# Patient Record
Sex: FEMALE | Race: BLACK OR AFRICAN AMERICAN | Employment: UNEMPLOYED | URBAN - METROPOLITAN AREA
[De-identification: names, ages, dates, MRNs, and addresses within clinical notes are randomized per-mention and may not be internally consistent; named-entity substitution may affect disease eponyms.]

---

## 2017-01-27 ENCOUNTER — HOSPITAL ENCOUNTER (EMERGENCY)
Age: 47
Discharge: HOME | End: 2017-01-28
Attending: EMERGENCY MEDICINE
Payer: MEDICAID

## 2017-01-27 DIAGNOSIS — K50.111 CROHN'S COLITIS, WITH RECTAL BLEEDING (HCC): Primary | ICD-10-CM

## 2017-01-27 DIAGNOSIS — S40.021A CONTUSION OF RIGHT UPPER ARM, INITIAL ENCOUNTER: ICD-10-CM

## 2017-01-27 LAB
ALBUMIN SERPL BCP-MCNC: 3.9 G/DL (ref 3.4–5)
ALBUMIN/GLOB SERPL: 1 {RATIO} (ref 0.8–1.7)
ALP SERPL-CCNC: 52 U/L (ref 45–117)
ALT SERPL-CCNC: 21 U/L (ref 13–56)
ANION GAP BLD CALC-SCNC: 9 MMOL/L (ref 3–18)
AST SERPL W P-5'-P-CCNC: 25 U/L (ref 15–37)
BASOPHILS # BLD AUTO: 0 K/UL (ref 0–0.06)
BASOPHILS # BLD: 0 % (ref 0–2)
BILIRUB SERPL-MCNC: 0.4 MG/DL (ref 0.2–1)
BUN SERPL-MCNC: 15 MG/DL (ref 7–18)
BUN/CREAT SERPL: 20 (ref 12–20)
CALCIUM SERPL-MCNC: 8.7 MG/DL (ref 8.5–10.1)
CHLORIDE SERPL-SCNC: 103 MMOL/L (ref 100–108)
CO2 SERPL-SCNC: 26 MMOL/L (ref 21–32)
CREAT SERPL-MCNC: 0.75 MG/DL (ref 0.6–1.3)
DIFFERENTIAL METHOD BLD: ABNORMAL
EOSINOPHIL # BLD: 0.3 K/UL (ref 0–0.4)
EOSINOPHIL NFR BLD: 3 % (ref 0–5)
ERYTHROCYTE [DISTWIDTH] IN BLOOD BY AUTOMATED COUNT: 15.8 % (ref 11.6–14.5)
GLOBULIN SER CALC-MCNC: 4 G/DL (ref 2–4)
GLUCOSE SERPL-MCNC: 90 MG/DL (ref 74–99)
HCT VFR BLD AUTO: 34.6 % (ref 35–45)
HGB BLD-MCNC: 10.8 G/DL (ref 12–16)
LIPASE SERPL-CCNC: 136 U/L (ref 73–393)
LYMPHOCYTES # BLD AUTO: 19 % (ref 21–52)
LYMPHOCYTES # BLD: 1.7 K/UL (ref 0.9–3.6)
MAGNESIUM SERPL-MCNC: 1.5 MG/DL (ref 1.8–2.4)
MCH RBC QN AUTO: 23.5 PG (ref 24–34)
MCHC RBC AUTO-ENTMCNC: 31.2 G/DL (ref 31–37)
MCV RBC AUTO: 75.4 FL (ref 74–97)
MONOCYTES # BLD: 0.7 K/UL (ref 0.05–1.2)
MONOCYTES NFR BLD AUTO: 8 % (ref 3–10)
NEUTS SEG # BLD: 6.3 K/UL (ref 1.8–8)
NEUTS SEG NFR BLD AUTO: 70 % (ref 40–73)
PLATELET # BLD AUTO: 312 K/UL (ref 135–420)
PMV BLD AUTO: 9.7 FL (ref 9.2–11.8)
POTASSIUM SERPL-SCNC: 4.8 MMOL/L (ref 3.5–5.5)
PROT SERPL-MCNC: 7.9 G/DL (ref 6.4–8.2)
RBC # BLD AUTO: 4.59 M/UL (ref 4.2–5.3)
SODIUM SERPL-SCNC: 138 MMOL/L (ref 136–145)
WBC # BLD AUTO: 9 K/UL (ref 4.6–13.2)

## 2017-01-27 PROCEDURE — 85652 RBC SED RATE AUTOMATED: CPT | Performed by: EMERGENCY MEDICINE

## 2017-01-27 PROCEDURE — 96361 HYDRATE IV INFUSION ADD-ON: CPT

## 2017-01-27 PROCEDURE — 80053 COMPREHEN METABOLIC PANEL: CPT | Performed by: EMERGENCY MEDICINE

## 2017-01-27 PROCEDURE — 85025 COMPLETE CBC W/AUTO DIFF WBC: CPT | Performed by: EMERGENCY MEDICINE

## 2017-01-27 PROCEDURE — 96374 THER/PROPH/DIAG INJ IV PUSH: CPT

## 2017-01-27 PROCEDURE — 83690 ASSAY OF LIPASE: CPT | Performed by: EMERGENCY MEDICINE

## 2017-01-27 PROCEDURE — 81003 URINALYSIS AUTO W/O SCOPE: CPT | Performed by: EMERGENCY MEDICINE

## 2017-01-27 PROCEDURE — 99284 EMERGENCY DEPT VISIT MOD MDM: CPT

## 2017-01-27 PROCEDURE — 83735 ASSAY OF MAGNESIUM: CPT | Performed by: EMERGENCY MEDICINE

## 2017-01-27 PROCEDURE — 74011250636 HC RX REV CODE- 250/636: Performed by: EMERGENCY MEDICINE

## 2017-01-27 PROCEDURE — 96375 TX/PRO/DX INJ NEW DRUG ADDON: CPT

## 2017-01-27 RX ORDER — MORPHINE SULFATE 2 MG/ML
4 INJECTION, SOLUTION INTRAMUSCULAR; INTRAVENOUS
Status: COMPLETED | OUTPATIENT
Start: 2017-01-27 | End: 2017-01-28

## 2017-01-27 RX ORDER — SODIUM CHLORIDE 0.9 % (FLUSH) 0.9 %
5-10 SYRINGE (ML) INJECTION EVERY 8 HOURS
Status: DISCONTINUED | OUTPATIENT
Start: 2017-01-27 | End: 2017-01-28 | Stop reason: HOSPADM

## 2017-01-27 RX ORDER — ONDANSETRON 2 MG/ML
4 INJECTION INTRAMUSCULAR; INTRAVENOUS
Status: COMPLETED | OUTPATIENT
Start: 2017-01-27 | End: 2017-01-27

## 2017-01-27 RX ORDER — SODIUM CHLORIDE 0.9 % (FLUSH) 0.9 %
5-10 SYRINGE (ML) INJECTION AS NEEDED
Status: DISCONTINUED | OUTPATIENT
Start: 2017-01-27 | End: 2017-01-28 | Stop reason: HOSPADM

## 2017-01-27 RX ADMIN — Medication 10 ML: at 23:37

## 2017-01-27 RX ADMIN — ONDANSETRON 4 MG: 2 INJECTION INTRAMUSCULAR; INTRAVENOUS at 23:37

## 2017-01-27 RX ADMIN — SODIUM CHLORIDE 1000 ML: 900 INJECTION, SOLUTION INTRAVENOUS at 23:37

## 2017-01-27 RX ADMIN — METHYLPREDNISOLONE SODIUM SUCCINATE 62.5 MG: 125 INJECTION, POWDER, FOR SOLUTION INTRAMUSCULAR; INTRAVENOUS at 23:46

## 2017-01-27 NOTE — Clinical Note
SPECIAL DISCHARGE INSTRUCTIONS AND COMMENTS: 
 
General comments: Thank you for allowing us to provide you with excellent care today. We hope we addressed all of your concerns and needs. We strive to provide excellent quality care in the Emergency Depart ment. If you feel that you have not received excellent quality care or timely care, please ask to speak to the nurse manager. Please choose us in the future for your continued health care needs. Follow-up comments: The exam and treatment you rece ived in the Emergency Department were for an urgent problem that may be new for you and / or one which may be related to a worsening of a chronic or ongoing medical problem that you already had prior to this visit. In any case, today's treatment is not i ntended to be considered as complete care in all cases and thus, it is important that you follow-up with a doctor, nurse practitioner, or physicians assistant to: (1) confirm your diagnosis, (2) re-evaluation of changes in your illness and treatment, an d (3) for ongoing care. In some cases we may have contacted your doctor or a specific specialist who may be involved in your future evaluation and care but in any case, take this sheet with you when you go to your follow-up visit or refer to the infor racheal on these sheets when you are calling to arrange an appointment - it may prove helpful in making the appointment. Prescribed Medications: Unless you have been directed by the provider to change your current medicines, you should continue to noble e them as before. If you have been prescribed medicines, please take them as directed. In some cases, these new medicines are intended to replace a medicine that you are currently taking and if so, this will be noted below.  
 
 Our expectation is that y our condition will improve by following the doctor's recommendations, however, if in the event your condition worsens or does not improve as expected, please follow-up with your PCP or if unable to reach your usual health care provider, you should return  to the Emergency Department. We are available 24 hours a day.   
 
SPECIFIC INSTRUCTIONS PROVIDED BY YOUR DOCTOR, Taylor Monteiro MD:

## 2017-01-28 ENCOUNTER — APPOINTMENT (OUTPATIENT)
Dept: CT IMAGING | Age: 47
End: 2017-01-28
Attending: EMERGENCY MEDICINE
Payer: MEDICAID

## 2017-01-28 VITALS
OXYGEN SATURATION: 99 % | TEMPERATURE: 98 F | HEIGHT: 66 IN | HEART RATE: 89 BPM | RESPIRATION RATE: 16 BRPM | BODY MASS INDEX: 28.12 KG/M2 | SYSTOLIC BLOOD PRESSURE: 157 MMHG | WEIGHT: 175 LBS | DIASTOLIC BLOOD PRESSURE: 119 MMHG

## 2017-01-28 LAB
APPEARANCE UR: CLEAR
BILIRUB UR QL: NEGATIVE
COLOR UR: YELLOW
ERYTHROCYTE [SEDIMENTATION RATE] IN BLOOD: 34 MM/HR (ref 0–20)
GLUCOSE UR STRIP.AUTO-MCNC: NEGATIVE MG/DL
HGB UR QL STRIP: NEGATIVE
KETONES UR QL STRIP.AUTO: 40 MG/DL
LEUKOCYTE ESTERASE UR QL STRIP.AUTO: NEGATIVE
NITRITE UR QL STRIP.AUTO: NEGATIVE
PH UR STRIP: 5.5 [PH] (ref 5–8)
PROT UR STRIP-MCNC: NEGATIVE MG/DL
SP GR UR REFRACTOMETRY: >1.03 (ref 1–1.03)
UROBILINOGEN UR QL STRIP.AUTO: 0.2 EU/DL (ref 0.2–1)

## 2017-01-28 PROCEDURE — 74011250636 HC RX REV CODE- 250/636: Performed by: EMERGENCY MEDICINE

## 2017-01-28 PROCEDURE — 72125 CT NECK SPINE W/O DYE: CPT

## 2017-01-28 PROCEDURE — 74176 CT ABD & PELVIS W/O CONTRAST: CPT

## 2017-01-28 RX ORDER — HYDROCODONE BITARTRATE AND ACETAMINOPHEN 5; 325 MG/1; MG/1
1 TABLET ORAL
Qty: 10 TAB | Refills: 0 | Status: SHIPPED | OUTPATIENT
Start: 2017-01-28 | End: 2017-02-09

## 2017-01-28 RX ORDER — PROMETHAZINE HYDROCHLORIDE 25 MG/1
25 TABLET ORAL
Qty: 12 TAB | Refills: 0 | Status: SHIPPED | OUTPATIENT
Start: 2017-01-28 | End: 2017-01-31

## 2017-01-28 RX ORDER — PREDNISONE 10 MG/1
TABLET ORAL
Qty: 43 TAB | Refills: 0 | Status: SHIPPED | OUTPATIENT
Start: 2017-01-28 | End: 2017-03-23

## 2017-01-28 RX ORDER — HYDROCHLOROTHIAZIDE 12.5 MG/1
12.5 TABLET ORAL DAILY
Qty: 30 TAB | Refills: 3 | Status: SHIPPED | OUTPATIENT
Start: 2017-01-28 | End: 2017-03-23

## 2017-01-28 RX ADMIN — Medication 4 MG: at 00:11
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
## 2017-01-28 NOTE — ED NOTES
Pt was discharged in good and improved condition. The patient's diagnosis, condition and treatment were explained to patient and aftercare instructions were given. The patient verbalized good understanding. Patient armband removed and both labels and armband were placed in shred bin. Patient left ER ambulatory with steady gait in no acute distress. 4 prescriptions provided. Patient reports pain is currently 3 on numeric scale. Patient provided education about pain medication including dosage and side effects. Patient verbalized understanding. [employer] [place] at bedside to provide transportation home. MD aware of patients blood pressure, patient has a hx. Of hypertension and has not taken her medication for tonight, MD okay with continuing discharge of patient to go home and take her BP medication.

## 2017-01-28 NOTE — ED NOTES
Pt educated on the effects of narcotic pain medication and the inability to drive after receiving narcotic pain medication in the ER. Pt verbalized understanding and states that her daughter will come pick her up. This RN spoke with patients daughter Joanie Silva on the phone, who verified that she will be here to pick patient up once she is discharged home.

## 2017-01-28 NOTE — DISCHARGE INSTRUCTIONS
Crohn's Disease: Care Instructions  Your Care Instructions    Crohn's disease is a lifelong inflammatory bowel disease (IBD). Parts of the digestive tract get swollen and irritated and may develop deep sores called ulcers. Crohn's disease usually occurs in the last part of the small intestine and the first part of the large intestine. But it can develop anywhere from the mouth to the anus. The main symptoms of Crohn's disease are belly pain, diarrhea, fever, and weight loss. Some people may have constipation. Crohn's disease also sometimes causes problems with the joints, eyes, or skin. Your symptoms may be mild at some times and severe at others. The disease can also go into remission, which means that it is not active and you have no symptoms. Bad attacks of Crohn's disease often have to be treated in the hospital so that you can get medicines and liquids through a tube in your vein, called an IV. This gives your digestive system time to rest and recover. Talk with your doctor about the best treatments for you. You may need medicines that help prevent or treat flare-ups of the disease. You may need surgery to remove part of your bowel if you have an abnormal opening in the bowel (fistula), an abscess, or a bowel obstruction. In some cases, surgery is needed if medicines do not work. But symptoms often return to other areas of the intestines after surgery. Learning good self-care can help you reduce your symptoms and manage Crohn's disease. Follow-up care is a key part of your treatment and safety. Be sure to make and go to all appointments, and call your doctor if you are having problems. Its also a good idea to know your test results and keep a list of the medicines you take. How can you care for yourself at home? · Take your medicines exactly as prescribed. Call your doctor if you think you are having a problem with your medicine.  You will get more details on the specific medicines your doctor prescribes. · Do not take anti-inflammatory medicines, such as aspirin, ibuprofen (Advil, Motrin), or naproxen (Aleve). They may make your symptoms worse. Do not take any other medicines or herbal products without talking to your doctor first.  · Avoid foods that make your symptoms worse. These might include milk, alcohol, high-fiber foods, or spicy foods. · Eat a healthy diet. Make sure to get enough iron. Rectal bleeding may make you lose iron. Good sources of iron include beef, lentils, spinach, raisins, and iron-enriched breads and cereals. · Drink liquid meal replacements if your doctor recommends them. These are high in calories and contain vitamins and minerals. Severe symptoms may make it hard for your body to absorb vitamins and minerals. · Do not smoke. Smoking makes Crohn's disease worse. If you need help quitting, talk to your doctor about stop-smoking programs and medicines. These can increase your chances of quitting for good. · Seek support from friends and family to help cope with Crohn's disease. The illness can affect all parts of your life. Get counseling if you need it. When should you call for help? Call 911 anytime you think you may need emergency care. For example, call if:  · You have severe belly pain. · You passed out (lost consciousness). Call your doctor now or seek immediate medical care if:  · You have signs of needing more fluids. You have sunken eyes and a dry mouth, and you pass only a little dark urine. · You have pain and swelling in the anal area, or you have pus draining from the anal area. · You have a fever or shaking chills. · Your belly is bloated. Watch closely for changes in your health, and be sure to contact your doctor if:  · Your symptoms get worse. · You have diarrhea for more than 2 weeks. · Your pain is not steadily getting better. · You have unexplained weight loss. Where can you learn more?   Go to http://pam.info/. Enter 21  in the search box to learn more about \"Crohn's Disease: Care Instructions. \"  Current as of: August 9, 2016  Content Version: 11.1  © 4344-4405 [employer]. Care instructions adapted under license by [employer] (which disclaims liability or warranty for this information). If you have questions about a medical condition or this instruction, always ask your healthcare professional. [place] [address] any warranty or liability for your use of this information.
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
## 2017-01-28 NOTE — ED NOTES
Pt is resting in a position of comfort on stretcher watching Tv. Patient states her pain is slightly better at a 6 on a 0-10 scale. Pt updated on plan of care waiting on test results. Pt verbalized understanding.
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
## 2017-01-31 ENCOUNTER — HOSPITAL ENCOUNTER (EMERGENCY)
Age: 47
Discharge: HOME | End: 2017-01-31
Attending: INTERNAL MEDICINE
Payer: MEDICAID

## 2017-01-31 VITALS
RESPIRATION RATE: 20 BRPM | HEART RATE: 80 BPM | BODY MASS INDEX: 28.12 KG/M2 | TEMPERATURE: 98.6 F | WEIGHT: 175 LBS | HEIGHT: 66 IN | DIASTOLIC BLOOD PRESSURE: 86 MMHG | OXYGEN SATURATION: 99 % | SYSTOLIC BLOOD PRESSURE: 155 MMHG

## 2017-01-31 DIAGNOSIS — G89.4 CHRONIC PAIN SYNDROME: Primary | ICD-10-CM

## 2017-01-31 DIAGNOSIS — K50.111 CROHN'S COLITIS, WITH RECTAL BLEEDING (HCC): ICD-10-CM

## 2017-01-31 LAB
ALBUMIN SERPL BCP-MCNC: 3.6 G/DL (ref 3.4–5)
ALBUMIN/GLOB SERPL: 1 {RATIO} (ref 0.8–1.7)
ALP SERPL-CCNC: 51 U/L (ref 45–117)
ALT SERPL-CCNC: 17 U/L (ref 13–56)
AMPHET UR QL SCN: NEGATIVE
ANION GAP BLD CALC-SCNC: 10 MMOL/L (ref 3–18)
APPEARANCE UR: CLEAR
AST SERPL W P-5'-P-CCNC: 15 U/L (ref 15–37)
BARBITURATES UR QL SCN: NEGATIVE
BASOPHILS # BLD AUTO: 0 K/UL (ref 0–0.06)
BASOPHILS # BLD: 0 % (ref 0–2)
BENZODIAZ UR QL: NEGATIVE
BILIRUB SERPL-MCNC: 0.4 MG/DL (ref 0.2–1)
BILIRUB UR QL: NEGATIVE
BUN SERPL-MCNC: 13 MG/DL (ref 7–18)
BUN/CREAT SERPL: 20 (ref 12–20)
CALCIUM SERPL-MCNC: 8.8 MG/DL (ref 8.5–10.1)
CANNABINOIDS UR QL SCN: NEGATIVE
CHLORIDE SERPL-SCNC: 101 MMOL/L (ref 100–108)
CO2 SERPL-SCNC: 26 MMOL/L (ref 21–32)
COCAINE UR QL SCN: NEGATIVE
COLOR UR: YELLOW
CREAT SERPL-MCNC: 0.66 MG/DL (ref 0.6–1.3)
DIFFERENTIAL METHOD BLD: ABNORMAL
EOSINOPHIL # BLD: 0 K/UL (ref 0–0.4)
EOSINOPHIL NFR BLD: 0 % (ref 0–5)
ERYTHROCYTE [DISTWIDTH] IN BLOOD BY AUTOMATED COUNT: 15.7 % (ref 11.6–14.5)
GLOBULIN SER CALC-MCNC: 3.6 G/DL (ref 2–4)
GLUCOSE SERPL-MCNC: 97 MG/DL (ref 74–99)
GLUCOSE UR STRIP.AUTO-MCNC: NEGATIVE MG/DL
HCT VFR BLD AUTO: 31.4 % (ref 35–45)
HDSCOM,HDSCOM: ABNORMAL
HGB BLD-MCNC: 10 G/DL (ref 12–16)
HGB UR QL STRIP: NEGATIVE
KETONES UR QL STRIP.AUTO: 40 MG/DL
LEUKOCYTE ESTERASE UR QL STRIP.AUTO: NEGATIVE
LYMPHOCYTES # BLD AUTO: 12 % (ref 21–52)
LYMPHOCYTES # BLD: 0.9 K/UL (ref 0.9–3.6)
MCH RBC QN AUTO: 23.5 PG (ref 24–34)
MCHC RBC AUTO-ENTMCNC: 31.8 G/DL (ref 31–37)
MCV RBC AUTO: 73.9 FL (ref 74–97)
METHADONE UR QL: NEGATIVE
MONOCYTES # BLD: 0.7 K/UL (ref 0.05–1.2)
MONOCYTES NFR BLD AUTO: 9 % (ref 3–10)
NEUTS SEG # BLD: 6.2 K/UL (ref 1.8–8)
NEUTS SEG NFR BLD AUTO: 79 % (ref 40–73)
NITRITE UR QL STRIP.AUTO: NEGATIVE
OPIATES UR QL: POSITIVE
PCP UR QL: NEGATIVE
PH UR STRIP: 6 [PH] (ref 5–8)
PLATELET # BLD AUTO: 323 K/UL (ref 135–420)
PMV BLD AUTO: 9.5 FL (ref 9.2–11.8)
POTASSIUM SERPL-SCNC: 3.9 MMOL/L (ref 3.5–5.5)
PROT SERPL-MCNC: 7.2 G/DL (ref 6.4–8.2)
PROT UR STRIP-MCNC: NEGATIVE MG/DL
RBC # BLD AUTO: 4.25 M/UL (ref 4.2–5.3)
SODIUM SERPL-SCNC: 137 MMOL/L (ref 136–145)
SP GR UR REFRACTOMETRY: 1.03 (ref 1–1.03)
UROBILINOGEN UR QL STRIP.AUTO: 1 EU/DL (ref 0.2–1)
WBC # BLD AUTO: 7.9 K/UL (ref 4.6–13.2)

## 2017-01-31 PROCEDURE — 80053 COMPREHEN METABOLIC PANEL: CPT

## 2017-01-31 PROCEDURE — 96374 THER/PROPH/DIAG INJ IV PUSH: CPT

## 2017-01-31 PROCEDURE — 85025 COMPLETE CBC W/AUTO DIFF WBC: CPT | Performed by: INTERNAL MEDICINE

## 2017-01-31 PROCEDURE — 74011250636 HC RX REV CODE- 250/636: Performed by: PHYSICIAN ASSISTANT

## 2017-01-31 PROCEDURE — 81003 URINALYSIS AUTO W/O SCOPE: CPT

## 2017-01-31 PROCEDURE — 96375 TX/PRO/DX INJ NEW DRUG ADDON: CPT

## 2017-01-31 PROCEDURE — 99283 EMERGENCY DEPT VISIT LOW MDM: CPT

## 2017-01-31 PROCEDURE — 36415 COLL VENOUS BLD VENIPUNCTURE: CPT | Performed by: INTERNAL MEDICINE

## 2017-01-31 PROCEDURE — 80307 DRUG TEST PRSMV CHEM ANLYZR: CPT

## 2017-01-31 RX ORDER — ONDANSETRON 2 MG/ML
8 INJECTION INTRAMUSCULAR; INTRAVENOUS ONCE
Status: COMPLETED | OUTPATIENT
Start: 2017-01-31 | End: 2017-01-31

## 2017-01-31 RX ORDER — PROMETHAZINE HYDROCHLORIDE 25 MG/1
25 TABLET ORAL
Qty: 30 TAB | Refills: 0 | Status: SHIPPED | OUTPATIENT
Start: 2017-01-31 | End: 2017-03-23

## 2017-01-31 RX ORDER — BUTORPHANOL TARTRATE 2 MG/ML
1 INJECTION INTRAMUSCULAR; INTRAVENOUS
Status: COMPLETED | OUTPATIENT
Start: 2017-01-31 | End: 2017-01-31

## 2017-01-31 RX ORDER — PREDNISONE 10 MG/1
TABLET ORAL
Qty: 43 TAB | Refills: 0 | Status: SHIPPED | OUTPATIENT
Start: 2017-01-31 | End: 2017-03-23

## 2017-01-31 RX ORDER — TRAMADOL HYDROCHLORIDE 50 MG/1
50 TABLET ORAL
Qty: 12 TAB | Refills: 0 | Status: SHIPPED | OUTPATIENT
Start: 2017-01-31 | End: 2017-03-23

## 2017-01-31 RX ADMIN — BUTORPHANOL TARTRATE 1 MG: 2 INJECTION, SOLUTION INTRAMUSCULAR; INTRAVENOUS at 20:28

## 2017-01-31 RX ADMIN — ONDANSETRON 8 MG: 2 INJECTION INTRAMUSCULAR; INTRAVENOUS at 20:28

## 2017-01-31 RX ADMIN — METHYLPREDNISOLONE SODIUM SUCCINATE 125 MG: 125 INJECTION, POWDER, FOR SOLUTION INTRAMUSCULAR; INTRAVENOUS at 20:28

## 2017-01-31 NOTE — ED PROVIDER NOTES
[address]  EMERGENCY DEPARTMENT HISTORY AND PHYSICAL EXAM       Date: 1/31/2017   Patient Name: Praveen Reddy   YOB: 1970  Medical Record Number: [id number]    Chief Complaint   Patient presents with    Abdominal Pain        History Provided By:  patient    Additional History: 40 y.o. female to ED w/ hx chronic pain, cocaine use (resulting in d/c from pain mgmt) and crohn's colitis to ED c/o continued generalized abdominal pain and bloody stool. Rain out of norco rx'd 4 days ago. Saw Dr. Michelle Rueda previously, no consistent fu \"my Crohn's flare up doesn't match his schedule. \" Had fever (101F) PTA that resolved w/ bath. Has been taking lt over oral prednisone x few days. Takes Asacol. Denies possibility of pregnancy (PSHx tubal ligation). Requesting admission for pain control. No vomiting, tolerating PO. No prior abdominal surgeries. Past Medical History:   Past Medical History   Diagnosis Date    Abdominal pain     Anemia NEC      sickle cell trait    C. difficile colitis     Crohn's disease (HCC)     Gastrointestinal disorder      Crohns    Herpes zoster     Hx of cocaine abuse     Hyperkalemia     Hypertension     Iron deficiency anemia     MRSA (methicillin resistant Staphylococcus aureus)     Obesity     Pain management     Sickle cell trait (HCC)         Past Surgical History:   Past Surgical History   Procedure Laterality Date    Hx gyn       tubal ligation    Hx tubal ligation          Social History:   Social History   Substance Use Topics    Smoking status: Former Smoker    Smokeless tobacco: None    Alcohol use No        Allergies:   No Known Allergies     Review of Systems   Review of Systems   Constitutional: Negative for activity change, chills, diaphoresis and fatigue. Gastrointestinal: Positive for abdominal pain and blood in stool. Negative for abdominal distention, diarrhea, nausea, rectal pain and vomiting.    Musculoskeletal: Negative. Skin: Negative. Neurological: Negative. Hematological: Negative. Psychiatric/Behavioral: Negative. Physical Exam  Vitals:    01/31/17 1827 01/31/17 2000 01/31/17 2100 01/31/17 2130   BP: (!) 170/114 (!) 155/100 (!) 154/98 155/86   Pulse: 80      Resp: 20      Temp: 98.6 °F (37 °C)      SpO2: 99% 100% 97% 99%   Weight: 79.4 kg (175 lb)      Height: 5' 6\" (1.676 m)          CONSTITUTIONAL: Well developed/nourished. Awake, alert. Non-toxic appearance. Not diaphoretic. Afebrile. Overweight well appearing female resting comfortably on stretcher. HEAD: NC/AT. EYES: PERRLA. EOMI. Sclera anicteric. Mucosa pink     ENT: Ears normal to external inspection. Mucous membranes moist. Nares patent. No trismus. No stridor. Secretions controlled. NECK: No adenopathy. Neck supple without meningismus. CARDIOVASCULAR: Regular rate and rhythm. No MRG. PULMONARY/CHEST: No acute respiratory distress. Moving air well. CTA-B. No wheeze/rale/rhonchi. Talking in full sentences w/o accessory muscle use. ABDOMINAL: Soft. Non-distended. Active BS all quadrants. No tenderness. No rebound, guarding, or rigidity. MUSCULOSKELETAL: FROM x 4. No peripheral edema. SKIN:  Skin warm and dry. No diaphoresis, rashes or lesions visible. Skin intact. No erythema, warmth, streaking, fluctuance or induration. NEUROLOGICAL: Alert, awake. Age appropriate behavior. Appropriately oriented. Normal speech.     DIAGNOSTICS    Medications   butorphanol (STADOL) injection 1 mg (1 mg IntraVENous Given 1/31/17 2028)   ondansetron (ZOFRAN) injection 8 mg (8 mg IntraVENous Given 1/31/17 2028)   methylPREDNISolone (PF) (SOLU-MEDROL) injection 125 mg (125 mg IntraVENous Given 1/31/17 2028)       Recent Results (from the past 12 hour(s))   DRUG SCREEN, URINE    Collection Time: 01/31/17  7:45 PM   Result Value Ref Range    BENZODIAZEPINE NEGATIVE  NEG      BARBITURATES NEGATIVE  NEG      THC (TH-CANNABINOL) NEGATIVE NEG      OPIATES POSITIVE (A) NEG      PCP(PHENCYCLIDINE) NEGATIVE  NEG      COCAINE NEGATIVE  NEG      AMPHETAMINE NEGATIVE  NEG      METHADONE NEGATIVE  NEG      HDSCOM (NOTE)    URINALYSIS W/ RFLX MICROSCOPIC    Collection Time: 01/31/17  7:45 PM   Result Value Ref Range    Color YELLOW      Appearance CLEAR      Specific gravity 1.030 1.005 - 1.030      pH (UA) 6.0 5.0 - 8.0      Protein NEGATIVE  NEG mg/dL    Glucose NEGATIVE  NEG mg/dL    Ketone 40 (A) NEG mg/dL    Bilirubin NEGATIVE  NEG      Blood NEGATIVE  NEG      Urobilinogen 1.0 0.2 - 1.0 EU/dL    Nitrites NEGATIVE  NEG      Leukocyte Esterase NEGATIVE  NEG     METABOLIC PANEL, COMPREHENSIVE    Collection Time: 01/31/17  8:15 PM   Result Value Ref Range    Sodium 137 136 - 145 mmol/L    Potassium 3.9 3.5 - 5.5 mmol/L    Chloride 101 100 - 108 mmol/L    CO2 26 21 - 32 mmol/L    Anion gap 10 3.0 - 18 mmol/L    Glucose 97 74 - 99 mg/dL    BUN 13 7.0 - 18 MG/DL    Creatinine 0.66 0.6 - 1.3 MG/DL    BUN/Creatinine ratio 20 12 - 20      GFR est AA >60 >60 ml/min/1.73m2    GFR est non-AA >60 >60 ml/min/1.73m2    Calcium 8.8 8.5 - 10.1 MG/DL    Bilirubin, total 0.4 0.2 - 1.0 MG/DL    ALT 17 13 - 56 U/L    AST 15 15 - 37 U/L    Alk. phosphatase 51 45 - 117 U/L    Protein, total 7.2 6.4 - 8.2 g/dL    Albumin 3.6 3.4 - 5.0 g/dL    Globulin 3.6 2.0 - 4.0 g/dL    A-G Ratio 1.0 0.8 - 1.7     CBC WITH AUTOMATED DIFF    Collection Time: 01/31/17  9:00 PM   Result Value Ref Range    WBC 7.9 4.6 - 13.2 K/uL    RBC 4.25 4.20 - 5.30 M/uL    HGB 10.0 (L) 12.0 - 16.0 g/dL    HCT 31.4 (L) 35.0 - 45.0 %    MCV 73.9 (L) 74.0 - 97.0 FL    MCH 23.5 (L) 24.0 - 34.0 PG    MCHC 31.8 31.0 - 37.0 g/dL    RDW 15.7 (H) 11.6 - 14.5 %    PLATELET 575 952 - 903 K/uL    MPV 9.5 9.2 - 11.8 FL    NEUTROPHILS 79 (H) 40 - 73 %    LYMPHOCYTES 12 (L) 21 - 52 %    MONOCYTES 9 3 - 10 %    EOSINOPHILS 0 0 - 5 %    BASOPHILS 0 0 - 2 %    ABS. NEUTROPHILS 6.2 1.8 - 8.0 K/UL    ABS.  LYMPHOCYTES 0.9 0.9 - 3.6 K/UL    ABS. MONOCYTES 0.7 0.05 - 1.2 K/UL    ABS. EOSINOPHILS 0.0 0.0 - 0.4 K/UL    ABS. BASOPHILS 0.0 0.0 - 0.06 K/UL    DF AUTOMATED       MEDICAL DECISION MAKING  I am the first provider for this patient. I reviewed the vital signs, available nursing notes, past medical history, past surgical history, family history and social history. Patient Vitals for the past 12 hrs:   Temp Pulse Resp BP SpO2   01/31/17 2130 - - - 155/86 99 %   01/31/17 2100 - - - (!) 154/98 97 %   01/31/17 2000 - - - (!) 155/100 100 %   01/31/17 1827 98.6 °F (37 °C) 80 20 (!) 170/114 99 %       Medical Records Reviewed: Old medical records. Previous radiology studies. Provider Notes:      -chronic pain hx/dx w/ frequent narcotics,  reviewed- extensive and frequent fills  -comprehensive w/u 4 days ago including CT which showed only mild colitis  -labs unremarkable today, no sig changes today, doubt need for repeat imaging  -today not clinically ill, septic or toxic appearing  -abdomen soft and not acute  -suspect primary reason for return visit is for more narcotics- first thing she requested during exam, stadol didn't help and wants refill  -dc from pain mgmt for cocaine abuse  - will not be refilling norco  -refuses cipro/flagyl rx, says she has at home  -stable for dc home w/ fu as already scheduled    Discharge: Pt has been reexamined. Patient has no new complaints, changes, or physical findings. Care plan outlined and precautions discussed. Results were reviewed with the patient. All medications were reviewed with the patient; will d/c home. All of pt's questions and concerns were addressed. Patient was instructed and agrees to follow up, as well as to return to the ED upon further deterioration. Patient is ready to go home. 1. Chronic pain syndrome    2.  Crohn's colitis, with rectal bleeding ([facility] [address]         DISCHARGE    Follow-up Information     Follow up With Details Comments [address], MD Schedule an appointment as soon as possible for a visit in 1 day Follow up with Dr. Braydon Chaney. [address] [place] [address] [address] [facility] EMERGENCY DEPT  As needed, If symptoms worsen [address] [address]  [phone]          Discharge Medication List as of 1/31/2017  9:57 PM      START taking these medications    Details   ! ! predniSONE (DELTASONE) 10 mg tablet 6 pills x 2 days  5 pills x 2 days  4 pills x 2 days  3 pills x 2 days  2 pills x 2 days  1 pill x 2 days  1/2 pill x 2 days, Print, Disp-43 Tab, R-0      !! promethazine (PHENERGAN) 25 mg tablet Take 1 Tab by mouth every six (6) hours as needed for Nausea., Normal, Disp-30 Tab, R-0      traMADol (ULTRAM) 50 mg tablet Take 1 Tab by mouth every six (6) hours as needed for Pain. Max Daily Amount: 200 mg., Print, Disp-12 Tab, R-0       !! - Potential duplicate medications found. Please discuss with provider. CONTINUE these medications which have NOT CHANGED    Details   ! ! predniSONE (DELTASONE) 10 mg tablet Take 6 pills for 2 days, 5 pills for 2 days, 4 pills for 2 days, 3 pills for 2 days, 2 pills for 2 days, 1 pill for 2 days, 1/2 pill for 2 days. Take with food. Indications: CROHN'S DISEASE, Print, Disp-43 Tab, R-0      HYDROcodone-acetaminophen (NORCO) 5-325 mg per tablet Take 1 Tab by mouth every four (4) hours as needed for Pain. Max Daily Amount: 6 Tabs., Print, Disp-10 Tab, R-0      hydroCHLOROthiazide (HYDRODIURIL) 12.5 mg tablet Take 1 Tab by mouth daily. , Normal, Disp-30 Tab, R-3      !! promethazine (PHENERGAN) 25 mg tablet Take 1 Tab by mouth every six (6) hours as needed. For nausea or vomiting, Print, Disp-30 Tab, R-0      mesalamine EC (ASACOL) 400 mg EC tablet Take  by mouth three (3) times daily. , Historical Med       !! - Potential duplicate medications found. Please discuss with provider. Attestations:    This note is prepared by Gabriele Lee, acting as a Scribe for [email or web address] on 7:01 PM on 1/31/2017 . R&LLONI: The scribe's documentation has been prepared under my direction and personally reviewed by me in its entirety.

## 2017-01-31 NOTE — ED TRIAGE NOTES
Pt states was seen x3 days ago for crohns flare up; bloody stools and abdominal pain. Given Norco, ran out-pain is worse, unable to see GI until next month. Sepsis Screening completed    (  )Patient meets SIRS criteria. (  x)Patient does not meet SIRS criteria.       SIRS Criteria is achieved when two or more of the following are present   Temperature < 96.8°F (36°C) or > 100.9°F (38.3°C)   Heart Rate > 90 beats per minute   Respiratory Rate > 20 beats per minute   WBC count > 12,000 or <4,000 or > 10% bands

## 2017-02-01 NOTE — DISCHARGE INSTRUCTIONS
Crohn's Disease: Care Instructions  Your Care Instructions    Crohn's disease is a lifelong inflammatory bowel disease (IBD). Parts of the digestive tract get swollen and irritated and may develop deep sores called ulcers. Crohn's disease usually occurs in the last part of the small intestine and the first part of the large intestine. But it can develop anywhere from the mouth to the anus. The main symptoms of Crohn's disease are belly pain, diarrhea, fever, and weight loss. Some people may have constipation. Crohn's disease also sometimes causes problems with the joints, eyes, or skin. Your symptoms may be mild at some times and severe at others. The disease can also go into remission, which means that it is not active and you have no symptoms. Bad attacks of Crohn's disease often have to be treated in the hospital so that you can get medicines and liquids through a tube in your vein, called an IV. This gives your digestive system time to rest and recover. Talk with your doctor about the best treatments for you. You may need medicines that help prevent or treat flare-ups of the disease. You may need surgery to remove part of your bowel if you have an abnormal opening in the bowel (fistula), an abscess, or a bowel obstruction. In some cases, surgery is needed if medicines do not work. But symptoms often return to other areas of the intestines after surgery. Learning good self-care can help you reduce your symptoms and manage Crohn's disease. Follow-up care is a key part of your treatment and safety. Be sure to make and go to all appointments, and call your doctor if you are having problems. Its also a good idea to know your test results and keep a list of the medicines you take. How can you care for yourself at home? · Take your medicines exactly as prescribed. Call your doctor if you think you are having a problem with your medicine.  You will get more details on the specific medicines your doctor prescribes. · Do not take anti-inflammatory medicines, such as aspirin, ibuprofen (Advil, Motrin), or naproxen (Aleve). They may make your symptoms worse. Do not take any other medicines or herbal products without talking to your doctor first.  · Avoid foods that make your symptoms worse. These might include milk, alcohol, high-fiber foods, or spicy foods. · Eat a healthy diet. Make sure to get enough iron. Rectal bleeding may make you lose iron. Good sources of iron include beef, lentils, spinach, raisins, and iron-enriched breads and cereals. · Drink liquid meal replacements if your doctor recommends them. These are high in calories and contain vitamins and minerals. Severe symptoms may make it hard for your body to absorb vitamins and minerals. · Do not smoke. Smoking makes Crohn's disease worse. If you need help quitting, talk to your doctor about stop-smoking programs and medicines. These can increase your chances of quitting for good. · Seek support from friends and family to help cope with Crohn's disease. The illness can affect all parts of your life. Get counseling if you need it. When should you call for help? Call 911 anytime you think you may need emergency care. For example, call if:  · You have severe belly pain. · You passed out (lost consciousness). Call your doctor now or seek immediate medical care if:  · You have signs of needing more fluids. You have sunken eyes and a dry mouth, and you pass only a little dark urine. · You have pain and swelling in the anal area, or you have pus draining from the anal area. · You have a fever or shaking chills. · Your belly is bloated. Watch closely for changes in your health, and be sure to contact your doctor if:  · Your symptoms get worse. · You have diarrhea for more than 2 weeks. · Your pain is not steadily getting better. · You have unexplained weight loss. Where can you learn more?   Go to http://pam.info/. Enter 21  in the search box to learn more about \"Crohn's Disease: Care Instructions. \"  Current as of: August 9, 2016  Content Version: 11.1  © 1646-2755 [employer]. Care instructions adapted under license by [employer] (which disclaims liability or warranty for this information). If you have questions about a medical condition or this instruction, always ask your healthcare professional. [place] [address] any warranty or liability for your use of this information.

## 2017-02-01 NOTE — ED NOTES
Upon entry for discharge patient daughter not present. Patient reports will daughter for ride home. Patient informed that IV will be removed and discharge paperwork will be reviewed when daughter returns.  Patient verbalized understanding

## 2017-02-01 NOTE — ED NOTES
I have reviewed discharge instructions with the patient. The patient verbalized understanding.   Patient armband removed and shredded      Daughter at bedside upon discharge

## 2017-02-09 ENCOUNTER — HOSPITAL ENCOUNTER (EMERGENCY)
Age: 47
Discharge: HOME | End: 2017-02-10
Attending: EMERGENCY MEDICINE | Admitting: EMERGENCY MEDICINE
Payer: MEDICAID

## 2017-02-09 ENCOUNTER — APPOINTMENT (OUTPATIENT)
Dept: CT IMAGING | Age: 47
End: 2017-02-09
Attending: EMERGENCY MEDICINE
Payer: MEDICAID

## 2017-02-09 DIAGNOSIS — R10.84 CHRONIC GENERALIZED ABDOMINAL PAIN: ICD-10-CM

## 2017-02-09 DIAGNOSIS — G89.29 CHRONIC GENERALIZED ABDOMINAL PAIN: ICD-10-CM

## 2017-02-09 DIAGNOSIS — K50.90 CROHN'S DISEASE WITHOUT COMPLICATION, UNSPECIFIED GASTROINTESTINAL TRACT LOCATION (HCC): Primary | ICD-10-CM

## 2017-02-09 LAB
ALBUMIN SERPL BCP-MCNC: 3.7 G/DL (ref 3.4–5)
ALBUMIN/GLOB SERPL: 0.9 {RATIO} (ref 0.8–1.7)
ALP SERPL-CCNC: 47 U/L (ref 45–117)
ALT SERPL-CCNC: 17 U/L (ref 13–56)
ANION GAP BLD CALC-SCNC: 11 MMOL/L (ref 3–18)
APPEARANCE UR: CLEAR
AST SERPL W P-5'-P-CCNC: 7 U/L (ref 15–37)
BASOPHILS # BLD AUTO: 0 K/UL (ref 0–0.06)
BASOPHILS # BLD: 0 % (ref 0–2)
BILIRUB SERPL-MCNC: 0.3 MG/DL (ref 0.2–1)
BILIRUB UR QL: NEGATIVE
BUN SERPL-MCNC: 13 MG/DL (ref 7–18)
BUN/CREAT SERPL: 19 (ref 12–20)
CALCIUM SERPL-MCNC: 9 MG/DL (ref 8.5–10.1)
CHLORIDE SERPL-SCNC: 98 MMOL/L (ref 100–108)
CO2 SERPL-SCNC: 29 MMOL/L (ref 21–32)
COLOR UR: YELLOW
CREAT SERPL-MCNC: 0.69 MG/DL (ref 0.6–1.3)
DIFFERENTIAL METHOD BLD: ABNORMAL
EOSINOPHIL # BLD: 0 K/UL (ref 0–0.4)
EOSINOPHIL NFR BLD: 0 % (ref 0–5)
ERYTHROCYTE [DISTWIDTH] IN BLOOD BY AUTOMATED COUNT: 16.1 % (ref 11.6–14.5)
ERYTHROCYTE [SEDIMENTATION RATE] IN BLOOD: 48 MM/HR (ref 0–20)
GLOBULIN SER CALC-MCNC: 4 G/DL (ref 2–4)
GLUCOSE SERPL-MCNC: 116 MG/DL (ref 74–99)
GLUCOSE UR STRIP.AUTO-MCNC: NEGATIVE MG/DL
HCT VFR BLD AUTO: 34.9 % (ref 35–45)
HGB BLD-MCNC: 11.1 G/DL (ref 12–16)
HGB UR QL STRIP: NEGATIVE
KETONES UR QL STRIP.AUTO: NEGATIVE MG/DL
LEUKOCYTE ESTERASE UR QL STRIP.AUTO: NEGATIVE
LIPASE SERPL-CCNC: 139 U/L (ref 73–393)
LYMPHOCYTES # BLD AUTO: 6 % (ref 21–52)
LYMPHOCYTES # BLD: 0.9 K/UL (ref 0.9–3.6)
MCH RBC QN AUTO: 23.7 PG (ref 24–34)
MCHC RBC AUTO-ENTMCNC: 31.8 G/DL (ref 31–37)
MCV RBC AUTO: 74.6 FL (ref 74–97)
MONOCYTES # BLD: 0.5 K/UL (ref 0.05–1.2)
MONOCYTES NFR BLD AUTO: 3 % (ref 3–10)
NEUTS SEG # BLD: 14.3 K/UL (ref 1.8–8)
NEUTS SEG NFR BLD AUTO: 91 % (ref 40–73)
NITRITE UR QL STRIP.AUTO: NEGATIVE
PH UR STRIP: 8 [PH] (ref 5–8)
PLATELET # BLD AUTO: 431 K/UL (ref 135–420)
PMV BLD AUTO: 9.5 FL (ref 9.2–11.8)
POTASSIUM SERPL-SCNC: 3.6 MMOL/L (ref 3.5–5.5)
PROT SERPL-MCNC: 7.7 G/DL (ref 6.4–8.2)
PROT UR STRIP-MCNC: NEGATIVE MG/DL
RBC # BLD AUTO: 4.68 M/UL (ref 4.2–5.3)
SODIUM SERPL-SCNC: 138 MMOL/L (ref 136–145)
SP GR UR REFRACTOMETRY: 1.02 (ref 1–1.03)
UROBILINOGEN UR QL STRIP.AUTO: 1 EU/DL (ref 0.2–1)
WBC # BLD AUTO: 15.6 K/UL (ref 4.6–13.2)

## 2017-02-09 PROCEDURE — 81003 URINALYSIS AUTO W/O SCOPE: CPT | Performed by: EMERGENCY MEDICINE

## 2017-02-09 PROCEDURE — 96376 TX/PRO/DX INJ SAME DRUG ADON: CPT

## 2017-02-09 PROCEDURE — 74011000258 HC RX REV CODE- 258: Performed by: EMERGENCY MEDICINE

## 2017-02-09 PROCEDURE — 96375 TX/PRO/DX INJ NEW DRUG ADDON: CPT

## 2017-02-09 PROCEDURE — 83690 ASSAY OF LIPASE: CPT | Performed by: EMERGENCY MEDICINE

## 2017-02-09 PROCEDURE — 74011250636 HC RX REV CODE- 250/636: Performed by: EMERGENCY MEDICINE

## 2017-02-09 PROCEDURE — 85025 COMPLETE CBC W/AUTO DIFF WBC: CPT | Performed by: EMERGENCY MEDICINE

## 2017-02-09 PROCEDURE — 74176 CT ABD & PELVIS W/O CONTRAST: CPT

## 2017-02-09 PROCEDURE — 99285 EMERGENCY DEPT VISIT HI MDM: CPT

## 2017-02-09 PROCEDURE — 80307 DRUG TEST PRSMV CHEM ANLYZR: CPT | Performed by: EMERGENCY MEDICINE

## 2017-02-09 PROCEDURE — 85652 RBC SED RATE AUTOMATED: CPT | Performed by: EMERGENCY MEDICINE

## 2017-02-09 PROCEDURE — 96374 THER/PROPH/DIAG INJ IV PUSH: CPT

## 2017-02-09 PROCEDURE — 80053 COMPREHEN METABOLIC PANEL: CPT | Performed by: EMERGENCY MEDICINE

## 2017-02-09 PROCEDURE — 96361 HYDRATE IV INFUSION ADD-ON: CPT

## 2017-02-09 RX ORDER — PREDNISONE 10 MG/1
TABLET ORAL
Qty: 40 TAB | Refills: 0 | Status: SHIPPED | OUTPATIENT
Start: 2017-02-09 | End: 2017-03-23

## 2017-02-09 RX ORDER — BUTORPHANOL TARTRATE 2 MG/ML
2 INJECTION INTRAMUSCULAR; INTRAVENOUS
Status: COMPLETED | OUTPATIENT
Start: 2017-02-09 | End: 2017-02-10

## 2017-02-09 RX ORDER — SODIUM CHLORIDE 0.9 % (FLUSH) 0.9 %
5-10 SYRINGE (ML) INJECTION EVERY 8 HOURS
Status: DISCONTINUED | OUTPATIENT
Start: 2017-02-09 | End: 2017-02-10 | Stop reason: HOSPADM

## 2017-02-09 RX ORDER — BUTORPHANOL TARTRATE 2 MG/ML
2 INJECTION INTRAMUSCULAR; INTRAVENOUS
Status: COMPLETED | OUTPATIENT
Start: 2017-02-09 | End: 2017-02-09

## 2017-02-09 RX ORDER — PROMETHAZINE HYDROCHLORIDE 25 MG/1
25 TABLET ORAL
Qty: 12 TAB | Refills: 0 | Status: SHIPPED | OUTPATIENT
Start: 2017-02-09 | End: 2017-03-23

## 2017-02-09 RX ORDER — SODIUM CHLORIDE 0.9 % (FLUSH) 0.9 %
5-10 SYRINGE (ML) INJECTION AS NEEDED
Status: DISCONTINUED | OUTPATIENT
Start: 2017-02-09 | End: 2017-02-10 | Stop reason: HOSPADM

## 2017-02-09 RX ORDER — TRAMADOL HYDROCHLORIDE 50 MG/1
50 TABLET ORAL
Qty: 30 TAB | Refills: 0 | Status: SHIPPED | OUTPATIENT
Start: 2017-02-09 | End: 2017-02-19

## 2017-02-09 RX ADMIN — SODIUM CHLORIDE 1000 ML: 900 INJECTION, SOLUTION INTRAVENOUS at 22:00

## 2017-02-09 RX ADMIN — PROMETHAZINE HYDROCHLORIDE 25 MG: 25 INJECTION, SOLUTION INTRAMUSCULAR; INTRAVENOUS at 22:29

## 2017-02-09 RX ADMIN — METHYLPREDNISOLONE SODIUM SUCCINATE 62.5 MG: 125 INJECTION, POWDER, FOR SOLUTION INTRAMUSCULAR; INTRAVENOUS at 23:47

## 2017-02-09 RX ADMIN — BUTORPHANOL TARTRATE 2 MG: 2 INJECTION, SOLUTION INTRAMUSCULAR; INTRAVENOUS at 22:31

## 2017-02-09 NOTE — Clinical Note
SPECIAL DISCHARGE INSTRUCTIONS AND COMMENTS: 
 
General comments: Thank you for allowing us to provide you with excellent care today. We hope we addressed all of your concerns and needs. We strive to provide excellent quality care in the Emergency Depart ment. If you feel that you have not received excellent quality care or timely care, please ask to speak to the nurse manager. Please choose us in the future for your continued health care needs. Follow-up comments: The exam and treatment you rece ived in the Emergency Department were for an urgent problem that may be new for you and / or one which may be related to a worsening of a chronic or ongoing medical problem that you already had prior to this visit. In any case, today's treatment is not i ntended to be considered as complete care in all cases and thus, it is important that you follow-up with a doctor, nurse practitioner, or physicians assistant to: (1) confirm your diagnosis, (2) re-evaluation of changes in your illness and treatment, an d (3) for ongoing care. In some cases we may have contacted your doctor or a specific specialist who may be involved in your future evaluation and care but in any case, take this sheet with you when you go to your follow-up visit or refer to the infor racheal on these sheets when you are calling to arrange an appointment - it may prove helpful in making the appointment. Prescribed Medications: Unless you have been directed by the provider to change your current medicines, you should continue to noble e them as before. If you have been prescribed medicines, please take them as directed. In some cases, these new medicines are intended to replace a medicine that you are currently taking and if so, this will be noted below.  
 
 Our expectation is that y our condition will improve by following the doctor's recommendations, however, if in the event your condition worsens or does not improve as expected, please follow-up with your PCP or if unable to reach your usual health care provider, you should return  to the Emergency Department. We are available 24 hours a day.   
 
SPECIFIC INSTRUCTIONS PROVIDED BY YOUR DOCTOR, Spencer Vela MD:

## 2017-02-10 VITALS
WEIGHT: 175 LBS | DIASTOLIC BLOOD PRESSURE: 99 MMHG | TEMPERATURE: 99 F | OXYGEN SATURATION: 99 % | RESPIRATION RATE: 16 BRPM | HEART RATE: 76 BPM | HEIGHT: 66 IN | SYSTOLIC BLOOD PRESSURE: 141 MMHG | BODY MASS INDEX: 28.12 KG/M2

## 2017-02-10 LAB
AMPHET UR QL SCN: NEGATIVE
BARBITURATES UR QL SCN: NEGATIVE
BENZODIAZ UR QL: NEGATIVE
CANNABINOIDS UR QL SCN: NEGATIVE
COCAINE UR QL SCN: NEGATIVE
HDSCOM,HDSCOM: ABNORMAL
METHADONE UR QL: NEGATIVE
OPIATES UR QL: POSITIVE
PCP UR QL: NEGATIVE

## 2017-02-10 PROCEDURE — 74011250636 HC RX REV CODE- 250/636: Performed by: EMERGENCY MEDICINE

## 2017-02-10 RX ADMIN — BUTORPHANOL TARTRATE 2 MG: 2 INJECTION, SOLUTION INTRAMUSCULAR; INTRAVENOUS at 00:03

## 2017-02-10 NOTE — DISCHARGE INSTRUCTIONS
Chronic Pain: Care Instructions  Your Care Instructions  Chronic pain is pain that lasts a long time (months or even years) and may or may not have a clear cause. It is different from acute pain, which usually does have a clear cause--like an injury or illness--and gets better over time. Chronic pain:  · Lasts over time but may vary from day to day. · Does not go away despite efforts to end it. · May disrupt your sleep and lead to fatigue. · May cause depression or anxiety. · May make your muscles tense, causing more pain. · Can disrupt your work, hobbies, home life, and relationships with friends and family. Chronic pain is a very real condition. It is not just in your head. Treatment can help and usually includes several methods used together, such as medicines, physical therapy, exercise, and other treatments. Learning how to relax and changing negative thought patterns can also help you cope. Chronic pain is complex. Taking an active role in your treatment will help you better manage your pain. Tell your doctor if you have trouble dealing with your pain. You may have to try several things before you find what works best for you. Follow-up care is a key part of your treatment and safety. Be sure to make and go to all appointments, and call your doctor if you are having problems. Its also a good idea to know your test results and keep a list of the medicines you take. How can you care for yourself at home? · Pace yourself. Break up large jobs into smaller tasks. Save harder tasks for days when you have less pain, or go back and forth between hard tasks and easier ones. Take rest breaks. · Relax, and reduce stress. Relaxation techniques such as deep breathing or meditation can help. · Keep moving. Gentle, daily exercise can help reduce pain over the long run. Try low- or no-impact exercises such as walking, swimming, and stationary biking. Do stretches to stay flexible.   · Try heat, cold packs, and massage. · Get enough sleep. Chronic pain can make you tired and drain your energy. Talk with your doctor if you have trouble sleeping because of pain. · Think positive. Your thoughts can affect your pain level. Do things that you enjoy to distract yourself when you have pain instead of focusing on the pain. See a movie, read a book, listen to music, or spend time with a friend. · If you think you are depressed, talk to your doctor about treatment. · Keep a daily pain diary. Record how your moods, thoughts, sleep patterns, activities, and medicine affect your pain. You may find that your pain is worse during or after certain activities or when you are feeling a certain emotion. Having a record of your pain can help you and your doctor find the best ways to treat your pain. · Take pain medicines exactly as directed. ¨ If the doctor gave you a prescription medicine for pain, take it as prescribed. ¨ If you are not taking a prescription pain medicine, ask your doctor if you can take an over-the-counter medicine. Reducing constipation caused by pain medicine  · Include fruits, vegetables, beans, and whole grains in your diet each day. These foods are high in fiber. · Drink plenty of fluids, enough so that your urine is light yellow or clear like water. If you have kidney, heart, or liver disease and have to limit fluids, talk with your doctor before you increase the amount of fluids you drink. · If your doctor recommends it, get more exercise. Walking is a good choice. Bit by bit, increase the amount you walk every day. Try for at least 30 minutes on most days of the week. · Schedule time each day for a bowel movement. A daily routine may help. Take your time and do not strain when having a bowel movement. When should you call for help? Call your doctor now or seek immediate medical care if:  · Your pain gets worse or is out of control.   · You feel down or blue, or you do not enjoy things like you once did. You may be depressed, which is common in people with chronic pain. Depression can be treated. · You have vomiting or cramps for more than 2 hours. Watch closely for changes in your health, and be sure to contact your doctor if:  · You cannot sleep because of pain. · You are very worried or anxious about your pain. · You have trouble taking your pain medicine. · You have any concerns about your pain medicine. · You have trouble with bowel movements, such as:  ¨ No bowel movement in 3 days. ¨ Blood in the anal area, in your stool, or on the toilet paper. ¨ Diarrhea for more than 24 hours. Where can you learn more? Go to http://johan-maria del carmen.info/. Enter N004 in the search box to learn more about \"Chronic Pain: Care Instructions. \"  Current as of: February 19, 2016  Content Version: 11.1  © 2064-4132 [employer]. Care instructions adapted under license by [employer] (which disclaims liability or warranty for this information). If you have questions about a medical condition or this instruction, always ask your healthcare professional. [place] [address] any warranty or liability for your use of this information. Diet for Inflammatory Bowel Disease: Care Instructions  Your Care Instructions    Crohns disease and ulcerative colitis are types of inflammatory bowel disease. What you eat doesn't increase the inflammation that causes your disease. But some types of foods, such as high-fiber fruits and vegetables, may make your symptoms worse. No one diet is right for everyone with an inflammatory bowel disease. Foods that bother one person may not bother another. Your diet has to be tailored for you. Follow-up care is a key part of your treatment and safety. Be sure to make and go to all appointments, and call your doctor if you are having problems.  It's also a good idea to know your test results and keep a list of the medicines you take.  How can you care for yourself at home? · Keep a food diary. As soon as you know what foods make your symptoms worse, your doctor or dietitian can help you plan the right diet for you. · During a flare-up, avoid or reduce foods that make symptoms worse. ¨ Choose dairy products that are low in lactose, such as yogurt, lactose-reduced milk, and hard cheeses like cheddar. ¨ If you have fat in your stools, choose low-fat foods instead of high-fat ones. For instance, some cuts of red meat have a lot of fat. A low-fat choice would be lean beef (such as sirloin, top and bottom round, tri, or diet lean hamburger), poultry, or fish, such as cod. ¨ Instead of frying foods, try baking or broiling them. ¨ Cook fruits and vegetables without hulls, skins, or seeds. ¨ Try different ways of preparing fruits and vegetables, such as steaming, stewing, or baking. ¨ Peel and seed fresh fruits and vegetables if these bother you, or choose canned varieties. · Get the calories and nutrients you need. ¨ Eat a varied, nutritious diet that is high in calories and protein. ¨ Try eating 3 meals plus 2 or 3 snacks a day. It may be easier to get more calories if you spread your food intake throughout the day. ¨ Take vitamin and mineral supplements if your doctor recommends them. ¨ Try adding high-calorie liquid supplements, such as Ensure Plus or Boost Plus, if you have trouble keeping your weight up. ¨ See your doctor or dietitian if your diet feels too limited or you are losing weight. · Make sure to get enough iron. Rectal bleeding may make you lose iron. Good sources of iron include:  ¨ Beef  ¨ Lentils. ¨ Spinach. ¨ Raisins. ¨ Iron-enriched breads and cereals. When should you call for help? Call 911 anytime you think you may need emergency care. For example, call if:  · You have severe belly pain. · You passed out (lost consciousness).   Call your doctor now or seek immediate medical care if:  · You have signs of needing more fluids. You have sunken eyes and a dry mouth, and you pass only a little dark urine. · You have pain and swelling in the anal area, or you have pus draining from the anal area. · You have a fever or shaking chills. · Your belly is bloated. Watch closely for changes in your health, and be sure to contact your doctor if:  · Your symptoms get worse. · You have diarrhea for more than 2 weeks. · You have unexplained weight loss. Where can you learn more? Go to http://johan-maria del carmen.info/. Enter I921 in the search box to learn more about \"Diet for Inflammatory Bowel Disease: Care Instructions. \"  Current as of: July 26, 2016  Content Version: 11.1  © 7698-5234 [employer]. Care instructions adapted under license by [employer] (which disclaims liability or warranty for this information). If you have questions about a medical condition or this instruction, always ask your healthcare professional. [place] [address] any warranty or liability for your use of this information. Abdominal Pain: Care Instructions  Your Care Instructions    Abdominal pain has many possible causes. Some aren't serious and get better on their own in a few days. Others need more testing and treatment. If your pain continues or gets worse, you need to be rechecked and may need more tests to find out what is wrong. You may need surgery to correct the problem. Don't ignore new symptoms, such as fever, nausea and vomiting, urination problems, pain that gets worse, and dizziness. These may be signs of a more serious problem. Your doctor may have recommended a follow-up visit in the next 8 to 12 hours. If you are not getting better, you may need more tests or treatment. The doctor has checked you carefully, but problems can develop later. If you notice any problems or new symptoms, get medical treatment right away.   Follow-up care is a key part of your treatment and safety. Be sure to make and go to all appointments, and call your doctor if you are having problems. It's also a good idea to know your test results and keep a list of the medicines you take. How can you care for yourself at home? · Rest until you feel better. · To prevent dehydration, drink plenty of fluids, enough so that your urine is light yellow or clear like water. Choose water and other caffeine-free clear liquids until you feel better. If you have kidney, heart, or liver disease and have to limit fluids, talk with your doctor before you increase the amount of fluids you drink. · If your stomach is upset, eat mild foods, such as rice, dry toast or crackers, bananas, and applesauce. Try eating several small meals instead of two or three large ones. · Wait until 48 hours after all symptoms have gone away before you have spicy foods, alcohol, and drinks that contain caffeine. · Do not eat foods that are high in fat. · Avoid anti-inflammatory medicines such as aspirin, ibuprofen (Advil, Motrin), and naproxen (Aleve). These can cause stomach upset. Talk to your doctor if you take daily aspirin for another health problem. When should you call for help? Call 911 anytime you think you may need emergency care. For example, call if:  · You passed out (lost consciousness). · You pass maroon or very bloody stools. · You vomit blood or what looks like coffee grounds. · You have new, severe belly pain. Call your doctor now or seek immediate medical care if:  · Your pain gets worse, especially if it becomes focused in one area of your belly. · You have a new or higher fever. · Your stools are black and look like tar, or they have streaks of blood. · You have unexpected vaginal bleeding. · You have symptoms of a urinary tract infection. These may include:  ¨ Pain when you urinate. ¨ Urinating more often than usual.  ¨ Blood in your urine.   · You are dizzy or lightheaded, or you feel like you may faint. Watch closely for changes in your health, and be sure to contact your doctor if:  · You are not getting better after 1 day (24 hours). Where can you learn more? Go to http://johan-maria del carmen.info/. Enter A378 in the search box to learn more about \"Abdominal Pain: Care Instructions. \"  Current as of: May 27, 2016  Content Version: 11.1  © 2006-2016 [employer]. Care instructions adapted under license by [employer] (which disclaims liability or warranty for this information). If you have questions about a medical condition or this instruction, always ask your healthcare professional. [place] [address] any warranty or liability for your use of this information. Crohn's Disease: Care Instructions  Your Care Instructions    Crohn's disease is a lifelong inflammatory bowel disease (IBD). Parts of the digestive tract get swollen and irritated and may develop deep sores called ulcers. Crohn's disease usually occurs in the last part of the small intestine and the first part of the large intestine. But it can develop anywhere from the mouth to the anus. The main symptoms of Crohn's disease are belly pain, diarrhea, fever, and weight loss. Some people may have constipation. Crohn's disease also sometimes causes problems with the joints, eyes, or skin. Your symptoms may be mild at some times and severe at others. The disease can also go into remission, which means that it is not active and you have no symptoms. Bad attacks of Crohn's disease often have to be treated in the hospital so that you can get medicines and liquids through a tube in your vein, called an IV. This gives your digestive system time to rest and recover. Talk with your doctor about the best treatments for you. You may need medicines that help prevent or treat flare-ups of the disease.  You may need surgery to remove part of your bowel if you have an abnormal opening in the bowel (fistula), an abscess, or a bowel obstruction. In some cases, surgery is needed if medicines do not work. But symptoms often return to other areas of the intestines after surgery. Learning good self-care can help you reduce your symptoms and manage Crohn's disease. Follow-up care is a key part of your treatment and safety. Be sure to make and go to all appointments, and call your doctor if you are having problems. Its also a good idea to know your test results and keep a list of the medicines you take. How can you care for yourself at home? · Take your medicines exactly as prescribed. Call your doctor if you think you are having a problem with your medicine. You will get more details on the specific medicines your doctor prescribes. · Do not take anti-inflammatory medicines, such as aspirin, ibuprofen (Advil, Motrin), or naproxen (Aleve). They may make your symptoms worse. Do not take any other medicines or herbal products without talking to your doctor first.  · Avoid foods that make your symptoms worse. These might include milk, alcohol, high-fiber foods, or spicy foods. · Eat a healthy diet. Make sure to get enough iron. Rectal bleeding may make you lose iron. Good sources of iron include beef, lentils, spinach, raisins, and iron-enriched breads and cereals. · Drink liquid meal replacements if your doctor recommends them. These are high in calories and contain vitamins and minerals. Severe symptoms may make it hard for your body to absorb vitamins and minerals. · Do not smoke. Smoking makes Crohn's disease worse. If you need help quitting, talk to your doctor about stop-smoking programs and medicines. These can increase your chances of quitting for good. · Seek support from friends and family to help cope with Crohn's disease. The illness can affect all parts of your life. Get counseling if you need it. When should you call for help? Call 911 anytime you think you may need emergency care.  For example, call if:  · You have severe belly pain. · You passed out (lost consciousness). Call your doctor now or seek immediate medical care if:  · You have signs of needing more fluids. You have sunken eyes and a dry mouth, and you pass only a little dark urine. · You have pain and swelling in the anal area, or you have pus draining from the anal area. · You have a fever or shaking chills. · Your belly is bloated. Watch closely for changes in your health, and be sure to contact your doctor if:  · Your symptoms get worse. · You have diarrhea for more than 2 weeks. · Your pain is not steadily getting better. · You have unexplained weight loss. Where can you learn more? Go to http://johan-maria del carmen.info/. Enter [id number] [id number] in the search box to learn more about \"Crohn's Disease: Care Instructions. \"  Current as of: August 9, 2016  Content Version: 11.1  © 6551-4900 [employer]. Care instructions adapted under license by [employer] (which disclaims liability or warranty for this information). If you have questions about a medical condition or this instruction, always ask your healthcare professional. [address] any warranty or liability for your use of this information.

## 2017-02-10 NOTE — ED NOTES
Report received, care of pt assumed at this time. Resting in position of comfort. IVF infusing at this time with no s/sx of inflammation or infiltration. Call bell in reach.

## 2017-02-10 NOTE — ED NOTES
Bedside and Verbal shift change report given to Howard Villarreal. RN (oncoming nurse) by Dianne Bacon (offgoing nurse). Report included the following information SBAR, ED Summary, ED Summary, Recent Results. Pt resting comfortably in stretcher. Updated pt on plan of care awaiting test results. Pt verbalized understanding, warm blankets provided to pt for comfort, bed in lowest lock position, side rails up x2, call bell in reach of pt and pt instructed to use call bell for assistance.

## 2017-02-10 NOTE — ED NOTES
Pt comes in tonight with complaints of crohn's flare up. Pt states  \"abd pain, nausea, vomiting and bloody stools have been on and off for a couple of weeks. \" Pt states she was recently seen in the ER for the same symptoms and has not followed up with her PCP since she was last seen.

## 2017-02-10 NOTE — ED NOTES
Pt is resting in a position of comfort on stretcher watching TV. Pt updated on plan of care waiting on test results. Pt verbalized understanding.

## 2017-02-10 NOTE — ED NOTES
Pt was educated on receiving narcotic pain medication and the inability to drive afterwards, patient verbalized understanding and states her son will be picking her up. Spoke with patients son Nicholas Manzanares on the phone who states that he drove the patient to the ER and will be here when she is discharged from the ER to drive her home.

## 2017-02-10 NOTE — ED NOTES
Pt discharged home stable and ambulatory. Pain level at discharge 7. Pt discharged with son. Reviewed discharged instructions with patient who verbalized understanding.   Patient armband removed and shredded

## 2017-02-10 NOTE — ED NOTES
Dr Rafi Munoz made aware of patient being a hard stick for IV's and blood, ultrasound machine brought to bedside per MD request for IV access.

## 2017-02-10 NOTE — ED PROVIDER NOTES
HPI Comments:   8:44 PM  40 y.o. female presents to ED c/o chronic worsening 8/10 abdominal pain. Associated symptoms include blood in stool, fever, chills, nausea, and decreased appetite. Reports she was seen for same sxs 2 weeks ago, the sxs eased after tx in ED, then began to flare up again today. Pt has an appointment with Yaya Baxter MD at the end of the month. PMHx of Crohn's disease, anemia, HTN, and obesity. Pt denies vomiting, cough, and any other Sx or complaints. Patient is a 40 y.o. female presenting with abdominal pain. The history is provided by the patient. No  was used. Abdominal Pain    This is a chronic problem. The problem has been gradually worsening. The pain is located in the generalized abdominal region. The pain is at a severity of 8/10. Associated symptoms include a fever and vomiting. Pertinent negatives include no diarrhea, no nausea, no dysuria, no frequency, no hematuria, no headaches, no arthralgias, no myalgias, no chest pain and no back pain. Her past medical history is significant for Crohn's disease. Past Medical History:   Diagnosis Date    Abdominal pain     Anemia NEC      sickle cell trait    C. difficile colitis     Crohn's disease (HCC)     Gastrointestinal disorder      Crohns    Herpes zoster     Hx of cocaine abuse     Hyperkalemia     Hypertension     Iron deficiency anemia     MRSA (methicillin resistant Staphylococcus aureus)     Obesity     Pain management     Sickle cell trait (HCC)        Past Surgical History:   Procedure Laterality Date    Hx gyn       tubal ligation    Hx tubal ligation           History reviewed. No pertinent family history. Social History     Social History    Marital status:      Spouse name: N/A    Number of children: N/A    Years of education: N/A     Occupational History    Not on file.      Social History Main Topics    Smoking status: Former Smoker    Smokeless tobacco: Not on file    Alcohol use No    Drug use: No    Sexual activity: Yes     Partners: Male     Birth control/ protection: Surgical     Other Topics Concern    Not on file     Social History Narrative         ALLERGIES: Review of patient's allergies indicates no known allergies. Review of Systems   Constitutional: Positive for appetite change (decreased), chills and fever. HENT: Negative for congestion, rhinorrhea and sore throat. Eyes: Negative for pain, discharge and visual disturbance. Respiratory: Negative for cough, chest tightness, shortness of breath and wheezing. Cardiovascular: Negative for chest pain and leg swelling. Gastrointestinal: Positive for abdominal pain, blood in stool and vomiting. Negative for diarrhea and nausea. Endocrine: Negative for polydipsia and polyuria. Genitourinary: Negative for difficulty urinating, dysuria, frequency, hematuria, menstrual problem, pelvic pain, urgency, vaginal bleeding and vaginal discharge. Musculoskeletal: Negative for arthralgias, back pain and myalgias. Skin: Negative for color change. Neurological: Negative for weakness, numbness and headaches. Hematological: Does not bruise/bleed easily. Psychiatric/Behavioral: Negative for decreased concentration. All other systems reviewed and are negative. Vitals:    02/09/17 1905 02/09/17 2107 02/09/17 2204 02/09/17 2217   BP: 127/87      Pulse: (!) 108  95 89   Resp: 20  19 14   Temp: 99 °F (37.2 °C)      SpO2: 100% 100% 100% 100%   Weight: 79.4 kg (175 lb)      Height: 5' 6\" (1.676 m)               Physical Exam   Nursing note and vitals reviewed. -------------------------PHYSICAL EXAM-------------------------    Vital signs and nursing notes reviewed  Nursing note and VS were reviewed      CONSTITUTIONAL: NAD. Well developed, well nourished and appears adequately hydrated. Awake and alert. Non-toxic in appearance, not diaphoretic. Afebrile.      HEAD: Normocephalic, Atraumatic. EYES: Pupils are equal, round and reactive. Extra-ocular movements intact. Sclera anicteric. Conjunctiva not injected. ENT: Mucous membranes are moist. There is no erythema or swelling of the mucosal tissues or enlargement of the tonsillar tissue or the presence of exudates. No oral lesions or thrush. The left TM is unremarkable. Both EAC's are without swelling or erythema. Both TM's unremarkable. Nasal mucosa pink with no discharge and the turbinates are of normal size. NECK: Normal ROM. Neck is supple. No posterior cervical paraspinal or midline tenderness. No obvious enlargement of the thyroid. No significant anterior cervical adenopathy. Trachea is midline. CARDIOVASCULAR:  regular rhythm. No murmurs, rubs or gallops. Distal pulses are 2+ and equal.    PULMONARY: Respiratory effort is normal and without the use of accessory muscles. Patient is speaking in full sentences. Clear to auscultation bilaterally. No wheezing, rales or rhonchi. CHEST WALL: Normal shape;  non tender to palpation; no crepitus    ABDOMINAL: Soft and non-distended. Diffuse mild tenderness. NO peritoneal signs - No rebound, guarding or rigidity. Active bowel sounds present. BACK: No thoracolumbar midline or paraspinal tenderness. Full range of motion. No CVA tenderness. MUSCULOSKELETAL: No obvious soft tissue tenderness or sites of bony tenderness or deformities; Full range of motion in all extremities. No obvious muscle tenderness. No joint inflammation. No LE edema. SKIN: Skin is warm and dry. Good skin turgor. No suspiscious rashes. No diaphoresis, lesions, or petechiae. Cap Refill is Normal.    NEUROLOGICAL: Alert, awake and appropriately oriented. Normal speech. CN's are normal;  Motor - no focal weakness; no obvious sensory loss; Cerebellar function- intact; DTR's - 2+ equal    PSYCH: Appropriate affect; normal thought content; no expressed suicidal ideation.       MDM  Number of Diagnoses or Management Options  Diagnosis management comments: INITIAL CLINICAL IMPRESSION and PLANS:  The patient presents with the primary complaint(s) of: abdominal pain. The presentation, to include historical aspects and clinical findings appear to be consistent with the DX of flare of Crohn's dz. However, other possible DX's to consider as primary, associated with, or exacerbated by include:    1. Chronic abdominal pain  2. Drug seeking behavior    Considering the above, my initial management plan to evaluate and therapeutic interventions include: Obtain Lab Studies,, Obtain Radiologic studies, and Initiate Medications and or IV Fluids,  As well as those noted in the orders:    Amelia Quach MD        Amount and/or Complexity of Data Reviewed  Clinical lab tests: ordered and reviewed  Tests in the radiology section of CPT®: ordered and reviewed (CT A/P)      ED Course       Procedures      PROGRESS NOTE:  11:55 PM  I have evaluated the patient. The patient notes feeling better. Clinically:  The patient appears to have demonstrated some overall improvement in response to intervention(s). Ancillary test results: \"were reviewed and and discussed with the patient. Impression:  Flare of Crohns disease with a CT that is not much different than prior. No evidence of complication. Action:   \"patient can be readied for discharge     Bree Delgado MD      ED CLINICAL SUMMARY - DISCHARGE     8:07 PM  CLINICAL COURSE while in the ED:      Intervention)s) while in ED:  ACTIONS / APPROACH: Based on the presenting CHRONIC history of abdominal pain. My initial focus was to Determine the cause and extent of the problem and Initiate Treatment as Appropriate . Details of actions taken are noted below. SPECIFICS REGARDING APPROACH:     1.  DIAGNOSTIC RESULTS:     PULSE OXIMETRY NOTE:  19:05   Pulse-ox is 100% on room air  Interpretation: normal  Intervention: none   Written by RUTH Farmer, as dictated by Emery Bates MD     CT ABD PELV WO CONT    (Results Pending)            Labs Reviewed   CBC WITH AUTOMATED DIFF - Abnormal; Notable for the following:        Result Value    WBC 15.6 (*)     HGB 11.1 (*)     HCT 34.9 (*)     MCH 23.7 (*)     RDW 16.1 (*)     PLATELET 105 (*)     NEUTROPHILS 91 (*)     LYMPHOCYTES 6 (*)     ABS. NEUTROPHILS 14.3 (*)     All other components within normal limits   METABOLIC PANEL, COMPREHENSIVE - Abnormal; Notable for the following:     Chloride 98 (*)     Glucose 116 (*)     AST (SGOT) 7 (*)     All other components within normal limits   SED RATE (ESR) - Abnormal; Notable for the following:     Sed rate, automated 48 (*)     All other components within normal limits   URINALYSIS W/ RFLX MICROSCOPIC   LIPASE   DRUG SCREEN, URINE           Recent Results (from the past 12 hour(s))   URINALYSIS W/ RFLX MICROSCOPIC    Collection Time: 02/09/17  8:45 PM   Result Value Ref Range    Color YELLOW      Appearance CLEAR      Specific gravity 1.019 1.005 - 1.030      pH (UA) 8.0 5.0 - 8.0      Protein NEGATIVE  NEG mg/dL    Glucose NEGATIVE  NEG mg/dL    Ketone NEGATIVE  NEG mg/dL    Bilirubin NEGATIVE  NEG      Blood NEGATIVE  NEG      Urobilinogen 1.0 0.2 - 1.0 EU/dL    Nitrites NEGATIVE  NEG      Leukocyte Esterase NEGATIVE  NEG     CBC WITH AUTOMATED DIFF    Collection Time: 02/09/17  9:40 PM   Result Value Ref Range    WBC 15.6 (H) 4.6 - 13.2 K/uL    RBC 4.68 4.20 - 5.30 M/uL    HGB 11.1 (L) 12.0 - 16.0 g/dL    HCT 34.9 (L) 35.0 - 45.0 %    MCV 74.6 74.0 - 97.0 FL    MCH 23.7 (L) 24.0 - 34.0 PG    MCHC 31.8 31.0 - 37.0 g/dL    RDW 16.1 (H) 11.6 - 14.5 %    PLATELET 646 (H) 469 - 420 K/uL    MPV 9.5 9.2 - 11.8 FL    NEUTROPHILS 91 (H) 40 - 73 %    LYMPHOCYTES 6 (L) 21 - 52 %    MONOCYTES 3 3 - 10 %    EOSINOPHILS 0 0 - 5 %    BASOPHILS 0 0 - 2 %    ABS. NEUTROPHILS 14.3 (H) 1.8 - 8.0 K/UL    ABS. LYMPHOCYTES 0.9 0.9 - 3.6 K/UL    ABS.  MONOCYTES 0.5 0.05 - 1.2 K/UL ABS. EOSINOPHILS 0.0 0.0 - 0.4 K/UL    ABS. BASOPHILS 0.0 0.0 - 0.06 K/UL    DF AUTOMATED     METABOLIC PANEL, COMPREHENSIVE    Collection Time: 02/09/17  9:40 PM   Result Value Ref Range    Sodium 138 136 - 145 mmol/L    Potassium 3.6 3.5 - 5.5 mmol/L    Chloride 98 (L) 100 - 108 mmol/L    CO2 29 21 - 32 mmol/L    Anion gap 11 3.0 - 18 mmol/L    Glucose 116 (H) 74 - 99 mg/dL    BUN 13 7.0 - 18 MG/DL    Creatinine 0.69 0.6 - 1.3 MG/DL    BUN/Creatinine ratio 19 12 - 20      GFR est AA >60 >60 ml/min/1.73m2    GFR est non-AA >60 >60 ml/min/1.73m2    Calcium 9.0 8.5 - 10.1 MG/DL    Bilirubin, total 0.3 0.2 - 1.0 MG/DL    ALT (SGPT) 17 13 - 56 U/L    AST (SGOT) 7 (L) 15 - 37 U/L    Alk. phosphatase 47 45 - 117 U/L    Protein, total 7.7 6.4 - 8.2 g/dL    Albumin 3.7 3.4 - 5.0 g/dL    Globulin 4.0 2.0 - 4.0 g/dL    A-G Ratio 0.9 0.8 - 1.7     SED RATE (ESR)    Collection Time: 02/09/17  9:40 PM   Result Value Ref Range    Sed rate, automated 48 (H) 0 - 20 mm/hr   LIPASE    Collection Time: 02/09/17  9:40 PM   Result Value Ref Range    Lipase 139 73 - 393 U/L       2. MEDICATIONS GIVEN:   Medications   sodium chloride (NS) flush 5-10 mL (not administered)   sodium chloride (NS) flush 5-10 mL (not administered)   sodium chloride 0.9 % bolus infusion 1,000 mL (1,000 mL IntraVENous New Bag 2/9/17 2200)   promethazine (PHENERGAN) 25 mg in 0.9% sodium chloride 50 mL IVPB (25 mg IntraVENous New Bag 2/9/17 2229)   butorphanol (STADOL) injection 2 mg (2 mg IntraVENous Given 2/9/17 2231)        Response to Intervention(s):   IMPROVED       Unanticipated Developments: NONE     ED COURSE - General Comment:  During the ED course I had re-evaluated the patient, answered their and /or their family's questions regarding my clinical impression, the patient's condition and plans for therapeutic interventions. The patient's ED course was uneventful and remained stable throughout.     CLINICAL IMPRESSION AND DISCUSSION:   I reviewed our electronic medical record system for any past medical records that were available that may contribute to the patients current condition, the nursing notes and vital signs from today's visit. Based on the clinical presentation, findings and results of diagnostic studies, as well as developments while in the ED,  I suspect the following: For the presentation noted above    The most likely cause or diagnosis is: flare of Crohn's disease        DISCUSSION REGARDING DIAGNOSIS: The specifics regarding my clinical impression / diagnosis are as follows: At this time there is no clinical evidence to support other pertinent diagnostic considerations such as:   N/A    Finally, other diagnostic considerations during this visit are noted below in Clinical Impression. Specific Conversations: With pt - need for close f/u with her GI specialist.      DISPOSITION DECISION:     DISCHARGE: I feel that we have optimized outpatient assessment and management such that Jolly Chiang is stable to be discharged and to continue with her care or complete any additional evaluation as appropriate at home or as an outpatient. Preparations will be made to discharge the patient. Present condition at the time of disposition: STABLE    DISCUSSION REGARDING THE DISPOSITION:         DISCHARGE NOTE:    Margaritoelvia Curry's  results have been reviewed with her. She has been counseled regarding her diagnosis, treatment, and plan. She verbally conveys understanding and agreement of the signs, symptoms, diagnosis, treatment and prognosis and additionally agrees to follow up as discussed. She also agrees with the care-plan and conveys that all of her questions have been answered.   I have also provided discharge instructions for her that include: educational information regarding their diagnosis and treatment, and list of reasons why they would want to return to the ED prior to their follow-up appointment, should her condition change. The patient and/or family has been provided with education for proper Emergency Department utilization. CLINICAL IMPRESSION  1. Crohn's disease without complication, unspecified gastrointestinal tract location ([address]    2. Chronic generalized abdominal pain        PLAN:  1. D/C home  2. Patient's Medications   Start Taking    No medications on file   Continue Taking    HYDROCHLOROTHIAZIDE (HYDRODIURIL) 12.5 MG TABLET    Take 1 Tab by mouth daily. MESALAMINE EC (ASACOL) 400 MG EC TABLET    Take  by mouth three (3) times daily. PREDNISONE (DELTASONE) 10 MG TABLET    Take 6 pills for 2 days, 5 pills for 2 days, 4 pills for 2 days, 3 pills for 2 days, 2 pills for 2 days, 1 pill for 2 days, 1/2 pill for 2 days. Take with food. Indications: CROHN'S DISEASE    PREDNISONE (DELTASONE) 10 MG TABLET    6 pills x 2 days  5 pills x 2 days  4 pills x 2 days  3 pills x 2 days  2 pills x 2 days  1 pill x 2 days  1/2 pill x 2 days    PROMETHAZINE (PHENERGAN) 25 MG TABLET    Take 1 Tab by mouth every six (6) hours as needed. For nausea or vomiting    PROMETHAZINE (PHENERGAN) 25 MG TABLET    Take 1 Tab by mouth every six (6) hours as needed for Nausea. TRAMADOL (ULTRAM) 50 MG TABLET    Take 1 Tab by mouth every six (6) hours as needed for Pain. Max Daily Amount: 200 mg. These Medications have changed    No medications on file   Stop Taking    HYDROCODONE-ACETAMINOPHEN (NORCO) 5-325 MG PER TABLET    Take 1 Tab by mouth every four (4) hours as needed for Pain. Max Daily Amount: 6 Tabs. 3.   Follow-up Information     Follow up With Details Comments Mac Rojas MD Go to For follow up with your appointment as scheduled. [address] [facility] [address] [facility] EMERGENCY DEPT Go to As needed, If symptoms worsen.  [address] [place] [id number] [phone]        Return if sxs worsen    ATTESTATIONS:  This note is prepared by Jorge Luevano Franco, acting as Scribe for Chelsea Marte MD.    Chelsea Marte MD: The scribe's documentation has been prepared under my direction and personally reviewed by me in its entirety. I confirm that the note above accurately reflects all work, treatment, procedures, and medical decision making performed by me.

## 2017-02-10 NOTE — ED TRIAGE NOTES
C/o chronic abd pain, blood in stool. States having a crohns flare up, seen for same x2 last week. Appt with Dr. Haydee Morel at end of the month. Sepsis Screening completed    (  )Patient meets SIRS criteria. ( x )Patient does not meet SIRS criteria.       SIRS Criteria is achieved when two or more of the following are present   Temperature < 96.8°F (36°C) or > 100.9°F (38.3°C)   Heart Rate > 90 beats per minute   Respiratory Rate > 20 beats per minute   WBC count > 12,000 or <4,000 or > 10% bands

## 2017-03-10 ENCOUNTER — APPOINTMENT (OUTPATIENT)
Dept: MRI IMAGING | Age: 47
DRG: 045 | End: 2017-03-10
Attending: EMERGENCY MEDICINE
Payer: MEDICAID

## 2017-03-10 ENCOUNTER — APPOINTMENT (OUTPATIENT)
Dept: CT IMAGING | Age: 47
DRG: 045 | End: 2017-03-10
Attending: PHYSICIAN ASSISTANT
Payer: MEDICAID

## 2017-03-10 ENCOUNTER — HOSPITAL ENCOUNTER (INPATIENT)
Age: 47
LOS: 13 days | Discharge: HOME HEALTH | DRG: 045 | End: 2017-03-23
Attending: EMERGENCY MEDICINE | Admitting: FAMILY MEDICINE
Payer: MEDICAID

## 2017-03-10 DIAGNOSIS — E87.6 HYPOKALEMIA: ICD-10-CM

## 2017-03-10 DIAGNOSIS — I63.9 CEREBROVASCULAR ACCIDENT (CVA), UNSPECIFIED MECHANISM (HCC): Primary | ICD-10-CM

## 2017-03-10 LAB
ALBUMIN SERPL BCP-MCNC: 2.9 G/DL (ref 3.4–5)
ALBUMIN/GLOB SERPL: 0.7 {RATIO} (ref 0.8–1.7)
ALP SERPL-CCNC: 40 U/L (ref 45–117)
ALT SERPL-CCNC: 15 U/L (ref 13–56)
AMPHET UR QL SCN: NEGATIVE
ANION GAP BLD CALC-SCNC: 8 MMOL/L (ref 3–18)
APPEARANCE UR: CLEAR
APTT PPP: 24.7 SEC (ref 23–36.4)
AST SERPL W P-5'-P-CCNC: 10 U/L (ref 15–37)
BARBITURATES UR QL SCN: NEGATIVE
BASOPHILS # BLD AUTO: 0 K/UL (ref 0–0.1)
BASOPHILS # BLD: 0 % (ref 0–3)
BENZODIAZ UR QL: NEGATIVE
BILIRUB SERPL-MCNC: 0.2 MG/DL (ref 0.2–1)
BILIRUB UR QL: NEGATIVE
BUN SERPL-MCNC: 10 MG/DL (ref 7–18)
BUN/CREAT SERPL: 14 (ref 12–20)
CALCIUM SERPL-MCNC: 8.6 MG/DL (ref 8.5–10.1)
CANNABINOIDS UR QL SCN: NEGATIVE
CHLORIDE SERPL-SCNC: 98 MMOL/L (ref 100–108)
CK MB CFR SERPL CALC: ABNORMAL % (ref 0–4)
CK MB SERPL-MCNC: <1 NG/ML (ref 5–25)
CK SERPL-CCNC: 15 U/L (ref 26–192)
CO2 SERPL-SCNC: 34 MMOL/L (ref 21–32)
COCAINE UR QL SCN: NEGATIVE
COLOR UR: YELLOW
CREAT SERPL-MCNC: 0.73 MG/DL (ref 0.6–1.3)
DIFFERENTIAL METHOD BLD: ABNORMAL
EOSINOPHIL # BLD: 0 K/UL (ref 0–0.4)
EOSINOPHIL NFR BLD: 0 % (ref 0–5)
ERYTHROCYTE [DISTWIDTH] IN BLOOD BY AUTOMATED COUNT: 17 % (ref 11.6–14.5)
GLOBULIN SER CALC-MCNC: 3.9 G/DL (ref 2–4)
GLUCOSE SERPL-MCNC: 115 MG/DL (ref 74–99)
GLUCOSE UR STRIP.AUTO-MCNC: NEGATIVE MG/DL
HCG UR QL: NEGATIVE
HCG UR QL: NEGATIVE
HCT VFR BLD AUTO: 27.2 % (ref 35–45)
HDSCOM,HDSCOM: ABNORMAL
HGB BLD-MCNC: 8.4 G/DL (ref 12–16)
HGB UR QL STRIP: NEGATIVE
INR PPP: 0.9 (ref 0.8–1.2)
KETONES UR QL STRIP.AUTO: NEGATIVE MG/DL
LEUKOCYTE ESTERASE UR QL STRIP.AUTO: NEGATIVE
LYMPHOCYTES # BLD AUTO: 2 % (ref 20–51)
LYMPHOCYTES # BLD: 0.3 K/UL (ref 0.8–3.5)
MAGNESIUM SERPL-MCNC: 1.9 MG/DL (ref 1.8–2.4)
MCH RBC QN AUTO: 24.3 PG (ref 24–34)
MCHC RBC AUTO-ENTMCNC: 30.9 G/DL (ref 31–37)
MCV RBC AUTO: 78.6 FL (ref 74–97)
METHADONE UR QL: NEGATIVE
MONOCYTES # BLD: 1 K/UL (ref 0–1)
MONOCYTES NFR BLD AUTO: 7 % (ref 2–9)
NEUTS BAND NFR BLD MANUAL: 4 % (ref 0–5)
NEUTS SEG # BLD: 12.1 K/UL (ref 1.8–8)
NEUTS SEG NFR BLD AUTO: 87 % (ref 42–75)
NITRITE UR QL STRIP.AUTO: NEGATIVE
NRBC BLD-RTO: 2 PER 100 WBC
OPIATES UR QL: POSITIVE
PCP UR QL: NEGATIVE
PH UR STRIP: 6.5 [PH] (ref 5–8)
PLATELET # BLD AUTO: 546 K/UL (ref 135–420)
PLATELET COMMENTS,PCOM: ABNORMAL
PMV BLD AUTO: 8.6 FL (ref 9.2–11.8)
POTASSIUM SERPL-SCNC: 2.9 MMOL/L (ref 3.5–5.5)
PROT SERPL-MCNC: 6.8 G/DL (ref 6.4–8.2)
PROT UR STRIP-MCNC: NEGATIVE MG/DL
PROTHROMBIN TIME: 12 SEC (ref 11.5–15.2)
RBC # BLD AUTO: 3.46 M/UL (ref 4.2–5.3)
RBC MORPH BLD: ABNORMAL
SODIUM SERPL-SCNC: 140 MMOL/L (ref 136–145)
SP GR UR REFRACTOMETRY: 1.02 (ref 1–1.03)
TROPONIN I SERPL-MCNC: <0.02 NG/ML (ref 0–0.06)
UROBILINOGEN UR QL STRIP.AUTO: 1 EU/DL (ref 0.2–1)
WBC # BLD AUTO: 13.9 K/UL (ref 4.6–13.2)
WBC MORPH BLD: ABNORMAL

## 2017-03-10 PROCEDURE — 74011250636 HC RX REV CODE- 250/636: Performed by: EMERGENCY MEDICINE

## 2017-03-10 PROCEDURE — 82550 ASSAY OF CK (CPK): CPT | Performed by: EMERGENCY MEDICINE

## 2017-03-10 PROCEDURE — 70450 CT HEAD/BRAIN W/O DYE: CPT

## 2017-03-10 PROCEDURE — 85025 COMPLETE CBC W/AUTO DIFF WBC: CPT | Performed by: EMERGENCY MEDICINE

## 2017-03-10 PROCEDURE — 85730 THROMBOPLASTIN TIME PARTIAL: CPT | Performed by: EMERGENCY MEDICINE

## 2017-03-10 PROCEDURE — 99285 EMERGENCY DEPT VISIT HI MDM: CPT

## 2017-03-10 PROCEDURE — 74011250637 HC RX REV CODE- 250/637: Performed by: EMERGENCY MEDICINE

## 2017-03-10 PROCEDURE — 74011250637 HC RX REV CODE- 250/637: Performed by: INTERNAL MEDICINE

## 2017-03-10 PROCEDURE — 80307 DRUG TEST PRSMV CHEM ANLYZR: CPT | Performed by: EMERGENCY MEDICINE

## 2017-03-10 PROCEDURE — 74011250637 HC RX REV CODE- 250/637: Performed by: FAMILY MEDICINE

## 2017-03-10 PROCEDURE — 70551 MRI BRAIN STEM W/O DYE: CPT

## 2017-03-10 PROCEDURE — 65610000006 HC RM INTENSIVE CARE

## 2017-03-10 PROCEDURE — 83735 ASSAY OF MAGNESIUM: CPT | Performed by: FAMILY MEDICINE

## 2017-03-10 PROCEDURE — 81025 URINE PREGNANCY TEST: CPT | Performed by: EMERGENCY MEDICINE

## 2017-03-10 PROCEDURE — 85610 PROTHROMBIN TIME: CPT | Performed by: EMERGENCY MEDICINE

## 2017-03-10 PROCEDURE — 74011250636 HC RX REV CODE- 250/636: Performed by: INTERNAL MEDICINE

## 2017-03-10 PROCEDURE — 80053 COMPREHEN METABOLIC PANEL: CPT | Performed by: EMERGENCY MEDICINE

## 2017-03-10 PROCEDURE — B246ZZ4 ULTRASONOGRAPHY OF RIGHT AND LEFT HEART, TRANSESOPHAGEAL: ICD-10-PCS | Performed by: INTERNAL MEDICINE

## 2017-03-10 PROCEDURE — 81003 URINALYSIS AUTO W/O SCOPE: CPT | Performed by: EMERGENCY MEDICINE

## 2017-03-10 PROCEDURE — 93005 ELECTROCARDIOGRAM TRACING: CPT

## 2017-03-10 PROCEDURE — 74011250636 HC RX REV CODE- 250/636: Performed by: FAMILY MEDICINE

## 2017-03-10 PROCEDURE — 70547 MR ANGIOGRAPHY NECK W/O DYE: CPT

## 2017-03-10 PROCEDURE — 70544 MR ANGIOGRAPHY HEAD W/O DYE: CPT

## 2017-03-10 RX ORDER — ASPIRIN 325 MG
325 TABLET ORAL
Status: COMPLETED | OUTPATIENT
Start: 2017-03-10 | End: 2017-03-10

## 2017-03-10 RX ORDER — SODIUM CHLORIDE 9 MG/ML
50 INJECTION, SOLUTION INTRAVENOUS CONTINUOUS
Status: DISCONTINUED | OUTPATIENT
Start: 2017-03-10 | End: 2017-03-14

## 2017-03-10 RX ORDER — GUAIFENESIN 100 MG/5ML
162 LIQUID (ML) ORAL DAILY
Status: DISCONTINUED | OUTPATIENT
Start: 2017-03-11 | End: 2017-03-11

## 2017-03-10 RX ORDER — SODIUM CHLORIDE 0.9 % (FLUSH) 0.9 %
5-10 SYRINGE (ML) INJECTION EVERY 8 HOURS
Status: DISCONTINUED | OUTPATIENT
Start: 2017-03-10 | End: 2017-03-10

## 2017-03-10 RX ORDER — SODIUM CHLORIDE 0.9 % (FLUSH) 0.9 %
5-10 SYRINGE (ML) INJECTION AS NEEDED
Status: DISCONTINUED | OUTPATIENT
Start: 2017-03-10 | End: 2017-03-11 | Stop reason: SDUPTHER

## 2017-03-10 RX ORDER — ATORVASTATIN CALCIUM 20 MG/1
80 TABLET, FILM COATED ORAL
Status: DISCONTINUED | OUTPATIENT
Start: 2017-03-10 | End: 2017-03-23 | Stop reason: HOSPADM

## 2017-03-10 RX ORDER — SODIUM CHLORIDE 9 MG/ML
125 INJECTION, SOLUTION INTRAVENOUS CONTINUOUS
Status: DISCONTINUED | OUTPATIENT
Start: 2017-03-10 | End: 2017-03-10

## 2017-03-10 RX ORDER — POTASSIUM CHLORIDE 7.45 MG/ML
10 INJECTION INTRAVENOUS
Status: COMPLETED | OUTPATIENT
Start: 2017-03-10 | End: 2017-03-10

## 2017-03-10 RX ORDER — MORPHINE SULFATE 2 MG/ML
2 INJECTION, SOLUTION INTRAMUSCULAR; INTRAVENOUS
Status: DISCONTINUED | OUTPATIENT
Start: 2017-03-10 | End: 2017-03-18

## 2017-03-10 RX ORDER — SODIUM CHLORIDE 0.9 % (FLUSH) 0.9 %
5-10 SYRINGE (ML) INJECTION EVERY 8 HOURS
Status: DISCONTINUED | OUTPATIENT
Start: 2017-03-10 | End: 2017-03-11 | Stop reason: SDUPTHER

## 2017-03-10 RX ORDER — ACETAMINOPHEN 325 MG/1
650 TABLET ORAL
Status: DISCONTINUED | OUTPATIENT
Start: 2017-03-10 | End: 2017-03-11 | Stop reason: SDUPTHER

## 2017-03-10 RX ADMIN — POTASSIUM CHLORIDE 10 MEQ: 10 INJECTION, SOLUTION INTRAVENOUS at 20:18

## 2017-03-10 RX ADMIN — ATORVASTATIN CALCIUM 80 MG: 20 TABLET, FILM COATED ORAL at 21:20

## 2017-03-10 RX ADMIN — POTASSIUM CHLORIDE 10 MEQ: 10 INJECTION, SOLUTION INTRAVENOUS at 22:28

## 2017-03-10 RX ADMIN — Medication 2 MG: at 22:50

## 2017-03-10 RX ADMIN — POTASSIUM CHLORIDE 10 MEQ: 10 INJECTION, SOLUTION INTRAVENOUS at 01:00

## 2017-03-10 RX ADMIN — SODIUM CHLORIDE 125 ML/HR: 900 INJECTION, SOLUTION INTRAVENOUS at 19:40

## 2017-03-10 RX ADMIN — ASPIRIN 325 MG ORAL TABLET 325 MG: 325 PILL ORAL at 18:39

## 2017-03-10 RX ADMIN — ACETAMINOPHEN 650 MG: 325 TABLET, FILM COATED ORAL at 21:21

## 2017-03-10 RX ADMIN — Medication 10 ML: at 20:17

## 2017-03-10 RX ADMIN — POTASSIUM CHLORIDE 10 MEQ: 10 INJECTION, SOLUTION INTRAVENOUS at 21:24

## 2017-03-10 NOTE — ED PROVIDER NOTES
HPI Comments:   3:50 PM   Deysi Ontiveros is a 40 y.o. Female with a hx of iron deficient anemia and HTN presenting via EMS to the ED C/O left sided weakness and left facial droop, onset 10 hours ago. EMS reports on their exam, FSBS was 146 mg/dL and pt had left sided weakness to her UE with no  and positive drift. Pt was seen at [facility] yesterday for acute abdominal pain and was discharged home. Pt apparently put on a Fentanyl 50 mcg patch about 13 hours ago and still had it on upon EMS arrival this afternoon. No known injury/trauma/fall. No other sxs or complaints. Patient is a 40 y.o. female presenting with facial droop. The history is provided by the EMS personnel and the patient. No  was used. Facial Droop   This is a new problem. The current episode started 6 to 12 hours ago. The problem has not changed since onset. Past Medical History:   Diagnosis Date    Abdominal pain     Anemia NEC     sickle cell trait    C. difficile colitis     Crohn's disease (HCC)     Gastrointestinal disorder     Crohns    Herpes zoster     Hx of cocaine abuse     Hyperkalemia     Hypertension     Iron deficiency anemia     MRSA (methicillin resistant Staphylococcus aureus)     Obesity     Pain management     Sickle cell trait (HCC)        Past Surgical History:   Procedure Laterality Date    HX GYN      tubal ligation    HX TUBAL LIGATION           History reviewed. No pertinent family history. Social History     Social History    Marital status:      Spouse name: N/A    Number of children: N/A    Years of education: N/A     Occupational History    Not on file. Social History Main Topics    Smoking status: Former Smoker    Smokeless tobacco: Not on file    Alcohol use No    Drug use:  Yes    Sexual activity: Yes     Partners: Male     Birth control/ protection: Surgical     Other Topics Concern    Not on file     Social History Narrative         ALLERGIES: Review of patient's allergies indicates no known allergies. Review of Systems   Neurological: Positive for facial asymmetry (left facial droop) and weakness (left sided). All other systems reviewed and are negative. Vitals:    03/10/17 1645 03/10/17 1647 03/10/17 1700 03/10/17 1715   BP: 123/86  104/72 (!) 140/92   Pulse: (!) 104  (!) 105 (!) 101   Resp: 21  16 11   Temp:       SpO2: 98% 98%     Weight:       Height:                Physical Exam   Constitutional: She appears well-developed and well-nourished. No distress. NAD   HENT:   Head: Normocephalic and atraumatic. Eyes: Pupils are equal, round, and reactive to light. Neck: Neck supple. Cardiovascular: Normal rate, regular rhythm, S1 normal, S2 normal and normal heart sounds. Pulmonary/Chest: Breath sounds normal. No respiratory distress. She has no wheezes. She has no rales. She exhibits no tenderness. Lungs clear. Abdominal: Soft. She exhibits no distension and no mass. There is no tenderness. There is no guarding. Musculoskeletal: Normal range of motion. She exhibits no edema or tenderness. Neurological: She is alert. There is left facial droop. Speech is slightly slurred. There is left hemiparesis. Skin: No rash noted. Psychiatric: She has a normal mood and affect. Her behavior is normal. Thought content normal.   Nursing note and vitals reviewed. RESULTS:    EKG interpretation: (Preliminary)  5:23 PM   NSR, rate 99 bpm. Normal EKG. EKG read by Nicky Saleem MD    CARDIAC MONITOR NOTE:  4:18 PM    Cardiac Rhythm: Sinus tachycardia  Rate: 119 bpm  Intervention: None     CT HEAD WO CONT   Final Result:  1. Focally abnormal gray-white matter differentiation with associated edema  involving the right parietal and temporal lobes, spanning the sylvian fissure in  keeping with developing infarct.  Punctate area of increased density within the  sylvian M2 segment is nonspecific, but may pertain to a small in situ thrombus. No evidence of acute intra-axial or extra-axial fluid collection.     CODE S findings discussed with  in the ED at the time of the scan. As read by the radiologist.            Labs Reviewed   CARDIAC PANEL,(CK, CKMB & TROPONIN) - Abnormal; Notable for the following:        Result Value    CK 15 (*)     All other components within normal limits   CBC WITH AUTOMATED DIFF - Abnormal; Notable for the following:     WBC 13.9 (*)     RBC 3.46 (*)     HGB 8.4 (*)     HCT 27.2 (*)     MCHC 30.9 (*)     RDW 17.0 (*)     PLATELET 429 (*)     MPV 8.6 (*)     NEUTROPHILS 87 (*)     LYMPHOCYTES 2 (*)     NRBC 2.0 (*)     ABS. NEUTROPHILS 12.1 (*)     ABS. LYMPHOCYTES 0.3 (*)     All other components within normal limits   METABOLIC PANEL, COMPREHENSIVE - Abnormal; Notable for the following:     Potassium 2.9 (*)     Chloride 98 (*)     CO2 34 (*)     Glucose 115 (*)     AST (SGOT) 10 (*)     Alk.  phosphatase 40 (*)     Albumin 2.9 (*)     A-G Ratio 0.7 (*)     All other components within normal limits   DRUG SCREEN, URINE - Abnormal; Notable for the following:     OPIATES POSITIVE (*)     All other components within normal limits   URINALYSIS W/ RFLX MICROSCOPIC   HCG URINE, QL   PROTHROMBIN TIME + INR   PTT   HCG URINE, QL. - POC       Recent Results (from the past 12 hour(s))   CARDIAC PANEL,(CK, CKMB & TROPONIN)    Collection Time: 03/10/17  4:30 PM   Result Value Ref Range    CK 15 (L) 26 - 192 U/L    CK - MB <1.0 <3.6 ng/ml    CK-MB Index Cannot be calulated 0.0 - 4.0 %    Troponin-I, Qt. <0.02 0.00 - 0.06 NG/ML   CBC WITH AUTOMATED DIFF    Collection Time: 03/10/17  4:30 PM   Result Value Ref Range    WBC 13.9 (H) 4.6 - 13.2 K/uL    RBC 3.46 (L) 4.20 - 5.30 M/uL    HGB 8.4 (L) 12.0 - 16.0 g/dL    HCT 27.2 (L) 35.0 - 45.0 %    MCV 78.6 74.0 - 97.0 FL    MCH 24.3 24.0 - 34.0 PG    MCHC 30.9 (L) 31.0 - 37.0 g/dL    RDW 17.0 (H) 11.6 - 14.5 %    PLATELET 805 (H) 103 - 420 K/uL    MPV 8.6 (L) 9.2 - 11.8 FL    NEUTROPHILS 87 (H) 42 - 75 %    BAND NEUTROPHILS 4 0 - 5 %    LYMPHOCYTES 2 (L) 20 - 51 %    MONOCYTES 7 2 - 9 %    EOSINOPHILS 0 0 - 5 %    BASOPHILS 0 0 - 3 %    NRBC 2.0 (H) 0  WBC    ABS. NEUTROPHILS 12.1 (H) 1.8 - 8.0 K/UL    ABS. LYMPHOCYTES 0.3 (L) 0.8 - 3.5 K/UL    ABS. MONOCYTES 1.0 0 - 1.0 K/UL    ABS. EOSINOPHILS 0.0 0.0 - 0.4 K/UL    ABS. BASOPHILS 0.0 0.0 - 0.1 K/UL    PLATELET COMMENTS INCREASED PLATELETS      RBC COMMENTS ANISOCYTOSIS  1+        RBC COMMENTS SPHEROCYTES  1+        RBC COMMENTS POLYCHROMASIA  1+        RBC COMMENTS HYPOCHROMIA  1+        RBC COMMENTS OVALOCYTES  1+        RBC COMMENTS POIKILOCYTOSIS  1+        RBC COMMENTS TEARDROP CELLS  1+        RBC COMMENTS TARGET CELLS  FEW  MICROCYTOSIS  1+        WBC COMMENTS TOXIC GRANULATION      DF MANUAL     METABOLIC PANEL, COMPREHENSIVE    Collection Time: 03/10/17  4:30 PM   Result Value Ref Range    Sodium 140 136 - 145 mmol/L    Potassium 2.9 (LL) 3.5 - 5.5 mmol/L    Chloride 98 (L) 100 - 108 mmol/L    CO2 34 (H) 21 - 32 mmol/L    Anion gap 8 3.0 - 18 mmol/L    Glucose 115 (H) 74 - 99 mg/dL    BUN 10 7.0 - 18 MG/DL    Creatinine 0.73 0.6 - 1.3 MG/DL    BUN/Creatinine ratio 14 12 - 20      GFR est AA >60 >60 ml/min/1.73m2    GFR est non-AA >60 >60 ml/min/1.73m2    Calcium 8.6 8.5 - 10.1 MG/DL    Bilirubin, total 0.2 0.2 - 1.0 MG/DL    ALT (SGPT) 15 13 - 56 U/L    AST (SGOT) 10 (L) 15 - 37 U/L    Alk.  phosphatase 40 (L) 45 - 117 U/L    Protein, total 6.8 6.4 - 8.2 g/dL    Albumin 2.9 (L) 3.4 - 5.0 g/dL    Globulin 3.9 2.0 - 4.0 g/dL    A-G Ratio 0.7 (L) 0.8 - 1.7     URINALYSIS W/ RFLX MICROSCOPIC    Collection Time: 03/10/17  4:30 PM   Result Value Ref Range    Color YELLOW      Appearance CLEAR      Specific gravity 1.021 1.005 - 1.030      pH (UA) 6.5 5.0 - 8.0      Protein NEGATIVE  NEG mg/dL    Glucose NEGATIVE  NEG mg/dL    Ketone NEGATIVE  NEG mg/dL    Bilirubin NEGATIVE  NEG      Blood NEGATIVE  NEG Urobilinogen 1.0 0.2 - 1.0 EU/dL    Nitrites NEGATIVE  NEG      Leukocyte Esterase NEGATIVE  NEG     DRUG SCREEN, URINE    Collection Time: 03/10/17  4:30 PM   Result Value Ref Range    BENZODIAZEPINE NEGATIVE  NEG      BARBITURATES NEGATIVE  NEG      THC (TH-CANNABINOL) NEGATIVE  NEG      OPIATES POSITIVE (A) NEG      PCP(PHENCYCLIDINE) NEGATIVE  NEG      COCAINE NEGATIVE  NEG      AMPHETAMINE NEGATIVE  NEG      METHADONE NEGATIVE  NEG      HDSCOM (NOTE)    HCG URINE, QL    Collection Time: 03/10/17  4:30 PM   Result Value Ref Range    HCG urine, Ql. NEGATIVE  NEG     PROTHROMBIN TIME + INR    Collection Time: 03/10/17  5:00 PM   Result Value Ref Range    Prothrombin time 12.0 11.5 - 15.2 sec    INR 0.9 0.8 - 1.2     PTT    Collection Time: 03/10/17  5:00 PM   Result Value Ref Range    aPTT 24.7 23.0 - 36.4 SEC   EKG, 12 LEAD, INITIAL    Collection Time: 03/10/17  5:23 PM   Result Value Ref Range    Ventricular Rate 99 BPM    Atrial Rate 99 BPM    P-R Interval 144 ms    QRS Duration 86 ms    Q-T Interval 368 ms    QTC Calculation (Bezet) 472 ms    Calculated P Axis 30 degrees    Calculated R Axis 27 degrees    Calculated T Axis 44 degrees    Diagnosis       Normal sinus rhythm  Normal ECG  When compared with ECG of 26-FEB-2016 06:43,  Criteria for Anteroseptal infarct are no longer present  Nonspecific T wave abnormality no longer evident in Anterior leads     HCG URINE, QL. - POC    Collection Time: 03/10/17  5:35 PM   Result Value Ref Range    Pregnancy test,urine (POC) NEGATIVE  NEG          MDM  Number of Diagnoses or Management Options  Cerebrovascular accident (CVA), unspecified mechanism ([facility] [address]): Hypokalemia:   Diagnosis management comments: INITIAL CLINICAL IMPRESSION and PLANS:  The patient presents with the primary complaint(s) of: left sided weakness and left facial droop.  The presentation, to include historical aspects and clinical findings are consistent with the DX of stroke, which was confirmed via immediate CT. Considering the above, my initial management plan to evaluate and therapeutic interventions include the following and as noted in the orders:    1. Labs: UDS, UA, CMP, CBC, Cardiac Panel, HCG Urine Ql, PTT, Prothrombin time + INR  2. Imaging: CT head, MRI brain, MRA brain, MRA neck       Amount and/or Complexity of Data Reviewed  Clinical lab tests: ordered and reviewed  Tests in the radiology section of CPT®: ordered and reviewed (CT head, MRI brain, MRA brain, MRA neck)  Tests in the medicine section of CPT®: ordered and reviewed (EKG)  Discuss the patient with other providers: yes (Dr. Candida Watkins (Tele Neurology), Yoel Darby MD (Neurology), Paddy Menon DO (Internal Medicine))  Independent visualization of images, tracings, or specimens: yes (EKG)    Critical Care  Total time providing critical care: 30-74 minutes (45)    ED Course     MEDICATIONS GIVEN:  Medications   sodium chloride (NS) flush 5-10 mL (not administered)   sodium chloride (NS) flush 5-10 mL (not administered)   aspirin (ASPIRIN) tablet 325 mg (not administered)        Procedures    PROGRESS NOTE:   3:39 PM  Code S called. PROGRESS NOTE:   3:50 PM  Initial assessment performed. CONSULT NOTE:   4:00 PM  Terese Kennedy MD spoke with Dr. Candida Watkins  Specialty: Tele Neurology  Discussed pt's hx, disposition, and available diagnostic and imaging results over the telephone. Reviewed care plans. He recommends admission for stroke and obtaining MRI brain, MRA brain, and MRA neck with contrast.   Written by Melania Hong ED Scribariane, as dictated by Terese Kennedy MD.    CONSULT NOTE:   4:40 PM  Terese Kennedy MD spoke with Yoel Darby MD    Specialty: Neurology  Discussed pt's hx, disposition, and available diagnostic and imaging results over the telephone. Reviewed care plans. Consulting physician agrees with plans as outlined. He recommends admission to the ICU and states he will consult the pt. Written by RUTH Merrill Che, as dictated by Sam Ruiz MD.    CONSULT NOTE:   4:55 PM  Sam Ruiz MD spoke with Shawanda Vasquez DO    Specialty: Internal Medicine   Discussed pt's hx, disposition, and available diagnostic and imaging results over the telephone. Reviewed care plans. Consulting physician agrees with plans as outlined. He agrees to admit the pt to the ICU. Written by RUTH Merrill Che, as dictated by Sam Ruiz MD.     ADMISSION NOTE:  4:55 PM  Patient is being admitted to the hospital by Shawanda Vasquez DO. The results of their tests and reasons for their admission have been discussed with them and/or available family. They convey agreement and understanding for the need to be admitted and for their admission diagnosis. Written by RUTH Merrill Che, as dictated by Sam Ruiz MD.    CONDITIONS ON ADMISSION:  Thrombosis is not present at the time of admission. Urinary Tract Infection is not present at the time of admission. MRSA is not present at the time of admission. Wound infection is not present at the time of admission. Pressure Ulcer is not present at the time of admission. Procedure Note- Peripheral IV Access  5:35 PM  Performed by: MD Sam Cam MD gained IV access using 20 gauge needle because the patient had no vascular access. After cleaning the site with alcohol prep, the right EJ vein was localized without ultrasound guidance in an anterior approach. Line confirmation was obtained by direct visualization and good blood return. No anaesthetic was used. The line was successfully flushed with normal saline and was secured with transparent tape. The procedure took 1-15 minutes, and pt tolerated well. Written by RUTH Merrill Che, as dictated by Sam Ruiz MD.        CLINICAL IMPRESSION    1. Cerebrovascular accident (CVA), unspecified mechanism ([address]    2. Hypokalemia         Attestations: This note is prepared by John Kapadia, acting as Scribe for Guille Prabhakar MD.    Guille Prabhakar MD: The scribe's documentation has been prepared under my direction and personally reviewed by me in its entirety. I confirm that the note above accurately reflects all work, treatment, procedures, and medical decision making performed by me.     5:17 PM  I have spent 45 minutes of critical care time involved in lab review, consultations with specialist, family decision-making, and documentation. During this entire length of time I was immediately available to the patient. Critical Care: The reason for providing this level of medical care for this critically ill patient was due a critical illness that impaired one or more vital organ systems such that there was a high probability of imminent or life threatening deterioration in the patients condition. This care involved high complexity decision making to assess, manipulate, and support vital system functions, to treat this degreee vital organ system failure and to prevent further life threatening deterioration of the patients condition.

## 2017-03-10 NOTE — ED TRIAGE NOTES
Patient arrived via ems. Patient was called a code S from the field by RONNY Tristan. patient states her last normal was before 6am. Patient was seen at [facility] yesterday for acute abdominal pain was discharged home. Patients daughter called 911 because of illness she was not acting right. Found patient in her bed patient had left sided facial droop and left arm numbness and right leg tingling. Patient has fentanyl patch she applied at 0300 am and started having facial droop at 6am per patient. States she fell in bathroom earlier today due to her weakness. Sepsis Screening completed    (  )Patient meets SIRS criteria. ( x )Patient does not meet SIRS criteria.       SIRS Criteria is achieved when two or more of the following are present   Temperature < 96.8°F (36°C) or > 100.9°F (38.3°C)   Heart Rate > 90 beats per minute   Respiratory Rate > 20 breaths per minute   WBC count > 12,000 or <4,000 or > 10% bands

## 2017-03-10 NOTE — IP AVS SNAPSHOT
Summary of Care Report The Summary of Care report has been created to help improve care coordination. Users with access to [employer] or [address] (Web-based application) may access additional patient information including the Discharge Summary. If you are not currently a [address] user and need more information, please call the number listed below in the [place] [address] section and ask to be connected with Medical Records. Facility Information Name Address Phone [address] [address] [phone] Patient Information Patient Name Sex  Baltazar Gallardo ([id number]) Female 1970 Discharge Information Admitting Provider Service Area Unit Kingsley De Luna, DO / [address] [facility]/Med / [phone] Discharge Provider Discharge Date/Time Discharge Disposition Destination [facility] [place], [place] / [phone] 3/23/2017 (Pending) [facility] (none) Patient Language Language ENGLISH [13] Hospital Problems as of 3/23/2017  Reviewed: 3/15/2017 12:04 PM by Alok Rey MD  
  
  
  
 Class Noted - Resolved Last Modified POA Active Problems Chronic pain syndrome  2012 - Present 3/15/2017 by Laverne Hagan MD Yes Entered by Diana Zavaleta MD  
  Crohn disease [facility])  2012 - Present 3/10/2017 by [address], DO Yes Entered by Diana Zavaleta MD  
  Sickle cell trait [facility])  Unknown - Present 3/10/2017 by [address], DO Yes Entered by [address], DO Hypertension  Unknown - Present 3/10/2017 by [address], DO Yes Entered by [address], DO Hx of cocaine abuse  Unknown - Present 3/10/2017 by [address], DO Yes Entered by [address], DO Embolic stroke ([facility] [address]  777 - Present 3/15/2017 by Laverne Hagan MD Yes Entered by Mohsen Garcia DO Neuropathic pain  3/15/2017 - Present 3/15/2017 by Kristal Salguero MD Yes Entered by Kristal Salguero MD  
  DVT (deep venous thrombosis) ([facility] [address]  3/16/2017 - Present 3/16/2017 by Kristal Salguero MD Unknown Entered by Kristal Salguero MD  
  
Non-Hospital Problems as of 3/23/2017  Reviewed: 3/15/2017 12:04 PM by Jeneal Krabbe, MD  
 None You are allergic to the following No active allergies Current Discharge Medication List  
  
START taking these medications Dose & Instructions Dispensing Information Comments  
 atorvastatin 80 mg tablet Commonly known as:  LIPITOR Dose:  80 mg Take 1 Tab by mouth nightly. Quantity:  30 Tab Refills:  2  
   
 enoxaparin 80 mg/0.8 mL injection Commonly known as:  LOVENOX Dose:  80 mg  
80 mg by SubCUTAneous route every twelve (12) hours. Quantity:  10 Syringe Refills:  0  
   
 gabapentin 400 mg capsule Commonly known as:  NEURONTIN Dose:  400 mg Take 1 Cap by mouth three (3) times daily. Quantity:  90 Cap Refills:  2  
   
 mesalamine 500 mg CR capsule Commonly known as:  PENTASA Replaces:  mesalamine  mg EC tablet Dose:  1000 mg Take 2 Caps by mouth four (4) times daily. Indications: CROHN'S DISEASE Quantity:  120 Cap Refills:  0  
   
 oxyCODONE-acetaminophen 5-325 mg per tablet Commonly known as:  PERCOCET Dose:  1 Tab Take 1 Tab by mouth every four (4) hours as needed for Pain. Max Daily Amount: 6 Tabs. Quantity:  20 Tab Refills:  0 CONTINUE these medications which have CHANGED Dose & Instructions Dispensing Information Comments  
 predniSONE 20 mg tablet Commonly known as:  Jonetta Cass What changed:   
- medication strength 
- how much to take 
- how to take this - when to take this 
- additional instructions - Another medication with the same name was removed. Continue taking this medication, and follow the directions you see here. Dose:  40 mg Take 2 Tabs by mouth daily (with breakfast). Indications: CROHN'S DISEASE Quantity:  20 Tab Refills:  0 STOP taking these medications Comments  
 hydroCHLOROthiazide 12.5 mg tablet Commonly known as:  HYDRODIURIL  
   
   
 mesalamine  mg EC tablet Commonly known as:  ASACOL Replaced by:  mesalamine 500 mg CR capsule  
   
   
 promethazine 25 mg tablet Commonly known as:  PHENERGAN  
   
   
 traMADol 50 mg tablet Commonly known as:  ULTRAM  
   
   
  
  
  
Current Immunizations Name Date Pneumococcal Vaccine (Unspecified Type) 1/1/2010 Surgery Information ID Date/Time Status Primary Surgeon All Procedures Location [id number] 3/17/2017 [address] [place] [address] MD Milad COLONOSCOPY; BIOPSY; THE [facility] ENDOSCOPY    
 COLONOSCOPY; BIOPSY;:  COLONOSCOPY Follow-up Information Follow up With Details Comments Contact Info [facility]   [phone] [facility] OP INFUSION [facility] On 3/24/2017 Follow-up appointment @ 1:00 p.m.  (Located on the Third Floor at [address] [facility] 
[phone] Sandor Romberg, MD On 3/27/2017 Follow-up appointment @ 10:20 a.m. Please arrive by 10:00 a.m. to complete paperwork. [id number] [address], [address] Discharge Instructions DISCHARGE SUMMARY from Nurse The following personal items are in your possession at time of discharge: 
 
Dental Appliances: None Visual Aid: None Home Medications: None Jewelry: None Clothing: None Other Valuables: Cell Phone, [address] PATIENT INSTRUCTIONS: 
 
 
F-face looks uneven A-arms unable to move or move unevenly S-speech slurred or non-existent T-time-call 911 as soon as signs and symptoms begin-DO NOT go  
 Back to bed or wait to see if you get better-TIME IS BRAIN. Warning Signs of HEART ATTACK Call 911 if you have these symptoms: 
? Chest discomfort. Most heart attacks involve discomfort in the center of the chest that lasts more than a few minutes, or that goes away and comes back. It can feel like uncomfortable pressure, squeezing, fullness, or pain. ? Discomfort in other areas of the upper body. Symptoms can include pain or discomfort in one or both arms, the back, neck, jaw, or stomach. ? Shortness of breath with or without chest discomfort. ? Other signs may include breaking out in a cold sweat, nausea, or lightheadedness. Don't wait more than five minutes to call [address]! Fast action can save your life. Calling 911 is almost always the fastest way to get lifesaving treatment. Emergency Medical Services staff can begin treatment when they arrive  up to an hour sooner than if someone gets to the hospital by car. The discharge information has been reviewed with the {PATIENT PARENT GUARDIAN:42630}. The {PATIENT PARENT GUARDIAN:92055} verbalized understanding. Discharge medications reviewed with the {Dishcarge meds reviewed SNUC:99613} and appropriate educational materials and side effects teaching were provided. Chart Review Routing History Recipient Method Report Sent By [facility] Rosa Ponce MD  
Fax: [phone] Phone: [phone] Fax Provider Comm Report Dea Maddox [[id number]] 2/28/2012  4:27 PM 02/27/2012 Jody Ponce MD  
Fax: [phone] Phone: [phone] Fax Provider Comm Report Dea Maddox [[id number]] 7/11/2012 10:09 AM 06/04/2012 Provider Comm Report Dea Maddox [[id number]] 7/11/2012 10:09 AM 04/09/2012 Provider Comm Report Dea Maddox [[id number]] 7/11/2012 10:09 AM 02/27/2012 Arun Hubbard MD  
Phone: [phone]  In [employer] Routed Ryan Asif MD [[id number]] 8/8/2015 6:51 PM 08/08/2015 Tarun Dave MD  
Phone: [phone] In Basket IP Auto Routed Trans Vilma Avilez MD [[id number]] 8/9/2015  8:21 PM 08/09/2015 Cricket Candelaria MD  
Phone: [phone] In Basket IP Auto Routed Trans Vilma Avilez MD [[id number]] 8/9/2015  8:21 PM 08/09/2015 Eulalia Leal DO Fax: [phone] Phone: [phone] Fax IP Auto Routed Jessee Alvarado MD [id number] 3/17/2017  3:15 PM 03/17/2017

## 2017-03-10 NOTE — ED NOTES
Neurologist Dr. Elan De La Vega on [facility] assessed patient informed her she has had a stroke. Patient has a fentanyl patch 50mcg to left arm.  Removed per Dr. Naomi Altman and waisted per protocol per witness of Bairon Bernal RN

## 2017-03-10 NOTE — ED NOTES
Was reported to this RN due to IV access unable to do mri and mri of brain with contrast. Dr. Victorina Aburto was aware and said this was all right per Dr. Victorina Aburto but the MRI and MRA  Of brain without contrast was completed.

## 2017-03-10 NOTE — IP AVS SNAPSHOT
Sonam Jane 
 
 
 [address] [facility] [address] 
[phone] Patient: Troy Box MRN: [id number] NVY:5/86/6123 You are allergic to the following No active allergies Recent Documentation Height Weight Breastfeeding? BMI OB Status Smoking Status 1.676 m 85.1 kg No 30.28 kg/m2 Having regular periods Former Smoker Emergency Contacts Name Discharge Info Relation Home Work Mobile Sherry Green DISCHARGE CAREGIVER [3] Parent [1] [phone] About your hospitalization You were admitted on:  March 10, 2017 You last received care in the:  [address] [place] [address] You were discharged on:  March 23, 2017 Unit phone number:  [phone] Why you were hospitalized Your primary diagnosis was:  Not on File Your diagnoses also included:  Crohn Disease (Hcc), Obesity, Hx Of Cocaine Abuse, Hypertension, Sickle Cell Trait (Hcc), Hypokalemia, Embolic Stroke (Hcc), Acute Blood Loss Anemia, Chronic Pain Syndrome, Neuropathic Pain, Dvt (Deep Venous Thrombosis) (Hcc) Providers Seen During Your Hospitalizations Provider Role Specialty Primary office phone Gabriel Johnson MD Attending Provider Family Practice [phone] Mary Dickson DO Attending Provider Family Practice [phone] Your Primary Care Physician (PCP) Primary Care Physician Office Phone Office Fax NONE ** None ** ** None ** Follow-up Information Follow up With Details Comments Contact Info [id number]   [phone] [facility] OP INFUSION THE [facility] On 3/24/2017 Follow-up appointment @ 1:00 p.m.  (Located on the Third Floor at [address] [facility] 
[phone] Rylie Pope MD On 3/27/2017 Follow-up appointment @ 10:20 a.m. Please arrive by 10:00 a.m. to complete paperwork. [id number] So[address] Evita Ramirez, Volodymyr [address] [place] [address] 
 [phone] Your Appointments Friday March 24, 2017  1:00 PM EDT INFUSION 90 with THE [facility] INFUSION NURSE 3  
[facility] OP INFUSION THE [facility] ([address] THE [facility]) [address] [address]  
[phone] Friday April 07, 2017  1:00 PM EDT INFUSION 60 with THE [facility] INFUSION NURSE 3  
[facility] OP INFUSION THE [facility] ([address] [facility]) [address] [address]  
[phone] Friday May 05, 2017  1:00 PM EDT INFUSION 60 with THE [facility] INFUSION NURSE 3  
[facility] OP INFUSION THE [facility] ([address] [facility]) [address] [address]  
[phone] Current Discharge Medication List  
  
START taking these medications Dose & Instructions Dispensing Information Comments Morning Noon Evening Bedtime  
 atorvastatin 80 mg tablet Commonly known as:  LIPITOR Your last dose was: Your next dose is:    
   
   
 Dose:  80 mg Take 1 Tab by mouth nightly. Quantity:  30 Tab Refills:  2  
     
   
   
   
  
 enoxaparin 80 mg/0.8 mL injection Commonly known as:  LOVENOX Your last dose was: Your next dose is:    
   
   
 Dose:  80 mg  
80 mg by SubCUTAneous route every twelve (12) hours. Quantity:  10 Syringe Refills:  0  
     
   
   
   
  
 gabapentin 400 mg capsule Commonly known as:  NEURONTIN Your last dose was: Your next dose is:    
   
   
 Dose:  400 mg Take 1 Cap by mouth three (3) times daily. Quantity:  90 Cap Refills:  2  
     
   
   
   
  
 mesalamine 500 mg CR capsule Commonly known as:  PENTASA Replaces:  mesalamine  mg EC tablet Your last dose was: Your next dose is:    
   
   
 Dose:  1000 mg Take 2 Caps by mouth four (4) times daily. Indications: CROHN'S DISEASE Quantity:  120 Cap Refills:  0  
     
   
   
   
  
 oxyCODONE-acetaminophen 5-325 mg per tablet Commonly known as:  PERCOCET Your last dose was: Your next dose is:    
   
   
 Dose:  1 Tab Take 1 Tab by mouth every four (4) hours as needed for Pain. Max Daily Amount: 6 Tabs. Quantity:  20 Tab Refills:  0 CONTINUE these medications which have CHANGED Dose & Instructions Dispensing Information Comments Morning Noon Evening Bedtime  
 predniSONE 20 mg tablet Commonly known as:  Willie Plasencia What changed:   
- medication strength 
- how much to take 
- how to take this - when to take this 
- additional instructions - Another medication with the same name was removed. Continue taking this medication, and follow the directions you see here. Your last dose was: Your next dose is:    
   
   
 Dose:  40 mg Take 2 Tabs by mouth daily (with breakfast). Indications: CROHN'S DISEASE Quantity:  20 Tab Refills:  0 STOP taking these medications   
 hydroCHLOROthiazide 12.5 mg tablet Commonly known as:  HYDRODIURIL  
   
  
 mesalamine  mg EC tablet Commonly known as:  ASACOL Replaced by:  mesalamine 500 mg CR capsule  
   
  
 promethazine 25 mg tablet Commonly known as:  PHENERGAN  
   
  
 traMADol 50 mg tablet Commonly known as:  ULTRAM  
   
  
  
  
Where to Get Your Medications Information on where to get these meds will be given to you by the nurse or doctor. ! Ask your nurse or doctor about these medications  
  atorvastatin 80 mg tablet  
 enoxaparin 80 mg/0.8 mL injection  
 gabapentin 400 mg capsule  
 mesalamine 500 mg CR capsule  
 oxyCODONE-acetaminophen 5-325 mg per tablet  
 predniSONE 20 mg tablet Discharge Instructions DISCHARGE SUMMARY from Nurse The following personal items are in your possession at time of discharge: 
 
Dental Appliances: None Visual Aid: None Home Medications: None Jewelry: None Clothing: None Other Valuables: Cell Phone, Luis Armando Terry PATIENT INSTRUCTIONS: 
 
 
F-face looks uneven A-arms unable to move or move unevenly S-speech slurred or non-existent T-time-call 911 as soon as signs and symptoms begin-DO NOT go Back to bed or wait to see if you get better-TIME IS BRAIN. Warning Signs of HEART ATTACK Call 911 if you have these symptoms: 
? Chest discomfort. Most heart attacks involve discomfort in the center of the chest that lasts more than a few minutes, or that goes away and comes back. It can feel like uncomfortable pressure, squeezing, fullness, or pain. ? Discomfort in other areas of the upper body. Symptoms can include pain or discomfort in one or both arms, the back, neck, jaw, or stomach. ? Shortness of breath with or without chest discomfort. ? Other signs may include breaking out in a cold sweat, nausea, or lightheadedness. Don't wait more than five minutes to call [address]! Fast action can save your life. Calling 911 is almost always the fastest way to get lifesaving treatment. Emergency Medical Services staff can begin treatment when they arrive  up to an hour sooner than if someone gets to the hospital by car. The discharge information has been reviewed with the {PATIENT PARENT GUARDIAN:72562}. The {PATIENT PARENT GUARDIAN:82185} verbalized understanding. Discharge medications reviewed with the {Dishcarge meds reviewed EUXJ:92462} and appropriate educational materials and side effects teaching were provided. Discharge Instructions Attachments/References ANTIPLATELETS AFTER ISCHEMIC STROKE: GENERAL INFO (ENGLISH) STROKE: SECONDARY PREVENTION: GENERAL INFO (ENGLISH) DVT (DEEP VEIN THROMBOSIS): GENERAL INFO (ENGLISH) COMPRESSION DEVICE: SEQUENTIAL OR INTERMITTENT PNEUMATIC: GENERAL INFO (ENGLISH) ENOXAPARIN (LOVENOX) (ENGLISH) WARFARIN SAFETY TIPS (ENGLISH) Discharge Orders None [employer] Announcement We are excited to announce that we are making your provider's discharge notes available to you in [employer]. You will see these notes when they are completed and signed by the physician that discharged you from your recent hospital stay. If you have any questions or concerns about any information you see in [employer], please call the Health Information Department where you were seen or reach out to your Primary Care Provider for more information about your plan of care. Introducing [facility]! Ted Goneaston introduces [employer] patient portal. Now you can access parts of your medical record, email your doctor's office, and request medication refills online. 1. In your internet browser, go to [email or web address]. [email or web address] 2. Click on the First Time User? Click Here link in the Sign In box. You will see the New Member Sign Up page. 3. Enter your [employer] Access Code exactly as it appears below. You will not need to use this code after youve completed the sign-up process. If you do not sign up before the expiration date, you must request a new code. · [employer] Access Code: [email or web address] Expires: 4/28/2017 12:08 AM 
 
4. Enter the last four digits of your Social Security Number (xxxx) and Date of Birth (mm/dd/yyyy) as indicated and click Submit. You will be taken to the next sign-up page. 5. Create a [employer] ID. This will be your [employer] login ID and cannot be changed, so think of one that is secure and easy to remember. 6. Create a [employer] password. You can change your password at any time. 7. Enter your Password Reset Question and Answer. This can be used at a later time if you forget your password. 8. Enter your e-mail address. You will receive e-mail notification when new information is available in [address] 9. Click Sign Up. You can now view and download portions of your medical record. 10. Click the Download Summary menu link to download a portable copy of your medical information. If you have questions, please visit the Frequently Asked Questions section of the [employer] website. Remember, [employer] is NOT to be used for urgent needs. For medical emergencies, dial 911. Now available from your iPhone and Android! General Information Please provide this summary of care documentation to your next provider. Patient Signature:  ____________________________________________________________ Date:  ____________________________________________________________  
  
Samia Sleight Provider Signature:  ____________________________________________________________ Date:  ____________________________________________________________ More Information Learning About Antiplatelet Medicines After a Stroke Introduction If you have had a stroke, you may have concerns about having another one. You want to do all you can do to avoid this. If your stroke was caused by a blood clot, one of the best things you can do is to take antiplatelet medicines. They can help prevent another stroke. In most cases, you don't take them if you had a stroke caused by a leak in an artery. These medicines are often called blood thinners. But they don't thin your blood. They work to keep platelets from sticking together and forming blood clots. (A platelet is a type of blood cell.) Blood clots can cause a stroke if they block a blood vessel in the brain. So by preventing blood clots, you are helping to prevent a stroke. Examples · Aspirin (Enmanuel, Bufferin, Ecotrin) · Aspirin with dipyridamole (Aggrenox) · Clopidogrel (Plavix) Possible side effects These medicines make your blood take longer than normal to clot. This can cause bleeding, and you may bruise easily. In rare cases, they can cause you to bleed inside your body without an injury.  If you have an injury, you might have bleeding that is hard to control. These medicines may have other side effects. Depending on which one you take, you may: 
· Have diarrhea. · Feel sick to your stomach. · Have a headache. · Have some mild belly pain. You may have other side effects or reactions not listed here. Check the information that comes with your medicine. What to know about taking this medicine · Be sure you get instructions about how to take your medicine safely. Blood thinners can cause serious bleeding problems. · Be safe with medicines. Take your medicines exactly as prescribed. Call your doctor if you think you are having a problem with your medicine. · Check with your doctor or pharmacist before you use any other medicines, including over-the-counter medicines. Make sure your doctor knows all of the medicines, vitamins, herbal products, and supplements you take. Taking some medicines together can cause problems. Where can you learn more? Go to http://johan-maria del carmen.info/. Enter X658 in the search box to learn more about \"Learning About Antiplatelet Medicines After a Stroke. \" Current as of: January 27, 2016 Content Version: 11.1 © 3664-0943 [employer]. Care instructions adapted under license by [employer] (which disclaims liability or warranty for this information). If you have questions about a medical condition or this instruction, always ask your healthcare professional. [place] [address] any warranty or liability for your use of this information. Learning About How to Prevent Another Stroke What can you do to prevent another stroke? After a stroke, people feel lots of different emotions. Some people are worried that they could have another stroke. Or they may feel overwhelmed by how much there is to learn and do. Some people feel sad or depressed.  
No matter what emotions you are feeling, you can give yourself some control and peace of mind by making a plan to lower your risk of having another stroke. Take your medicines You'll need to take medicines to help prevent another stroke. Be sure to take your medicines exactly as prescribed. And don't stop taking them unless your doctor tells you to. If you stop taking your medicines, you can increase your risk of having another stroke. Some of the medicines your doctor may prescribe include: · Aspirin or some other blood thinner to prevent blood clots. · Statins to lower cholesterol. · Blood pressure medicines to lower blood pressure. Manage other health problems You can help lower your chance of having another stroke by managing certain other health problems. Problems that increase your risk of having another stroke include: · High blood pressure. · High cholesterol. · Atrial fibrillation. · Diabetes. If you have any of these health problems, you can manage them with healthy lifestyle changes along with medicine. Adopt a healthy lifestyle · Do not smoke or allow others to smoke around you. If you need help quitting, talk to your doctor about stop-smoking programs and medicines. These can increase your chances of quitting for good. Smoking makes a stroke more likely. · Limit alcohol to 2 drinks a day for men and 1 drink a day for women. · Lose weight if you need to. Controlling your weight will help you keep your heart and body healthy. · Be active. Ask your doctor what type and level of activity is safe for you. · Eat heart-healthy foods, like fruits, vegetables, and high-fiber foods. It's also important to: · Get a flu shot every year. · Ask for help if you think you are depressed. Do stroke rehab Taking part in a stroke rehabilitation (rehab) program will help you to regain skills you lost or make the most of your abilities after a stroke. It also helps you take steps to prevent another stroke. Your rehab team will give you education and support to help you build new, healthy habits. You'll learn how to manage any other health problems that you might have. Linn Stone also learn how to exercise safely, eat a healthy diet, and quit smoking if you smoke. Linn Stone work with your team to decide what lifestyle choices are best for you. If your doctor hasn't already suggested it, ask him or her if stroke rehab is right for you. Know stroke symptoms Make sure you know the symptoms of stroke. FAST is a simple way to remember. Recognizing these symptoms helps you know when to call for medical help. FAST stands for: 
· Face drooping. · Arm weakness. · Speech difficulty. · Time to call 911. Follow-up care is a key part of your treatment and safety. Be sure to make and go to all appointments, and call your doctor if you are having problems. It's also a good idea to know your test results and keep a list of the medicines you take. Where can you learn more? Go to http://johan-maria del carmen.info/. Enter J421 in the search box to learn more about \"Learning About How to Prevent Another Stroke. \" Current as of: July 27, 2016 Content Version: 11.1 © 9092-7896 [employer]. Care instructions adapted under license by [employer] (which disclaims liability or warranty for this information). If you have questions about a medical condition or this instruction, always ask your healthcare professional. [place] [address] any warranty or liability for your use of this information. Learning About Deep Vein Thrombosis What is deep vein thrombosis? A deep vein thrombosis (DVT) is a blood clot in certain veins of the legs, pelvis, or arms. The clot is usually in the legs. DVT may damage the vein and cause the area to ache, swell, and change color. DVT also can lead to sores.  
DVT in these veins needs to be treated because the clots can get bigger, break loose, and travel through the bloodstream to the lungs. A blood clot in a lung can cause death. Blood clots can form in the veins when you are not active for a long period of time. For example, they can form if you need to stay in bed because of a health problem or must sit for a long time on an airplane or in a car. Surgery or an injury can damage your blood vessels and cause a clot to form. Cancer also can cause DVT. And some people have blood that clots too easily, which is a problem that may run in families. A risk factor is something that makes you more likely to develop a disease. Here are some major risk factors for DVT: 
· You have surgery. · You have to stay in bed for more than 3 days (such as in the hospital). · Your blood is likely to clot because of an injury, cancer, or inherited condition. Here are some minor risk factors for DVT: 
· You take birth control hormones. · You are pregnant. · You are in a car or airplane for a long trip. What are the symptoms? Symptoms of DVT may include: · Swelling in the affected area. · Redness and warmth in the affected area. · Pain or tenderness. You may have pain only when you touch the affected area or when you stand or walk. If your doctor thinks you may have DVT, you will probably have an ultrasound test. You may have other tests as well. How can you prevent DVT? · Exercise your lower leg muscles to help blood flow in your legs. Point your toes up toward your head so the calves of your legs are stretched, then relax and repeat. This is a good exercise to do when you are sitting for long periods of time. · Get out of bed as soon as you can after an illness or surgery. If you need to stay in bed, do the leg exercise noted above every hour when you are awake. · Use special stockings called compression stockings. These stockings are tight at the feet with a gradually looser fit on the leg.  Many doctors recommend that you wear compression stockings during a journey longer than 8 hours. · Take breaks when you are on long trips. Stop the car and walk around. On long airplane flights, walk up and down the aisle hourly, flex and point your feet every 20 minutes while sitting, and drink plenty of water. · Take blood-thinning medicines after some types of surgery if your doctor recommends it. Blood thinners also may be used if you are likely to develop clots. How is DVT treated? Treatment for DVT usually involves taking blood thinners. These medicines are given through a vein (intravenously, or IV) or as a pill. Talk with your doctor about which medicine is right for you. Your doctor also may suggest that you prop up or elevate your leg when possible, take walks, and wear compression stockings. These measures may help reduce the pain and swelling that can happen with DVT. Follow-up care is a key part of your treatment and safety. Be sure to make and go to all appointments, and call your doctor if you are having problems. It's also a good idea to know your test results and keep a list of the medicines you take. Where can you learn more? Go to http://johan-maria del carmen.info/. Enter O541 in the search box to learn more about \"Learning About Deep Vein Thrombosis. \" Current as of: June 4, 2016 Content Version: 11.1 © 8855-3851 [employer]wise[employer] Inc[employer]. Care instructions adapted under license by [employer] (which disclaims liability or warranty for this information). If you have questions about a medical condition or this instruction, always ask your healthcare professional. [place] [address] any warranty or liability for your use of this information. Learning About Compression Devices to Prevent Blood Clots What is a compression device?  
A compression device has sleeves that fit around your legs, with tubes that connect the sleeves to a pump. The device is sometimes called an intermittent or sequential compression device. The pump inflates different sections of the sleeves, squeezing from bottom to top in a massaging motion to help move the blood in your legs. The compression and massaging help prevent blood clots. Clots can form in the veins when you are not active for a long period of time. This can happen if you need to stay in bed after surgery or if you have a health condition or illness that makes your blood more likely to clot. It's important to prevent blood clots because they can be painful and dangerous. A deep vein thrombosis (DVT) is a blood clot in certain veins of the legs, pelvis, or arms. The clot is most often in the legs. The clots can get bigger, break loose, and travel through the bloodstream to the lungs. A blood clot in a lung can cause death. A compression device keeps the blood moving in your legs until you are able to move around on your own. What can you expect? Your nurse will put the sleeves on your legs, check the fit, and turn on the device. The sleeves will squeeze and release your legs. The pressure will make the sleeves feel snug when they inflate, but it should not feel painful. Sometimes a sleeve is used on one leg instead of both legs. A few times a day, your nurse will check to make sure that the sleeves fit properly. The nurse will also check your skin for irritation or any other problems. What else should you know about this device? It's important to wear the sleeves as much as you can, even while you sleep. It may feel a bit uncomfortable at times. But the device helps protect you from life-threatening blood clots. If you feel pain, your skin gets irritated, or you feel too hot, tell a member of your care team. If you need to use the bathroom or bathe, ask your nurse to take off the sleeves so you can get up.  Don't turn off the device or try to take off the sleeves by yourself. Keep the sleeves on when you're in bed. And keep using them until your doctor says you don't need them anymore. Where can you learn more? Go to http://johan-maria del carmen.info/. Enter P991 in the search box to learn more about \"Learning About Compression Devices to Prevent Blood Clots. \" Current as of: July 29, 2016 Content Version: 11.1 © 4769-2333 [employer]. Care instructions adapted under license by [employer] (which disclaims liability or warranty for this information). If you have questions about a medical condition or this instruction, always ask your healthcare professional. [address] any warranty or liability for your use of this information. Enoxaparin (Lovenox): Care Instructions Your Care Instructions Enoxaparin (Lovenox) is an anticoagulant medicine. It is one of a class of anticoagulants called low molecular weight heparin. Many people call these medicines blood thinners. They don't actually thin the blood, but they increase the time it takes a blood clot to form. This reduces the chance of a blood clot in the leg veins (deep vein thrombosis) or in the lungs (pulmonary embolism). Enoxaparin is a shot (injection). You or someone caring for you will inject it once or twice a day. Most people need shots for 5 to 10 days, but in some cases it can be longer. Your doctor will tell you how long you need to have the shots. Enoxaparin is used to: · Treat deep vein thrombosis (DVT), which is a blood clot in the legs, pelvis, or arms. · Reduce the chance of getting blood clots after certain surgeries. For example, you may take enoxaparin after knee or hip replacement surgery. · Reduce the chance of getting blood clots in people who are likely to get them and who are not active for a long period of time.  For example, you may need enoxaparin if you need to stay in bed for a long time because of a health problem. · Reduce the chance of blood clots when another blood thinner is stopped for a short time. For example, if you take warfarin and need surgery, your doctor may ask you to stop taking warfarin for a short time before the surgery. You will take enoxaparin to help prevent blood clots before the surgery. After the surgery, your doctor will tell you when it is safe to start taking warfarin again. This is called bridge therapy. Follow-up care is a key part of your treatment and safety. Be sure to make and go to all appointments, and call your doctor if you are having problems. It's also a good idea to know your test results and keep a list of the medicines you take. How can you care for yourself at home? How to inject enoxaparin You will get a prescription for prefilled syringes. Inject the medicine at the same time every day unless your doctor gives you other instructions. 1. Wash and dry your hands. 2. Sit or lie in a position that lets you see your belly. 3. Clean the injection site with an alcohol pad or swab, and let it dry. Choose a site on the right or left side of your belly, at least 2 inches from your belly button. Change the site each time you inject the medicine. 4. Remove the needle cap by pulling it straight off. Don't twist it. 5. Hold the syringe like a pencil in one hand. With the other hand, pinch an area of the injection site skin. You should have a \"fold\" in the skin. 6. Insert the entire needle straight down into the fold of skin. Don't insert the needle at an angle. 7. Press the plunger with your thumb until the syringe is empty. 8. Pull the needle straight out and let go of the skin. 9. Point the needle away from you and press down on the plunger. The needle will be covered. Take precautions · Don't rub the injection site. This could cause bruising. · Don't push air bubbles out of the syringe unless your doctor tells you to. Each syringe comes with air bubbles. · Don't stop taking enoxaparin without talking to your doctor. · If you are taking a blood thinner, be sure you get instructions about how to take your medicine safely. Blood thinners can cause serious bleeding problems. · Talk to your doctor before you take any prescription medicines, over-the-counter medicines, antibiotics, vitamins, or herbal products. · Don't take the following medicines unless your doctor says it's okay: ¨ Aspirin, products like aspirin (salicylates), or products that contain aspirin ¨ Nonsteroidal anti-inflammatory drugs (NSAIDs), such as ibuprofen (Advil, Motrin) and naproxen (Aleve) · Store enoxaparin at room temperature. Don't put it in the refrigerator or freezer. When should you call for help? Call 911 anytime you think you may need emergency care. For example, call if: 
· You cough up blood. · You vomit blood or what looks like coffee grounds. · You pass maroon or very bloody stools. Call your doctor now or seek immediate medical care if: 
· You have new bruises that are away from the injection site or blood spots under your skin. · You have a nosebleed. · You have blood in your urine. · Your stools are black and tarlike or have streaks of blood. · You have vaginal bleeding when you are not having your period, or heavy period bleeding. Watch closely for changes in your health, and be sure to contact your doctor if: 
· You have questions about enoxaparin or your treatment. Where can you learn more? Go to http://johan-maria del carmen.info/. Enter P266 in the search box to learn more about \"Enoxaparin (Lovenox): Care Instructions. \" Current as of: February 5, 2016 Content Version: 11.1 © 0089-9757 [employer].  Care instructions adapted under license by [employer] (which disclaims liability or warranty for this information). If you have questions about a medical condition or this instruction, always ask your healthcare professional. [address] any warranty or liability for your use of this information. Taking Warfarin Safely: Care Instructions Your Care Instructions Warfarin is a medicine that you take to prevent blood clots. It is often called a blood thinner. Doctors give warfarin (such as Coumadin) to reduce the risk of blood clots. You may be at risk for blood clots if you have atrial fibrillation or deep vein thrombosis. Some other health problems may also put you at risk. Warfarin slows the amount of time it takes for your blood to clot. It can cause bleeding problems. Even if you've been taking warfarin for a while, it's important to know how to take it safely. Foods and other medicines can affect the way warfarin works. Some can make warfarin work too well. This can cause bleeding problems. And some can make it work poorly, so that it does not prevent blood clots very well. You will need regular blood tests to check how long it takes for your blood to form a clot. This test is called a PT or prothrombin time test. The result of the test is called an INR level. Depending on the test results, your doctor or anticoagulation clinic may adjust your dose of warfarin. Follow-up care is a key part of your treatment and safety. Be sure to make and go to all appointments, and call your doctor if you are having problems. It's also a good idea to know your test results and keep a list of the medicines you take. How can you care for yourself at home? Take warfarin safely · Take your warfarin at the same time each day. · If you miss a dose of warfarin, don't take an extra dose to make up for it. Your doctor can tell you exactly what to do so you don't take too much or too little. · Wear medical alert jewelry that lets others know that you take warfarin. You can buy this at most drugstores. · Don't take warfarin if you are pregnant or planning to get pregnant. Talk to your doctor about how you can prevent getting pregnant while you are taking it. · Don't change your dose or stop taking warfarin unless your doctor tells you to. Effects of medicines and food on warfarin · Don't start or stop taking any medicines, vitamins, or natural remedies unless you first talk to your doctor. Many medicines can affect how warfarin works. These include aspirin and other pain relievers, over-the-counter medicines, multivitamins, dietary supplements, and herbal products. · Tell all of your doctors and pharmacists that you take warfarin. Some prescription medicines can affect how warfarin works. · Keep the amount of vitamin K in your diet about the same from day to day. Do not suddenly eat a lot more or a lot less food that is rich in vitamin K than you usually do. Vitamin K affects how warfarin works and how your blood clots. Talk with your doctor before making big changes in your diet. Vitamin K is in many foods, such as: 
¨ Leafy greens, such as kale, cabbage, spinach, Swiss chard, and lettuce. ¨ Canola and soybean oils. ¨ Green vegetables, such as asparagus, broccoli, and [place] sprouts. ¨ Vegetable drinks, green tea leaves, and some dietary supplement drinks. · Avoid cranberry juice and other cranberry products. They can increase the effects of warfarin. · Limit your use of alcohol. Avoid bleeding by preventing falls and injuries · Wear slippers or shoes with nonskid soles. · Remove throw rugs and clutter. · Rearrange furniture and electrical cords to keep them out of walking paths. · Keep stairways, porches, and outside walkways well lit. Use night-lights in hallways and bathrooms. · Be extra careful when you work with sharp tools or knives. When should you call for help? Call 911 anytime you think you may need emergency care. For example, call if: · You have a sudden, severe headache that is different from past headaches. Call your doctor now or seek immediate medical care if: 
· You have any abnormal bleeding, such as: 
¨ Nosebleeds. ¨ Vaginal bleeding that is different (heavier, more frequent, at a different time of the month) than what you are used to. ¨ Bloody or black stools, or rectal bleeding. ¨ Bloody or pink urine. Watch closely for changes in your health, and be sure to contact your doctor if you have any problems. Where can you learn more? Go to http://johan-maria del carmen.info/. Enter P231 in the search box to learn more about \"Taking Warfarin Safely: Care Instructions. \" Current as of: January 27, 2016 Content Version: 11.1 © 0491-8500 [employer], [email or web address]. Care instructions adapted under license by [employer] (which disclaims liability or warranty for this information). If you have questions about a medical condition or this instruction, always ask your healthcare professional. [place] [address] any warranty or liability for your use of this information.

## 2017-03-10 NOTE — IP AVS SNAPSHOT
Current Discharge Medication List  
  
ASK your doctor about these medications Dose & Instructions Dispensing Information Comments Morning Noon Evening Bedtime  
 hydroCHLOROthiazide 12.5 mg tablet Commonly known as:  HYDRODIURIL Your last dose was: Your next dose is:    
   
   
 Dose:  12.5 mg Take 1 Tab by mouth daily. Quantity:  30 Tab Refills:  3  
     
   
   
   
  
 mesalamine  mg EC tablet Commonly known as:  ASACOL Your last dose was: Your next dose is: Take  by mouth three (3) times daily. Refills:  0  
     
   
   
   
  
 * predniSONE 10 mg tablet Commonly known as:  Therman Rail Your last dose was: Your next dose is: Take 6 pills for 2 days, 5 pills for 2 days, 4 pills for 2 days, 3 pills for 2 days, 2 pills for 2 days, 1 pill for 2 days, 1/2 pill for 2 days. Take with food. Indications: CROHN'S DISEASE Quantity:  43 Tab Refills:  0  
     
   
   
   
  
 * predniSONE 10 mg tablet Commonly known as:  Therman Rail Your last dose was: Your next dose is:    
   
   
 6 pills x 2 days 5 pills x 2 days 4 pills x 2 days 3 pills x 2 days 2 pills x 2 days 1 pill x 2 days 1/2 pill x 2 days Quantity:  43 Tab Refills:  0  
     
   
   
   
  
 * predniSONE 10 mg dose pack Commonly known as:  STERAPRED DS Your last dose was: Your next dose is: Take 4 tabs each day for 3 days then 3 tabs each day for 3 days then 2 tabs each day for 3 days then 1 tab each day for 3 days then 1/2 tab each day until gone Quantity:  40 Tab Refills:  0  
     
   
   
   
  
 * promethazine 25 mg tablet Commonly known as:  PHENERGAN Your last dose was: Your next dose is:    
   
   
 Dose:  25 mg Take 1 Tab by mouth every six (6) hours as needed. For nausea or vomiting Quantity:  30 Tab Refills:  0 * promethazine 25 mg tablet Commonly known as:  PHENERGAN Your last dose was: Your next dose is:    
   
   
 Dose:  25 mg Take 1 Tab by mouth every six (6) hours as needed for Nausea. Quantity:  30 Tab Refills:  0  
     
   
   
   
  
 * promethazine 25 mg tablet Commonly known as:  PHENERGAN Your last dose was: Your next dose is:    
   
   
 Dose:  25 mg Take 1 Tab by mouth every six (6) hours as needed. Quantity:  12 Tab Refills:  0  
     
   
   
   
  
 traMADol 50 mg tablet Commonly known as:  ULTRAM  
   
Your last dose was: Your next dose is:    
   
   
 Dose:  50 mg Take 1 Tab by mouth every six (6) hours as needed for Pain. Max Daily Amount: 200 mg. Quantity:  12 Tab Refills:  0  
     
   
   
   
  
 * Notice: This list has 6 medication(s) that are the same as other medications prescribed for you. Read the directions carefully, and ask your doctor or other care provider to review them with you.

## 2017-03-10 NOTE — ED NOTES
RN in room for purpose of hourly rounding. Room checked for trash and safety hazards. Daughter at bedside updated by patient that she has had a stroke. Patient is. ..    [  ] seated at bedside. [  ] lying in bed with side rails up and call bell in reach. [ x ] lying in with call bell in reach and continuous monitoring in place. Pain reduction interventions. .. [ x ] not indicated at this time      include the following   [  ] administration of analgesic medications   [  ] lights turned down   [  ] patient offered a cool washcloth   [  ] heat applied   [  ] ice applied   [  ] patient repositioned    Restroom needs addressed. Patient is. ... [x  ] Not in need restroom assistance at this time  [  ] Ambulatory as needed to the restroom  [  ] Assisted to bedside commode  [  ] Assisted with use of bed pan  [  ] Provided with a urinal    Position. ..    [  ] Patient does not require assistance with repositioning  [ x ] Patient repositions with assistance of RN  [  ] Patient repositioned with assistance of RN and other ED staff.

## 2017-03-10 NOTE — Clinical Note
Status[de-identified] Inpatient [101] Type of Bed: Intensive Care [6] Inpatient Hospitalization Certified Necessary for the Following Reasons: 4. Patient requires ICU level of care interventions (further clarification in H&P documentation) Admitting Diagnosis: Stroke [facility]) [[id number]] Admitting Diagnosis: Hypokalemia [[id number]] Admitting Physician: Peg Pineda [[id number]] Attending Physician: Peg Pineda [[id number]] Estimated Length of Stay: > or = to 2 Midnights Discharge Plan[de-identified] Home with Office Follow-up

## 2017-03-11 LAB
ALBUMIN SERPL BCP-MCNC: 2.3 G/DL (ref 3.4–5)
ALBUMIN/GLOB SERPL: 0.9 {RATIO} (ref 0.8–1.7)
ALP SERPL-CCNC: 32 U/L (ref 45–117)
ALT SERPL-CCNC: 13 U/L (ref 13–56)
ANION GAP BLD CALC-SCNC: 10 MMOL/L (ref 3–18)
ANION GAP BLD CALC-SCNC: 5 MMOL/L (ref 3–18)
AST SERPL W P-5'-P-CCNC: 13 U/L (ref 15–37)
BASOPHILS # BLD AUTO: 0 K/UL (ref 0–0.06)
BASOPHILS # BLD: 0 % (ref 0–2)
BILIRUB SERPL-MCNC: 0.2 MG/DL (ref 0.2–1)
BUN SERPL-MCNC: 5 MG/DL (ref 7–18)
BUN SERPL-MCNC: 5 MG/DL (ref 7–18)
BUN/CREAT SERPL: 10 (ref 12–20)
BUN/CREAT SERPL: 10 (ref 12–20)
CALCIUM SERPL-MCNC: 7.2 MG/DL (ref 8.5–10.1)
CALCIUM SERPL-MCNC: 7.7 MG/DL (ref 8.5–10.1)
CHLORIDE SERPL-SCNC: 102 MMOL/L (ref 100–108)
CHLORIDE SERPL-SCNC: 103 MMOL/L (ref 100–108)
CO2 SERPL-SCNC: 26 MMOL/L (ref 21–32)
CO2 SERPL-SCNC: 30 MMOL/L (ref 21–32)
CREAT SERPL-MCNC: 0.49 MG/DL (ref 0.6–1.3)
CREAT SERPL-MCNC: 0.51 MG/DL (ref 0.6–1.3)
DIFFERENTIAL METHOD BLD: ABNORMAL
EOSINOPHIL # BLD: 0.1 K/UL (ref 0–0.4)
EOSINOPHIL NFR BLD: 1 % (ref 0–5)
ERYTHROCYTE [DISTWIDTH] IN BLOOD BY AUTOMATED COUNT: 17.1 % (ref 11.6–14.5)
GLOBULIN SER CALC-MCNC: 2.7 G/DL (ref 2–4)
GLUCOSE SERPL-MCNC: 82 MG/DL (ref 74–99)
GLUCOSE SERPL-MCNC: 86 MG/DL (ref 74–99)
HCT VFR BLD AUTO: 23 % (ref 35–45)
HGB BLD-MCNC: 7 G/DL (ref 12–16)
LYMPHOCYTES # BLD AUTO: 19 % (ref 21–52)
LYMPHOCYTES # BLD: 1.2 K/UL (ref 0.9–3.6)
MAGNESIUM SERPL-MCNC: 1.7 MG/DL (ref 1.8–2.4)
MCH RBC QN AUTO: 24.1 PG (ref 24–34)
MCHC RBC AUTO-ENTMCNC: 30.4 G/DL (ref 31–37)
MCV RBC AUTO: 79.3 FL (ref 74–97)
MONOCYTES # BLD: 0.9 K/UL (ref 0.05–1.2)
MONOCYTES NFR BLD AUTO: 14 % (ref 3–10)
NEUTS SEG # BLD: 4 K/UL (ref 1.8–8)
NEUTS SEG NFR BLD AUTO: 66 % (ref 40–73)
PLATELET # BLD AUTO: 409 K/UL (ref 135–420)
PMV BLD AUTO: 8.6 FL (ref 9.2–11.8)
POTASSIUM SERPL-SCNC: 2.5 MMOL/L (ref 3.5–5.5)
POTASSIUM SERPL-SCNC: 4 MMOL/L (ref 3.5–5.5)
PROT SERPL-MCNC: 5 G/DL (ref 6.4–8.2)
RBC # BLD AUTO: 2.9 M/UL (ref 4.2–5.3)
SODIUM SERPL-SCNC: 138 MMOL/L (ref 136–145)
SODIUM SERPL-SCNC: 138 MMOL/L (ref 136–145)
WBC # BLD AUTO: 6.1 K/UL (ref 4.6–13.2)

## 2017-03-11 PROCEDURE — 85303 CLOT INHIBIT PROT C ACTIVITY: CPT | Performed by: PSYCHIATRY & NEUROLOGY

## 2017-03-11 PROCEDURE — 74011250637 HC RX REV CODE- 250/637: Performed by: FAMILY MEDICINE

## 2017-03-11 PROCEDURE — 80053 COMPREHEN METABOLIC PANEL: CPT | Performed by: FAMILY MEDICINE

## 2017-03-11 PROCEDURE — 65610000006 HC RM INTENSIVE CARE

## 2017-03-11 PROCEDURE — 74011250636 HC RX REV CODE- 250/636: Performed by: EMERGENCY MEDICINE

## 2017-03-11 PROCEDURE — 85025 COMPLETE CBC W/AUTO DIFF WBC: CPT | Performed by: FAMILY MEDICINE

## 2017-03-11 PROCEDURE — 97162 PT EVAL MOD COMPLEX 30 MIN: CPT

## 2017-03-11 PROCEDURE — 83735 ASSAY OF MAGNESIUM: CPT | Performed by: HOSPITALIST

## 2017-03-11 PROCEDURE — 93306 TTE W/DOPPLER COMPLETE: CPT

## 2017-03-11 PROCEDURE — 86146 BETA-2 GLYCOPROTEIN ANTIBODY: CPT | Performed by: PSYCHIATRY & NEUROLOGY

## 2017-03-11 PROCEDURE — 82607 VITAMIN B-12: CPT | Performed by: PSYCHIATRY & NEUROLOGY

## 2017-03-11 PROCEDURE — 74011250636 HC RX REV CODE- 250/636: Performed by: INTERNAL MEDICINE

## 2017-03-11 PROCEDURE — 85306 CLOT INHIBIT PROT S FREE: CPT | Performed by: PSYCHIATRY & NEUROLOGY

## 2017-03-11 PROCEDURE — 97167 OT EVAL HIGH COMPLEX 60 MIN: CPT

## 2017-03-11 PROCEDURE — 97166 OT EVAL MOD COMPLEX 45 MIN: CPT

## 2017-03-11 PROCEDURE — 83090 ASSAY OF HOMOCYSTEINE: CPT | Performed by: PSYCHIATRY & NEUROLOGY

## 2017-03-11 PROCEDURE — 92610 EVALUATE SWALLOWING FUNCTION: CPT

## 2017-03-11 PROCEDURE — 80048 BASIC METABOLIC PNL TOTAL CA: CPT | Performed by: HOSPITALIST

## 2017-03-11 PROCEDURE — 85305 CLOT INHIBIT PROT S TOTAL: CPT | Performed by: PSYCHIATRY & NEUROLOGY

## 2017-03-11 PROCEDURE — 81241 F5 GENE: CPT | Performed by: PSYCHIATRY & NEUROLOGY

## 2017-03-11 PROCEDURE — 85301 ANTITHROMBIN III ANTIGEN: CPT | Performed by: PSYCHIATRY & NEUROLOGY

## 2017-03-11 PROCEDURE — 97535 SELF CARE MNGMENT TRAINING: CPT

## 2017-03-11 PROCEDURE — 97116 GAIT TRAINING THERAPY: CPT

## 2017-03-11 PROCEDURE — 74011250636 HC RX REV CODE- 250/636: Performed by: HOSPITALIST

## 2017-03-11 PROCEDURE — 74011250637 HC RX REV CODE- 250/637: Performed by: HOSPITALIST

## 2017-03-11 PROCEDURE — 85613 RUSSELL VIPER VENOM DILUTED: CPT | Performed by: PSYCHIATRY & NEUROLOGY

## 2017-03-11 PROCEDURE — 85240 CLOT FACTOR VIII AHG 1 STAGE: CPT | Performed by: PSYCHIATRY & NEUROLOGY

## 2017-03-11 RX ORDER — ONDANSETRON 2 MG/ML
4 INJECTION INTRAMUSCULAR; INTRAVENOUS
Status: DISCONTINUED | OUTPATIENT
Start: 2017-03-11 | End: 2017-03-23 | Stop reason: HOSPADM

## 2017-03-11 RX ORDER — ACETAMINOPHEN 325 MG/1
650 TABLET ORAL
Status: DISCONTINUED | OUTPATIENT
Start: 2017-03-11 | End: 2017-03-23 | Stop reason: HOSPADM

## 2017-03-11 RX ORDER — SODIUM CHLORIDE 0.9 % (FLUSH) 0.9 %
5-10 SYRINGE (ML) INJECTION AS NEEDED
Status: DISCONTINUED | OUTPATIENT
Start: 2017-03-11 | End: 2017-03-23 | Stop reason: HOSPADM

## 2017-03-11 RX ORDER — GUAIFENESIN 100 MG/5ML
81 LIQUID (ML) ORAL DAILY
Status: DISCONTINUED | OUTPATIENT
Start: 2017-03-12 | End: 2017-03-15

## 2017-03-11 RX ORDER — HYDROMORPHONE HYDROCHLORIDE 1 MG/ML
1 INJECTION, SOLUTION INTRAMUSCULAR; INTRAVENOUS; SUBCUTANEOUS
Status: DISCONTINUED | OUTPATIENT
Start: 2017-03-11 | End: 2017-03-15

## 2017-03-11 RX ORDER — SODIUM CHLORIDE 0.9 % (FLUSH) 0.9 %
5-10 SYRINGE (ML) INJECTION EVERY 8 HOURS
Status: DISCONTINUED | OUTPATIENT
Start: 2017-03-11 | End: 2017-03-23 | Stop reason: HOSPADM

## 2017-03-11 RX ORDER — MAGNESIUM SULFATE 1 G/100ML
1 INJECTION INTRAVENOUS ONCE
Status: COMPLETED | OUTPATIENT
Start: 2017-03-11 | End: 2017-03-11

## 2017-03-11 RX ORDER — POTASSIUM CHLORIDE 20 MEQ/1
20 TABLET, EXTENDED RELEASE ORAL 2 TIMES DAILY
Status: DISCONTINUED | OUTPATIENT
Start: 2017-03-11 | End: 2017-03-14

## 2017-03-11 RX ORDER — POTASSIUM CHLORIDE 7.45 MG/ML
10 INJECTION INTRAVENOUS
Status: COMPLETED | OUTPATIENT
Start: 2017-03-11 | End: 2017-03-14

## 2017-03-11 RX ORDER — ENOXAPARIN SODIUM 100 MG/ML
40 INJECTION SUBCUTANEOUS EVERY 24 HOURS
Status: DISCONTINUED | OUTPATIENT
Start: 2017-03-11 | End: 2017-03-18

## 2017-03-11 RX ADMIN — POTASSIUM CHLORIDE 10 MEQ: 10 INJECTION, SOLUTION INTRAVENOUS at 11:36

## 2017-03-11 RX ADMIN — POTASSIUM CHLORIDE 10 MEQ: 10 INJECTION, SOLUTION INTRAVENOUS at 14:53

## 2017-03-11 RX ADMIN — Medication 2 MG: at 08:17

## 2017-03-11 RX ADMIN — Medication 10 ML: at 23:13

## 2017-03-11 RX ADMIN — SODIUM CHLORIDE 125 ML/HR: 900 INJECTION, SOLUTION INTRAVENOUS at 03:33

## 2017-03-11 RX ADMIN — POTASSIUM CHLORIDE 10 MEQ: 10 INJECTION, SOLUTION INTRAVENOUS at 16:02

## 2017-03-11 RX ADMIN — ENOXAPARIN SODIUM 40 MG: 40 INJECTION SUBCUTANEOUS at 10:15

## 2017-03-11 RX ADMIN — Medication 2 MG: at 03:35

## 2017-03-11 RX ADMIN — POTASSIUM CHLORIDE 20 MEQ: 20 TABLET, EXTENDED RELEASE ORAL at 10:15

## 2017-03-11 RX ADMIN — ATORVASTATIN CALCIUM 80 MG: 20 TABLET, FILM COATED ORAL at 23:11

## 2017-03-11 RX ADMIN — MAGNESIUM SULFATE HEPTAHYDRATE 1 G: 1 INJECTION, SOLUTION INTRAVENOUS at 10:21

## 2017-03-11 RX ADMIN — SODIUM CHLORIDE 125 ML/HR: 900 INJECTION, SOLUTION INTRAVENOUS at 11:37

## 2017-03-11 RX ADMIN — HYDROMORPHONE HYDROCHLORIDE 1 MG: 1 INJECTION, SOLUTION INTRAMUSCULAR; INTRAVENOUS; SUBCUTANEOUS at 20:22

## 2017-03-11 RX ADMIN — Medication 10 ML: at 13:13

## 2017-03-11 RX ADMIN — SODIUM CHLORIDE 125 ML/HR: 900 INJECTION, SOLUTION INTRAVENOUS at 21:30

## 2017-03-11 RX ADMIN — ASPIRIN 162 MG: 81 TABLET, CHEWABLE ORAL at 08:17

## 2017-03-11 RX ADMIN — Medication 10 ML: at 10:15

## 2017-03-11 RX ADMIN — HYDROMORPHONE HYDROCHLORIDE 1 MG: 1 INJECTION, SOLUTION INTRAMUSCULAR; INTRAVENOUS; SUBCUTANEOUS at 15:18

## 2017-03-11 RX ADMIN — POTASSIUM CHLORIDE 10 MEQ: 10 INJECTION, SOLUTION INTRAVENOUS at 10:23

## 2017-03-11 RX ADMIN — Medication 2 MG: at 13:13

## 2017-03-11 RX ADMIN — POTASSIUM CHLORIDE 20 MEQ: 20 TABLET, EXTENDED RELEASE ORAL at 21:00

## 2017-03-11 RX ADMIN — POTASSIUM CHLORIDE 10 MEQ: 10 INJECTION, SOLUTION INTRAVENOUS at 13:45

## 2017-03-11 RX ADMIN — POTASSIUM CHLORIDE 10 MEQ: 10 INJECTION, SOLUTION INTRAVENOUS at 12:44

## 2017-03-11 NOTE — ED NOTES
NEUROLOGY PRELIMINARY NOTE    Patient: Deonte Palacio        Sex: female          DOA: 3/10/2017  YOB: 1970      Age:  40 y.o.         LOS: 0 days       Comment: Discussed the case with the attending, Dr. Kvng Varghese. Stroke with left sided weakness. MRI shows multiple infarcts involving right hemisphere and cerebellum. Most likely cardioembolic in nature. Initial recommendations:  Please use stroke admission order sets. 1. Aspirin 325mg po (given), and then Aspirin 81mg po daily afterwards. 2. Admit to ICU with tele monitoring  3. Neuro checks per stroke protocol  4. Permissive hypertension (do not treat if BP is not >200/110, prefer prn labetolol 5mg)  5. IV normal saline continuous infusion 100-125 ml/h  6. Euglycemia with sliding scale insulin  7. Euthermia with acetaminophen 650mg Q6h prn for fever  8. Avoid urinary catheters please. 9. Echocardiogram with bubble study  10. Lipid panel  11. Hemoglobin A1c  12. SCDs and DVT prophylaxis    Further recommendations to follow tomorrow.     Signed:  Omid Fletcher MD  3/10/2017  8:38 PM

## 2017-03-11 NOTE — PROGRESS NOTES
Hospitalist Progress Note    Patient: Tacho Crouch MRN: [id number]  CSN: [id number]    YOB: 1970  Age: 40 y.o. Sex: female    DOA: 3/10/2017 LOS:  LOS: 1 day          Chief Complaint:    Acute CVA      Assessment/Plan     Acute CVA with facial and l;eft sided weakness, appears thromboembolic    Echo stat  Daily asa as per neurology  MRA, head and neck pending  PT/OT/Speech formal consults  Treat HA with tylenol  Continue statin, IVF NS  Avoid catheters    Sickle cell trait  Hypokalemia-repeat BMP and Mag, start PO Kdur if able to take, o/w IV  crohns disease  Chronic pain  H/o cocaine use    ICU level care    Patient Active Problem List   Diagnosis Code    Chronic pain syndrome G89.4    Abdominal pain R10.9    Crohn disease ([facility] [address] K50.90    Obesity E66.9    Colitis K52.9    Crohn's disease with complication ([facility] [address] R73.011    Dehydration E86.0    Hypokalemia E87.6    Stroke ([facility] [address] I63.9    Sickle cell trait ([facility] [address] D57.3    Hypertension I10    Hx of cocaine abuse Z87.898    CVA (cerebral vascular accident) ([facility] [address] I63.9       Subjective:    Left facial and arm numbness  Denies difficulty swallowing few pills, but took a while  Denies CP, SOB  Has headache, frontal  Right bottom of foot numb feeling      Review of systems:    Constitutional: denies fevers, chills, myalgias  Respiratory: denies SOB, cough  Cardiovascular: denies chest pain, palpitations  Gastrointestinal: denies nausea, vomiting, diarrhea      Vital signs/Intake and Output:  Visit Vitals    BP (!) 127/95    Pulse (!) 103    Temp 100.1 °F (37.8 °C)    Resp 18    Ht 5' 4\" (1.626 m)    Wt 80.4 kg (177 lb 4.8 oz)    SpO2 99%    Breastfeeding No    BMI 30.43 kg/m2     Current Shift:     Last three shifts:  03/09 1901 - 03/11 0700  In: 1926.3 [P.O.:120;  I.V.:1806.3]  Out: -     Exam:    General: Well developed, alert, NAD, OX3  Head/Neck: NCAT, supple, No masses, No lymphadenopathy  CVS:Regular rate and rhythm, no M/R/G, S1/S2 heard, no thrill  Lungs:Clear to auscultation bilaterally, no wheezes, rhonchi, or rales  Abdomen: Soft, Nontender, No distention, Normal Bowel sounds, No hepatomegaly  Extremities: No C/C/E, pulses palpable 2+  Skin:normal texture and turgor, no rashes, no lesions  Neuro:left facial weakness, nasolabial fold flattening left side, LUE weakmess, LE BL appear equal in styrength                Labs: Results:       Chemistry Recent Labs      03/10/17   1630   GLU  115*   NA  140   K  2.9*   CL  98*   CO2  34*   BUN  10   CREA  0.73   CA  8.6   AGAP  8   BUCR  14   AP  40*   TP  6.8   ALB  2.9*   GLOB  3.9   AGRAT  0.7*      CBC w/Diff Recent Labs      03/10/17   1630   WBC  13.9*   RBC  3.46*   HGB  8.4*   HCT  27.2*   PLT  546*   GRANS  87*   LYMPH  2*   EOS  0      Cardiac Enzymes Recent Labs      03/10/17   1630   CPK  15*   CKND1  Cannot be calulated      Coagulation Recent Labs      03/10/17   1700   PTP  12.0   INR  0.9   APTT  24.7       Lipid Panel No results found for: CHOL, CHOLPOCT, CHOLX, CHLST, CHOLV, 227187, HDL, LDL, NLDLCT, DLDL, LDLC, DLDLP, [id number], VLDLC, VLDL, TGL, TGLX, TRIGL, [id number], TRIGP, TGLPOCT, [id number], CHHD, CHHDX   BNP No results for input(s): BNPP in the last 72 hours.    Liver Enzymes Recent Labs      03/10/17   1630   TP  6.8   ALB  2.9*   AP  40*   SGOT  10*      Thyroid Studies Lab Results   Component Value Date/Time    TSH 1.51 12/12/2009 09:48 AM        Procedures/imaging: see electronic medical records for all procedures/Xrays and details which were not copied into this note but were reviewed prior to creation of Rachid Tipton MD

## 2017-03-11 NOTE — PROGRESS NOTES
TRANSFER - IN REPORT:    Verbal report received from KEISHA Gates RN(name) on Almas Dawson  being received from ED(unit) for routine progression of care      Report consisted of patients Situation, Background, Assessment and   Recommendations(SBAR). Information from the following report(s) SBAR, ED Summary, Intake/Output, MAR, Recent Results, Cardiac Rhythm NSR-ST and Alarm Parameters  was reviewed with the receiving nurse. Opportunity for questions and clarification was provided. Assessment completed upon patients arrival to unit and care assumed.

## 2017-03-11 NOTE — PROGRESS NOTES
Problem: Dysphagia (Adult)  Goal: *Acute Goals and Plan of Care (Insert Text)  Dysphagia Present: mild/moderate  Aspiration: at risk     Recommendations:  Diet: [facility] soft and thin liquids  Meds: 1 @ at time, place to R side  Aspiration Precautions  Other: bolus placement R, small sips/bites, alternate solids/liquids    Goals: Patient will:  1. Tolerate PO trials with 0 s/s overt distress in 4/5 trials  2. Utilize compensatory swallow strategies/maneuvers (decrease bite/sip, size/rate, alt. liq/sol) with min cues in 4/5 trials  3. Perform oral-motor/laryngeal exercises to increase oropharyngeal swallow function with min cues  4. Complete an objective swallow study (i.e., MBSS) to assess swallow integrity, r/o aspiration, and determine of safest LRD, min A  Outcome: Progressing Towards Goal  SPEECH LANGUAGE PATHOLOGY BEDSIDE SWALLOW EVALUATION     Patient: Sulma Lyle (45 y.o. female)  Date: 3/11/2017  Primary Diagnosis: Stroke [facility])  Stroke [facility])  Hypokalemia  CVA (cerebral vascular accident) [facility])        Precautions: aspiration  Fall      ASSESSMENT :  Based on the objective data described below, the patient presents with mild/moderate oropharyngeal dysphagia. Clinical evaluation of swallow completed with pt A&Ox4. Oral motor exam significant for R L labial asymmetry, reduced ROM and reduced L facial sensation. Further note L neglect in environment. PO assessment of thin liquids +straw single and successive sips, puree and regular solid tolerated without overt s/s penetration/aspiration. Labored mastication of solid cracker with mild L lingual residue; responsive to cues for bolus placement R. Pharyngeal swallow delayed however functional elevation to palpation. Limited PO trials as pt needing toileting. Educated pt re: dysphagia in setting of CVA, aspiration risk, diet recommendations and compensatory strategies; d/w RN.  Recommend initiate [facility] soft diet and thin liquids with safe swallow strategies of bolus placement R, small sips/bites, alternating solids/liquids with HOB >45 for PO intake and >30 post meal. ST to f/u for diet tolerance/advancement and education, consider MBS to rule out pharyngeal dysfunction. Patient will benefit from skilled intervention to address the above impairments. Patients rehabilitation potential is considered to be Good  Factors which may influence rehabilitation potential include:   [ ]            None noted  [X]            Mental ability/status  [X]            Medical condition  [ ]            Home/family situation and support systems  [ ]            Safety awareness  [ ]            Pain tolerance/management  [ ]            Other:        PLAN : mech soft and thin liquids, compensatory strategies as above  Recommendations and Planned Interventions:  ST to f/u for diet tolerance/advancement and education, consider MBS to rule out pharyngeal dysfunction  Frequency/Duration: Patient will be followed by speech-language pathology 1-2 times per day/4-7 days per week to address goals. Discharge Recommendations: Inpatient Rehab       SUBJECTIVE:   Patient stated This applesauce tastes good, I feel like I haven't eaten in a week.       OBJECTIVE:       Past Medical History:   Diagnosis Date    Abdominal pain      Anemia NEC       sickle cell trait    C. difficile colitis      Cro[facility]'s disease ([facility] [address]      Gastrointestinal disorder       Crohns    Herpes zoster      Hx of cocaine abuse      Hyperkalemia      Hypertension      Iron deficiency anemia      MRSA (methicillin resistant Staphylococcus aureus)      Obesity      Pain management      Sickle cell trait (HCC)       Past Surgical History:   Procedure Laterality Date    HX GYN         tubal ligation    HX TUBAL LIGATION         Prior Level of Function/Home Situation: per chart/pt  Home Situation  Home Environment: Apartment (town house)  One/Two Story Residence: Two story  Living Alone: Yes (3 adult children)  Support Systems: Family member(s)  Patient Expects to be Discharged to[de-identified] Private residence  Current DME Used/Available at Home: None  Diet prior to admission: regular/thin  Current Diet:  NPO   Cognitive and Communication Status:  Neurologic State: Alert  Orientation Level: Oriented X4  Cognition: Follows commands, Appropriate for age attention/concentration, Appropriate safety awareness  Perception: Cues to attend to left side of body, Cues to attend left visual field  Perseveration: No perseveration noted  Safety/Judgement: Awareness of environment, Good awareness of safety precautions, Insight into deficits  Oral Assessment:  Oral Assessment  Labial: Decreased rate;Left droop; Decreased seal  Dentition: Intact; Natural  Oral Hygiene: good  Lingual: Decreased rate;Decreased strength  Velum: No impairment  Mandible: No impairment  P.O. Trials:  Patient Position: HOB 45  Vocal quality prior to P.O.: Low volume  Consistency Presented: Thin liquid;Puree; Solid  How Presented: Self-fed/presented;Straw;Spoon  How Much:  (x2oz thin, x1oz puree, x1 solid)  Bolus Acceptance: No impairment  Bolus Formation/Control: Impaired  Type of Impairment: Delayed;Mastication  Propulsion: Delayed (# of seconds)  Oral Residue: Less than 10% of bolus; Lingual;Left  Initiation of Swallow: Delayed (# of seconds)  Laryngeal Elevation: Functional  Aspiration Signs/Symptoms: None  Pharyngeal Phase Characteristics: No impairment, issues, or problems   Effective Modifications:  Alternate liquids/solids;Small sips and bites (bolus placement R)  Cues for Modifications: Minimal        Oral Phase Severity: Mild-moderate  Pharyngeal Phase Severity : Mild     GCODESwallowing:   Swallow Current Status CJ= 20-39%   Swallow Goal Status CI= 1-19%     The severity rating is based on the following outcomes:  JULEE Noms Swallow Level 5              Clinical Judgement     PAIN:  Start of Eval: 3  End of Eval: 3      After treatment:   [ ]            Patient left in no apparent distress sitting up in chair  [X]            Patient left in no apparent distress in bed  [X]            Call bell left within reach  [X]            Nursing notified  [ ]            Family present  [ ]            Caregiver present  [ ]            Bed alarm activated      COMMUNICATION/EDUCATION:   [X]            Aspiration precautions; swallow safety; compensatory techniques. [X]            Patient/family have participated as able in goal setting and plan of care. [X]            Patient/family agree to work toward stated goals and plan of care. [ ]            Patient understands intent and goals of therapy; neutral about participation. [ ]            Patient unable to participate in goal setting/plan of care; educ ongoing with interdisciplinary staff  [ ]             Posted safety precautions in patient's room.      Thank you for this referral.  Nevaeh Jeffries, SLP  Time Calculation: 14 mins

## 2017-03-11 NOTE — PROGRESS NOTES
Problem: Mobility Impaired (Adult and Pediatric)  Goal: *Acute Goals and Plan of Care (Insert Text)  Physical Therapy Goals  Initiated 3/11/2017 and to be accomplished within 7 day(s)  1. Patient will move from supine to sit and sit to supine in bed with supervision/set-up. 2. Patient will transfer from bed to chair and chair to bed with supervision/set-up using the least restrictive device. 3. Patient will perform sit to stand with supervision/set-up. 4. Patient will ambulate with supervision/set-up for >100 feet with the least restrictive device. Outcome: Progressing Towards Goal  PHYSICAL THERAPY EVALUATION     Patient: Lincoln Jarquin (77 y.o. female)  Date: 3/11/2017  Primary Diagnosis: Stroke [facility])  Stroke [facility])  Hypokalemia  CVA (cerebral vascular accident) [facility])  Precautions:   Fall      ASSESSMENT :  Based on the objective data described below, Patient present to PT with functional mobility impairments with regard to bed mobility, transfers, gait, stair negotiation, balance, weakness, and overall tolerance for activity. Pt with L UE and R LE coordination, sensation, and proprioception deficits as well as L visual field deficits. Pt's gross L LE MMT 4/5 and R LE with gross MMT 4/5 except for hamstring strength at 3+/5. Pt required max verbal and tactile cuing to use her L UE and R LE during PT session. During gait training with RW use, Min A pt ambulated a total of 4 feet to the [facility] and back with max verbal and tactile cuing required to advance her R LE and to hold onto RW with L UE. Left pt in bed with all needs met and call bell within reach. Educated pt to move her UEs and LEs through the day and practice on both fine and gross coordination. Initially pt c/o pain in her R shoulder but with repositioning and therex pt's pain diminished. Recommend inpatient rehab at time of discharge. Patient will benefit from skilled intervention to address the above impairments.   Patients rehabilitation potential is considered to be Good  Factors which may influence rehabilitation potential include:   [ ]         None noted  [ ]         Mental ability/status  [X]         Medical condition  [ ]         Home/family situation and support systems  [ ]         Safety awareness  [ ]         Pain tolerance/management  [ ]         Other:        PLAN :  Recommendations and Planned Interventions:  [X]           Bed Mobility Training             [X]    Neuromuscular Re-Education  [X]           Transfer Training                   [ ]    Orthotic/Prosthetic Training  [X]           Gait Training                          [ ]    Modalities  [X]           Therapeutic Exercises          [ ]    Edema Management/Control  [X]           Therapeutic Activities            [X]    Patient and Family Training/Education  [ ]           Other (comment):     Frequency/Duration: Patient will be followed by physical therapy daily to address goals. Discharge Recommendations: Inpatient Rehab  Further Equipment Recommendations for Discharge: rolling walker       SUBJECTIVE:   Patient stated I am ready to get up and use the bathroom.       OBJECTIVE DATA SUMMARY:       Past Medical History:   Diagnosis Date    Abdominal pain      Anemia NEC       sickle cell trait    C. difficile colitis      Cro[facility]'s disease ([facility] [address]      Gastrointestinal disorder       Crohns    Herpes zoster      Hx of cocaine abuse      Hyperkalemia      Hypertension      Iron deficiency anemia      MRSA (methicillin resistant Staphylococcus aureus)      Obesity      Pain management      Sickle cell trait (HCC)       Past Surgical History:   Procedure Laterality Date    HX GYN         tubal ligation    HX TUBAL LIGATION         Barriers to Learning/Limitations: yes;  sensory deficits-vision/hearing/speech  Compensate with: visual, verbal, tactile, kinesthetic cues/model  Prior Level of Function/Home Situation: Pt stated she was independent with ADLs and functional mobility prior to hospitalization. Home Situation  Home Environment: Apartment (town house)  One/Two Story Residence: Two story  Living Alone: No  Support Systems: Family member(s)  Patient Expects to be Discharged to[de-identified] Private residence  Current DME Used/Available at Home: None  Critical Behavior:  Neurologic State: Drowsy  Orientation Level: Oriented to person;Oriented to place  Cognition: Follows commands   Psychosocial  Patient Behaviors: Calm; Cooperative  Needs Expressed: Educational  Purposeful Interaction: Yes  Pt Identified Daily Priority: Clinical issues (comment); Communication issues (comment)  Caritas Process: Nurture loving kindness;Enable carson/hope;Establish trust;Nurture spiritual self;Teaching/learning; Attend basic human needs;Create healing environment  Caring Interventions: Reassure; Therapeutic modalities  Reassure: Therapeutic listening;Caring rounds; Informing  Therapeutic Modalities: Intentional therapeutic touch   Strength:    Strength: Generally decreased, functional   Tone & Sensation:   Tone: Abnormal   Sensation: Impaired   Range Of Motion:  AROM: Generally decreased, functional   PROM: Generally decreased, functional   Functional Mobility:  Bed Mobility:  Rolling: Supervision; Additional time  Supine to Sit: Contact guard assistance; Additional time  Sit to Supine: Contact guard assistance; Additional time  Scooting: Contact guard assistance; Additional time  Transfers:  Sit to Stand: Minimum assistance; Additional time  Stand to Sit: Minimum assistance; Additional time   Balance:   Sitting: Intact  Standing: Impaired; With support  Standing - Static: Fair  Standing - Dynamic : Fair  Ambulation/Gait Training:  Distance (ft): 4 Feet (ft)  Assistive Device: Walker, rolling;Gait belt  Ambulation - Level of Assistance: Minimal assistance   Gait Description (WDL): Exceptions to WDL  Gait Abnormalities: Ataxic;Decreased step clearance   Base of Support: Center of gravity altered  Stance: Time  Speed/Janice: Slow;Shuffled  Step Length: Right shortened;Left shortened  Swing Pattern: Left asymmetrical;Right asymmetrical   Interventions: Visual/Demos; Verbal cues; Tactile cues; Safety awareness training   Therapeutic Exercises:   LE therex included ankle pumps, LAQ, heel slides, marching x 10 reps  Pain:  Pain Scale 1: Visual  Pain Intensity 1: 0  Pain Location 1: Abdomen; Head  Pain Orientation 1: Left;Right  Pain Description 1: Aching  Pain Intervention(s) 1: Medication (see MAR)  Activity Tolerance:   Fair+  Please refer to the flowsheet for vital signs taken during this treatment. After treatment:   [ ]         Patient left in no apparent distress sitting up in chair  [X]         Patient left in no apparent distress in bed  [X]         Call bell left within reach  [X]         Nursing notified  [ ]         Caregiver present  [ ]         Bed alarm activated      COMMUNICATION/EDUCATION:   [X]         Fall prevention education was provided and the patient/caregiver indicated understanding. [X]         Patient/family have participated as able in goal setting and plan of care. [X]         Patient/family agree to work toward stated goals and plan of care. [ ]         Patient understands intent and goals of therapy, but is neutral about his/her participation. [ ]         Patient is unable to participate in goal setting and plan of care.      Thank you for this referral.  Cesar Foley PT, DPT         Time Calculation: 35 mins    Eval Complexity: History: MEDIUM  Complexity : 1-2 comorbidities / personal factors will impact the outcome/ POC Exam:HIGH Complexity : 4+ Standardized tests and measures addressing body structure, function, activity limitation and / or participation in recreation  Presentation: MEDIUM Complexity : Evolving with changing characteristics  Clinical Decision Making:Medium Complexity Pt ambulated <30 feet Overall Complexity:MEDIUM

## 2017-03-11 NOTE — H&P
Medicine History and Physical    Patient: Tata Campos   Age:  40 y.o.    HPI:   Tata Campos is a 40 y.o. female who presents via EMS c/o left sided weakness and left facial droop since 6 am this morning (approximately 10h prior to arrival). EMS reports FBS 146mg/dL. They did notice LUE weakness and positive drift. She was seen at [facility] yesterday for acute abdominal pain and was discharged home. Pt apparently put on a Fentanyl 50 mcg patch about 13 hours ago and still had it on upon EMS arrival this afternoon. No known injury/trauma/fall. She does report having a bilateral frontal headache for the past few days. She denies any other medication changes. Denies drug, alcohol, tobacco use during my interview. No other complaints. Past Medical History:  Past Medical History:   Diagnosis Date    Abdominal pain     Anemia NEC     sickle cell trait    C. difficile colitis     Cro[facility]'s disease ([facility] [address]     Gastrointestinal disorder     Crohns    Herpes zoster     Hx of cocaine abuse     Hyperkalemia     Hypertension     Iron deficiency anemia     MRSA (methicillin resistant Staphylococcus aureus)     Obesity     Pain management     Sickle cell trait (HCC)        Past Surgical History:  Past Surgical History:   Procedure Laterality Date    HX GYN      tubal ligation    HX TUBAL LIGATION         Family History:  History reviewed. No pertinent family history. Social History:  Social History     Social History    Marital status:      Spouse name: N/A    Number of children: N/A    Years of education: N/A     Social History Main Topics    Smoking status: Former Smoker    Smokeless tobacco: None    Alcohol use No    Drug use: Yes    Sexual activity: Yes     Partners: Male     Birth control/ protection: Surgical     Other Topics Concern    None     Social History Narrative       Home Medications:  Prior to Admission medications    Medication Sig Start Date End Date Taking? Authorizing Provider   promethazine (PHENERGAN) 25 mg tablet Take 1 Tab by mouth every six (6) hours as needed. 2/9/17   Elizabeth Faith MD   predniSONE (STERAPRED DS) 10 mg dose pack Take 4 tabs each day for 3 days then 3 tabs each day for 3 days then 2 tabs each day for 3 days then 1 tab each day for 3 days then 1/2 tab each day until gone 2/9/17   Elizabeth Faith MD   predniSONE (DELTASONE) 10 mg tablet 6 pills x 2 days  5 pills x 2 days  4 pills x 2 days  3 pills x 2 days  2 pills x 2 days  1 pill x 2 days  1/2 pill x 2 days 1/31/17   RONNY Bradford   promethazine (PHENERGAN) 25 mg tablet Take 1 Tab by mouth every six (6) hours as needed for Nausea. 1/31/17   RONNY Harp   traMADol (ULTRAM) 50 mg tablet Take 1 Tab by mouth every six (6) hours as needed for Pain. Max Daily Amount: 200 mg. 1/31/17   RONNY Harp   predniSONE (DELTASONE) 10 mg tablet Take 6 pills for 2 days, 5 pills for 2 days, 4 pills for 2 days, 3 pills for 2 days, 2 pills for 2 days, 1 pill for 2 days, 1/2 pill for 2 days. Take with food. Indications: CROHN'S DISEASE 1/28/17   Elizabeth Faith MD   hydroCHLOROthiazide (HYDRODIURIL) 12.5 mg tablet Take 1 Tab by mouth daily. 1/28/17   Elizabeth Faith MD   promethazine (PHENERGAN) 25 mg tablet Take 1 Tab by mouth every six (6) hours as needed. For nausea or vomiting 9/26/16   Justice Omalley PA-C   mesalamine EC (ASACOL) 400 mg EC tablet Take  by mouth three (3) times daily. Shannon Walker MD       Allergies:  No Known Allergies    Review of Systems  A comprehensive review of systems was negative except for that written in the History of Present Illness.     Physical Exam:     Visit Vitals    BP (!) 134/93    Pulse (!) 101    Temp 100.2 °F (37.9 °C)    Resp 22    Ht 5' 4\" (1.626 m)    Wt 80.4 kg (177 lb 4.8 oz)    SpO2 98%    BMI 30.43 kg/m2       Physical Exam:  General appearance: alert, cooperative, no distress, appears stated age  Head: Normocephalic, atraumatic; obvious left facial droop  Neck: supple, trachea midline  Lungs: clear to auscultation bilaterally  Heart: regular rate and rhythm, S1, S2 normal, no murmur, click, rub or gallop  Abdomen: soft, non-tender. Bowel sounds normal. No masses,  no organomegaly  Extremities: extremities normal, atraumatic, no cyanosis or edema  Skin: Skin color, texture, turgor normal. No rashes or lesions  Neurologic: Grossly normal; left sided hemiparesis    Intake and Output:  Current Shift:     Last three shifts:       Lab/Data Reviewed: All lab results for the last 24 hours reviewed. Medications Reviewed.     Assessment/Plan     Hospital Problems  Date Reviewed: 10/8/2012          Codes Class Noted POA    * (Principal)Stroke [facility]) ICD-10-CM: I63.9  ICD-9-CM: 434.91  3/10/2017 Yes        Sickle cell trait (HCC) ICD-10-CM: D57.3  ICD-9-CM: 282.5  Unknown Yes        Hypertension ICD-10-CM: I10  ICD-9-CM: 401.9  Unknown Yes        Hx of cocaine abuse ICD-10-CM: Z87.898  ICD-9-CM: 305.63  Unknown Yes        CVA (cerebral vascular accident) ([facility] [address] ICD-10-CM: I63.9  ICD-9-CM: 434.91  3/10/2017 Yes        Hypokalemia ICD-10-CM: E87.6  ICD-9-CM: 276.8  8/8/2015 Yes        Crohn disease ([facility] [address] ICD-10-CM: K50.90  ICD-9-CM: 555.9  2/27/2012 Yes        Obesity ICD-10-CM: F12.6  ICD-9-CM: 278.00  2/27/2012 Yes            Sub-acute CVA work-up ordered: MRI brain, MRA head/neck, echo  - Admit to ICU for overnight monitoring/neuro checks  - monitor for ICP elevation  - High dose statin (lipitor 80mg)  - Aspirin 162mg daily.  - Neuro consulted/following  - [place] BP goals: <220/120 mmHg  - Needs DVT prophylaxis prior to 72h; holding for now  - NPO until SLP evaluation  - Isotonic fluids w/o dextrose to maintain fluid balance and avoid hyperglycemia    4 runs KCl  Check Mg; replete if needed     PT/OT/SLP eval    CM for d/c planning    FENGI: NS @ 125cc/hr; SCDs; NPO; No GI prophylaxis indicated    Dispo: ICU admission; length of stay anticipated to be >48h or 2 midnights; anticipate dc to home w/ home health    60 minutes of critical care time spent in the direct evaluation and treatment of this high risk patient. The reason for providing this level of medical care for this critically ill patient was due a critical illness that impaired one or more vital organ systems such that there was a high probability of imminent or life threatening deterioration in the patients condition. This care involved high complexity decision making to assess, manipulate, and support vital system functions, to treat this degreee vital organ system failure and to prevent further life threatening deterioration of the patients condition.     Sonia Jon, DO

## 2017-03-11 NOTE — CONSULTS
NEUROLOGY CONSULTATION NOTE    Patient: Radha Braga MRN: [id number]  CSN: [id number]    YOB: 1970  Age: 40 y.o. Sex: female    DOA: 3/10/2017 LOS:  LOS: 1 day        Requesting Physician: Dr. Km Shankar  Reason for Consultation: Stroke    No PCP. HISTORY OF PRESENT ILLNESS:   Radha Braga is a 40 y.o. female with history of Crohn's disease, past history of cocaine use (not recently) and Sickle CEll trait who had several ER presentations with GI pain, bleeding and anemia, recently seen in [facility] and was placed on fentanyl patch. She woke up with left sided weakness yesterday morning and arrived to ER 10 hours after the onset of symptoms. She was seen by tele-neurology, and was not regarded to be tPA or intervention candidate. She was given a loading dose of aspirin and was admitted to ICU. Currently, she complains of right sided symptoms (not the left, due to neglect). She did have headache, but not currently. Her brain MRI shows multifocal infarcts on the right temporal lobe, inferior MCA distribution, right thalamic, parietal and frontal lobes, multifocal cerebellar (biggest on left cerebellum) regions, all of which are suggestive of a central source of emboli. PAST MEDICAL HISTORY:  Past Medical History:   Diagnosis Date    Abdominal pain     Anemia NEC     sickle cell trait    C. difficile colitis     Crohn's disease ([address]     Gastrointestinal disorder     Crohns    Herpes zoster     Hx of cocaine abuse     Hyperkalemia     Hypertension     Iron deficiency anemia     MRSA (methicillin resistant Staphylococcus aureus)     Obesity     Pain management     Sickle cell trait [address] [address]      PAST SURGICAL HISTORY:  Past Surgical History:   Procedure Laterality Date    HX GYN      tubal ligation    HX TUBAL LIGATION       FAMILY HISTORY:  History reviewed. No pertinent family history.   SOCIAL HISTORY:  Social History     Social History    Marital status:  Spouse name: N/A    Number of children: N/A    Years of education: N/A     Social History Main Topics    Smoking status: Former Smoker    Smokeless tobacco: None    Alcohol use No    Drug use: Yes    Sexual activity: Yes     Partners: Male     Birth control/ protection: Surgical     Other Topics Concern    None     Social History Narrative     MEDICATIONS:  Current Facility-Administered Medications   Medication Dose Route Frequency    sodium chloride (NS) flush 5-10 mL  5-10 mL IntraVENous Q8H    sodium chloride (NS) flush 5-10 mL  5-10 mL IntraVENous PRN    ondansetron (ZOFRAN) injection 4 mg  4 mg IntraVENous Q6H PRN    acetaminophen (TYLENOL) tablet 650 mg  650 mg Oral Q4H PRN    enoxaparin (LOVENOX) injection 40 mg  40 mg SubCUTAneous Q24H    [START ON 3/12/2017] aspirin chewable tablet 81 mg  81 mg Oral DAILY    potassium chloride (K-DUR, KLOR-CON) SR tablet 20 mEq  20 mEq Oral BID    0.9% sodium chloride infusion  125 mL/hr IntraVENous CONTINUOUS    atorvastatin (LIPITOR) tablet 80 mg  80 mg Oral QHS    morphine injection 2 mg  2 mg IntraVENous Q4H PRN     Prior to Admission medications    Medication Sig Start Date End Date Taking? Authorizing Provider   predniSONE (DELTASONE) 10 mg tablet Take 6 pills for 2 days, 5 pills for 2 days, 4 pills for 2 days, 3 pills for 2 days, 2 pills for 2 days, 1 pill for 2 days, 1/2 pill for 2 days. Take with food. Indications: CROHN'S DISEASE 1/28/17  Yes Lynette Lal MD   hydroCHLOROthiazide (HYDRODIURIL) 12.5 mg tablet Take 1 Tab by mouth daily. 1/28/17  Yes Lynette Lal MD   promethazine (PHENERGAN) 25 mg tablet Take 1 Tab by mouth every six (6) hours as needed. For nausea or vomiting 9/26/16  Yes Jennifer Guido PA-C   mesalamine EC (ASACOL) 400 mg EC tablet Take  by mouth three (3) times daily. Yes Shannon Walker MD   promethazine (PHENERGAN) 25 mg tablet Take 1 Tab by mouth every six (6) hours as needed.  2/9/17   Lynette Lal MD predniSONE (STERAPRED DS) 10 mg dose pack Take 4 tabs each day for 3 days then 3 tabs each day for 3 days then 2 tabs each day for 3 days then 1 tab each day for 3 days then 1/2 tab each day until gone 2/9/17   Tahir Street MD   predniSONE (DELTASONE) 10 mg tablet 6 pills x 2 days  5 pills x 2 days  4 pills x 2 days  3 pills x 2 days  2 pills x 2 days  1 pill x 2 days  1/2 pill x 2 days 1/31/17   RONNY Garcia   promethazine (PHENERGAN) 25 mg tablet Take 1 Tab by mouth every six (6) hours as needed for Nausea. 1/31/17   RONNY Hebert   traMADol (ULTRAM) 50 mg tablet Take 1 Tab by mouth every six (6) hours as needed for Pain. Max Daily Amount: 200 mg. 1/31/17   RONNY Garcia       ALLERGIES:  No Known Allergies    Review of Systems  GENERAL: No fevers or chills. HEENT: No change in vision, earache, tinnitus, sore throat or sinus congestion. Headache+  NECK: No pain or stiffness. CARDIOVASCULAR: No chest pain or pressure. No palpitations. PULMONARY: No shortness of breath, cough or wheeze. GASTROINTESTINAL: Abdominal pain+ GI bleeding in the past+, not currently. GENITOURINARY: No urinary frequency, urgency, hesitancy or dysuria. MUSCULOSKELETAL: No joint or muscle pain, no back pain, no recent trauma. DERMATOLOGIC: No rash, no itching, no lesions. ENDOCRINE: No polyuria, polydipsia, no heat or cold intolerance. No recent change in weight. HEMATOLOGICAL: No anemia or easy bruising or bleeding. NEUROLOGIC: Headache+ Bilateral feet and hand numbness+, left side weakness (partial neglect)    PHYSICAL EXAMINATION:     Visit Vitals    BP (!) 127/95    Pulse (!) 103    Temp 100.1 °F (37.8 °C)    Resp 18    Ht 5' 4\" (1.626 m)    Wt 80.4 kg (177 lb 4.8 oz)    SpO2 99%    Breastfeeding No    BMI 30.43 kg/m2      O2 Device: Room air  GENERAL: Pleasant, in no apparent distress. HEENT: Moist mucous membranes, sclerae anicteric, scalp is atraumatic.    CVS: Regular rate and rhythm, no murmurs or gallops. No carotid bruits. PULMONARY: Clear to auscultation bilaterally. No rales or rhonchi. No wheezing. EXTREMITIES: Normal range of motion at all sites. No deformities. ABDOMEN: Soft, nontender. SKIN: No rashes or ecchymoses. Warm and dry. NEUROLOGIC: Alert and oriented x3. Speech is dysarthric, but no aphasic. She is able to name. She has partial neglect and extinction to double stimuli on the left side. Cranial nerves: Face: left facial weakness, central in nature. Facial sensation is decreased on the left. Extraocular movements are intact with no nystagmus. No gaze deviation. Visual fields are full to confrontation, but with neglect on the left. PERRL. Tongue is midline. Palate elevates symmetrically. Hearing intact to speech. Sternocleidomastoid and trapezius strengths are full bilaterally. Motor: Left arm drift, 4/5 hand . Left leg drift, 4/5 strength at all segments. Rights side full strength. Sensory: Left arm and leg decreased sensation to touch and pinprick. Coordination: Intact coordination with finger-nose-finger, slower on the left side. Deep tendon reflexes: 2+ at biceps, brachioradialis, patella and ankles bilaterally.       Labs: Results:       Chemistry Recent Labs      03/10/17   1630   GLU  115*   NA  140   K  2.9*   CL  98*   CO2  34*   BUN  10   CREA  0.73   CA  8.6   AGAP  8   BUCR  14   AP  40*   TP  6.8   ALB  2.9*   GLOB  3.9   AGRAT  0.7*      CBC w/Diff Recent Labs      03/10/17   1630   WBC  13.9*   RBC  3.46*   HGB  8.4*   HCT  27.2*   PLT  546*   GRANS  87*   LYMPH  2*   EOS  0      Cardiac Enzymes Recent Labs      03/10/17   1630   CPK  15*   CKND1  Cannot be calulated      Coagulation Recent Labs      03/10/17   1700   PTP  12.0   INR  0.9   APTT  24.7       Lipid Panel No results found for: CHOL, CHOLPOCT, CHOLX, CHLST, CHOLV, 123192, HDL, LDL, NLDLCT, DLDL, LDLC, DLDLP, 806826, VLDLC, VLDL, TGL, TGLX, TRIGL, [id number], TRIGP, TGLPOCT, [id number], [facility], [address]  [facility]   BNP No results for input(s): BNPP in the last 72 hours. Liver Enzymes Recent Labs      03/10/17   1630   TP  6.8   ALB  2.9*   AP  40*   SGOT  10*      Thyroid Studies Lab Results   Component Value Date/Time    TSH 1.51 12/12/2009 09:48 AM          Radiology:  Ct Head Wo Cont    Result Date: 3/10/2017  EXAM: CT head INDICATION: Left-sided paralysis and facial droop COMPARISON: CT performed March 11, 2008 TECHNIQUE: Axial CT imaging of the head was performed without intravenous contrast. One or more dose reduction techniques were used on this CT: automated exposure control, adjustment of the mAs and/or kVp according to patient's size, and iterative reconstruction techniques. The specific techniques utilized on this CT exam have been documented in the patient's electronic medical record. _______________ FINDINGS: Detail is mildly limited at the skull base secondary to motion artifact. BRAIN AND POSTERIOR FOSSA: There is focal loss of the normal cortical gray-white matter differentiation involving the inferior portions of the right parietal lobe, and extending into the midportion of the right temporal lobe. There is edema which is also noted to track along the sylvian fissure anteriorly, to involve the right-sided insular and subinsular cortex. There is focally increased density noted within the right MCA branch vessel on axial image 15. Mild local mass effect is present, with evidence of sulcal flattening. The ventricular size and configuration is stable. The basilar cisterns are patent. No discrete intra-axial hemorrhage identified. EXTRA-AXIAL SPACES AND MENINGES: There are no abnormal extra-axial fluid collections. CALVARIUM: Intact. SINUSES: Clear. OTHER: None. _______________     IMPRESSION: 1. Focally abnormal gray-white matter differentiation with associated edema involving the right parietal and temporal lobes, spanning the sylvian fissure in keeping with developing infarct.  Punctate area of increased density within the sylvian M2 segment is nonspecific, but may pertain to a small in situ thrombus. No evidence of acute intra-axial or extra-axial fluid collection. CODE S findings discussed with  in the ED at the time of the scan. Ct Abd Pelv Wo Cont    Result Date: 2/9/2017  EXAM: CT of the abdomen and pelvis INDICATION: Abdominal pain. COMPARISON: January 28, 2017. TECHNIQUE: Axial CT imaging of the abdomen and pelvis was performed without intravenous contrast. Multiplanar reformats were generated. Dose reduction techniques used: automated exposure control, adjustment of the mAs and/or kVp according to patient size, and iterative reconstruction techniques. _______________ FINDINGS: LOWER CHEST: Unremarkable. LIVER, BILIARY: There is a stable 1 cm cyst at the dome of the liver. No biliary dilation. Gallbladder is unremarkable. PANCREAS: Normal. SPLEEN: Normal. ADRENALS: Normal. KIDNEYS: Normal. LYMPH NODES: No enlarged lymph nodes. GASTROINTESTINAL TRACT: There appears to be mild mid to distal left and rectosigmoid thickening with mild surrounding infiltration. The appendix is visualized and is within normal limits. PELVIC ORGANS: The uterus is mildly enlarged and in contour with  a 3.1 cm myometrial lesion with calcification. VASCULATURE: Unremarkable. BONES: No acute or aggressive osseous abnormalities identified. OTHER: None. _______________     IMPRESSION: There appears to be mild mid to distal left colonic and rectosigmoid thickening suggesting a mild colitis. Fibroid uterus. ASSESSMENT/IMPRESSION:  Multifocal right hemispheric (temporal, thalamic, parietal and cerebellar infarcts), seemingly cardioembolic in nature. Full report of imaging studies are pending. She needs to be on aspirin for now. Serum potassium is low, being corrected as I am writing this note.  I will also request a hematology consult for hypercoagulable states (possible Sickle Cell disease). RECOMMENDATIONS:  1. Continue Aspirin 81mg po daily. 2. Continue ICU stay with telemetry monitoring  3. Neuro checks per stroke protocol  4. Permissive hypertension (do not treat if BP is not >200/110, prefer prn labetolol 5mg)  5. IV normal saline continuous infusion 125 ml/h  6. Euglycemia with sliding scale insulin  7. Euthermia with acetaminophen 650mg Q6h prn for fever  8. Avoid urinary catheters please. 9. Echocardiogram with bubble study  10. A1c and Lipid panel  11. Hypercoag panel  12. Hematology consult     I will follow the patient.  Please do not hesitate to return with any questions.    ------------------------------------  Kanika Alvarez MD  3/11/2017  9:38 AM

## 2017-03-11 NOTE — WOUND CARE
Wound care attempted to see pt during rounds. Pt having bedside procedure. No skin issues noted per bedside RN. Wound care will continue to follow pt daily while in ICU.

## 2017-03-11 NOTE — PROGRESS NOTES
2015: Pt arrived to ICU 3. Pt able to transfer from stretcher to bed without difficulty. NIH scale on arrival. Pt has left arm weakness and complaints of numbness. She states her right leg is weaker then her left. Both appear weak with slight drift. Speech is slightly slurred. Complaints of pain 10/10 in head. Temp 101. 1. Notified Dr. Lakia Dominguez, new orders received for tylenol. Lungs clear bilaterally. BP elevated, per Dr. Carlito Finnegan note, do not treat if BP is not >200/110.    2030: Assessment complete, see appropriate flow sheets. Nursing care continues. 2230: pain unrelieved from tylenol, new orders received for 2 mg morphine PRN from Dr. Lakia Dominguez. See MAR.     0000: Reassessment, no changes. 0400: Reassessment, no changes. Shift summary: No changes with pt status. Pt continues to have left extremeties weakness. Speech is slurred. Facial drop present. Pt has had 5 loose BM for this shift, gets on the bedpan with assistance. Pain treated with morphine, pt tolerating. Neuro checks q1h. Nursing care continues. 0720: Bedside and Verbal shift change report given to ROCIO Tipton RN (oncoming nurse) by Deja De Jesus RN (offgoing nurse). Report included the following information SBAR, Intake/Output, MAR, Recent Results, Cardiac Rhythm NSR and Alarm Parameters . Pt father concern about pt condition, requesting physician call for update. Pt agrees to discuss with father.

## 2017-03-11 NOTE — PROGRESS NOTES
Problem: Self Care Deficits Care Plan (Adult)  Goal: *Acute Goals and Plan of Care (Insert Text)  Occupational Therapy Goals  Initiated 3/11/2017 within 7 day(s). 1. Patient will perform lower body dressing with minimal assistance/contact guard assist while sitting on EOB and use of salbador-technique. 2. Patient will perform grooming with contact guard assist while standing at sink using BUE. 3. Patient will perform toilet transfers with contact guard assist with verbal and tactile cues. 4. Patient will perform all aspects of toileting with Contact guard assist and with verbal and tactile cues. 5. Patient will participate in upper extremity therapeutic exercise/activities with contact guard assist for 15 minutes. OCCUPATIONAL THERAPY EVALUATION     Patient: Amanda Beach (71 y.o. female)  Date: 3/11/2017  Primary Diagnosis: Stroke [facility])  Stroke [facility])  Hypokalemia  CVA (cerebral vascular accident) [facility])  Precautions: Fall      ASSESSMENT :  Based on the objective data described below, the patient presents with impaired sensation, impaired proprioception, extended time to move BUE; more so of LUE than RUE, impaired Fine motor and gross motor coordination, and decrease strength that is impacting her ability to complete ADLs and functional transfers/mobility. Pt was seen with PT to maximize safety. Pt was agreeable to participate in therapy but was drowsy throughout session. Pt performed supine/sit EOB transfers with CGA and max verbal-tactile cues. Pt required extended time and Mod A to don sotero socks. Pt was attempting to don socks with R hand only and required cues to use L hand to assist as well. Pt does have L-side neglect but does look towards L side to look at therapist. Pt performed sit/stand transfer with RW and Min A and required extended time with max verbal and tactile cues to complete SPT from EOB to [facility] with RW.  Pt was having difficulty discriminating between R and L--impaired verbal command following but able to follow with tactile command following. Pt required A to complete Hygiene. Pt transferred back to EOB with Min A and back to supine with CGA. Pt does have good rehab potential and would benefit from rehab in order to return closely to PLOF in being as independent as possible. Pt benefits from skilled OT to work on BUE impairments and performance with ADLs. Patients rehabilitation potential is considered to be Excellent  Factors which may influence rehabilitation potential include:   [X]             None noted  [ ]             Mental ability/status  [ ]             Medical condition  [ ]             Home/family situation and support systems  [ ]             Safety awareness  [ ]             Pain tolerance/management  [ ]             Other:        PLAN :  Recommendations and Planned Interventions:  [X]               Self Care Training                  [X]        Therapeutic Activities  [X]               Functional Mobility Training    [ ]        Cognitive Retraining  [ ]               Therapeutic Exercises           [ ]        Endurance Activities  [ ]               Balance Training                   [ ]        Neuromuscular Re-Education  [ ]               Visual/Perceptual Training     [X]   Home Safety Training  [X]               Patient Education                 [X]        Family Training/Education  [ ]               Other (comment):     Frequency/Duration: Patient will be followed by occupational therapy 3-5x/wk for 1week   to address goals. Discharge Recommendations: Rehab  Further Equipment Recommendations for Discharge: TBD       SUBJECTIVE:   Patient stated My shoulder hurts.       OBJECTIVE DATA SUMMARY:       Past Medical History:   Diagnosis Date    Abdominal pain      Anemia NEC       sickle cell trait    C. difficile colitis      Cro[address]'s disease ([facility] [address]      Gastrointestinal disorder       Crohns    Herpes zoster      Hx of cocaine abuse      Hyperkalemia      Hypertension      Iron deficiency anemia      MRSA (methicillin resistant Staphylococcus aureus)      Obesity      Pain management      Sickle cell trait ([facility])       Past Surgical History:   Procedure Laterality Date    HX GYN         tubal ligation    HX TUBAL LIGATION         Barriers to Learning/Limitations: None  Compensate with: visual, verbal, tactile, kinesthetic cues/model  Prior Level of Function/Home Situation: Pt was independent with ADLs/IADLs prior. Driving. No AD for mobility. Home Situation  Home Environment: Apartment (town house)  One/Two Story Residence: Two story  Living Alone: Yes (3 adult children)  Support Systems: Family member(s)  Patient Expects to be Discharged to[de-identified] Private residence  Current DME Used/Available at Home: None  Cognitive/Behavioral Status:  Neurologic State: Drowsy  Orientation Level: Oriented X4  Cognition: Follows commands   Skin: BUE skin intact  Edema: none noted of BUE  Vision/Perceptual:    Visual tracking was intact but does have L-side neglect. Coordination:  Coordination: Generally decreased, functional  Fine Motor Skills-Upper: Left Impaired;Right Impaired (mild impairment of RUE; mod-max impairment of L)    Gross Motor Skills-Upper: Left Impaired;Right Impaired (more so in LUE than RUE)  Balance:  Sitting: Intact  Standing: Impaired; With support  Standing - Static: Fair  Standing - Dynamic : Fair  Strength:(B) UE   Strength: Generally decreased, functional   Tone & Sensation:(B) UE  Tone: Abnormal (noted of LUE)  Sensation: Intact (more numbness-painful stimuli of LUE than RUE)  Range of Motion:(B) UE  AROM: Generally decreased, functional  PROM: Generally decreased, functional   Functional Mobility and Transfers for ADLs:  Bed Mobility:  Rolling: Supervision; Additional time  Supine to Sit: Contact guard assistance; Additional time  Sit to Supine: Contact guard assistance; Additional time  Scooting: Contact guard assistance; Additional time  Transfers:  Sit to Stand: Minimum assistance; Additional time              Toilet Transfer : Minimum assistance; Additional time (with MAX verbal and tactile cueing required)               ADL Assessment/Intervention:   Lower Body Dressing: Moderate assistance; Additional time (required assistance to don thoroughly over L toes)  Toileting: Total assistance (hygiene in standing)   Pain:  Pain Scale 1: Visual  Pain Intensity 1: 0  Pain Location 1: Abdomen; Head  Pain Orientation 1: Left;Right  Pain Description 1: Aching  Pain Intervention(s) 1: Medication (see MAR)  Activity Tolerance:   Pt tolerated tx/eval well with no signs of fatigue or increase in pain after treatment. Please refer to the flowsheet for vital signs taken during this treatment. After treatment:   [ ] Patient left in no apparent distress sitting up in chair  [ ] Patient left sitting on EOB  [X] Patient left in no apparent distress in bed  [ ] Patient declined to be OOB at this time due to  [X] Call bell left within reach  [X] Nursing notified  [ ] Caregiver present  [ ] Bed alarm activated  [ ] CPM placed on  knee  COMMUNICATION/EDUCATION:   [ ] Home safety education was provided and the patient/caregiver indicated understanding. [X] Patient/family have participated as able in goal setting and plan of care. [X] Patient/family agree to work toward stated goals and plan of care. [ ] Patient understands intent and goals of therapy, but is neutral about his/her participation. [ ] Patient is unable to participate in goal setting and plan of care.      Thank you for this referral.  Odette Foster, OT  Time Calculation: 35 mins

## 2017-03-12 LAB
ALBUMIN SERPL BCP-MCNC: 2.3 G/DL (ref 3.4–5)
ALBUMIN/GLOB SERPL: 0.7 {RATIO} (ref 0.8–1.7)
ALP SERPL-CCNC: 33 U/L (ref 45–117)
ALT SERPL-CCNC: 14 U/L (ref 13–56)
ANION GAP BLD CALC-SCNC: 8 MMOL/L (ref 3–18)
AST SERPL W P-5'-P-CCNC: 15 U/L (ref 15–37)
BASOPHILS # BLD AUTO: 0 K/UL (ref 0–0.06)
BASOPHILS # BLD: 0 % (ref 0–2)
BILIRUB SERPL-MCNC: 0.1 MG/DL (ref 0.2–1)
BUN SERPL-MCNC: 3 MG/DL (ref 7–18)
BUN/CREAT SERPL: 6 (ref 12–20)
CALCIUM SERPL-MCNC: 7.6 MG/DL (ref 8.5–10.1)
CHLORIDE SERPL-SCNC: 104 MMOL/L (ref 100–108)
CHOLEST SERPL-MCNC: 145 MG/DL
CO2 SERPL-SCNC: 26 MMOL/L (ref 21–32)
CREAT SERPL-MCNC: 0.53 MG/DL (ref 0.6–1.3)
DIFFERENTIAL METHOD BLD: ABNORMAL
EOSINOPHIL # BLD: 0.1 K/UL (ref 0–0.4)
EOSINOPHIL NFR BLD: 1 % (ref 0–5)
ERYTHROCYTE [DISTWIDTH] IN BLOOD BY AUTOMATED COUNT: 17.2 % (ref 11.6–14.5)
EST. AVERAGE GLUCOSE BLD GHB EST-MCNC: 117 MG/DL
FOLATE SERPL-MCNC: 13.8 NG/ML (ref 3.1–17.5)
GLOBULIN SER CALC-MCNC: 3.3 G/DL (ref 2–4)
GLUCOSE BLD STRIP.AUTO-MCNC: 78 MG/DL (ref 70–110)
GLUCOSE SERPL-MCNC: 83 MG/DL (ref 74–99)
HBA1C MFR BLD: 5.7 % (ref 4.5–5.6)
HCT VFR BLD AUTO: 26.3 % (ref 35–45)
HDLC SERPL-MCNC: 37 MG/DL (ref 40–60)
HDLC SERPL: 3.9 {RATIO} (ref 0–5)
HGB BLD-MCNC: 8.1 G/DL (ref 12–16)
LDLC SERPL CALC-MCNC: 91.6 MG/DL (ref 0–100)
LIPID PROFILE,FLP: ABNORMAL
LYMPHOCYTES # BLD AUTO: 20 % (ref 21–52)
LYMPHOCYTES # BLD: 1.6 K/UL (ref 0.9–3.6)
MAGNESIUM SERPL-MCNC: 1.8 MG/DL (ref 1.8–2.4)
MAGNESIUM SERPL-MCNC: 1.9 MG/DL (ref 1.8–2.4)
MCH RBC QN AUTO: 24.2 PG (ref 24–34)
MCHC RBC AUTO-ENTMCNC: 30.8 G/DL (ref 31–37)
MCV RBC AUTO: 78.5 FL (ref 74–97)
MONOCYTES # BLD: 1 K/UL (ref 0.05–1.2)
MONOCYTES NFR BLD AUTO: 12 % (ref 3–10)
NEUTS SEG # BLD: 5.4 K/UL (ref 1.8–8)
NEUTS SEG NFR BLD AUTO: 67 % (ref 40–73)
PLATELET # BLD AUTO: 400 K/UL (ref 135–420)
PMV BLD AUTO: 8.4 FL (ref 9.2–11.8)
POTASSIUM SERPL-SCNC: 3.6 MMOL/L (ref 3.5–5.5)
POTASSIUM SERPL-SCNC: 3.7 MMOL/L (ref 3.5–5.5)
PROT SERPL-MCNC: 5.6 G/DL (ref 6.4–8.2)
RBC # BLD AUTO: 3.35 M/UL (ref 4.2–5.3)
SODIUM SERPL-SCNC: 138 MMOL/L (ref 136–145)
TRIGL SERPL-MCNC: 82 MG/DL (ref ?–150)
VIT B12 SERPL-MCNC: 564 PG/ML (ref 211–911)
VLDLC SERPL CALC-MCNC: 16.4 MG/DL
WBC # BLD AUTO: 8.1 K/UL (ref 4.6–13.2)

## 2017-03-12 PROCEDURE — 82962 GLUCOSE BLOOD TEST: CPT

## 2017-03-12 PROCEDURE — 74011250637 HC RX REV CODE- 250/637: Performed by: FAMILY MEDICINE

## 2017-03-12 PROCEDURE — 84132 ASSAY OF SERUM POTASSIUM: CPT | Performed by: FAMILY MEDICINE

## 2017-03-12 PROCEDURE — 97530 THERAPEUTIC ACTIVITIES: CPT

## 2017-03-12 PROCEDURE — 97116 GAIT TRAINING THERAPY: CPT

## 2017-03-12 PROCEDURE — 97110 THERAPEUTIC EXERCISES: CPT

## 2017-03-12 PROCEDURE — 80061 LIPID PANEL: CPT | Performed by: FAMILY MEDICINE

## 2017-03-12 PROCEDURE — 65610000006 HC RM INTENSIVE CARE

## 2017-03-12 PROCEDURE — 36415 COLL VENOUS BLD VENIPUNCTURE: CPT | Performed by: FAMILY MEDICINE

## 2017-03-12 PROCEDURE — 74011250636 HC RX REV CODE- 250/636: Performed by: EMERGENCY MEDICINE

## 2017-03-12 PROCEDURE — 74011250637 HC RX REV CODE- 250/637: Performed by: HOSPITALIST

## 2017-03-12 PROCEDURE — 93880 EXTRACRANIAL BILAT STUDY: CPT

## 2017-03-12 PROCEDURE — 83735 ASSAY OF MAGNESIUM: CPT | Performed by: FAMILY MEDICINE

## 2017-03-12 PROCEDURE — 74011250636 HC RX REV CODE- 250/636: Performed by: HOSPITALIST

## 2017-03-12 PROCEDURE — 83036 HEMOGLOBIN GLYCOSYLATED A1C: CPT | Performed by: HOSPITALIST

## 2017-03-12 PROCEDURE — 85025 COMPLETE CBC W/AUTO DIFF WBC: CPT | Performed by: FAMILY MEDICINE

## 2017-03-12 RX ORDER — POTASSIUM CHLORIDE 20MEQ/15ML
40 LIQUID (ML) ORAL
Status: COMPLETED | OUTPATIENT
Start: 2017-03-12 | End: 2017-03-12

## 2017-03-12 RX ADMIN — HYDROMORPHONE HYDROCHLORIDE 1 MG: 1 INJECTION, SOLUTION INTRAMUSCULAR; INTRAVENOUS; SUBCUTANEOUS at 07:43

## 2017-03-12 RX ADMIN — Medication 10 ML: at 07:45

## 2017-03-12 RX ADMIN — Medication 10 ML: at 21:23

## 2017-03-12 RX ADMIN — SODIUM CHLORIDE 125 ML/HR: 900 INJECTION, SOLUTION INTRAVENOUS at 06:24

## 2017-03-12 RX ADMIN — Medication 10 ML: at 13:48

## 2017-03-12 RX ADMIN — HYDROMORPHONE HYDROCHLORIDE 1 MG: 1 INJECTION, SOLUTION INTRAMUSCULAR; INTRAVENOUS; SUBCUTANEOUS at 00:39

## 2017-03-12 RX ADMIN — HYDROMORPHONE HYDROCHLORIDE 1 MG: 1 INJECTION, SOLUTION INTRAMUSCULAR; INTRAVENOUS; SUBCUTANEOUS at 11:53

## 2017-03-12 RX ADMIN — POTASSIUM CHLORIDE 20 MEQ: 20 TABLET, EXTENDED RELEASE ORAL at 08:20

## 2017-03-12 RX ADMIN — HYDROMORPHONE HYDROCHLORIDE 1 MG: 1 INJECTION, SOLUTION INTRAMUSCULAR; INTRAVENOUS; SUBCUTANEOUS at 22:15

## 2017-03-12 RX ADMIN — ASPIRIN 81 MG 81 MG: 81 TABLET ORAL at 08:21

## 2017-03-12 RX ADMIN — POTASSIUM CHLORIDE 40 MEQ: 20 SOLUTION ORAL at 10:19

## 2017-03-12 RX ADMIN — HYDROMORPHONE HYDROCHLORIDE 1 MG: 1 INJECTION, SOLUTION INTRAMUSCULAR; INTRAVENOUS; SUBCUTANEOUS at 15:04

## 2017-03-12 RX ADMIN — HYDROMORPHONE HYDROCHLORIDE 1 MG: 1 INJECTION, SOLUTION INTRAMUSCULAR; INTRAVENOUS; SUBCUTANEOUS at 18:05

## 2017-03-12 RX ADMIN — ATORVASTATIN CALCIUM 80 MG: 20 TABLET, FILM COATED ORAL at 21:22

## 2017-03-12 RX ADMIN — ENOXAPARIN SODIUM 40 MG: 40 INJECTION SUBCUTANEOUS at 08:21

## 2017-03-12 RX ADMIN — SODIUM CHLORIDE 125 ML/HR: 900 INJECTION, SOLUTION INTRAVENOUS at 15:53

## 2017-03-12 NOTE — PROGRESS NOTES
Hospitalist Progress Note    Patient: Rocío Branch MRN: [id number]  CSN: [id number]    YOB: 1970  Age: 40 y.o. Sex: female    DOA: 3/10/2017 LOS:  LOS: 2 days          Chief Complaint:    Acute CVA      Assessment/Plan     Acute CVA  Multiple infarcts, left sided weakness, facial weakness/paresthesia    Sickle cell trait  crohns disease  Anemia due to SC  Hx cocaine abuse  Chronic pain     Carotid duplex planned  Thromboembolic in nature but echo without obvious thrombus  For heme consult, and daily asa  hypercoaguable labs sent  Keep in ICU  IVF hydration, monitor and allow BP to trend up  PT/OT  Will need acute rehab on d/c    Patient Active Problem List   Diagnosis Code    Chronic pain syndrome G89.4    Abdominal pain R10.9    Crohn disease ([facility] K50.90    Obesity E66.9    Colitis K52.9    Crohn's disease with complication ([facility] Z37.210    Dehydration E86.0    Hypokalemia E87.6    Stroke ([facility] [address] I63.9    Sickle cell trait ([facility] [address] [facility] D57.3    Hypertension I10    Hx of cocaine abuse Z87.898    CVA (cerebral vascular accident) ([facility] I63.9       Subjective:    Feels a little better now  Denies HA, CP, SOB  Foot numbness is better  Has eaten    Review of systems:    Constitutional: denies fevers, chills, myalgias  Respiratory: denies SOB, cough  Cardiovascular: denies chest pain, palpitations  Gastrointestinal: denies nausea, vomiting, diarrhea      Vital signs/Intake and Output:  Visit Vitals    /85    Pulse 95    Temp 98.8 °F (37.1 °C)    Resp 13    Ht 5' 4\" (1.626 m)    Wt 80.4 kg (177 lb 4.8 oz)    SpO2 98%    Breastfeeding No    BMI 30.43 kg/m2     Current Shift:  03/12 0701 - 03/12 1900  In: -   Out: 301 [Urine:300]  Last three shifts:  03/10 1901 - 03/12 0700  In: 5261.7 [P.O.:120;  I.V.:5141.7]  Out: 701 [Urine:500]    Exam:    General: Well developed, alert, NAD, OX3  CVS:Regular rate and rhythm, no M/R/G, S1/S2 heard, no thrill  Lungs:Clear to auscultation bilaterally, no wheezes, rhonchi, or rales  Abdomen: Soft, Nontender, No distention, Normal Bowel sounds, No hepatomegaly  Extremities: No C/C/E, pulses palpable 2+  Skin:normal texture and turgor, no rashes, no lesions  Neuro:left facial weakness, droop, LUE weakness, decent grasp, LE strength equal BL  Psych:appropriate                Labs: Results:       Chemistry Recent Labs      03/12/17   0615  03/11/17   1732  03/11/17   0845  03/10/17   1630   GLU  83  82  86  115*   NA  138  138  138  140   K  3.6  4.0  2.5*  2.9*   CL  104  102  103  98*   CO2  26  26  30  34*   BUN  3*  5*  5*  10   CREA  0.53*  0.51*  0.49*  0.73   CA  7.6*  7.2*  7.7*  8.6   AGAP  8  10  5  8   BUCR  6*  10*  10*  14   AP  33*  32*   --   40*   TP  5.6*  5.0*   --   6.8   ALB  2.3*  2.3*   --   2.9*   GLOB  3.3  2.7   --   3.9   AGRAT  0.7*  0.9   --   0.7*      CBC w/Diff Recent Labs      03/12/17   0615  03/11/17   1732  03/10/17   1630   WBC  8.1  6.1  13.9*   RBC  3.35*  2.90*  3.46*   HGB  8.1*  7.0*  8.4*   HCT  26.3*  23.0*  27.2*   PLT  400  409  546*   GRANS  67  66  87*   LYMPH  20*  19*  2*   EOS  1  1  0      Cardiac Enzymes Recent Labs      03/10/17   1630   CPK  15*   CKND1  Cannot be calulated      Coagulation Recent Labs      03/10/17   1700   PTP  12.0   INR  0.9   APTT  24.7       Lipid Panel Lab Results   Component Value Date/Time    Cholesterol, total 145 03/12/2017 06:15 AM    HDL Cholesterol 37 03/12/2017 06:15 AM    LDL, calculated 91.6 03/12/2017 06:15 AM    VLDL, calculated 16.4 03/12/2017 06:15 AM    Triglyceride 82 03/12/2017 06:15 AM    CHOL/HDL Ratio 3.9 03/12/2017 06:15 AM      BNP No results for input(s): BNPP in the last 72 hours.    Liver Enzymes Recent Labs      03/12/17   0615   TP  5.6*   ALB  2.3*   AP  33*   SGOT  15      Thyroid Studies Lab Results   Component Value Date/Time    TSH 1.51 12/12/2009 09:48 AM        Procedures/imaging: see electronic medical records for all procedures/Xrays and details which were not copied into this note but were reviewed prior to creation of Reba Alcantar MD

## 2017-03-12 NOTE — PROGRESS NOTES
Attempted visit patient unavailable. Chaplains remain available to provide pastoral support to patient and family as needed and requested. .  Ruby Aguilar    [phone]

## 2017-03-12 NOTE — PROGRESS NOTES
NEUROLOGY PROGRESS NOTE        Patient: Almas Dawson        Sex: female          DOA: 3/10/2017  YOB: 1970      Age:  40 y.o.         LOS: 2 days     Identification:  40 y.o. female with history of Crohn's disease, past history of cocaine use (not recently) and Sickle Cell trait, who presented with left sided weakness, found to have multifocal embolic strokes. SUBJECTIVE:   Pt is stable both hemodynamically and clinically. Stroke Work-up:  Brain MRI:1. Multifocal acute infarctions, the largest is in the right MCA territory correlating with the abnormal CT. There are bilateral left greater than right cerebellar hemisphere infarctions. These infarctions are likely embolic with a central origin. 2. Possible hemorrhagic lesion within the pituitary. Scans dedicated to the pituitary could be performed. This could be artifact of volume averaging of the sellar floor but is at least suspect. 3. Chronic appearing right orbital floor fracture with protrusion of orbital fat and relative enophthalmos. MRA Brain: Occlusion inferior division right M2 segment MCA. This correlates with the acute infarction and is indeterminate in nature, most likely embolic certainly. No other significant intracranial vascular abnormality. MRA Neck: Limited noncontrast neck vascular evaluation. No definite hemodynamically significant stenosis is documented. Echocardiogram: EF is 60-65%, no intracardiac shunt detected.   Lipid panel:  Lab Results   Component Value Date/Time    Cholesterol, total 145 03/12/2017 06:15 AM    HDL Cholesterol 37 03/12/2017 06:15 AM    LDL, calculated 91.6 03/12/2017 06:15 AM    VLDL, calculated 16.4 03/12/2017 06:15 AM    Triglyceride 82 03/12/2017 06:15 AM    CHOL/HDL Ratio 3.9 03/12/2017 06:15 AM   HbA1c:   Lab Results   Component Value Date/Time    Hemoglobin A1c 5.7 03/12/2017 06:15 AM       ASSESSMENT/IMPRESSION:   Multifocal right hemispheric (temporal, thalamic, parietal and cerebellar infarcts), most likely cardioembolic in nature. We need to rule out hypercoagulable state (lab work pending).     RECOMMENDATIONS:  1. Continue Aspirin 81mg po daily. 2. Continue ICU stay with telemetry monitoring  3. Neuro checks per stroke protocol  4. Permissive hypertension (do not treat if BP is not >200/110, prefer prn labetolol 5mg)  5. IV normal saline continuous infusion 125 ml/h  6. Euglycemia with sliding scale insulin  7. Euthermia with acetaminophen 650mg Q6h prn for fever  8. Avoid urinary catheters please. 9. Carotid doppler  10. Hypercoag panel  11. Hematology consult will see her tomorrow    I will follow the patient. Please do not hesitate to return with any questions. Signed:  Jeffrey Dumont MD  3/12/2017  10:36 AM    REVIEW OF SYSTEMS: Denies chest pain, abdominal pain, nausea or vomiting. No fever or chills. OBJECTIVE:      Visit Vitals    BP (!) 124/93    Pulse 94    Temp 98.6 °F (37 °C)    Resp 12    Ht 5' 4\" (1.626 m)    Wt 80.4 kg (177 lb 4.8 oz)    SpO2 100%    Breastfeeding No    BMI 30.43 kg/m2     Physical Exam:  GEN: Alert, NAD  EYES: conjunctiva normal, lids with out lesions  HEENT: MMM. HEART: RRR +S1 +S2  LUNGS: CTA B/L no rales or rhonchi. ABDOMEN: Soft, non-tender. EXTREMITIES: No edema cyanosis  SKIN: no rashes or skin breakdown, no nodules  NEURO: Alert, oriented x3. Left sided neglect. Left facial droop. Left arm>>leg weakness, left sided decreased sensation. Right side full.     Current Facility-Administered Medications   Medication Dose Route Frequency    sodium chloride (NS) flush 5-10 mL  5-10 mL IntraVENous Q8H    sodium chloride (NS) flush 5-10 mL  5-10 mL IntraVENous PRN    ondansetron (ZOFRAN) injection 4 mg  4 mg IntraVENous Q6H PRN    acetaminophen (TYLENOL) tablet 650 mg  650 mg Oral Q4H PRN    enoxaparin (LOVENOX) injection 40 mg  40 mg SubCUTAneous Q24H    aspirin chewable tablet 81 mg  81 mg Oral DAILY    potassium chloride (K-DUR, KLOR-CON) SR tablet 20 mEq  20 mEq Oral BID    ELECTROLYTE REPLACEMENT PROTOCOL - Potassium  1 Each Other PRN    ELECTROLYTE REPLACEMENT PROTOCOL - Magnesium  1 Each Other PRN    HYDROmorphone (PF) (DILAUDID) injection 1 mg  1 mg IntraVENous Q4H PRN    0.9% sodium chloride infusion  125 mL/hr IntraVENous CONTINUOUS    atorvastatin (LIPITOR) tablet 80 mg  80 mg Oral QHS    morphine injection 2 mg  2 mg IntraVENous Q4H PRN       Laboratory  METABOLIC PANEL, COMPREHENSIVE    Collection Time: 03/12/17  6:15 AM   Result Value Ref Range    Sodium 138 136 - 145 mmol/L    Potassium 3.6 3.5 - 5.5 mmol/L    Chloride 104 100 - 108 mmol/L    CO2 26 21 - 32 mmol/L    Anion gap 8 3.0 - 18 mmol/L    Glucose 83 74 - 99 mg/dL    BUN 3 (L) 7.0 - 18 MG/DL    Creatinine 0.53 (L) 0.6 - 1.3 MG/DL    BUN/Creatinine ratio 6 (L) 12 - 20      GFR est AA >60 >60 ml/min/1.73m2    GFR est non-AA >60 >60 ml/min/1.73m2    Calcium 7.6 (L) 8.5 - 10.1 MG/DL    Bilirubin, total 0.1 (L) 0.2 - 1.0 MG/DL    ALT (SGPT) 14 13 - 56 U/L    AST (SGOT) 15 15 - 37 U/L    Alk. phosphatase 33 (L) 45 - 117 U/L    Protein, total 5.6 (L) 6.4 - 8.2 g/dL    Albumin 2.3 (L) 3.4 - 5.0 g/dL    Globulin 3.3 2.0 - 4.0 g/dL    A-G Ratio 0.7 (L) 0.8 - 1.7     CBC WITH AUTOMATED DIFF    Collection Time: 03/12/17  6:15 AM   Result Value Ref Range    WBC 8.1 4.6 - 13.2 K/uL    RBC 3.35 (L) 4.20 - 5.30 M/uL    HGB 8.1 (L) 12.0 - 16.0 g/dL    HCT 26.3 (L) 35.0 - 45.0 %    MCV 78.5 74.0 - 97.0 FL    MCH 24.2 24.0 - 34.0 PG    MCHC 30.8 (L) 31.0 - 37.0 g/dL    RDW 17.2 (H) 11.6 - 14.5 %    PLATELET 118 598 - 780 K/uL    MPV 8.4 (L) 9.2 - 11.8 FL    NEUTROPHILS 67 40 - 73 %    LYMPHOCYTES 20 (L) 21 - 52 %    MONOCYTES 12 (H) 3 - 10 %    EOSINOPHILS 1 0 - 5 %    BASOPHILS 0 0 - 2 %    ABS. NEUTROPHILS 5.4 1.8 - 8.0 K/UL    ABS. LYMPHOCYTES 1.6 0.9 - 3.6 K/UL    ABS. MONOCYTES 1.0 0.05 - 1.2 K/UL    ABS. EOSINOPHILS 0.1 0.0 - 0.4 K/UL    ABS.  BASOPHILS 0.0 0.0 - 0.06 K/UL    DF AUTOMATED     LIPID PANEL    Collection Time: 03/12/17  6:15 AM   Result Value Ref Range    LIPID PROFILE          Cholesterol, total 145 <200 MG/DL    Triglyceride 82 <150 MG/DL    HDL Cholesterol 37 (L) 40 - 60 MG/DL    LDL, calculated 91.6 0 - 100 MG/DL    VLDL, calculated 16.4 MG/DL    CHOL/HDL Ratio 3.9 0 - 5.0     HEMOGLOBIN A1C WITH EAG    Collection Time: 03/12/17  6:15 AM   Result Value Ref Range    Hemoglobin A1c 5.7 (H) 4.5 - 5.6 %    Est. average glucose 117 mg/dL   MAGNESIUM    Collection Time: 03/12/17  6:15 AM   Result Value Ref Range    Magnesium 1.9 1.8 - 2.4 mg/dL   GLUCOSE, POC    Collection Time: 03/12/17  8:10 AM   Result Value Ref Range    Glucose (POC) 78 70 - 110 mg/dL       Radiology:  Mra Brain Wo Cont    Result Date: 3/11/2017  Brain MRA: Indication: Multiple acute infarctions. Evaluate for source of embolus. Procedure: Standard three dimensional time of flight MRA of the brain arterial vasculature was performed. Source images were reviewed soft copy. MIP angiographic reconstructions were generated of the entire head examination and also selected MIP angiographic reconstructions of the individual carotid systems and of the vertebrobasilar system. Findings: Apparent occlusion versus very high-grade stenosis proximal aspect inferior division right M2 segment MCA correlating with the right MCA infarction likely. Bilateral prominent posterior communicating arteries approach fetal origin with small vertebrobasilar system. Otherwise normal. No saccular aneurysm. IMPRESSION: Occlusion inferior division right M2 segment MCA. This correlates with the acute infarction and is indeterminate in nature, most likely embolic certainly. No other significant intracranial vascular abnormality. Mra Neck Wo Cont    Result Date: 3/11/2017  Neck MRA without contrast: INDICATION: Acute infarctions. Evaluate for source of embolus.  PROCEDURE: Noncontrast technique was used for uncertain reasons. 2-dimensional time-of-flight and submental three-dimensional time-of-flight imaging. FINDINGS: Any stenosis described below uses the NASCET method, comparing the minimum residual lumen to the nontapering cervical internal carotid. Given noncontrast technique there is poor evaluation of the arch and proximal great vessels as typical. Likely normal branching pattern without definitive high-grade proximal stenosis. Vertebral arteries are codominant. Origins not well seen but grossly patent. No vertebral stenosis. No common or visualized internal carotid stenosis that is substantial. Patient motion obscures detail particularly on the three-dimensional acquisition which was not processed as MIP angiographic images given patient motion. IMPRESSION: Limited noncontrast neck vascular evaluation. No definite hemodynamically significant stenosis is documented. Mri Brain Wo Cont    Result Date: 3/11/2017  Brain MR without contrast: Indication: Possible acute infarction. Left-sided weakness. Abnormal head CT. History of sickle cell. Procedure: Sagittal spin echo T1, axial FSE FLAIR and T2 and axial diffusion weighted scanning was performed. Coronal spin echo T1 and T2 FSE images were also performed. No contrast was administered. Comparison head CT 3/10/2017. Findings: Diffusion:  Multifocal areas of restricted diffusion consistent with multifocal acute infarctions are noted. Multiple left greater than right cerebellar hemisphere infarctions are noted area there is a large right MCA territory infarction involving the insular cortex and posterior perisylvian temporal and inferior parietal lobe as well as the posterior inferior frontal lobe. This was in part hypodense on CT. No hemorrhage or significant mass effect associated with these lesions. The axial T2 star examination does not show any hemorrhage associated with the areas of acute infarction.  There is however a strong suspicion of a hemorrhagic lesion within the pituitary eccentric to the left but does not appear artifactual. Brain parenchyma:  Other than the areas of acute infarction, no additional focal brain lesion. No mass or mass effect. Ventricles and sulci:  Normal.  No significant atrophy given age. Extra-axial:  No extra-axial fluid collection or mass is noted. Brain vasculature:  No vascular abnormality is appreciated on this routine brain MR examination. Craniocervical junction:  Normal. Skull base, extracranial and calvarium:  Protrusion of orbital fat through a defect in the right orbital floor is most likely a chronic orbital floor fracture. There does appear to be some relative right enophthalmos. Impression: 1. Multifocal acute infarctions, the largest is in the right MCA territory correlating with the abnormal CT. There are bilateral left greater than right cerebellar hemisphere infarctions. These infarctions are likely embolic with a central origin. 2. Possible hemorrhagic lesion within the pituitary. Scans dedicated to the pituitary could be performed. This could be artifact of volume averaging of the sellar floor but is at least suspect. 3. Chronic appearing right orbital floor fracture with protrusion of orbital fat and relative enophthalmos. Ct Head Wo Cont    Result Date: 3/10/2017  EXAM: CT head INDICATION: Left-sided paralysis and facial droop COMPARISON: CT performed March 11, 2008 TECHNIQUE: Axial CT imaging of the head was performed without intravenous contrast. One or more dose reduction techniques were used on this CT: automated exposure control, adjustment of the mAs and/or kVp according to patient's size, and iterative reconstruction techniques. The specific techniques utilized on this CT exam have been documented in the patient's electronic medical record. _______________ FINDINGS: Detail is mildly limited at the skull base secondary to motion artifact.  BRAIN AND POSTERIOR FOSSA: There is focal loss of the normal cortical gray-white matter differentiation involving the inferior portions of the right parietal lobe, and extending into the midportion of the right temporal lobe. There is edema which is also noted to track along the sylvian fissure anteriorly, to involve the right-sided insular and subinsular cortex. There is focally increased density noted within the right MCA branch vessel on axial image 15. Mild local mass effect is present, with evidence of sulcal flattening. The ventricular size and configuration is stable. The basilar cisterns are patent. No discrete intra-axial hemorrhage identified. EXTRA-AXIAL SPACES AND MENINGES: There are no abnormal extra-axial fluid collections. CALVARIUM: Intact. SINUSES: Clear. OTHER: None. _______________     IMPRESSION: 1. Focally abnormal gray-white matter differentiation with associated edema involving the right parietal and temporal lobes, spanning the sylvian fissure in keeping with developing infarct. Punctate area of increased density within the sylvian M2 segment is nonspecific, but may pertain to a small in situ thrombus. No evidence of acute intra-axial or extra-axial fluid collection. CODE S findings discussed with  in the ED at the time of the scan.

## 2017-03-12 NOTE — PROGRESS NOTES
Alarm parameters reviewed, on and audible Adjusted to meet patient clinical condition. Shift report received from ROCIO Hickman RN. 2000 shift assessment completed per doc flow sheet. NAD, Vss, family at bedside. 2200  Comfort measures provided, assisted with repositioning. 0000  Reassessment completed w/no change in status. 0030  Dilaudid 1mg iv given for left leg numbness and pain 10/10.  0045  Patient resting quietly w/eyes closed. NAD, Vss  0330  Patient is not showing any urine output from 0700  3/11/17 till 3/12/17 at 0300 Bladder scanned for 658 mls. Paged Dr. Magali Jacobo, order to straight cath. Patient insisted that she use the [facility]. With another RN assisted patient to [facility] without difficulty. Bladder scanned for 156 ml after voiding in commode. 0400  Reassessment completed. 0730  Assisted patient to [facility] without difficulty in fact patient seemed to do much better the second time getting oob.    0700  Report given to APPLE Edwards RN and Chris Hoffman RN.

## 2017-03-12 NOTE — PROGRESS NOTES
Aida Mohr INITIAL NUTRITION ASSESSMENT     RECOMMENDATIONS/PLAN:   -Continue current diet- Cardiac, mechanically soft diet, advanced as tolerated   -Add Ensure Compact TID to increase calories/protein intake    -Recommend patient to receive nutrition education related to appropriate diet for Crohn's disease   -If appropriate, recommend ferrous sulfate for low H/H   -Monitor labs, replete as needed      REASON FOR ASSESSMENT:     [x]  RN Referral:    [] MST score >/=2  Malnutrition Screening Tool (MST):  Recently Lost Weight Without Trying: Yes  If Yes, How Much Weight Loss: 14 - 23 lbs  Eating Poorly Due to Decreased Appetite: Yes  MST Score: 3      NUTRITION ASSESSMENT:   Client History: 40 yrs old female admitted with left sided weakness and left facial droop. PMHx: Stroke, acute abdominal pain, C. Difficile colitis, Crohn's disease, hyperkalemia, HTN, CVA, iron deficiency anemia, MRSA, and dehydration  Cultural/Muslim Food Preferences: None Identified    FOOD/NUTRITION HISTORY  Diet History: regular   Food Allergies:  [x] NKFA     [] Yes   Pertinent PTA Medications: Prednisone, Phernegan     NUTRITION INTAKE   Diet Order:  Cardiac, mechanically soft       Average PO Intake:     None noted   No data found. Pertinent Medications:  [x] Reviewed;  Atorvastatin and Zofran    Electrolyte Replacement Protocol: [x]K  [x]Mg  []PO4    Insulin:  [] SSI  [] Pre-meal   []  Basal   [] Drip  [x] None  Pt expected to meet estimated nutrient needs through next review:          [x]  Yes     [] No; >50% of meals  ANTHROPOMETRICS  Height: 5' 4\" (162.6 cm)       Weight: 80.4 kg (177 lb 4.8 oz)    BMI: 30.4 kg/m^2  -  Obese status                                       Comparison to Reference Standards:  IBW: 132 lbs      %IBW: 134%      AdjBW: 65 kg    NUTRITION-FOCUSED PHYSICAL ASSESSMENT  Skin: intact    GI: last BM 3/12/17    BIOCHEMICAL DATA & MEDICAL TESTS  Pertinent Labs:  [x] Reviewed;  3/12/17 BUN 3, creatinine 90.53, calcium 7.6, albumin 2.3, ALK phos 33, bilirubin total 0.1, HDL 37, H/H 8.1/26.3, HA1C 5.7    NUTRITION PRESCRIPTION  Calories: 1800- 2040 kcal/day based on 30-34 kcal/kg AdjBW   Protein: 65-78 g/day based on 1-1.2 g/kg AdjBW  CHO: 225-255 g/day based on 50% of total energy  Fluid: 0071-6578 ml/day based on 1 kcal/ml      NUTRITION DIAGNOSES:   1. Obesity related to excessive PO intake and physical inactivity as evidenced by BMI of 30.4       NUTRITION INTERVENTIONS:   INTERVENTIONS:        GOALS:  1. Nutrition education, Ensure compact TID, replete electrolytes, iron supplement  1. >50 % of meals/supplements, prevent significant wt change, electrolytes/labs WNL by next review 3-5 days             LEARNING NEEDS (Diet, Supplementation, Food/Nutrient-Drug Interaction):   [] None Identified  [x] Inpatient education provided/documented    [] Identified and patient:  [] Declined     [] Was not appropriate/indicated  NUTRITION MONITORING /EVALUATION:   -Monitor PO intake, supplement acceptance, weight, labs, and skin status. [x] Participated in Interdisciplinary Rounds  [x] [address] [place] [address] Reviewed/Documented  DISCHARGE NUTRITION RECOMMENDATIONS ADDRESSED:     [] Yes- recommended diet (see nutrition d/c instructions)     [x] To be determined closer to discharge    NUTRITION RISK:     [x]  At risk                     []  Not currently at risk     Will follow-up per policy.   Evan Santoro, [address]  PAGER:  [phone]

## 2017-03-12 NOTE — PROGRESS NOTES
Problem: Mobility Impaired (Adult and Pediatric)  Goal: *Acute Goals and Plan of Care (Insert Text)  Physical Therapy Goals  Initiated 3/11/2017 and to be accomplished within 7 day(s)  1. Patient will move from supine to sit and sit to supine in bed with supervision/set-up. 2. Patient will transfer from bed to chair and chair to bed with supervision/set-up using the least restrictive device. 3. Patient will perform sit to stand with supervision/set-up. 4. Patient will ambulate with supervision/set-up for >100 feet with the least restrictive device. Outcome: Progressing Towards Goal  PHYSICAL THERAPY TREATMENT     Patient: Tika Roca (87 y.o. female)  Date: 3/12/2017  Diagnosis: Stroke [facility])  Stroke [facility])  Hypokalemia  CVA (cerebral vascular accident) [facility]) [facility])       Precautions: Fall   Chart, physical therapy assessment, plan of care and goals were reviewed. ASSESSMENT:  Although fairly lethargic, pt following directions and able to greatly decrease extremity delay. Pt able to ambulate with minimal balance deficit, but drowsiness (closed eyes) and visual deficits limit safety. Cuing required to follow tech, elevating LE's fairly well. Progression toward goals:  [X]      Improving appropriately and progressing toward goals  [ ]      Improving slowly and progressing toward goals  [ ]      Not making progress toward goals and plan of care will be adjusted       PLAN:  Patient continues to benefit from skilled intervention to address the above impairments. Continue treatment per established plan of care. Discharge Recommendations:  Rehab and Acute Rehab  Further Equipment Recommendations for Discharge:  rolling walker       SUBJECTIVE:   Patient stated I want to walk up and down the [place]. I want to get better.       OBJECTIVE DATA SUMMARY:   Critical Behavior:  Neurologic State: Eyes open spontaneously, Drowsy  Orientation Level: Oriented X4  Cognition: Appropriate decision making, Follows commands  Safety/Judgement: Awareness of environment, Good awareness of safety precautions, Insight into deficits  Functional Mobility Training:  Bed Mobility:  Rolling: Contact guard assistance  Supine to Sit: Contact guard assistance  Sit to Supine: Contact guard assistance  Scooting: Contact guard assistance  Transfers:  Sit to Stand: Minimum assistance  Stand to Sit: Minimum assistance  Balance:  Sitting: Intact  Standing: Impaired; With support  Standing - Static: Fair  Standing - Dynamic : Fair  Ambulation/Gait Training:  Distance (ft): 120 Feet (ft)  Assistive Device: Walker, rolling;Gait belt  Ambulation - Level of Assistance: Minimal assistance  Gait Abnormalities: Ataxic;Decreased step clearance  Base of Support: Center of gravity altered  Stance: Time  Speed/Janice: Slow;Shuffled  Step Length: Right shortened;Left shortened  Swing Pattern: Left asymmetrical;Right asymmetrical  Therapeutic Exercises:         EXERCISE   Sets   Reps   Active Active Assist   Passive Self ROM   Comments   Ankle Pumps/Circles 1 30  [ ] [ ] [ ] [ ]     Quad Sets/Glut Sets     [ ] [ ] [ ] [ ]     Hamstring Sets     [ ] [ ] [ ] [ ]     Jose Solders 1 8 [ ] [ ] [ ] [ ]     Heel Slides 1 8 [ ] [ ] [ ] [ ]     Venda Mourning     [ ] [ ] [ ] [ ]     Hip Abd/Add 1 8 [ ] [ ] [ ] [ ]     Priya Ropes 1 8 [ ] [ ] [ ] [ ]     Seated Marching 2 8 [ ] [ ] [ ] [ ]     Jass Goon     [ ] [ ] [ ] [ ]           [ ] [ ] [ ] [ ]           Pain:     Activity Tolerance:   Good  Please refer to the flowsheet for vital signs taken during this treatment.   After treatment:   [ ] Patient left in no apparent distress sitting up in chair  [X] Patient left in no apparent distress in bed  [X] Call bell left within reach  [X] Nursing notified  [ ] Caregiver present  [ ] Bed alarm activated      Armida Ashley PTA   Time Calculation: 40 mins

## 2017-03-12 NOTE — PROGRESS NOTES
0700-Report Received from Deandra Vasquez, [facility]. SBAR, MARS, Labs, & Pt status reviewed. Reviewed CVA deficits bedside with off going nurse to determine if any changes. No changes noted. 0800-40Meq K given per protocol. Ate breakfast tray by self, with supervision of swallowing. Right sided facial droop present with tingling sensation to cheek. 1150-Patient complains of bilateral frontal headache 8 of 10 on pain scale. Dimmed lights and gave 1mg Dilauded per PRN orders  1230- Patient states relief from PRN pain med. Rates 6 of 10 on pain scale. 1500-Headache started increasing again. 8 of 10 unilateral frontal forehead pain. Dilaudid given per PRN orders   1540-Reassessed pain. Patient states pain is currently 5 of 10 on pain scale. P.T. In to work with patient with standing/walking. 1800-Patient stated pain in head continues. Patient also stated right foot has burning/tingling sensation on the bottom. Requested Dilaudid over morphine; states morphine makes her too sleepy to eat. 1830-Patient reports pain has decreased to 5 of 10 and tolerable at this time, states, \"Dilaudid worked very well for my pain\".

## 2017-03-12 NOTE — WOUND CARE
Pt seen by wound care. No skin issues noted at this time. Wound care will continue to follow pt daily while in ICU.

## 2017-03-13 LAB
ALBUMIN SERPL BCP-MCNC: 2 G/DL (ref 3.4–5)
ALBUMIN/GLOB SERPL: 0.6 {RATIO} (ref 0.8–1.7)
ALP SERPL-CCNC: 27 U/L (ref 45–117)
ALT SERPL-CCNC: 16 U/L (ref 13–56)
ANION GAP BLD CALC-SCNC: 5 MMOL/L (ref 3–18)
AST SERPL W P-5'-P-CCNC: 16 U/L (ref 15–37)
BASOPHILS # BLD AUTO: 0 K/UL (ref 0–0.06)
BASOPHILS # BLD: 0 % (ref 0–2)
BILIRUB SERPL-MCNC: 0.1 MG/DL (ref 0.2–1)
BUN SERPL-MCNC: 2 MG/DL (ref 7–18)
BUN/CREAT SERPL: 4 (ref 12–20)
CALCIUM SERPL-MCNC: 7.2 MG/DL (ref 8.5–10.1)
CHLORIDE SERPL-SCNC: 107 MMOL/L (ref 100–108)
CO2 SERPL-SCNC: 26 MMOL/L (ref 21–32)
CREAT SERPL-MCNC: 0.45 MG/DL (ref 0.6–1.3)
DIFFERENTIAL METHOD BLD: ABNORMAL
EOSINOPHIL # BLD: 0.1 K/UL (ref 0–0.4)
EOSINOPHIL NFR BLD: 2 % (ref 0–5)
ERYTHROCYTE [DISTWIDTH] IN BLOOD BY AUTOMATED COUNT: 17.5 % (ref 11.6–14.5)
GLOBULIN SER CALC-MCNC: 3.3 G/DL (ref 2–4)
GLUCOSE SERPL-MCNC: 90 MG/DL (ref 74–99)
HCT VFR BLD AUTO: 25.4 % (ref 35–45)
HGB BLD-MCNC: 7.5 G/DL (ref 12–16)
LYMPHOCYTES # BLD AUTO: 24 % (ref 21–52)
LYMPHOCYTES # BLD: 1.3 K/UL (ref 0.9–3.6)
MCH RBC QN AUTO: 24.2 PG (ref 24–34)
MCHC RBC AUTO-ENTMCNC: 29.5 G/DL (ref 31–37)
MCV RBC AUTO: 81.9 FL (ref 74–97)
MONOCYTES # BLD: 0.8 K/UL (ref 0.05–1.2)
MONOCYTES NFR BLD AUTO: 14 % (ref 3–10)
NEUTS SEG # BLD: 3.4 K/UL (ref 1.8–8)
NEUTS SEG NFR BLD AUTO: 60 % (ref 40–73)
PLATELET # BLD AUTO: 376 K/UL (ref 135–420)
PMV BLD AUTO: 8.8 FL (ref 9.2–11.8)
POTASSIUM SERPL-SCNC: 3.2 MMOL/L (ref 3.5–5.5)
PROT SERPL-MCNC: 5.3 G/DL (ref 6.4–8.2)
RBC # BLD AUTO: 3.1 M/UL (ref 4.2–5.3)
SODIUM SERPL-SCNC: 138 MMOL/L (ref 136–145)
WBC # BLD AUTO: 5.6 K/UL (ref 4.6–13.2)

## 2017-03-13 PROCEDURE — 74011000250 HC RX REV CODE- 250: Performed by: INTERNAL MEDICINE

## 2017-03-13 PROCEDURE — 80053 COMPREHEN METABOLIC PANEL: CPT | Performed by: FAMILY MEDICINE

## 2017-03-13 PROCEDURE — 65660000000 HC RM CCU STEPDOWN

## 2017-03-13 PROCEDURE — 74011250636 HC RX REV CODE- 250/636: Performed by: HOSPITALIST

## 2017-03-13 PROCEDURE — 82272 OCCULT BLD FECES 1-3 TESTS: CPT | Performed by: HOSPITALIST

## 2017-03-13 PROCEDURE — 74011250637 HC RX REV CODE- 250/637: Performed by: FAMILY MEDICINE

## 2017-03-13 PROCEDURE — 97116 GAIT TRAINING THERAPY: CPT

## 2017-03-13 PROCEDURE — 36415 COLL VENOUS BLD VENIPUNCTURE: CPT | Performed by: FAMILY MEDICINE

## 2017-03-13 PROCEDURE — 97535 SELF CARE MNGMENT TRAINING: CPT

## 2017-03-13 PROCEDURE — 92526 ORAL FUNCTION THERAPY: CPT

## 2017-03-13 PROCEDURE — 74011250637 HC RX REV CODE- 250/637: Performed by: HOSPITALIST

## 2017-03-13 PROCEDURE — 97530 THERAPEUTIC ACTIVITIES: CPT

## 2017-03-13 PROCEDURE — 85025 COMPLETE CBC W/AUTO DIFF WBC: CPT | Performed by: FAMILY MEDICINE

## 2017-03-13 RX ORDER — SODIUM CHLORIDE 9 MG/ML
9 INJECTION INTRAMUSCULAR; INTRAVENOUS; SUBCUTANEOUS ONCE
Status: COMPLETED | OUTPATIENT
Start: 2017-03-13 | End: 2017-03-13

## 2017-03-13 RX ORDER — SODIUM CHLORIDE 9 MG/ML
INJECTION INTRAMUSCULAR; INTRAVENOUS; SUBCUTANEOUS
Status: DISPENSED
Start: 2017-03-13 | End: 2017-03-14

## 2017-03-13 RX ORDER — PANTOPRAZOLE SODIUM 40 MG/1
40 TABLET, DELAYED RELEASE ORAL
Status: DISCONTINUED | OUTPATIENT
Start: 2017-03-14 | End: 2017-03-23 | Stop reason: HOSPADM

## 2017-03-13 RX ORDER — POTASSIUM CHLORIDE 20 MEQ/1
20 TABLET, EXTENDED RELEASE ORAL
Status: COMPLETED | OUTPATIENT
Start: 2017-03-13 | End: 2017-03-13

## 2017-03-13 RX ADMIN — SODIUM CHLORIDE 100 ML/HR: 900 INJECTION, SOLUTION INTRAVENOUS at 23:53

## 2017-03-13 RX ADMIN — ASPIRIN 81 MG 81 MG: 81 TABLET ORAL at 09:39

## 2017-03-13 RX ADMIN — POTASSIUM CHLORIDE 20 MEQ: 20 TABLET, EXTENDED RELEASE ORAL at 09:40

## 2017-03-13 RX ADMIN — HYDROMORPHONE HYDROCHLORIDE 1 MG: 1 INJECTION, SOLUTION INTRAMUSCULAR; INTRAVENOUS; SUBCUTANEOUS at 08:21

## 2017-03-13 RX ADMIN — ONDANSETRON 4 MG: 2 INJECTION INTRAMUSCULAR; INTRAVENOUS at 17:21

## 2017-03-13 RX ADMIN — Medication 10 ML: at 21:46

## 2017-03-13 RX ADMIN — HYDROMORPHONE HYDROCHLORIDE 1 MG: 1 INJECTION, SOLUTION INTRAMUSCULAR; INTRAVENOUS; SUBCUTANEOUS at 21:46

## 2017-03-13 RX ADMIN — ENOXAPARIN SODIUM 40 MG: 40 INJECTION SUBCUTANEOUS at 09:39

## 2017-03-13 RX ADMIN — ATORVASTATIN CALCIUM 80 MG: 20 TABLET, FILM COATED ORAL at 21:45

## 2017-03-13 RX ADMIN — HYDROMORPHONE HYDROCHLORIDE 1 MG: 1 INJECTION, SOLUTION INTRAMUSCULAR; INTRAVENOUS; SUBCUTANEOUS at 03:11

## 2017-03-13 RX ADMIN — MULTIPLE VITAMINS W/ MINERALS TAB 1 TABLET: TAB at 17:21

## 2017-03-13 RX ADMIN — HYDROMORPHONE HYDROCHLORIDE 1 MG: 1 INJECTION, SOLUTION INTRAMUSCULAR; INTRAVENOUS; SUBCUTANEOUS at 17:21

## 2017-03-13 RX ADMIN — Medication 10 ML: at 17:24

## 2017-03-13 RX ADMIN — HYDROMORPHONE HYDROCHLORIDE 1 MG: 1 INJECTION, SOLUTION INTRAMUSCULAR; INTRAVENOUS; SUBCUTANEOUS at 12:30

## 2017-03-13 RX ADMIN — POTASSIUM CHLORIDE 20 MEQ: 20 TABLET, EXTENDED RELEASE ORAL at 17:21

## 2017-03-13 RX ADMIN — SODIUM CHLORIDE 9 ML: 9 INJECTION, SOLUTION INTRAMUSCULAR; INTRAVENOUS; SUBCUTANEOUS at 14:44

## 2017-03-13 NOTE — PROGRESS NOTES
NEUROLOGY PROGRESS NOTE        Patient: Rocío Branch        Sex: female          DOA: 3/10/2017  YOB: 1970      Age:  40 y.o.         LOS: 3 days     Identification:  40 y.o. female with history of Crohn's disease, past history of cocaine use (not recently) and Sickle Cell trait, who presented with left sided weakness, found to have multifocal embolic strokes.     SUBJECTIVE:   The patient is doing well. Stable. Left sided weakness continues.      Stroke Work-up:  Brain MRI:1. Multifocal acute infarctions, the largest is in the right MCA territory correlating with the abnormal CT. There are bilateral left greater than right cerebellar hemisphere infarctions. These infarctions are likely embolic with a central origin. 2. Possible hemorrhagic lesion within the pituitary. Scans dedicated to the pituitary could be performed. This could be artifact of volume averaging of the sellar floor but is at least suspect. 3. Chronic appearing right orbital floor fracture with protrusion of orbital fat and relative enophthalmos. MRA Brain: Occlusion inferior division right M2 segment MCA. This correlates with the acute infarction and is indeterminate in nature, most likely embolic certainly. No other significant intracranial vascular abnormality. MRA Neck: Limited noncontrast neck vascular evaluation. No definite hemodynamically significant stenosis is documented. Echocardiogram: EF is 60-65%, no intracardiac shunt detected. Carotid Doppler pending.   Lipid panel:        Lab Results   Component Value Date/Time     Cholesterol, total 145 03/12/2017 06:15 AM     HDL Cholesterol 37 03/12/2017 06:15 AM     LDL, calculated 91.6 03/12/2017 06:15 AM     VLDL, calculated 16.4 03/12/2017 06:15 AM     Triglyceride 82 03/12/2017 06:15 AM     CHOL/HDL Ratio 3.9 03/12/2017 06:15 AM   HbA1c:   Lab Results   Component Value Date/Time     Hemoglobin A1c 5.7 03/12/2017 06:15 AM         ASSESSMENT/IMPRESSION:   Multifocal right hemispheric (temporal, thalamic, parietal and cerebellar infarcts), most likely cardioembolic in nature. We need to rule out hypercoagulable state (lab work pending). I would request cardiology consult for possible LAURIE.     RECOMMENDATIONS:  1. Continue Aspirin 81mg po daily. 2. Continue ICU stay with telemetry monitoring  3. Neuro checks per stroke protocol  4. Permissive hypertension (do not treat if BP is not >200/110, prefer prn labetolol 5mg)  5. IV normal saline continuous infusion 125 ml/h  6. Euglycemia with sliding scale insulin  7. Euthermia with acetaminophen 650mg Q6h prn for fever  8. Avoid urinary catheters please. 9. Carotid doppler  10. Hypercoag panel  11. Hematology and cardiology consult (possibel LAURIE)    I will follow the patient. Please do not hesitate to return with any questions.     Signed:  Yoel Darby MD  3/13/2017  10:14 AM    REVIEW OF SYSTEMS: Denies chest pain, abdominal pain, nausea or vomiting. No fever or chills. OBJECTIVE:      Visit Vitals    /69    Pulse 85    Temp 97.8 °F (36.6 °C)    Resp 12    Ht 5' 4\" (1.626 m)    Wt 80.4 kg (177 lb 4.8 oz)    SpO2 100%    Breastfeeding No    BMI 30.43 kg/m2     Physical Exam:  GEN: Alert, NAD  EYES: conjunctiva normal, lids with out lesions  HEENT: MMM. HEART: RRR +S1 +S2  LUNGS: CTA B/L no rales or rhonchi. ABDOMEN: Soft, non-tender. EXTREMITIES: No edema cyanosis  SKIN: no rashes or skin breakdown, no nodules  NEURO: Alert, oriented x3. Speech is dysarthric. Left sided facial weakness with decreased strength. There is left arm drift with orbiting. Left leg is stronger than the left arm but weaker than the right side. Right side has full strength. There is ongoing left-sided neglect with double simultaneous stimulation.     Current Facility-Administered Medications   Medication Dose Route Frequency    sodium chloride (NS) flush 5-10 mL  5-10 mL IntraVENous Q8H    sodium chloride (NS) flush 5-10 mL  5-10 mL IntraVENous PRN    ondansetron (ZOFRAN) injection 4 mg  4 mg IntraVENous Q6H PRN    acetaminophen (TYLENOL) tablet 650 mg  650 mg Oral Q4H PRN    enoxaparin (LOVENOX) injection 40 mg  40 mg SubCUTAneous Q24H    aspirin chewable tablet 81 mg  81 mg Oral DAILY    potassium chloride (K-DUR, KLOR-CON) SR tablet 20 mEq  20 mEq Oral BID    ELECTROLYTE REPLACEMENT PROTOCOL - Potassium  1 Each Other PRN    ELECTROLYTE REPLACEMENT PROTOCOL - Magnesium  1 Each Other PRN    HYDROmorphone (PF) (DILAUDID) injection 1 mg  1 mg IntraVENous Q4H PRN    0.9% sodium chloride infusion  125 mL/hr IntraVENous CONTINUOUS    atorvastatin (LIPITOR) tablet 80 mg  80 mg Oral QHS    morphine injection 2 mg  2 mg IntraVENous Q4H PRN       Laboratory  Recent Results (from the past 24 hour(s))   MAGNESIUM    Collection Time: 03/12/17  8:10 PM   Result Value Ref Range    Magnesium 1.8 1.8 - 2.4 mg/dL   POTASSIUM    Collection Time: 03/12/17  8:10 PM   Result Value Ref Range    Potassium 3.7 3.5 - 5.5 mmol/L   METABOLIC PANEL, COMPREHENSIVE    Collection Time: 03/13/17  4:53 AM   Result Value Ref Range    Sodium 138 136 - 145 mmol/L    Potassium 3.2 (L) 3.5 - 5.5 mmol/L    Chloride 107 100 - 108 mmol/L    CO2 26 21 - 32 mmol/L    Anion gap 5 3.0 - 18 mmol/L    Glucose 90 74 - 99 mg/dL    BUN 2 (L) 7.0 - 18 MG/DL    Creatinine 0.45 (L) 0.6 - 1.3 MG/DL    BUN/Creatinine ratio 4 (L) 12 - 20      GFR est AA >60 >60 ml/min/1.73m2    GFR est non-AA >60 >60 ml/min/1.73m2    Calcium 7.2 (L) 8.5 - 10.1 MG/DL    Bilirubin, total 0.1 (L) 0.2 - 1.0 MG/DL    ALT (SGPT) 16 13 - 56 U/L    AST (SGOT) 16 15 - 37 U/L    Alk.  phosphatase 27 (L) 45 - 117 U/L    Protein, total 5.3 (L) 6.4 - 8.2 g/dL    Albumin 2.0 (L) 3.4 - 5.0 g/dL    Globulin 3.3 2.0 - 4.0 g/dL    A-G Ratio 0.6 (L) 0.8 - 1.7     CBC WITH AUTOMATED DIFF    Collection Time: 03/13/17  4:53 AM   Result Value Ref Range    WBC 5.6 4.6 - 13.2 K/uL    RBC 3.10 (L) 4.20 - 5.30 M/uL    HGB 7.5 (L) 12.0 - 16.0 g/dL    HCT 25.4 (L) 35.0 - 45.0 %    MCV 81.9 74.0 - 97.0 FL    MCH 24.2 24.0 - 34.0 PG    MCHC 29.5 (L) 31.0 - 37.0 g/dL    RDW 17.5 (H) 11.6 - 14.5 %    PLATELET 676 149 - 837 K/uL    MPV 8.8 (L) 9.2 - 11.8 FL    NEUTROPHILS 60 40 - 73 %    LYMPHOCYTES 24 21 - 52 %    MONOCYTES 14 (H) 3 - 10 %    EOSINOPHILS 2 0 - 5 %    BASOPHILS 0 0 - 2 %    ABS. NEUTROPHILS 3.4 1.8 - 8.0 K/UL    ABS. LYMPHOCYTES 1.3 0.9 - 3.6 K/UL    ABS. MONOCYTES 0.8 0.05 - 1.2 K/UL    ABS. EOSINOPHILS 0.1 0.0 - 0.4 K/UL    ABS. BASOPHILS 0.0 0.0 - 0.06 K/UL    DF AUTOMATED         Radiology:  Mra Brain Wo Cont    Result Date: 3/11/2017  Brain MRA: Indication: Multiple acute infarctions. Evaluate for source of embolus. Procedure: Standard three dimensional time of flight MRA of the brain arterial vasculature was performed. Source images were reviewed soft copy. MIP angiographic reconstructions were generated of the entire head examination and also selected MIP angiographic reconstructions of the individual carotid systems and of the vertebrobasilar system. Findings: Apparent occlusion versus very high-grade stenosis proximal aspect inferior division right M2 segment MCA correlating with the right MCA infarction likely. Bilateral prominent posterior communicating arteries approach fetal origin with small vertebrobasilar system. Otherwise normal. No saccular aneurysm. IMPRESSION: Occlusion inferior division right M2 segment MCA. This correlates with the acute infarction and is indeterminate in nature, most likely embolic certainly. No other significant intracranial vascular abnormality. Mra Neck Wo Cont    Result Date: 3/11/2017  Neck MRA without contrast: INDICATION: Acute infarctions. Evaluate for source of embolus. PROCEDURE: Noncontrast technique was used for uncertain reasons. 2-dimensional time-of-flight and submental three-dimensional time-of-flight imaging.  FINDINGS: Any stenosis described below uses the NASCET method, comparing the minimum residual lumen to the nontapering cervical internal carotid. Given noncontrast technique there is poor evaluation of the arch and proximal great vessels as typical. Likely normal branching pattern without definitive high-grade proximal stenosis. Vertebral arteries are codominant. Origins not well seen but grossly patent. No vertebral stenosis. No common or visualized internal carotid stenosis that is substantial. Patient motion obscures detail particularly on the three-dimensional acquisition which was not processed as MIP angiographic images given patient motion. IMPRESSION: Limited noncontrast neck vascular evaluation. No definite hemodynamically significant stenosis is documented. Mri Brain Wo Cont    Result Date: 3/11/2017  Brain MR without contrast: Indication: Possible acute infarction. Left-sided weakness. Abnormal head CT. History of sickle cell. Procedure: Sagittal spin echo T1, axial FSE FLAIR and T2 and axial diffusion weighted scanning was performed. Coronal spin echo T1 and T2 FSE images were also performed. No contrast was administered. Comparison head CT 3/10/2017. Findings: Diffusion:  Multifocal areas of restricted diffusion consistent with multifocal acute infarctions are noted. Multiple left greater than right cerebellar hemisphere infarctions are noted area there is a large right MCA territory infarction involving the insular cortex and posterior perisylvian temporal and inferior parietal lobe as well as the posterior inferior frontal lobe. This was in part hypodense on CT. No hemorrhage or significant mass effect associated with these lesions. The axial T2 star examination does not show any hemorrhage associated with the areas of acute infarction.  There is however a strong suspicion of a hemorrhagic lesion within the pituitary eccentric to the left but does not appear artifactual. Brain parenchyma:  Other than the areas of acute infarction, no additional focal brain lesion. No mass or mass effect. Ventricles and sulci:  Normal.  No significant atrophy given age. Extra-axial:  No extra-axial fluid collection or mass is noted. Brain vasculature:  No vascular abnormality is appreciated on this routine brain MR examination. Craniocervical junction:  Normal. Skull base, extracranial and calvarium:  Protrusion of orbital fat through a defect in the right orbital floor is most likely a chronic orbital floor fracture. There does appear to be some relative right enophthalmos. Impression: 1. Multifocal acute infarctions, the largest is in the right MCA territory correlating with the abnormal CT. There are bilateral left greater than right cerebellar hemisphere infarctions. These infarctions are likely embolic with a central origin. 2. Possible hemorrhagic lesion within the pituitary. Scans dedicated to the pituitary could be performed. This could be artifact of volume averaging of the sellar floor but is at least suspect. 3. Chronic appearing right orbital floor fracture with protrusion of orbital fat and relative enophthalmos. Ct Head Wo Cont    Result Date: 3/10/2017  EXAM: CT head INDICATION: Left-sided paralysis and facial droop COMPARISON: CT performed March 11, 2008 TECHNIQUE: Axial CT imaging of the head was performed without intravenous contrast. One or more dose reduction techniques were used on this CT: automated exposure control, adjustment of the mAs and/or kVp according to patient's size, and iterative reconstruction techniques. The specific techniques utilized on this CT exam have been documented in the patient's electronic medical record. _______________ FINDINGS: Detail is mildly limited at the skull base secondary to motion artifact.  BRAIN AND POSTERIOR FOSSA: There is focal loss of the normal cortical gray-white matter differentiation involving the inferior portions of the right parietal lobe, and extending into the midportion of the right temporal lobe. There is edema which is also noted to track along the sylvian fissure anteriorly, to involve the right-sided insular and subinsular cortex. There is focally increased density noted within the right MCA branch vessel on axial image 15. Mild local mass effect is present, with evidence of sulcal flattening. The ventricular size and configuration is stable. The basilar cisterns are patent. No discrete intra-axial hemorrhage identified. EXTRA-AXIAL SPACES AND MENINGES: There are no abnormal extra-axial fluid collections. CALVARIUM: Intact. SINUSES: Clear. OTHER: None. _______________     IMPRESSION: 1. Focally abnormal gray-white matter differentiation with associated edema involving the right parietal and temporal lobes, spanning the sylvian fissure in keeping with developing infarct. Punctate area of increased density within the sylvian M2 segment is nonspecific, but may pertain to a small in situ thrombus. No evidence of acute intra-axial or extra-axial fluid collection. CODE S findings discussed with  in the ED at the time of the scan.

## 2017-03-13 NOTE — PROGRESS NOTES
0830 Received report assumed care at this time. 0900 Complete RN assessment done at this time as per doc flow sheet. Pt awake alert and oriented. Pleasant and cooperative denies pain discomfort or sob. She can maew right LE tingles and is weaker then left lower also left upper is weaker then right upper extremity . #20G to right EJ patent and flushed with NS infusing at 125 cc/hr per MD order. Site is positional and per pt has been beeping off and on since yesterday site redressed at this time and taped into correct position no beeping noted. LCTA, skin intact. 0930 Pt set up for breakfast.     0947 pt took all am medications without difficulty. Speech therapist at bedside. 12 writer assisted pt up to bs. Without difficulty. 1200 Reassessed no changes vss remains in bed call bell in reach. 1230 pt tolerated PT well ambulated in hallway back to bed now c/o headache. Dilaudid requested and given for 10/10 pain. 1315 pain 6/10 per pt tolerable level is 6.     1345 pt's pain level is acceptable now rated 6 per pt.     1600 reassessed no changes vss resting comfortably, visitors at bedside. 1800 no changes, resting comfortably. Medicated by Janice Courtney RN task nurse @ 4432 pt now comfortable. 1944 no changes. VSS resting quietly report to Kaur Spann RN   Bedside and Verbal shift change report given to [employer]  (oncoming nurse) by Anish Olivares RN   (offgoing nurse). Report included the following information SBAR, Kardex, Intake/Output, MAR, Recent Results, Med Rec Status, Cardiac Rhythm ST and Alarm Parameters .

## 2017-03-13 NOTE — PROGRESS NOTES
Problem: Dysphagia (Adult)  Goal: *Acute Goals and Plan of Care (Insert Text)  Dysphagia Present: mild  Aspiration: at risk     Recommendations:  Diet: dental soft and thin liquids  Meds: 1 @ at time, place to R side  Aspiration Precautions  Other: bolus placement R, small sips/bites, alternate solids/liquids    Goals: Patient will:  1. Tolerate PO trials with 0 s/s overt distress in 4/5 trials  2. Utilize compensatory swallow strategies/maneuvers (decrease bite/sip, size/rate, alt. liq/sol) with min cues in 4/5 trials  3. Perform oral-motor/laryngeal exercises to increase oropharyngeal swallow function with min cues  4. Complete an objective swallow study (i.e., MBSS) to assess swallow integrity, r/o aspiration, and determine of safest LRD, min A     Outcome: Progressing Towards Goal  SPEECH LANGUAGE PATHOLOGY DYSPHAGIA TREATMENT     Patient: Mabel Beach (4 y.o. female)  Date: 3/13/2017  Diagnosis: Stroke [facility])  [facility])  Hypokalemia  CVA (cerebral vascular accident) ([address] [facility])       Precautions: aspiration Fall      ASSESSMENT:  Mild oropharyngeal dysphagia     Dysphagia f/u completed with pt A&Ox4. Continues to demo R gaze preference though will attend to L with verbal/visual stimuli. Skilled meal assessment of mech soft and thin liquid meal tray tolerated without overt s/s penetration/aspiration. Somewhat labored mastication of mech soft complicated by self-fed large bolus and disorganized bolus cohesion, however with cues to reduce bolus size and masticate to R pt tolerated remainder of meal without event, 100% oral clearance. Thin +straw single sips self-fed without s/s aspiration and clear vocal quality throughout. RN at bedside to administer meds, tolerated whole 1 @ at a time (large pill cut in half) without event.  Educated pt re: dysphagia in setting of CVA, aspiration risk, diet consistencies, and importance of compensatory strategies for safety; understanding voiced and demonstrated with remainder of PO intake during meal. Recommend advance to dental soft and thin liquids with ST to f/u for diet tolerance/advancement, education, and continued compensatory swallow strategy training. Progression toward goals:  [X]         Improving appropriately and progressing toward goals  [ ]         Improving slowly and progressing toward goals  [ ]         Not making progress toward goals and plan of care will be adjusted       PLAN: advance to dental soft and thin liquids, compensatory swallow strategies  Recommendations and Planned Interventions:  ST to f/u for diet tolerance/advancement, education, swallow strategy training  Patient continues to benefit from skilled intervention to address the above impairments. Continue treatment per established plan of care. Discharge Recommendations:  Inpatient Rehab       SUBJECTIVE:   Patient stated I'd really like a piece of toast or a salad. OBJECTIVE:   Cognitive and Communication Status:  Neurologic State: Alert  Orientation Level: Oriented X4  Cognition: Follows commands, Appropriate for age attention/concentration, Appropriate safety awareness  Perception: Cues to attend to left side of body, Cues to attend left visual field  Perseveration: No perseveration noted  Safety/Judgement: Awareness of environment, Good awareness of safety precautions, Insight into deficits  Dysphagia Treatment:  Oral Assessment:  Oral Assessment  Labial: Decreased rate, Left droop, Decreased seal  Dentition: Intact, Natural  Oral Hygiene: good  Lingual: Decreased rate, Decreased strength  Velum: No impairment  Mandible: No impairment  P.O. Trials:              Patient Position: HOB 45              Vocal quality prior to P.O.: No impairment              Consistency Presented:  Thin liquid, Mechanical soft              How Presented: Self-fed/presented, Straw, Spoon              How Much:  (meal)              Bolus Acceptance: No impairment              Bolus Formation/Control: Impaired              Type of Impairment: Mastication, Delayed              Propulsion: Delayed (# of seconds), Discoordination              Oral Residue: Less than 10% of bolus, Lingual, Left              Initiation of Swallow: Delayed (# of seconds)              Laryngeal Elevation: Functional              Aspiration Signs/Symptoms: None              Pharyngeal Phase Characteristics: No impairment, issues, or problems               Effective Modifications:  Alternate liquids/solids, Small sips and bites (bolus placement R)              Cues for Modifications: Minimal                                Oral Phase Severity: Mild              Pharyngeal Phase Severity : Mild                                                                                                                                                                                                                                                                                                                                                                                                                                                                                                                                                                                                                                                                                                                                                                                                                                                                                                                                                     PAIN:  Start of Tx: 0  End of Tx: 0      After treatment:   [ ]              Patient left in no apparent distress sitting up in chair  [X]              Patient left in no apparent distress in bed  [X]              Call bell left within reach  [X]              Nursing notified  [ ]              Family present  [ ] Caregiver present  [ ]              Bed alarm activated         COMMUNICATION/EDUCATION:   [X]        Aspiration precautions; swallow safety; compensatory techniques  [ ]        Patient unable to participate in education; education ongoing with staff  [ ]         Posted safety precautions in patient's room.   [ ]         Oral-motor/laryngeal strengthening exercises        Doreen Ledezma, SLP  Time Calculation: 15 mins

## 2017-03-13 NOTE — CONSULTS
Hematology Inpatient Consult    Subjective:     Sofia Giron is a 40 y.o., [address] female, who is being seen for . 40 year woman with a history of crohn's, cdiff colitis, herpex zoster, MRSA, obesisty, tuballidation,  cocaine use (not recent) sickle cell trait, GI pain, bleeding anemia, treated with fentanyl patch at [facility]. On 3-11-17 she aowke with left side weakness and presented to ED 10 hours later. TPA not given. Brain MRI with multifocal infarcts in the inferior MCA distribution, with right thalamic parietal and frontal lobe involvement. She has had unintentional weight loss. Never mammogram.    Dyspnea  She began having headackes a 233ks qago and present to [facility] but no head Ct or brain imaging done. Much dizziness, which she thought was from loss of blood. She currently has no one managing her crohns. She was given two units of blood at [facility]. CT with fibroid uterus. Laboratory evaluation: normocytic anemia with MCHC under 33, normal wbc and plt counts. Normal B12 and folate. PTT 24.7, INR 0.9    Sh QUITE SMOKING LAST YEAR, two grwon children at home. No caregiver. Fh: FATHER'S DISE UNKNOWN  SISTER WITH TUMOR RIGHT NECK  Past Medical History:   Diagnosis Date    Abdominal pain     Anemia NEC     sickle cell trait    C. difficile colitis     Crohn's disease ([address]     Gastrointestinal disorder     Crohns    Herpes zoster     Hx of cocaine abuse     Hyperkalemia     Hypertension     Iron deficiency anemia     MRSA (methicillin resistant Staphylococcus aureus)     Obesity     Pain management     Sickle cell trait (HCC)      Past Surgical History:   Procedure Laterality Date    HX GYN      tubal ligation    HX TUBAL LIGATION        History reviewed. No pertinent family history.   Social History   Substance Use Topics    Smoking status: Former Smoker    Smokeless tobacco: Not on file    Alcohol use No      Current Facility-Administered Medications   Medication Dose Route Frequency Provider Last Rate Last Dose    potassium chloride (K-DUR, KLOR-CON) SR tablet 20 mEq  20 mEq Oral NOW Sary Aquino MD        multivitamin, tx-iron-ca-min (THERA-M w/ IRON) tablet 1 Tab  1 Tab Oral DAILY Sary Aquino MD        [START ON 3/14/2017] pantoprazole (PROTONIX) tablet 40 mg  40 mg Oral ACB Sary Aquino MD        sodium chloride (NS) flush 5-10 mL  5-10 mL IntraVENous Q8H Sary Aquino MD   10 mL at 03/12/17 2123    sodium chloride (NS) flush 5-10 mL  5-10 mL IntraVENous PRN Sary Aquino MD        ondansetron [facility]) injection 4 mg  4 mg IntraVENous Q6H PRN Sary Aquino MD        acetaminophen (TYLENOL) tablet 650 mg  650 mg Oral Q4H PRN Sary Aquino MD        enoxaparin (LOVENOX) injection 40 mg  40 mg SubCUTAneous Q24H Sary Aquino MD   40 mg at 03/13/17 7749    aspirin chewable tablet 81 mg  81 mg Oral DAILY Sary Aquino MD   81 mg at 03/13/17 5117    potassium chloride (K-DUR, KLOR-CON) SR tablet 20 mEq  20 mEq Oral BID Sary Aquino MD   20 mEq at 03/13/17 0940    ELECTROLYTE REPLACEMENT PROTOCOL - Potassium  1 Each Other PRN Sary Aquino MD        ELECTROLYTE REPLACEMENT PROTOCOL - Magnesium  1 Each Other PRN Sary Aquino MD        HYDROmorphone (PF) (DILAUDID) injection 1 mg  1 mg IntraVENous Q4H PRN Sary Aquino MD   1 mg at 03/13/17 1230    0.9% sodium chloride infusion  100 mL/hr IntraVENous CONTINUOUS Sary Aquino  mL/hr at 03/12/17 1553 125 mL/hr at 03/12/17 1553    atorvastatin (LIPITOR) tablet 80 mg  80 mg Oral QHS Billy Way DO   80 mg at 03/12/17 2122    morphine injection 2 mg  2 mg IntraVENous Q4H PRN Vernon Moody MD   2 mg at 03/11/17 1313        No Known Allergies   Review of Systems:  Headaches, nausea, dyspnea, dizzines, weight loss. Still periods. No breast lumps.         Objective:     Patient Vitals for the past 8 hrs:   BP Temp Pulse Resp SpO2   03/13/17 1602 120/83 98.4 °F (36.9 °C) 87 15 100 %   17 1300 (!) 119/91 - 99 23 99 %   17 1200 (!) 129/91 - 96 16 100 %   17 1119 (!) 125/106 98 °F (36.7 °C) 96 11 100 %   17 1100 (!) 125/106 - 97 22 100 %   17 1000 128/86 - 93 18 100 %   17 0900 - - 90 26 99 %     Temp (24hrs), Av.2 °F (36.8 °C), Min:97.8 °F (36.6 °C), Max:98.5 °F (36.9 °C)     0701 -  1900  In: 1000 [I.V.:1000]  Out: -     Physical Exam:   Left facial droop, weak left leg but able to move and flex ankle. Fluent speech and comprehension  Lung: cta  Cv: rrr, no MRG  Abd: tender, no HSM  Ext: no edema. Skin no lesions  Neuro 2-12    Lab/Data Review:  Recent Results (from the past 24 hour(s))   MAGNESIUM    Collection Time: 17  8:10 PM   Result Value Ref Range    Magnesium 1.8 1.8 - 2.4 mg/dL   POTASSIUM    Collection Time: 17  8:10 PM   Result Value Ref Range    Potassium 3.7 3.5 - 5.5 mmol/L   METABOLIC PANEL, COMPREHENSIVE    Collection Time: 17  4:53 AM   Result Value Ref Range    Sodium 138 136 - 145 mmol/L    Potassium 3.2 (L) 3.5 - 5.5 mmol/L    Chloride 107 100 - 108 mmol/L    CO2 26 21 - 32 mmol/L    Anion gap 5 3.0 - 18 mmol/L    Glucose 90 74 - 99 mg/dL    BUN 2 (L) 7.0 - 18 MG/DL    Creatinine 0.45 (L) 0.6 - 1.3 MG/DL    BUN/Creatinine ratio 4 (L) 12 - 20      GFR est AA >60 >60 ml/min/1.73m2    GFR est non-AA >60 >60 ml/min/1.73m2    Calcium 7.2 (L) 8.5 - 10.1 MG/DL    Bilirubin, total 0.1 (L) 0.2 - 1.0 MG/DL    ALT (SGPT) 16 13 - 56 U/L    AST (SGOT) 16 15 - 37 U/L    Alk.  phosphatase 27 (L) 45 - 117 U/L    Protein, total 5.3 (L) 6.4 - 8.2 g/dL    Albumin 2.0 (L) 3.4 - 5.0 g/dL    Globulin 3.3 2.0 - 4.0 g/dL    A-G Ratio 0.6 (L) 0.8 - 1.7     CBC WITH AUTOMATED DIFF    Collection Time: 17  4:53 AM   Result Value Ref Range    WBC 5.6 4.6 - 13.2 K/uL    RBC 3.10 (L) 4.20 - 5.30 M/uL    HGB 7.5 (L) 12.0 - 16.0 g/dL    HCT 25.4 (L) 35.0 - 45.0 %    MCV 81.9 74.0 - 97.0 FL    MCH 24.2 24.0 - 34.0 PG    MCHC 29.5 (L) 31.0 - 37.0 g/dL    RDW 17.5 (H) 11.6 - 14.5 %    PLATELET 680 782 - 334 K/uL    MPV 8.8 (L) 9.2 - 11.8 FL    NEUTROPHILS 60 40 - 73 %    LYMPHOCYTES 24 21 - 52 %    MONOCYTES 14 (H) 3 - 10 %    EOSINOPHILS 2 0 - 5 %    BASOPHILS 0 0 - 2 %    ABS. NEUTROPHILS 3.4 1.8 - 8.0 K/UL    ABS. LYMPHOCYTES 1.3 0.9 - 3.6 K/UL    ABS. MONOCYTES 0.8 0.05 - 1.2 K/UL    ABS. EOSINOPHILS 0.1 0.0 - 0.4 K/UL    ABS. BASOPHILS 0.0 0.0 - 0.06 K/UL    DF AUTOMATED           Assessment:     Principal Problem:    Stroke ([facility] (3/10/2017)    Active Problems:    Crohn disease ([facility] (2/27/2012)      Obesity (2/27/2012)      Hypokalemia (8/8/2015)      Sickle cell trait (HCC) ()      Hypertension ()      Hx of cocaine abuse ()      CVA (cerebral vascular accident) ([facility] [address] (3/10/2017)    Summary: 40 year woman with a history of crohn's, cdiff colitis, herpex zoster, MRSA, obesisty, tuballidation,  cocaine use (not recent) sickle cell trait, GI pain, bleeding anemia, treated with fentanyl patch at [facility], presents with embolic type stroke with no clear atrial fibrillation, nor structural heart disorder, no right to left shunt, and no family history of a clotting disorder. Weight loss from crohn's. Plan:   Pending labs: AT III, factor V leig, factor VIII, homocysteine, Lupus anticoagulant, Protein C and S,   Check hgb electrophoresis  Check ferritin  Full anticoagulation? Caution with crohns.     I reviewed with Sofia Giron     Signed By: Vikas Pineda MD     March 13, 2017

## 2017-03-13 NOTE — PROGRESS NOTES
Alarm parameters reviewed, on and audible Adjusted to meet patient clinical condition. Shift report received from [address]. Patient alert oriented x4, calm cooperative, NAD, Vss.  2000  Shift assessment completed. See flow sheet. 2030  Assisted to [facility] without difficulty. 2200  Patient c/o Rt leg pain numbness and HA 5/10, dilaudid 1mg iv given for pain 5/10.   2230  Reassessment for HA and leg pain 0, resting w/eyes closed. 0000  Reassessment per doc flow sheet w/no change in status. Resting quietly. 0200  Assisted with repositioning, comfort measures provided. VSS, NAD.  0400  Reassessment completed w/no change. 0500  Assisted patient to [facility] without difficulty. 0815  C/o HA 8/10, dilaudid 1 mg iv administered. 0830  Resting quietly with eyes closed, arouses easily. Alarm parameters reviewed and opportunities for questions provided. Bedside and Verbal shift change report given to [facility] (oncoming nurse) by Apolinar Delgado RN (offgoing nurse). Report included the following information SBAR, Kardex, Intake/Output, MAR, Recent Results and Alarm Parameters .

## 2017-03-13 NOTE — PROGRESS NOTES
Chart reviewed, met with pt at bedside. Pt admitted with CVA. Pt lives at home with there three adult children, would like to discharge home if possible. CM discussed rehab options- with her Medicaid plan, she has no SNF benefit, only acute rehab. FOC offered and pt would like either [facility] Acute rehab or [facility] Acute [facility] in [place]; pt made aware of [facility] not participating with her insurance. Referrals placed, pt would need insurance approval to transition to next level of care. Should pt need to discharge home, she does have family who may be able to assist, would need DME for home, possibly hospital bed as she lives in [place] if unable to go up steps if needed. Plan: inpt rehab when stable and pending insurance approval vs home with DME and home health pending progress with therapy here    Care Management Interventions  PCP Verified by CM:  Yes  Transition of Care Consult (CM Consult): Discharge Planning  Physical Therapy Consult: Yes  Occupational Therapy Consult: Yes  Speech Therapy Consult: Yes  Current Support Network: Own Home  Confirm Follow Up Transport: Family  Plan discussed with Pt/Family/Caregiver: Yes  Freedom of Choice Offered: Yes  Discharge Location  Discharge Placement: Rehab Unit Subacute (vs HH pending progress)

## 2017-03-13 NOTE — PROGRESS NOTES
Hospitalist Progress Note    Patient: Ana Cristina Francois MRN: [id number]  CSN: [id number]    YOB: 1970  Age: 40 y.o.   Sex: female    DOA: 3/10/2017 LOS:  LOS: 3 days          Chief Complaint:    Acute CVA      Assessment/Plan     Acute CVA-possibly thromboembolic-no shunt on echo  Left sided neglect and hemiparesis  crohns disease  hypokalemia  Sickle cell trait and severe anemia-Get iron profile, heme check stools, start PPI PO, and start a MVI also-is this dilutional considering in Feb her Hgb was higher and here she has been on IVF, or is she losing blood also??    On daily ASA  If Hgb drops more, would transfuse her  IVF NS, 100 cc/hr  Statin daily  Replete K PO    Cardio consulted for possible LAURIE  Hematology consult  hypercoag workup pending  Continue PT and OT  Speech is going well with swallowing  Acute rehab options being explored for d/c      Patient Active Problem List   Diagnosis Code    Chronic pain syndrome G89.4    Abdominal pain R10.9    Crohn disease ([facility] K50.90    Obesity E66.9    Colitis K52.9    Crohn's disease with complication ([facility] [address] [facility] D45.067    Dehydration E86.0    Hypokalemia E87.6    Stroke ([address] I63.9    Sickle cell trait ([facility] [address] [facility] D57.3    Hypertension I10    Hx of cocaine abuse Z87.898    CVA (cerebral vascular accident) ([facility] [address] I63.9       Subjective:    Denies Mckay, pain, nuasea, SOB  Feeling much better    Review of systems:    Constitutional: denies fevers, chills, myalgias  Respiratory: denies SOB, cough  Cardiovascular: denies chest pain, palpitations  Gastrointestinal: denies nausea, vomiting, diarrhea      Vital signs/Intake and Output:  Visit Vitals    BP (!) 119/91    Pulse 99    Temp 98 °F (36.7 °C)    Resp 23    Ht 5' 4\" (1.626 m)    Wt 80.4 kg (177 lb 4.8 oz)    SpO2 99%    Breastfeeding No    BMI 30.43 kg/m2     Current Shift:  03/13 0701 - 03/13 1900  In: 1000 [I.V.:1000]  Out: -   Last three shifts:  03/11 1901 - 03/13 0700  In: 4385.8 [P.O.:240; I.V.:4145.8]  Out: 1402 [Urine:1200]    Exam:    General: Well developed, alert, NAD, OX3  Head/Neck: NCAT, supple, No masses, No lymphadenopathy  CVS:Regular rate and rhythm, no M/R/G, S1/S2 heard, no thrill  Lungs:Clear to auscultation bilaterally, no wheezes, rhonchi, or rales  Abdomen: Soft, Nontender, No distention, Normal Bowel sounds, No hepatomegaly  Extremities: No C/C/E, pulses palpable 2+  Skin:normal texture and turgor, no rashes, no lesions  Neuro:left face weakness, left grasp strength weal, LLE neglect  Psych:appropriate                Labs: Results:       Chemistry Recent Labs      03/13/17 0453 03/12/17 2010 03/12/17   0615  03/11/17   1732   GLU  90   --   83  82   NA  138   --   138  138   K  3.2*  3.7  3.6  4.0   CL  107   --   104  102   CO2  26   --   26  26   BUN  2*   --   3*  5*   CREA  0.45*   --   0.53*  0.51*   CA  7.2*   --   7.6*  7.2*   AGAP  5   --   8  10   BUCR  4*   --   6*  10*   AP  27*   --   33*  32*   TP  5.3*   --   5.6*  5.0*   ALB  2.0*   --   2.3*  2.3*   GLOB  3.3   --   3.3  2.7   AGRAT  0.6*   --   0.7*  0.9      CBC w/Diff Recent Labs      03/13/17 0453 03/12/17   0615  03/11/17   1732   WBC  5.6  8.1  6.1   RBC  3.10*  3.35*  2.90*   HGB  7.5*  8.1*  7.0*   HCT  25.4*  26.3*  23.0*   PLT  376  400  409   GRANS  60  67  66   LYMPH  24  20*  19*   EOS  2  1  1      Cardiac Enzymes Recent Labs      03/10/17   1630   CPK  15*   CKND1  Cannot be calulated      Coagulation Recent Labs      03/10/17   1700   PTP  12.0   INR  0.9   APTT  24.7       Lipid Panel Lab Results   Component Value Date/Time    Cholesterol, total 145 03/12/2017 06:15 AM    HDL Cholesterol 37 03/12/2017 06:15 AM    LDL, calculated 91.6 03/12/2017 06:15 AM    VLDL, calculated 16.4 03/12/2017 06:15 AM    Triglyceride 82 03/12/2017 06:15 AM    CHOL/HDL Ratio 3.9 03/12/2017 06:15 AM      BNP No results for input(s): BNPP in the last 72 hours.    Liver Enzymes Recent Labs      03/13/17 0453   TP  5.3*   ALB  2.0*   AP  27*   SGOT  16      Thyroid Studies Lab Results   Component Value Date/Time    TSH 1.51 12/12/2009 09:48 AM        Procedures/imaging: see electronic medical records for all procedures/Xrays and details which were not copied into this note but were reviewed prior to creation of Margaret Figueroa MD

## 2017-03-13 NOTE — PROGRESS NOTES
Problem: Self Care Deficits Care Plan (Adult)  Goal: *Acute Goals and Plan of Care (Insert Text)  Occupational Therapy Goals  Initiated 3/11/2017 within 7 day(s). 1. Patient will perform lower body dressing with minimal assistance/contact guard assist while sitting on EOB and use of salbador-technique. 2. Patient will perform grooming with contact guard assist while standing at sink using BUE. 3. Patient will perform toilet transfers with contact guard assist with verbal and tactile cues. 4. Patient will perform all aspects of toileting with Contact guard assist and with verbal and tactile cues. 5. Patient will participate in upper extremity therapeutic exercise/activities with contact guard assist for 15 minutes. OCCUPATIONAL THERAPY TREATMENT     Patient: Stephanie Xiao (58 y.o. female)  Date: 3/13/2017  Diagnosis: Stroke [facility])  Stroke [facility])  Hypokalemia  CVA (cerebral vascular accident) [facility])       Precautions: Fall  Chart, occupational therapy assessment, plan of care, and goals were reviewed. ASSESSMENT:  Noted continued L UE ataxia and decreased coordination with neglect. Attempted to assist pt with ordering meal off menu and needed min/mod assist for holding menu with L hand and attending to L side. Also extra time to follow commands and visually scan menu for lunch item. Pt donned hospital gown with mod assist using salbador-dressing technique and verbal cues to attend to left UE. Pt completed functional mobility using RW with min assist and verbal cues to scan environment. Less verbal cues this session to scan towards left side. Pt left in bed with call bell in reach reporting headache. Nursing notified. Pt continues to be appropriate candidate for inpatient rehab to maximize potential and is motivated for treatment.    Progression toward goals:  [X]          Improving appropriately and progressing toward goals  [ ]          Improving slowly and progressing toward goals  [ ] Not making progress toward goals and plan of care will be adjusted       PLAN:  Patient continues to benefit from skilled intervention to address the above impairments. Continue treatment per established plan of care. Discharge Recommendations:  Inpatient Rehab  Further Equipment Recommendations for Discharge:  N/A       SUBJECTIVE:   Patient stated My whole leg is hurting.       OBJECTIVE DATA SUMMARY:   Cognitive/Behavioral Status:  Neurologic State: Alert  Orientation Level: Oriented X4  Cognition: Follows commands, Appropriate for age attention/concentration, Appropriate safety awareness  Safety/Judgement: Awareness of environment, Fall prevention  Functional Mobility and Transfers for ADLs:              Bed Mobility:  Rolling: Contact guard assistance (VCs)  Supine to Sit: Contact guard assistance (VCs)  Sit to Supine: Contact guard assistance (VCs)  Scooting: Contact guard assistance (Max VCs)              Transfers:  Sit to Stand: Contact guard assistance;Minimum assistance (VCs)   Balance:  Sitting: Intact  Standing: Impaired; With support  Standing - Static: Fair  Standing - Dynamic : Poor  ADL Intervention:     UE dressing: Mod assist     Cognitive Retraining  Safety/Judgement: Awareness of environment; Fall prevention     Pain:  Pain Scale 1: Numeric (0 - 10)  Pain Intensity 1: 0     Activity Tolerance:    Fair +  Please refer to the flowsheet for vital signs taken during this treatment.   After treatment:   [ ]  Patient left in no apparent distress sitting up in chair  [X]  Patient left in no apparent distress in bed  [X]  Call bell left within reach  [X]  Nursing notified  [ ]  Caregiver present  [ ]  Bed alarm activated     Loise Schimke, OTR/L  Time Calculation: 34 mins

## 2017-03-13 NOTE — PROGRESS NOTES
Problem: Mobility Impaired (Adult and Pediatric)  Goal: *Acute Goals and Plan of Care (Insert Text)  Physical Therapy Goals  Initiated 3/11/2017 and to be accomplished within 7 day(s)  1. Patient will move from supine to sit and sit to supine in bed with supervision/set-up. 2. Patient will transfer from bed to chair and chair to bed with supervision/set-up using the least restrictive device. 3. Patient will perform sit to stand with supervision/set-up. 4. Patient will ambulate with supervision/set-up for >100 feet with the least restrictive device. Outcome: Progressing Towards Goal  PHYSICAL THERAPY TREATMENT     Patient: Sulma Lyle (66 y.o. female)  Date: 3/13/2017  Diagnosis: Stroke [facility])  Stroke [facility])  Hypokalemia  CVA (cerebral vascular accident) [facility]) [facility])       Precautions: Fall   Chart, physical therapy assessment, plan of care and goals were reviewed. ASSESSMENT:  Pt demonstrate return of L UE delay and partial neglect, but able to use (Grossly) with cuing. Pt's fine motor skill are limit. Less symmetry of LEs on this session and pt reports R foot tingle. Pt's transfer to standing required assistance, due to forward foot slipping. During ambulation, pt has better awareness of limited visual field, but relies totally of cuing for directional needs. With not RW use, before hospitalization. Pt is not safe for home setting in current status, but may benefit quickly,if highly placed. Progression toward goals:  [ ]      Improving appropriately and progressing toward goals  [X]      Improving slowly and progressing toward goals  [ ]      Not making progress toward goals and plan of care will be adjusted       PLAN:  Patient continues to benefit from skilled intervention to address the above impairments. Continue treatment per established plan of care.   Discharge Recommendations:  Rehab  Further Equipment Recommendations for Discharge:  bedside commode and rolling walker SUBJECTIVE:   Patient stated I'm hungry, but I cant eat with a headache.       OBJECTIVE DATA SUMMARY:   Critical Behavior:  Neurologic State: Alert  Orientation Level: Oriented X4  Cognition: Follows commands, Appropriate for age attention/concentration, Appropriate safety awareness  Safety/Judgement: Awareness of environment, Good awareness of safety precautions, Insight into deficits  Functional Mobility Training:  Bed Mobility:  Rolling: Contact guard assistance (VCs)  Supine to Sit: Contact guard assistance (VCs)  Sit to Supine: Contact guard assistance (VCs)  Scooting: Contact guard assistance (Max VCs)  Transfers:  Sit to Stand: Contact guard assistance;Minimum assistance (VCs)  Stand to Sit: Contact guard assistance (VCs)  Balance:  Sitting: Intact  Standing: Impaired; With support  Standing - Static: Fair  Standing - Dynamic : Poor  Ambulation/Gait Training:  Distance (ft): 240 Feet (ft)  Assistive Device: Walker, rolling;Gait belt  Ambulation - Level of Assistance: Minimal assistance;Contact guard assistance (VCs)  Gait Abnormalities: Ataxic;Decreased step clearance  Base of Support: Center of gravity altered  Stance: Time  Speed/Janice: Shuffled; Slow  Step Length: Right shortened;Left shortened  Swing Pattern: Left asymmetrical;Right asymmetrical  Therapeutic Exercises:   AP, AC, QS x 10  Pain:  Pain Scale 1: Numeric (0 - 10)  Pain Intensity 1: 0  Activity Tolerance:   Fair   Please refer to the flowsheet for vital signs taken during this treatment.   After treatment:   [ ] Patient left in no apparent distress sitting up in chair  [X] Patient left in no apparent distress in bed  [X] Call bell left within reach  [X] Nursing notified  [ ] Caregiver present  [ ] Bed alarm activated      Samantha Anderson PTA   Time Calculation: 34 mins

## 2017-03-13 NOTE — PROGRESS NOTES
conducted a Follow up consultation and Spiritual Assessment for Jolly Chiang, who is a 40 y.o.,female. Patient completed an Advance Medical Directive. A copy was placed in her chart for scanning and original and copies were given to patient. The  provided the following Interventions:  Continued the relationship of care and support. Listened empathically. Offered assurance of continued prayer on patients behalf. Chart reviewed. The following outcomes were achieved:  Patient expressed gratitude for Spiritual Care visit. Patient completed an Advance Medical Directive. Assessment:  There are no further spiritual or Quaker issues which require Spiritual Care Services intervention at this time. Plan:  Chaplains will continue to follow and will provide pastoral care as needed or requested.  recommends bedside caregivers page the  on duty if patient shows signs of acute spiritual or emotional distress. [address] [place] [address].    Board Certified   [phone] - Office

## 2017-03-14 LAB
AT III AG PPP IA-ACNC: 90 % (ref 72–124)
AT III PPP CHRO-ACNC: 102 % (ref 75–135)
FACT VIII ACT/NOR PPP: 298 % (ref 57–163)
FERRITIN SERPL-MCNC: 25 NG/ML (ref 8–388)
GLUCOSE BLD STRIP.AUTO-MCNC: 102 MG/DL (ref 70–110)
GLUCOSE BLD STRIP.AUTO-MCNC: 92 MG/DL (ref 70–110)
HCT VFR BLD AUTO: 24 % (ref 35–45)
HCYS SERPL-SCNC: 8.5 UMOL/L (ref 0–15)
HEMOCCULT STL QL: POSITIVE
HGB BLD-MCNC: 7.3 G/DL (ref 12–16)
IRON SATN MFR SERPL: 5 %
IRON SERPL-MCNC: 13 UG/DL (ref 50–175)
POTASSIUM SERPL-SCNC: 3 MMOL/L (ref 3.5–5.5)
PROT C PPP-ACNC: 116 % (ref 73–180)
PROT S ACT/NOR PPP: 72 % (ref 57–157)
PROT S ACT/NOR PPP: 91 % (ref 63–140)
PROT S PPP-ACNC: 110 % (ref 60–150)
TIBC SERPL-MCNC: 242 UG/DL (ref 250–450)

## 2017-03-14 PROCEDURE — 74011250637 HC RX REV CODE- 250/637: Performed by: FAMILY MEDICINE

## 2017-03-14 PROCEDURE — 92526 ORAL FUNCTION THERAPY: CPT

## 2017-03-14 PROCEDURE — 84132 ASSAY OF SERUM POTASSIUM: CPT | Performed by: HOSPITALIST

## 2017-03-14 PROCEDURE — 65660000000 HC RM CCU STEPDOWN

## 2017-03-14 PROCEDURE — 83021 HEMOGLOBIN CHROMOTOGRAPHY: CPT | Performed by: HOSPITALIST

## 2017-03-14 PROCEDURE — 83540 ASSAY OF IRON: CPT | Performed by: HOSPITALIST

## 2017-03-14 PROCEDURE — 82962 GLUCOSE BLOOD TEST: CPT

## 2017-03-14 PROCEDURE — 85018 HEMOGLOBIN: CPT | Performed by: HOSPITALIST

## 2017-03-14 PROCEDURE — 74011250636 HC RX REV CODE- 250/636: Performed by: INTERNAL MEDICINE

## 2017-03-14 PROCEDURE — 82728 ASSAY OF FERRITIN: CPT | Performed by: HOSPITALIST

## 2017-03-14 PROCEDURE — 51798 US URINE CAPACITY MEASURE: CPT

## 2017-03-14 PROCEDURE — 74011250636 HC RX REV CODE- 250/636: Performed by: HOSPITALIST

## 2017-03-14 PROCEDURE — 97535 SELF CARE MNGMENT TRAINING: CPT

## 2017-03-14 PROCEDURE — 36415 COLL VENOUS BLD VENIPUNCTURE: CPT | Performed by: HOSPITALIST

## 2017-03-14 PROCEDURE — 74011250637 HC RX REV CODE- 250/637: Performed by: HOSPITALIST

## 2017-03-14 RX ORDER — SODIUM CHLORIDE AND POTASSIUM CHLORIDE .9; .15 G/100ML; G/100ML
SOLUTION INTRAVENOUS CONTINUOUS
Status: DISCONTINUED | OUTPATIENT
Start: 2017-03-14 | End: 2017-03-20

## 2017-03-14 RX ORDER — POTASSIUM CHLORIDE 7.45 MG/ML
10 INJECTION INTRAVENOUS
Status: COMPLETED | OUTPATIENT
Start: 2017-03-14 | End: 2017-03-21

## 2017-03-14 RX ORDER — POTASSIUM CHLORIDE 20MEQ/15ML
20 LIQUID (ML) ORAL DAILY
Status: DISCONTINUED | OUTPATIENT
Start: 2017-03-15 | End: 2017-03-23 | Stop reason: HOSPADM

## 2017-03-14 RX ADMIN — POTASSIUM CHLORIDE 10 MEQ: 10 INJECTION, SOLUTION INTRAVENOUS at 16:36

## 2017-03-14 RX ADMIN — ENOXAPARIN SODIUM 40 MG: 40 INJECTION SUBCUTANEOUS at 08:59

## 2017-03-14 RX ADMIN — HYDROMORPHONE HYDROCHLORIDE 1 MG: 1 INJECTION, SOLUTION INTRAMUSCULAR; INTRAVENOUS; SUBCUTANEOUS at 21:25

## 2017-03-14 RX ADMIN — POTASSIUM CHLORIDE 10 MEQ: 10 INJECTION, SOLUTION INTRAVENOUS at 13:08

## 2017-03-14 RX ADMIN — Medication 10 ML: at 13:13

## 2017-03-14 RX ADMIN — POTASSIUM CHLORIDE 10 MEQ: 10 INJECTION, SOLUTION INTRAVENOUS at 14:40

## 2017-03-14 RX ADMIN — PANTOPRAZOLE SODIUM 40 MG: 40 TABLET, DELAYED RELEASE ORAL at 07:21

## 2017-03-14 RX ADMIN — HYDROMORPHONE HYDROCHLORIDE 1 MG: 1 INJECTION, SOLUTION INTRAMUSCULAR; INTRAVENOUS; SUBCUTANEOUS at 17:24

## 2017-03-14 RX ADMIN — POTASSIUM CHLORIDE 20 MEQ: 20 TABLET, EXTENDED RELEASE ORAL at 09:39

## 2017-03-14 RX ADMIN — ATORVASTATIN CALCIUM 80 MG: 20 TABLET, FILM COATED ORAL at 21:22

## 2017-03-14 RX ADMIN — ASPIRIN 81 MG 81 MG: 81 TABLET ORAL at 09:38

## 2017-03-14 RX ADMIN — Medication 2 MG: at 15:19

## 2017-03-14 RX ADMIN — HYDROMORPHONE HYDROCHLORIDE 1 MG: 1 INJECTION, SOLUTION INTRAMUSCULAR; INTRAVENOUS; SUBCUTANEOUS at 13:04

## 2017-03-14 RX ADMIN — HYDROMORPHONE HYDROCHLORIDE 1 MG: 1 INJECTION, SOLUTION INTRAMUSCULAR; INTRAVENOUS; SUBCUTANEOUS at 06:07

## 2017-03-14 RX ADMIN — HYDROMORPHONE HYDROCHLORIDE 1 MG: 1 INJECTION, SOLUTION INTRAMUSCULAR; INTRAVENOUS; SUBCUTANEOUS at 01:47

## 2017-03-14 RX ADMIN — SODIUM CHLORIDE AND POTASSIUM CHLORIDE: 9; 1.49 INJECTION, SOLUTION INTRAVENOUS at 13:16

## 2017-03-14 RX ADMIN — HYDROMORPHONE HYDROCHLORIDE 1 MG: 1 INJECTION, SOLUTION INTRAMUSCULAR; INTRAVENOUS; SUBCUTANEOUS at 09:33

## 2017-03-14 RX ADMIN — MULTIPLE VITAMINS W/ MINERALS TAB 1 TABLET: TAB at 09:39

## 2017-03-14 RX ADMIN — POTASSIUM CHLORIDE 10 MEQ: 10 INJECTION, SOLUTION INTRAVENOUS at 18:10

## 2017-03-14 RX ADMIN — Medication 10 ML: at 06:07

## 2017-03-14 NOTE — ROUTINE PROCESS
TRANSFER - IN REPORT:    Verbal report received from Namita Pierce RN (name) on [place]  being received from ICU (unit) for routine progression of care      Report consisted of patients Situation, Background, Assessment and   Recommendations(SBAR). Information from the following report(s) SBAR, MAR, Recent Results and Cardiac Rhythm NSR/ST was reviewed with the receiving nurse. Opportunity for questions and clarification was provided.

## 2017-03-14 NOTE — PROGRESS NOTES
0800 Assumed pt care. Pt alert and oriented, in bed resting    0900 Called by pt who requested dilaudid, but dilaudid is not due yet, was offered morphine but refused. Pt refused her morning meds in fear of her chron's disease. Offered to give potassium in ginger ale melt  but still refused. Will notify EFE Nelson to bathroom with the walker. Tolerated well. Pt passed one loose stool. Hand hygiene enforced. 0933  Pt medicated with dilaudid 1 mg as per [facility] for generalized pain 10/10.    0940 pt decide to ahead and take meds, same give and tolerated well with ginger ale. 1030 Pain reassessed, pain decreased to 5/10    1130 Interdisciplinary team rounds were held 3/14/2017 with the following team members:Care Management, Nursing, Physical Therapy and Physician Dr Alfred Banks and the patient. Plan of care discussed. See clinical pathway and/or care plan for interventions and desired outcomes. [facility] Stroke Education provided to patient and the following topics were discussed    1. Patients personal risk factors for stroke are hypertension, family history, hyperlipidemia and obesity    2. Warning signs of Stroke:        * Sudden numbness or weakness of the face, arm or leg, especially on one side of          The body            * Sudden confusion, trouble speaking or understanding        * Sudden trouble seeing in one or both eyes        * Sudden trouble walking, dizziness, loss of balance or coordination        * Sudden severe headache with no known cause      3. Importance of activation Emergency Medical Services ( 9-1-1 ) immediately if experience any warning signs of stroke. 4. Be sure and schedule a follow-up appointment with your primary care doctor or any specialists as instructed. 5. You must take medicine every day to treat your risk factors for stroke. Be sure to take your medicines exactly as your doctor tells you: no more, no less. Know what your medicines are for , what they do. Anti-thrombotics /anticoagulants can help prevent strokes. You are taking the following medicine(s)  n/a     6.  Smoking and second-hand smoke greatly increase your risk of stroke, cardiovascular disease and death. Smoking history never    7. Information provided was [facility] Stroke Education Binder    8. Documentation of teaching completed in Patient Education Activity and on Care Plan with teaching response noted? yes    1200 pt was picked up via w/c for duplex studies    1230 Called by vascular tech that report that pt was unable to tolerate procedure due to iv to neck area. Pt was brought back. Pt very hard stick, will attempt to place a peripheral iv to arms and will f/u  1245 Dr Per Menard  called and notified and we spoke regarding need for possible PICC line due to pt \" very hard stick\". Attempted to place Iv and unsuccessful. 1300 Pt was medicated with dilaudid 1 mg as per [facility] for gen pain 10/10    1400 Pain reassessed, down to 6/10    1520 Pt called c/o back/neck pain 10/10. ,medicated with morphine 2 mg as per MAR.    1630 Pt pain not quite relieved. 1730 Pt medicated with dilaudid 1 mg as per [facility] for back pain 10/10.

## 2017-03-14 NOTE — PROGRESS NOTES
Problem: Mobility Impaired (Adult and Pediatric)  Goal: *Acute Goals and Plan of Care (Insert Text)  Physical Therapy Goals  Initiated 3/11/2017 and to be accomplished within 7 day(s)  1. Patient will move from supine to sit and sit to supine in bed with supervision/set-up. 2. Patient will transfer from bed to chair and chair to bed with supervision/set-up using the least restrictive device. 3. Patient will perform sit to stand with supervision/set-up. 4. Patient will ambulate with supervision/set-up for >100 feet with the least restrictive device. Outcome: Not Progressing Towards Goal  Pt refused PT due to:  [ ]  Nausea/vomiting  [X]  Eating  [ ]  Pain  [ ]  Pt lethargic  [ ]  Off Unit  Will f/u later as schedule allows. Thank you.   Pallavi Ramos, PTA

## 2017-03-14 NOTE — PROGRESS NOTES
Cardiology Progress Note      3/14/2017 4:07 PM    Admit Date: 3/10/2017    Admit Diagnosis: Stroke [facility])  Stroke [facility])  Hypokalemia  CVA (cerebral vascular accident) ([facility]      Subjective:     Rojas Martell denies chest pain, chest pressure/discomfort, dyspnea, palpitations.     Visit Vitals    /78 (BP 1 Location: Right arm, BP Patient Position: At rest)    Pulse (!) 102    Temp 98.1 °F (36.7 °C)    Resp 18    Ht 5' 4\" (1.626 m)    Wt 83.1 kg (183 lb 3.2 oz)    SpO2 99%    Breastfeeding No    BMI 29.57 kg/m2     Current Facility-Administered Medications   Medication Dose Route Frequency    potassium chloride 10 mEq in 100 ml IVPB  10 mEq IntraVENous Q1H    0.9% sodium chloride with KCl 20 mEq/L infusion   IntraVENous CONTINUOUS    [START ON 3/15/2017] potassium chloride (KAON 10%) 20 mEq/15 mL oral liquid 20 mEq  20 mEq Oral DAILY    multivitamin, tx-iron-ca-min (THERA-M w/ IRON) tablet 1 Tab  1 Tab Oral DAILY    pantoprazole (PROTONIX) tablet 40 mg  40 mg Oral ACB    sodium chloride (NS) flush 5-10 mL  5-10 mL IntraVENous Q8H    sodium chloride (NS) flush 5-10 mL  5-10 mL IntraVENous PRN    ondansetron (ZOFRAN) injection 4 mg  4 mg IntraVENous Q6H PRN    acetaminophen (TYLENOL) tablet 650 mg  650 mg Oral Q4H PRN    enoxaparin (LOVENOX) injection 40 mg  40 mg SubCUTAneous Q24H    aspirin chewable tablet 81 mg  81 mg Oral DAILY    ELECTROLYTE REPLACEMENT PROTOCOL - Potassium  1 Each Other PRN    ELECTROLYTE REPLACEMENT PROTOCOL - Magnesium  1 Each Other PRN    HYDROmorphone (PF) (DILAUDID) injection 1 mg  1 mg IntraVENous Q4H PRN    atorvastatin (LIPITOR) tablet 80 mg  80 mg Oral QHS    morphine injection 2 mg  2 mg IntraVENous Q4H PRN         Objective:      Physical Exam:  Visit Vitals    /78 (BP 1 Location: Right arm, BP Patient Position: At rest)    Pulse (!) 102    Temp 98.1 °F (36.7 °C)    Resp 18    Ht 5' 4\" (1.626 m)    Wt 83.1 kg (183 lb 3.2 oz)    SpO2 99%    Breastfeeding No    BMI 29.57 kg/m2     General Appearance:  Well developed, well nourished,alert and oriented x 3, and individual in no acute distress. Ears/Nose/Mouth/Throat:   Hearing grossly normal.         Neck: Supple. Chest:   Lungs clear to auscultation bilaterally. Cardiovascular:  Regular rate and rhythm, S1, S2 normal, no murmur. Abdomen:   Soft, non-tender, bowel sounds are active. Extremities: No edema bilaterally. Skin: Warm and dry. Data Review:   Labs:    Recent Results (from the past 24 hour(s))   GLUCOSE, POC    Collection Time: 03/14/17 11:16 AM   Result Value Ref Range    Glucose (POC) 92 70 - 110 mg/dL   HGB & HCT    Collection Time: 03/14/17 11:22 AM   Result Value Ref Range    HGB 7.3 (L) 12.0 - 16.0 g/dL    HCT 24.0 (L) 35.0 - 45.0 %   POTASSIUM    Collection Time: 03/14/17 11:22 AM   Result Value Ref Range    Potassium 3.0 (L) 3.5 - 5.5 mmol/L       Telemetry: normal sinus rhythm      Assessment:     Principal Problem:    [facility] (3/10/2017)    Active Problems:    Crohn disease ([facility] [address] (2/27/2012)      Obesity (2/27/2012)      Hypokalemia (8/8/2015)      Sickle cell trait (HCC) ()      Hypertension ()      Hx of cocaine abuse ()      CVA (cerebral vascular accident) ([facility] (3/10/2017)        Plan:     She has dysarthria, facial weakness. We plan to proceed with transesophageal echo tomorrow. What is already been written. Continue to follow.     Chaz Pineda MD

## 2017-03-14 NOTE — PROGRESS NOTES
Problem: Mobility Impaired (Adult and Pediatric)  Goal: *Acute Goals and Plan of Care (Insert Text)  Physical Therapy Goals  Initiated 3/11/2017 and to be accomplished within 7 day(s)  1. Patient will move from supine to sit and sit to supine in bed with supervision/set-up. 2. Patient will transfer from bed to chair and chair to bed with supervision/set-up using the least restrictive device. 3. Patient will perform sit to stand with supervision/set-up. 4. Patient will ambulate with supervision/set-up for >100 feet with the least restrictive device. Outcome: Not Progressing Towards Goal  PT session held due to:  [ ]  Nausea/vomiting  [X]  RN present and medicating   [ ]  Extreme Pain  [ ]  Dialysis treatment in progress. Will f/u later as schedule allows. Thank you.   Valerio Hunt, PTA

## 2017-03-14 NOTE — CONSULTS
[address] [place] [address] Rd    Name:  Aurora Land  MR#:  [id number]  :  1970  Account #:  [de-identified]  Date of Adm:  03/10/2017  Date of Consultation:  2017      CONCLUSIONS  1. Embolic cerebrovascular accident. 2. History of Crohn disease. 3. History of Clostridium difficile colitis. 4. History of herpes zoster. 5. History of cocaine abuse, remotely. 6. Hyperkalemia. 7. Hypertension. 8. Anemia. 9. History of methicillin-resistant Staphylococcus aureus. 10. Obesity. 11. Chronic pain with pain management. 12. Sickle cell trait. 13. Anemia. RECOMMENDATIONS  1. Agree with transesophageal echocardiogram to rule out any  intracardiac source of the patient's embolic strokes. 2. Shall follow. 3. Plan to proceed and I have scheduled a LAURIE for Wednesday,  03/15/2017. CONCLUSIONS: The patient's records were reviewed. The patient  was interviewed and examined. HISTORY OF PRESENT ILLNESS: The patient is a 58-year-old lady  who presented by way of EMS to the ER with left-sided weakness, left  facial droop on the morning of admission. Onset was about 10 hours  prior to her arrival. She was apparently seen at [facility] the day prior for  abdominal pain and was discharged home. No known injury or fall. She  reported bilateral frontal headaches. The patient had no other  complaints. She was admitted, and found to have a stroke, seen by  Neurology and had imaging that showed multiple strokes and likely  embolic source. Transthoracic echo showed no evidence of  intracardiac thrombi. A LAURIE was suggested with which I agree. I plan  to proceed as noted above. The patient's records were reviewed. The  patient was interviewed and examined.     PAST MEDICAL HISTORY: Includes abdominal pain, anemia, sickle  cell trait, Clostridium difficile colitis, Crohn disease, GI disorder, herpes  zoster, cocaine abuse, hyperkalemia, hypertension, iron-deficiency  anemia, MRSA, obesity, pain management, sickle cell trait. PAST SURGICAL HISTORY: Includes tubal ligation. FAMILY HISTORY: Noncontributory. SOCIAL HISTORY: The patient is . She is a former smoker. She denies alcohol use, but does admit to drug use. MEDICATIONS PRIOR TO ADMISSION  1. Phenergan. 2. Prednisone Dosepak. 3. Colton Render. 4. Ultram.  5. Another Deltasone Dosepak. Apparently all of this was for Crohn's. 6. HydroDIURIL. 7. Asacol. DRUG ALLERGIES: NONE KNOWN. REVIEW OF SYSTEMS  CONSTITUTIONAL: The patient apparently had no fever, chills or  sweats. HEENT: No sore throat, swollen glands, neck pain, stiffness, sore  throat. She did complain of headaches prior to admission. RESPIRATORY/CHEST: Denies cough, sputum production, wheezing,  or hemoptysis. CARDIAC: Denies chest pain, shortness of breath, dizziness, syncope,  presyncope. ABDOMEN/GASTROINTESTINAL: Positive for abdominal pain. HEMATOLOGIC: No bruising, bleeding, known clotting disorder. She is  anemic. Has sickle cell trait. NEUROLOGIC: Please see above. SKIN: No rashes or itching. ENDOCRINE: No polyphagia, polyuria, heat or cold intolerance. PHYSICAL EXAMINATION  GENERAL: Reveals a well-developed, obese lady in no acute distress. HEENT: Normocephalic. No evidence of trauma, left facial droop. Pupils are equal and reactive. NECK: Supple. Trachea midline. No JVD. CHEST: Lungs are clear to auscultation bilaterally. No rales, rhonchi,  or wheezes. CARDIAC: Precordium normal to palpation. There are no heaves,  thrills, or ectopic impulses. First and second heart sounds are normal.  There are no murmurs, gallops, or rubs. ABDOMEN: Soft. Liver and spleen are not enlarged. No abdominal  masses or bruits are appreciated. Bowel sounds are present. Abdominal aorta is not palpable. EXTREMITIES: No cyanosis or clubbing. Peripheral pulses are present  bilaterally. NEUROLOGIC: The patient is alert and oriented.  She has a left facial  droop. She has a right hemiparesis. Peripheral pulses are present. ASSESSMENT/PLAN: At this juncture, the patient is certainly stable  from a cardiac standpoint. I agree with transesophageal  echocardiogram and will plan to schedule it. Thank you for asking me to see this patient.         MD Justin Franklin / Sudha Gardner  D:  03/13/2017   19:19  T:  03/13/2017   20:03  Job #:  [id number]

## 2017-03-14 NOTE — PROGRESS NOTES
NEUROLOGY PROGRESS NOTE        Patient: Kelsey Muse        Sex: female          DOA: 3/10/2017  YOB: 1970      Age:  40 y.o.         LOS: 4 days     Identification:  40 y.o. female with history of Crohn's disease, past history of cocaine use (not recently) and Sickle Cell trait, who presented with left sided weakness, found to have multifocal embolic strokes.      SUBJECTIVE:   The patient is stable with left sided weakness and neglect. She has right foot pains, currently of unclear etiology.      Stroke Work-up:  Brain MRI:1. Multifocal acute infarctions, the largest is in the right MCA territory correlating with the abnormal CT. There are bilateral left greater than right cerebellar hemisphere infarctions. These infarctions are likely embolic with a central origin. 2. Possible hemorrhagic lesion within the pituitary. Scans dedicated to the pituitary could be performed. This could be artifact of volume averaging of the sellar floor but is at least suspect. 3. Chronic appearing right orbital floor fracture with protrusion of orbital fat and relative enophthalmos. MRA Brain: Occlusion inferior division right M2 segment MCA. This correlates with the acute infarction and is indeterminate in nature, most likely embolic certainly. No other significant intracranial vascular abnormality. MRA Neck: Limited noncontrast neck vascular evaluation. No definite hemodynamically significant stenosis is documented. Echocardiogram: EF is 60-65%, no intracardiac shunt detected. Carotid Doppler pending.   Lipid panel:            Lab Results   Component Value Date/Time      Cholesterol, total 145 03/12/2017 06:15 AM      HDL Cholesterol 37 03/12/2017 06:15 AM      LDL, calculated 91.6 03/12/2017 06:15 AM      VLDL, calculated 16.4 03/12/2017 06:15 AM      Triglyceride 82 03/12/2017 06:15 AM      CHOL/HDL Ratio 3.9 03/12/2017 06:15 AM   HbA1c:         Lab Results   Component Value Date/Time      Hemoglobin A1c 5.7 03/12/2017 06:15 AM           ASSESSMENT/IMPRESSION:   Multifocal right hemispheric (temporal, thalamic, parietal and cerebellar infarcts), most likely cardioembolic in nature. We need to rule out hypercoagulable state (lab work pending). LUARIE pending.      RECOMMENDATIONS:  1. Continue Aspirin 81mg po daily. 2. Continue telemetry monitoring  3. Neuro checks per stroke protocol  4. Normotensive protocol can be initiated. 5. IV normal saline or oral liquids for hydration  6. Carotid doppler:pending  7. Hypercoag panel: pending  8. LAURIE pending. I will follow the patient. Please do not hesitate to return with any questions. Signed:  Mikhail Ramirez MD  3/14/2017  5:46 PM    REVIEW OF SYSTEMS: Denies chest pain, abdominal pain, nausea or vomiting. No fever or chills. OBJECTIVE:      Visit Vitals    /78 (BP 1 Location: Right arm, BP Patient Position: At rest)    Pulse (!) 102    Temp 98.1 °F (36.7 °C)    Resp 18    Ht 5' 4\" (1.626 m)    Wt 83.1 kg (183 lb 3.2 oz)    SpO2 99%    Breastfeeding No    BMI 29.57 kg/m2     Physical Exam:  GEN: Alert, NAD  EYES: conjunctiva normal, lids with out lesions  HEENT: MMM. HEART: RRR +S1 +S2  LUNGS: CTA B/L no rales or rhonchi. ABDOMEN: Soft, non-tender. EXTREMITIES: No edema cyanosis  SKIN: no rashes or skin breakdown, no nodules  NEURO: Alert, oriented x3. Speech is slightly dysarthric. Left sided facial droop. Left face, arm and leg decreased sensation. Left arm drift. Left leg stronger. Left sided neglect and extinction to double simultaneous stimuli.     Current Facility-Administered Medications   Medication Dose Route Frequency    0.9% sodium chloride with KCl 20 mEq/L infusion   IntraVENous CONTINUOUS    [START ON 3/15/2017] potassium chloride (KAON 10%) 20 mEq/15 mL oral liquid 20 mEq  20 mEq Oral DAILY    multivitamin, tx-iron-ca-min (THERA-M w/ IRON) tablet 1 Tab  1 Tab Oral DAILY    pantoprazole (PROTONIX) tablet 40 mg  40 mg Oral ACB    sodium chloride (NS) flush 5-10 mL  5-10 mL IntraVENous Q8H    sodium chloride (NS) flush 5-10 mL  5-10 mL IntraVENous PRN    ondansetron (ZOFRAN) injection 4 mg  4 mg IntraVENous Q6H PRN    acetaminophen (TYLENOL) tablet 650 mg  650 mg Oral Q4H PRN    enoxaparin (LOVENOX) injection 40 mg  40 mg SubCUTAneous Q24H    aspirin chewable tablet 81 mg  81 mg Oral DAILY    ELECTROLYTE REPLACEMENT PROTOCOL - Potassium  1 Each Other PRN    ELECTROLYTE REPLACEMENT PROTOCOL - Magnesium  1 Each Other PRN    HYDROmorphone (PF) (DILAUDID) injection 1 mg  1 mg IntraVENous Q4H PRN    atorvastatin (LIPITOR) tablet 80 mg  80 mg Oral QHS    morphine injection 2 mg  2 mg IntraVENous Q4H PRN       Laboratory  Recent Results (from the past 24 hour(s))   IRON PROFILE    Collection Time: 03/14/17  8:04 AM   Result Value Ref Range    Iron 13 (L) 50 - 175 ug/dL    TIBC 242 (L) 250 - 450 ug/dL    Iron % saturation 5 %   FERRITIN    Collection Time: 03/14/17  8:04 AM   Result Value Ref Range    Ferritin 25 8 - 388 NG/ML   GLUCOSE, POC    Collection Time: 03/14/17 11:16 AM   Result Value Ref Range    Glucose (POC) 92 70 - 110 mg/dL   HGB & HCT    Collection Time: 03/14/17 11:22 AM   Result Value Ref Range    HGB 7.3 (L) 12.0 - 16.0 g/dL    HCT 24.0 (L) 35.0 - 45.0 %   POTASSIUM    Collection Time: 03/14/17 11:22 AM   Result Value Ref Range    Potassium 3.0 (L) 3.5 - 5.5 mmol/L   GLUCOSE, POC    Collection Time: 03/14/17  4:23 PM   Result Value Ref Range    Glucose (POC) 102 70 - 110 mg/dL

## 2017-03-14 NOTE — PROGRESS NOTES
2240:  Patient arrived to unit. Oriented to room, call light and phone within reach. 0200:  Patient stated she had to urinate but had already started prior to getting up. Placed on BSC. Noted minimal urine in [facility] post voiding. Bladder scan revealed 310cc. 0217:  Discussed urinary retention of 310cc with Dr. Sahara Garibay. Ordered to place Gaming catheter. Shift summary:  Patient with uneventful shift aside from urinary retention, and reddish milena colored watery stool. Stool sample sent and Gaming placed. Patient left in no acute distress with call light and phone within reach.

## 2017-03-14 NOTE — PROGRESS NOTES
Problem: Self Care Deficits Care Plan (Adult)  Goal: *Acute Goals and Plan of Care (Insert Text)  Occupational Therapy Goals  Initiated 3/11/2017 within 7 day(s). 1. Patient will perform lower body dressing with minimal assistance/contact guard assist while sitting on EOB and use of salbador-technique. 2. Patient will perform grooming with contact guard assist while standing at sink using BUE. 3. Patient will perform toilet transfers with contact guard assist with verbal and tactile cues. 4. Patient will perform all aspects of toileting with Contact guard assist and with verbal and tactile cues. 5. Patient will participate in upper extremity therapeutic exercise/activities with contact guard assist for 15 minutes. OCCUPATIONAL THERAPY TREATMENT     Patient: Bryson Lantigua (80 y.o. female)  Date: 3/14/2017  Diagnosis: Stroke [facility])  Stroke [facility])  Hypokalemia  CVA (cerebral vascular accident) [facility]) [facility])       Precautions: Fall  Chart, occupational therapy assessment, plan of care, and goals were reviewed. ASSESSMENT:  Pt completed supine to sit EOB this session with SBA. Min assist for sit to stand from EOB and min assist for functional mobility in room using RW. Transfer to toilet with min assist and verbal cues for direction and safety. Verbal cues to attend to left side and visually scan. Pt completed toileting with min assist and verbal cues. Grooming standing at sink this session with CGA and verbal cues to use Left hand to assist. Continued ataxia and decreased coordination and neglect of left UE with functional activity but pt able to correct with cueing. Pt left in bed with call bell in reach.    Progression toward goals:  [X]          Improving appropriately and progressing toward goals  [ ]          Improving slowly and progressing toward goals  [ ]          Not making progress toward goals and plan of care will be adjusted       PLAN:  Patient continues to benefit from skilled intervention to address the above impairments. Continue treatment per established plan of care. Discharge Recommendations:  Inpatient Rehab  Further Equipment Recommendations for Discharge:  N/A       SUBJECTIVE:   Patient stated Oh I forgot about you again left hand.       OBJECTIVE DATA SUMMARY:   Cognitive/Behavioral Status:  Neurologic State: Alert  Orientation Level: Oriented X4  Cognition: Follows commands  Safety/Judgement: Awareness of environment, Fall prevention  Functional Mobility and Transfers for ADLs:              Bed Mobility:  Supine to Sit: Stand-by asssistance  Sit to Supine: Stand-by asssistance  Scooting: Stand-by asssistance              Transfers:  Sit to Stand: Contact guard assistance              Toilet Transfer : Minimum assistance              Balance:  Sitting: Intact  Standing: Impaired; With support  Standing - Static: Fair  Standing - Dynamic : Fair  ADL Intervention:     Toileting: Min assist  LB dressing: Doff/don socks min assist, verbal cues  Grooming: CGA standing at sink, verbal cues     Cognitive Retraining  Safety/Judgement: Awareness of environment; Fall prevention     Pain:  Pain Scale 1: Numeric (0 - 10)  Pain Intensity 1: 10  Pain Location 1: Generalized  Pain Description 1: Aching  Pain Intervention(s) 1: Medication (see MAR)     Activity Tolerance:    fair  Please refer to the flowsheet for vital signs taken during this treatment.   After treatment:   [ ]  Patient left in no apparent distress sitting up in chair  [X]  Patient left in no apparent distress in bed  [X]  Call bell left within reach  [X]  Nursing notified  [ ]  Caregiver present  [ ]  Bed alarm activated     RASTA Hernandez/L  Time Calculation: 35 mins

## 2017-03-14 NOTE — PROGRESS NOTES
Hospitalist Progress Note    Patient: Marcy Lu MRN: [id number]  CSN: [id number]    YOB: 1970  Age: 40 y.o.   Sex: female    DOA: 3/10/2017 LOS:  LOS: 4 days          Chief Complaint:      Acute stroke    Assessment/Plan     Acute embolic CVA  Left side hemiparesis  crohns disease  Severe anemia  Urinary retention  Chronic pain  hypokalemia    Does not want PO K as it upsets her stomach-will order 4 runs Kcl as K still low at 3  LAURIE planned for tomorrow  Change maint IVF to NS with K  Currently on asa  Gaming placed last night-should have it removed after procedure tomorrow if does ok  Acute rehab planned after discharge from hospital    Consider transfusion for Hgb 7.3-hematology following-hypercoag workup in place-she may not be able to tolerate anticoag meds due to crohns and risk for bleeding    Patient Active Problem List   Diagnosis Code    Chronic pain syndrome G89.4    Abdominal pain R10.9    Crohn disease ([address] K50.90    Obesity E66.9    Colitis K52.9    Crohn's disease with complication ([address] X56.563    Dehydration E86.0    Hypokalemia E87.6    Stroke ([address] I63.9    Sickle cell trait ([address] D57.3    Hypertension I10    Hx of cocaine abuse Z87.898    CVA (cerebral vascular accident) ([facility] [facility] I63.9       Subjective:    i could not pee last night  Potassium tabs make my stomach burn    Review of systems:    Constitutional: denies fevers, chills, myalgias  Respiratory: denies SOB, cough  Cardiovascular: denies chest pain, palpitations  Gastrointestinal: denies nausea, vomiting, diarrhea      Vital signs/Intake and Output:  Visit Vitals    /78 (BP 1 Location: Right arm, BP Patient Position: At rest)    Pulse 89    Temp 98.2 °F (36.8 °C)    Resp 18    Ht 5' 4\" (1.626 m)    Wt 83.1 kg (183 lb 3.2 oz)    SpO2 100%    Breastfeeding No    BMI 29.57 kg/m2     Current Shift:  03/14 0701 - 03/14 1900  In: 120 [P.O.:120]  Out: 750 [Urine:750]  Last three shifts:  03/12 1901 - 03/14 0700  In: 3080 [P.O.:330; I.V.:2750]  Out: 995 [Urine:995]    Exam:    General: Well developed, alert, NAD, OX3  Head/Neck: NCAT, supple, No masses, No lymphadenopathy  CVS:Regular rate and rhythm, no M/R/G, S1/S2 heard, no thrill  Lungs:Clear to auscultation bilaterally, no wheezes, rhonchi, or rales  Abdomen: Soft, Nontender, No distention, Normal Bowel sounds, No hepatomegaly  Extremities: No C/C/E, pulses palpable 2+  Skin:normal texture and turgor, no rashes, no lesions  Neuro:speech normal, left facial weakness, LUE weakness  Psych:appropriate                Labs: Results:       Chemistry Recent Labs      03/14/17 1122 03/13/17 0453 03/12/17 2010 03/12/17 0615 03/11/17   1732   GLU   --   90   --   83  82   NA   --   138   --   138  138   K  3.0*  3.2*  3.7  3.6  4.0   CL   --   107   --   104  102   CO2   --   26   --   26  26   BUN   --   2*   --   3*  5*   CREA   --   0.45*   --   0.53*  0.51*   CA   --   7.2*   --   7.6*  7.2*   AGAP   --   5   --   8  10   BUCR   --   4*   --   6*  10*   AP   --   27*   --   33*  32*   TP   --   5.3*   --   5.6*  5.0*   ALB   --   2.0*   --   2.3*  2.3*   GLOB   --   3.3   --   3.3  2.7   AGRAT   --   0.6*   --   0.7*  0.9      CBC w/Diff Recent Labs      03/14/17 1122 03/13/17 0453 03/12/17 0615 03/11/17   1732   WBC   --   5.6  8.1  6.1   RBC   --   3.10*  3.35*  2.90*   HGB  7.3*  7.5*  8.1*  7.0*   HCT  24.0*  25.4*  26.3*  23.0*   PLT   --   376  400  409   GRANS   --   60  67  66   LYMPH   --   24  20*  19*   EOS   --   2  1  1      Cardiac Enzymes No results for input(s): CPK, CKND1, CHANDA in the last 72 hours. No lab exists for component: CKRMB, TROIP   Coagulation No results for input(s): PTP, INR, APTT in the last 72 hours.     No lab exists for component: INREXT    Lipid Panel Lab Results   Component Value Date/Time    Cholesterol, total 145 03/12/2017 06:15 AM    HDL Cholesterol 37 03/12/2017 06:15 AM    LDL, calculated 91.6 03/12/2017 06:15 AM    VLDL, calculated 16.4 03/12/2017 06:15 AM    Triglyceride 82 03/12/2017 06:15 AM    CHOL/HDL Ratio 3.9 03/12/2017 06:15 AM      BNP No results for input(s): BNPP in the last 72 hours.    Liver Enzymes Recent Labs      03/13/17   0453   TP  5.3*   ALB  2.0*   AP  27*   SGOT  16      Thyroid Studies Lab Results   Component Value Date/Time    TSH 1.51 12/12/2009 09:48 AM        Procedures/imaging: see electronic medical records for all procedures/Xrays and details which were not copied into this note but were reviewed prior to creation of Rachid Tipton MD

## 2017-03-14 NOTE — PROGRESS NOTES
1930: Bedside and Verbal shift change report received from Steven Mahoney RN. Report included the following information SBAR, Intake/Output, MAR, Recent Results, Cardiac Rhythm NSR-ST and Alarm Parameters . 2000: Assessment complete, see flow sheets. Pt assisted to bedside commode, tolerating well.     2100: Verified with Dr. Arminda Cisneros and Dr. Nicky Frey pt is clear to transfer to telemetry. Notified EMANUEL Strickland RN (nursing supervisor). Awaiting room. 2155: TRANSFER - OUT REPORT:    Verbal report given to FAYE Gomez RN(name) on [place]  being transferred to Telemetry (unit) for routine progression of care       Report consisted of patients Situation, Background, Assessment and   Recommendations(SBAR). Information from the following report(s) SBAR, Intake/Output, MAR, Cardiac Rhythm NSR-ST and Alarm Parameters  was reviewed with the receiving nurse. Lines:   Peripheral IV 03/10/17 Right External jugular (Active)   Site Assessment Clean, dry, & intact 3/13/2017  8:00 PM   Phlebitis Assessment 0 3/13/2017  8:00 PM   Infiltration Assessment 0 3/13/2017  8:00 PM   Dressing Status Clean, dry, & intact 3/13/2017  8:00 PM   Dressing Type Tape;Transparent 3/13/2017  8:00 PM   Hub Color/Line Status Pink; Infusing 3/13/2017  8:00 PM   Action Taken Open ports on tubing capped 3/13/2017  8:00 PM   Alcohol Cap Used Yes 3/13/2017  8:00 PM        Opportunity for questions and clarification was provided. Patient transported with:   Monitor  Registered Nurse  Tech   Pt visitors at the bedside, aware of pt transfer to room 357.

## 2017-03-14 NOTE — PROGRESS NOTES
Problem: Mobility Impaired (Adult and Pediatric)  Goal: *Acute Goals and Plan of Care (Insert Text)  Physical Therapy Goals  Initiated 3/11/2017 and to be accomplished within 7 day(s)  1. Patient will move from supine to sit and sit to supine in bed with supervision/set-up. 2. Patient will transfer from bed to chair and chair to bed with supervision/set-up using the least restrictive device. 3. Patient will perform sit to stand with supervision/set-up. 4. Patient will ambulate with supervision/set-up for >100 feet with the least restrictive device. Outcome: Not Progressing Towards Goal  Pt refused PT due to:  [ ]  Nausea/vomiting  [ ]  Eating  [X]  Pain (10/10 pain for neck down spine and into her right arm)   [ ]  Pt lethargic  [ ]  Off Unit  RN informed of PT full list of complaints (Different than usual). RN notes pt recently medicated for pain. Will f/u tomorrow. Thank you.   Elsa Garsia, PTA

## 2017-03-14 NOTE — ROUTINE PROCESS
Bedside and Verbal shift change report given to Marie Solis RN (oncoming nurse) by Mile Joshua RN (offgoing nurse). Report given with SBAR, Procedure Summary, Intake/Output, MAR and Recent Results.

## 2017-03-14 NOTE — PROGRESS NOTES
Problem: Mobility Impaired (Adult and Pediatric)  Goal: *Acute Goals and Plan of Care (Insert Text)  Physical Therapy Goals  Initiated 3/11/2017 and to be accomplished within 7 day(s)  1. Patient will move from supine to sit and sit to supine in bed with supervision/set-up. 2. Patient will transfer from bed to chair and chair to bed with supervision/set-up using the least restrictive device. 3. Patient will perform sit to stand with supervision/set-up. 4. Patient will ambulate with supervision/set-up for >100 feet with the least restrictive device. Outcome: Not Progressing Towards Goal  Pt refused PT due to:  [ ]  Nausea/vomiting  [X]  Eating  [ ]  Pain  [ ]  Pt lethargic  [ ]  Off Unit  Will f/u later as schedule allows. Thank you.   Azul Hall, PTA

## 2017-03-15 PROBLEM — M79.2 NEUROPATHIC PAIN: Status: ACTIVE | Noted: 2017-03-15

## 2017-03-15 PROBLEM — D62 ACUTE BLOOD LOSS ANEMIA: Status: ACTIVE | Noted: 2017-03-15

## 2017-03-15 LAB
ANION GAP BLD CALC-SCNC: 10 MMOL/L (ref 3–18)
B2 GLYCOPROT1 IGA SER-ACNC: <9 GPI IGA UNITS (ref 0–25)
B2 GLYCOPROT1 IGG SER-ACNC: <9 GPI IGG UNITS (ref 0–20)
B2 GLYCOPROT1 IGM SER-ACNC: <9 GPI IGM UNITS (ref 0–32)
BUN SERPL-MCNC: 1 MG/DL (ref 7–18)
BUN/CREAT SERPL: 2 (ref 12–20)
CALCIUM SERPL-MCNC: 7.8 MG/DL (ref 8.5–10.1)
CHLORIDE SERPL-SCNC: 103 MMOL/L (ref 100–108)
CO2 SERPL-SCNC: 23 MMOL/L (ref 21–32)
CREAT SERPL-MCNC: 0.43 MG/DL (ref 0.6–1.3)
GLUCOSE SERPL-MCNC: 76 MG/DL (ref 74–99)
HCT VFR BLD AUTO: 27.7 % (ref 35–45)
HGB A MFR BLD: 95.9 % (ref 94–98)
HGB A2 MFR BLD COLUMN CHROM: 3.6 % (ref 0.7–3.1)
HGB BLD-MCNC: 8.6 G/DL (ref 12–16)
HGB C MFR BLD: 0 %
HGB F MFR BLD: 0.5 % (ref 0–2)
HGB FRACT BLD-IMP: ABNORMAL
HGB S BLD QL SOLY: NEGATIVE
HGB S MFR BLD: 0 %
LA PPP-IMP: NORMAL
POTASSIUM SERPL-SCNC: 4.3 MMOL/L (ref 3.5–5.5)
SCREEN APTT: 37.2 SEC (ref 0–43.6)
SCREEN DRVVT: 33.9 SEC (ref 0–44)
SODIUM SERPL-SCNC: 136 MMOL/L (ref 136–145)

## 2017-03-15 PROCEDURE — 85018 HEMOGLOBIN: CPT | Performed by: HOSPITALIST

## 2017-03-15 PROCEDURE — 74011250637 HC RX REV CODE- 250/637: Performed by: HOSPITALIST

## 2017-03-15 PROCEDURE — 80048 BASIC METABOLIC PNL TOTAL CA: CPT | Performed by: HOSPITALIST

## 2017-03-15 PROCEDURE — 74011250636 HC RX REV CODE- 250/636: Performed by: INTERNAL MEDICINE

## 2017-03-15 PROCEDURE — 74011250636 HC RX REV CODE- 250/636: Performed by: HOSPITALIST

## 2017-03-15 PROCEDURE — 74011000250 HC RX REV CODE- 250: Performed by: INTERNAL MEDICINE

## 2017-03-15 PROCEDURE — 97116 GAIT TRAINING THERAPY: CPT

## 2017-03-15 PROCEDURE — 93312 ECHO TRANSESOPHAGEAL: CPT

## 2017-03-15 PROCEDURE — 97530 THERAPEUTIC ACTIVITIES: CPT

## 2017-03-15 PROCEDURE — 93970 EXTREMITY STUDY: CPT

## 2017-03-15 PROCEDURE — 97110 THERAPEUTIC EXERCISES: CPT

## 2017-03-15 PROCEDURE — 99153 MOD SED SAME PHYS/QHP EA: CPT

## 2017-03-15 PROCEDURE — 65660000000 HC RM CCU STEPDOWN

## 2017-03-15 PROCEDURE — 99152 MOD SED SAME PHYS/QHP 5/>YRS: CPT

## 2017-03-15 PROCEDURE — 36415 COLL VENOUS BLD VENIPUNCTURE: CPT | Performed by: HOSPITALIST

## 2017-03-15 PROCEDURE — 74011250637 HC RX REV CODE- 250/637: Performed by: FAMILY MEDICINE

## 2017-03-15 RX ORDER — GABAPENTIN 300 MG/1
300 CAPSULE ORAL 3 TIMES DAILY
Status: DISCONTINUED | OUTPATIENT
Start: 2017-03-15 | End: 2017-03-18

## 2017-03-15 RX ORDER — DEXTROMETHORPHAN/PSEUDOEPHED 2.5-7.5/.8
1.2 DROPS ORAL
Status: CANCELLED | OUTPATIENT
Start: 2017-03-15

## 2017-03-15 RX ORDER — MIDAZOLAM HYDROCHLORIDE 1 MG/ML
.5-2 INJECTION, SOLUTION INTRAMUSCULAR; INTRAVENOUS
Status: DISCONTINUED | OUTPATIENT
Start: 2017-03-15 | End: 2017-03-15 | Stop reason: HOSPADM

## 2017-03-15 RX ORDER — HYDROMORPHONE HYDROCHLORIDE 2 MG/1
1 TABLET ORAL
Status: DISCONTINUED | OUTPATIENT
Start: 2017-03-15 | End: 2017-03-15

## 2017-03-15 RX ORDER — HYDROMORPHONE HYDROCHLORIDE 1 MG/ML
0.5 INJECTION, SOLUTION INTRAMUSCULAR; INTRAVENOUS; SUBCUTANEOUS
Status: DISCONTINUED | OUTPATIENT
Start: 2017-03-15 | End: 2017-03-18

## 2017-03-15 RX ORDER — EPINEPHRINE 0.1 MG/ML
1 INJECTION INTRACARDIAC; INTRAVENOUS
Status: CANCELLED | OUTPATIENT
Start: 2017-03-15 | End: 2017-03-16

## 2017-03-15 RX ORDER — ATROPINE SULFATE 0.1 MG/ML
0.5 INJECTION INTRAVENOUS
Status: CANCELLED | OUTPATIENT
Start: 2017-03-15 | End: 2017-03-16

## 2017-03-15 RX ORDER — FENTANYL CITRATE 50 UG/ML
25-100 INJECTION, SOLUTION INTRAMUSCULAR; INTRAVENOUS
Status: DISCONTINUED | OUTPATIENT
Start: 2017-03-15 | End: 2017-03-15 | Stop reason: HOSPADM

## 2017-03-15 RX ORDER — LANOLIN ALCOHOL/MO/W.PET/CERES
1 CREAM (GRAM) TOPICAL 2 TIMES DAILY WITH MEALS
Status: DISCONTINUED | OUTPATIENT
Start: 2017-03-15 | End: 2017-03-23 | Stop reason: HOSPADM

## 2017-03-15 RX ADMIN — GABAPENTIN 300 MG: 300 CAPSULE ORAL at 21:14

## 2017-03-15 RX ADMIN — HYDROMORPHONE HYDROCHLORIDE 1 MG: 1 INJECTION, SOLUTION INTRAMUSCULAR; INTRAVENOUS; SUBCUTANEOUS at 02:09

## 2017-03-15 RX ADMIN — ASPIRIN 81 MG 81 MG: 81 TABLET ORAL at 10:02

## 2017-03-15 RX ADMIN — HYDROMORPHONE HYDROCHLORIDE 0.5 MG: 1 INJECTION, SOLUTION INTRAMUSCULAR; INTRAVENOUS; SUBCUTANEOUS at 14:37

## 2017-03-15 RX ADMIN — SODIUM CHLORIDE AND POTASSIUM CHLORIDE: 9; 1.49 INJECTION, SOLUTION INTRAVENOUS at 15:08

## 2017-03-15 RX ADMIN — FERROUS SULFATE TAB 325 MG (65 MG ELEMENTAL FE) 325 MG: 325 (65 FE) TAB at 10:02

## 2017-03-15 RX ADMIN — ATORVASTATIN CALCIUM 80 MG: 20 TABLET, FILM COATED ORAL at 21:14

## 2017-03-15 RX ADMIN — MIDAZOLAM HYDROCHLORIDE 1 MG: 1 INJECTION, SOLUTION INTRAMUSCULAR; INTRAVENOUS at 12:11

## 2017-03-15 RX ADMIN — MULTIPLE VITAMINS W/ MINERALS TAB 1 TABLET: TAB at 10:02

## 2017-03-15 RX ADMIN — MIDAZOLAM HYDROCHLORIDE 1 MG: 1 INJECTION, SOLUTION INTRAMUSCULAR; INTRAVENOUS at 12:06

## 2017-03-15 RX ADMIN — GABAPENTIN 300 MG: 300 CAPSULE ORAL at 10:02

## 2017-03-15 RX ADMIN — PANTOPRAZOLE SODIUM 40 MG: 40 TABLET, DELAYED RELEASE ORAL at 07:00

## 2017-03-15 RX ADMIN — GABAPENTIN 300 MG: 300 CAPSULE ORAL at 17:19

## 2017-03-15 RX ADMIN — FERROUS SULFATE TAB 325 MG (65 MG ELEMENTAL FE) 325 MG: 325 (65 FE) TAB at 17:19

## 2017-03-15 RX ADMIN — FENTANYL CITRATE 50 MCG: 50 INJECTION, SOLUTION INTRAMUSCULAR; INTRAVENOUS at 12:19

## 2017-03-15 RX ADMIN — HYDROMORPHONE HYDROCHLORIDE 1 MG: 1 INJECTION, SOLUTION INTRAMUSCULAR; INTRAVENOUS; SUBCUTANEOUS at 06:09

## 2017-03-15 RX ADMIN — HYDROMORPHONE HYDROCHLORIDE 0.5 MG: 1 INJECTION, SOLUTION INTRAMUSCULAR; INTRAVENOUS; SUBCUTANEOUS at 18:34

## 2017-03-15 RX ADMIN — BENZOCAINE 3 SPRAY: 220 SPRAY, METERED PERIODONTAL at 12:02

## 2017-03-15 RX ADMIN — ENOXAPARIN SODIUM 40 MG: 40 INJECTION SUBCUTANEOUS at 10:03

## 2017-03-15 RX ADMIN — HYDROMORPHONE HYDROCHLORIDE 0.5 MG: 1 INJECTION, SOLUTION INTRAMUSCULAR; INTRAVENOUS; SUBCUTANEOUS at 10:11

## 2017-03-15 RX ADMIN — POTASSIUM CHLORIDE 20 MEQ: 20 SOLUTION ORAL at 10:02

## 2017-03-15 RX ADMIN — FENTANYL CITRATE 50 MCG: 50 INJECTION, SOLUTION INTRAMUSCULAR; INTRAVENOUS at 12:11

## 2017-03-15 RX ADMIN — FENTANYL CITRATE 50 MCG: 50 INJECTION, SOLUTION INTRAMUSCULAR; INTRAVENOUS at 12:06

## 2017-03-15 RX ADMIN — Medication 10 ML: at 15:08

## 2017-03-15 RX ADMIN — MIDAZOLAM HYDROCHLORIDE 1 MG: 1 INJECTION, SOLUTION INTRAMUSCULAR; INTRAVENOUS at 12:18

## 2017-03-15 NOTE — PROGRESS NOTES
TRANSFER - OUT REPORT:    Verbal report given to Yumiko Ramirez RN on Sofia Giron  being transferred to [facility] for routine progression of care       Report consisted of patients Situation, Background, Assessment and   Recommendations(SBAR). Information from the following report(s) Procedure Summary, MAR and Cardiac Rhythm Sinus Tach was reviewed with the receiving nurse. Lines:   Peripheral IV 03/15/17 Right Forearm (Active)   Site Assessment Clean, dry, & intact 3/15/2017 11:12 AM   Phlebitis Assessment 0 3/15/2017 11:12 AM   Infiltration Assessment 0 3/15/2017 11:12 AM   Dressing Status Clean, dry, & intact 3/15/2017 11:12 AM   Dressing Type Transparent 3/15/2017 11:12 AM   Hub Color/Line Status Blue; Infusing 3/15/2017 11:12 AM   Alcohol Cap Used Yes 3/15/2017 11:12 AM    Positive for gag reflex. Opportunity for questions and clarification was provided.       Patient transported with:   Registered Nurse  Tech

## 2017-03-15 NOTE — PROGRESS NOTES
Problem: Mobility Impaired (Adult and Pediatric)  Goal: *Acute Goals and Plan of Care (Insert Text)  Physical Therapy Goals  Initiated 3/11/2017 and to be accomplished within 7 day(s)  1. Patient will move from supine to sit and sit to supine in bed with supervision/set-up. 2. Patient will transfer from bed to chair and chair to bed with supervision/set-up using the least restrictive device. 3. Patient will perform sit to stand with supervision/set-up. 4. Patient will ambulate with supervision/set-up for >100 feet with the least restrictive device. Outcome: Progressing Towards Goal  PHYSICAL THERAPY TREATMENT     Patient: Bryson Lantigua (72 y.o. female)  Date: 3/15/2017  Diagnosis: Stroke [facility])  Stroke [facility])  Hypokalemia  CVA (cerebral vascular accident) [facility]) [facility])       Precautions: Fall   Chart, physical therapy assessment, plan of care and goals were reviewed. ASSESSMENT:  Pt presents with C/o of Right calf pain when standing. Pain increases with activity, which  Increases antalgia. Pt is able to ambulate to from restroom, before reporting pain as too bad to continue. Progression toward goals:  [ ]      Improving appropriately and progressing toward goals  [X]      Improving slowly and progressing toward goals  [ ]      Not making progress toward goals and plan of care will be adjusted       PLAN:  Patient continues to benefit from skilled intervention to address the above impairments. Continue treatment per established plan of care. Discharge Recommendations:  Rehab  Further Equipment Recommendations for Discharge:  bedside commode and rolling walker       SUBJECTIVE:   Patient stated I'm tired.       OBJECTIVE DATA SUMMARY:   Critical Behavior:  Neurologic State: Alert  Orientation Level: Oriented X4  Cognition: Follows commands  Safety/Judgement: Decreased insight into deficits  Functional Mobility Training:  Bed Mobility:  Rolling: Supervision  Supine to Sit: Supervision  Sit to Supine: Supervision  Scooting: Supervision  Transfers:  Sit to Stand: Supervision  Stand to Sit: Supervision  Balance:  Sitting: Intact  Standing: Intact; With support  Standing - Static: Fair  Standing - Dynamic : Fair  Ambulation/Gait Training:  Distance (ft): 30 Feet (ft)  Assistive Device: Walker, rolling;Gait belt  Ambulation - Level of Assistance: Supervision  Gait Abnormalities: Ataxic;Decreased step clearance  Base of Support: Widened  Speed/Janice: Slow  Step Length: Left shortened;Right shortened  Swing Pattern: Left symmetrical;Right symmetrical  Pain:  Pain Scale 1: Numeric (0 - 10)  Pain Intensity 1: 3  Pain Location 1: Foot  Pain Orientation 1: Right  Pain Description 1: Burning  Pain Intervention(s) 1: Medication (see MAR)  Activity Tolerance:   Good  Please refer to the flowsheet for vital signs taken during this treatment.   After treatment:   [ ] Patient left in no apparent distress sitting up in chair  [X] Patient left in no apparent distress in bed  [X] Call bell left within reach  [X] Nursing notified  [ ] Caregiver present  [ ] Bed alarm activated      Matt Cullen PTA   Time Calculation: 25 mins

## 2017-03-15 NOTE — PROGRESS NOTES
Hematology Inpatient Consult    Subjective:     Tata Campos is a 40 y.o., [address] 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 Past Medical History:   Diagnosis Date    Abdominal pain     Anemia NEC     sickle cell trait    C. difficile colitis     Crohn's disease (HCC)     Gastrointestinal disorder     Crohns    Herpes zoster     Hx of cocaine abuse     Hyperkalemia     Hypertension     Iron deficiency anemia     MRSA (methicillin resistant Staphylococcus aureus)     Obesity     Pain management     Sickle cell trait (HCC)      Past Surgical History:   Procedure Laterality Date    HX GYN      tubal ligation    HX TUBAL LIGATION        History reviewed. No pertinent family history.   Social History   Substance Use Topics    Smoking status: Former Smoker    Smokeless tobacco: Not on file    Alcohol use No      Current Facility-Administered Medications   Medication Dose Route Frequency Provider Last Rate Last Dose    ferrous sulfate tablet 325 mg  1 Tab Oral BID WITH MEALS Justyn Espana MD   325 mg at 03/15/17 1002    HYDROmorphone (PF) (DILAUDID) injection 0.5 mg  0.5 mg IntraVENous Q4H PRN Justyn Espana MD   0.5 mg at 03/15/17 1437    gabapentin (NEURONTIN) capsule 300 mg  300 mg Oral TID Justyn Espana MD   300 mg at 03/15/17 1002    benzocaine (HURRICANE) 20 % spray   Mucous Membrane PRN Yaritza Whitfield MD   [address] [place] at 03/15/17 1202    0.9% sodium chloride with KCl 20 mEq/L infusion   IntraVENous CONTINUOUS Justyn Espana MD 50 mL/hr at 03/15/17 1508      potassium chloride (KAON 10%) 20 mEq/15 mL oral liquid 20 mEq  20 mEq Oral DAILY Justyn Espana MD   20 mEq at 03/15/17 1002    multivitamin, tx-iron-ca-min (THERA-M w/ IRON) tablet 1 Tab  1 Tab Oral DAILY Justyn Espana MD   1 Tab at 03/15/17 1002    pantoprazole (PROTONIX) tablet 40 mg  40 mg Oral ACB Justyn Espana MD   40 mg at 03/15/17 0700    sodium chloride (NS) flush 5-10 mL  5-10 mL IntraVENous Q8H Preston Phillips Toribio Chowdhury MD   10 mL at 03/15/17 1508    sodium chloride (NS) flush 5-10 mL  5-10 mL IntraVENous PRN Randee Wang MD        ondansetron [facility]) injection 4 mg  4 mg IntraVENous Q6H PRN Randee Wang MD   4 mg at 17 1721    acetaminophen (TYLENOL) tablet 650 mg  650 mg Oral Q4H PRN Randee Wang MD        enoxaparin (LOVENOX) injection 40 mg  40 mg SubCUTAneous Q24H Randee Wang MD   40 mg at 03/15/17 1003    aspirin chewable tablet 81 mg  81 mg Oral DAILY Randee Wang MD   81 mg at 03/15/17 1002    ELECTROLYTE REPLACEMENT PROTOCOL - Potassium  1 Each Other PRN Randee Wang MD        ELECTROLYTE REPLACEMENT PROTOCOL - Magnesium  1 Each Other PRN Randee Wang MD        atorvastatin (LIPITOR) tablet 80 mg  80 mg Oral QHS Taylersuzanne Sueey, DO   80 mg at 17 2122    morphine injection 2 mg  2 mg IntraVENous Q4H PRN Heri Joe MD   2 mg at 17 1519        No Known Allergies     Review of Systems:  New right leg pain since last night  No headache now.      Objective:     Patient Vitals for the past 8 hrs:   BP Temp Pulse Resp SpO2   03/15/17 1547 133/79 98.7 °F (37.1 °C) (!) 104 18 96 %   03/15/17 1400 149/88 99.1 °F (37.3 °C) (!) 101 18 95 %   03/15/17 1330 139/90 - 100 14 -   03/15/17 1315 (!) 134/95 - 97 17 -   03/15/17 1300 127/79 - 99 19 100 %   03/15/17 1243 116/70 - 100 17 100 %   03/15/17 1229 91/81 98 °F (36.7 °C) 100 15 100 %   03/15/17 1100 127/88 98.9 °F (37.2 °C) 99 18 99 %     Temp (24hrs), Av.5 °F (36.9 °C), Min:98 °F (36.7 °C), Max:99.1 °F (37.3 °C)    03/15 0701 - 03/15 1900  In: -   Out: 850 [Urine:850]    Physical Exam:   No edema, +catalina's sign,  Some tenderness behind right knee    Lab/Data Review:  Recent Results (from the past 24 hour(s))   METABOLIC PANEL, BASIC    Collection Time: 03/15/17 10:25 AM   Result Value Ref Range    Sodium 136 136 - 145 mmol/L    Potassium 4.3 3.5 - 5.5 mmol/L    Chloride 103 100 - 108 mmol/L    CO2 23 21 - 32 mmol/L    Anion gap 10 3.0 - 18 mmol/L    Glucose 76 74 - 99 mg/dL    BUN 1 (L) 7.0 - 18 MG/DL    Creatinine 0.43 (L) 0.6 - 1.3 MG/DL    BUN/Creatinine ratio 2 (L) 12 - 20      GFR est AA >60 >60 ml/min/1.73m2    GFR est non-AA >60 >60 ml/min/1.73m2    Calcium 7.8 (L) 8.5 - 10.1 MG/DL   HGB & HCT    Collection Time: 03/15/17 10:25 AM   Result Value Ref Range    HGB 8.6 (L) 12.0 - 16.0 g/dL    HCT 27.7 (L) 35.0 - 45.0 %         Assessment:     Principal Problem:    Stroke ([facility] (3/10/2017)    Active Problems:    Chronic pain syndrome (2/27/2012)      Crohn disease ([facility] (2/27/2012)      Hypokalemia (8/8/2015)      Sickle cell trait (HCC) ()      Hypertension ()      Hx of cocaine abuse ()      Embolic stroke ([facility] (2/86/1246)      Acute blood loss anemia (3/15/2017)      Neuropathic pain (3/15/2017)    hgb fractionaction suggests beta thalssemia minor, but in context of low iron would need gene sequencing to confirm  Factor VIII excess  Pituitary hemorrhage  Possible DVT right leg      Plan:   Check ultrasound of leg  Anticoagulation with coumadin preferred because of reversal agent. Will defer its commencement to Dr. Brianda Mehta in light of pituitary hemorrhage. Currently on lovenox 40mg  Considerations are crohns and post-CVA setting.   I reviewed with Evelyn Stallings     Signed By: Jeremy Rivera MD     March 15, 2017

## 2017-03-15 NOTE — PROGRESS NOTES
TRANSFER - OUT REPORT:  Verbal report given to FRANCIS Cavanaugh on Blake Bowling being transferred to CARE for routine progression of care   Report consisted of patients Situation, Background, Assessment and   Recommendations(SBAR). Information from the following report(s) Procedure Summary was reviewed with the receiving nurse. Cath Lab Report:    Procedure:  [ ] Alejandrina Berny  [ ] Frances Locket  [ ] PTCA   [ ] Peripheral   [ ] Pacemaker [x ] LAURIE  [ ] [facility]    Access site:   [ ] Radial     [ ] Brachial     [ ] Femoral    [ ] Jugular   [ ] Chest Wall       Sheath:           [ ] Pulled in Cath Lab   [ ] In place   [ ] To be pulled after:         Closure:          [ ] TR Band [ ] Radial Band  [ ] Right [ ] Left    [ ] Manual Pressure     [ ] Skip Bibles     [ ] Star Close    [ ] Per Close    [ ] Safe Guard    Site Assessment:   [ ] Clean, Dry, No bleeding    [ ] Minor oozing          [ ] Hematoma: Description:    Stents(s) Placement:  [ ] Left Main:                 [ ] LAD:                [ ] Circ:                [ ] RCA:                [ ] EF:     [ ] Peripheral:      [ ] N/A    Infusion [ ]Angiomax [ ] Integrelin [ ] Heparin d/c'd: Intra procedure Medications:    Fentanyl: 150 mcg  Versed: 3 mg  Other:  Antiplatelet:    Lines:        Peripheral IV 03/15/17 Right Forearm (Active)   Site Assessment Clean, dry, & intact 3/15/2017 11:12 AM   Phlebitis Assessment 0 3/15/2017 11:12 AM   Infiltration Assessment 0 3/15/2017 11:12 AM   Dressing Status Clean, dry, & intact 3/15/2017 11:12 AM   Dressing Type Transparent 3/15/2017 11:12 AM   Hub Color/Line Status Blue; Infusing 3/15/2017 11:12 AM   Alcohol Cap Used Yes 3/15/2017 11:12 AM          Patient Vitals for the past 4 hrs:   Temp Pulse Resp BP SpO2   03/15/17 1100 98.9 °F (37.2 °C) 99 18 127/88 99 %         Extended / Orthostatic Vitals:    Vital Signs  Level of Consciousness: Alert (03/15/17 1100)  Temp: 98.9 °F (37.2 °C) (03/15/17 1100)  Temp Source: Oral (03/15/17 1100)  Pulse (Heart Rate): 99 (03/15/17 1100)  Heart Rate Source: Monitor (03/15/17 1100)  Cardiac Rhythm: Normal sinus rhythm (03/15/17 1100)  Resp Rate: 18 (03/15/17 1100)  BP: 127/88 (03/15/17 1100)  MAP (Calculated): 101 (03/15/17 1100)  BP 1 Location: Left arm (03/15/17 1100)  BP 1 Method: Automatic (03/15/17 0736)  BP Patient Position: At rest (03/15/17 1100)  MEWS Score: 1 (03/15/17 1100)         Oxygen Therapy  O2 Sat (%): 99 % (03/15/17 1100)  O2 Device: Room air (03/15/17 1100)          Opportunity for questions and clarification was provided.

## 2017-03-15 NOTE — PROGRESS NOTES
Problem: Mobility Impaired (Adult and Pediatric)  Goal: *Acute Goals and Plan of Care (Insert Text)  Physical Therapy Goals  Initiated 3/11/2017 and to be accomplished within 7 day(s)  1. Patient will move from supine to sit and sit to supine in bed with supervision/set-up. 2. Patient will transfer from bed to chair and chair to bed with supervision/set-up using the least restrictive device. 3. Patient will perform sit to stand with supervision/set-up. 4. Patient will ambulate with supervision/set-up for >100 feet with the least restrictive device. Outcome: Not Progressing Towards Goal  PT session held due to:  [ ]  Nausea/vomiting  [ ]  RN Communication/ suggestion  [ ]  Extreme Pain  [ ]  PT still off unit. Will f/u tomorrow. Thank you.   Wang Momin, PTA

## 2017-03-15 NOTE — PROGRESS NOTES
Pt has been OSVALDO x3 attempts for testing. Will f/u next date as appropriate.     Thank you for this referral,  Rylie Chang, SLP

## 2017-03-15 NOTE — H&P
Date of Surgery Update:  Anne Stauffer was seen and examined. History and physical has been reviewed. The patient has been examined. There have been no significant clinical changes since the completion of the originally dated History and Physical. Plan LAURIE with moderate sedation.      Signed By: Breanne Puckett MD     March 15, 2017 12:04 PM

## 2017-03-15 NOTE — ROUTINE PROCESS
Bedside and Verbal shift change report given to Edel Romeo RN (oncoming nurse) by Troy Jimenez RN  (offgoing nurse). Report included the following information SBAR, Kardex, Cardiac Rhythm SR and Alarm Parameters .

## 2017-03-15 NOTE — ROUTINE PROCESS
Cardiac Cath Lab:  Pre Procedure Chart Check    Patients chart was accessed and reviewed for possible and/or scheduled procedure. Creatinine Clearance:  CREATININE: 0.45 MG/DL ABNORMAL (03/13/17 0453)  Estimated creatinine clearance: 166.2 mL/min    Total Contrast  Load:  3 x estimated clearance amount= 498.6       ml    75% of Contrast Load:  0.75 x Total Contrast Load=  373.95      ml    Recent Labs      03/14/17   1122  03/13/17   0453   WBC   --   5.6   RBC   --   3.10*   HCT  24.0*  25.4*   HGB  7.3*  7.5*   PLT   --   376   NA   --   138   K  3.0*  3.2*   BUN   --   2*   CREA   --   0.45*   GFRAA   --   >60   GFRNA   --   >60   CA   --   7.2*       BMI: Body mass index is 30.1 kg/(m^2). ALLERGIES: No Known Allergies    Lines:        Peripheral IV 03/15/17 Right Forearm (Active)   Site Assessment Clean, dry, & intact 3/15/2017  9:49 AM   Phlebitis Assessment 0 3/15/2017  9:49 AM   Infiltration Assessment 0 3/15/2017  9:49 AM   Dressing Status Clean, dry, & intact 3/15/2017  9:49 AM   Dressing Type Tape;Transparent 3/15/2017  9:49 AM   Hub Color/Line Status Blue; Infusing 3/15/2017  9:49 AM   Alcohol Cap Used Yes 3/15/2017  9:49 AM          History:    Past Medical History:   Diagnosis Date    Abdominal pain     Anemia NEC     sickle cell trait    C. difficile colitis     [facility]'s [facility] ([facility]     Gastrointestinal disorder     Crohns    Herpes zoster     Hx of cocaine abuse     Hyperkalemia     Hypertension     Iron deficiency anemia     MRSA (methicillin resistant Staphylococcus aureus)     Obesity     Pain management     Sickle cell trait ([facility])      Past Surgical History:   Procedure Laterality Date    HX GYN      tubal ligation    HX TUBAL LIGATION       Patient Active Problem List   Diagnosis Code    Chronic pain syndrome G89.4    Crohn disease ([facility] K50.90    Hypokalemia E87.6    Stroke ([facility]) I63.9    Sickle cell trait ([facility]) D57.3    Hypertension I10    Hx of cocaine abuse P61.872    Embolic stroke (HCC) T27.0    Acute blood loss anemia D62    Neuropathic pain M79.2

## 2017-03-15 NOTE — PROGRESS NOTES
2002: Assumed care of pt.; Arrived to find pt. visiting with daughter; No acute distress present; status stable    2125: Pt. C/o 10/10 pain \"all over\"  And \"burning\" to bottom of left foot, PRN Dilaudid administered as requested; will reassess and document on flowsheet    2300: Swelling to R. clavicular region present on reassessment, Attempted to flush R. EJ  IV resistance and pain noted; site discontinued; Several attempts made by this nurse and PCT to obtain another peripheral site and all attempts were unsuccessful; this nurse contacted ER for assistance     0130: ED tech. Also unsuccessful at obtaining IV access; will update physician on call    [id number]: Dr. Neftali Larry made aware of above issue; Order obtained to administer Dilaudid 1mg PO until IV access is obtained; order read back and verified    0209: New IV site obtained; Dilaudid administered for 10/10 generalized pain; will reassess and document on flowsheet    0242: Pt. Requesting pain med.; Reminded pt. Dilaudid 1mg IV administered @ 70 [phone]; time placed on white board for next dose; understanding verbalized     0300:Hourly rounding performed; Pt. Noted to be resting quietly with eyes closed in no acute distressed    0340: Pt. Requesting dilaudid; Reminded pt. that her dilaudid is not yet due; Informed her of time written on white board    261 2692: Pt. Requesting dilaudid; Morphine offered, pt. Declined; Informed pt. that her dilaudid is every 4 hours; understanding verbalized; will continue to monitor      0517: Hourly round performed; Pt. Resting quietly with eyes closed, appears to be sleep    0724: Bedside shift change report given to JERONIMO Alcazar (oncoming nurse) by Ant Navarro (offgoing nurse). Report included the following information SBAR and Kardex.

## 2017-03-15 NOTE — PROGRESS NOTES
Pt assisted to bathroom via amb by 2 nurses, passed scant amt pink, foam with a lot of flatus.   850cc dark, clear odorless urine emptied from bradshaw bag

## 2017-03-15 NOTE — PROGRESS NOTES
NEUROLOGY PROGRESS NOTE        Patient: Chelsey Self        Sex: female          DOA: 3/10/2017  YOB: 1970      Age:  40 y.o.         LOS: 5 days     Identification:  40 y.o. female with history of Crohn's disease, past history of cocaine use (not recently) and Sickle Cell trait, who presented with left sided weakness, found to have multifocal embolic strokes.      SUBJECTIVE:   The patient is stable. Continues to have pain at the bottom of right foot. Hypercoag panel shows increased factor VIII. Factor V Leiden is pending. Other hypercoag markers are normal/negative.      LAURIE result is pending. Stroke Work-up:  Brain MRI:1. Multifocal acute infarctions, the largest is in the right MCA territory correlating with the abnormal CT. There are bilateral left greater than right cerebellar hemisphere infarctions. These infarctions are likely embolic with a central origin. 2. Possible hemorrhagic lesion within the pituitary. Scans dedicated to the pituitary could be performed. This could be artifact of volume averaging of the sellar floor but is at least suspect. 3. Chronic appearing right orbital floor fracture with protrusion of orbital fat and relative enophthalmos. MRA Brain: Occlusion inferior division right M2 segment MCA. This correlates with the acute infarction and is indeterminate in nature, most likely embolic certainly. No other significant intracranial vascular abnormality. MRA Neck: Limited noncontrast neck vascular evaluation. No definite hemodynamically significant stenosis is documented. Echocardiogram: EF is 60-65%, no intracardiac shunt detected. Carotid Doppler pending. Lipid panel: LDL 91.6, HDL is 37. HbA1c: 5.7    ASSESSMENT/IMPRESSION:   Multifocal right hemispheric (temporal, thalamic, parietal and cerebellar infarcts), most likely cardioembolic in nature. 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  She has not been on any oral contraceptives. She may need anticoagulation (need to discuss with hematology before starting). I am also worried about right leg oain, which may be either peripheral neuropathy or DVT. We will need to get LE Doppler studies.     RECOMMENDATIONS:  1. Continue Aspirin 81mg po daily for now (will decide for Coumadin or Xarelto today)  2. Continue telemetry monitoring  3. Neuro checks per stroke protocol  4. Normotensive protocol can be initiated. 5. IV normal saline or oral liquids for hydration  6. Carotid doppler: pending  7. Hematology consult appreciated. 8. LAURIE pending. 9. Will obtain LE doppler US       I will follow the patient. Please do not hesitate to return with any questions. Signed:  Laura Alamo MD  3/15/2017  7:45 AM    REVIEW OF SYSTEMS: Denies chest pain, abdominal pain, nausea or vomiting. Right foot pain    OBJECTIVE:      Visit Vitals    /84 (BP 1 Location: Left arm, BP Patient Position: At rest)    Pulse 100    Temp 98.2 °F (36.8 °C)    Resp 18    Ht 5' 4\" (1.626 m)    Wt 84.6 kg (186 lb 8.2 oz)    SpO2 100%    Breastfeeding No    BMI 30.1 kg/m2     Physical Exam:  GEN: Alert, NAD  EYES: conjunctiva normal, lids with out lesions  HEENT: MMM. HEART: RRR +S1 +S2  LUNGS: CTA B/L no rales or rhonchi. ABDOMEN: Soft, non-tender. EXTREMITIES: No edema cyanosis, Right leg tender to touch. SKIN: no rashes or skin breakdown, no nodules  NEURO: Alert, oriented x3. Speech is dysarthric. Left facial droop. Left arm drift and left leg weakness, strnger than the arm. Right side full strength. Left sided extinction to double stimuli.     Current Facility-Administered Medications   Medication Dose Route Frequency    0.9% sodium chloride with KCl 20 mEq/L infusion   IntraVENous CONTINUOUS    potassium chloride (KAON 10%) 20 mEq/15 mL oral liquid 20 mEq  20 mEq Oral DAILY    multivitamin, tx-iron-ca-min (THERA-M w/ IRON) tablet 1 Tab  1 Tab Oral DAILY    pantoprazole (PROTONIX) tablet 40 mg  40 mg Oral ACB    sodium chloride (NS) flush 5-10 mL  5-10 mL IntraVENous Q8H    sodium chloride (NS) flush 5-10 mL  5-10 mL IntraVENous PRN    ondansetron (ZOFRAN) injection 4 mg  4 mg IntraVENous Q6H PRN    acetaminophen (TYLENOL) tablet 650 mg  650 mg Oral Q4H PRN    enoxaparin (LOVENOX) injection 40 mg  40 mg SubCUTAneous Q24H    aspirin chewable tablet 81 mg  81 mg Oral DAILY    ELECTROLYTE REPLACEMENT PROTOCOL - Potassium  1 Each Other PRN    ELECTROLYTE REPLACEMENT PROTOCOL - Magnesium  1 Each Other PRN    HYDROmorphone (PF) (DILAUDID) injection 1 mg  1 mg IntraVENous Q4H PRN    atorvastatin (LIPITOR) tablet 80 mg  80 mg Oral QHS    morphine injection 2 mg  2 mg IntraVENous Q4H PRN       Laboratory  Recent Results (from the past 24 hour(s))   IRON PROFILE    Collection Time: 03/14/17  8:04 AM   Result Value Ref Range    Iron 13 (L) 50 - 175 ug/dL    TIBC 242 (L) 250 - 450 ug/dL    Iron % saturation 5 %   FERRITIN    Collection Time: 03/14/17  8:04 AM   Result Value Ref Range    Ferritin 25 8 - 388 NG/ML   GLUCOSE, POC    Collection Time: 03/14/17 11:16 AM   Result Value Ref Range    Glucose (POC) 92 70 - 110 mg/dL   HGB & HCT    Collection Time: 03/14/17 11:22 AM   Result Value Ref Range    HGB 7.3 (L) 12.0 - 16.0 g/dL    HCT 24.0 (L) 35.0 - 45.0 %   POTASSIUM    Collection Time: 03/14/17 11:22 AM   Result Value Ref Range    Potassium 3.0 (L) 3.5 - 5.5 mmol/L   GLUCOSE, POC    Collection Time: 03/14/17  4:23 PM   Result Value Ref Range    Glucose (POC) 102 70 - 110 mg/dL

## 2017-03-15 NOTE — PROGRESS NOTES
0 Pt received from offgoing nurse without any signs or symptoms of distress. Pt vitals are stable and within normal limits. Pt bed in low position with wheels locked and call bell within reach. 1703 Assessment completed and documented in flow sheet. Pt denies any further needs at this time. Pt in NAD with bed in low position, wheels locked and call bell within reach. Neuro check completed as per protocol. Pt continues with (L) arm weakness and (R) leg weakness, pain, and tingling. 1719 Scheduled medications administered as ordered. 1930 Bedside and Verbal shift change report given to Donavon Hamilton (oncoming nurse) by Diana Lopez RN (offgoing nurse). Report given with SBAR, Procedure Summary, Intake/Output, MAR and Recent Results.

## 2017-03-15 NOTE — PROCEDURES
LAURIE performed with moderate sedation and topical anesthesia. Procedure was well tolerated. No complications. There was no evidence of intracardiac thrombi.      Katelynn Klein MD

## 2017-03-15 NOTE — ROUTINE PROCESS
TRANSFER - OUT REPORT:    Verbal report given to Nurse (name) on Marcy Lu  being transferred to Care Unit(unit) for routine progression of care       Report consisted of patients Situation, Background, Assessment and   Recommendations(SBAR). Information from the following report(s) SBAR, Kardex, Intake/Output, Cardiac Rhythm SR and Alarm Parameters  was reviewed with the receiving nurse. Lines:   Peripheral IV 03/15/17 Right Forearm (Active)   Site Assessment Clean, dry, & intact 3/15/2017 11:12 AM   Phlebitis Assessment 0 3/15/2017 11:12 AM   Infiltration Assessment 0 3/15/2017 11:12 AM   Dressing Status Clean, dry, & intact 3/15/2017 11:12 AM   Dressing Type Transparent 3/15/2017 11:12 AM   Hub Color/Line Status Blue; Infusing 3/15/2017 11:12 AM   Alcohol Cap Used Yes 3/15/2017 11:12 AM        Opportunity for questions and clarification was provided.       Patient transported with:   Monitor

## 2017-03-15 NOTE — ROUTINE PROCESS
TRANSFER - IN REPORT:    Verbal report received from Nurse (name) on Sofia Giron  being received from CareUnit(unit) for routine progression of care      Report consisted of patients Situation, Background, Assessment and   Recommendations(SBAR). Information from the following report(s) SBAR, Kardex, Procedure Summary, Intake/Output, MAR and Recent Results was reviewed with the receiving nurse. Opportunity for questions and clarification was provided. Assessment completed upon patients arrival to unit and care assumed.

## 2017-03-15 NOTE — PROGRESS NOTES
Cardiology Progress Note      3/15/2017 4:19 PM    Admit Date: 3/10/2017    Admit Diagnosis: Stroke [facility])  Stroke [facility])  Hypokalemia  CVA (cerebral vascular accident) ([facility]      Subjective:     [place] denies chest pain, chest pressure/discomfort, dyspnea, palpitations. Visit Vitals    /79 (BP 1 Location: Right arm, BP Patient Position: Head of bed elevated (Comment degrees); At rest)  Comment (BP Patient Position): 40 degrees    Pulse (!) 104    Temp 98.7 °F (37.1 °C)    Resp 18    Ht 5' 4\" (1.626 m)    Wt 84.6 kg (186 lb 8.2 oz)    SpO2 96%    Breastfeeding No    BMI 30.1 kg/m2     Current Facility-Administered Medications   Medication Dose Route Frequency    ferrous sulfate tablet 325 mg  1 Tab Oral BID WITH MEALS    HYDROmorphone (PF) (DILAUDID) injection 0.5 mg  0.5 mg IntraVENous Q4H PRN    gabapentin (NEURONTIN) capsule 300 mg  300 mg Oral TID    benzocaine (HURRICANE) 20 % spray   Mucous Membrane PRN    0.9% sodium chloride with KCl 20 mEq/L infusion   IntraVENous CONTINUOUS    potassium chloride (KAON 10%) 20 mEq/15 mL oral liquid 20 mEq  20 mEq Oral DAILY    multivitamin, tx-iron-ca-min (THERA-M w/ IRON) tablet 1 Tab  1 Tab Oral DAILY    pantoprazole (PROTONIX) tablet 40 mg  40 mg Oral ACB    sodium chloride (NS) flush 5-10 mL  5-10 mL IntraVENous Q8H    sodium chloride (NS) flush 5-10 mL  5-10 mL IntraVENous PRN    ondansetron (ZOFRAN) injection 4 mg  4 mg IntraVENous Q6H PRN    acetaminophen (TYLENOL) tablet 650 mg  650 mg Oral Q4H PRN    enoxaparin (LOVENOX) injection 40 mg  40 mg SubCUTAneous Q24H    aspirin chewable tablet 81 mg  81 mg Oral DAILY    ELECTROLYTE REPLACEMENT PROTOCOL - Potassium  1 Each Other PRN    ELECTROLYTE REPLACEMENT PROTOCOL - Magnesium  1 Each Other PRN    atorvastatin (LIPITOR) tablet 80 mg  80 mg Oral QHS    morphine injection 2 mg  2 mg IntraVENous Q4H PRN         Objective:      Physical Exam:  Visit Vitals    /79 (BP 1 Location: Right arm, BP Patient Position: Head of bed elevated (Comment degrees); At rest)  Comment (BP Patient Position): 40 degrees    Pulse (!) 104    Temp 98.7 °F (37.1 °C)    Resp 18    Ht 5' 4\" (1.626 m)    Wt 84.6 kg (186 lb 8.2 oz)    SpO2 96%    Breastfeeding No    BMI 30.1 kg/m2     General Appearance:  Well developed, well nourished,alert and oriented x 3, and individual in no acute distress. Facial weakness. Ears/Nose/Mouth/Throat:   Hearing grossly normal.         Neck: Supple. Chest:   Lungs clear to auscultation bilaterally. Cardiovascular:  Regular rate and rhythm, S1, S2 normal, no murmur. Abdomen:   Soft, non-tender, bowel sounds are active. Extremities: No edema bilaterally. Skin: Warm and dry.                Data Review:   Labs:    Recent Results (from the past 24 hour(s))   GLUCOSE, POC    Collection Time: 03/14/17  4:23 PM   Result Value Ref Range    Glucose (POC) 102 70 - 141 mg/dL   METABOLIC PANEL, BASIC    Collection Time: 03/15/17 10:25 AM   Result Value Ref Range    Sodium 136 136 - 145 mmol/L    Potassium 4.3 3.5 - 5.5 mmol/L    Chloride 103 100 - 108 mmol/L    CO2 23 21 - 32 mmol/L    Anion gap 10 3.0 - 18 mmol/L    Glucose 76 74 - 99 mg/dL    BUN 1 (L) 7.0 - 18 MG/DL    Creatinine 0.43 (L) 0.6 - 1.3 MG/DL    BUN/Creatinine ratio 2 (L) 12 - 20      GFR est AA >60 >60 ml/min/1.73m2    GFR est non-AA >60 >60 ml/min/1.73m2    Calcium 7.8 (L) 8.5 - 10.1 MG/DL   HGB & HCT    Collection Time: 03/15/17 10:25 AM   Result Value Ref Range    HGB 8.6 (L) 12.0 - 16.0 g/dL    HCT 27.7 (L) 35.0 - 45.0 %       Telemetry: normal sinus rhythm      Assessment:     Principal Problem:    Stroke ([facility] (3/10/2017)    Active Problems:    Chronic pain syndrome (2/27/2012)      Crohn disease ([facility] (2/27/2012)      Hypokalemia (8/8/2015)      Sickle cell trait (HCC) ()      Hypertension ()      Hx of cocaine abuse ()      Embolic stroke ([facility] [address] (9/39/5805)      Acute blood loss anemia (3/15/2017)      Neuropathic pain (3/15/2017)        Plan:     Transesophageal echo showed no clot or thrombus in her left atrium, left atrial appendage. There was no left atrial and left atrial appendage smoke.  No aortic abnormality was noted. No LV or valvular problems.      Rose Alvarez MD

## 2017-03-15 NOTE — PROGRESS NOTES
Hospitalist Progress Note    Patient: Kaylan Griffith MRN: [id number]  CSN: [id number]    YOB: 1970  Age: 40 y.o.   Sex: female    DOA: 3/10/2017 LOS:  LOS: 5 days          Chief Complaint:    Acute embolic CVA      Assessment/Plan     Acute embolic CVA-on aspirin for now-for LAURIE this am, complete carotid study now that CVL out of neck-would start anticoag but with her crohns and poss bleeding makes her at high risk  Left sided hemiparesis-continue PT, nursing notes she is less active when receiving narcotics, so need to use these sparingly, reduce dosing as well  Neuropathic pain right foot-add neurontin TID, may lessen need for narcotics-arterial study planned as well as venous duplex  Anemia, evidence for heme pos stools-GI consult, on PPI, no gross melena  Ac on chronic anemia, may be blood loss additionally to iron deficiency-add PO iron tabs also, iron 13  Iron deficiency  Hypokalemia-repleted yesterday IV  crohns disease-will need close surveillance-immunologic illness plus sickle cell trait certainly may have contributed to her CVA risk  HTN  Factor VIII activity is measured high  Chronic pain    Repeat K and Hgb today      Patient Active Problem List   Diagnosis Code    Chronic pain syndrome G89.4    Crohn disease ([facility] [address] K50.90    Hypokalemia E87.6    Stroke ([facility] [address] I63.9    Sickle cell trait ([facility] [address] D57.3    Hypertension I10    Hx of cocaine abuse C50.673    Embolic stroke ([facility] [address] Z13.4    Acute blood loss anemia D62    Neuropathic pain M79.2       Subjective:    C/p pain and numbness right foot, low grade headache across front of head this am  No nausea  NPO for her LAURIE  No CP or SOB    Review of systems:    Constitutional: denies fevers, chills, myalgias  Respiratory: denies SOB, cough  Cardiovascular: denies chest pain, palpitations  Gastrointestinal: denies nausea, vomiting, diarrhea      Vital signs/Intake and Output:  Visit Vitals    /84 (BP 1 Location: Left arm, BP Patient Position: At rest)    Pulse 100    Temp 98.2 °F (36.8 °C)    Resp 18    Ht 5' 4\" (1.626 m)    Wt 84.6 kg (186 lb 8.2 oz)    SpO2 100%    Breastfeeding No    BMI 30.1 kg/m2     Current Shift:     Last three shifts:  03/13 1901 - 03/15 0700  In: 1847.5 [P.O.:720; I.V.:1127.5]  Out: 2795 [Urine:2795]    Exam:    General: Well developed, alert, NAD, OX3  Head/Neck: NCAT, supple, No masses, No lymphadenopathy  CVS:Regular rate and rhythm, no M/R/G, S1/S2 heard, no thrill  Lungs:Clear to auscultation bilaterally, no wheezes, rhonchi, or rales  Abdomen: Soft, Nontender, No distention, Normal Bowel sounds, No hepatomegaly  Extremities: No C/C/E, pulses palpable 2+, feet warm, no signs ischemia  Skin:normal texture and turgor, no rashes, no lesions  Neuro:left side weakness, facial weakness  Psych:appropriate                Labs: Results:       Chemistry Recent Labs      03/14/17 1122 03/13/17 0453 03/12/17 2010   GLU   --   90   --    NA   --   138   --    K  3.0*  3.2*  3.7   CL   --   107   --    CO2   --   26   --    BUN   --   2*   --    CREA   --   0.45*   --    CA   --   7.2*   --    AGAP   --   5   --    BUCR   --   4*   --    AP   --   27*   --    TP   --   5.3*   --    ALB   --   2.0*   --    GLOB   --   3.3   --    AGRAT   --   0.6*   --       CBC w/Diff Recent Labs      03/14/17 1122 03/13/17 0453   WBC   --   5.6   RBC   --   3.10*   HGB  7.3*  7.5*   HCT  24.0*  25.4*   PLT   --   376   GRANS   --   60   LYMPH   --   24   EOS   --   2      Cardiac Enzymes No results for input(s): CPK, CKND1, CHANDA in the last 72 hours. No lab exists for component: CKRMB, TROIP   Coagulation No results for input(s): PTP, INR, APTT in the last 72 hours.     No lab exists for component: INREXT    Lipid Panel Lab Results   Component Value Date/Time    Cholesterol, total 145 03/12/2017 06:15 AM    HDL Cholesterol 37 03/12/2017 06:15 AM    LDL, calculated 91.6 03/12/2017 06:15 AM    VLDL, calculated 16.4 03/12/2017 06:15 AM    Triglyceride 82 03/12/2017 06:15 AM    CHOL/HDL Ratio 3.9 03/12/2017 06:15 AM      BNP No results for input(s): BNPP in the last 72 hours.    Liver Enzymes Recent Labs      03/13/17   0453   TP  5.3*   ALB  2.0*   AP  27*   SGOT  16      Thyroid Studies Lab Results   Component Value Date/Time    TSH 1.51 12/12/2009 09:48 AM        Procedures/imaging: see electronic medical records for all procedures/Xrays and details which were not copied into this note but were reviewed prior to creation of Holly Matias MD

## 2017-03-15 NOTE — PROGRESS NOTES
Problem: Mobility Impaired (Adult and Pediatric)  Goal: *Acute Goals and Plan of Care (Insert Text)  Physical Therapy Goals  Initiated 3/11/2017 and to be accomplished within 7 day(s)  1. Patient will move from supine to sit and sit to supine in bed with supervision/set-up. 2. Patient will transfer from bed to chair and chair to bed with supervision/set-up using the least restrictive device. 3. Patient will perform sit to stand with supervision/set-up. 4. Patient will ambulate with supervision/set-up for >100 feet with the least restrictive device. Outcome: Not Progressing Towards Goal  PT session held due to:  [ ]  Nausea/vomiting  [ ]  RN Communication/ suggestion  [ ]  Extreme Pain  [X]  Off unit. Will f/u later as schedule allows. Thank you.   Yousuf Sheth, PTA

## 2017-03-15 NOTE — ROUTINE PROCESS
Bedside and Verbal shift change report given to T. Romilda Kayser RN (oncoming nurse) by Ethan Wray RN  (offgoing nurse).  Report included the following information SBAR, Kardex and Cardiac Rhythm SR.

## 2017-03-15 NOTE — PROGRESS NOTES
0800 Assumed pt care. Pt in bed alert and oriented, son @ bedside. 1010 Pt medicated for generalized pain 10/10 with 0.5 mg of dilaudid IV. Pt c/o pain to rt leg and hypersensitivity. Doppler ordered per Dr Lottie Blunt. 1030 Pt picked up via bed to the cath lab for LAURIE.     1445 Pt back to room S//P LAURIE, tolerated well. Was medicated as per nurse prior to transfer. No complains voiced. In bed resting and call light within reach/.

## 2017-03-16 PROBLEM — I82.409 DVT (DEEP VENOUS THROMBOSIS) (HCC): Status: ACTIVE | Noted: 2017-03-16

## 2017-03-16 LAB
ANION GAP BLD CALC-SCNC: 7 MMOL/L (ref 3–18)
ATRIAL RATE: 99 BPM
BUN SERPL-MCNC: 2 MG/DL (ref 7–18)
BUN/CREAT SERPL: 4 (ref 12–20)
CALCIUM SERPL-MCNC: 8 MG/DL (ref 8.5–10.1)
CALCULATED P AXIS, ECG09: 30 DEGREES
CALCULATED R AXIS, ECG10: 27 DEGREES
CALCULATED T AXIS, ECG11: 44 DEGREES
CHLORIDE SERPL-SCNC: 105 MMOL/L (ref 100–108)
CO2 SERPL-SCNC: 26 MMOL/L (ref 21–32)
COMMENT, 511217: NORMAL
CREAT SERPL-MCNC: 0.5 MG/DL (ref 0.6–1.3)
CRP SERPL-MCNC: 6.2 MG/DL (ref 0–0.3)
DIAGNOSIS, 93000: NORMAL
F5 GENE MUT ANL BLD/T: NORMAL
GLUCOSE SERPL-MCNC: 93 MG/DL (ref 74–99)
P-R INTERVAL, ECG05: 144 MS
POTASSIUM SERPL-SCNC: 4.2 MMOL/L (ref 3.5–5.5)
Q-T INTERVAL, ECG07: 368 MS
QRS DURATION, ECG06: 86 MS
QTC CALCULATION (BEZET), ECG08: 472 MS
SODIUM SERPL-SCNC: 138 MMOL/L (ref 136–145)
VENTRICULAR RATE, ECG03: 99 BPM

## 2017-03-16 PROCEDURE — 74011250636 HC RX REV CODE- 250/636: Performed by: INTERNAL MEDICINE

## 2017-03-16 PROCEDURE — 86140 C-REACTIVE PROTEIN: CPT | Performed by: INTERNAL MEDICINE

## 2017-03-16 PROCEDURE — 97535 SELF CARE MNGMENT TRAINING: CPT

## 2017-03-16 PROCEDURE — 65660000000 HC RM CCU STEPDOWN

## 2017-03-16 PROCEDURE — 74011250637 HC RX REV CODE- 250/637: Performed by: HOSPITALIST

## 2017-03-16 PROCEDURE — 97530 THERAPEUTIC ACTIVITIES: CPT

## 2017-03-16 PROCEDURE — 80048 BASIC METABOLIC PNL TOTAL CA: CPT | Performed by: HOSPITALIST

## 2017-03-16 PROCEDURE — 74011250637 HC RX REV CODE- 250/637: Performed by: FAMILY MEDICINE

## 2017-03-16 PROCEDURE — 74011250636 HC RX REV CODE- 250/636: Performed by: HOSPITALIST

## 2017-03-16 PROCEDURE — 36415 COLL VENOUS BLD VENIPUNCTURE: CPT | Performed by: INTERNAL MEDICINE

## 2017-03-16 PROCEDURE — 74011000250 HC RX REV CODE- 250: Performed by: INTERNAL MEDICINE

## 2017-03-16 RX ORDER — PREDNISONE 20 MG/1
40 TABLET ORAL
Status: DISCONTINUED | OUTPATIENT
Start: 2017-03-17 | End: 2017-03-23 | Stop reason: HOSPADM

## 2017-03-16 RX ADMIN — MULTIPLE VITAMINS W/ MINERALS TAB 1 TABLET: TAB at 08:26

## 2017-03-16 RX ADMIN — ATORVASTATIN CALCIUM 80 MG: 20 TABLET, FILM COATED ORAL at 21:18

## 2017-03-16 RX ADMIN — HYDROMORPHONE HYDROCHLORIDE 0.5 MG: 1 INJECTION, SOLUTION INTRAMUSCULAR; INTRAVENOUS; SUBCUTANEOUS at 21:18

## 2017-03-16 RX ADMIN — HYDROMORPHONE HYDROCHLORIDE 0.5 MG: 1 INJECTION, SOLUTION INTRAMUSCULAR; INTRAVENOUS; SUBCUTANEOUS at 03:58

## 2017-03-16 RX ADMIN — ENOXAPARIN SODIUM 40 MG: 40 INJECTION SUBCUTANEOUS at 08:27

## 2017-03-16 RX ADMIN — HYDROMORPHONE HYDROCHLORIDE 0.5 MG: 1 INJECTION, SOLUTION INTRAMUSCULAR; INTRAVENOUS; SUBCUTANEOUS at 17:21

## 2017-03-16 RX ADMIN — POTASSIUM CHLORIDE 20 MEQ: 20 SOLUTION ORAL at 08:27

## 2017-03-16 RX ADMIN — POLYETHYLENE GLYCOL 3350, SODIUM CHLORIDE, POTASSIUM CHLORIDE, SODIUM BICARBONATE, AND SODIUM SULFATE 2000 ML: 240; 5.84; 2.98; 6.72; 22.72 POWDER, FOR SOLUTION ORAL at 22:56

## 2017-03-16 RX ADMIN — HYDROMORPHONE HYDROCHLORIDE 0.5 MG: 1 INJECTION, SOLUTION INTRAMUSCULAR; INTRAVENOUS; SUBCUTANEOUS at 12:42

## 2017-03-16 RX ADMIN — GABAPENTIN 300 MG: 300 CAPSULE ORAL at 17:22

## 2017-03-16 RX ADMIN — FERROUS SULFATE TAB 325 MG (65 MG ELEMENTAL FE) 325 MG: 325 (65 FE) TAB at 17:21

## 2017-03-16 RX ADMIN — IRON SUCROSE 500 MG: 20 INJECTION, SOLUTION INTRAVENOUS at 22:58

## 2017-03-16 RX ADMIN — FERROUS SULFATE TAB 325 MG (65 MG ELEMENTAL FE) 325 MG: 325 (65 FE) TAB at 08:27

## 2017-03-16 RX ADMIN — GABAPENTIN 300 MG: 300 CAPSULE ORAL at 21:18

## 2017-03-16 RX ADMIN — GABAPENTIN 300 MG: 300 CAPSULE ORAL at 08:26

## 2017-03-16 RX ADMIN — SODIUM CHLORIDE AND POTASSIUM CHLORIDE: 9; 1.49 INJECTION, SOLUTION INTRAVENOUS at 17:21

## 2017-03-16 RX ADMIN — PANTOPRAZOLE SODIUM 40 MG: 40 TABLET, DELAYED RELEASE ORAL at 06:12

## 2017-03-16 RX ADMIN — Medication 10 ML: at 17:22

## 2017-03-16 RX ADMIN — HYDROMORPHONE HYDROCHLORIDE 0.5 MG: 1 INJECTION, SOLUTION INTRAMUSCULAR; INTRAVENOUS; SUBCUTANEOUS at 08:34

## 2017-03-16 NOTE — PROGRESS NOTES
Hematology Inpatient Consult    Subjective:     Marline Mcnair is a 40 y.o., [address] 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 Past Medical History:   Diagnosis Date    Abdominal pain     Anemia NEC     sickle cell trait    C. difficile colitis     Crohn's disease (HCC)     Gastrointestinal disorder     Crohns    Herpes zoster     Hx of cocaine abuse     Hyperkalemia     Hypertension     Iron deficiency anemia     MRSA (methicillin resistant Staphylococcus aureus)     Obesity     Pain management     Sickle cell trait (HCC)      Past Surgical History:   Procedure Laterality Date    HX GYN      tubal ligation    HX TUBAL LIGATION        History reviewed. No pertinent family history.   Social History   Substance Use Topics    Smoking status: Former Smoker    Smokeless tobacco: Not on file    Alcohol use No      Current Facility-Administered Medications   Medication Dose Route Frequency Provider Last Rate Last Dose    ferrous sulfate tablet 325 mg  1 Tab Oral BID WITH MEALS Eryn Adamson MD   325 mg at 03/16/17 0827    HYDROmorphone (PF) (DILAUDID) injection 0.5 mg  0.5 mg IntraVENous Q4H PRN Eryn Adamson MD   0.5 mg at 03/16/17 1242    gabapentin (NEURONTIN) capsule 300 mg  300 mg Oral TID Eryn Adamson MD   300 mg at 03/16/17 7452    benzocaine (HURRICANE) 20 % spray   Mucous Membrane PRN Veronica Acevedo MD   3 Spray at 03/15/17 1202    peg 3350-electrolytes (COLYTE) 4000 mL  2,000 mL Oral BID Zander Maurice MD        0.9% sodium chloride with KCl 20 mEq/L infusion   IntraVENous CONTINUOUS Eryn Adamson MD 50 mL/hr at 03/16/17 0827      potassium chloride (KAON 10%) 20 mEq/15 mL oral liquid 20 mEq  20 mEq Oral DAILY Eryn Adamson MD   20 mEq at 03/16/17 0827    multivitamin, tx-iron-ca-min (THERA-M w/ IRON) tablet 1 Tab  1 Tab Oral DAILY Eryn Adamson MD   1 Tab at 03/16/17 0826    pantoprazole (PROTONIX) tablet 40 mg  40 mg Oral ACB Eryn Adamson MD 40 mg at 17 0612    sodium chloride (NS) flush 5-10 mL  5-10 mL IntraVENous Q8H Leta St MD   10 mL at 03/15/17 1508    sodium chloride (NS) flush 5-10 mL  5-10 mL IntraVENous PRN Leta St MD        ondansetron [facility]) injection 4 mg  4 mg IntraVENous Q6H PRN Leta St MD   4 mg at 17 1721    acetaminophen (TYLENOL) tablet 650 mg  650 mg Oral Q4H PRN Leta St MD        enoxaparin (LOVENOX) injection 40 mg  40 mg SubCUTAneous Q24H Leta St MD   40 mg at 17 0827    ELECTROLYTE REPLACEMENT PROTOCOL - Potassium  1 Each Other PRRILEY St MD        ELECTROLYTE REPLACEMENT PROTOCOL - Magnesium  1 Each Other PRRILEY [address] MD        atorvastatin (LIPITOR) tablet 80 mg  80 mg Oral QHS Marnette Kussmaul Dixon Gladden, DO   80 mg at 03/15/17 2114    morphine injection 2 mg  2 mg IntraVENous Q4H PRN Jason Reynolds MD   2 mg at 17 1519        No Known Allergies     Review of Systems:      Objective:     Patient Vitals for the past 8 hrs:   BP Temp Pulse Resp SpO2   17 1443 111/72 98.7 °F (37.1 °C) 90 18 100 %   17 1228 (!) 113/94 99.4 °F (37.4 °C) (!) 106 18 100 %     Temp (24hrs), Av °F (37.2 °C), Min:98.5 °F (36.9 °C), Max:99.5 °F (37.5 °C)     0701 -  1900  In: -   Out: 600 [Urine:600]    Physical Exam:   No distress  sleeping    Lab/Data Review:  Recent Results (from the past 24 hour(s))   C REACTIVE PROTEIN, QT    Collection Time: 17  6:12 AM   Result Value Ref Range    C-Reactive protein 6.2 (H) 0 - 0.3 mg/dL   METABOLIC PANEL, BASIC    Collection Time: 17  6:13 AM   Result Value Ref Range    Sodium 138 136 - 145 mmol/L    Potassium 4.2 3.5 - 5.5 mmol/L    Chloride 105 100 - 108 mmol/L    CO2 26 21 - 32 mmol/L    Anion gap 7 3.0 - 18 mmol/L    Glucose 93 74 - 99 mg/dL    BUN 2 (L) 7.0 - 18 MG/DL    Creatinine 0.50 (L) 0.6 - 1.3 MG/DL    BUN/Creatinine ratio 4 (L) 12 - 20      GFR est AA >60 >60 ml/min/1.73m2    GFR est non-AA >60 >60 ml/min/1.73m2    Calcium 8.0 (L) 8.5 - 10.1 MG/DL         Assessment:     Principal Problem:    Stroke ([facility] (3/10/2017)    Active Problems:    Chronic pain syndrome (2/27/2012)      Crohn disease ([facility] (2/27/2012)      Hypokalemia (8/8/2015)      Sickle cell trait (HCC) ()      Hypertension ()      Hx of cocaine abuse ()      Embolic stroke ([facility] (8/51/8850)      Acute blood loss anemia (3/15/2017)      Neuropathic pain (3/15/2017)      DVT (deep venous thrombosis) ([facility] (3/16/2017)        Plan:      Indication for coumadin/lovenox 1mg/kg bid: DVT and CVA and factor VIII excess  Indications against: GI bleeding, pituitary bleeding. Can place lucio filter in the interim and at least avoid PE    Iron deficiency provide venofer.         Signed By: José Miguel Restrepo MD     March 16, 2017

## 2017-03-16 NOTE — PROGRESS NOTES
Cardiology Progress Note      3/16/2017 2:45 PM    Admit Date: 3/10/2017    Admit Diagnosis: Stroke [facility])  Stroke [facility])  Hypokalemia  CVA (cerebral vascular accident) ([address]  anemia      Subjective:     Christina Lorenzana denies chest pain, chest pressure/discomfort, dyspnea, palpitations.     Visit Vitals    /72 (BP 1 Location: Left arm, BP Patient Position: At rest)    Pulse 90    Temp 98.7 °F (37.1 °C)    Resp 18    Ht 5' 4\" (1.626 m)    Wt 84.6 kg (186 lb 8.2 oz)    SpO2 100%    Breastfeeding No    BMI 30.1 kg/m2     Current Facility-Administered Medications   Medication Dose Route Frequency    ferrous sulfate tablet 325 mg  1 Tab Oral BID WITH MEALS    HYDROmorphone (PF) (DILAUDID) injection 0.5 mg  0.5 mg IntraVENous Q4H PRN    gabapentin (NEURONTIN) capsule 300 mg  300 mg Oral TID    benzocaine (HURRICANE) 20 % spray   Mucous Membrane PRN    peg 3350-electrolytes (COLYTE) 4000 mL  2,000 mL Oral BID    0.9% sodium chloride with KCl 20 mEq/L infusion   IntraVENous CONTINUOUS    potassium chloride (KAON 10%) 20 mEq/15 mL oral liquid 20 mEq  20 mEq Oral DAILY    multivitamin, tx-iron-ca-min (THERA-M w/ IRON) tablet 1 Tab  1 Tab Oral DAILY    pantoprazole (PROTONIX) tablet 40 mg  40 mg Oral ACB    sodium chloride (NS) flush 5-10 mL  5-10 mL IntraVENous Q8H    sodium chloride (NS) flush 5-10 mL  5-10 mL IntraVENous PRN    ondansetron (ZOFRAN) injection 4 mg  4 mg IntraVENous Q6H PRN    acetaminophen (TYLENOL) tablet 650 mg  650 mg Oral Q4H PRN    enoxaparin (LOVENOX) injection 40 mg  40 mg SubCUTAneous Q24H    ELECTROLYTE REPLACEMENT PROTOCOL - Potassium  1 Each Other PRN    ELECTROLYTE REPLACEMENT PROTOCOL - Magnesium  1 Each Other PRN    atorvastatin (LIPITOR) tablet 80 mg  80 mg Oral QHS    morphine injection 2 mg  2 mg IntraVENous Q4H PRN         Objective:      Physical Exam:  Visit Vitals    /72 (BP 1 Location: Left arm, BP Patient Position: At rest)    Pulse 90    Temp 98.7 °F (37.1 °C)    Resp 18    Ht 5' 4\" (1.626 m)    Wt 84.6 kg (186 lb 8.2 oz)    SpO2 100%    Breastfeeding No    BMI 30.1 kg/m2     General Appearance:  Well developed, well nourished,alert and oriented x 3, and individual in no acute distress. Facial weakness. Ears/Nose/Mouth/Throat:   Hearing grossly normal. Dysarthria         Neck: Supple. Chest:   Lungs clear to auscultation bilaterally. Cardiovascular:  Regular rate and rhythm, S1, S2 normal, no murmur. Abdomen:   Soft, non-tender, bowel sounds are active. Extremities: No edema bilaterally. Skin: Warm and dry. Data Review:   Labs:    Recent Results (from the past 24 hour(s))   C REACTIVE PROTEIN, QT    Collection Time: 03/16/17  6:12 AM   Result Value Ref Range    C-Reactive protein 6.2 (H) 0 - 0.3 mg/dL   METABOLIC PANEL, BASIC    Collection Time: 03/16/17  6:13 AM   Result Value Ref Range    Sodium 138 136 - 145 mmol/L    Potassium 4.2 3.5 - 5.5 mmol/L    Chloride 105 100 - 108 mmol/L    CO2 26 21 - 32 mmol/L    Anion gap 7 3.0 - 18 mmol/L    Glucose 93 74 - 99 mg/dL    BUN 2 (L) 7.0 - 18 MG/DL    Creatinine 0.50 (L) 0.6 - 1.3 MG/DL    BUN/Creatinine ratio 4 (L) 12 - 20      GFR est AA >60 >60 ml/min/1.73m2    GFR est non-AA >60 >60 ml/min/1.73m2    Calcium 8.0 (L) 8.5 - 10.1 MG/DL       Telemetry: normal sinus rhythm      Assessment:     Principal Problem:    Stroke ([facility] (3/10/2017)    Active Problems:    Chronic pain syndrome (2/27/2012)      Crohn disease ([facility] (2/27/2012)      Hypokalemia (8/8/2015)      Sickle cell trait (HCC) ()      Hypertension ()      Hx of cocaine abuse ()      Embolic stroke ([facility] (5/36/5656)      Acute blood loss anemia (3/15/2017)      Neuropathic pain (3/15/2017)      DVT (deep venous thrombosis) ([address] (3/16/2017)        Plan:     Stable. No evidence for cardiac source for cerebral emboli. Was seen by hematology.  The only immediate reversal agent for oral anticoagulants is available for dabigatran.  (Idarucizumab for dabigatran reversal)    Rowena Downing MD

## 2017-03-16 NOTE — CDMP QUERY
Please clarify if DVT was (POA). It is documented patient has chronic DVT rt leg per Hospitalist. Patient had duplex vascular rt lower leg that was positive.   Lovenox treatment    Thank-you    Romi Quiroz [phone]

## 2017-03-16 NOTE — PROGRESS NOTES
NEUROLOGY PROGRESS NOTE        Patient: Troy Box        Sex: female          DOA: 3/10/2017  YOB: 1970      Age:  40 y.o.         LOS: 6 days     Identification:  40 y.o. female with history of Crohn's disease, past history of cocaine use (not recently) and Sickle Cell trait, who presented with left sided weakness, found to have multifocal embolic strokes.      SUBJECTIVE:   The patient is  With left sided weakness. She has ongoing rectal bleeding. Seen by GI, highly appreciated. 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      LAURIE does not show any intracardiac thrombosis. She has ongoing feet pain bilaterally LE doppler US pending.     Stroke Work-up:  Brain MRI:1. Multifocal acute infarctions, the largest is in the right MCA territory correlating with the abnormal CT. There are bilateral left greater than right cerebellar hemisphere infarctions. These infarctions are likely embolic with a central origin. 2. Possible hemorrhagic lesion within the pituitary. Scans dedicated to the pituitary could be performed. This could be artifact of volume averaging of the sellar floor but is at least suspect. 3. Chronic appearing right orbital floor fracture with protrusion of orbital fat and relative enophthalmos. MRA Brain: Occlusion inferior division right M2 segment MCA. This correlates with the acute infarction and is indeterminate in nature, most likely embolic certainly. No other significant intracranial vascular abnormality. MRA Neck: Limited noncontrast neck vascular evaluation. No definite hemodynamically significant stenosis is documented. Echocardiogram: EF is 60-65%, no intracardiac shunt detected. LAURIE: no intracardiac thrombus. Carotid Doppler pending. Lipid panel: LDL 91.6, HDL is 37.    HbA1c: 5.7     ASSESSMENT/IMPRESSION:   Multifocal right hemispheric (temporal, thalamic, parietal and cerebellar infarcts), most likely cardioembolic in nature. 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 She has not been on any oral contraceptives. Untreated Chron's disease maybe the underlying reason for high Factor VIII. She needs anticoagulation with coumadin, but due to GI bleeding we can not do it. We will continue Aspirin for now and after Chron's disease is under good control, we can think about anticoagulation as outpatient. I  am also worried about right leg pain, which may be either peripheral neuropathy or DVT. We will need to get LE Doppler studies. It is good to keep her on gabapentin for leg pains.      RECOMMENDATIONS:  1. Continue Aspirin 81mg po daily for now   2. Continue telemetry monitoring  3. Neuro checks per stroke protocol  4. Oral liquids for hydration  5. Carotid doppler: pending  6. LE doppler US pending. 7. Continue gabapentin 300mg TID for feet pain.     I will follow the patient. Please do not hesitate to return with any questions. Signed:  Rhoda Laurent MD  3/16/2017  7:51 AM    REVIEW OF SYSTEMS: Bilateral feet pain. GI bleeding. No chest pain or dyspnea. OBJECTIVE:      Visit Vitals    /89 (BP 1 Location: Left arm)    Pulse (!) 109    Temp 98.5 °F (36.9 °C)    Resp 18    Ht 5' 4\" (1.626 m)    Wt 84.6 kg (186 lb 8.2 oz)    SpO2 98%    Breastfeeding No    BMI 30.1 kg/m2     Physical Exam:  GEN: Alert, NAD  EYES: conjunctiva normal, lids with out lesions  HEENT: MMM. HEART: RRR +S1 +S2  LUNGS: CTA B/L no rales or rhonchi. ABDOMEN: Soft, non-tender. EXTREMITIES: No edema cyanosis  SKIN: no rashes or skin breakdown, no nodules  NEURO: Alert, oriented x3. Dysarthric speech. Left facial droop. Left arm weakness with drift. . Hand  is 3/5 on the left. Right side full strength. Tender to touch on both feet. Moves both legs with full strength, slightly weak on the left. Subtle left neglect with extinction.     Current Facility-Administered Medications   Medication Dose Route Frequency    ferrous sulfate tablet 325 mg  1 Tab Oral BID WITH MEALS    HYDROmorphone (PF) (DILAUDID) injection 0.5 mg  0.5 mg IntraVENous Q4H PRN    gabapentin (NEURONTIN) capsule 300 mg  300 mg Oral TID    benzocaine (HURRICANE) 20 % spray   Mucous Membrane PRN    peg 3350-electrolytes (COLYTE) 4000 mL  2,000 mL Oral BID    0.9% sodium chloride with KCl 20 mEq/L infusion   IntraVENous CONTINUOUS    potassium chloride (KAON 10%) 20 mEq/15 mL oral liquid 20 mEq  20 mEq Oral DAILY    multivitamin, tx-iron-ca-min (THERA-M w/ IRON) tablet 1 Tab  1 Tab Oral DAILY    pantoprazole (PROTONIX) tablet 40 mg  40 mg Oral ACB    sodium chloride (NS) flush 5-10 mL  5-10 mL IntraVENous Q8H    sodium chloride (NS) flush 5-10 mL  5-10 mL IntraVENous PRN    ondansetron (ZOFRAN) injection 4 mg  4 mg IntraVENous Q6H PRN    acetaminophen (TYLENOL) tablet 650 mg  650 mg Oral Q4H PRN    enoxaparin (LOVENOX) injection 40 mg  40 mg SubCUTAneous Q24H    ELECTROLYTE REPLACEMENT PROTOCOL - Potassium  1 Each Other PRN    ELECTROLYTE REPLACEMENT PROTOCOL - Magnesium  1 Each Other PRN    atorvastatin (LIPITOR) tablet 80 mg  80 mg Oral QHS    morphine injection 2 mg  2 mg IntraVENous Q4H PRN       Laboratory  Recent Results (from the past 24 hour(s))   METABOLIC PANEL, BASIC    Collection Time: 03/15/17 10:25 AM   Result Value Ref Range    Sodium 136 136 - 145 mmol/L    Potassium 4.3 3.5 - 5.5 mmol/L    Chloride 103 100 - 108 mmol/L    CO2 23 21 - 32 mmol/L    Anion gap 10 3.0 - 18 mmol/L    Glucose 76 74 - 99 mg/dL    BUN 1 (L) 7.0 - 18 MG/DL    Creatinine 0.43 (L) 0.6 - 1.3 MG/DL    BUN/Creatinine ratio 2 (L) 12 - 20      GFR est AA >60 >60 ml/min/1.73m2    GFR est non-AA >60 >60 ml/min/1.73m2    Calcium 7.8 (L) 8.5 - 10.1 MG/DL   HGB & HCT    Collection Time: 03/15/17 10:25 AM   Result Value Ref Range    HGB 8.6 (L) 12.0 - 16.0 g/dL    HCT 27.7 (L) 35.0 - 45.0 % METABOLIC PANEL, BASIC    Collection Time: 03/16/17  6:13 AM   Result Value Ref Range    Sodium 138 136 - 145 mmol/L    Potassium 4.2 3.5 - 5.5 mmol/L    Chloride 105 100 - 108 mmol/L    CO2 26 21 - 32 mmol/L    Anion gap 7 3.0 - 18 mmol/L    Glucose 93 74 - 99 mg/dL    BUN 2 (L) 7.0 - 18 MG/DL    Creatinine 0.50 (L) 0.6 - 1.3 MG/DL    BUN/Creatinine ratio 4 (L) 12 - 20      GFR est AA >60 >60 ml/min/1.73m2    GFR est non-AA >60 >60 ml/min/1.73m2    Calcium 8.0 (L) 8.5 - 10.1 MG/DL

## 2017-03-16 NOTE — ROUTINE PROCESS
Bedside and Verbal shift change report given to Mayra Anderson RN  (oncoming nurse) by Jamie Cote RN  (offgoing nurse). Report given with SBAR, Kardex, Intake/Output and Recent Results.

## 2017-03-16 NOTE — PROGRESS NOTES
Problem: Mobility Impaired (Adult and Pediatric)  Goal: *Acute Goals and Plan of Care (Insert Text)  Physical Therapy Goals  Initiated 3/11/2017 and to be accomplished within 7 day(s)  1. Patient will move from supine to sit and sit to supine in bed with supervision/set-up. 2. Patient will transfer from bed to chair and chair to bed with supervision/set-up using the least restrictive device. 3. Patient will perform sit to stand with supervision/set-up. 4. Patient will ambulate with supervision/set-up for >100 feet with the least restrictive device. Outcome: Progressing Towards Goal  PHYSICAL THERAPY TREATMENT     Patient: Anne Stauffer (74 y.o. female)  Date: 3/16/2017  Diagnosis: Stroke [facility])  Stroke ([address]  Hypokalemia  CVA (cerebral vascular accident) ([address]  anemia Stroke ([address]  Procedure(s) (LRB):  COLONOSCOPY (N/A)    Precautions: Fall   Chart, physical therapy assessment, plan of care and goals were reviewed. ASSESSMENT:  Hospitalist requesting PT session before results of doppler. Pt still very tender to medial calf palpation, but now reports increase of R foot pain/sensitivity. Pt with low spirits upon entering room. Pt with angry and R LE pain and new diet order. Pt willing to increase ambulation, although pain continues to increase during ambulation. Pt with slight improvement of visual scanning, balance and RW franck. Pt continue to use a step to pattern, due to R LE pain. Progression toward goals:  [ ]      Improving appropriately and progressing toward goals  [X]      Improving slowly and progressing toward goals  [ ]      Not making progress toward goals and plan of care will be adjusted       PLAN:  Patient continues to benefit from skilled intervention to address the above impairments. Continue treatment per established plan of care.   Discharge Recommendations:  Rehab  Further Equipment Recommendations for Discharge:  bedside commode and rolling walker       SUBJECTIVE:   Patient stated They have me on a liquid diet. I don't want that. I want food.       OBJECTIVE DATA SUMMARY:   Critical Behavior:  Neurologic State: Alert  Orientation Level: Oriented X4  Cognition: Follows commands  Safety/Judgement: Fall prevention  Functional Mobility Training:  Bed Mobility:  Rolling: Supervision  Supine to Sit: Supervision  Sit to Supine: Supervision  Scooting: Supervision  Transfers:  Sit to Stand: Stand-by asssistance  Stand to Sit: Supervision  Balance:  Sitting: Intact  Standing: Intact; With support  Standing - Static: Fair  Standing - Dynamic : Fair  Ambulation/Gait Training:  Distance (ft): 250 Feet (ft)  Assistive Device: Walker, rolling;Gait belt  Ambulation - Level of Assistance: Supervision  Gait Abnormalities: Ataxic;Decreased step clearance  Base of Support: Widened  Speed/Janice: Slow  Step Length: Left shortened;Right shortened  Swing Pattern: Left symmetrical;Right symmetrical  Pain:  Pain Scale 1: Numeric (0 - 10)  Pain Intensity 1: 4  Pain Location 1: Leg;Abdomen  Pain Intervention(s) 1: Medication (see MAR)  Activity Tolerance:   Good  Please refer to the flowsheet for vital signs taken during this treatment.   After treatment:   [ ] Patient left in no apparent distress sitting up in chair  [X] Patient left in no apparent distress in bed  [X] Call bell left within reach  [X] Nursing notified  [ ] Caregiver present  [ ] Bed alarm activated      Omero Arcos PTA   Time Calculation: 47 mins

## 2017-03-16 NOTE — PROGRESS NOTES
Late entry for 3-15-17: rec'd message from Sajan3 Brad Smith / Carmel Martin ([phone]) who asked what was pt's plan once pt finished rehab. Met with pt who stated that she would return home with her three chn: Chuyita (23 yrs), Neo Bansal (26 yr), and Duane (17yrs). T/C to Carmel Martin with pt's response. Carmel Martin stated that [facility] [place] had rec'd auth from pt's [facility] and could accept pt on Thursday if pt were medically stable. Will f/u with pt.

## 2017-03-16 NOTE — PROGRESS NOTES
2050 The PCT reports that the patient had a large loose BM with blood and mucus. The patient is now sitting in bed, alert and talking with visitors. 2114 Pt took evening medications as scheduled. She asked for dilaudid but was instructed that she cannot get it until 2230. Wrote the time on the board. 2250 The patient is arousable but quickly falls back to sleep and starts snoring. She denies taking any medications except what the nurses have given her. Aroused the patient and talked to her about the ordered colonoscopy. She is absolutely refusing the bowl prep as well as the colonoscopy. She did not give a reason why. Calling Dr Siria Villa and Dr Shobha Moise to notify them of the refusal and the previous bloody stool. 701  [place] to Dr Siria Villa via phone and notified him that the patient has refused the ordered colonoscopy, is very drowsy and had a bloody stool. Order received to d/c aspirin. 2304 Paged Dr Shobha Moise at [phone]. Awaiting a return call. 0 Spoke with Dr Shobha Moise and notified him that the patient is refusing the colonoscopy. No new orders. 0005 Pt resting in bed with eyes closed. Breathing even and unlabored. No s/s of distress of any kind. Call bell within reach. 0310 The pt was incontinent of stool in the bed. Assisted her to the [facility] x2 staff members. She was able to  and bend her right leg nearly to her chest while in bed. Afterwards she would not wipe herself when finished and just stared at the wipe when it was handed to her. Staff cleaned her up and assisted her back into bed. She had a small liquid pinkish red stool. While on the [facility] she asked for dilaudid but once in bed she appeared to go back to sleep right away. 0358 Pt requesting pain medication. She is awake and watching TV. Administered 0.5mg IV dilaudid as ordered prn for pain. Pt closed her eyes and appeared to fall asleep. 0780 Reported second pinkish red stool to Dr Siria Villa.  Pt has had a total of two since 1900.     0147-4252 Shift Summary: No other clinical concerns noted except noted above.

## 2017-03-16 NOTE — PROGRESS NOTES
Discussed in IDR; MD indicated anticipated d/c next week; [facility], Henriuqe Duran / Mohinder Cantonment ([phone]) made aware. Per Dena's request,  will provide clinical udates to [facility] on Monday. Will cont to follow.

## 2017-03-16 NOTE — PROCEDURES
[facility]  *** FINAL REPORT ***    Name: Robert Mahmood  MRN: [id number]    Inpatient  : [address] 1970  HIS Order #: [id number]  [id number] [facility] Visit #: [id number]  Date: 15 Mar 2017    TYPE OF TEST: Peripheral Venous Testing    REASON FOR TEST  Pain in limb, Limb swelling    Right Leg:-  Deep venous thrombosis:           Yes  Proximal extent of thrombus:      Posterior Tibial  Superficial venous thrombosis:    No  Deep venous insufficiency:        Not examined  Superficial venous insufficiency: Not examined    Left Leg:-  Deep venous thrombosis:           No  Superficial venous thrombosis:    No  Deep venous insufficiency:        Not examined  Superficial venous insufficiency: Not examined      INTERPRETATION/FINDINGS  Duplex images were obtained using 2-D gray scale, color flow and  spectral doppler analysis. This study suggests evidence of chronic deep venous thrombosis  involving the right posterior tibial vein, recanalized flow is noted  distally. No other deep venous thrombosis was noted bilateral lower  extremities at the present time involving the common femoral, deep  femoral, proximal femoral, mid femoral, distal femoral,  popliteal(above knee), popliteal(fossa), popliteal(below knee), left  posterior tibial vein at the present time. No evidence of superficial  thrombosis detected. ADDITIONAL COMMENTS  Verbal results telephoned to nurse Aliya Arenas 12:35pm.    I have personally reviewed the data relevant to the interpretation of  this  study. TECHNOLOGIST: Shante Carroll  Signed: 2017 12:36 PM    PHYSICIAN: Gladis Foreman MD  Signed: 2017 02:16 PM

## 2017-03-16 NOTE — PROGRESS NOTES
Hospitalist Progress Note    Patient: Deonte Palacio MRN: [id number]  CSN: [id number]    YOB: 1970  Age: 40 y.o.   Sex: female    DOA: 3/10/2017 LOS:  LOS: 6 days          Chief Complaint:    Acute CVA      Assessment/Plan     Acute embolic CVA-on aspirin for now-for LAURIE this am, complete carotid study now that CVL out of neck-currently on aspirin po as she has crohns and poss bleeding makes her at high risk  Left sided hemiparesis-continue PT, nursing notes she is less active when receiving narcotics, so need to use these sparingly, reduce dosing as well  Neuropathic pain right foot-add neurontin TID, may lessen need for narcotics-leg studies are completed but results pending this am  Anemia, evidence for heme pos stools-GI consult, on PPI, no gross melena-for colonoscopy Friday 3/17  Ac on chronic anemia, may be blood loss additionally to iron deficiency-add PO iron tabs also, iron 13  Iron deficiency  Hypokalemia-repleted   crohns disease-will need close surveillance-immunologic illness plus sickle cell trait certainly may have contributed to her CVA risk  HTN  Factor VIII activity is measured high  Chronic pain    Await vascular studies-addendum, she has a chronic DVT right leg-POA-will need to discuss treatment with hematology and Neuro-she may need to start coumadin-await GI evaluation with her crohns  Colonoscopy Friday  Decision on coumadin delayed until GI eval completed  She may have to stay on asa  LAURIE was negative for clot    Patient Active Problem List   Diagnosis Code    Chronic pain syndrome G89.4    Crohn disease ([facility] K50.90    Hypokalemia E87.6    Stroke ([facility] I63.9    Sickle cell trait ([facility] D57.3    Hypertension I10    Hx of cocaine abuse M61.243    Embolic stroke ([facility] L73.3    Acute blood loss anemia D62    Neuropathic pain M79.2       Subjective:    Denies HA  Leg pain the same, maybe a bit better  No N/V  Wants to do PT today    Review of systems:    Constitutional: denies fevers, chills, myalgias  Respiratory: denies SOB, cough  Cardiovascular: denies chest pain, palpitations  Gastrointestinal: denies nausea, vomiting, diarrhea      Vital signs/Intake and Output:  Visit Vitals    BP (!) 124/92 (BP 1 Location: Left arm, BP Patient Position: Sitting)    Pulse (!) 118    Temp 99 °F (37.2 °C)    Resp 18    Ht 5' 4\" (1.626 m)    Wt 84.6 kg (186 lb 8.2 oz)    SpO2 100%    Breastfeeding No    BMI 30.1 kg/m2     Current Shift:  03/16 0701 - 03/16 1900  In: -   Out: 600 [Urine:600]  Last three shifts:  03/14 1901 - 03/16 0700  In: 1425 [P.O.:480; I.V.:945]  Out: 3500 [Urine:3500]    Exam:    General: Well developed, alert, NAD, OX3  CVS:Regular rate and rhythm, no M/R/G, S1/S2 heard, no thrill  Lungs:Clear to auscultation bilaterally, no wheezes, rhonchi, or rales  Abdomen: Soft, Nontender, No distention, Normal Bowel sounds, No hepatomegaly  Extremities: No C/C/E, pulses palpable 2+  Skin:normal texture and turgor, no rashes, no lesions  Neuro:left weakness, facial weakness, unchanged, face actually looks more symmetrical now  Psych:appropriate                Labs: Results:       Chemistry Recent Labs      03/16/17   0613  03/15/17   1025  03/14/17   1122   GLU  93  76   --    NA  138  136   --    K  4.2  4.3  3.0*   CL  105  103   --    CO2  26  23   --    BUN  2*  1*   --    CREA  0.50*  0.43*   --    CA  8.0*  7.8*   --    AGAP  7  10   --    BUCR  4*  2*   --       CBC w/Diff Recent Labs      03/15/17   1025  03/14/17   1122   HGB  8.6*  7.3*   HCT  27.7*  24.0*      Cardiac Enzymes No results for input(s): CPK, CKND1, CHANDA in the last 72 hours. No lab exists for component: CKRMB, TROIP   Coagulation No results for input(s): PTP, INR, APTT in the last 72 hours.     No lab exists for component: INREXT    Lipid Panel Lab Results   Component Value Date/Time    Cholesterol, total 145 03/12/2017 06:15 AM    HDL Cholesterol 37 03/12/2017 06:15 AM LDL, calculated 91.6 03/12/2017 06:15 AM    VLDL, calculated 16.4 03/12/2017 06:15 AM    Triglyceride 82 03/12/2017 06:15 AM    CHOL/HDL Ratio 3.9 03/12/2017 06:15 AM      BNP No results for input(s): BNPP in the last 72 hours. Liver Enzymes No results for input(s): TP, ALB, TBIL, AP, SGOT, GPT in the last 72 hours.     No lab exists for component: DBIL   Thyroid Studies Lab Results   Component Value Date/Time    TSH 1.51 12/12/2009 09:48 AM        Procedures/imaging: see electronic medical records for all procedures/Xrays and details which were not copied into this note but were reviewed prior to creation of Werner Bynum MD

## 2017-03-16 NOTE — PROGRESS NOTES
0730 Assumed pt care, pt is alert and oriented x4, ST on the monitor, lungs are clear on room air, VSS, pt is complaining of right leg pain and abd pain. Bed is in the low and locked position and call bell has been left within reach. 8162 Administered dilaudid IV 0.5 mg for abd and right leg pain stated as a 10 on a 1-10 pain scale. See doc flow sheet for additional information. 3677 dc bradshaw catheter. Pt now off unit to vascular for ordered procedure. 1200 Pt has voided straw-yellow urine. 1240 Administered dilaudid IV prn for abd and right leg pain. See doc flow sheet for additional info. 1015 Pt back on unit. 1720 Administered dilaudid IV prn for abd and right leg pain. See doc flow sheet for additional info. Shift Summary- Pt experienced an uneventful shift. VSS, pt remained alert and oriented x4, family members were at bedside. Pt's only complaint throughout shift was abd and leg pain. 1900 Bedside and Verbal shift change report given to NAOMIE Burrell (oncoming nurse) by Shaniqua Cuevas   (offgoing nurse).  Report included the following information SBAR, Kardex, ED Summary, Procedure Summary, Intake/Output, MAR, Recent Results and Cardiac Rhythm SR.

## 2017-03-16 NOTE — PROGRESS NOTES
Problem: Self Care Deficits Care Plan (Adult)  Goal: *Acute Goals and Plan of Care (Insert Text)  Occupational Therapy Goals  Initiated 3/11/2017 within 7 day(s). 1. Patient will perform lower body dressing with minimal assistance/contact guard assist while sitting on EOB and use of salbador-technique. 2. Patient will perform grooming with contact guard assist while standing at sink using BUE. 3. Patient will perform toilet transfers with contact guard assist with verbal and tactile cues. 4. Patient will perform all aspects of toileting with Contact guard assist and with verbal and tactile cues. 5. Patient will participate in upper extremity therapeutic exercise/activities with contact guard assist for 15 minutes. Outcome: Progressing Towards Goal  OCCUPATIONAL THERAPY TREATMENT     Patient: Frankie Olsen (24 y.o. female)  Date: 3/16/2017  Diagnosis: Stroke [facility])  Stroke ([facility]  Hypokalemia  CVA (cerebral vascular accident) ([facility]  anemia Stroke ([facility]  Procedure(s) (LRB):  COLONOSCOPY (N/A)    Precautions: Fall  Chart, occupational therapy assessment, plan of care, and goals were reviewed. ASSESSMENT:  Pt seen with PTA to maximize pt safety. Verbal order received from Dr. Leobardo Pascual to not hold therapy session for PVLs. Pt completed supine to sit transfer with supervision. While seated EOB, pt donned socks with supervision. Pt required increased time and noted to have difficulty using L hand for task. Pt donned gown like robe with min A due to IV line. Pt completed sit to stand transfer with SBA. Pt completed functional mobility with use of RW with SBA-CGA, requiring occasional assist/cueing for use of RW. Pt completed toilet transfer with SBA. Pt required min A for clothing management and required SBA-CGA for bowel/bladder hygiene while standing. Pt required CGA and cueing for safety while standing at sink performing grooming tasks with RW. Pt returned to supine in bed with supervision.  Pt left supine in bed with needs in reach. EDUCATION: importance of continued use of LUE during ADL tasks  Progression toward goals:  [ ]          Improving appropriately and progressing toward goals  [X]          Improving slowly and progressing toward goals  [ ]          Not making progress toward goals and plan of care will be adjusted       PLAN:  Patient continues to benefit from skilled intervention to address the above impairments. Continue treatment per established plan of care. Discharge Recommendations:  Rehab  Further Equipment Recommendations for Discharge:  rolling walker       SUBJECTIVE:   Patient stated .      OBJECTIVE DATA SUMMARY:      G CODES:      Cognitive/Behavioral Status:  Neurologic State: Alert  Orientation Level: Oriented X4  Cognition: Follows commands  Safety/Judgement: Fall prevention  Functional Mobility and Transfers for ADLs:              Bed Mobility:  Supine to Sit: Supervision  Sit to Supine: Supervision                 Transfers:  Sit to Stand: Stand-by asssistance              Toilet Transfer : Stand-by asssistance              Bathroom Mobility: Stand-by assistance;Contact guard assistance                 Balance:  Sitting: Intact  Standing: Intact; With support  ADL Intervention:  Grooming  Grooming Assistance: Stand-by assistance;Contact guard assistance  Washing Hands: Stand-by assistance;Contact guard assistance     Upper Body Dressing Assistance  Dressing Assistance: Minimum assistance (due to IV line)  Hospital Gown: Minimum  assistance (due to IV line)     Lower Body Dressing Assistance  Dressing Assistance: Stand-by assistance  Socks: Stand-by assistance  Leg Crossed Method Used: Yes  Position Performed: Seated edge of bed     Toileting  Toileting Assistance: Contact guard assistance;Minimum assistance  Bladder Hygiene: Stand-by assistance;Contact guard assistance  Bowel Hygiene: Stand-by assistance;Contact guard assistance  Clothing Management: Minimum assistance     Cognitive Retraining  Safety/Judgement: Fall prevention     Pain:  Pre Treatment:10  Post Treatment:10     Activity Tolerance:    good  Please refer to the flowsheet for vital signs taken during this treatment.   After treatment:   [ ]  Patient left in no apparent distress sitting up in chair  [X]  Patient left in no apparent distress in bed  [X]  Call bell left within reach  [ ]  Nursing notified  [ ]  Caregiver present  [ ]  Bed alarm activated     Loretta Cohn MS OTR/L  Time Calculation: 42 mins

## 2017-03-16 NOTE — CONSULTS
[address] [place] [address] Rd    Name:  Abbey Solitario  MR#:  [id number]  :  1970  Account #:  [de-identified]  Date of Adm:  03/10/2017  Date of Consultation:  03/15/2017      PRIMARY CARE PHYSICIAN: She has no primary care physician. This is a 27-year-old female who was admitted on 03/10/2017 for a  CVA on the right side with left hemiparesis and also left upper  extremity weakness. I am being asked to see her for iron-deficiency  anemia. This patient is known already to have sickle cell trait. She has a history  of Crohn colitis, more severe on the left side for the past 14 years. Last  colonoscopy was about 4 years ago. I do not have records of this. She  was treated with different biologics including Remicade, Humira and  Cimzia. All of them had to be stopped because infectious  complications. She has MRSA infection and C difficile colitis. She has  been intolerant to all these biologics. The last time I saw her during an  admission in 2015 because of flare up of her left-sided colitis. She was put on steroids. The IBD markers did not help us differentiate  between Crohn's and ulcerative colitis, as she initially started as  ulcerative colitis and then the diagnosis was changed to Crohn  disease. Both markers were negative. Previously, she was seen by  different gastroenterologists in the city and also in [facility], but she has  not been following anyone. After her admission in 2015, I have  never seen her. She was off medication and only about a month ago  started taking Asacol 400 mg 3 times a day and prednisone 20 mg  daily, started about a couple of weeks ago. She has been treating  herself intermittently with prednisone. She was supposed to have a  colonoscopy in November, but apparently she canceled her  appointment. She has chronic recurring severe iron-deficiency anemia. The patient apparently required a couple of blood transfusions about a  month ago. Apparently, the patient has very poor venous access from  previous IV drug usage. She used to smoke, but apparently she  stopped. She has hypertension. She lives with her kids. She had 3  pregnancies and deliveries. She had tubal ligation. Otherwise, no other  surgery. She has history of shingles, hypertension. MEDICATIONS AT HOME  1. Prednisone 10 mg in a tapered fashion starting at 60 mg a day. 2. HydroDIURIL 12.5 mg.  3. Phenergan 25 mg.  4. Mesalamine 400 mg 3 times a day. 5. Phenergan 25 mg p.r.n.  6. Ultram 50. She denies having any rash. She does have some mild lower back  pain. Denies taking any NSAIDs. No arthralgia, but does have some  pain in her right thigh. No deep vein thrombosis. She has rare nausea,  but no vomiting. No heartburn, dysphagia. She has some lower  abdominal pain for a long time. Usually she has 3 liquid bowel  movements every day and yesterday, started to have bleeding from  her rectum, but she claimed also that about a month earlier she bled  significantly and dropped her hemoglobin where she required to have 2  blood transfusions. She lost 15 pounds in the last month because of  loss of appetite. The patient does have some shortness of breath, but  no chest pain. MEDICATIONS: Presently, the patient is treated with    1. Aspirin. 2. Gabapentin. 3. Dilaudid. 4. Zofran. 5. Multivitamins. 6. Morphine. 7. Lovenox subcutaneous. 8. Ferrous sulfate. 9. Lipitor 80. PHYSICAL EXAMINATION  GENERAL: We have a 27-year-old [facility] female, who  appears to be much older than her true age. She is resting comfortably  in bed. She is not the best historian. She does have a left  hemiparalysis with left hemifacial paralysis and deviation of the mouth  to the right. She does have mild weakness of the right upper extremity. SKIN: Remarkable for multiple tattoos. HEENT: Eyes are remarkable for pale conjunctivae, but the sclerae are  anicteric.  pupils are equal and reactive to light. Oropharyngeal cavity,  she has her own teeth. The mucous membranes are pale and dry. NECK: Supple. No palpable mass. No enlarged thyroid. LUNGS: Clear to auscultation. CARDIAC: Rhythm is rapid, but regular. S1, S2 normal. No murmur. VITAL SIGNS: Blood pressure is 136/74, pulse 108, temperature 99.5,  breathing 18, saturation 100% on room air. She weighs 186 pounds. ABDOMEN: Obese, soft. There is mild diffuse tenderness, but more  severe in the left lower quadrant. EXTREMITIES: Normal. No pedal  edema. No clubbing. No tremor. NEUROLOGICAL: She is alert and oriented x3. She has good  comprehension and judgment. BLOOD TESTS: We have a hemoglobin of 7.3 yesterday and 8.6  today. Normal WBC, platelets 704, a low MCH 29.5, normal MCV at  81.9. Urinalysis was normal. I forgot to mention that she has a Gaming  catheter. She has normal electrolytes and LFTs. Albumin is 2. Iron  saturation 5%, ferritin 25. Tox screen was positive for opiates. TSH  1.51. Last C difficile was negative in 08/2015. Serology for hepatitis  B is negative. She did not receive any blood transfusions at this time. Occult blood in the stool was positive. INR 0.9 on arrival. Factor 8  activity is raised to 198, normal up to 163. She has normal protein CS  and negative lupus anticoagulant. CT scan of the abdomen and pelvis  in 02/2017: Mild mid to distal left colon and rectosigmoid thickening  with mild surrounding infiltration. Uterus is slightly enlarged with a  calcified myometrial lesion. There is a 1 cm cyst at the dome of the  liver. CT scan of the brain on arrival showed focal abnormal gray white  matter differentiation with associated edema involving the right parietal  and temporal lobes in keeping with acute infarct. There is also a small  foci of increased density in the sylvian M2 segment in the right lobe.     CONCLUSION: This is a 71-year-old female with severe Crohn  disease of the left colon that has failed all the biologics because of side  effects or infectious complications. She comes with iron-deficiency  anemia, hemoglobin around 7.3 to 8.6, low iron. Most likely this is  caused by her active all going Crohn disease of her left colon. Will  eventually have to do another colonoscopy. The problem is that  the patient has not been taking her medication and started at a very  low-dose of Asacol about a month ago. She was also treated with  short-term steroids for the last couple of weeks, but she still has  diarrhea. She does have recently rectal bleeding. I think this needs to  be investigated after 14 years of progression. There is a much higher  risk for colon cancer. I told her that this procedure will be done on  Friday. I explained to her the procedure and the risks involved which  include, but not limited to bleeding, perforation, reaction to medication  or missing a lesion. She agreed to proceed. Her other health problems,  she had a CVA the right hemisphere. The hematological workup is still  pending. Most likely her hypercoagulable state can come from her  aggressive Crohn disease. Would like to check the CRP. The patient also has hypertension. Presently she does not  work. She is not very active and obese. Further note to follow. MOHAMED B. Vinetta Bloch, MD ME / TW  D:  03/15/2017   21:38  T:  03/15/2017   22:41  Job #:  [id number]

## 2017-03-17 ENCOUNTER — SURGERY (OUTPATIENT)
Age: 47
End: 2017-03-17

## 2017-03-17 PROCEDURE — 74011250637 HC RX REV CODE- 250/637: Performed by: INTERNAL MEDICINE

## 2017-03-17 PROCEDURE — 74011250636 HC RX REV CODE- 250/636: Performed by: HOSPITALIST

## 2017-03-17 PROCEDURE — 74011250637 HC RX REV CODE- 250/637: Performed by: HOSPITALIST

## 2017-03-17 PROCEDURE — 74011250637 HC RX REV CODE- 250/637: Performed by: FAMILY MEDICINE

## 2017-03-17 PROCEDURE — 88305 TISSUE EXAM BY PATHOLOGIST: CPT | Performed by: INTERNAL MEDICINE

## 2017-03-17 PROCEDURE — 74011636637 HC RX REV CODE- 636/637: Performed by: INTERNAL MEDICINE

## 2017-03-17 PROCEDURE — 65660000000 HC RM CCU STEPDOWN

## 2017-03-17 PROCEDURE — 74011250636 HC RX REV CODE- 250/636

## 2017-03-17 PROCEDURE — 0DBL8ZX EXCISION OF TRANSVERSE COLON, VIA NATURAL OR ARTIFICIAL OPENING ENDOSCOPIC, DIAGNOSTIC: ICD-10-PCS | Performed by: INTERNAL MEDICINE

## 2017-03-17 PROCEDURE — 74011000250 HC RX REV CODE- 250: Performed by: INTERNAL MEDICINE

## 2017-03-17 PROCEDURE — 74011250636 HC RX REV CODE- 250/636: Performed by: INTERNAL MEDICINE

## 2017-03-17 PROCEDURE — 77030020256 HC SOL INJ NACL 0.9%  500ML: Performed by: INTERNAL MEDICINE

## 2017-03-17 PROCEDURE — 76040000008: Performed by: INTERNAL MEDICINE

## 2017-03-17 PROCEDURE — 0DBM8ZX EXCISION OF DESCENDING COLON, VIA NATURAL OR ARTIFICIAL OPENING ENDOSCOPIC, DIAGNOSTIC: ICD-10-PCS | Performed by: INTERNAL MEDICINE

## 2017-03-17 RX ORDER — NALOXONE HYDROCHLORIDE 0.4 MG/ML
0.4 INJECTION, SOLUTION INTRAMUSCULAR; INTRAVENOUS; SUBCUTANEOUS
Status: DISCONTINUED | OUTPATIENT
Start: 2017-03-17 | End: 2017-03-17 | Stop reason: HOSPADM

## 2017-03-17 RX ORDER — SODIUM CHLORIDE 9 MG/ML
100 INJECTION, SOLUTION INTRAVENOUS CONTINUOUS
Status: DISCONTINUED | OUTPATIENT
Start: 2017-03-17 | End: 2017-03-19

## 2017-03-17 RX ORDER — MIDAZOLAM HYDROCHLORIDE 1 MG/ML
.5-5 INJECTION, SOLUTION INTRAMUSCULAR; INTRAVENOUS
Status: DISCONTINUED | OUTPATIENT
Start: 2017-03-17 | End: 2017-03-17 | Stop reason: HOSPADM

## 2017-03-17 RX ORDER — FLUMAZENIL 0.1 MG/ML
0.2 INJECTION INTRAVENOUS
Status: DISCONTINUED | OUTPATIENT
Start: 2017-03-17 | End: 2017-03-17 | Stop reason: HOSPADM

## 2017-03-17 RX ORDER — FENTANYL CITRATE 50 UG/ML
100 INJECTION, SOLUTION INTRAMUSCULAR; INTRAVENOUS
Status: DISCONTINUED | OUTPATIENT
Start: 2017-03-17 | End: 2017-03-17 | Stop reason: HOSPADM

## 2017-03-17 RX ADMIN — GABAPENTIN 300 MG: 300 CAPSULE ORAL at 17:37

## 2017-03-17 RX ADMIN — ONDANSETRON 4 MG: 2 INJECTION INTRAMUSCULAR; INTRAVENOUS at 20:39

## 2017-03-17 RX ADMIN — FERROUS SULFATE TAB 325 MG (65 MG ELEMENTAL FE) 325 MG: 325 (65 FE) TAB at 09:39

## 2017-03-17 RX ADMIN — GABAPENTIN 300 MG: 300 CAPSULE ORAL at 09:39

## 2017-03-17 RX ADMIN — GABAPENTIN 300 MG: 300 CAPSULE ORAL at 21:46

## 2017-03-17 RX ADMIN — HYDROMORPHONE HYDROCHLORIDE 0.5 MG: 1 INJECTION, SOLUTION INTRAMUSCULAR; INTRAVENOUS; SUBCUTANEOUS at 13:15

## 2017-03-17 RX ADMIN — Medication 2 MG: at 20:04

## 2017-03-17 RX ADMIN — FENTANYL CITRATE 50 MCG: 50 INJECTION, SOLUTION INTRAMUSCULAR; INTRAVENOUS at 15:43

## 2017-03-17 RX ADMIN — POLYETHYLENE GLYCOL 3350, SODIUM CHLORIDE, POTASSIUM CHLORIDE, SODIUM BICARBONATE, AND SODIUM SULFATE 2000 ML: 240; 5.84; 2.98; 6.72; 22.72 POWDER, FOR SOLUTION ORAL at 04:21

## 2017-03-17 RX ADMIN — HYDROMORPHONE HYDROCHLORIDE 0.5 MG: 1 INJECTION, SOLUTION INTRAMUSCULAR; INTRAVENOUS; SUBCUTANEOUS at 04:21

## 2017-03-17 RX ADMIN — FENTANYL CITRATE 100 MCG: 50 INJECTION, SOLUTION INTRAMUSCULAR; INTRAVENOUS at 15:29

## 2017-03-17 RX ADMIN — MIDAZOLAM HYDROCHLORIDE 1 MG: 1 INJECTION, SOLUTION INTRAMUSCULAR; INTRAVENOUS at 15:33

## 2017-03-17 RX ADMIN — MIDAZOLAM HYDROCHLORIDE 1 MG: 1 INJECTION, SOLUTION INTRAMUSCULAR; INTRAVENOUS at 15:37

## 2017-03-17 RX ADMIN — SODIUM CHLORIDE 100 ML/HR: 900 INJECTION, SOLUTION INTRAVENOUS at 15:01

## 2017-03-17 RX ADMIN — POTASSIUM CHLORIDE 20 MEQ: 20 SOLUTION ORAL at 09:39

## 2017-03-17 RX ADMIN — HYDROMORPHONE HYDROCHLORIDE 0.5 MG: 1 INJECTION, SOLUTION INTRAMUSCULAR; INTRAVENOUS; SUBCUTANEOUS at 01:00

## 2017-03-17 RX ADMIN — PANTOPRAZOLE SODIUM 40 MG: 40 TABLET, DELAYED RELEASE ORAL at 06:54

## 2017-03-17 RX ADMIN — MIDAZOLAM HYDROCHLORIDE 2 MG: 1 INJECTION, SOLUTION INTRAMUSCULAR; INTRAVENOUS at 15:29

## 2017-03-17 RX ADMIN — MESALAMINE 1000 MG: 250 CAPSULE ORAL at 17:37

## 2017-03-17 RX ADMIN — Medication 10 ML: at 13:15

## 2017-03-17 RX ADMIN — HYDROMORPHONE HYDROCHLORIDE 0.5 MG: 1 INJECTION, SOLUTION INTRAMUSCULAR; INTRAVENOUS; SUBCUTANEOUS at 09:47

## 2017-03-17 RX ADMIN — ATORVASTATIN CALCIUM 80 MG: 20 TABLET, FILM COATED ORAL at 21:46

## 2017-03-17 RX ADMIN — MESALAMINE 1000 MG: 250 CAPSULE ORAL at 21:46

## 2017-03-17 RX ADMIN — Medication 2 MG: at 06:54

## 2017-03-17 RX ADMIN — PREDNISONE 40 MG: 20 TABLET ORAL at 09:39

## 2017-03-17 RX ADMIN — MULTIPLE VITAMINS W/ MINERALS TAB 1 TABLET: TAB at 09:39

## 2017-03-17 RX ADMIN — HYDROMORPHONE HYDROCHLORIDE 0.5 MG: 1 INJECTION, SOLUTION INTRAMUSCULAR; INTRAVENOUS; SUBCUTANEOUS at 17:38

## 2017-03-17 RX ADMIN — Medication 10 ML: at 01:00

## 2017-03-17 RX ADMIN — FERROUS SULFATE TAB 325 MG (65 MG ELEMENTAL FE) 325 MG: 325 (65 FE) TAB at 17:38

## 2017-03-17 NOTE — PROCEDURES
[facility]  *** FINAL REPORT ***    Name: Dinh Erickson  MRN: [id number]    Inpatient  : [address] 1970  HIS Order #: [id number]  [address] [facility] Visit #: [id number]  Date: 12 Mar 2017    TYPE OF TEST: Cerebrovascular Duplex    REASON FOR TEST  Cerebrovascular accident (left hemisphere) < 6 weeks    Right Carotid:-             Proximal               Mid                 Distal  cm/s  Systolic  Diastolic  Systolic  Diastolic  Systolic  Diastolic  CCA:     62.9      20.6       71.1      21.9       73.6      21.9  Bulb:    63.3      27.0  ECA:     59.6      14.4  ICA:     36.2      12.0       57.0      26.3       98.4      51.5  ICA/CCA:  1.1       2.5    ICA Stenosis: Normal    Right Vertebral:-  Finding: Antegrade  Sys:       50.7  Mona:       18.4    Right Subclavian: Normal    Left Carotid:-            Proximal                Mid                 Distal  cm/s  Systolic  Diastolic  Systolic  Diastolic  Systolic  Diastolic  CCA:     22.0      22.1       75.3      24.0       95.0      29.9  Bulb:    89.1      31.9  ECA:     63.5      16.2  ICA:     75.4      25.4       53.5      16.0       79.7      37.3  ICA/CCA:  0.8       1.2    ICA Stenosis: <50%    Left Vertebral:-  Finding: Antegrade  Sys:       39.1  Mona:       11.9    Left Subclavian: Normal    INTERPRETATION/FINDINGS  Duplex images were obtained using 2-D gray scale, color flow and  spectral doppler analysis. 1.This study suggests no evidence of hemodynamically significant  stensosis involving the right internal carotid artery. 2.There is evidence of homogeneous plaque at the bulb of the left  internal carotid artery, no hemodynamically significant elevated  velocities were noted, <50% stenosis. 3. No significant stenosis in the external carotid arteries  bilaterally. 4. Antegrade flow in both vertebral arteries. 5. Normal flow in both subclavian arteries.     ADDITIONAL COMMENTS    I have personally reviewed the data relevant to the interpretation of  this  study. TECHNOLOGIST: Xena Worthy.  [place]len  Signed: 03/17/2017 07:56 AM    PHYSICIAN: Donnell Lama MD  Signed: 03/31/2017 10:01 AM

## 2017-03-17 NOTE — PROGRESS NOTES
Dysphagia POC on hold 2/2 planned colonoscopy. Will f/u as medically appropriate.     Thank you for this referral,  Kathy Vázquez, SLP

## 2017-03-17 NOTE — PROGRESS NOTES
Gastrointestinal Progress Note    Patient Name: Sofia Giron    CWAYC'C Date: 3/16/2017    Admit Date: 3/10/2017    Subjective:   Poor historian  Diet: Patient is on Clear Liquid and is tolerating. Nausea is present. Vomiting is not present. Pain: Patient does complain of abdominal pain. The pain is located in the entire abdomen. The pain is described as dull, and is 4/10 in intensity.      Bowel Movements: Diarrhea x2    Bleeding:  None    Current Facility-Administered Medications   Medication Dose Route Frequency    iron sucrose (VENOFER) 500 mg in 0.9% sodium chloride 250 mL IVPB  500 mg IntraVENous ONCE    peg 3350-electrolytes (COLYTE) 4000 mL  2,000 mL Oral BID    ferrous sulfate tablet 325 mg  1 Tab Oral BID WITH MEALS    HYDROmorphone (PF) (DILAUDID) injection 0.5 mg  0.5 mg IntraVENous Q4H PRN    gabapentin (NEURONTIN) capsule 300 mg  300 mg Oral TID    benzocaine (HURRICANE) 20 % spray   Mucous Membrane PRN    0.9% sodium chloride with KCl 20 mEq/L infusion   IntraVENous CONTINUOUS    potassium chloride (KAON 10%) 20 mEq/15 mL oral liquid 20 mEq  20 mEq Oral DAILY    multivitamin, tx-iron-ca-min (THERA-M w/ IRON) tablet 1 Tab  1 Tab Oral DAILY    pantoprazole (PROTONIX) tablet 40 mg  40 mg Oral ACB    sodium chloride (NS) flush 5-10 mL  5-10 mL IntraVENous Q8H    sodium chloride (NS) flush 5-10 mL  5-10 mL IntraVENous PRN    ondansetron (ZOFRAN) injection 4 mg  4 mg IntraVENous Q6H PRN    acetaminophen (TYLENOL) tablet 650 mg  650 mg Oral Q4H PRN    enoxaparin (LOVENOX) injection 40 mg  40 mg SubCUTAneous Q24H    ELECTROLYTE REPLACEMENT PROTOCOL - Potassium  1 Each Other PRN    ELECTROLYTE REPLACEMENT PROTOCOL - Magnesium  1 Each Other PRN    atorvastatin (LIPITOR) tablet 80 mg  80 mg Oral QHS    morphine injection 2 mg  2 mg IntraVENous Q4H PRN          Objective:     Visit Vitals    /86 (BP 1 Location: Left arm, BP Patient Position: At rest)    Pulse (!) 104    Temp 98.4 °F (36.9 °C)    Resp 18    Ht 5' 4\" (1.626 m)    Wt 84.6 kg (186 lb 8.2 oz)    SpO2 99%    Breastfeeding No    BMI 30.1 kg/m2       03/15 0701 - 03/16 1900  In: 743.3 [P.O.:240; I.V.:503.3]  Out: 2650 [Urine:2650]    General appearance: alert, fatigued, distracted, no distress, slowed mentation, appears stated age, appears older than stated age, pale. Slurred speech. Has left hemiparesis with deviation of the mouth to the right. Abdomen: not distended, rounded but very soft. mild diffuse tenderness tender. Bowel sounds normal. No masses,  no organomegaly  Extremities: no cyanosis or edema,     Data Review:    Labs: Results:       Chemistry Recent Labs      03/16/17   0613  03/15/17   1025  03/14/17   1122   GLU  93  76   --    NA  138  136   --    K  4.2  4.3  3.0*   CL  105  103   --    CO2  26  23   --    BUN  2*  1*   --    CREA  0.50*  0.43*   --    CA  8.0*  7.8*   --    AGAP  7  10   --    BUCR  4*  2*   --       CBC w/Diff Recent Labs      03/15/17   1025  03/14/17   1122   HGB  8.6*  7.3*   HCT  27.7*  24.0*      Coagulation No results for input(s): PTP, INR, APTT in the last 72 hours. No lab exists for component: INREXT    Liver Enzymes No results for input(s): TP, ALB, TBIL, AP, SGOT, GPT in the last 72 hours. No lab exists for component: DBIL       Assessment:     Principal Problem:    Stroke ([facility] (3/10/2017)    Active Problems:    Chronic pain syndrome (2/27/2012)      Crohn disease ([facility] (2/27/2012)      Hypokalemia (8/8/2015)      Sickle cell trait (HCC) ()      Hypertension ()      Hx of cocaine abuse ()      Embolic stroke ([facility] (0/41/7700)      Acute blood loss anemia (3/15/2017)      Neuropathic pain (3/15/2017)      DVT (deep venous thrombosis) ([facility] (3/16/2017)        Plan:     Crohn's disease of the colon more on the left side present x 14 years. Untreated non compliant. She refused to have any colonoscopy which is limiting to do a proper evaluation and r/o cancer.  In this case we need to treat her empirically with Entyvio IV as an outpatient as we cant give it to her as an inpatient and still has to be approved by medicaid. Long term I am not keen on following her as an out patient as she is not compliant. For now start at least on Asacol and Prednisone. Iron deficiency anemia down to 7.3 most likely related to her active Crohn's disease    Sp CVA probably related to hyper coagulable state related to her Crohn's disease? Or something else?                     Mariya Merritt MD  March 16, 2017

## 2017-03-17 NOTE — PROGRESS NOTES
Cardiology Progress Note      3/17/2017 1:57 PM    Admit Date: 3/10/2017    Admit Diagnosis: Stroke [facility])  Stroke [facility])  Hypokalemia  CVA (cerebral vascular accident) ([facility]  anemia      Subjective:     Tika Roca denies chest pain, chest pressure/discomfort, dyspnea, palpitations, irregular heart beats.     Visit Vitals    /62 (BP 1 Location: Left arm, BP Patient Position: At rest;Head of bed elevated (Comment degrees))  Comment (BP Patient Position): 30 degrees     Pulse (!) 108    Temp 98.1 °F (36.7 °C)    Resp 18    Ht 5' 4\" (1.626 m)    Wt 83 kg (182 lb 15.7 oz)    SpO2 100%    Breastfeeding No    BMI 29.53 kg/m2     Current Facility-Administered Medications   Medication Dose Route Frequency    predniSONE (DELTASONE) tablet 40 mg  40 mg Oral DAILY WITH BREAKFAST    ferrous sulfate tablet 325 mg  1 Tab Oral BID WITH MEALS    HYDROmorphone (PF) (DILAUDID) injection 0.5 mg  0.5 mg IntraVENous Q4H PRN    gabapentin (NEURONTIN) capsule 300 mg  300 mg Oral TID    benzocaine (HURRICANE) 20 % spray   Mucous Membrane PRN    0.9% sodium chloride with KCl 20 mEq/L infusion   IntraVENous CONTINUOUS    potassium chloride (KAON 10%) 20 mEq/15 mL oral liquid 20 mEq  20 mEq Oral DAILY    multivitamin, tx-iron-ca-min (THERA-M w/ IRON) tablet 1 Tab  1 Tab Oral DAILY    pantoprazole (PROTONIX) tablet 40 mg  40 mg Oral ACB    sodium chloride (NS) flush 5-10 mL  5-10 mL IntraVENous Q8H    sodium chloride (NS) flush 5-10 mL  5-10 mL IntraVENous PRN    ondansetron (ZOFRAN) injection 4 mg  4 mg IntraVENous Q6H PRN    acetaminophen (TYLENOL) tablet 650 mg  650 mg Oral Q4H PRN    enoxaparin (LOVENOX) injection 40 mg  40 mg SubCUTAneous Q24H    ELECTROLYTE REPLACEMENT PROTOCOL - Potassium  1 Each Other PRN    ELECTROLYTE REPLACEMENT PROTOCOL - Magnesium  1 Each Other PRN    atorvastatin (LIPITOR) tablet 80 mg  80 mg Oral QHS    morphine injection 2 mg  2 mg IntraVENous Q4H PRN         Objective: Physical Exam:  Visit Vitals    /62 (BP 1 Location: Left arm, BP Patient Position: At rest;Head of bed elevated (Comment degrees))  Comment (BP Patient Position): 30 degrees     Pulse (!) 108    Temp 98.1 °F (36.7 °C)    Resp 18    Ht 5' 4\" (1.626 m)    Wt 83 kg (182 lb 15.7 oz)    SpO2 100%    Breastfeeding No    BMI 29.53 kg/m2     General Appearance:  Well developed, well nourished,alert and oriented x 3, and individual in no acute distress. Left sided weakness. Ears/Nose/Mouth/Throat:   Hearing grossly normal.         Neck: Supple. Chest:   Lungs clear to auscultation bilaterally. Cardiovascular:  Regular rate and rhythm, S1, S2 normal, no murmur. Abdomen:   Soft, non-tender, bowel sounds are active. Extremities: No edema bilaterally. Skin: Warm and dry. Data Review:   Labs:  No results found for this or any previous visit (from the past 24 hour(s)). Telemetry: normal sinus rhythm      Assessment:     Principal Problem:    [facility] (3/10/2017)    Active Problems:    Chronic pain syndrome (2/27/2012)      Crohn disease ([address] (2/27/2012)      Hypokalemia (8/8/2015)      Sickle cell trait (HCC) ()      Hypertension ()      Hx of cocaine abuse ()      Embolic stroke ([facility] (5/44/9688)      Acute blood loss anemia (3/15/2017)      Neuropathic pain (3/15/2017)      DVT (deep venous thrombosis) ([facility] (3/16/2017)        Plan:     The patient remains stable from the cardiac standpoint. She is to get a colonoscopy.     Chaz Pineda MD

## 2017-03-17 NOTE — PROCEDURES
[facility]  Colonoscopy Procedure Report  _______________________________________________________  Patient: Marcy Lu                                         Attending Physician: Irasema Mackay MD    Patient ID: [id number]                                      Referring Physician: None    Exam Date: March 17, 2017 _______________________________________________________      Introduction: A  40 y.o. female patient, presents for inpatient Colonoscopy    Indications: Severe Crohn's of the left colon x 14 years has been off biologics for few years because of infectious complications. She has been off therapy for few years. Has been having diarrhea with 6 liquid stools per day rarely with blood. She has chronic iron deficiency anemia requiring 3 blood transfusions a month ago because of severe rectal bleeding. Last colonoscopy 4 to 5 years ago. She was admitted with a CVA with right hemiparesis. Ref. Range 1/31/2017 21:00 2/9/2017 21:40 3/10/2017 16:30 3/11/2017 17:32 3/12/2017 06:15 3/13/2017 04:53 3/14/2017 11:22 3/15/2017 10:25   HGB Latest Ref Range: 12.0 - 16.0 g/dL 10.0 (L) 11.1 (L) 8.4 (L) 7.0 (L) 8.1 (L) 7.5 (L) 7.3 (L) 8.6 (L)   CT scan of the abdomen and pelvis February 7, 2017   There appears to be mild mid to distal left colonic and rectosigmoid thickening  suggesting a mild colitis. Fibroid uterus. Consent: The benefits, risks, and alternatives to the procedure were discussed and informed consent was obtained from the patient. Preparation: EKG, pulse, pulse oximetry and blood pressure were monitored throughout the procedure. ASA Classification: Class 2 - . The heart is an S1-S2 and regular heart rate and rhythm. Lungs are clear to auscultation and percussion. Abdomen is soft, nondistended, and nontender. Mental Status: awake, alert, and oriented to person, place, and time    Medications:  · Fentanyl 150 mcg IV before procedure.   · Versed 5 mg IV throughout the procedure. Rectal Exam: Large external anal skin tags. No Blood. Pathology Specimens:  2 specimens removed. Procedure: The pediatric colonoscope was passed with ease through the anus under direct visualization and advanced to the cecum and 10 cm inside the terminal ileum. The patient required positioning on the back to aid in the passage of the scope. The scope was withdrawn and the mucosa was carefully examined. The quality of the preparation was very poor. The views were poor. The patient's toleration of the procedure was fair. Retroflexion was preformed in the ascending colon and hepatic flexure. Total time is 25 minutes and withdrawal time is 12 minutes. Findings:  Huge quantity of solid formed soft and semi liquid stools throughout the whole colon  Rectum:   Normal  Sigmoid:   Severe Crohn's disease of the sigmoid with confluent serpiginous ulcers with cobble stone appearing mucosa. Many pseudo and inflammatory polyps  biopsies taken. Descending Colon:   Severe Crohn's disease of the descending colon with confluent serpiginous ulcers with cobble stone appearing mucosa. Many pseudo and inflammatory polyps. Few biopsies taken   Transverse Colon:   Mild to moderate Crohn's disease of the transverse colon with numerous small ulcers with cobble stone appearing mucosa. Many pseudo and inflammatory polyps extending the hepatic flexure. Few biopsies taken from transverse  Ascending Colon:   Normal. Huge amount of stools  Cecum:   Normal  Terminal Ileum:   Normal    Unplanned Events: There were no unplanned events. Estimated Blood Loss: less than 4 ml  Impressions:  Poor bowel prep with sub-optimal exam.  Severe Crohn's disease of the sigmoid with confluent serpiginous ulcers with cobble stone appearing mucosa. Many pseudo and inflammatory polyps extending mostly in the whole left and transverse colon. Few biopsies taken from transverse and left colon. No diverticula.  I decided not to attempt to remove any polyp because of the terrible bowel prep    Complications: None; patient tolerated the procedure well. Recommendations:  · Return to her room when standard parameters are met. · Resume regular diet. · Colonoscopy recommendation in 1 years with much better bowel prep. · Start oral steroid, 5 -ASA (Mesalamin)   · Get on Entyvio 300 mg IV perfusions at 0, week 2, 6 and then every 8 weeks. To start as soon as possible once released from the hospital because not available as an inpatient. · Resume the Lovenox.     Procedure Codes:    · COLONOSCOPY,BIOPSY [[id number]]    Endoscope Information:  Model Number(s)    [id number]     Assistant: None    Signed By: Brandon Bautista MD Date: March 17, 2017

## 2017-03-17 NOTE — PROGRESS NOTES
0730 Assumed pt care, pt is alert and oriented x4, ST on the monitor, VSS, lungs are clear on room air. Pt denies any further needs at this time. 5665 Administered dilaudid IV for r. leg and abd pain stated as a 10 on a 1-10 scale. See doc flow sheet for additional info. 1130 Consent form signed for colonoscopy and in pt's chart, pt is  finished with prep. 1445 Pt off of unit for scheduled procedure. 1645 Pt back on unit from procedure. Shift Summary- Pt experienced an uneventful shift. Pt had a colonoscopy and returned from procedure in stable condition. No acute events to report. Bedside and Verbal shift change report given to NAOMIE Correa (oncoming nurse) by Froy Roger   (offgoing nurse).  Report included the following information SBAR, Kardex, ED Summary, Procedure Summary, Intake/Output, MAR, Recent Results and Cardiac Rhythm ST.

## 2017-03-17 NOTE — PROGRESS NOTES
NEUROLOGY PROGRESS NOTE        Patient: Sharon Meadows        Sex: female          DOA: 3/10/2017  YOB: 1970      Age:  40 y.o.         LOS: 7 days      Identification:  40 y.o. female with history of Crohn's disease, past history of cocaine use (not recently) and Sickle Cell trait, who presented with left sided weakness, found to have multifocal embolic strokes.      SUBJECTIVE:   The patient is scheduled for colonoscopy. LE doppler shows DVD on right posterior tibial vein. She continues to have GI bleeding, therefore holding anticoagulation.      LAURIE does not show any intracardiac thrombosis.     Stroke Work-up:  Brain MRI:1. Multifocal acute infarctions, the largest is in the right MCA territory correlating with the abnormal CT. There are bilateral left greater than right cerebellar hemisphere infarctions. These infarctions are likely embolic with a central origin. 2. Possible hemorrhagic lesion within the pituitary. Scans dedicated to the pituitary could be performed. This could be artifact of volume averaging of the sellar floor but is at least suspect. 3. Chronic appearing right orbital floor fracture with protrusion of orbital fat and relative enophthalmos. MRA Brain: Occlusion inferior division right M2 segment MCA. This correlates with the acute infarction and is indeterminate in nature, most likely embolic certainly. No other significant intracranial vascular abnormality. MRA Neck: Limited noncontrast neck vascular evaluation. No definite hemodynamically significant stenosis is documented. Echocardiogram: EF is 60-65%, no intracardiac shunt detected. LAURIE: no intracardiac thrombus. Carotid Doppler: Left ICA <50% stenosis with a small plaque. No significant stenosis elsewhere. Lipid panel: LDL 91.6, HDL is 37. HbA1c: 5.7      ASSESSMENT/IMPRESSION:   Multifocal right hemispheric (temporal, thalamic, parietal and cerebellar infarcts), most likely cardioembolic in nature.  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 She has not been on any oral contraceptives. Untreated Crohn's disease maybe the underlying reason for high Factor right posterior tibial vein VIII. She needs anticoagulation with coumadin also for DVT, but due to GI bleeding, we can not do it. We will continue Aspirin for now> will wait to hear from GI/coloscopy before starting anticoagulation.     RECOMMENDATIONS:  1. Continue Aspirin 81mg po daily for now   2. Continue telemetry monitoring  3. Neuro checks per stroke protocol  4. Oral liquids for hydration  5. Continue gabapentin 300mg TID for feet pain.      I will follow the patient. Please do not hesitate to return with any questions. Signed:  Simon Joseph MD  3/17/2017  8:06 AM    REVIEW OF SYSTEMS: Denies chest pain or dyspnea. Abdominal pain+     OBJECTIVE:      Visit Vitals    /88 (BP 1 Location: Left arm, BP Patient Position: At rest)    Pulse 94    Temp 98.2 °F (36.8 °C)    Resp 18    Ht 5' 4\" (1.626 m)    Wt 83 kg (182 lb 15.7 oz)    SpO2 100%    Breastfeeding No    BMI 29.53 kg/m2     Physical Exam:  GEN: Alert, NAD  EYES: conjunctiva normal, lids with out lesions  ABDOMEN: Soft, non-tender. EXTREMITIES: No edema cyanosis  SKIN: no rashes or skin breakdown, no nodules  NEURO: Alert, oriented x3. Speech is dysarthric. Left sided weakness, left facial weakness and left neglect continues.     Current Facility-Administered Medications   Medication Dose Route Frequency    predniSONE (DELTASONE) tablet 40 mg  40 mg Oral DAILY WITH BREAKFAST    ferrous sulfate tablet 325 mg  1 Tab Oral BID WITH MEALS    HYDROmorphone (PF) (DILAUDID) injection 0.5 mg  0.5 mg IntraVENous Q4H PRN    gabapentin (NEURONTIN) capsule 300 mg  300 mg Oral TID    benzocaine (HURRICANE) 20 % spray   Mucous Membrane PRN    0.9% sodium chloride with KCl 20 mEq/L infusion   IntraVENous CONTINUOUS    potassium chloride (KAON 10%) 20 mEq/15 mL oral liquid 20 mEq  20 mEq Oral DAILY    multivitamin, tx-iron-ca-min (THERA-M w/ IRON) tablet 1 Tab  1 Tab Oral DAILY    pantoprazole (PROTONIX) tablet 40 mg  40 mg Oral ACB    sodium chloride (NS) flush 5-10 mL  5-10 mL IntraVENous Q8H    sodium chloride (NS) flush 5-10 mL  5-10 mL IntraVENous PRN    ondansetron (ZOFRAN) injection 4 mg  4 mg IntraVENous Q6H PRN    acetaminophen (TYLENOL) tablet 650 mg  650 mg Oral Q4H PRN    enoxaparin (LOVENOX) injection 40 mg  40 mg SubCUTAneous Q24H    ELECTROLYTE REPLACEMENT PROTOCOL - Potassium  1 Each Other PRN    ELECTROLYTE REPLACEMENT PROTOCOL - Magnesium  1 Each Other PRN    atorvastatin (LIPITOR) tablet 80 mg  80 mg Oral QHS    morphine injection 2 mg  2 mg IntraVENous Q4H PRN       Laboratory  No results found for this or any previous visit (from the past 24 hour(s)).

## 2017-03-17 NOTE — ROUTINE PROCESS
TRANSFER - IN REPORT:    Verbal report received from [facility]) on Troy Box  being received from endo(unit) for routine progression of care      Report consisted of patients Situation, Background, Assessment and   Recommendations(SBAR). Information from the following report(s) SBAR, Procedure Summary, Intake/Output and Cardiac Rhythm st was reviewed with the receiving nurse. Opportunity for questions and clarification was provided. Assessment completed upon patients arrival to unit and care assumed.

## 2017-03-17 NOTE — PERIOP NOTES
Report called to  [facility] (ARSH KEANE) RN, Fentanyl 150 mg given and Versed 4 mg given during the procedure. VSS. Patient transported back to the floor via transportation.

## 2017-03-17 NOTE — PROGRESS NOTES
1910 Pt received from offgoing nurse without any signs or symptoms of distress. Pt vitals are stable and within normal limits. Pt bed in low position with wheels locked and call bell within reach. 1951 Assessment completed and documented in flow sheet. Pt denies any further needs at this time. Pt in NAD with bed in low position, wheels locked and call bell within reach. 2118 Scheduled medications administered as ordered. Pain medication administered as per PRN order for c/o pain. See flow sheets for follow up documentation. 0100 Reassessment completed with no changes noted. Bed locked, in lowest position, with call light within reach. Pain medication administered as per PRN order for c/o pain. See flow sheets for follow up documentation. 1055 Reassessment completed with no changes noted. Bed locked, in lowest position, with call light within reach. Pain medication administered as per PRN order for c/o pain. See flow sheets for follow up documentation. 5159 Bedside and Verbal shift change report given to Zurdo Medrano RN (oncoming nurse) by Taye Jones RN (offgoing nurse). Report given with SBAR, Intake/Output, MAR and Recent Results.

## 2017-03-17 NOTE — PROGRESS NOTES
Hospitalist Progress Note    Patient: Tacho Crouch MRN: [id number]  CSN: [id number]    YOB: 1970  Age: 40 y.o. Sex: female    DOA: 3/10/2017 LOS:  LOS: 7 days                Assessment/Plan     Patient Active Problem List   Diagnosis Code    Chronic pain syndrome G89.4    Crohn disease ([address] K50.90    Hypokalemia E87.6    Stroke ([address] I63.9    [facility] D57.3    Hypertension I10    Hx of cocaine abuse M93.296    Embolic stroke ([address] S80.9    Acute blood loss anemia D62    Neuropathic pain M79.2    DVT (deep venous thrombosis) ([facility]) I82.409            39 yo female admitted with left sided weakness. Acute CVA - multifocal right hemispheric infarcts. Neurology following, on aspirin. Acute on chronic anemia - related to crohn's disease. crohns disease - un treated, non compliant, seen by GI, s/p colonoscopy, on asacol and prednisone. History of DVT - not able to anticoagulate. Heme/onc following. Hypokalemia - repleted    Review of systems  General: No fevers or chills. Cardiovascular: No chest pain or pressure. No palpitations. Pulmonary: No shortness of breath. Gastrointestinal: No nausea, vomiting. Physical Exam:  General: Awake, cooperative, no acute distress    HEENT: NC, Atraumatic. PERRLA, anicteric sclerae. Lungs: CTA Bilaterally. No Wheezing/Rhonchi/Rales. Heart:  Regular  rhythm,  No murmur, No Rubs, No Gallops  Abdomen: Soft, Non distended, Non tender.  +Bowel sounds,   Extremities: No c/c/e  Psych:   Not anxious or agitated. Neurologic:  Left sided weakness.            Vital signs/Intake and Output:  Visit Vitals    /84 (BP 1 Location: Left arm, BP Patient Position: At rest)    Pulse (!) (P) 106    Temp 98.9 °F (37.2 °C)    Resp 22    Ht 5' 4\" (1.626 m)    Wt 83 kg (182 lb 15.7 oz)    SpO2 100%    Breastfeeding No    BMI 29.53 kg/m2     Current Shift:     Last three shifts:  03/15 1901 - 03/17 0700  In: 1536.7 [P.O.:720; I.V.:816.7]  Out: 2100 [Urine:1800]            Labs: Results:       Chemistry Recent Labs      03/16/17   0613  03/15/17   1025   GLU  93  76   NA  138  136   K  4.2  4.3   CL  105  103   CO2  26  23   BUN  2*  1*   CREA  0.50*  0.43*   CA  8.0*  7.8*   AGAP  7  10   BUCR  4*  2*      CBC w/Diff Recent Labs      03/15/17   1025   HGB  8.6*   HCT  27.7*      Cardiac Enzymes No results for input(s): CPK, CKND1, CHANDA in the last 72 hours. No lab exists for component: CKRMB, TROIP   Coagulation No results for input(s): PTP, INR, APTT in the last 72 hours. No lab exists for component: INREXT    Lipid Panel Lab Results   Component Value Date/Time    Cholesterol, total 145 03/12/2017 06:15 AM    HDL Cholesterol 37 03/12/2017 06:15 AM    LDL, calculated 91.6 03/12/2017 06:15 AM    VLDL, calculated 16.4 03/12/2017 06:15 AM    Triglyceride 82 03/12/2017 06:15 AM    CHOL/HDL Ratio 3.9 03/12/2017 06:15 AM      BNP No results for input(s): BNPP in the last 72 hours. Liver Enzymes No results for input(s): TP, ALB, TBIL, AP, SGOT, GPT in the last 72 hours.     No lab exists for component: DBIL   Thyroid Studies Lab Results   Component Value Date/Time    TSH 1.51 12/12/2009 09:48 AM        Procedures/imaging: see electronic medical records for all procedures/Xrays and details which were not copied into this note but were reviewed prior to creation of Plan

## 2017-03-17 NOTE — PROGRESS NOTES
NUTRITION FOLLOW-UP    RECOMMENDATIONS/PLAN:   - Resume Cardiac [facility] Altered NDD2 diet after procedures if medically indicated  - Suggest addition of Ensure Compact BID to increase protein/kcal intake due to variable intake    NUTRITION ASSESSMENT:   Client Update: 40 yrs old Female with acute CVA, neuropathic foot pain, and anemia. On CL diet x2 days for colonoscopy today. Overall this adm PO intake is fair, variable. FOOD/NUTRITION INTAKE   Diet Order:  Clear Liquid   Supplements: none   Food Allergies: NKFA  Average PO Intake (of Cardiac [facility] Altered diet):   % Diet Eaten   03/15/17 1842 100 %   03/14/17 1441 50 %   03/14/17 1103 10 %   03/13/17 1820 90 %   03/13/17 1607 30 %     Pertinent Medications:  [x] Reviewed; NS/KCl, lipitor, fentanyl, FeSO4, neurontin, dilaudid, morphine, ppi, MVI, mesalamine, prednisone, kaon  Insulin:  []SSI  []Pre-meal   []Basal    []Drip  [x]None  Cultural/Sabianist Food Preferences: None Identified       ANTHROPOMETRICS  Height: 5' 4\" (162.6 cm)       Weight: 83 kg (182 lb 15.7 oz)         BMI: 29.5 kg/m^2 overweight (25.0%-29.9% BMI)   Adm Weight: 177 lbs                Weight change: +6 lbs  Adjusted Body Weight: 65 kg     NUTRITION-FOCUSED PHYSICAL ASSESSMENT  Skin: intact      GI: last BM 3/17- watery    BIOCHEMICAL DATA & MEDICAL TESTS  Pertinent Labs:  [x] Reviewed; CRP 6.2, Hgb 8.6, Hct 27.7    NUTRITION PRESCRIPTION  Calories: 1800- 2040 kcal/day based on 30-34 kcal/kg AdjBW   Protein: 65-78 g/day based on 1-1.2 g/kg AdjBW  CHO: 225-255 g/day based on 50% of total energy  Fluid: 7244-9178 ml/day based on 1 kcal/ml      NUTRITION DIAGNOSES:   1. At risk of inadequate oral intake related to acute CVA as evidenced by dysphagia and variable PO intake % of meals  - ongoing    NUTRITION INTERVENTIONS:   INTERVENTIONS:        GOALS:  1.  Compact BID, advance diet 1. >50% PO intake of meals/supplements by next review 3-5 days     LEARNING NEEDS (Diet, Supplementation, Food/Nutrient-Drug Interaction):   [] None Identified   [] Education provided/documented      Identified and patient: [] Declined   [x] Was not appropriate/indicated        NUTRITION MONITORING /EVALUATION:   Follow PO intake  Monitor wt  Monitor renal labs, electrolytes, fluid status  Monitor for additional supplement needs     Previous Recommendations Implemented: yes        Previous Goals Met:  yes -progressing      [] Participated in Interdisciplinary Rounds    [x] Interdisciplinary Care Plan Reviewed  DISCHARGE NUTRITION RECOMMENDATIONS ADDRESSED:     [x] To be determined closer to discharge    NUTRITION RISK:           [x] At risk                        [] Not currently at risk        Will follow-up per policy.   Jyothi Fisher RD  PAGER:  [phone]

## 2017-03-17 NOTE — PROGRESS NOTES
Problem: Mobility Impaired (Adult and Pediatric)  Goal: *Acute Goals and Plan of Care (Insert Text)  Physical Therapy Goals  Initiated 3/11/2017 and to be accomplished within 7 day(s)  1. Patient will move from supine to sit and sit to supine in bed with supervision/set-up. 2. Patient will transfer from bed to chair and chair to bed with supervision/set-up using the least restrictive device. 3. Patient will perform sit to stand with supervision/set-up. 4. Patient will ambulate with supervision/set-up for >100 feet with the least restrictive device. Holding PT at this time due to patient working with [facility] team. Patient is also scheduled for colonoscopy soon. Will follow up later as patient's schedule permits.      Caridad Barakat PT

## 2017-03-18 PROCEDURE — 87340 HEPATITIS B SURFACE AG IA: CPT | Performed by: INTERNAL MEDICINE

## 2017-03-18 PROCEDURE — 65660000000 HC RM CCU STEPDOWN

## 2017-03-18 PROCEDURE — 86706 HEP B SURFACE ANTIBODY: CPT | Performed by: INTERNAL MEDICINE

## 2017-03-18 PROCEDURE — 74011636637 HC RX REV CODE- 636/637: Performed by: INTERNAL MEDICINE

## 2017-03-18 PROCEDURE — 97168 OT RE-EVAL EST PLAN CARE: CPT

## 2017-03-18 PROCEDURE — 74011250636 HC RX REV CODE- 250/636: Performed by: HOSPITALIST

## 2017-03-18 PROCEDURE — 74011250637 HC RX REV CODE- 250/637: Performed by: INTERNAL MEDICINE

## 2017-03-18 PROCEDURE — 97164 PT RE-EVAL EST PLAN CARE: CPT

## 2017-03-18 PROCEDURE — 97535 SELF CARE MNGMENT TRAINING: CPT

## 2017-03-18 PROCEDURE — 74011250637 HC RX REV CODE- 250/637: Performed by: HOSPITALIST

## 2017-03-18 PROCEDURE — 74011250637 HC RX REV CODE- 250/637: Performed by: FAMILY MEDICINE

## 2017-03-18 PROCEDURE — 74011250636 HC RX REV CODE- 250/636: Performed by: INTERNAL MEDICINE

## 2017-03-18 PROCEDURE — 97116 GAIT TRAINING THERAPY: CPT

## 2017-03-18 PROCEDURE — 86704 HEP B CORE ANTIBODY TOTAL: CPT | Performed by: INTERNAL MEDICINE

## 2017-03-18 RX ORDER — HYDROMORPHONE HYDROCHLORIDE 1 MG/ML
0.5 INJECTION, SOLUTION INTRAMUSCULAR; INTRAVENOUS; SUBCUTANEOUS
Status: DISCONTINUED | OUTPATIENT
Start: 2017-03-18 | End: 2017-03-23 | Stop reason: HOSPADM

## 2017-03-18 RX ORDER — GABAPENTIN 400 MG/1
400 CAPSULE ORAL 3 TIMES DAILY
Status: DISCONTINUED | OUTPATIENT
Start: 2017-03-18 | End: 2017-03-23 | Stop reason: HOSPADM

## 2017-03-18 RX ORDER — ENOXAPARIN SODIUM 100 MG/ML
80 INJECTION SUBCUTANEOUS EVERY 24 HOURS
Status: DISCONTINUED | OUTPATIENT
Start: 2017-03-19 | End: 2017-03-23 | Stop reason: DRUGHIGH

## 2017-03-18 RX ORDER — HYDROMORPHONE HYDROCHLORIDE 4 MG/1
4 TABLET ORAL
Status: DISCONTINUED | OUTPATIENT
Start: 2017-03-18 | End: 2017-03-23 | Stop reason: HOSPADM

## 2017-03-18 RX ORDER — MORPHINE SULFATE 2 MG/ML
2 INJECTION, SOLUTION INTRAMUSCULAR; INTRAVENOUS
Status: DISCONTINUED | OUTPATIENT
Start: 2017-03-18 | End: 2017-03-23 | Stop reason: HOSPADM

## 2017-03-18 RX ADMIN — Medication 10 ML: at 00:36

## 2017-03-18 RX ADMIN — POTASSIUM CHLORIDE 20 MEQ: 20 SOLUTION ORAL at 09:44

## 2017-03-18 RX ADMIN — MESALAMINE 1000 MG: 250 CAPSULE ORAL at 18:16

## 2017-03-18 RX ADMIN — MULTIPLE VITAMINS W/ MINERALS TAB 1 TABLET: TAB at 09:44

## 2017-03-18 RX ADMIN — SODIUM CHLORIDE AND POTASSIUM CHLORIDE: 9; 1.49 INJECTION, SOLUTION INTRAVENOUS at 00:39

## 2017-03-18 RX ADMIN — HYDROMORPHONE HYDROCHLORIDE 0.5 MG: 1 INJECTION, SOLUTION INTRAMUSCULAR; INTRAVENOUS; SUBCUTANEOUS at 00:36

## 2017-03-18 RX ADMIN — Medication 10 ML: at 15:26

## 2017-03-18 RX ADMIN — SODIUM CHLORIDE AND POTASSIUM CHLORIDE: 9; 1.49 INJECTION, SOLUTION INTRAVENOUS at 12:16

## 2017-03-18 RX ADMIN — FERROUS SULFATE TAB 325 MG (65 MG ELEMENTAL FE) 325 MG: 325 (65 FE) TAB at 09:44

## 2017-03-18 RX ADMIN — MESALAMINE 1000 MG: 250 CAPSULE ORAL at 21:44

## 2017-03-18 RX ADMIN — PREDNISONE 40 MG: 20 TABLET ORAL at 09:44

## 2017-03-18 RX ADMIN — ATORVASTATIN CALCIUM 80 MG: 20 TABLET, FILM COATED ORAL at 21:45

## 2017-03-18 RX ADMIN — HYDROMORPHONE HYDROCHLORIDE 0.5 MG: 1 INJECTION, SOLUTION INTRAMUSCULAR; INTRAVENOUS; SUBCUTANEOUS at 06:35

## 2017-03-18 RX ADMIN — MESALAMINE 1000 MG: 250 CAPSULE ORAL at 12:09

## 2017-03-18 RX ADMIN — GABAPENTIN 400 MG: 400 CAPSULE ORAL at 09:44

## 2017-03-18 RX ADMIN — ENOXAPARIN SODIUM 40 MG: 40 INJECTION SUBCUTANEOUS at 09:43

## 2017-03-18 RX ADMIN — Medication 10 ML: at 09:44

## 2017-03-18 RX ADMIN — GABAPENTIN 400 MG: 400 CAPSULE ORAL at 21:45

## 2017-03-18 RX ADMIN — Medication 10 ML: at 23:22

## 2017-03-18 RX ADMIN — GABAPENTIN 400 MG: 400 CAPSULE ORAL at 15:23

## 2017-03-18 RX ADMIN — HYDROMORPHONE HYDROCHLORIDE 4 MG: 4 TABLET ORAL at 18:20

## 2017-03-18 RX ADMIN — PANTOPRAZOLE SODIUM 40 MG: 40 TABLET, DELAYED RELEASE ORAL at 06:35

## 2017-03-18 RX ADMIN — Medication 2 MG: at 15:23

## 2017-03-18 RX ADMIN — Medication 2 MG: at 03:39

## 2017-03-18 RX ADMIN — MESALAMINE 1000 MG: 250 CAPSULE ORAL at 09:43

## 2017-03-18 RX ADMIN — Medication 2 MG: at 21:44

## 2017-03-18 RX ADMIN — HYDROMORPHONE HYDROCHLORIDE 4 MG: 4 TABLET ORAL at 23:20

## 2017-03-18 RX ADMIN — HYDROMORPHONE HYDROCHLORIDE 4 MG: 4 TABLET ORAL at 12:09

## 2017-03-18 RX ADMIN — FERROUS SULFATE TAB 325 MG (65 MG ELEMENTAL FE) 325 MG: 325 (65 FE) TAB at 18:16

## 2017-03-18 NOTE — CONSULTS
[facility] [address]    Name:  Kylee Vasquez  MR#:  [id number]  :  1970  Account #:  [de-identified]  Date of Adm:  03/10/2017  Date of Consultation:  2017      VASCULAR CONSULTATION    REASON FOR CONSULTATION: Hospitalization for acute stroke and  acute Crohn's. HISTORY OF PRESENT ILLNESS: The patient is a 78-year-old  female in the hospital. She has had bleeding from her anus related to  acute Crohn's inflammation. She is also hospitalized for acute stroke  and a workup for hypercoagulable disorder. She noted to have a DVT  on study, see below. Vascular consultation was requested regarding  possibility of an IVC filter placement. She tells me she has never had a  blood clot before to her knowledge. No history of filter. She is  chronically treated for Crohn's and tells me that the hypercoagulable  workup is a new item to her. She currently has no chest pain, no  shortness of breath. She is comfortable with the exception of some  pain on the bottom of her right foot. She tells me that she had her  stroke, she feels like she fell and twisted her foot. She has no  claudication, no chronic edema. PAST MEDICAL HISTORY: Significant for Crohn disease, history of C  difficile colitis, sickle cell trait, chronic abdominal pain, herpes zoster,  history of cocaine use, hyperkalemia, hypertension, history of anemia,  history of MRSA, chronic pain syndrome. PAST SURGICAL HISTORY: Tubal ligation. SOCIAL HISTORY: She lives independently. She is a former smoker. She denies current tobacco use. No alcohol use, history of cocaine  use. HOME MEDICATIONS: Include  1. Lipitor 80 mg.  2. Lovenox 40 daily. 3. Ferrous sulfate. 4. Neurontin. 5. Pentasa. 6. Multivitamin with iron. 7. Protonix. 8. Potassium chloride replacement as needed. 9. Prednisone. ALLERGIES: NONE. REVIEW OF SYSTEMS  NEUROLOGIC: Acute stroke. ENDOCRINE: No diabetes.   PULMONARY: No shortness of breath. No chronic lung disease. CARDIAC: Treated for hypertension. GASTROINTESTINAL: Chronic Crohn's disorder. GENITOURINARY: No history of dialysis. GYNECOLOGIC: History of tubal ligation. INTEGUMENT: No chronic skin disorders. VASCULAR: Factor VIII disorder, hypercoagulable disorder with  arterial and venous thrombosis. GASTROINTESTINAL: Recent hospital stay at [facility] for acute  Crohn's flare-up within the last couple months. MUSCULOSKELETAL: Possible fall on right foot. PHYSICAL EXAMINATION  GENERAL: I saw her at the bedside. Her affect and demeanor are  pleasant and without distress. VITAL SIGNS: Her current pulse rate is 95, blood pressure 125/78,  temperature is 98.5. HEENT: Head is normocephalic and atraumatic. Pupils are reactive. NECK: No JVD. CHEST: Sounds clear and nonlabored. CARDIAC: Regular. ABDOMEN: Soft, nondistended. EXTREMITIES: Lower extremities have no edema. Range of motion  seems symmetrical. Her feet are warm bilaterally with no signs of  acute arterial insufficiency. She has some bruising on the bottom of her  foot on the right. DATA: Review of her hospital testing, she has a DVT study that shows  a recannulated right posterior tibial vein DVT, no acute appearance, no  other DVT notable. CT shows infarct on right side brain. Recent  colonoscopy with typical Crohn's appearance. Current hemoglobin is  8.6, platelets are 357. Her creatinine is 0.50. Carotid studies showed  no significant carotid lesion. Reviewed chart including oncology and  neurology evaluations. DIAGNOSES  1. Cerebral event, possibly related hypercoagulable disorder. No  definable anatomical defect seen on imaging, will need chronic  anticoagulation.   2. Gastrointestinal bleed related to Crohn disease, currently oral  anticoagulation on hold, but once Crohn disease as acutely  suppressed and she is on suppressive therapy, would like to maintain  her on some type of outpatient anticoagulation per Hematology. 3. Chronic calf vein deep vein thrombosis. Would elect to treat this with  compression stockings. She will continue her subcutaneous Lovenox  while in the hospital, that is at 40 subcu daily. At this point, no  indication for a filter. It is a small recanalized calf vein thrombus, low  risk for embolization and she is high risk for filter placement with her  having hypercoagulable disorder, arterial clotting disorder, would  certainly hate for her to have a caval interruption or caval thrombosis  related to the filter. Thanks so much for allowing me to participate in the care of this  patient. I had a long discussion with her today. She understands her  vascular evaluation.         Harsh Capellan DO CM / Zandra Rehman  D:  03/18/2017   09:56  T:  03/18/2017   10:26  Job #:  [id number]

## 2017-03-18 NOTE — PROGRESS NOTES
Problem: Mobility Impaired (Adult and Pediatric)  Goal: *Acute Goals and Plan of Care (Insert Text)  Physical Therapy Goals  Initiated 3/11/2017 and to be accomplished within 7 day(s)  1. Patient will move from supine to sit and sit to supine in bed with supervision/set-up. 2. Patient will transfer from bed to chair and chair to bed with supervision/set-up using the least restrictive device. 3. Patient will perform sit to stand with supervision/set-up. 4. Patient will ambulate with supervision/set-up for >100 feet with the least restrictive device. Re-evaluation on 3/18/2017. Goals to be met within 2-8 days. 1. Patient will move from supine to sit and sit to supine in bed with supervision/set-up. 2. Patient will transfer from bed to chair and chair to bed with supervision/set-up using the least restrictive device. 3. Patient will perform sit to stand with supervision/set-up. 4. Patient will ambulate with supervision/modified independence for >250 feet with the least restrictive device. 5. Patient will demonstrate compliance and understanding of home exercise program to maximize strength and functional return. Outcome: Progressing Towards Goal  PHYSICAL THERAPY RE-EVALUATION AND TREATMENT     Patient: Kenneth Thurston (28 y.o. female)  Date: 3/18/2017  Diagnosis: Stroke [facility])  Stroke ([facility]  Hypokalemia  CVA (cerebral vascular accident) ([facility]  anemia Stroke ([facility]  Procedure(s) (LRB):  COLONOSCOPY; BIOPSY; (N/A) 1 Day Post-Op  Precautions: Fall      ASSESSMENT:  The patient continues to exhibit decreased functional mobility with regards to left sided strength, transfers, gait, balance, and tolerance to activity. The patient has made progress with physical therapy, however continues to demonstrate left side neglect and weakness. The patient was seen with OT this date. Patient stood up to a rolling walker with supervision and ambulated 800 feet in the hallway.  Gait is slow but steady with no overt loss of balance. Verbal cues were provided to improve foot clearance and promote heel-toe step pattern, especially for left lower extremity. The patient states that she is not ready to progress from a walker at this time, despite appearing very steady with a walker. Patient was returned to her room, seated up in a chair. Neurology entered to speak with the patient. Patient's lunch also arrived. Encouraged continued use of left upper extremity for ADLs. Will continue PT to return the patient to her prior level of function. Recommend rehab to maximize balance, strength, coordination, and overall independence. Patient's progression toward goals since last assessment: Good       PLAN:  Goals have been updated based on progression since last assessment. Patient continues to benefit from skilled intervention to address the above impairments. Continue to follow the patient daily to address goals. Planned Interventions:  [X]     Bed Mobility Training          [ ]     Neuromuscular Re-Education  [X]     Transfer Training                [ ]    Orthotic/Prosthetic Training  [X]     Gait Training                       [ ]     Modalities  [X]     Therapeutic Exercises       [ ]     Edema Management/Control  [X]     Therapeutic Activities         [X]     Patient and Family Training/Education  [ ]     Other (comment):  Discharge Recommendations: Rehab   Further Equipment Recommendations for Discharge: Rolling walker       SUBJECTIVE:   Patient stated I try to exercise this hand.       OBJECTIVE DATA SUMMARY:   Critical Behavior:  Neurologic State: Alert  Orientation Level: Appropriate for age  Cognition: Appropriate decision making  Safety/Judgement: Fall prevention  Functional Mobility Training:  Bed Mobility:  Rolling: Supervision  Supine to Sit: Supervision  Scooting: Supervision  Transfers:  Sit to Stand: Supervision  Stand to Sit: Supervision  Balance:  Sitting: Intact  Standing: Intact; With support  Standing - Static: Good  Standing - Dynamic : Fair  Ambulation/Gait Training:  Distance (ft): 800 Feet (ft)  Assistive Device: Gait belt;Walker, rolling  Ambulation - Level of Assistance: Supervision  Gait Abnormalities: Decreased step clearance  Speed/Janice: Slow  Step Length: Right shortened;Left shortened  Swing Pattern: Right asymmetrical;Left asymmetrical     Therapeutic Exercises:   Educated patient in lower extremity exercises. Emphasized left dorsiflexion as patient does not ambulate with heel-toe pattern. Pain:  Pain Scale 1: Numeric (0 - 10)  Pain Intensity 1: 0  Activity Tolerance:   Good  Please refer to the flowsheet for vital signs taken during this treatment.   After treatment:   [X]  Patient left in no apparent distress sitting up in chair  [ ]  Patient left in no apparent distress in bed  [X]  Call bell left within reach  [X]  Nursing notified  [ ]  Caregiver present  [ ]  Bed alarm activated     Odalys Sandra   Time Calculation: 48 mins

## 2017-03-18 NOTE — PROGRESS NOTES
NEUROLOGY PROGRESS NOTE        Patient: Deysi Ontiveros        Sex: female          DOA: 3/10/2017  YOB: 1970      Age:  40 y.o.         LOS: 8 days     Identification:  40 y.o. female with history of Crohn's disease, past history of cocaine use (not recently) and Sickle Cell trait, who presented with left sided weakness, found to have multifocal embolic strokes.      SUBJECTIVE:   The patient underwent colonoscopy, which showed findings if Crohn's. It was limited exam due to bad bowel prep. LE doppler showed DVD on right posterior tibial vein. Currently, she does nto have any bleeding. She was started on prednisone for Crohn's treatment. LAURIE does not show any intracardiac thrombosis.     Stroke Work-up:  Brain MRI:1. Multifocal acute infarctions, the largest is in the right MCA territory correlating with the abnormal CT. There are bilateral left greater than right cerebellar hemisphere infarctions. These infarctions are likely embolic with a central origin. 2. Possible hemorrhagic lesion within the pituitary. Scans dedicated to the pituitary could be performed. This could be artifact of volume averaging of the sellar floor but is at least suspect. 3. Chronic appearing right orbital floor fracture with protrusion of orbital fat and relative enophthalmos. MRA Brain: Occlusion inferior division right M2 segment MCA. This correlates with the acute infarction and is indeterminate in nature, most likely embolic certainly. No other significant intracranial vascular abnormality. MRA Neck: Limited noncontrast neck vascular evaluation. No definite hemodynamically significant stenosis is documented. Echocardiogram: EF is 60-65%, no intracardiac shunt detected. LAURIE: no intracardiac thrombus. Carotid Doppler: Left ICA <50% stenosis with a small plaque. No significant stenosis elsewhere. Lipid panel: LDL 91.6, HDL is 37.    HbA1c: 5.7      ASSESSMENT/IMPRESSION:   Multifocal right hemispheric (temporal, thalamic, parietal and cerebellar infarcts), most likely cardioembolic in nature. 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 She has not been on any oral contraceptives. Untreated Crohn's disease maybe the underlying reason for high Factor VIII. She needs anticoagulation with coumadin also for DVT. Currently ,she does not have GI bleeding. I recommend anticoagulation.      RECOMMENDATIONS:  1. D/c aspirin and start enoxaparine with a goal to start coumadin if she does not bleed. It is reasonable to watch the patient closely while in house, if needed one week more. 2. Continue telemetry monitoring  3. Neuro checks per stroke protocol  4. Oral liquids for hydration  5. Continue gabapentin 300mg TID for feet pain.      I will follow the patient. Please do not hesitate to return with any questions. Signed:  Omid Fletcher MD  3/18/2017  10:19 AM    REVIEW OF SYSTEMS: Denies chest pain. No hematochezia now. OBJECTIVE:      Visit Vitals    /78 (BP 1 Location: Left arm, BP Patient Position: At rest)    Pulse 95    Temp 98.5 °F (36.9 °C)    Resp 18    Ht 5' 4\" (1.626 m)    Wt 79 kg (174 lb 3.2 oz)    SpO2 100%    Breastfeeding No    BMI 28.12 kg/m2     Physical Exam:  GEN: Alert, NAD  EYES: conjunctiva normal, lids with out lesions  HEENT: MMM. HEART: RRR +S1 +S2  LUNGS: CTA B/L no rales or rhonchi. ABDOMEN: Soft, non-tender. EXTREMITIES: No edema cyanosis  SKIN: no rashes or skin breakdown, no nodules  NEURO: Alert, oriented x3. Speech is fluent. Left facial weakness. Left arm drift. Han to walk with a walker.     Current Facility-Administered Medications   Medication Dose Route Frequency    HYDROmorphone (PF) (DILAUDID) injection 0.5 mg  0.5 mg IntraVENous Q12H PRN    morphine injection 2 mg  2 mg IntraVENous Q6H PRN    gabapentin (NEURONTIN) capsule 400 mg  400 mg Oral TID    HYDROmorphone (DILAUDID) tablet 4 mg  4 mg Oral Q4H PRN    [START ON 3/19/2017] enoxaparin (LOVENOX) injection 80 mg  80 mg SubCUTAneous Q24H    mesalamine (PENTASA) CR capsule 1,000 mg  1 g Oral QID    predniSONE (DELTASONE) tablet 40 mg  40 mg Oral DAILY WITH BREAKFAST    ferrous sulfate tablet 325 mg  1 Tab Oral BID WITH MEALS    benzocaine (HURRICANE) 20 % spray   Mucous Membrane PRN    0.9% sodium chloride with KCl 20 mEq/L infusion   IntraVENous CONTINUOUS    potassium chloride (KAON 10%) 20 mEq/15 mL oral liquid 20 mEq  20 mEq Oral DAILY    multivitamin, tx-iron-ca-min (THERA-M w/ IRON) tablet 1 Tab  1 Tab Oral DAILY    pantoprazole (PROTONIX) tablet 40 mg  40 mg Oral ACB    sodium chloride (NS) flush 5-10 mL  5-10 mL IntraVENous Q8H    sodium chloride (NS) flush 5-10 mL  5-10 mL IntraVENous PRN    ondansetron (ZOFRAN) injection 4 mg  4 mg IntraVENous Q6H PRN    acetaminophen (TYLENOL) tablet 650 mg  650 mg Oral Q4H PRN    ELECTROLYTE REPLACEMENT PROTOCOL - Potassium  1 Each Other PRN    ELECTROLYTE REPLACEMENT PROTOCOL - Magnesium  1 Each Other PRN    atorvastatin (LIPITOR) tablet 80 mg  80 mg Oral QHS       Laboratory  No results found for this or any previous visit (from the past 24 hour(s)).

## 2017-03-18 NOTE — PROGRESS NOTES
1915 Pt received from offgoing nurse without any signs or symptoms of distress. Pt vitals are stable and within normal limits. Pt bed in low position with wheels locked and call bell within reach. 2004 Assessment completed and documented in flow sheet. Pt denies any further needs at this time. Pt in NAD with bed in low position, wheels locked and call bell within reach. Pain medication administered as per PRN order for c/o pain. See flow sheets for follow up documentation. 2039 Pt c/o mausea. zofran administered per PRN order    2146 Scheduled medications administered as ordered. 0036 Pain medication administered as per PRN order for c/o pain. See flow sheets for follow up documentation. 7876 Pain medication administered as per PRN order for c/o pain. See flow sheets for follow up documentation. 0402 Reassessment completed with no changes noted. Bed locked, in lowest position, with call light within reach. 3877 Pain medication administered as per PRN order for c/o pain. See flow sheets for follow up documentation. 0715 Bedside and Verbal shift change report given to Lei Garcia RN (oncoming nurse) by Alexus Esteban RN (offgoing nurse). Report given with SBAR, Intake/Output, MAR and Recent Results.

## 2017-03-18 NOTE — PROGRESS NOTES
Gastrointestinal Progress Note    Patient Name: Sofia Giron    PVYOL'G Date: 3/18/2017    Admit Date: 3/10/2017    Subjective:   Poor historian  Diet: Patient is on regular solid diet and is tolerating 2/3. Nausea is present. Vomiting is not present. Pain: Patient does not complain of abdominal pain.     Bowel Movements: None    Bleeding:  None    Current Facility-Administered Medications   Medication Dose Route Frequency    HYDROmorphone (PF) (DILAUDID) injection 0.5 mg  0.5 mg IntraVENous Q12H PRN    morphine injection 2 mg  2 mg IntraVENous Q6H PRN    gabapentin (NEURONTIN) capsule 400 mg  400 mg Oral TID    HYDROmorphone (DILAUDID) tablet 4 mg  4 mg Oral Q4H PRN    [START ON 3/19/2017] enoxaparin (LOVENOX) injection 80 mg  80 mg SubCUTAneous Q24H    mesalamine (PENTASA) CR capsule 1,000 mg  1 g Oral QID    predniSONE (DELTASONE) tablet 40 mg  40 mg Oral DAILY WITH BREAKFAST    ferrous sulfate tablet 325 mg  1 Tab Oral BID WITH MEALS    benzocaine (HURRICANE) 20 % spray   Mucous Membrane PRN    0.9% sodium chloride with KCl 20 mEq/L infusion   IntraVENous CONTINUOUS    potassium chloride (KAON 10%) 20 mEq/15 mL oral liquid 20 mEq  20 mEq Oral DAILY    multivitamin, tx-iron-ca-min (THERA-M w/ IRON) tablet 1 Tab  1 Tab Oral DAILY    pantoprazole (PROTONIX) tablet 40 mg  40 mg Oral ACB    sodium chloride (NS) flush 5-10 mL  5-10 mL IntraVENous Q8H    sodium chloride (NS) flush 5-10 mL  5-10 mL IntraVENous PRN    ondansetron (ZOFRAN) injection 4 mg  4 mg IntraVENous Q6H PRN    acetaminophen (TYLENOL) tablet 650 mg  650 mg Oral Q4H PRN    ELECTROLYTE REPLACEMENT PROTOCOL - Potassium  1 Each Other PRN    ELECTROLYTE REPLACEMENT PROTOCOL - Magnesium  1 Each Other PRN    atorvastatin (LIPITOR) tablet 80 mg  80 mg Oral QHS          Objective:     Visit Vitals    /83 (BP 1 Location: Left arm, BP Patient Position: At rest)    Pulse (!) 102    Temp 98.4 °F (36.9 °C)    Resp 17    Ht 5' 4\" (1.626 m)    Wt 79 kg (174 lb 3.2 oz)    SpO2 99%    Breastfeeding No    BMI 28.12 kg/m2       03/16 1901 - 03/18 0700  In: 2644.2 [P.O.:1200; I.V.:1444.2]  Out: 300     General appearance: alert, fatigued, distracted, no distress, slowed mentation, appears stated age, appears older than stated age, pale. Slurred speech. Has left hemiparesis with deviation of the mouth to the right. Abdomen: not distended, rounded but very soft. Not tender. Bowel sounds normal. No masses,  no organomegaly  Extremities: no cyanosis or edema,     Data Review:    Labs: Results:       Chemistry Recent Labs      03/16/17   0613   GLU  93   NA  138   K  4.2   CL  105   CO2  26   BUN  2*   CREA  0.50*   CA  8.0*   AGAP  7   BUCR  4*      CBC w/Diff No results for input(s): WBC, RBC, HGB, HCT, PLT, GRANS, LYMPH, EOS, RETIC, HGBEXT, HCTEXT, PLTEXT, HGBEXT, HCTEXT, PLTEXT in the last 72 hours. Coagulation No results for input(s): PTP, INR, APTT in the last 72 hours. No lab exists for component: INREXT, INREXT    Liver Enzymes No results for input(s): TP, ALB, TBIL, AP, SGOT, GPT in the last 72 hours. No lab exists for component: DBIL       Assessment:     Principal Problem:    Stroke ([facility] (3/10/2017)    Active Problems:    Chronic pain syndrome (2/27/2012)      [facility] (2/27/2012)      Hypokalemia (8/8/2015)      Sickle cell trait (HCC) ()      Hypertension ()      Hx of cocaine abuse ()      Embolic stroke ([facility] (6/85/5182)      Acute blood loss anemia (3/15/2017)      Neuropathic pain (3/15/2017)      DVT (deep venous thrombosis) ([facility] (3/16/2017)        Plan:     Crohn's disease of the colon more on the left side present x 14 years. Untreated non compliant. Colonoscopy yesterday moderate diffuse Crohn's of the left and transverse colon with pseudo and inflammatory polyposis poor bowel prep. She needs to have her procedure repeated next year.  We need to treat her with [facility] asap as an outpatient but still has to be approved by medicaid. For now continue steroids and Pentasa.    Iron deficiency anemia down to 7.3 most likely related to her active Crohn's disease  R/o tb and hep B  Sp CVA and DVT related to hyper coagulable state in relation to factor 8 need to be on anticoagulation            Onelia Mock MD  March 18, 2017

## 2017-03-18 NOTE — PROGRESS NOTES
See consult  Chronic right calf vein dvt, no acute dvt  No benefit from filter at this point, high risk for cava thrombosis with filter  Recommend compression stockings, continue lovenox  Will need oral anticoagulation once able, to prevent future arterial and venous thrombosis

## 2017-03-18 NOTE — PROGRESS NOTES
Hematology Inpatient Consult    Subjective:     Marcy Lu is a 40 y.o., [address] 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 History of Crohn's 14 years, managed by Lucas Leong and Jered Ba. 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 History of headaches with periods, lasting a week, with nausea, no stroke symptoms or neurological complaint, became much more severe 1 year ago. Presented to [facility] ED with recurrence of diarrhea, hgb to 3.0, and dizzy, headache, requiring transfusion. EGD recently by Dr. Jani Galaviz    No prior DVT or VTE     Past Medical History:   Diagnosis Date    Abdominal pain     Anemia NEC     sickle cell trait    C. difficile colitis     [facility]'s [facility]     Gastrointestinal disorder     Crohns    Herpes zoster     Hx of cocaine abuse     Hyperkalemia     Hypertension     Iron deficiency anemia     MRSA (methicillin resistant Staphylococcus aureus)     Obesity     Pain management     Sickle cell trait ([facility]      Past Surgical History:   Procedure Laterality Date    HX GYN      tubal ligation    HX TUBAL LIGATION        History reviewed. No pertinent family history.   Social History   Substance Use Topics    Smoking status: Former Smoker    Smokeless tobacco: Not on file    Alcohol use No      Current Facility-Administered Medications   Medication Dose Route Frequency Provider Last Rate Last Dose    HYDROmorphone (PF) (DILAUDID) injection 0.5 mg  0.5 mg IntraVENous Q12H PRN Noreen Bojorquez,         morphine injection 2 mg  2 mg IntraVENous Q6H PRN Noreen Bojorquez, DO        gabapentin (NEURONTIN) capsule 400 mg  400 mg Oral TID Noreen Bojorquez DO   400 mg at 03/18/17 0944    HYDROmorphone (DILAUDID) tablet 4 mg  4 mg Oral Q4H PRN Noreen Bojorquez DO   4 mg at 03/18/17 1209    [START ON 3/19/2017] enoxaparin (LOVENOX) injection 80 mg  80 mg SubCUTAneous Q24H Noreen Bojorquez DO  mesalamine (PENTASA) CR capsule 1,000 mg  1 g Oral QID Albert Sen MD   1,000 mg at 03/18/17 1209    predniSONE (DELTASONE) tablet 40 mg  40 mg Oral DAILY WITH BREAKFAST Albert Sen MD   40 mg at 03/18/17 0944    ferrous sulfate tablet 325 mg  1 Tab Oral BID WITH MEALS Amanda Patel MD   325 mg at 03/18/17 0944    benzocaine (HURRICANE) 20 % spray   Mucous Membrane PRN Kanika Bergman MD   3 [place] at 03/15/17 1202    0.9% sodium chloride with KCl 20 mEq/L infusion   IntraVENous CONTINUOUS Amanda Patel MD 50 mL/hr at 03/18/17 1216      potassium chloride (KAON 10%) 20 mEq/15 mL oral liquid 20 mEq  20 mEq Oral DAILY Amanda Patel MD   20 mEq at 03/18/17 0944    multivitamin, tx-iron-ca-min (THERA-M w/ IRON) tablet 1 Tab  1 Tab Oral DAILY Amanda Patel MD   1 Tab at 03/18/17 0944    pantoprazole (PROTONIX) tablet 40 mg  40 mg Oral ACB Amanda Patel MD   40 mg at 03/18/17 6902    sodium chloride (NS) flush 5-10 mL  5-10 mL IntraVENous Q8H Amanda Patel MD   10 mL at 03/18/17 0944    sodium chloride (NS) flush 5-10 mL  5-10 mL IntraVENous PRN Amanda Patel MD        [facility]ron [facility]) injection 4 mg  4 mg IntraVENous Q6H PRN Amanda Patel MD   4 mg at 03/17/17 2039    acetaminophen (TYLENOL) tablet 650 mg  650 mg Oral Q4H PRN Amanda Patel MD        ELECTROLYTE REPLACEMENT PROTOCOL - Potassium  1 Each Other PRN Amanda Patel MD        ELECTROLYTE REPLACEMENT PROTOCOL - Magnesium  1 Each Other PRN Amanda Patel MD        atorvastatin (LIPITOR) tablet 80 mg  80 mg Oral QHS Tay Eastman,    80 mg at 03/17/17 2146        No Known Allergies     Review of Systems:  A little headache this morning  3 lose bowel movements yesterday, with some blood seen  Much abdominal pain yesterday  A little right leg pain  No depression.     Objective:     Patient Vitals for the past 8 hrs:   BP Temp Pulse Resp SpO2   03/18/17 1206 124/83 98.4 °F (36.9 °C) (!) 102 17 99 %   03/18/17 0822 125/78 98.5 °F (36.9 °C) 95 18 100 %     Temp (24hrs), Av.7 °F (37.1 °C), Min:98 °F (36.7 °C), Max:99.3 °F (37.4 °C)    701 - 1900  In: 120 [P.O.:120]  Out: -     Physical Exam:   Awake alert  Occasional slurring of words  Eom+  + Brown's on right, but no edema  Abd: sl tender  cv:RRR    Lab/Data Review:  No results found for this or any previous visit (from the past 24 hour(s)). Assessment:     Principal Problem:    [facility] (3/10/2017)    Active Problems:    Chronic pain syndrome (2012)      Crohn disease ([facility] (2012)      Hypokalemia (2015)      Sickle cell trait (HCC) ()      Hypertension ()      Hx of cocaine abuse ()      Embolic stroke ([facility] (6531)      Acute blood loss anemia (3/15/2017)      Neuropathic pain (3/15/2017)      DVT (deep venous thrombosis) ([facility] (3/16/2017)    Chronic DVT right leg: no filter  No Ventral septal defect per echo. Small pituitary bleed  Crohn's disease, sp IV venofer, recheck ferritin  Factor VIII excess: plan: lovenox.         Plan:     I reviewed with Lincoln Jarquin     Signed By: Scott Gardiner MD     2017

## 2017-03-18 NOTE — PROGRESS NOTES
0800 Assumed pt care. Pt alert and oriented. Denied any complains. 0930 Vascular dr in to see pt for a possible filter placement. Will not need to placed filter as per Dr evaluation. 1210 Pt medicated with dilaudid 4 mg for abm pain 10/10.    1300 Pt in bed sleeping comfortably. 1530 pt called c/o legs pain 8/10, medicated with morphine 2 mg. Family @ bedside. 1620 Pt reassessed, pain resolved. 1830 Pt spent a fair day. Has been up with pt and tolerated well. No acute distress.

## 2017-03-18 NOTE — PROGRESS NOTES
Problem: Self Care Deficits Care Plan (Adult)  Goal: *Acute Goals and Plan of Care (Insert Text)  Occupational Therapy Goals  Initiated 3/11/2017 within 7 day(s). 1. Patient will perform lower body dressing with minimal assistance/contact guard assist while sitting on EOB and use of salbador-technique. (Progressing)    2. Patient will perform grooming with contact guard assist while standing at sink using BUE. (MET)  New Goal: Pt will perform grooming tasks while standing at sink with Mod I.    3. Patient will perform toilet transfers with contact guard assist with verbal and tactile cues. (MET)  New Goal: Pt will perform toilet transfers with Mod I.    4. Patient will perform all aspects of toileting with Contact guard assist and with verbal and tactile cues. (MET)  New Goal: Pt will perform toileting tasks with Mod I.    5. Patient will participate in upper extremity therapeutic exercise/activities with contact guard assist for 15 minutes. (Progressing)     OCCUPATIONAL THERAPY REEVALUATION     Patient: Stephanie Xiao (14 y.o. female)  Date: 3/18/2017  Diagnosis: Stroke [facility])  Stroke ([facility]  Hypokalemia  CVA (cerebral vascular accident) ([facility]  anemia Stroke ([facility] [address]  Procedure(s) (LRB):  COLONOSCOPY; BIOPSY; (N/A) 1 Day Post-Op  Precautions: Fall      ASSESSMENT :  Based on the objective data described below, the patient presents with decreased ability to perform functional mobility, bed mobility, LE dressing and functional transfers. Pt limited by decreased strength, impaired FMC/GMC, and impaired sensation that is impacting pt's ability to complete ADLs and functional transfers/mobility. Pt has been making good, steady progress and continues to benefit from skilled OT to work towards PLOF in being as independent as possible. Pt completed bed mobility with Supervision. Pt did require Min A to don gown around back like a jacket. Pt performed functional mobility with RW with CGA-Supervision.  Pt was able to look towards R/L while walking to read various signs. Pt performed bathroom mobility with RW and Supervision. Pt performed toileting tasks with Supervision. Pt was educated and encouraged to apply various textures to LUE in order to promote increase input. Pt demo and verbalized understanding. Pt would benefit from rehab. Patient will benefit from skilled intervention to address the above impairments. Patients rehabilitation potential is considered to be Good  Factors which may influence rehabilitation potential include:   [X]                None noted  [ ]                Mental ability/status  [ ]                Medical condition  [ ]                Home/family situation and support systems  [ ]                Safety awareness  [ ]                Pain tolerance/management  [ ]                Other:        PLAN :  Recommendations and Planned Interventions:  [X]                  Self Care Training                  [X]           Therapeutic Activities  [X]                  Functional Mobility Training    [X]           Cognitive Retraining  [X]                  Therapeutic Exercises           [X]           Endurance Activities  [X]                  Balance Training                   [X]           Neuromuscular Re-Education  [X]                  Visual/Perceptual Training     [X]      Home Safety Training  [X]                  Patient Education                 [X]           Family Training/Education  [ ]                  Other (comment):     Frequency/Duration: Patient will be followed by occupational therapy 3-5x/wk for 1week   to address goals. Discharge Recommendations: Rehab  Further Equipment Recommendations for Discharge: TBD       SUBJECTIVE:   Patient stated I'm doing alright.       OBJECTIVE DATA SUMMARY:   Cognitive/Behavioral Status:  Neurologic State: Alert  Orientation Level: Appropriate for age  Cognition: Appropriate decision making  Safety/Judgement: Fall prevention  Coordination:  Coordination: Generally decreased, functional (requires extended time to complete with LUE)  Fine Motor Skills-Upper: Left Impaired (extnded time to complete finger opposition)    Gross Motor Skills-Upper: Left Impaired  Balance:  Sitting: Intact  Standing: Intact; With support  Standing - Static: Good  Standing - Dynamic : Fair  Strength:  Strength: Generally decreased, functional (more in LUE than RUE)  Tone & Sensation:  Tone: Normal  Sensation: Impaired (hypersensitivity and tingling of LUE)  Range of Motion:  AROM: Generally decreased, functional (more decrease in LUE than RUE)  PROM: Generally decreased, functional   Functional Mobility and Transfers for ADLs:  Bed Mobility:  Rolling: Supervision  Supine to Sit: Supervision   Scooting: Supervision  Transfers:  Sit to Stand: Supervision              Toilet Transfer : Supervision               ADL Assessment:   Oral Facial Hygiene/Grooming: Supervision (standing at sink)  Upper Body Dressing: Minimum assistance (assistance to don gown like a jacket)  Lower Body Dressing: Supervision (donning R sock)  Toileting: Supervision  Pain:  Pain Scale 1: Numeric (0 - 10)  Pain Intensity 1: 10  Pain Location 1: Abdomen  Pain Orientation 1: Anterior  Pain Description 1: Aching  Pain Intervention(s) 1: Medication (see MAR)  Activity Tolerance:   Pt tolerated treatment well no signs of fatigue or increase pain     Please refer to the flowsheet for vital signs taken during this treatment. After treatment:   [X] Patient left in no apparent distress sitting up in chair  [ ] Patient left in no apparent distress in bed  [X] Call bell left within reach  [X] Nursing notified  [ ] Caregiver present  [ ] Bed alarm activated      COMMUNICATION/EDUCATION:   [ ]    Home safety education was provided and the patient/caregiver indicated understanding. [X]    Patient/family have participated as able in goal setting and plan of care.   [X]    Patient/family agree to work toward stated goals and plan of care.  [ ]    Patient understands intent and goals of therapy, but is neutral about his/her participation. [ ]    Patient is unable to participate in goal setting and plan of care. This patients plan of care is appropriate for delegation to [facility].      Thank you for this referral.  Luis Felton OT  Time Calculation: 48 mins

## 2017-03-18 NOTE — PROGRESS NOTES
Hospitalist Progress Note    Patient: Kelsey Muse MRN: [id number]  CSN: [id number]    YOB: 1970  Age: 40 y.o. Sex: female    DOA: 3/10/2017 LOS:  LOS: 8 days            Assessment/Plan     1. Acute embolic CVA on ASA  2. Acute on chronic anemia related to crohn's disease sp colonoscopy w poor prep  3. DVT  4. Hypokalemia due to GI losses      Plan:  - on steroids, mesalamine   - transition to oral analgesia from IV, increase gabapentin  - mobilize  - discussed w Dr Angi Leong and Renny Merchant- no IVC filter recommended. Will start treatment dose lovenox and monitor for bleeding  - all questions answered      Patient Active Problem List   Diagnosis Code    Chronic pain syndrome G89.4    Crohn disease ([address] K50.90    Hypokalemia E87.6    Stroke ([facility] [address] I63.9    Sickle cell trait ([facility] [address] D57.3    Hypertension I10    Hx of cocaine abuse U89.652    Embolic stroke ([facility] [address] R37.8    Acute blood loss anemia D62    Neuropathic pain M79.2    DVT (deep venous thrombosis) ([facility] I82.409               Subjective:    cc: \" my leg & stomach hurts, I need a PICC line\"  No acute events overnight  Tolerated Cscope, but poor prep noted  No bleeding  Co lower abdominal crampy pain, 7/10, no radiation, better w IV dilaudid  Tolerating po      REVIEW OF SYSTEMS:  General: No fevers or chills. Cardiovascular: No chest pain or pressure. No palpitations. Pulmonary: No shortness of breath. Gastrointestinal: No nausea, vomiting. Objective:        Vital signs/Intake and Output:  Visit Vitals    /78 (BP 1 Location: Left arm, BP Patient Position: At rest)    Pulse 95    Temp 98.5 °F (36.9 °C)    Resp 18    Ht 5' 4\" (1.626 m)    Wt 79 kg (174 lb 3.2 oz)    SpO2 100%    Breastfeeding No    BMI 28.12 kg/m2     Current Shift:     Last three shifts:  03/16 1901 - 03/18 0700  In: 2644.2 [P.O.:1200; I.V.:1444.2]  Out: 300     Body mass index is 28.12 kg/(m^2).     Physical Exam:  GEN: Alert and oriented times three NAD  EYES: conjunctiva normal, lids with out lesions  HEENT: MMM, No thyromegaly, no lymphadenopathy  HEART: RRR +S1 +S2, no JVD, pulses 2+ distally  LUNGS: CTA B/L no wheezes, rales or rhonchi  ABDOMEN: + BS, soft NT/ND no organomegaly,  No rebound  EXTREMITIES: No edema cyanosis, cap refill normal   SKIN: no rashes or skin breakdown, no nodules, normal turgor  NEURO: dysarthria noted, facial droop  Current Facility-Administered Medications   Medication Dose Route Frequency    HYDROmorphone (PF) (DILAUDID) injection 0.5 mg  0.5 mg IntraVENous Q12H PRN    morphine injection 2 mg  2 mg IntraVENous Q6H PRN    gabapentin (NEURONTIN) capsule 400 mg  400 mg Oral TID    HYDROmorphone (DILAUDID) tablet 4 mg  4 mg Oral Q4H PRN    mesalamine (PENTASA) CR capsule 1,000 mg  1 g Oral QID    predniSONE (DELTASONE) tablet 40 mg  40 mg Oral DAILY WITH BREAKFAST    ferrous sulfate tablet 325 mg  1 Tab Oral BID WITH MEALS    benzocaine (HURRICANE) 20 % spray   Mucous Membrane PRN    0.9% sodium chloride with KCl 20 mEq/L infusion   IntraVENous CONTINUOUS    potassium chloride (KAON 10%) 20 mEq/15 mL oral liquid 20 mEq  20 mEq Oral DAILY    multivitamin, tx-iron-ca-min (THERA-M w/ IRON) tablet 1 Tab  1 Tab Oral DAILY    pantoprazole (PROTONIX) tablet 40 mg  40 mg Oral ACB    sodium chloride (NS) flush 5-10 mL  5-10 mL IntraVENous Q8H    sodium chloride (NS) flush 5-10 mL  5-10 mL IntraVENous PRN    ondansetron (ZOFRAN) injection 4 mg  4 mg IntraVENous Q6H PRN    acetaminophen (TYLENOL) tablet 650 mg  650 mg Oral Q4H PRN    enoxaparin (LOVENOX) injection 40 mg  40 mg SubCUTAneous Q24H    ELECTROLYTE REPLACEMENT PROTOCOL - Potassium  1 Each Other PRN    ELECTROLYTE REPLACEMENT PROTOCOL - Magnesium  1 Each Other PRN    atorvastatin (LIPITOR) tablet 80 mg  80 mg Oral QHS         All the patient's labs over the past 24 hours were reviewed both during my initial daily workflow process and at the time notated as \"note time\" in [address] [facility]. (It is not time stamped separately in this workflow.)  Select labs are listed below.         Labs: Results:       Chemistry Recent Labs      03/16/17   0613  03/15/17   1025   GLU  93  76   NA  138  136   K  4.2  4.3   CL  105  103   CO2  26  23   BUN  2*  1*   CREA  0.50*  0.43*   CA  8.0*  7.8*   AGAP  7  10   BUCR  4*  2*      CBC w/Diff Recent Labs      03/15/17   1025   HGB  8.6*   HCT  27.7*              Lipid Panel Lab Results   Component Value Date/Time    Cholesterol, total 145 03/12/2017 06:15 AM    HDL Cholesterol 37 03/12/2017 06:15 AM    LDL, calculated 91.6 03/12/2017 06:15 AM    VLDL, calculated 16.4 03/12/2017 06:15 AM    Triglyceride 82 03/12/2017 06:15 AM    CHOL/HDL Ratio 3.9 03/12/2017 06:15 AM              Thyroid Studies Lab Results   Component Value Date/Time    TSH 1.51 12/12/2009 09:48 AM        Procedures/imaging: see electronic medical records for all procedures/Xrays and details which were not copied into this note but were reviewed prior to creation of Plan            Total time spent: 50 minutes > 50% coordinating care    Mauro Smoker, DO  Internal Medicine/Geriatrics

## 2017-03-18 NOTE — PROGRESS NOTES
Cardiology Progress Note      3/18/2017 5:46 PM    Admit Date: 3/10/2017    Admit Diagnosis: Stroke [facility])  Stroke [facility])  Hypokalemia  CVA (cerebral vascular accident) ([facility] [address]  anemia      Subjective:     Kaylan Griffith denies chest pain, chest pressure/discomfort, dyspnea, palpitations, irregular heart beats.     Visit Vitals    /83 (BP 1 Location: Left arm, BP Patient Position: At rest)    Pulse (!) 102    Temp 98.4 °F (36.9 °C)    Resp 17    Ht 5' 4\" (1.626 m)    Wt 79 kg (174 lb 3.2 oz)    SpO2 99%    Breastfeeding No    BMI 28.12 kg/m2     Current Facility-Administered Medications   Medication Dose Route Frequency    HYDROmorphone (PF) (DILAUDID) injection 0.5 mg  0.5 mg IntraVENous Q12H PRN    morphine injection 2 mg  2 mg IntraVENous Q6H PRN    gabapentin (NEURONTIN) capsule 400 mg  400 mg Oral TID    HYDROmorphone (DILAUDID) tablet 4 mg  4 mg Oral Q4H PRN    [START ON 3/19/2017] enoxaparin (LOVENOX) injection 80 mg  80 mg SubCUTAneous Q24H    mesalamine (PENTASA) CR capsule 1,000 mg  1 g Oral QID    predniSONE (DELTASONE) tablet 40 mg  40 mg Oral DAILY WITH BREAKFAST    ferrous sulfate tablet 325 mg  1 Tab Oral BID WITH MEALS    benzocaine (HURRICANE) 20 % spray   Mucous Membrane PRN    0.9% sodium chloride with KCl 20 mEq/L infusion   IntraVENous CONTINUOUS    potassium chloride (KAON 10%) 20 mEq/15 mL oral liquid 20 mEq  20 mEq Oral DAILY    multivitamin, tx-iron-ca-min (THERA-M w/ IRON) tablet 1 Tab  1 Tab Oral DAILY    pantoprazole (PROTONIX) tablet 40 mg  40 mg Oral ACB    sodium chloride (NS) flush 5-10 mL  5-10 mL IntraVENous Q8H    sodium chloride (NS) flush 5-10 mL  5-10 mL IntraVENous PRN    ondansetron (ZOFRAN) injection 4 mg  4 mg IntraVENous Q6H PRN    acetaminophen (TYLENOL) tablet 650 mg  650 mg Oral Q4H PRN    ELECTROLYTE REPLACEMENT PROTOCOL - Potassium  1 Each Other PRN    ELECTROLYTE REPLACEMENT PROTOCOL - Magnesium  1 Each Other PRN    atorvastatin (LIPITOR) tablet 80 mg  80 mg Oral QHS         Objective:      Physical Exam:  Visit Vitals    /83 (BP 1 Location: Left arm, BP Patient Position: At rest)    Pulse (!) 102    Temp 98.4 °F (36.9 °C)    Resp 17    Ht 5' 4\" (1.626 m)    Wt 79 kg (174 lb 3.2 oz)    SpO2 99%    Breastfeeding No    BMI 28.12 kg/m2     General Appearance:  Well developed, well nourished,alert and oriented x 3, and individual in no acute distress. Facial weakness. Ears/Nose/Mouth/Throat:   Hearing grossly normal.         Neck: Supple. Chest:   Lungs clear to auscultation bilaterally. Cardiovascular:  Regular rate and rhythm, S1, S2 normal, no murmur. Abdomen:   Soft, non-tender, bowel sounds are active. Extremities: No edema bilaterally. Skin: Warm and dry. Data Review:   Labs:  No results found for this or any previous visit (from the past 24 hour(s)). Telemetry: normal sinus rhythm      Assessment:     Principal Problem:    Stroke ([facility] [address] (3/10/2017)    Active Problems:    Chronic pain syndrome (2/27/2012)      Crohn disease ([address] (2/27/2012)      Hypokalemia (8/8/2015)      Sickle cell trait (HCC) ()      Hypertension ()      Hx of cocaine abuse ()      Embolic stroke ([facility] [address] (4/13/3930)      Acute blood loss anemia (3/15/2017)      Neuropathic pain (3/15/2017)      DVT (deep venous thrombosis) ([facility] [address] (3/16/2017)        Plan:     Patient remains stable from the cardiac standpoint. She had her endoscopy. No cardiac source for cerebral emboli was found.     Alok Rey MD

## 2017-03-18 NOTE — ROUTINE PROCESS
Bedside and Verbal shift change report given to NAOMIE Ramos RN (oncoming nurse) by Bernardo Chacon RN  (offgoing nurse). Report included the following information SBAR, Kardex, Cardiac Rhythm SR and Alarm Parameters .

## 2017-03-19 PROCEDURE — 74011250636 HC RX REV CODE- 250/636: Performed by: HOSPITALIST

## 2017-03-19 PROCEDURE — 97530 THERAPEUTIC ACTIVITIES: CPT

## 2017-03-19 PROCEDURE — 74011250636 HC RX REV CODE- 250/636: Performed by: INTERNAL MEDICINE

## 2017-03-19 PROCEDURE — 74011250637 HC RX REV CODE- 250/637: Performed by: INTERNAL MEDICINE

## 2017-03-19 PROCEDURE — 97116 GAIT TRAINING THERAPY: CPT

## 2017-03-19 PROCEDURE — 74011250637 HC RX REV CODE- 250/637: Performed by: HOSPITALIST

## 2017-03-19 PROCEDURE — 74011250637 HC RX REV CODE- 250/637: Performed by: FAMILY MEDICINE

## 2017-03-19 PROCEDURE — 65660000000 HC RM CCU STEPDOWN

## 2017-03-19 PROCEDURE — 74011636637 HC RX REV CODE- 636/637: Performed by: INTERNAL MEDICINE

## 2017-03-19 RX ADMIN — GABAPENTIN 400 MG: 400 CAPSULE ORAL at 09:48

## 2017-03-19 RX ADMIN — MESALAMINE 1000 MG: 250 CAPSULE ORAL at 13:13

## 2017-03-19 RX ADMIN — GABAPENTIN 400 MG: 400 CAPSULE ORAL at 21:09

## 2017-03-19 RX ADMIN — Medication 10 ML: at 21:10

## 2017-03-19 RX ADMIN — ATORVASTATIN CALCIUM 80 MG: 20 TABLET, FILM COATED ORAL at 21:09

## 2017-03-19 RX ADMIN — HYDROMORPHONE HYDROCHLORIDE 4 MG: 4 TABLET ORAL at 15:12

## 2017-03-19 RX ADMIN — PREDNISONE 40 MG: 20 TABLET ORAL at 09:48

## 2017-03-19 RX ADMIN — Medication 2 MG: at 18:47

## 2017-03-19 RX ADMIN — FERROUS SULFATE TAB 325 MG (65 MG ELEMENTAL FE) 325 MG: 325 (65 FE) TAB at 09:48

## 2017-03-19 RX ADMIN — SODIUM CHLORIDE AND POTASSIUM CHLORIDE: 9; 1.49 INJECTION, SOLUTION INTRAVENOUS at 23:11

## 2017-03-19 RX ADMIN — Medication 2 MG: at 04:10

## 2017-03-19 RX ADMIN — HYDROMORPHONE HYDROCHLORIDE 4 MG: 4 TABLET ORAL at 06:52

## 2017-03-19 RX ADMIN — Medication 2 MG: at 13:13

## 2017-03-19 RX ADMIN — GABAPENTIN 400 MG: 400 CAPSULE ORAL at 15:12

## 2017-03-19 RX ADMIN — PANTOPRAZOLE SODIUM 40 MG: 40 TABLET, DELAYED RELEASE ORAL at 06:52

## 2017-03-19 RX ADMIN — MESALAMINE 1000 MG: 250 CAPSULE ORAL at 21:08

## 2017-03-19 RX ADMIN — Medication 10 ML: at 13:13

## 2017-03-19 RX ADMIN — HYDROMORPHONE HYDROCHLORIDE 0.5 MG: 1 INJECTION, SOLUTION INTRAMUSCULAR; INTRAVENOUS; SUBCUTANEOUS at 21:09

## 2017-03-19 RX ADMIN — MESALAMINE 1000 MG: 250 CAPSULE ORAL at 17:04

## 2017-03-19 RX ADMIN — FERROUS SULFATE TAB 325 MG (65 MG ELEMENTAL FE) 325 MG: 325 (65 FE) TAB at 15:12

## 2017-03-19 RX ADMIN — MESALAMINE 1000 MG: 250 CAPSULE ORAL at 09:48

## 2017-03-19 RX ADMIN — ENOXAPARIN SODIUM 80 MG: 80 INJECTION SUBCUTANEOUS at 09:49

## 2017-03-19 RX ADMIN — MULTIPLE VITAMINS W/ MINERALS TAB 1 TABLET: TAB at 09:48

## 2017-03-19 RX ADMIN — POTASSIUM CHLORIDE 20 MEQ: 20 SOLUTION ORAL at 09:49

## 2017-03-19 RX ADMIN — HYDROMORPHONE HYDROCHLORIDE 4 MG: 4 TABLET ORAL at 11:14

## 2017-03-19 NOTE — PROGRESS NOTES
Hematology Inpatient Consult    Subjective:     Kenneth Thurston is a 40 y.o., [address] 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 Past Medical History:   Diagnosis Date    Abdominal pain     Anemia NEC     sickle cell trait    C. difficile colitis     Crohn's disease (HCC)     Gastrointestinal disorder     Crohns    Herpes zoster     Hx of cocaine abuse     Hyperkalemia     Hypertension     Iron deficiency anemia     MRSA (methicillin resistant Staphylococcus aureus)     Obesity     Pain management     Sickle cell trait (HCC)      Past Surgical History:   Procedure Laterality Date    HX GYN      tubal ligation    HX TUBAL LIGATION        History reviewed. No pertinent family history.   Social History   Substance Use Topics    Smoking status: Former Smoker    Smokeless tobacco: Not on file    Alcohol use No      Current Facility-Administered Medications   Medication Dose Route Frequency Provider Last Rate Last Dose    HYDROmorphone (PF) (DILAUDID) injection 0.5 mg  0.5 mg IntraVENous Q12H PRN Barbarann Stallings, DO        morphine injection 2 mg  2 mg IntraVENous Q6H PRN Barbarann Stallings, DO   2 mg at 03/19/17 0410    gabapentin (NEURONTIN) capsule 400 mg  400 mg Oral TID Barbarann Stallings, DO   400 mg at 03/18/17 2145    HYDROmorphone (DILAUDID) tablet 4 mg  4 mg Oral Q4H PRN Barbarann Stallings, DO   4 mg at 03/19/17 0872    enoxaparin (LOVENOX) injection 80 mg  80 mg SubCUTAneous Q24H Barbarann Stallings, DO        mesalamine (PENTASA) CR capsule 1,000 mg  1 g Oral QID Asim Carr MD   1,000 mg at 03/18/17 2144    predniSONE (DELTASONE) tablet 40 mg  40 mg Oral DAILY WITH BREAKFAST Asim Carr MD   40 mg at 03/18/17 0944    ferrous sulfate tablet 325 mg  1 Tab Oral BID WITH MEALS Seth Sandoval MD   325 mg at 03/18/17 1816    benzocaine (HURRICANE) 20 % spray   Mucous Membrane PRN Rose Alvarez MD   3 [place] at 03/15/17 1202    0.9% sodium chloride with KCl 20 mEq/L infusion   IntraVENous CONTINUOUS Anaya Mera MD 50 mL/hr at 17 1216      potassium chloride (KAON 10%) 20 mEq/15 mL oral liquid 20 mEq  20 mEq Oral DAILY Anaya Mera MD   20 mEq at 17 0944    multivitamin, tx-iron-ca-min (THERA-M w/ IRON) tablet 1 Tab  1 Tab Oral DAILY Anaya Mera MD   1 Tab at 17 0944    pantoprazole (PROTONIX) tablet 40 mg  40 mg Oral ACB Anaya Mera MD   40 mg at 17 4962    sodium chloride (NS) flush 5-10 mL  5-10 mL IntraVENous Q8H Anaya Mera MD   10 mL at 17 2322    sodium chloride (NS) flush 5-10 mL  5-10 mL IntraVENous PRN Anaya Mera MD        ondansetron [facility]) injection 4 mg  4 mg IntraVENous Q6H PRN Anaya Mera MD   4 mg at 17    acetaminophen (TYLENOL) tablet 650 mg  650 mg Oral Q4H PRN Anaya Mera MD        ELECTROLYTE REPLACEMENT PROTOCOL - Potassium  1 Each Other PRN Anaya Mera MD        ELECTROLYTE REPLACEMENT PROTOCOL - Magnesium  1 Each Other PRN Anaya Mera MD        atorvastatin (LIPITOR) tablet 80 mg  80 mg Oral QHS Jefry Frazier, DO   80 mg at 17 2145        No Known Allergies     Review of Systems:  Stinging and needles in the right foot  Better with stockings, able to walk though some cramping  Frontal headache, no double vision  Eating good  A little diarrhea this a, slight blood      Objective:     Patient Vitals for the past 8 hrs:   BP Temp Pulse Resp SpO2 Weight   17 0820 (!) 137/97 97.4 °F (36.3 °C) (!) 103 18 100 % -   17 0331 (!) 135/92 98.5 °F (36.9 °C) 93 18 98 % 79 kg (174 lb 1.6 oz)     Temp (24hrs), Av.1 °F (36.7 °C), Min:97.4 °F (36.3 °C), Max:98.5 °F (36.9 °C)         Physical Exam:   Heent: eom+, perrl  Peripheral vision intact  Lung: cta  Cv: rrr  Abd: soft,  Ext: no edema  Neuro: 2-12    Lab/Data Review:  No results found for this or any previous visit (from the past 24 hour(s)).       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 Prefer staying on lovenox rather than coumadin, though practicality issues may dictate when outpatient. Crohn's: monitor CBC  Iron deficiency: PO iron    Chronic right leg DVT: no filter. Compression stockings though. Vascular medicine following. Outpatient vascular medicine also should likely occur.       Plan:     I reviewed with Amanda Beach     Signed By: Yanet Shepard MD     March 19, 2017

## 2017-03-19 NOTE — PROGRESS NOTES
NEUROLOGY PROGRESS NOTE        Patient: Deonte Palacio        Sex: female          DOA: 3/10/2017  YOB: 1970      Age:  40 y.o.         LOS: 9 days     Identification:  40 y.o. female with history of Crohn's disease, past history of cocaine use (not recently) and Sickle Cell trait, who presented with left sided weakness, found to have multifocal embolic strokes.      SUBJECTIVE:   The patient is stable. No new complaints. Continues to have a subtle, pinkish melena at times. No abdominal pain for now. Her colonoscopy showed findings of Crohn;'s disease. It was limited exam due to bad bowel prep. LE doppler showed DVD on right posterior tibial vein. Currently, she does not have remarkable bleeding. She was started on steroids for Crohn's treatment.     LAURIE does not show any intracardiac thrombosis.     Stroke Work-up:  Brain MRI:1. Multifocal acute infarctions, the largest is in the right MCA territory correlating with the abnormal CT. There are bilateral left greater than right cerebellar hemisphere infarctions. These infarctions are likely embolic with a central origin. 2. Possible hemorrhagic lesion within the pituitary. Scans dedicated to the pituitary could be performed. This could be artifact of volume averaging of the sellar floor but is at least suspect. 3. Chronic appearing right orbital floor fracture with protrusion of orbital fat and relative enophthalmos. MRA Brain: Occlusion inferior division right M2 segment MCA. This correlates with the acute infarction and is indeterminate in nature, most likely embolic certainly. No other significant intracranial vascular abnormality. MRA Neck: Limited noncontrast neck vascular evaluation. No definite hemodynamically significant stenosis is documented. Echocardiogram: EF is 60-65%, no intracardiac shunt detected. LAURIE: no intracardiac thrombus. Carotid Doppler: Left ICA <50% stenosis with a small plaque.  No significant stenosis elsewhere. Lipid panel: LDL 91.6, HDL is 37. HbA1c: 5.7      ASSESSMENT/IMPRESSION:   Multifocal right hemispheric (temporal, thalamic, parietal and cerebellar infarcts), most likely cardioembolic in nature. 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 She has not been on any oral contraceptives. Untreated Crohn's disease maybe the underlying reason for high Factor VIII. She needs anticoagulation with coumadin also for DVT. We need to monitor her closely while she is being anticoagulated (due to GI bleeding risks).     RECOMMENDATIONS:  1. Continue enoxaparine with a goal to start coumadin if she does not bleed in two days. It is reasonable to watch the patient closely while in house, if needed one week more. 2. Continue telemetry monitoring  3. Neuro checks per stroke protocol  4. Oral liquids for hydration  5. Continue gabapentin 300mg TID for feet pain.       I will follow the patient. Please do not hesitate to return with any questions. Signed:  Gonzalo Young MD  3/19/2017  11:01 AM    REVIEW OF SYSTEMS: Denies chest pain, abdominal pain, nausea or vomiting. No fever or chills. See above. Subtle hematochezia present. OBJECTIVE:      Visit Vitals    BP (!) 137/97 (BP 1 Location: Right arm, BP Patient Position: Sitting)    Pulse (!) 103    Temp 97.4 °F (36.3 °C)    Resp 18    Ht 5' 4\" (1.626 m)    Wt 79 kg (174 lb 1.6 oz)    SpO2 100%    Breastfeeding No    BMI 28.1 kg/m2     Physical Exam:  GEN: Alert, NAD  EYES: conjunctiva normal, lids with out lesions  HEENT: MMM. HEART: RRR +S1 +S2  LUNGS: CTA B/L no rales or rhonchi. ABDOMEN: Soft, non-tender. EXTREMITIES: No edema cyanosis  SKIN: no rashes or skin breakdown, no nodules  NEURO: Alert, oriented x3. Speech is fluent. Left facial droop. Left arm drift. Left extinction. Decreased sensation on left side slightly.     Current Facility-Administered Medications   Medication Dose Route Frequency    HYDROmorphone (PF) (DILAUDID) injection 0.5 mg  0.5 mg IntraVENous Q12H PRN    morphine injection 2 mg  2 mg IntraVENous Q6H PRN    gabapentin (NEURONTIN) capsule 400 mg  400 mg Oral TID    HYDROmorphone (DILAUDID) tablet 4 mg  4 mg Oral Q4H PRN    enoxaparin (LOVENOX) injection 80 mg  80 mg SubCUTAneous Q24H    mesalamine (PENTASA) CR capsule 1,000 mg  1 g Oral QID    predniSONE (DELTASONE) tablet 40 mg  40 mg Oral DAILY WITH BREAKFAST    ferrous sulfate tablet 325 mg  1 Tab Oral BID WITH MEALS    benzocaine (HURRICANE) 20 % spray   Mucous Membrane PRN    0.9% sodium chloride with KCl 20 mEq/L infusion   IntraVENous CONTINUOUS    potassium chloride (KAON 10%) 20 mEq/15 mL oral liquid 20 mEq  20 mEq Oral DAILY    multivitamin, tx-iron-ca-min (THERA-M w/ IRON) tablet 1 Tab  1 Tab Oral DAILY    pantoprazole (PROTONIX) tablet 40 mg  40 mg Oral ACB    sodium chloride (NS) flush 5-10 mL  5-10 mL IntraVENous Q8H    sodium chloride (NS) flush 5-10 mL  5-10 mL IntraVENous PRN    ondansetron (ZOFRAN) injection 4 mg  4 mg IntraVENous Q6H PRN    acetaminophen (TYLENOL) tablet 650 mg  650 mg Oral Q4H PRN    ELECTROLYTE REPLACEMENT PROTOCOL - Potassium  1 Each Other PRN    ELECTROLYTE REPLACEMENT PROTOCOL - Magnesium  1 Each Other PRN    atorvastatin (LIPITOR) tablet 80 mg  80 mg Oral QHS

## 2017-03-19 NOTE — PROGRESS NOTES
1910 Pt received from offgoing nurse without any signs or symptoms of distress. Pt vitals are stable and within normal limits. Pt bed in low position with wheels locked and call bell within reach. IV access lost. Will attempt to obtain new IV site after family leaves for the evening. Pt is an extremely hard stick and is requesting a PICC line. Pt agrees to allow staff to attempt to obtain a new line this evening. 1949 Assessment completed and documented in flow sheet. Pt denies any further needs at this time. Pt in NAD with bed in low position, wheels locked and call bell within reach. 2144 Pain medication administered as per PRN order for c/o pain. See flow sheets for follow up documentation. 2320 Pain medication administered as per PRN order for c/o pain. See flow sheets for follow up documentation. 2322 Reassessment completed with no changes noted. Bed locked, in lowest position, with call light within reach. 0410 Pain medication administered as per PRN order for c/o pain. See flow sheets for follow up documentation. 2560 Reassessment completed with no changes noted. Bed locked, in lowest position, with call light within reach. 0715 Bedside and Verbal shift change report given to SHEYLA Funez Parents RN by NAOMIE Rodrigues. Report included the following information SBAR, Intake/Output, MAR and Recent Results.

## 2017-03-19 NOTE — PROGRESS NOTES
Cardiology Progress Note      3/19/2017 11:26 AM    Admit Date: 3/10/2017    Admit Diagnosis: Stroke [facility])  Stroke [facility])  Hypokalemia  CVA (cerebral vascular accident) ([facility] [address]  anemia      Subjective:     Joesphine Flash denies chest pain, chest pressure/discomfort, dyspnea, palpitations.     Visit Vitals    BP (!) 137/97 (BP 1 Location: Right arm, BP Patient Position: Sitting)    Pulse (!) 103    Temp 97.4 °F (36.3 °C)    Resp 18    Ht 5' 4\" (1.626 m)    Wt 79 kg (174 lb 1.6 oz)    SpO2 100%    Breastfeeding No    BMI 28.1 kg/m2     Current Facility-Administered Medications   Medication Dose Route Frequency    HYDROmorphone (PF) (DILAUDID) injection 0.5 mg  0.5 mg IntraVENous Q12H PRN    morphine injection 2 mg  2 mg IntraVENous Q6H PRN    gabapentin (NEURONTIN) capsule 400 mg  400 mg Oral TID    HYDROmorphone (DILAUDID) tablet 4 mg  4 mg Oral Q4H PRN    enoxaparin (LOVENOX) injection 80 mg  80 mg SubCUTAneous Q24H    mesalamine (PENTASA) CR capsule 1,000 mg  1 g Oral QID    predniSONE (DELTASONE) tablet 40 mg  40 mg Oral DAILY WITH BREAKFAST    ferrous sulfate tablet 325 mg  1 Tab Oral BID WITH MEALS    benzocaine (HURRICANE) 20 % spray   Mucous Membrane PRN    0.9% sodium chloride with KCl 20 mEq/L infusion   IntraVENous CONTINUOUS    potassium chloride (KAON 10%) 20 mEq/15 mL oral liquid 20 mEq  20 mEq Oral DAILY    multivitamin, tx-iron-ca-min (THERA-M w/ IRON) tablet 1 Tab  1 Tab Oral DAILY    pantoprazole (PROTONIX) tablet 40 mg  40 mg Oral ACB    sodium chloride (NS) flush 5-10 mL  5-10 mL IntraVENous Q8H    sodium chloride (NS) flush 5-10 mL  5-10 mL IntraVENous PRN    ondansetron (ZOFRAN) injection 4 mg  4 mg IntraVENous Q6H PRN    acetaminophen (TYLENOL) tablet 650 mg  650 mg Oral Q4H PRN    ELECTROLYTE REPLACEMENT PROTOCOL - Potassium  1 Each Other PRN    ELECTROLYTE REPLACEMENT PROTOCOL - Magnesium  1 Each Other PRN    atorvastatin (LIPITOR) tablet 80 mg  80 mg Oral QHS Objective:      Physical Exam:  Visit Vitals    BP (!) 137/97 (BP 1 Location: Right arm, BP Patient Position: Sitting)    Pulse (!) 103    Temp 97.4 °F (36.3 °C)    Resp 18    Ht 5' 4\" (1.626 m)    Wt 79 kg (174 lb 1.6 oz)    SpO2 100%    Breastfeeding No    BMI 28.1 kg/m2     General Appearance:  Well developed, well nourished,alert and oriented x 3, and individual in no acute distress. L weakness. Ears/Nose/Mouth/Throat:   Hearing grossly normal.         Neck: Supple. Chest:   Lungs clear to auscultation bilaterally. Cardiovascular:  Regular rate and rhythm, S1, S2 normal, no murmur. Abdomen:   Soft, non-tender, bowel sounds are active. Extremities: No edema bilaterally. Skin: Warm and dry. Data Review:   Labs:  No results found for this or any previous visit (from the past 24 hour(s)). Telemetry: normal sinus rhythm      Assessment:     Principal Problem:    Stroke ([facility] [address] (3/10/2017)    Active Problems:    Chronic pain syndrome (2/27/2012)      Crohn disease ([address] (2/27/2012)      Hypokalemia (8/8/2015)      Sickle cell trait (HCC) ()      Hypertension ()      Hx of cocaine abuse ()      Embolic stroke ([facility] [address] (8/94/9290)      Acute blood loss anemia (3/15/2017)      Neuropathic pain (3/15/2017)      DVT (deep venous thrombosis) ([facility] [address] (3/16/2017)        Plan:     Patient remains stable the cardiac standpoint. She had her colonoscopy. Plans are to anticoagulated with warfarin. Follow.     Amanda Gan MD

## 2017-03-19 NOTE — PROGRESS NOTES
Hospitalist Progress Note    Patient: Amanda Beach MRN: [id number]  CSN: [id number]    YOB: 1970  Age: 40 y.o. Sex: female    DOA: 3/10/2017 LOS:  LOS: 9 days            Assessment/Plan     1. Acute embolic CVA on ASA  2. Acute on chronic anemia related to crohn's disease sp colonoscopy w poor prep  3. DVT  4. Hypokalemia due to GI losses        Plan:  - lengthy discussion with neurology, hematology and vascular yesterday to start anticoagulation w lovenox transition to warfarin and monitor as inpt for significant bleeding  - continue pentasa, steroids  - PT/OT  - will place PICC for frequent blood draws      Patient Active Problem List   Diagnosis Code    Chronic pain syndrome G89.4    Crohn disease ([facility] [address] K50.90    Hypokalemia E87.6    Stroke ([facility] [address] I63.9    Sickle cell trait ([facility] [address] D57.3    Hypertension I10    Hx of cocaine abuse G28.070    Embolic stroke ([facility] [address] N13.2    Acute blood loss anemia D62    Neuropathic pain M79.2    DVT (deep venous thrombosis) ([facility]) I82.409               Subjective:    cc: cva  Pt complains of pain in legs /stomach  Tolerating diet, ,no significant blood loss noted      REVIEW OF SYSTEMS:  General: No fevers or chills. Cardiovascular: No chest pain or pressure. No palpitations. Pulmonary: No shortness of breath. Gastrointestinal: No nausea, vomiting. Objective:        Vital signs/Intake and Output:  Visit Vitals    BP (!) 135/92 (BP 1 Location: Left arm, BP Patient Position: At rest)    Pulse 93    Temp 98.5 °F (36.9 °C)    Resp 18    Ht 5' 4\" (1.626 m)    Wt 79 kg (174 lb 1.6 oz)    SpO2 98%    Breastfeeding No    BMI 28.1 kg/m2     Current Shift:  03/18 1901 - 03/19 0700  In: 642.5 [P.O.:420; I.V.:222.5]  Out: 0   Last three shifts:  03/17 0701 - 03/18 1900  In: 2076.7 [P.O.:840; I.V.:1236.7]  Out: 0     Body mass index is 28.1 kg/(m^2).     Physical Exam:  GEN: Alert and oriented times three NAD  EYES: conjunctiva normal, lids with out lesions  HEENT: MMM, No thyromegaly, no lymphadenopathy  HEART: RRR +S1 +S2, no JVD, pulses 2+ distally  LUNGS: CTA B/L no wheezes, rales or rhonchi  ABDOMEN: + BS, soft NT/ND no organomegaly,  No rebound  EXTREMITIES: No edema cyanosis, cap refill normal   SKIN: no rashes or skin breakdown, no nodules, normal turgor  Current Facility-Administered Medications   Medication Dose Route Frequency    HYDROmorphone (PF) (DILAUDID) injection 0.5 mg  0.5 mg IntraVENous Q12H PRN    morphine injection 2 mg  2 mg IntraVENous Q6H PRN    gabapentin (NEURONTIN) capsule 400 mg  400 mg Oral TID    HYDROmorphone (DILAUDID) tablet 4 mg  4 mg Oral Q4H PRN    enoxaparin (LOVENOX) injection 80 mg  80 mg SubCUTAneous Q24H    mesalamine (PENTASA) CR capsule 1,000 mg  1 g Oral QID    predniSONE (DELTASONE) tablet 40 mg  40 mg Oral DAILY WITH BREAKFAST    ferrous sulfate tablet 325 mg  1 Tab Oral BID WITH MEALS    benzocaine (HURRICANE) 20 % spray   Mucous Membrane PRN    0.9% sodium chloride with KCl 20 mEq/L infusion   IntraVENous CONTINUOUS    potassium chloride (KAON 10%) 20 mEq/15 mL oral liquid 20 mEq  20 mEq Oral DAILY    multivitamin, tx-iron-ca-min (THERA-M w/ IRON) tablet 1 Tab  1 Tab Oral DAILY    pantoprazole (PROTONIX) tablet 40 mg  40 mg Oral ACB    sodium chloride (NS) flush 5-10 mL  5-10 mL IntraVENous Q8H    sodium chloride (NS) flush 5-10 mL  5-10 mL IntraVENous PRN    ondansetron (ZOFRAN) injection 4 mg  4 mg IntraVENous Q6H PRN    acetaminophen (TYLENOL) tablet 650 mg  650 mg Oral Q4H PRN    ELECTROLYTE REPLACEMENT PROTOCOL - Potassium  1 Each Other PRN    ELECTROLYTE REPLACEMENT PROTOCOL - Magnesium  1 Each Other PRN    atorvastatin (LIPITOR) tablet 80 mg  80 mg Oral QHS         All the patient's labs over the past 24 hours were reviewed both during my initial daily workflow process and at the time notated as \"note time\" in [facility].   (It is not time stamped separately in this workflow.)  Select labs are listed below.         Labs: Results:                       Lipid Panel Lab Results   Component Value Date/Time    Cholesterol, total 145 03/12/2017 06:15 AM    HDL Cholesterol 37 03/12/2017 06:15 AM    LDL, calculated 91.6 03/12/2017 06:15 AM    VLDL, calculated 16.4 03/12/2017 06:15 AM    Triglyceride 82 03/12/2017 06:15 AM    CHOL/HDL Ratio 3.9 03/12/2017 06:15 AM              Thyroid Studies Lab Results   Component Value Date/Time    TSH 1.51 12/12/2009 09:48 AM        Procedures/imaging: see electronic medical records for all procedures/Xrays and details which were not copied into this note but were reviewed prior to creation of [address] [place] [address] Rd, DO  Internal Medicine/Geriatrics

## 2017-03-19 NOTE — PROGRESS NOTES
Problem: Mobility Impaired (Adult and Pediatric)  Goal: *Acute Goals and Plan of Care (Insert Text)  Physical Therapy Goals  Initiated 3/11/2017 and to be accomplished within 7 day(s)  1. Patient will move from supine to sit and sit to supine in bed with supervision/set-up. 2. Patient will transfer from bed to chair and chair to bed with supervision/set-up using the least restrictive device. 3. Patient will perform sit to stand with supervision/set-up. 4. Patient will ambulate with supervision/set-up for >100 feet with the least restrictive device. Re-evaluation on 3/18/2017. Goals to be met within 2-8 days. 1. Patient will move from supine to sit and sit to supine in bed with supervision/set-up. 2. Patient will transfer from bed to chair and chair to bed with supervision/set-up using the least restrictive device. 3. Patient will perform sit to stand with supervision/set-up. 4. Patient will ambulate with supervision/modified independence for >250 feet with the least restrictive device. 5. Patient will demonstrate compliance and understanding of home exercise program to maximize strength and functional return. Outcome: Progressing Towards Goal  PHYSICAL THERAPY TREATMENT     Patient: Frankie Olsen (28 y.o. female)  Date: 3/19/2017  Diagnosis: Stroke [facility])  Stroke ([facility] [address]  Hypokalemia  CVA (cerebral vascular accident) ([facility] [address]  anemia Stroke ([facility] [address]  Procedure(s) (LRB):  COLONOSCOPY; BIOPSY; (N/A) 2 Days Post-Op  Precautions: Fall   Chart, physical therapy assessment, plan of care and goals were reviewed. ASSESSMENT:  Pt manipulating her cellular phone with both hands and demonstrates improvement of UE symmetry. Pt ambulating with less delay and reciprocal pattern for most of session. Brief trial of amb without AD, but Path deviation increased. Pt reports increase of right sided LE pain with continued mobility.       Progression toward goals:  [ ]      Improving appropriately and progressing toward goals  [X]      Improving slowly and progressing toward goals  [ ]      Not making progress toward goals and plan of care will be adjusted       PLAN:  Patient continues to benefit from skilled intervention to address the above impairments. Continue treatment per established plan of care. Discharge Recommendations:  Home Health  Further Equipment Recommendations for Discharge:  bedside commode and rolling walker       SUBJECTIVE:   Patient stated I really enjoyed my omelette. Never tried mushrooms in eggs before.       OBJECTIVE DATA SUMMARY:   Critical Behavior:  Neurologic State: Alert  Orientation Level: Oriented X4  Cognition: Appropriate decision making, Appropriate for age attention/concentration, Appropriate safety awareness  Safety/Judgement: Fall prevention  Functional Mobility Training:  Bed Mobility:  Rolling: Supervision  Supine to Sit: Supervision  Sit to Supine: Supervision  Transfers:  Sit to Stand: Supervision  Stand to Sit: Supervision  Balance:  Sitting: Intact  Standing: Impaired  Standing - Static: Good  Standing - Dynamic : Fair  Ambulation/Gait Training:  Distance (ft): 650 Feet (ft)  Assistive Device: Gait belt;Walker, rolling  Ambulation - Level of Assistance: Stand-by asssistance  Gait Abnormalities: Decreased step clearance  Base of Support: Widened  Speed/Janice: Slow  Step Length: Right shortened;Left shortened  Pain:  Pain Scale 1: Numeric (0 - 10)  Pain Intensity 1: 0  Pain Location 1: Leg  Pain Orientation 1: Right  Pain Description 1: Aching  Pain Intervention(s) 1: Medication (see MAR)  Activity Tolerance:   Good  Please refer to the flowsheet for vital signs taken during this treatment.   After treatment:   [ ] Patient left in no apparent distress sitting up in chair  [X] Patient left in no apparent distress in bed  [X] Call bell left within reach  [X] Nursing notified  [ ] Caregiver present  [ ] Bed alarm activated      Yousuf Sheth PTA   Time Calculation: 24 mins

## 2017-03-20 ENCOUNTER — APPOINTMENT (OUTPATIENT)
Dept: INTERVENTIONAL RADIOLOGY/VASCULAR | Age: 47
DRG: 045 | End: 2017-03-20
Attending: INTERNAL MEDICINE
Payer: MEDICAID

## 2017-03-20 LAB
ERYTHROCYTE [DISTWIDTH] IN BLOOD BY AUTOMATED COUNT: 17.7 % (ref 11.6–14.5)
HBV & HDV AB SER-IMP: NEGATIVE
HBV SURFACE AB SERPL IA-ACNC: <3.1 MIU/ML
HBV SURFACE AG SER QL: <0.1 INDEX
HBV SURFACE AG SER QL: NEGATIVE
HCT VFR BLD AUTO: 22.7 % (ref 35–45)
HEP BS AB COMMENT,HBSAC: ABNORMAL
HGB BLD-MCNC: 6.7 G/DL (ref 12–16)
MCH RBC QN AUTO: 23.3 PG (ref 24–34)
MCHC RBC AUTO-ENTMCNC: 29.5 G/DL (ref 31–37)
MCV RBC AUTO: 79.1 FL (ref 74–97)
PLATELET # BLD AUTO: 275 K/UL (ref 135–420)
PMV BLD AUTO: 10 FL (ref 9.2–11.8)
RBC # BLD AUTO: 2.87 M/UL (ref 4.2–5.3)
WBC # BLD AUTO: 7.1 K/UL (ref 4.6–13.2)

## 2017-03-20 PROCEDURE — 36415 COLL VENOUS BLD VENIPUNCTURE: CPT | Performed by: INTERNAL MEDICINE

## 2017-03-20 PROCEDURE — 74011000250 HC RX REV CODE- 250: Performed by: FAMILY MEDICINE

## 2017-03-20 PROCEDURE — 74011636637 HC RX REV CODE- 636/637: Performed by: INTERNAL MEDICINE

## 2017-03-20 PROCEDURE — 74011250637 HC RX REV CODE- 250/637: Performed by: HOSPITALIST

## 2017-03-20 PROCEDURE — 74011250637 HC RX REV CODE- 250/637: Performed by: INTERNAL MEDICINE

## 2017-03-20 PROCEDURE — 86480 TB TEST CELL IMMUN MEASURE: CPT | Performed by: INTERNAL MEDICINE

## 2017-03-20 PROCEDURE — 97535 SELF CARE MNGMENT TRAINING: CPT

## 2017-03-20 PROCEDURE — 74011250637 HC RX REV CODE- 250/637: Performed by: FAMILY MEDICINE

## 2017-03-20 PROCEDURE — 02HV33Z INSERTION OF INFUSION DEVICE INTO SUPERIOR VENA CAVA, PERCUTANEOUS APPROACH: ICD-10-PCS | Performed by: RADIOLOGY

## 2017-03-20 PROCEDURE — 74011250636 HC RX REV CODE- 250/636: Performed by: FAMILY MEDICINE

## 2017-03-20 PROCEDURE — 97530 THERAPEUTIC ACTIVITIES: CPT

## 2017-03-20 PROCEDURE — 92526 ORAL FUNCTION THERAPY: CPT

## 2017-03-20 PROCEDURE — 92523 SPEECH SOUND LANG COMPREHEN: CPT

## 2017-03-20 PROCEDURE — 97116 GAIT TRAINING THERAPY: CPT

## 2017-03-20 PROCEDURE — 74011250636 HC RX REV CODE- 250/636: Performed by: INTERNAL MEDICINE

## 2017-03-20 PROCEDURE — 85027 COMPLETE CBC AUTOMATED: CPT | Performed by: PSYCHIATRY & NEUROLOGY

## 2017-03-20 PROCEDURE — 77030013687 IR FLUORO GUIDE PICC INSERTION

## 2017-03-20 PROCEDURE — 65660000000 HC RM CCU STEPDOWN

## 2017-03-20 RX ORDER — LIDOCAINE HYDROCHLORIDE 10 MG/ML
1-20 INJECTION INFILTRATION; PERINEURAL
Status: COMPLETED | OUTPATIENT
Start: 2017-03-20 | End: 2017-03-20

## 2017-03-20 RX ORDER — HEPARIN SODIUM (PORCINE) LOCK FLUSH IV SOLN 100 UNIT/ML 100 UNIT/ML
SOLUTION INTRAVENOUS
Status: DISPENSED
Start: 2017-03-20 | End: 2017-03-21

## 2017-03-20 RX ORDER — HEPARIN SODIUM 200 [USP'U]/100ML
500 INJECTION, SOLUTION INTRAVENOUS
Status: COMPLETED | OUTPATIENT
Start: 2017-03-20 | End: 2017-03-21

## 2017-03-20 RX ORDER — HEPARIN SODIUM (PORCINE) LOCK FLUSH IV SOLN 100 UNIT/ML 100 UNIT/ML
500 SOLUTION INTRAVENOUS
Status: COMPLETED | OUTPATIENT
Start: 2017-03-20 | End: 2017-03-20

## 2017-03-20 RX ORDER — HEPARIN SODIUM 200 [USP'U]/100ML
INJECTION, SOLUTION INTRAVENOUS
Status: DISPENSED
Start: 2017-03-20 | End: 2017-03-21

## 2017-03-20 RX ADMIN — GABAPENTIN 400 MG: 400 CAPSULE ORAL at 09:17

## 2017-03-20 RX ADMIN — MESALAMINE 1000 MG: 250 CAPSULE ORAL at 09:17

## 2017-03-20 RX ADMIN — HEPARIN SODIUM (PORCINE) LOCK FLUSH IV SOLN 100 UNIT/ML 500 UNITS: 100 SOLUTION at 12:43

## 2017-03-20 RX ADMIN — MULTIPLE VITAMINS W/ MINERALS TAB 1 TABLET: TAB at 09:17

## 2017-03-20 RX ADMIN — MESALAMINE 1000 MG: 250 CAPSULE ORAL at 13:21

## 2017-03-20 RX ADMIN — HYDROMORPHONE HYDROCHLORIDE 4 MG: 4 TABLET ORAL at 10:31

## 2017-03-20 RX ADMIN — Medication 10 ML: at 06:47

## 2017-03-20 RX ADMIN — PREDNISONE 40 MG: 20 TABLET ORAL at 09:17

## 2017-03-20 RX ADMIN — GABAPENTIN 400 MG: 400 CAPSULE ORAL at 23:14

## 2017-03-20 RX ADMIN — Medication 10 ML: at 23:25

## 2017-03-20 RX ADMIN — MESALAMINE 1000 MG: 250 CAPSULE ORAL at 17:29

## 2017-03-20 RX ADMIN — ATORVASTATIN CALCIUM 80 MG: 20 TABLET, FILM COATED ORAL at 23:13

## 2017-03-20 RX ADMIN — POTASSIUM CHLORIDE 20 MEQ: 20 SOLUTION ORAL at 09:17

## 2017-03-20 RX ADMIN — Medication 2 MG: at 20:18

## 2017-03-20 RX ADMIN — Medication 2 MG: at 07:14

## 2017-03-20 RX ADMIN — FERROUS SULFATE TAB 325 MG (65 MG ELEMENTAL FE) 325 MG: 325 (65 FE) TAB at 09:17

## 2017-03-20 RX ADMIN — PANTOPRAZOLE SODIUM 40 MG: 40 TABLET, DELAYED RELEASE ORAL at 06:47

## 2017-03-20 RX ADMIN — HEPARIN SODIUM 1000 UNITS: 200 INJECTION, SOLUTION INTRAVENOUS at 12:32

## 2017-03-20 RX ADMIN — Medication 10 ML: at 15:41

## 2017-03-20 RX ADMIN — HYDROMORPHONE HYDROCHLORIDE 4 MG: 4 TABLET ORAL at 17:30

## 2017-03-20 RX ADMIN — LIDOCAINE HYDROCHLORIDE 1 ML: 10 INJECTION, SOLUTION INFILTRATION; PERINEURAL at 12:33

## 2017-03-20 RX ADMIN — GABAPENTIN 400 MG: 400 CAPSULE ORAL at 15:40

## 2017-03-20 RX ADMIN — ENOXAPARIN SODIUM 80 MG: 80 INJECTION SUBCUTANEOUS at 13:21

## 2017-03-20 RX ADMIN — Medication 2 MG: at 13:21

## 2017-03-20 RX ADMIN — Medication 2 MG: at 01:00

## 2017-03-20 RX ADMIN — HYDROMORPHONE HYDROCHLORIDE 0.5 MG: 1 INJECTION, SOLUTION INTRAMUSCULAR; INTRAVENOUS; SUBCUTANEOUS at 23:25

## 2017-03-20 RX ADMIN — FERROUS SULFATE TAB 325 MG (65 MG ELEMENTAL FE) 325 MG: 325 (65 FE) TAB at 17:29

## 2017-03-20 NOTE — ROUTINE PROCESS
Bedside and Verbal shift change report given to Bruce Joshua RN (oncoming nurse) by Lisa Pérez RN (offgoing nurse).  Report included the following information SBAR, Kardex, Intake/Output, MAR, Recent Results and Cardiac Rhythm Sr ST.

## 2017-03-20 NOTE — PROGRESS NOTES
Problem: Neurolinguistics Impaired (Adult)  Goal: *Acute Goals and Plan of Care (Insert Text)  Goals:   1. Pt will complete functional mathematical reasoning tasks (ex. Money, time) with 80% accuracy. 2. Pt will complete functional problem solving tasks by generating multiple solutions (3+) for given scenario with 80% accuracy. 3. Pt will complete complex comprehension tasks (ex. yes/no, commands) involving temporal and prepositional components to increase attention to detail and auditory processing with 80% accuracy. 4. Pt will verbalize/demonstrate insight into physical and cognitive deficits and impact on safety and return home with 80% accuracy. Outcome: Progressing Towards Goal  SPEECH LANGUAGE PATHOLOGY EVALUATION     Patient: Deysi Ontiveros (10 y.o. female)  Date: 3/20/2017  Primary Diagnosis: Stroke [facility])  Stroke ([facility]  Hypokalemia  CVA (cerebral vascular accident) ([facility]  anemia  Procedure(s) (LRB):  COLONOSCOPY; BIOPSY; (N/A) 3 Days Post-Op   Precautions:   Fall      ASSESSMENT :  Based on the objective data described below, the patient presents with mild/moderate cognitive/communciation deficits c/w Right Hemisphere Dysfunction. Full cognitive/communication evaluation completed with results as below. Pt with relatively intact recall for current environment. Demo breakdown with visual scanning of environment with mild L inattention. Further impaired attention to detail during complex yes/no task with 70% accuracy x10. Functional mathematical reasoning task completed with 25% accuracy x4; pt reports independent with financial and medication management in home. Functional problem solving with generation of multiple solutions completed with 50% accuracy x4. Pt with vague insight into physical and cognitive deficits however will benefit from skilled speech therapy services in current and discharge environment to address goals as above for maximized safety and independence; pt agreeable.       Patient will benefit from skilled intervention to address the above impairments. Patients rehabilitation potential is considered to be Good  Factors which may influence rehabilitation potential include:   [ ]              None noted  [X]              Mental ability/status  [X]              Medical condition  [ ]              Home/family situation and support systems  [ ]              Safety awareness  [ ]              Pain tolerance/management  [ ]              Other:        PLAN : cognitive/communication goals as above  Recommendations and Planned Interventions:  Insight, complex comprehension, attention  Frequency/Duration: Patient will be followed by speech-language pathology 1-2 times per day/4-7 days per week to address goals. Discharge Recommendations: Outpatient       SUBJECTIVE:   Patient stated I'm having a hard time with my memory.       OBJECTIVE:       Past Medical History:   Diagnosis Date    Abdominal pain      Anemia NEC       sickle cell trait    C. difficile colitis      Crohn's disease ([facility]      Gastrointestinal disorder       Crohns    Herpes zoster      Hx of cocaine abuse      Hyperkalemia      Hypertension      Iron deficiency anemia      MRSA (methicillin resistant Staphylococcus aureus)      Obesity      Pain management      Sickle cell trait (HCC)       Past Surgical History:   Procedure Laterality Date    HX GYN         tubal ligation    HX TUBAL LIGATION         Prior Level of Function/Home Situation: per chart/pt  Home Situation  Home Environment: Apartment (town house)  One/Two Story Residence: Two story  Living Alone: Yes (3 adult children)  Support Systems: Family member(s)  Patient Expects to be Discharged to[de-identified] Private residence  Current DME Used/Available at Home: None  Mental Status:  Neurologic State: Alert  Orientation Level: Oriented to person, Oriented to place, Oriented to situation, Disoriented to time  Cognition: Follows commands, Decreased attention/concentration  Perception: Cues to attend left visual field, Cues to attend to left side of body  Perseveration: No perseveration noted  Safety/Judgement: Decreased insight into deficits  Motor Speech:  Oral-Motor Structure/Motor Speech  Labial: Left droop  Dentition: Intact; Natural  Oral Hygiene: good  Lingual: Decreased rate  Velum: No impairment  Mandible: No impairment  Apraxic Characteristics: None  Dysarthric Characteristics: None  Intelligibility: No impairment  Intonation: WFL  Rate: WFL  Prosody: WFL  Overall Impairment Severity: None  Language Comprehension and Expression:  Auditory Comprehension  Auditory Impairment: Yes  Response to Complex Yes/No Questions (%) : 80 %  Interfering Components: Attention to detail  Effective Techniques: Repetition; Extra processing time  Verbal Expression  Primary Mode of Expression: Verbal  Initiation: No impairment  Automatic Speech Task: No impairment  Naming: No impairment  Sentence Formulation: No impairment  Conversation: Fluent        Neuro-Linguistics:        Verbal Problem Solving: Impaired        Mathematical: Impaired  Memory: Impaired  Attention : Impaired     Mathematical Exercises   Word Problems Accuracy (%): 25 %  Memory Exercises  Functional Tasks: impaired                    Voice:                 Vocal Quality: No impairment                                GCODEAttention:   Attention Current Status CJ= 20-39%   Attention Goal Status CI= 1-19%     The severity rating is based on the following outcomes:         National Outcomes Measures (NOMS)  Professional Judgement        PAIN:  Start of Eval: 0  End of Eval: 0      After treatment:   [ ]              Patient left in no apparent distress sitting up in chair  [X]              Patient left in no apparent distress in bed  [X]              Call bell left within reach  [ ]              Nursing notified  [ ]              Caregiver present  [ ]              Bed alarm activated COMMUNICATION/EDUCATION:   [X] Patient/family have participated as able in goal setting and plan of care. [X]  Patient/family agree to work toward stated goals and plan of care. [ ]  Patient understands intent and goals of therapy, but is neutral about his/her participation. [ ]  Patient is unable to participate in goal setting and plan of care.      Thank you for this referral.  KIRILL Macias  Time Calculation: 19 mins

## 2017-03-20 NOTE — PROGRESS NOTES
.1942:  Assumed care. Pt awake, alert and oriented. Pt resting in bed with no acute signs of distress. Call bell and telephone within reach. White board updated. 2109: Pt c/o pain to head 10/10. PRN Dilaudid administered    0105:  Pt c/o back pain 10/10. PRN morphine administered. 3094 Pt c/o pain 110/10 to abd. PRN morphine administered    Shift Summary:  Pt had uneventful shift besides pain controlled with dilaudid and morphine. Pt in bed resting with no signs of distress or c/o pain.

## 2017-03-20 NOTE — PROGRESS NOTES
Hematology Inpatient Consult    Subjective:     Sharon Meadows is a 40 y.o., [address] 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 Past Medical History:   Diagnosis Date    Abdominal pain     Anemia NEC     sickle cell trait    C. difficile colitis     Crohn's disease (HCC)     Gastrointestinal disorder     Crohns    Herpes zoster     Hx of cocaine abuse     Hyperkalemia     Hypertension     Iron deficiency anemia     MRSA (methicillin resistant Staphylococcus aureus)     Obesity     Pain management     Sickle cell trait (HCC)      Past Surgical History:   Procedure Laterality Date    COLONOSCOPY N/A 3/17/2017    COLONOSCOPY; BIOPSY; performed by Dominga Silveira MD at [facility] ENDOSCOPY    HX GYN      tubal ligation    HX TUBAL LIGATION        History reviewed. No pertinent family history.   Social History   Substance Use Topics    Smoking status: Former Smoker    Smokeless tobacco: Not on file    Alcohol use No      Current Facility-Administered Medications   Medication Dose Route Frequency Provider Last Rate Last Dose    heparin (porcine) 100 unit/mL injection             heparinized saline 2 units/mL 1,000 unit/500 mL infusion             HYDROmorphone (PF) (DILAUDID) injection 0.5 mg  0.5 mg IntraVENous Q12H PRN Corazon Handy, DO   0.5 mg at 03/19/17 2109    morphine injection 2 mg  2 mg IntraVENous Q6H PRN Corazon Handy, DO   2 mg at 03/20/17 1321    gabapentin (NEURONTIN) capsule 400 mg  400 mg Oral TID Corazon Handy, DO   400 mg at 03/20/17 0917    HYDROmorphone (DILAUDID) tablet 4 mg  4 mg Oral Q4H PRN Corazon Handy, DO   4 mg at 03/20/17 1031    enoxaparin (LOVENOX) injection 80 mg  80 mg SubCUTAneous Q24H Corazon Handy, DO   80 mg at 03/20/17 1321    mesalamine (PENTASA) CR capsule 1,000 mg  1 g Oral QID Dominga Silveira MD   1,000 mg at 03/20/17 1321    predniSONE (DELTASONE) tablet 40 mg  40 mg Oral DAILY WITH BREAKFAST Dominga Silveira MD 40 mg at 17 7355    ferrous sulfate tablet 325 mg  1 Tab Oral BID WITH MEALS Mendy Sierra MD   325 mg at 17 4052    benzocaine (HURRICANE) 20 % spray   Mucous Membrane PRN Marcel Spivey MD   [address] [place] at 03/15/17 1202    0.9% sodium chloride with KCl 20 mEq/L infusion   IntraVENous CONTINUOUS Mendy Sierra MD 50 mL/hr at 17 2311      potassium chloride (KAON 10%) 20 mEq/15 mL oral liquid 20 mEq  20 mEq Oral DAILY Mendy Sierra MD   20 mEq at 17 6052    multivitamin, tx-iron-ca-min (THERA-M w/ IRON) tablet 1 Tab  1 Tab Oral DAILY Mendy Sierra MD   1 Tab at 17 6352    pantoprazole (PROTONIX) tablet 40 mg  40 mg Oral ACB Mendy Sierra MD   40 mg at 17 2562    sodium chloride (NS) flush 5-10 mL  5-10 mL IntraVENous Q8H Mendy Sierra MD   10 mL at 17 1744    sodium chloride (NS) flush 5-10 mL  5-10 mL IntraVENous PRN Mendy Sierra MD        ondansetron [facility]) injection 4 mg  4 mg IntraVENous Q6H PRN Mendy Sierra MD   4 mg at 17 203    acetaminophen (TYLENOL) tablet 650 mg  650 mg Oral Q4H PRN Mendy Sierra MD        ELECTROLYTE REPLACEMENT PROTOCOL - Potassium  1 Each Other PRN Mendy Sierra MD        ELECTROLYTE REPLACEMENT PROTOCOL - Magnesium  1 Each Other PRN Mendy Sierra MD        atorvastatin (LIPITOR) tablet 80 mg  80 mg Oral QHS Junie Ramsey, DO   80 mg at 17        No Known Allergies     Review of Systems:  Some headache  Some diarrhea, slight red tint  Walked with some cramping right leg.     Objective:     Patient Vitals for the past 8 hrs:   BP Temp Pulse Resp SpO2   17 1330 125/87 97.4 °F (36.3 °C) 99 20 100 %   17 1032 121/86 - (!) 102 - -   17 0742 126/83 98 °F (36.7 °C) 86 17 100 %     Temp (24hrs), Av.2 °F (36.8 °C), Min:97.4 °F (36.3 °C), Max:98.9 °F (37.2 °C)         Physical Exam:   Sitting up  Heent: eom+  Lung: cta  Cv: rrr  Abd: soft  Ext: no edema    Lab/Data Review:  Recent Results (from the past 24 hour(s))   CBC W/O DIFF    Collection Time: 03/20/17  9:28 AM   Result Value Ref Range    WBC 7.1 4.6 - 13.2 K/uL    RBC 2.87 (L) 4.20 - 5.30 M/uL    HGB 6.7 (L) 12.0 - 16.0 g/dL    HCT 22.7 (L) 35.0 - 45.0 %    MCV 79.1 74.0 - 97.0 FL    MCH 23.3 (L) 24.0 - 34.0 PG    MCHC 29.5 (L) 31.0 - 37.0 g/dL    RDW 17.7 (H) 11.6 - 14.5 %    PLATELET 282 126 - 692 K/uL    MPV 10.0 9.2 - 11.8 FL         Assessment:     Principal Problem:    Stroke ([facility] (3/10/2017)    Active Problems:    Chronic pain syndrome (2/27/2012)      Crohn disease ([facility] (2/27/2012)      Hypokalemia (8/8/2015)      Sickle cell trait (HCC) ()      Hypertension ()      Hx of cocaine abuse ()      Embolic stroke ([facility] (2/28/6422)      Acute blood loss anemia (3/15/2017)      Neuropathic pain (3/15/2017)      DVT (deep venous thrombosis) ([facility]) (3/16/2017)    hgb 6.7 check iron    Plan:   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

## 2017-03-20 NOTE — PROGRESS NOTES
0800 Assumed pt care. Pt alert and oriented. Voiced no complains @ this time. 0900 Awaiting PICC placement today    1030 Pt was medicated with dilaudid 4 mg for  HA/Back 10/10.     1130 pt voiced much relief. In bed getting a spa bath    1200 Picked up via W/C for PICC line insertion. 1300 Pt back to room, PICC line to SHARIFA, same intact and patent, all capped. Dressing Dry/intact. 1315 Assisted to the bathroom and to chair for lunch. 1320 Pt medicated  with morphine 2 mg for abm pain 10/10.    1345 pt ambulated with physical therapy and tolerated well    1420 pt in sleeping comfortably    1730 pt medicated with dilaudid 4 mg for abm pain 10/10  1830 pt resting well with family @ bedside. No acute distress.

## 2017-03-20 NOTE — PROGRESS NOTES
Gastrointestinal Progress Note    Patient Name: Mabel GILLWW'S Date: 3/20/2017    Admit Date: 3/10/2017    Subjective:   Poor historian  Diet: Patient is on regular solid diet and is not tolerating 2/3. Nausea is present. Vomiting is not present.     Pain: Patient doescomplain of lower abdominal pain relieved by bm and urine  Bowel Movements: 3 soft no blood  Pain right calf when walking on Lovenox    Bleeding:  None    Current Facility-Administered Medications   Medication Dose Route Frequency    heparin (porcine) 100 unit/mL injection        heparinized saline 2 units/mL 1,000 unit/500 mL infusion        HYDROmorphone (PF) (DILAUDID) injection 0.5 mg  0.5 mg IntraVENous Q12H PRN    morphine injection 2 mg  2 mg IntraVENous Q6H PRN    gabapentin (NEURONTIN) capsule 400 mg  400 mg Oral TID    HYDROmorphone (DILAUDID) tablet 4 mg  4 mg Oral Q4H PRN    enoxaparin (LOVENOX) injection 80 mg  80 mg SubCUTAneous Q24H    mesalamine (PENTASA) CR capsule 1,000 mg  1 g Oral QID    predniSONE (DELTASONE) tablet 40 mg  40 mg Oral DAILY WITH BREAKFAST    ferrous sulfate tablet 325 mg  1 Tab Oral BID WITH MEALS    benzocaine (HURRICANE) 20 % spray   Mucous Membrane PRN    potassium chloride (KAON 10%) 20 mEq/15 mL oral liquid 20 mEq  20 mEq Oral DAILY    multivitamin, tx-iron-ca-min (THERA-M w/ IRON) tablet 1 Tab  1 Tab Oral DAILY    pantoprazole (PROTONIX) tablet 40 mg  40 mg Oral ACB    sodium chloride (NS) flush 5-10 mL  5-10 mL IntraVENous Q8H    sodium chloride (NS) flush 5-10 mL  5-10 mL IntraVENous PRN    ondansetron (ZOFRAN) injection 4 mg  4 mg IntraVENous Q6H PRN    acetaminophen (TYLENOL) tablet 650 mg  650 mg Oral Q4H PRN    ELECTROLYTE REPLACEMENT PROTOCOL - Potassium  1 Each Other PRN    ELECTROLYTE REPLACEMENT PROTOCOL - Magnesium  1 Each Other PRN    atorvastatin (LIPITOR) tablet 80 mg  80 mg Oral QHS          Objective:     Visit Vitals    BP (!) 134/95 (BP 1 Location: Left arm, BP Patient Position: At rest)    Pulse 88    Temp 97.4 °F (36.3 °C)    Resp 20    Ht 5' 4\" (1.626 m)    Wt 81.3 kg (179 lb 3.7 oz)    SpO2 100%    Breastfeeding No    BMI 28.93 kg/m2       03/19 0701 - 03/20 1900  In: 036 [P.O.:645]  Out: -     General appearance: alert, fatigued, distracted, no distress, slowed mentation, appears stated age, appears older than stated age, pale. Slurred speech. Has left hemiparesis with deviation of the mouth to the right. Abdomen: not distended, rounded but very soft. Mild lower tenderness. Bowel sounds normal. No masses,  no organomegaly  Extremities: no cyanosis or edema,     Data Review:    Labs: Results:       Chemistry No results for input(s): GLU, NA, K, CL, CO2, BUN, CREA, CA, AGAP, BUCR, TBIL, GPT, AP, TP, ALB, GLOB, AGRAT in the last 72 hours. CBC w/Diff Recent Labs      03/20/17   0928   WBC  7.1   RBC  2.87*   HGB  6.7*   HCT  22.7*   PLT  275      Coagulation No results for input(s): PTP, INR, APTT in the last 72 hours. No lab exists for component: INREXT, INREXT    Liver Enzymes No results for input(s): TP, ALB, TBIL, AP, SGOT, GPT in the last 72 hours. No lab exists for component: DBIL       Assessment:     Principal Problem:    Stroke ([facility] (3/10/2017)    Active Problems:    Chronic pain syndrome (2/27/2012)      [facility] ([facility] (2/27/2012)      Hypokalemia (8/8/2015)      Sickle cell trait (HCC) ()      Hypertension ()      Hx of cocaine abuse ()      Embolic stroke ([facility] (9/51/7439)      Acute blood loss anemia (3/15/2017)      Neuropathic pain (3/15/2017)      DVT (deep venous thrombosis) ([facility] (3/16/2017)        Plan:     Crohn's disease of the colon more on the left side present x 14 years. Untreated non compliant. Colonoscopy yesterday moderate diffuse Crohn's of the left and transverse colon with pseudo and inflammatory polyposis poor bowel prep. She needs to have her procedure repeated next year.  We need to treat her with Entyvio IV asap as an outpatient but still has to be approved by medicaid. For now continue steroids and Pentasa.  Quantiferon gold and serology for hep B no Active hepatitis B so she is ready to start the Entyvio  Iron deficiency anemia down to 6.7 most likely related to her active Crohn's disease  R/o tb and hep B  Sp CVA and DVT related to hyper coagulable state in relation to factor 8 need to be on anticoagulation on Lovenox        Onelia Mock MD  March 20, 2017

## 2017-03-20 NOTE — PROGRESS NOTES
Problem: Dysphagia (Adult)  Goal: *Acute Goals and Plan of Care (Insert Text)  Dysphagia Present: mild  Aspiration: at risk     Recommendations:  Diet: regular diet and thin liquids  Meds: 1 @ at time, place to R side  Aspiration Precautions  Other: bolus placement R, small sips/bites, alternate solids/liquids    Goals: Patient will:  1. Tolerate PO trials with 0 s/s overt distress in 4/5 trials  2. Utilize compensatory swallow strategies/maneuvers (decrease bite/sip, size/rate, alt. liq/sol) with min cues in 4/5 trials       Outcome: Progressing Towards Goal  SPEECH LANGUAGE PATHOLOGY DYSPHAGIA TREATMENT     Patient: Phil Bull (57 y.o. female)  Date: 3/20/2017  Diagnosis: Stroke [facility])  Stroke ([facility] [address]  Hypokalemia  CVA (cerebral vascular accident) ([facility] [address]  anemia Stroke ([facility] [address]  Procedure(s) (LRB):  COLONOSCOPY; BIOPSY; (N/A) 3 Days Post-Op  Precautions: aspiration Fall      ASSESSMENT:  Mild oropharyngeal dysphagia     Dysphagia f/u completed this AM with pt A&Ox4. Self-feeding from breakfast tray of [facility] soft and regular solids as well as thin liquids +/- straw. Tolerated all PO without overt s/s penetration/aspiration present. Continues to demo mild labored/disorganized mastication r/t L oral motor weakness though remains functional for regular texture. Scant oral residue L post intake which reduced with independent lingual sweep and further cleared with cued liquid wash. Pt currently tolerating least-restrictive diet; will f/u 1-2 visits for completion of education and compensatory swallow strategy training to maximize safety.        Progression toward goals:  [X]         Improving appropriately and progressing toward goals  [ ]         Improving slowly and progressing toward goals  [ ]         Not making progress toward goals and plan of care will be adjusted       PLAN: regular diet, swallow strategies as above  Recommendations and Planned Interventions:  ST to f/u for education, compensatory swallow strategy training  Patient continues to benefit from skilled intervention to address the above impairments. Continue treatment per established plan of care. Discharge Recommendations:  Outpatient       SUBJECTIVE:   Patient stated You said it could go in my lungs if I'm not careful. OBJECTIVE:   Cognitive and Communication Status:  Neurologic State: Alert  Orientation Level: Oriented X4  Cognition: Appropriate decision making  Perception: Appears intact  Perseveration: No perseveration noted  Safety/Judgement: Fall prevention  Dysphagia Treatment:  Oral Assessment:  Oral Assessment  Labial: Decreased rate, Left droop, Decreased seal  Dentition: Intact, Natural  Oral Hygiene: good  Lingual: Decreased rate, Decreased strength  Velum: No impairment  Mandible: No impairment  P.O. Trials:              Patient Position: [facility]              Vocal quality prior to P.O.: No impairment              Consistency Presented: Thin liquid, Mechanical soft, Solid              How Presented: Self-fed/presented, Cup/sip, Straw, Successive swallows              How Much:  (meal)              Bolus Acceptance: No impairment              Bolus Formation/Control: Impaired              Type of Impairment: Mastication              Propulsion: Discoordination              Oral Residue: Less than 10% of bolus, Left, Lingual              Initiation of Swallow: No impairment              Laryngeal Elevation: Functional              Aspiration Signs/Symptoms: None              Pharyngeal Phase Characteristics: No impairment, issues, or problems               Effective Modifications:  Alternate liquids/solids, Small sips and bites (bolus placement R)              Cues for Modifications: Minimal                                Oral Phase Severity: Mild              Pharyngeal Phase Severity : Mild PAIN:  Start of Tx: 0  End of Tx: 0      After treatment:   [ ]              Patient left in no apparent distress sitting up in chair  [X]              Patient left in no apparent distress in bed  [X]              Call bell left within reach  [ ]              Nursing notified  [ ]              Family present  [ ]              Caregiver present  [ ]              Bed alarm activated         COMMUNICATION/EDUCATION:   [X]        Aspiration precautions; swallow safety; compensatory techniques  [ ]        Patient unable to participate in education; education ongoing with staff  [ ]         Posted safety precautions in patient's room.   [ ]         Oral-motor/laryngeal strengthening exercises        KIRILL Bailey  Time Calculation: 10 mins

## 2017-03-20 NOTE — PROGRESS NOTES
Acute Rehab at [facility] has no available beds today, Insurance approval still pending. [facility] Acute rehab reviewing. May be difficult for ARU placement at this time due to increased level of function. Care Management Interventions  PCP Verified by CM:  Yes  Transition of Care Consult (CM Consult): Discharge Planning  Physical Therapy Consult: Yes  Occupational Therapy Consult: Yes  Speech Therapy Consult: Yes  Current Support Network: Own Home  Confirm Follow Up Transport: Family  Plan discussed with Pt/Family/Caregiver: Yes  Freedom of Choice Offered: Yes  Discharge Location  Discharge Placement: Rehab Unit Subacute (vs  pending progress)

## 2017-03-20 NOTE — PROGRESS NOTES
NEUROLOGY PROGRESS NOTE        Patient: Amanda Beach        Sex: female          DOA: 3/10/2017  YOB: 1970      Age:  40 y.o.         LOS: 10 days     Identification:  40 y.o. female with history of Crohn's disease, past history of cocaine use (not recently) and Sickle Cell trait, who presented with left sided weakness, found to have multifocal embolic strokes.      SUBJECTIVE:   The patient continues to have a a small amount of blood in her stool. No abdominal pain for now.      Her colonoscopy showed findings of Crohn's disease. It was limited exam due to bad bowel prep. LE doppler showed DVD on right posterior tibial vein. Currently, she does not have remarkable bleeding. She was started on steroids for Crohn's treatment.        Stroke Work-up:  Brain MRI:1. Multifocal acute infarctions, the largest is in the right MCA territory correlating with the abnormal CT. There are bilateral left greater than right cerebellar hemisphere infarctions. These infarctions are likely embolic with a central origin. 2. Possible hemorrhagic lesion within the pituitary. Scans dedicated to the pituitary could be performed. This could be artifact of volume averaging of the sellar floor but is at least suspect. 3. Chronic appearing right orbital floor fracture with protrusion of orbital fat and relative enophthalmos. MRA Brain: Occlusion inferior division right M2 segment MCA. This correlates with the acute infarction and is indeterminate in nature, most likely embolic certainly. No other significant intracranial vascular abnormality. MRA Neck: Limited noncontrast neck vascular evaluation. No definite hemodynamically significant stenosis is documented. Echocardiogram: EF is 60-65%, no intracardiac shunt detected. LAURIE: no intracardiac thrombus. Carotid Doppler: Left ICA <50% stenosis with a small plaque. No significant stenosis elsewhere. Lipid panel: LDL 91.6, HDL is 37.    HbA1c: 5.7      ASSESSMENT/IMPRESSION:   Multifocal right hemispheric (temporal, thalamic, parietal and cerebellar infarcts), most likely cardioembolic in nature. 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 She has not been on any oral contraceptives. Untreated Crohn's disease maybe the underlying reason for high Factor VIII. She needs anticoagulation with coumadin also for DVT. We need to monitor her closely while she is being anticoagulated (due to GI bleeding risks).     RECOMMENDATIONS:  1. Continue enoxaparine with a goal to start coumadin in future. I agree with Hematology recs. The patient can be d/c'ed on enoxaparine, but she needs close follow up. 2. Continue telemetry monitoring  3. Neuro checks per stroke protocol  4. Oral liquids for hydration  5. Continue treatment of Crohn's disease. Needs follow up by GI a.s.a.p. After discharge from hospital.  6. PT/OT and dispo planning. OK to keep the patient in house for a few days more since she is being anticoagulated in the setting of Crohn's and minor GI bleeding. 7. Monitor CBC daily while being anticoagulated.        I will follow the patient. Please do not hesitate to return with any questions. Signed:  Ilda Tran MD  3/20/2017  7:33 AM    REVIEW OF SYSTEMS: Denies chest pain, abdominal pain, nausea or vomiting. No fever or chills. Small GI bleeding. Right leg pain feels better. OBJECTIVE:      Visit Vitals    /83 (BP 1 Location: Right arm, BP Patient Position: At rest)    Pulse 99    Temp 98.6 °F (37 °C)    Resp 16    Ht 5' 4\" (1.626 m)    Wt 81.3 kg (179 lb 3.7 oz)    SpO2 99%    Breastfeeding No    BMI 28.93 kg/m2     Physical Exam:  GEN: Alert, NAD  EYES: conjunctiva normal, lids with out lesions  HEENT: MMM. HEART: RRR +S1 +S2  LUNGS: CTA B/L no rales or rhonchi. ABDOMEN: Soft, non-tender. EXTREMITIES: No edema cyanosis  SKIN: no rashes or skin breakdown, no nodules  NEURO: Alert, oriented x3.  Speech is fluent. Left facial weakness. Left sided extinction to double stimuli. EOMI. Left arm drift and weakness. Left leg almost full strength. Left arm decrease sensation compared to right side.     Current Facility-Administered Medications   Medication Dose Route Frequency    HYDROmorphone (PF) (DILAUDID) injection 0.5 mg  0.5 mg IntraVENous Q12H PRN    morphine injection 2 mg  2 mg IntraVENous Q6H PRN    gabapentin (NEURONTIN) capsule 400 mg  400 mg Oral TID    HYDROmorphone (DILAUDID) tablet 4 mg  4 mg Oral Q4H PRN    enoxaparin (LOVENOX) injection 80 mg  80 mg SubCUTAneous Q24H    mesalamine (PENTASA) CR capsule 1,000 mg  1 g Oral QID    predniSONE (DELTASONE) tablet 40 mg  40 mg Oral DAILY WITH BREAKFAST    ferrous sulfate tablet 325 mg  1 Tab Oral BID WITH MEALS    benzocaine (HURRICANE) 20 % spray   Mucous Membrane PRN    0.9% sodium chloride with KCl 20 mEq/L infusion   IntraVENous CONTINUOUS    potassium chloride (KAON 10%) 20 mEq/15 mL oral liquid 20 mEq  20 mEq Oral DAILY    multivitamin, tx-iron-ca-min (THERA-M w/ IRON) tablet 1 Tab  1 Tab Oral DAILY    pantoprazole (PROTONIX) tablet 40 mg  40 mg Oral ACB    sodium chloride (NS) flush 5-10 mL  5-10 mL IntraVENous Q8H    sodium chloride (NS) flush 5-10 mL  5-10 mL IntraVENous PRN    ondansetron (ZOFRAN) injection 4 mg  4 mg IntraVENous Q6H PRN    acetaminophen (TYLENOL) tablet 650 mg  650 mg Oral Q4H PRN    ELECTROLYTE REPLACEMENT PROTOCOL - Potassium  1 Each Other PRN    ELECTROLYTE REPLACEMENT PROTOCOL - Magnesium  1 Each Other PRN    atorvastatin (LIPITOR) tablet 80 mg  80 mg Oral QHS

## 2017-03-20 NOTE — PROGRESS NOTES
TRANSFER - OUT REPORT:    Verbal report given to Evangelina Puckett RN on Almas Dawson  being transferred to [address] [place] [address](unit) for ordered procedure       Report consisted of patients Situation, Background, Assessment and   Recommendations(SBAR). Information from the following report(s) SBAR, Kardex and MAR was reviewed with the receiving nurse. Lines:   Peripheral IV 03/18/17 Left Forearm (Active)   Site Assessment Clean, dry, & intact 3/20/2017  9:05 AM   Phlebitis Assessment 0 3/20/2017  9:05 AM   Infiltration Assessment 0 3/20/2017  9:05 AM   Dressing Status Clean, dry, & intact 3/20/2017  9:05 AM   Dressing Type Tape;Transparent 3/20/2017  9:05 AM   Hub Color/Line Status Blue; Infusing 3/20/2017  9:05 AM   Action Taken Open ports on tubing capped 3/19/2017  7:24 PM   Alcohol Cap Used Yes 3/20/2017  9:05 AM        Opportunity for questions and clarification was provided. PICC ready to use.     Patient transported with:   [employer]

## 2017-03-20 NOTE — PROGRESS NOTES
Problem: Self Care Deficits Care Plan (Adult)  Goal: *Acute Goals and Plan of Care (Insert Text)  Occupational Therapy Goals  Initiated 3/11/2017 within 7 day(s). 1. Patient will perform lower body dressing with minimal assistance/contact guard assist while sitting on EOB and use of salbador-technique. (Progressing)    2. Patient will perform grooming with contact guard assist while standing at sink using BUE. (MET)  New Goal: Pt will perform grooming tasks while standing at sink with Mod I.    3. Patient will perform toilet transfers with contact guard assist with verbal and tactile cues. (MET)  New Goal: Pt will perform toilet transfers with Mod I.    4. Patient will perform all aspects of toileting with Contact guard assist and with verbal and tactile cues. (MET)  New Goal: Pt will perform toileting tasks with Mod I.    5. Patient will participate in upper extremity therapeutic exercise/activities with contact guard assist for 15 minutes. (Progressing)     OCCUPATIONAL THERAPY TREATMENT     Patient: Tata Campos (71 y.o. female)  Date: 3/20/2017  Diagnosis: Stroke [facility])  Stroke ([facility] [address]  Hypokalemia  CVA (cerebral vascular accident) ([facility] [address]  anemia Stroke ([facility] [address]  Procedure(s) (LRB):  COLONOSCOPY; BIOPSY; (N/A) 3 Days Post-Op  Precautions: Fall  Chart, occupational therapy assessment, plan of care, and goals were reviewed. ASSESSMENT:  Pt completed sit to stand with SBA/CGA at EOB. Pt donned socks this session with CGA sitting EOB and donned hospital gown with CGA. Pt completed self feeding at beginning of session with mod I. Noted some neglect towards left side still present as pt favors R side during functional activity but decreased neglect noted and increased awareness of Left side. Pt continues to need RW for safety during mobility. Pt could benefit continued OT to continue to address Left UE coordination and strength and independence with ADLs, transfers and safety.    Progression toward goals:  [X] Improving appropriately and progressing toward goals  [ ]          Improving slowly and progressing toward goals  [ ]          Not making progress toward goals and plan of care will be adjusted       PLAN:  Patient continues to benefit from skilled intervention to address the above impairments. Continue treatment per established plan of care. Discharge Recommendations:  Rehab vs Home Health  Further Equipment Recommendations for Discharge:  N/A       SUBJECTIVE:   Patient stated I love this cake.       OBJECTIVE DATA SUMMARY:   Cognitive/Behavioral Status:  Neurologic State: Alert  Orientation Level: Oriented to person, Oriented to place, Oriented to situation, Disoriented to time  Cognition: Follows commands, Decreased attention/concentration  Safety/Judgement: Decreased insight into deficits  Functional Mobility and Transfers for ADLs:              Bed Mobility: sitting EOB              Transfers:  Sit to Stand: Stand-by asssistance;Contact guard assistance  Balance:  Sitting: Intact  Standing: Intact; With support  Standing - Static: Good  Standing - Dynamic : Fair  ADL Intervention:  Cognitive Retraining  Safety/Judgement: Decreased insight into deficits     LB dressing: CGA for socks  UB dressing: CGA to don hospital gown     Pain:  Pain Scale 1: Numeric (0 - 10)  Pain Intensity 1: 0  Pain Location 1: Abdomen  Pain Orientation 1: Lower  Pain Description 1: Aching  Pain Intervention(s) 1: Medication (see MAR)     Activity Tolerance:    Fair +  Please refer to the flowsheet for vital signs taken during this treatment.   After treatment:   [ ]  Patient left in no apparent distress sitting up in chair  [X]  Patient left in no apparent distress in bed  [X]  Call bell left within reach  [ ]  Nursing notified  [ ]  Caregiver present  [ ]  Bed alarm activated     JORDEN Mendez  Time Calculation: 31 mins

## 2017-03-20 NOTE — PROGRESS NOTES
Pt arrived on unit; Pt Alert and Oriented; Consent signed; Pt transferred to treatment table for procedure.

## 2017-03-20 NOTE — PROGRESS NOTES
Problem: Mobility Impaired (Adult and Pediatric)  Goal: *Acute Goals and Plan of Care (Insert Text)  Physical Therapy Goals  Initiated 3/11/2017 and to be accomplished within 7 day(s)  1. Patient will move from supine to sit and sit to supine in bed with supervision/set-up. 2. Patient will transfer from bed to chair and chair to bed with supervision/set-up using the least restrictive device. 3. Patient will perform sit to stand with supervision/set-up. 4. Patient will ambulate with supervision/set-up for >100 feet with the least restrictive device. Re-evaluation on 3/18/2017. Goals to be met within 2-8 days. 1. Patient will move from supine to sit and sit to supine in bed with supervision/set-up. 2. Patient will transfer from bed to chair and chair to bed with supervision/set-up using the least restrictive device. 3. Patient will perform sit to stand with supervision/set-up. 4. Patient will ambulate with supervision/modified independence for >250 feet with the least restrictive device. 5. Patient will demonstrate compliance and understanding of home exercise program to maximize strength and functional return. 3/20/17 PT treatment attempted. Pt c/o pain 10/10 \"everywhere\" including frontal headache, back and right LE/foot. Nurse Leonel Dwayne in to administer pain medication. BP/HR checked and 121/86 and 102 respectively. Will re-attempt therapy session at later time.   Nando Gonzales, PT

## 2017-03-20 NOTE — PROGRESS NOTES
Problem: Mobility Impaired (Adult and Pediatric)  Goal: *Acute Goals and Plan of Care (Insert Text)  Physical Therapy Goals  Initiated 3/11/2017 and to be accomplished within 7 day(s)  1. Patient will move from supine to sit and sit to supine in bed with supervision/set-up. 2. Patient will transfer from bed to chair and chair to bed with supervision/set-up using the least restrictive device. 3. Patient will perform sit to stand with supervision/set-up. 4. Patient will ambulate with supervision/set-up for >100 feet with the least restrictive device. Re-evaluation on 3/18/2017. Goals to be met within 2-8 days. 1. Patient will move from supine to sit and sit to supine in bed with supervision/set-up. 2. Patient will transfer from bed to chair and chair to bed with supervision/set-up using the least restrictive device. 3. Patient will perform sit to stand with supervision/set-up. 4. Patient will ambulate with supervision/modified independence for >250 feet with the least restrictive device. 5. Patient will demonstrate compliance and understanding of home exercise program to maximize strength and functional return. Outcome: Progressing Towards Goal  PHYSICAL THERAPY TREATMENT     Patient: Sofia Giron (73 y.o. female)  Date: 3/20/2017  Diagnosis: Stroke [facility])  Stroke ([facility] [address]  Hypokalemia  CVA (cerebral vascular accident) ([facility] [address]  anemia Stroke ([facility] [address]  Procedure(s) (LRB):  COLONOSCOPY; BIOPSY; (N/A) 3 Days Post-Op  Precautions: Fall   Chart, physical therapy assessment, plan of care and goals were reviewed. ASSESSMENT:  Pt eating lunch and required additional time to finish same. Left facial weakness present with occasional slight drool. Demonstrates good static/dynamic sitting balance, fair standing balance. Slightly impulsive today while ambulating-pretending to dance/decreased concentration. Will continue PT for goals as stated.     Progression toward goals:  [X]      Improving appropriately and progressing toward goals  [ ]      Improving slowly and progressing toward goals  [ ]      Not making progress toward goals and plan of care will be adjusted       PLAN:  Patient continues to benefit from skilled intervention to address the above impairments. Continue treatment per established plan of care. Discharge Recommendations:  Home Health  Further Equipment Recommendations for Discharge:  rolling walker       SUBJECTIVE:   Patient stated This cake is really good.       OBJECTIVE DATA SUMMARY:   Critical Behavior:  Neurologic State: Alert  Orientation Level: Oriented to person, Oriented to place, Oriented to situation, Disoriented to time  Cognition: Follows commands, Decreased attention/concentration  Safety/Judgement: Decreased insight into deficits  Functional Mobility Training:  Transfers:  Sit to Stand: Stand-by asssistance;Contact guard assistance  Stand to Sit: Stand-by asssistance;Contact guard assistance  Balance:  Sitting: Intact  Standing: Intact; With support  Standing - Static: Good  Standing - Dynamic : Fair  Ambulation/Gait Training:  Distance (ft): 130 Feet (ft)  Assistive Device: Gait belt;Walker, rolling  Ambulation - Level of Assistance: Contact guard assistance;Stand-by asssistance  Gait Abnormalities: Decreased step clearance  Speed/Janice: Pace decreased (<100 feet/min)  Step Length: Right shortened;Left shortened  Swing Pattern: Right asymmetrical;Left asymmetrical  Pain:  Pain Scale 1: Numeric (0 - 10)  Pain Intensity 1: 10  Pain Location 1: Abdomen  Pain Orientation 1: Lower  Pain Description 1: Aching  Pain Intervention(s) 1: Medication (see MAR)  Activity Tolerance:   Good   Please refer to the flowsheet for vital signs taken during this treatment.   After treatment:   [X] Patient left in no apparent distress sitting up EOB  [ ] Patient left in no apparent distress in bed  [X] Call bell left within reach  [X] Nursing notified  [ ] Caregiver present  [ ] Bed alarm activated Tessa Guzman, PT   Time Calculation: 31 mins

## 2017-03-20 NOTE — PROGRESS NOTES
Problem: Mobility Impaired (Adult and Pediatric)  Goal: *Acute Goals and Plan of Care (Insert Text)  Physical Therapy Goals  Initiated 3/11/2017 and to be accomplished within 7 day(s)  1. Patient will move from supine to sit and sit to supine in bed with supervision/set-up. 2. Patient will transfer from bed to chair and chair to bed with supervision/set-up using the least restrictive device. 3. Patient will perform sit to stand with supervision/set-up. 4. Patient will ambulate with supervision/set-up for >100 feet with the least restrictive device. Re-evaluation on 3/18/2017. Goals to be met within 2-8 days. 1. Patient will move from supine to sit and sit to supine in bed with supervision/set-up. 2. Patient will transfer from bed to chair and chair to bed with supervision/set-up using the least restrictive device. 3. Patient will perform sit to stand with supervision/set-up. 4. Patient will ambulate with supervision/modified independence for >250 feet with the least restrictive device. 5. Patient will demonstrate compliance and understanding of home exercise program to maximize strength and functional return. 3/20/17  PT re-attempted. Pt reports feeling better. Found talking on the phone in NAD and requesting PT return later. Will comply with pt request and f/u at later time today.   Terence Short, PT

## 2017-03-20 NOTE — PROGRESS NOTES
Cardiology Progress Note      3/20/2017 9:21 AM    Admit Date: 3/10/2017    Admit Diagnosis: Stroke [facility])  Stroke [facility])  Hypokalemia  CVA (cerebral vascular accident) ([facility] [address]  anemia      Subjective:     Pilo Sinclair has multiple issues. .    Visit Vitals    /83 (BP 1 Location: Right arm, BP Patient Position: At rest)    Pulse 86    Temp 98 °F (36.7 °C)    Resp 17    Ht 5' 4\" (1.626 m)    Wt 81.3 kg (179 lb 3.7 oz)    SpO2 100%    Breastfeeding No    BMI 28.93 kg/m2     Current Facility-Administered Medications   Medication Dose Route Frequency    HYDROmorphone (PF) (DILAUDID) injection 0.5 mg  0.5 mg IntraVENous Q12H PRN    morphine injection 2 mg  2 mg IntraVENous Q6H PRN    gabapentin (NEURONTIN) capsule 400 mg  400 mg Oral TID    HYDROmorphone (DILAUDID) tablet 4 mg  4 mg Oral Q4H PRN    enoxaparin (LOVENOX) injection 80 mg  80 mg SubCUTAneous Q24H    mesalamine (PENTASA) CR capsule 1,000 mg  1 g Oral QID    predniSONE (DELTASONE) tablet 40 mg  40 mg Oral DAILY WITH BREAKFAST    ferrous sulfate tablet 325 mg  1 Tab Oral BID WITH MEALS    benzocaine (HURRICANE) 20 % spray   Mucous Membrane PRN    0.9% sodium chloride with KCl 20 mEq/L infusion   IntraVENous CONTINUOUS    potassium chloride (KAON 10%) 20 mEq/15 mL oral liquid 20 mEq  20 mEq Oral DAILY    multivitamin, tx-iron-ca-min (THERA-M w/ IRON) tablet 1 Tab  1 Tab Oral DAILY    pantoprazole (PROTONIX) tablet 40 mg  40 mg Oral ACB    sodium chloride (NS) flush 5-10 mL  5-10 mL IntraVENous Q8H    sodium chloride (NS) flush 5-10 mL  5-10 mL IntraVENous PRN    ondansetron (ZOFRAN) injection 4 mg  4 mg IntraVENous Q6H PRN    acetaminophen (TYLENOL) tablet 650 mg  650 mg Oral Q4H PRN    ELECTROLYTE REPLACEMENT PROTOCOL - Potassium  1 Each Other PRN    ELECTROLYTE REPLACEMENT PROTOCOL - Magnesium  1 Each Other PRN    atorvastatin (LIPITOR) tablet 80 mg  80 mg Oral QHS         Objective:      Physical Exam:  Visit Vitals    BP 126/83 (BP 1 Location: Right arm, BP Patient Position: At rest)    Pulse 86    Temp 98 °F (36.7 °C)    Resp 17    Ht 5' 4\" (1.626 m)    Wt 81.3 kg (179 lb 3.7 oz)    SpO2 100%    Breastfeeding No    BMI 28.93 kg/m2     General Appearance:  Well developed, well nourished,alert and oriented x 3, and individual in no acute distress. L sided weakness. Ears/Nose/Mouth/Throat:   Hearing grossly normal.         Neck: Supple. Chest:   Lungs clear to auscultation bilaterally. Cardiovascular:  Regular rate and rhythm, S1, S2 normal, no murmur. Abdomen:   Soft, non-tender, bowel sounds are active. Extremities: No edema bilaterally. Skin: Warm and dry. Data Review:   Labs:  No results found for this or any previous visit (from the past 24 hour(s)). Telemetry: normal sinus rhythm      Assessment:     Principal Problem:    Stroke ([address] (3/10/2017)    Active Problems:    Chronic pain syndrome (2/27/2012)      Crohn disease ([address] (2/27/2012)      Hypokalemia (8/8/2015)      Sickle cell trait (HCC) ()      Hypertension ()      Hx of cocaine abuse ()      Embolic stroke ([address] (9/24/5026)      Acute blood loss anemia (3/15/2017)      Neuropathic pain (3/15/2017)      DVT (deep venous thrombosis) ([address] (3/16/2017)        Plan:     The patient remains stable from the cardiac standpoint. Warfarin anticoagulation is planned.     Kanika Bergman MD

## 2017-03-21 LAB
ERYTHROCYTE [DISTWIDTH] IN BLOOD BY AUTOMATED COUNT: 17.7 % (ref 11.6–14.5)
FERRITIN SERPL-MCNC: 236 NG/ML (ref 8–388)
HBV CORE AB SERPL QL IA: NEGATIVE
HCT VFR BLD AUTO: 22.1 % (ref 35–45)
HGB BLD-MCNC: 6.7 G/DL (ref 12–16)
MCH RBC QN AUTO: 23.6 PG (ref 24–34)
MCHC RBC AUTO-ENTMCNC: 30.3 G/DL (ref 31–37)
MCV RBC AUTO: 77.8 FL (ref 74–97)
PLATELET # BLD AUTO: 441 K/UL (ref 135–420)
PMV BLD AUTO: 9.1 FL (ref 9.2–11.8)
RBC # BLD AUTO: 2.84 M/UL (ref 4.2–5.3)
WBC # BLD AUTO: 8.3 K/UL (ref 4.6–13.2)

## 2017-03-21 PROCEDURE — 74011250637 HC RX REV CODE- 250/637: Performed by: HOSPITALIST

## 2017-03-21 PROCEDURE — 74011636637 HC RX REV CODE- 636/637: Performed by: INTERNAL MEDICINE

## 2017-03-21 PROCEDURE — 97110 THERAPEUTIC EXERCISES: CPT

## 2017-03-21 PROCEDURE — 65660000000 HC RM CCU STEPDOWN

## 2017-03-21 PROCEDURE — 74011250636 HC RX REV CODE- 250/636: Performed by: INTERNAL MEDICINE

## 2017-03-21 PROCEDURE — 85027 COMPLETE CBC AUTOMATED: CPT | Performed by: INTERNAL MEDICINE

## 2017-03-21 PROCEDURE — 92507 TX SP LANG VOICE COMM INDIV: CPT

## 2017-03-21 PROCEDURE — 74011250637 HC RX REV CODE- 250/637: Performed by: INTERNAL MEDICINE

## 2017-03-21 PROCEDURE — 86920 COMPATIBILITY TEST SPIN: CPT | Performed by: HOSPITALIST

## 2017-03-21 PROCEDURE — 36430 TRANSFUSION BLD/BLD COMPNT: CPT

## 2017-03-21 PROCEDURE — P9016 RBC LEUKOCYTES REDUCED: HCPCS | Performed by: HOSPITALIST

## 2017-03-21 PROCEDURE — 82728 ASSAY OF FERRITIN: CPT | Performed by: INTERNAL MEDICINE

## 2017-03-21 PROCEDURE — 74011250637 HC RX REV CODE- 250/637: Performed by: FAMILY MEDICINE

## 2017-03-21 PROCEDURE — 86900 BLOOD TYPING SEROLOGIC ABO: CPT | Performed by: HOSPITALIST

## 2017-03-21 PROCEDURE — 92526 ORAL FUNCTION THERAPY: CPT

## 2017-03-21 RX ORDER — CLONIDINE HYDROCHLORIDE 0.1 MG/1
0.1 TABLET ORAL
Status: COMPLETED | OUTPATIENT
Start: 2017-03-21 | End: 2017-03-21

## 2017-03-21 RX ADMIN — Medication 2 MG: at 04:03

## 2017-03-21 RX ADMIN — Medication 10 ML: at 21:24

## 2017-03-21 RX ADMIN — Medication 2 MG: at 12:40

## 2017-03-21 RX ADMIN — CLONIDINE HYDROCHLORIDE 0.1 MG: 0.1 TABLET ORAL at 20:06

## 2017-03-21 RX ADMIN — MULTIPLE VITAMINS W/ MINERALS TAB 1 TABLET: TAB at 09:31

## 2017-03-21 RX ADMIN — FERROUS SULFATE TAB 325 MG (65 MG ELEMENTAL FE) 325 MG: 325 (65 FE) TAB at 09:32

## 2017-03-21 RX ADMIN — MESALAMINE 1000 MG: 250 CAPSULE ORAL at 21:18

## 2017-03-21 RX ADMIN — MESALAMINE 1000 MG: 250 CAPSULE ORAL at 09:32

## 2017-03-21 RX ADMIN — GABAPENTIN 400 MG: 400 CAPSULE ORAL at 18:24

## 2017-03-21 RX ADMIN — FERROUS SULFATE TAB 325 MG (65 MG ELEMENTAL FE) 325 MG: 325 (65 FE) TAB at 18:24

## 2017-03-21 RX ADMIN — MESALAMINE 1000 MG: 250 CAPSULE ORAL at 12:39

## 2017-03-21 RX ADMIN — ATORVASTATIN CALCIUM 80 MG: 20 TABLET, FILM COATED ORAL at 21:18

## 2017-03-21 RX ADMIN — HYDROMORPHONE HYDROCHLORIDE 4 MG: 4 TABLET ORAL at 09:31

## 2017-03-21 RX ADMIN — HYDROMORPHONE HYDROCHLORIDE 0.5 MG: 1 INJECTION, SOLUTION INTRAMUSCULAR; INTRAVENOUS; SUBCUTANEOUS at 22:34

## 2017-03-21 RX ADMIN — HYDROMORPHONE HYDROCHLORIDE 4 MG: 4 TABLET ORAL at 18:24

## 2017-03-21 RX ADMIN — MESALAMINE 1000 MG: 250 CAPSULE ORAL at 18:24

## 2017-03-21 RX ADMIN — PANTOPRAZOLE SODIUM 40 MG: 40 TABLET, DELAYED RELEASE ORAL at 07:07

## 2017-03-21 RX ADMIN — Medication 2 MG: at 20:06

## 2017-03-21 RX ADMIN — ENOXAPARIN SODIUM 80 MG: 80 INJECTION SUBCUTANEOUS at 09:33

## 2017-03-21 RX ADMIN — Medication 10 ML: at 15:02

## 2017-03-21 RX ADMIN — Medication 10 ML: at 07:09

## 2017-03-21 RX ADMIN — GABAPENTIN 400 MG: 400 CAPSULE ORAL at 21:18

## 2017-03-21 RX ADMIN — POTASSIUM CHLORIDE 20 MEQ: 20 SOLUTION ORAL at 09:32

## 2017-03-21 RX ADMIN — GABAPENTIN 400 MG: 400 CAPSULE ORAL at 09:32

## 2017-03-21 RX ADMIN — PREDNISONE 40 MG: 20 TABLET ORAL at 09:31

## 2017-03-21 NOTE — PROGRESS NOTES
1930: Assumed patient care, she was in bed watching television with no signs of distress noted. Patient was alert and oriented to person, place, time and situation. Vitals signs were stable. MEWS score was a one. Patient had some slight left sided weakness and a left sided facial droop. Neurological check showed no changes from the patient's last assessment. Patient denied any pain, discomfort, nausea, vomiting, dizziness or anxiety. Respiratory status remained stable on room air. White board was updated and explained. Bed was locked and in lowest position. Call bell, water and personal belongings were within reach. Patient had no questions, comments or concerns after bedside shift report. 2018: Patient asked for and received 2 mg of IV Morphine for pain in her left lower abdomin which she rated at a 10/10, with five being an acceptable level. Will reassess patient's pain level within one hour. 2100: Patient rated her pain at a 5/10, with five being an acceptable level. 2130: Neuro check was negative for any changes in status. 2142: Patient asked for and received 1 mg of IV Dilaudid for pain in her left lower abdomin which she rated at a 10/10, with five being an acceptable level. Will reassess patient's pain level within one hour. 2230: Patient rated her pain at a 5/10, with five being an acceptable level. 2335: Patient asked for and received . 5 mg of IV Dilaudid for pain in her left lower abdomin which she rated at a 10/10, with five being an acceptable level. Will reassess patient's pain level within one hour. 0000: Patient appeared to be sleeping with no signs of distress noted. 0130: Neuro check was negative for any changes in status. 0403: Patient asked for and received 2 mg of IV Morphine for pain in her left lower abdomin which she rated at a 10/10, with five being an acceptable level. Will reassess patient's pain level within one hour.     5173: Patient rated her pain at a 5/10, with five being an acceptable level.

## 2017-03-21 NOTE — PROGRESS NOTES
NEUROLOGY PROGRESS NOTE        Patient: Jose Francisco Setting        Sex: female          DOA: 3/10/2017  YOB: 1970      Age:  40 y.o.         LOS: 11 days     Identification:  40 y.o. female with history of Crohn's disease, past history of cocaine use (not recently) and Sickle Cell trait, who presented with left sided weakness, found to have multifocal embolic strokes.      SUBJECTIVE:   The patient is doing better. No GI bleeding. Continues PT. She is on Lovenox.       Stroke Work-up:  Brain MRI:1. Multifocal acute infarctions, the largest is in the right MCA territory correlating with the abnormal CT. There are bilateral left greater than right cerebellar hemisphere infarctions. These infarctions are likely embolic with a central origin. 2. Possible hemorrhagic lesion within the pituitary. Scans dedicated to the pituitary could be performed. This could be artifact of volume averaging of the sellar floor but is at least suspect. 3. Chronic appearing right orbital floor fracture with protrusion of orbital fat and relative enophthalmos. MRA Brain: Occlusion inferior division right M2 segment MCA. This correlates with the acute infarction and is indeterminate in nature, most likely embolic certainly. No other significant intracranial vascular abnormality. MRA Neck: Limited noncontrast neck vascular evaluation. No definite hemodynamically significant stenosis is documented. Echocardiogram: EF is 60-65%, no intracardiac shunt detected. LAURIE: no intracardiac thrombus. Carotid Doppler: Left ICA <50% stenosis with a small plaque. No significant stenosis elsewhere. Lipid panel: LDL 91.6, HDL is 37. HbA1c: 5.7      ASSESSMENT/IMPRESSION:   Multifocal right hemispheric (temporal, thalamic, parietal and cerebellar infarcts), most likely cardioembolic in nature. 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 She has not been on any oral contraceptives.  Untreated Crohn's disease maybe the underlying reason for high Factor VIII. She needs anticoagulation with coumadin also for DVT.     RECOMMENDATIONS:  1. Continue enoxaparine at the current dose. 2. Treatment of Crohn's per GI (GI recs highly appreciated)  3. PT/OT and dispo planning. 4. She is anemic. She may need blood transfusion? ?      Neurology signs off. OK to d/c patient to rehab or home with outpatient PT if feasible. I will follow the patient in one month as outpatient. Please do not hesitate to return with any questions. Signed:  Yoel Darby MD  3/21/2017  9:03 AM    REVIEW OF SYSTEMS: Denies chest pain, abdominal pain. No vomiting. No melena or hematochezia. OBJECTIVE:      Visit Vitals    /82    Pulse 84    Temp 98.2 °F (36.8 °C)    Resp 17    Ht 5' 6\" (1.676 m)    Wt 78 kg (172 lb)    SpO2 100%    Breastfeeding No    BMI 27.76 kg/m2     Physical Exam:  GEN: Alert, NAD  EYES: conjunctiva normal, lids with out lesions  HEENT: MMM. EXTREMITIES: No edema cyanosis  SKIN: no rashes or skin breakdown, no nodules  NEURO: Alert, oriented x3. Speech is slightly dysarthric. Left facial droop. Left arm drift. Left leg stronger. Right side full strength. :eft neglect with extinction.     Current Facility-Administered Medications   Medication Dose Route Frequency    HYDROmorphone (PF) (DILAUDID) injection 0.5 mg  0.5 mg IntraVENous Q12H PRN    morphine injection 2 mg  2 mg IntraVENous Q6H PRN    gabapentin (NEURONTIN) capsule 400 mg  400 mg Oral TID    HYDROmorphone (DILAUDID) tablet 4 mg  4 mg Oral Q4H PRN    enoxaparin (LOVENOX) injection 80 mg  80 mg SubCUTAneous Q24H    mesalamine (PENTASA) CR capsule 1,000 mg  1 g Oral QID    predniSONE (DELTASONE) tablet 40 mg  40 mg Oral DAILY WITH BREAKFAST    ferrous sulfate tablet 325 mg  1 Tab Oral BID WITH MEALS    benzocaine (HURRICANE) 20 % spray   Mucous Membrane PRN    potassium chloride (KAON 10%) 20 mEq/15 mL oral liquid 20 mEq  20 mEq Oral DAILY    multivitamin, tx-iron-ca-min (THERA-M w/ IRON) tablet 1 Tab  1 Tab Oral DAILY    pantoprazole (PROTONIX) tablet 40 mg  40 mg Oral ACB    sodium chloride (NS) flush 5-10 mL  5-10 mL IntraVENous Q8H    sodium chloride (NS) flush 5-10 mL  5-10 mL IntraVENous PRN    ondansetron (ZOFRAN) injection 4 mg  4 mg IntraVENous Q6H PRN    acetaminophen (TYLENOL) tablet 650 mg  650 mg Oral Q4H PRN    ELECTROLYTE REPLACEMENT PROTOCOL - Potassium  1 Each Other PRN    ELECTROLYTE REPLACEMENT PROTOCOL - Magnesium  1 Each Other PRN    atorvastatin (LIPITOR) tablet 80 mg  80 mg Oral QHS       Laboratory  Recent Results (from the past 24 hour(s))   CBC W/O DIFF    Collection Time: 03/20/17  9:28 AM   Result Value Ref Range    WBC 7.1 4.6 - 13.2 K/uL    RBC 2.87 (L) 4.20 - 5.30 M/uL    HGB 6.7 (L) 12.0 - 16.0 g/dL    HCT 22.7 (L) 35.0 - 45.0 %    MCV 79.1 74.0 - 97.0 FL    MCH 23.3 (L) 24.0 - 34.0 PG    MCHC 29.5 (L) 31.0 - 37.0 g/dL    RDW 17.7 (H) 11.6 - 14.5 %    PLATELET 810 676 - 012 K/uL    MPV 10.0 9.2 - 11.8 FL   CBC W/O DIFF    Collection Time: 03/21/17  6:55 AM   Result Value Ref Range    WBC 8.3 4.6 - 13.2 K/uL    RBC 2.84 (L) 4.20 - 5.30 M/uL    HGB 6.7 (L) 12.0 - 16.0 g/dL    HCT 22.1 (L) 35.0 - 45.0 %    MCV 77.8 74.0 - 97.0 FL    MCH 23.6 (L) 24.0 - 34.0 PG    MCHC 30.3 (L) 31.0 - 37.0 g/dL    RDW 17.7 (H) 11.6 - 14.5 %    PLATELET 800 (H) 793 - 420 K/uL    MPV 9.1 (L) 9.2 - 11.8 FL

## 2017-03-21 NOTE — PROGRESS NOTES
Cardiology Progress Note      3/21/2017 9:20 AM    Admit Date: 3/10/2017    Admit Diagnosis: Stroke [facility])  Stroke [facility])  Hypokalemia  CVA (cerebral vascular accident) ([address]  anemia      Subjective:     [place] denies chest pain, chest pressure/discomfort, dyspnea, palpitations.     Visit Vitals    /82    Pulse 84    Temp 98.2 °F (36.8 °C)    Resp 17    Ht 5' 6\" (1.676 m)    Wt 78 kg (172 lb)    SpO2 100%    Breastfeeding No    BMI 27.76 kg/m2     Current Facility-Administered Medications   Medication Dose Route Frequency    HYDROmorphone (PF) (DILAUDID) injection 0.5 mg  0.5 mg IntraVENous Q12H PRN    morphine injection 2 mg  2 mg IntraVENous Q6H PRN    gabapentin (NEURONTIN) capsule 400 mg  400 mg Oral TID    HYDROmorphone (DILAUDID) tablet 4 mg  4 mg Oral Q4H PRN    enoxaparin (LOVENOX) injection 80 mg  80 mg SubCUTAneous Q24H    mesalamine (PENTASA) CR capsule 1,000 mg  1 g Oral QID    predniSONE (DELTASONE) tablet 40 mg  40 mg Oral DAILY WITH BREAKFAST    ferrous sulfate tablet 325 mg  1 Tab Oral BID WITH MEALS    benzocaine (HURRICANE) 20 % spray   Mucous Membrane PRN    potassium chloride (KAON 10%) 20 mEq/15 mL oral liquid 20 mEq  20 mEq Oral DAILY    multivitamin, tx-iron-ca-min (THERA-M w/ IRON) tablet 1 Tab  1 Tab Oral DAILY    pantoprazole (PROTONIX) tablet 40 mg  40 mg Oral ACB    sodium chloride (NS) flush 5-10 mL  5-10 mL IntraVENous Q8H    sodium chloride (NS) flush 5-10 mL  5-10 mL IntraVENous PRN    ondansetron (ZOFRAN) injection 4 mg  4 mg IntraVENous Q6H PRN    acetaminophen (TYLENOL) tablet 650 mg  650 mg Oral Q4H PRN    ELECTROLYTE REPLACEMENT PROTOCOL - Potassium  1 Each Other PRN    ELECTROLYTE REPLACEMENT PROTOCOL - Magnesium  1 Each Other PRN    atorvastatin (LIPITOR) tablet 80 mg  80 mg Oral QHS         Objective:      Physical Exam:  Visit Vitals    /82    Pulse 84    Temp 98.2 °F (36.8 °C)    Resp 17    Ht 5' 6\" (1.676 m)    Wt 78 kg (172 lb)    SpO2 100%    Breastfeeding No    BMI 27.76 kg/m2     General Appearance:  Well developed, well nourished,alert and oriented x 3, and individual in no acute distress. L sided weakness. Ears/Nose/Mouth/Throat:   Hearing grossly normal.         Neck: Supple. Chest:   Lungs clear to auscultation bilaterally. Cardiovascular:  Regular rate and rhythm, S1, S2 normal, no murmur. Abdomen:   Soft, non-tender, bowel sounds are active. Extremities: No edema bilaterally. Skin: Warm and dry. Data Review:   Labs:    Recent Results (from the past 24 hour(s))   CBC W/O DIFF    Collection Time: 03/20/17  9:28 AM   Result Value Ref Range    WBC 7.1 4.6 - 13.2 K/uL    RBC 2.87 (L) 4.20 - 5.30 M/uL    HGB 6.7 (L) 12.0 - 16.0 g/dL    HCT 22.7 (L) 35.0 - 45.0 %    MCV 79.1 74.0 - 97.0 FL    MCH 23.3 (L) 24.0 - 34.0 PG    MCHC 29.5 (L) 31.0 - 37.0 g/dL    RDW 17.7 (H) 11.6 - 14.5 %    PLATELET 958 105 - 470 K/uL    MPV 10.0 9.2 - 11.8 FL   CBC W/O DIFF    Collection Time: 03/21/17  6:55 AM   Result Value Ref Range    WBC 8.3 4.6 - 13.2 K/uL    RBC 2.84 (L) 4.20 - 5.30 M/uL    HGB 6.7 (L) 12.0 - 16.0 g/dL    HCT 22.1 (L) 35.0 - 45.0 %    MCV 77.8 74.0 - 97.0 FL    MCH 23.6 (L) 24.0 - 34.0 PG    MCHC 30.3 (L) 31.0 - 37.0 g/dL    RDW 17.7 (H) 11.6 - 14.5 %    PLATELET 089 (H) 182 - 420 K/uL    MPV 9.1 (L) 9.2 - 11.8 FL       Telemetry: normal sinus rhythm      Assessment:     Principal Problem:    Stroke (HCC) (3/10/2017)    Active Problems:    Chronic pain syndrome (2/27/2012)      Crohn disease ([facility] [address] (2/27/2012)      Hypokalemia (8/8/2015)      Sickle cell trait (HCC) ()      Hypertension ()      Hx of cocaine abuse ()      Embolic stroke ([facility] (3/98/9269)      Acute blood loss anemia (3/15/2017)      Neuropathic pain (3/15/2017)      DVT (deep venous thrombosis) ([facility] (3/16/2017)        Plan:     The patient remains stable cardiac standpoint. He said no arrhythmia noted.  Warfarin anticoagulation is planned. Follow.     Domenic Le MD

## 2017-03-21 NOTE — PROGRESS NOTES
Problem: Dysphagia (Adult)  Goal: *Acute Goals and Plan of Care (Insert Text)  Dysphagia Present: mild    Recommendations:  Diet: regular diet and thin liquids  Meds: 1 @ at time, place to R side  Aspiration Precautions  Other: bolus placement R, small sips/bites, alternate solids/liquids         Outcome: Resolved/Met Date Met:  03/21/17  SPEECH LANGUAGE PATHOLOGY DYSPHAGIA TREATMENT/DISCHARGE     Patient: Hans Cantrell (89 y.o. female)  Date: 3/21/2017  Diagnosis: Stroke [facility])  Stroke ([facility] [address]  Hypokalemia  CVA (cerebral vascular accident) ([facility] [address]  anemia Stroke ([facility]  Procedure(s) (LRB):  COLONOSCOPY; BIOPSY; (N/A) 4 Days Post-Op  Precautions: aspiration Fall      ASSESSMENT:  Mild oropharyngeal dysphagia     Dysphagia f/u completed via skilled meal assessment, pt A&Ox4. Verbalized and demo use of compensatory feeding/swallowing strategies with mech soft, regular solids and thin liquids with no overt s/s penetration/aspiration noted. Continues to demo mildly labored/disorganized mastication 2/2 L facial weakness however 100% oral clearance with independent liquid wash as needed. +feedback provided for compensatory strategy use. As pt tolerating least restrictive diet and is independent with compensatory strategies, no further acute dysphagia; will complete POC and continue ST services to address cognitive/communication goals. Progression toward goals:  [X]         Improving appropriately and progressing toward goals  [ ]         Improving slowly and progressing toward goals  [ ]         Not making progress toward goals and plan of care will be adjusted       PLAN: regular diet, swallow strategies as above  Recommendations and Planned Interventions:  No further acute dysphagia needs; continue ST services for cognitive/communication goals  Discharge Recommendations:  No dysphagia tx needs       SUBJECTIVE:   Patient stated You told me to chew on my right side and take a drink to wash it down.       OBJECTIVE: Cognitive and Communication Status:  Neurologic State: Alert  Orientation Level: Oriented X4  Cognition: Follows commands, Appropriate safety awareness, Appropriate for age attention/concentration  Perception: Cues to attend left visual field, Cues to attend to left side of body  Perseveration: No perseveration noted  Safety/Judgement: Decreased insight into deficits  Dysphagia Treatment:  Oral Assessment:  Oral Assessment  Labial: Left droop  Dentition: Intact, Natural  Oral Hygiene: good  Lingual: Decreased rate  Velum: No impairment  Mandible: No impairment  P.O. Trials:              Patient Position: 90 EOB              Vocal quality prior to P.O.: No impairment              Consistency Presented: Thin liquid, Mechanical soft, Solid              How Presented: Self-fed/presented, Cup/sip, Successive swallows              How Much:  (meal)              Bolus Acceptance: No impairment              Bolus Formation/Control: Impaired              Type of Impairment: Mastication              Propulsion: Discoordination              Oral Residue: None              Initiation of Swallow: No impairment              Laryngeal Elevation: Functional              Aspiration Signs/Symptoms: None              Pharyngeal Phase Characteristics: No impairment, issues, or problems               Effective Modifications:  Alternate liquids/solids (bolus placement R)              Cues for Modifications: None                                Oral Phase Severity: Mild              Pharyngeal Phase Severity : No impairment PAIN:  Start of Tx: 0  End of Tx: 0      After treatment:   [ ]              Patient left in no apparent distress sitting up in chair  [X]              Patient left in no apparent distress in bed  [X]              Call bell left within reach  [X]              Nursing notified  [ ]              Family present  [ ]              Caregiver present  [ ]              Bed alarm activated         COMMUNICATION/EDUCATION:   [X]        Aspiration precautions; swallow safety; compensatory techniques  [ ]        Patient unable to participate in education; education ongoing with staff  [ ]         Posted safety precautions in patient's room.   [ ]         Oral-motor/laryngeal strengthening exercises        Jeff Rounds, SLP  Time Calculation: 10 mins

## 2017-03-21 NOTE — PROGRESS NOTES
Hospitalist Progress Note    Patient: Anne Stauffer MRN: [id number]  CSN: [id number]    YOB: 1970  Age: 40 y.o. Sex: female    DOA: 3/10/2017 LOS:  LOS: 10 days                Assessment/Plan     Patient Active Problem List   Diagnosis Code    Chronic pain syndrome G89.4    Crohn disease ([facility] [address] K50.90    Hypokalemia E87.6    Stroke ([facility] I63.9    Sickle cell trait ([facility] D57.3    Hypertension I10    Hx of cocaine abuse V65.104    Embolic stroke ([facility] [address] B63.4    Acute blood loss anemia D62    Neuropathic pain M79.2    DVT (deep venous thrombosis) (HCC) I82.409            41 yo female admitted with left sided weakness.      Acute CVA - multifocal right hemispheric infarcts. Neurology following, on aspirin.     Acute on chronic anemia - related to crohn's disease. H/H stable     crohns disease - un treated, non compliant, seen by GI, s/p colonoscopy, on asacol and prednisone.     History of DVT -  Heme/onc following. On anticoagulation with lovenox     Hypokalemia - repleted    PT/OT    Discharge planning.     Review of systems  General: No fevers or chills. Cardiovascular: No chest pain or pressure. No palpitations. Pulmonary: No shortness of breath. Gastrointestinal: No nausea, vomiting.         Physical Exam:  General: Awake, cooperative, no acute distress    HEENT: NC, Atraumatic. PERRLA, anicteric sclerae. Lungs: CTA Bilaterally. No Wheezing/Rhonchi/Rales. Heart:  Regular rhythm,  No murmur, No Rubs, No Gallops  Abdomen: Soft, Non distended, Non tender.  +Bowel sounds,   Extremities: No c/c/e  Psych:   Not anxious or agitated.   Neurologic:  Left sided weakness.          Vital signs/Intake and Output:  Visit Vitals    BP (!) 134/95 (BP 1 Location: Left arm, BP Patient Position: At rest)    Pulse 88    Temp 97.4 °F (36.3 °C)    Resp 20    Ht 5' 4\" (1.626 m)    Wt 81.3 kg (179 lb 3.7 oz)    SpO2 100%    Breastfeeding No    BMI 28.93 kg/m2     Current Shift:     Last three shifts:  03/19 0701 - 03/20 1900  In: 645 [P.O.:645]  Out: -             Labs: Results:       Chemistry No results for input(s): GLU, NA, K, CL, CO2, BUN, CREA, CA, AGAP, BUCR, TBIL, GPT, AP, TP, ALB, GLOB, AGRAT in the last 72 hours. CBC w/Diff Recent Labs      03/20/17   0928   WBC  7.1   RBC  2.87*   HGB  6.7*   HCT  22.7*   PLT  275      Cardiac Enzymes No results for input(s): CPK, CKND1, CHANDA in the last 72 hours. No lab exists for component: CKRMB, TROIP   Coagulation No results for input(s): PTP, INR, APTT in the last 72 hours. No lab exists for component: INREXT    Lipid Panel Lab Results   Component Value Date/Time    Cholesterol, total 145 03/12/2017 06:15 AM    HDL Cholesterol 37 03/12/2017 06:15 AM    LDL, calculated 91.6 03/12/2017 06:15 AM    VLDL, calculated 16.4 03/12/2017 06:15 AM    Triglyceride 82 03/12/2017 06:15 AM    CHOL/HDL Ratio 3.9 03/12/2017 06:15 AM      BNP No results for input(s): BNPP in the last 72 hours. Liver Enzymes No results for input(s): TP, ALB, TBIL, AP, SGOT, GPT in the last 72 hours.     No lab exists for component: DBIL   Thyroid Studies Lab Results   Component Value Date/Time    TSH 1.51 12/12/2009 09:48 AM        Procedures/imaging: see electronic medical records for all procedures/Xrays and details which were not copied into this note but were reviewed prior to creation of Plan

## 2017-03-21 NOTE — PROGRESS NOTES
Problem: Mobility Impaired (Adult and Pediatric)  Goal: *Acute Goals and Plan of Care (Insert Text)  Physical Therapy Goals  Initiated 3/11/2017 and to be accomplished within 7 day(s)  1. Patient will move from supine to sit and sit to supine in bed with supervision/set-up. 2. Patient will transfer from bed to chair and chair to bed with supervision/set-up using the least restrictive device. 3. Patient will perform sit to stand with supervision/set-up. 4. Patient will ambulate with supervision/set-up for >100 feet with the least restrictive device. Re-evaluation on 3/18/2017. Goals to be met within 2-8 days. 1. Patient will move from supine to sit and sit to supine in bed with supervision/set-up. 2. Patient will transfer from bed to chair and chair to bed with supervision/set-up using the least restrictive device. 3. Patient will perform sit to stand with supervision/set-up. 4. Patient will ambulate with supervision/modified independence for >250 feet with the least restrictive device. 5. Patient will demonstrate compliance and understanding of home exercise program to maximize strength and functional return. PT treatment attempted at 3:34  Pt receiving blood transfusion. Session stopped due to Dr. Brea Pascual in to see pt. Will re-attempt as pt schedule allows.  Roger, PT

## 2017-03-21 NOTE — PROGRESS NOTES
Pt seen and examined. Right foot with evidence of superficial tissue damage. Possible emboli? Patient will require anticoagulation chronically when stable. Right calf soft, unsure of why she has pain in this area. I dont believe this is related to her chronic DVT. Regardless she will require compression stockings on DC.  Will try to elevate extremity and re evaluate in the AM.

## 2017-03-21 NOTE — PROGRESS NOTES
Problem: Neurolinguistics Impaired (Adult)  Goal: *Acute Goals and Plan of Care (Insert Text)  Goals:   1. Pt will complete functional mathematical reasoning tasks (ex. Money, time) with 80% accuracy. 2. Pt will complete functional problem solving tasks by generating multiple solutions (3+) for given scenario with 80% accuracy. 3. Pt will complete complex comprehension tasks (ex. yes/no, commands) involving temporal and prepositional components to increase attention to detail and auditory processing with 80% accuracy. 4. Pt will verbalize/demonstrate insight into physical and cognitive deficits and impact on safety and return home with 80% accuracy. Outcome: Progressing Towards Goal  SPEECH LANGUAGE PATHOLOGY TREATMENT     Patient: Jolly Chiang (52 y.o. female)  Date: 3/21/2017  Diagnosis: Stroke [facility])  Stroke ([facility] [address]  Hypokalemia  CVA (cerebral vascular accident) ([facility] [address]  anemia Stroke ([facility] [address]  Procedure(s) (LRB):  COLONOSCOPY; BIOPSY; (N/A) 4 Days Post-Op      ASSESSMENT:  Mild/moderate cognitive/communication deficits c/w Right Hemisphere Dysfunction     F/u completed to address cognitive goals as above. Completed functional problem solving r/t discharge planning with 66% accuracy x6 with mod A, improving to 100% with max A. Deficits r/t impaired mental flexibility and poor insight into deficits and impact on safety and independence upon discharge. Further completed functional problem solving for medication management, completed with 25% accuracy with min A x4 improving to 75% with mod A. Educated pt re: recall strategies to improve safety and accuracy with medication management upon discharge; with ongoing education pt verbalized shallow insight into recall deficits and importance of strategy use. Further demo pragmatic impairments primarily c/b poor eye contact and R gaze preference, though does localize to L when necessary.  Educated pt re: recommendation to continue skilled therapy services for complex cognitive skills upon discharge. Progression toward goals:  [ ]       Improving appropriately and progressing toward goals  [X]       Improving slowly and progressing toward goals  [ ]       Not making progress toward goals and plan of care will be adjusted       PLAN: cognitive/communication goals as above  Patient continues to benefit from skilled intervention to address the above impairments. Continue treatment per established plan of care. Discharge Recommendations:  Inpatient Rehab and Outpatient       SUBJECTIVE:   Patient stated I don't think I'll have any trouble remembering when I go home.       OBJECTIVE:   Mental Status:  Neurologic State: Alert  Orientation Level: Oriented X4  Cognition: Appropriate decision making  Perception: Cues to attend left visual field, Cues to attend to left side of body  Perseveration: No perseveration noted  Safety/Judgement: Decreased insight into deficits  Treatment & Interventions:   as above                                                                            PAIN:  Start of Tx: 0  End of Tx: 0      After treatment:   [ ]       Patient left in no apparent distress sitting up in chair  [X]       Patient left in no apparent distress in bed  [X]       Call bell left within reach  [X]       Nursing notified  [ ]       Caregiver present  [ ]       Bed alarm activated         COMMUNICATION/EDUCATION:   [X]             Compensatory speech/language/comprhension techniques     Jona Cranker, SLP  Time Calculation: 25 mins

## 2017-03-21 NOTE — PROGRESS NOTES
0800 Assumed care of pt. Pt alert and oriented. Resting in bed    0930 Pt medicated with dilaudid 4 mg po for abm pain 10/10    1030 Pt pain relieved. 1100 Interdisciplinary team rounds were held 3/21/2017 with the following team members:Care Management, Nursing and Physician Dr Andres Samuels and the patient. Blood transfusion plan for today for low H/H  Plan of care discussed. See clinical pathway and/or care plan for interventions and desired outcomes. 1240 Pt medicated with morphine 2mg as per [facility] for rt leg pain     1330 pt in bed resting with eyes closed. Consent witnessed for blood transfusion    1435 First unit of blood initiated, education given and V/S  as per protocol  1737 First unit completed, tolerated well. 1830 Dilaudid adm as per c/o pain to rt leg. Second unit of blood initiated. V/s continues as per protocol  B/P @ 140's/'s. Dr Aurelia Hughes made aware and gave one time order for clonidine 0.1 mg.    1900 Endorsed off to incoming nurse.

## 2017-03-21 NOTE — PROGRESS NOTES
Hematology Inpatient Consult    Subjective:     Sulma Lyle is a 40 y.o., [address] 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 Past Medical History:   Diagnosis Date    Abdominal pain     Anemia NEC     sickle cell trait    C. difficile colitis     Crohn's disease (HCC)     Gastrointestinal disorder     Crohns    Herpes zoster     Hx of cocaine abuse     Hyperkalemia     Hypertension     Iron deficiency anemia     MRSA (methicillin resistant Staphylococcus aureus)     Obesity     Pain management     Sickle cell trait (HCC)      Past Surgical History:   Procedure Laterality Date    COLONOSCOPY N/A 3/17/2017    COLONOSCOPY; BIOPSY; performed by Enmanuel Kessler MD at [facility] ENDOSCOPY    HX GYN      tubal ligation    HX TUBAL LIGATION        History reviewed. No pertinent family history.   Social History   Substance Use Topics    Smoking status: Former Smoker    Smokeless tobacco: Not on file    Alcohol use No      Current Facility-Administered Medications   Medication Dose Route Frequency Provider Last Rate Last Dose    HYDROmorphone (PF) (DILAUDID) injection 0.5 mg  0.5 mg IntraVENous Q12H PRN Tracy Gunning, DO   0.5 mg at 03/20/17 2325    morphine injection 2 mg  2 mg IntraVENous Q6H PRN Tracy Gunning, DO   2 mg at 03/21/17 1240    gabapentin (NEURONTIN) capsule 400 mg  400 mg Oral TID Tracy Gunning, DO   400 mg at 03/21/17 0932    HYDROmorphone (DILAUDID) tablet 4 mg  4 mg Oral Q4H PRN Tracy Gunning, DO   4 mg at 03/21/17 0931    enoxaparin (LOVENOX) injection 80 mg  80 mg SubCUTAneous Q24H Tracy Gunning, DO   80 mg at 03/21/17 0933    mesalamine (PENTASA) CR capsule 1,000 mg  1 g Oral QID Enmanuel Kessler MD   1,000 mg at 03/21/17 1239    predniSONE (DELTASONE) tablet 40 mg  40 mg Oral DAILY WITH BREAKFAST Enmanuel Kessler MD   40 mg at 03/21/17 0931    ferrous sulfate tablet 325 mg  1 Tab Oral BID WITH MEALS Hari Shook Estefania Jimenez MD   325 mg at 17 0932    benzocaine (HURRICANE) 20 % spray   Mucous Membrane PRN Dedra Cortez MD   [address] [place] at 03/15/17 1202    potassium chloride (KAON 10%) 20 mEq/15 mL oral liquid 20 mEq  20 mEq Oral DAILY Deni Don MD   20 mEq at 17 0932    multivitamin, tx-iron-ca-min (THERA-M w/ IRON) tablet 1 Tab  1 Tab Oral DAILY Deni Don MD   1 Tab at 17 0931    pantoprazole (PROTONIX) tablet 40 mg  40 mg Oral ACB Deni Don MD   40 mg at 17 5730    sodium chloride (NS) flush 5-10 mL  5-10 mL IntraVENous Q8H Deni Don MD   10 mL at 17 1502    sodium chloride (NS) flush 5-10 mL  5-10 mL IntraVENous PRN Deni Don MD        ondansetron [facility]) injection 4 mg  4 mg IntraVENous Q6H PRN Deni Don MD   4 mg at 17 2039    acetaminophen (TYLENOL) tablet 650 mg  650 mg Oral Q4H PRN Deni Don MD        ELECTROLYTE REPLACEMENT PROTOCOL - Potassium  1 Each Other PRN Deni Don MD        ELECTROLYTE REPLACEMENT PROTOCOL - Magnesium  1 Each Other PRN Deni Don MD        atorvastatin (LIPITOR) tablet 80 mg  80 mg Oral QHS Othella Fernanda, DO   80 mg at 17 2313        No Known Allergies     Review of Systems:  No diarrhea, with blood  Some headache  Some Right calf pain    Objective:     Patient Vitals for the past 8 hrs:   BP Temp Pulse Resp SpO2   17 1500 128/80 - 90 - 99 %   17 1445 127/88 - 92 - 99 %   17 1435 126/78 98.6 °F (37 °C) 94 20 99 %   17 1040 137/88 98.1 °F (36.7 °C) 90 18 100 %   17 0753 138/82 98.2 °F (36.8 °C) 84 17 100 %     Temp (24hrs), Av.2 °F (36.8 °C), Min:97.4 °F (36.3 °C), Max:98.6 °F (37 °C)     0701 -  1900  In: 480 [P.O.:480]  Out: -     Physical Exam:   Awake  Lung: cta  Cv: rrr  Abd: soft, tender  Ext: no edema but right Berhane's sign  Neuro 2-12    Lab/Data Review:  Recent Results (from the past 24 hour(s))   FERRITIN    Collection Time: 17  6:55 AM   Result Value Ref Range    Ferritin 236 8 - 388 NG/ML   CBC W/O DIFF    Collection Time: 03/21/17  6:55 AM   Result Value Ref Range    WBC 8.3 4.6 - 13.2 K/uL    RBC 2.84 (L) 4.20 - 5.30 M/uL    HGB 6.7 (L) 12.0 - 16.0 g/dL    HCT 22.1 (L) 35.0 - 45.0 %    MCV 77.8 74.0 - 97.0 FL    MCH 23.6 (L) 24.0 - 34.0 PG    MCHC 30.3 (L) 31.0 - 37.0 g/dL    RDW 17.7 (H) 11.6 - 14.5 %    PLATELET 974 (H) 914 - 420 K/uL    MPV 9.1 (L) 9.2 - 11.8 FL   TYPE & CROSSMATCH    Collection Time: 03/21/17 11:29 AM   Result Value Ref Range    Crossmatch Expiration 03/24/2017     ABO/Rh(D) A POSITIVE     Antibody screen NEG     CALLED TO: Laverne Dowd AT 9386 ON 3/21/17 LAD     Unit number [id number]     Blood component type RC LR AS1     Unit division 00     Status of unit ALLOCATED     Crossmatch result Compatible     Unit number [id number]     Blood component type RC LR AS1     Unit division 00     Status of unit ISSUED     Crossmatch result Compatible          Assessment:     Principal Problem:    Stroke ([facility] (3/10/2017)    Active Problems:    Chronic pain syndrome (2/27/2012)      Crohn disease ([facility] (2/27/2012)      Hypokalemia (8/8/2015)      Sickle cell trait (HCC) ()      Hypertension ()      Hx of cocaine abuse ()      Embolic stroke ([facility] (6/58/6772)      Acute blood loss anemia (3/15/2017)      Neuropathic pain (3/15/2017)      DVT (deep venous thrombosis) ([facility] (3/16/2017)    no active GI bleeding today, adequate ferritin, but history of low retic  Inflammation  Factor VIII excess    Plan:   Check retic  I reviewed with Evelyn Stallings      Signed By: Jeremy Rivera MD     March 21, 2017

## 2017-03-21 NOTE — PROGRESS NOTES
Problem: Self Care Deficits Care Plan (Adult)  Goal: *Acute Goals and Plan of Care (Insert Text)  Occupational Therapy Goals  Initiated 3/11/2017 within 7 day(s). 1. Patient will perform lower body dressing with minimal assistance/contact guard assist while sitting on EOB and use of salbador-technique. (Progressing)    2. Patient will perform grooming with contact guard assist while standing at sink using BUE. (MET)  New Goal: Pt will perform grooming tasks while standing at sink with Mod I.    3. Patient will perform toilet transfers with contact guard assist with verbal and tactile cues. (MET)  New Goal: Pt will perform toilet transfers with Mod I.    4. Patient will perform all aspects of toileting with Contact guard assist and with verbal and tactile cues. (MET)  New Goal: Pt will perform toileting tasks with Mod I.    5. Patient will participate in upper extremity therapeutic exercise/activities with contact guard assist for 15 minutes. (Progressing)     OCCUPATIONAL THERAPY TREATMENT     Patient: Almas Dawson (79 y.o. female)  Date: 3/21/2017  Diagnosis: Stroke [facility])  Stroke ([facility]  Hypokalemia  CVA (cerebral vascular accident) ([facility]  anemia Stroke ([facility]  Procedure(s) (LRB):  COLONOSCOPY; BIOPSY; (N/A) 4 Days Post-Op  Precautions: Fall  Chart, occupational therapy assessment, plan of care, and goals were reviewed. ASSESSMENT:  Pt getting blood at this time as HGB is 6.7. Pt provided with handout with finger and hand exercises to increase fine motor coordination and strength in L UE. Pt demonstrated understanding of exercises through verbal feedback and demonstration 1 set x 5 reps each. No ADLs or mobility completed at this time as pt getting blood.   Progression toward goals:  [ ]          Improving appropriately and progressing toward goals  [X]          Improving slowly and progressing toward goals  [ ]          Not making progress toward goals and plan of care will be adjusted       PLAN:  Patient continues to benefit from skilled intervention to address the above impairments. Continue treatment per established plan of care. Discharge Recommendations:  Rehab vs Home Health  Further Equipment Recommendations for Discharge:  N/A       SUBJECTIVE:   Patient stated I'm having a rough day.       OBJECTIVE DATA SUMMARY:   Cognitive/Behavioral Status:  Neurologic State: Alert  Orientation Level: Oriented X4  Cognition: Follows commands, Appropriate safety awareness, Appropriate for age attention/concentration  Safety/Judgement: Decreased insight into deficits  Functional Mobility and Transfers for ADLs:              Bed Mobility: N/A              Transfers: N/A  Balance:     Therapeutic Exercises:   Left UE finger and hand exercises and handout provided this session to increase fine motor coordination and strength. Pain:  Pain Scale 1: Numeric (0 - 10)  Pain Intensity 1: 0  Pain Location 1: Leg  Pain Orientation 1: Lower  Pain Description 1: Aching  Pain Intervention(s) 1: Medication (see MAR)     Activity Tolerance:    Fair +  Please refer to the flowsheet for vital signs taken during this treatment.   After treatment:   [ ]  Patient left in no apparent distress sitting up in chair  [X]  Patient left in no apparent distress in bed  [X]  Call bell left within reach  [X]  Nursing notified  [ ]  Caregiver present  [ ]  Bed alarm activated     Merline Jasper, OTR/L  Time Calculation: 13 mins

## 2017-03-21 NOTE — PROGRESS NOTES
NUTRITION FOLLOW-UP    RECOMMENDATIONS/PLAN:   - Continue Regular diet with Ensure Compact supplements BID- PO intake improving    NUTRITION ASSESSMENT:   Client Update: 40 yrs old Female with acute CVA, acute on chronic anemia related to Crohn's disease. Pt has known h/o treatment noncompliance for Crohn's. PO intake improving over the past few days. Texture upgraded to regular per SLP recommendations. FOOD/NUTRITION INTAKE   Diet Order:  Regular   Supplements: Ensure Compact BID   Food Allergies: NKFA  Average PO Intake:      Patient Vitals for the past 100 hrs:   % Diet Eaten   03/21/17 1246 100 %   03/20/17 1930 100 %   03/20/17 1542 100 %   03/19/17 2109 50 %   03/19/17 1916 20 %   03/19/17 1114 75 %   03/18/17 1748 100 %   03/18/17 1213 25 %     Pertinent Medications:  [x] Reviewed; lipitor, FeSO4, neurontin, dilaudid, MVI, ppi, prednisone  Insulin:  []SSI  []Pre-meal   []Basal    []Drip  [x]None  Cultural/Sabianism Food Preferences: None Identified ANTHROPOMETRICS  Height: 5' 6\" (167.6 cm)       Weight: 78 kg (172 lb)         BMI: 27.8 kg/m^2 overweight (25.0%-29.9% BMI)   Adm Weight: 177 lbs                Weight change: -5 lbs (?fluid)  Adjusted Body Weight: 65 kg     NUTRITION-FOCUSED PHYSICAL ASSESSMENT  Skin: intact      GI: last BM 3/21- loose. Occasional nausea and lower abdominal pain. Abdomen soft, +BS per MD.    BIOCHEMICAL DATA & MEDICAL TESTS  Pertinent Labs:  [x] Reviewed; Hgb 6.7, Hct 22.1      NUTRITION PRESCRIPTION  Calories: 1800- 2040 kcal/day based on 30-34 kcal/kg AdjBW   Protein: 65-78 g/day based on 1-1.2 g/kg AdjBW  CHO: 225-255 g/day based on 50% of total energy  Fluid: 8045-6531 ml/day based on 1 kcal/ml      NUTRITION DIAGNOSES:   1. At risk of inadequate oral intake related to acute CVA as evidenced by dysphagia and variable PO intake % of meals  - progressing    NUTRITION INTERVENTIONS:   INTERVENTIONS:        GOALS:  1.  Current diet + ONS 1. >50% PO intake of meals/supplements, prevent significant weight changes by next review 3-5 days     LEARNING NEEDS (Diet, Supplementation, Food/Nutrient-Drug Interaction):   [] None Identified   [] Education provided/documented      Identified and patient: [] Declined   [x] Was not appropriate/indicated        NUTRITION MONITORING /EVALUATION:   Follow PO intake  Monitor wt  Monitor renal labs, electrolytes, fluid status  Monitor for additional supplement needs     Previous Recommendations Implemented: yes        Previous Goals Met:  yes -progressing      [] Participated in Interdisciplinary Rounds    [x] Interdisciplinary Care Plan Reviewed  DISCHARGE NUTRITION RECOMMENDATIONS ADDRESSED:     [x] To be determined closer to discharge    NUTRITION RISK:           [x] At risk                        [] Not currently at risk        Will follow-up per policy.   Radha Banks RD  PAGER:  [phone]

## 2017-03-21 NOTE — PROGRESS NOTES
Hospitalist Progress Note    Patient: Frankie Olsen MRN: [id number]  CSN: [id number]    YOB: 1970  Age: 40 y.o. Sex: female    DOA: 3/10/2017 LOS:  LOS: 11 days                Assessment/Plan     Patient Active Problem List   Diagnosis Code    Chronic pain syndrome G89.4    Crohn disease ([facility] K50.90    Hypokalemia E87.6    Stroke ([facility] I63.9    Sickle cell trait ([facility] D57.3    Hypertension I10    Hx of cocaine abuse P26.539    Embolic stroke ([facility] Y01.1    Acute blood loss anemia D62    Neuropathic pain M79.2    DVT (deep venous thrombosis) ([facility]) I82.409            39 yo female admitted with left sided weakness.       Acute CVA - multifocal right hemispheric infarcts. Neurology following, on aspirin.      Acute on chronic anemia - related to crohn's disease. Transfuse 2 units of PRBC.      crohns disease - un treated, non compliant, seen by GI, s/p colonoscopy, on asacol and prednisone.      History of DVT - Heme/onc following. On anticoagulation with lovenox      Hypokalemia - repleted     PT/OT     Discharge planning.      Review of systems  General: No fevers or chills. Cardiovascular: No chest pain or pressure. No palpitations. Pulmonary: No shortness of breath. Gastrointestinal: No nausea, vomiting.           Physical Exam:  General: Awake, cooperative, no acute distress    HEENT: NC, Atraumatic. PERRLA, anicteric sclerae. Lungs: CTA Bilaterally. No Wheezing/Rhonchi/Rales. Heart: Regular rhythm,  No murmur, No Rubs, No Gallops  Abdomen: Soft, Non distended, Non tender.  +Bowel sounds,   Extremities: No c/c/e  Psych:   Not anxious or agitated. Neurologic:  Left sided weakness.        Vital signs/Intake and Output:  Visit Vitals    /90    Pulse 90    Temp 98.6 °F (37 °C)    Resp 20    Ht 5' 6\" (1.676 m)    Wt 78 kg (172 lb)    SpO2 100%    Breastfeeding No    BMI 27.76 kg/m2     Current Shift:  03/21 0701 - 03/21 1900  In: 790 [P.O.:480]  Out: -   Last three shifts:  03/19 1901 - 03/21 0700  In: 840 [P.O.:840]  Out: 0             Labs: Results:       Chemistry No results for input(s): GLU, NA, K, CL, CO2, BUN, CREA, CA, AGAP, BUCR, TBIL, GPT, AP, TP, ALB, GLOB, AGRAT in the last 72 hours. CBC w/Diff Recent Labs      03/21/17   0655  03/20/17   0928   WBC  8.3  7.1   RBC  2.84*  2.87*   HGB  6.7*  6.7*   HCT  22.1*  22.7*   PLT  441*  275      Cardiac Enzymes No results for input(s): CPK, CKND1, CHANDA in the last 72 hours. No lab exists for component: CKRMB, TROIP   Coagulation No results for input(s): PTP, INR, APTT in the last 72 hours. No lab exists for component: INREXT    Lipid Panel Lab Results   Component Value Date/Time    Cholesterol, total 145 03/12/2017 06:15 AM    HDL Cholesterol 37 03/12/2017 06:15 AM    LDL, calculated 91.6 03/12/2017 06:15 AM    VLDL, calculated 16.4 03/12/2017 06:15 AM    Triglyceride 82 03/12/2017 06:15 AM    CHOL/HDL Ratio 3.9 03/12/2017 06:15 AM      BNP No results for input(s): BNPP in the last 72 hours. Liver Enzymes No results for input(s): TP, ALB, TBIL, AP, SGOT, GPT in the last 72 hours.     No lab exists for component: DBIL   Thyroid Studies Lab Results   Component Value Date/Time    TSH 1.51 12/12/2009 09:48 AM        Procedures/imaging: see electronic medical records for all procedures/Xrays and details which were not copied into this note but were reviewed prior to creation of Plan

## 2017-03-21 NOTE — PROGRESS NOTES
Problem: Mobility Impaired (Adult and Pediatric)  Goal: *Acute Goals and Plan of Care (Insert Text)  Physical Therapy Goals  Initiated 3/11/2017 and to be accomplished within 7 day(s)  1. Patient will move from supine to sit and sit to supine in bed with supervision/set-up. 2. Patient will transfer from bed to chair and chair to bed with supervision/set-up using the least restrictive device. 3. Patient will perform sit to stand with supervision/set-up. 4. Patient will ambulate with supervision/set-up for >100 feet with the least restrictive device. Re-evaluation on 3/18/2017. Goals to be met within 2-8 days. 1. Patient will move from supine to sit and sit to supine in bed with supervision/set-up. 2. Patient will transfer from bed to chair and chair to bed with supervision/set-up using the least restrictive device. 3. Patient will perform sit to stand with supervision/set-up. 4. Patient will ambulate with supervision/modified independence for >250 feet with the least restrictive device. 5. Patient will demonstrate compliance and understanding of home exercise program to maximize strength and functional return. Outcome: Progressing Towards Goal  PHYSICAL THERAPY TREATMENT     Patient: Rojas Martell (84 y.o. female)  Date: 3/21/2017  Diagnosis: Stroke [facility])  Stroke ([facility]  Hypokalemia  CVA (cerebral vascular accident) ([facility]  anemia Stroke ([facility]  Procedure(s) (LRB):  COLONOSCOPY; BIOPSY; (N/A) 4 Days Post-Op  Precautions: Fall   Chart, physical therapy assessment, plan of care and goals were reviewed. ASSESSMENT:  Pt receiving PRBC transfusion at this time. Pt instructed in facial ex to address left facial weakness and returned demonstration of same appropriately. Experiencing most difficulty with left lateralization and only able to hold same 1-2 seconds. Pt c/o frontal headache rated 8/10 initially and 7/10 at completion of session after head lowered. /100.   Nurse Pat Torrez notified of HA and BP.  OOB activity deferred today due to elevated BP and blood transfusion ongoing. Progression toward goals:  [X]      Improving appropriately and progressing toward goals  [ ]      Improving slowly and progressing toward goals  [ ]      Not making progress toward goals and plan of care will be adjusted       PLAN:  Patient continues to benefit from skilled intervention to address the above impairments. Continue treatment per established plan of care. Discharge Recommendations:  Home Health  Further Equipment Recommendations for Discharge:  rolling walker       SUBJECTIVE:   Patient stated I'm not gonna walk today?       OBJECTIVE DATA SUMMARY:   Critical Behavior:  Neurologic State: Alert  Orientation Level: Oriented X4  Cognition: Follows commands, Appropriate safety awareness, Appropriate for age attention/concentration  Safety/Judgement: Decreased insight into deficits  Therapeutic Exercises:   Facial ex for : bilateral lateralization; left lateralization; puckering/pucker with left lateralization and left sniffing upward x 5 each with 5 second hold attempted. Pain:  Pain Scale 1: Numeric (0 - 10)  Pain Intensity 1: 0  Pain Location 1: Leg  Pain Orientation 1: Lower  Pain Description 1: Aching  Pain Intervention(s) 1: Medication (see MAR)  Activity Tolerance:   fair  Please refer to the flowsheet for vital signs taken during this treatment.   After treatment:   [ ] Patient left in no apparent distress sitting up in chair  [X] Patient left in no apparent distress in bed  [X] Call bell left within reach  [X] Nursing notified  [ ] Caregiver present  [ ] Bed alarm activated      Kathie Godoy PT   Time Calculation: 14 mins

## 2017-03-22 LAB
ABO + RH BLD: NORMAL
ANNOTATION COMMENT IMP: NORMAL
BLD PROD TYP BPU: NORMAL
BLD PROD TYP BPU: NORMAL
BLOOD GROUP ANTIBODIES SERPL: NORMAL
BPU ID: NORMAL
BPU ID: NORMAL
CALLED TO:,BCALL1: NORMAL
CROSSMATCH RESULT,%XM: NORMAL
CROSSMATCH RESULT,%XM: NORMAL
ERYTHROCYTE [DISTWIDTH] IN BLOOD BY AUTOMATED COUNT: 17.7 % (ref 11.6–14.5)
HCT VFR BLD AUTO: 31.5 % (ref 35–45)
HGB BLD-MCNC: 10 G/DL (ref 12–16)
M TB IFN-G CD4+ BCKGRND COR BLD-ACNC: <0 IU/ML
M TB IFN-G CD4+ T-CELLS BLD-ACNC: 0.13 IU/ML
M TB TUBERC IFN-G BLD QL: NEGATIVE
M TB TUBERC IGNF/MITOGEN IGNF CONTROL: 8.71 IU/ML
MCH RBC QN AUTO: 25.4 PG (ref 24–34)
MCHC RBC AUTO-ENTMCNC: 31.7 G/DL (ref 31–37)
MCV RBC AUTO: 80.2 FL (ref 74–97)
PLATELET # BLD AUTO: 543 K/UL (ref 135–420)
PMV BLD AUTO: 9.4 FL (ref 9.2–11.8)
QUANTIFERON NIL VALUE: 0.15 IU/ML
RBC # BLD AUTO: 3.93 M/UL (ref 4.2–5.3)
RETICS/RBC NFR AUTO: 3.5 % (ref 0.5–2.3)
SERVICE CMNT-IMP: NORMAL
SPECIMEN EXP DATE BLD: NORMAL
STATUS OF UNIT,%ST: NORMAL
STATUS OF UNIT,%ST: NORMAL
UNIT DIVISION, %UDIV: 0
UNIT DIVISION, %UDIV: 0
WBC # BLD AUTO: 11.6 K/UL (ref 4.6–13.2)

## 2017-03-22 PROCEDURE — 97535 SELF CARE MNGMENT TRAINING: CPT

## 2017-03-22 PROCEDURE — 74011250636 HC RX REV CODE- 250/636: Performed by: INTERNAL MEDICINE

## 2017-03-22 PROCEDURE — 74011250637 HC RX REV CODE- 250/637: Performed by: INTERNAL MEDICINE

## 2017-03-22 PROCEDURE — 74011250637 HC RX REV CODE- 250/637: Performed by: HOSPITALIST

## 2017-03-22 PROCEDURE — 36415 COLL VENOUS BLD VENIPUNCTURE: CPT | Performed by: PSYCHIATRY & NEUROLOGY

## 2017-03-22 PROCEDURE — 74011250637 HC RX REV CODE- 250/637: Performed by: FAMILY MEDICINE

## 2017-03-22 PROCEDURE — 65660000000 HC RM CCU STEPDOWN

## 2017-03-22 PROCEDURE — 92507 TX SP LANG VOICE COMM INDIV: CPT

## 2017-03-22 PROCEDURE — 85045 AUTOMATED RETICULOCYTE COUNT: CPT | Performed by: PSYCHIATRY & NEUROLOGY

## 2017-03-22 PROCEDURE — 74011636637 HC RX REV CODE- 636/637: Performed by: INTERNAL MEDICINE

## 2017-03-22 PROCEDURE — 97116 GAIT TRAINING THERAPY: CPT

## 2017-03-22 PROCEDURE — 85027 COMPLETE CBC AUTOMATED: CPT | Performed by: PSYCHIATRY & NEUROLOGY

## 2017-03-22 RX ADMIN — GABAPENTIN 400 MG: 400 CAPSULE ORAL at 09:31

## 2017-03-22 RX ADMIN — PANTOPRAZOLE SODIUM 40 MG: 40 TABLET, DELAYED RELEASE ORAL at 06:40

## 2017-03-22 RX ADMIN — Medication 10 ML: at 14:00

## 2017-03-22 RX ADMIN — GABAPENTIN 400 MG: 400 CAPSULE ORAL at 17:28

## 2017-03-22 RX ADMIN — PREDNISONE 40 MG: 20 TABLET ORAL at 09:31

## 2017-03-22 RX ADMIN — HYDROMORPHONE HYDROCHLORIDE 4 MG: 4 TABLET ORAL at 20:09

## 2017-03-22 RX ADMIN — GABAPENTIN 400 MG: 400 CAPSULE ORAL at 20:10

## 2017-03-22 RX ADMIN — FERROUS SULFATE TAB 325 MG (65 MG ELEMENTAL FE) 325 MG: 325 (65 FE) TAB at 17:28

## 2017-03-22 RX ADMIN — ATORVASTATIN CALCIUM 80 MG: 20 TABLET, FILM COATED ORAL at 20:09

## 2017-03-22 RX ADMIN — FERROUS SULFATE TAB 325 MG (65 MG ELEMENTAL FE) 325 MG: 325 (65 FE) TAB at 09:31

## 2017-03-22 RX ADMIN — Medication 2 MG: at 03:29

## 2017-03-22 RX ADMIN — POTASSIUM CHLORIDE 20 MEQ: 20 SOLUTION ORAL at 09:30

## 2017-03-22 RX ADMIN — MULTIPLE VITAMINS W/ MINERALS TAB 1 TABLET: TAB at 09:31

## 2017-03-22 RX ADMIN — HYDROMORPHONE HYDROCHLORIDE 4 MG: 4 TABLET ORAL at 15:09

## 2017-03-22 RX ADMIN — ENOXAPARIN SODIUM 80 MG: 80 INJECTION SUBCUTANEOUS at 09:30

## 2017-03-22 RX ADMIN — MESALAMINE 1000 MG: 250 CAPSULE ORAL at 20:09

## 2017-03-22 RX ADMIN — HYDROMORPHONE HYDROCHLORIDE 0.5 MG: 1 INJECTION, SOLUTION INTRAMUSCULAR; INTRAVENOUS; SUBCUTANEOUS at 02:32

## 2017-03-22 RX ADMIN — MESALAMINE 1000 MG: 250 CAPSULE ORAL at 09:31

## 2017-03-22 RX ADMIN — HYDROMORPHONE HYDROCHLORIDE 4 MG: 4 TABLET ORAL at 06:40

## 2017-03-22 RX ADMIN — Medication 10 ML: at 06:40

## 2017-03-22 RX ADMIN — HYDROMORPHONE HYDROCHLORIDE 4 MG: 4 TABLET ORAL at 11:08

## 2017-03-22 RX ADMIN — MESALAMINE 1000 MG: 250 CAPSULE ORAL at 15:09

## 2017-03-22 NOTE — ROUTINE PROCESS
Shift assessment:  Patient had uneventful shift. Alarm parameters reviewed and opportunities for questions provided. Bedside and Verbal shift change report given to Arti Meza RN (oncoming nurse) by Claude Blanco RN   (offgoing nurse). Report included the following information SBAR, Kardex, Intake/Output, MAR, Accordion, Recent Results and Med Rec Status.

## 2017-03-22 NOTE — ROUTINE PROCESS
[id number] The documentation for this period is being entered following the guidelines as defined in the [facility] by Rosa Phillips RN.

## 2017-03-22 NOTE — PROGRESS NOTES
Hematology Inpatient Consult    Subjective:     Stephanie Xiao is a 40 y.o., [address] 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 Past Medical History:   Diagnosis Date    Abdominal pain     Anemia NEC     sickle cell trait    C. difficile colitis     Crohn's disease (HCC)     Gastrointestinal disorder     Crohns    Herpes zoster     Hx of cocaine abuse     Hyperkalemia     Hypertension     Iron deficiency anemia     MRSA (methicillin resistant Staphylococcus aureus)     Obesity     Pain management     Sickle cell trait (HCC)      Past Surgical History:   Procedure Laterality Date    COLONOSCOPY N/A 3/17/2017    COLONOSCOPY; BIOPSY; performed by Deniz Capps MD at [facility] ENDOSCOPY    HX GYN      tubal ligation    HX TUBAL LIGATION        History reviewed. No pertinent family history.   Social History   Substance Use Topics    Smoking status: Former Smoker    Smokeless tobacco: Not on file    Alcohol use No      Current Facility-Administered Medications   Medication Dose Route Frequency Provider Last Rate Last Dose    HYDROmorphone (PF) (DILAUDID) injection 0.5 mg  0.5 mg IntraVENous Q12H PRN Emelia Couch, DO   0.5 mg at 03/22/17 0232    morphine injection 2 mg  2 mg IntraVENous Q6H PRN Emelia Couch, DO   2 mg at 03/22/17 0329    gabapentin (NEURONTIN) capsule 400 mg  400 mg Oral TID Emelia Couch, DO   400 mg at 03/22/17 0931    HYDROmorphone (DILAUDID) tablet 4 mg  4 mg Oral Q4H PRN Emelia Couch, DO   4 mg at 03/22/17 1108    enoxaparin (LOVENOX) injection 80 mg  80 mg SubCUTAneous Q24H Emelia Couch, DO   80 mg at 03/22/17 0930    mesalamine (PENTASA) CR capsule 1,000 mg  1 g Oral QID Deniz Capps MD   1,000 mg at 03/22/17 0931    predniSONE (DELTASONE) tablet 40 mg  40 mg Oral DAILY WITH BREAKFAST Deniz Capps MD   40 mg at 03/22/17 0931    ferrous sulfate tablet 325 mg  1 Tab Oral BID WITH MEALS Jennifer Zamora MD   325 mg at 03/22/17 9735    benzocaine (HURRICANE) 20 % spray   Mucous Membrane PRN Brett Lunsford MD   [address] [place] at 03/15/17 1202    potassium chloride (KAON 10%) 20 mEq/15 mL oral liquid 20 mEq  20 mEq Oral DAILY Viri Aguilar MD   20 mEq at 17 0930    multivitamin, tx-iron-ca-min (THERA-M w/ IRON) tablet 1 Tab  1 Tab Oral DAILY Viri Aguilar MD   1 Tab at 17 0931    pantoprazole (PROTONIX) tablet 40 mg  40 mg Oral ACB Viri Aguilar MD   40 mg at 17 0640    sodium chloride (NS) flush 5-10 mL  5-10 mL IntraVENous Q8H Viri Aguilar MD   10 mL at 17 0640    sodium chloride (NS) flush 5-10 mL  5-10 mL IntraVENous PRN Viri Aguilar MD        ondansetron [facility]) injection 4 mg  4 mg IntraVENous Q6H PRN Viri Aguilar MD   4 mg at 17    acetaminophen (TYLENOL) tablet 650 mg  650 mg Oral Q4H PRN Viri Aguilar MD        ELECTROLYTE REPLACEMENT PROTOCOL - Potassium  1 Each Other PRN Viri Aguilar MD        ELECTROLYTE REPLACEMENT PROTOCOL - Magnesium  1 Each Other PRN Viri Aguilar MD        atorvastatin (LIPITOR) tablet 80 mg  80 mg Oral QHS Lety Khushbu Mayo, DO   80 mg at 17        No Known Allergies     Review of Systems:  Walking, some leg pain  No diarrhea  Some headache  Slight pink in bowel movement  No diarrhea    Objective:     Patient Vitals for the past 8 hrs:   BP Temp Pulse Resp SpO2   17 1113 134/68 97.8 °F (36.6 °C) 93 18 99 %   17 0642 151/72 97.3 °F (36.3 °C) 74 18 99 %     Temp (24hrs), Av.8 °F (36.6 °C), Min:97.2 °F (36.2 °C), Max:98.6 °F (37 °C)         Physical Exam:   No distress  Heent: eom +  Lung: cta  Cv: rrr  Abd: soft, slightly tender  Ext: no edema    Lab/Data Review:  Recent Results (from the past 24 hour(s))   CBC W/O DIFF    Collection Time: 17 11:07 AM   Result Value Ref Range    WBC 11.6 4.6 - 13.2 K/uL    RBC 3.93 (L) 4.20 - 5.30 M/uL    HGB 10.0 (L) 12.0 - 16.0 g/dL    HCT 31.5 (L) 35.0 - 45.0 %    MCV 80.2 74.0 - 97.0 FL    MCH 25.4 24.0 - 34.0 PG    MCHC 31.7 31.0 - 37.0 g/dL    RDW 17.7 (H) 11.6 - 14.5 %    PLATELET 710 (H) 637 - 420 K/uL    MPV 9.4 9.2 - 11.8 FL   RETICULOCYTE COUNT    Collection Time: 03/22/17 11:07 AM   Result Value Ref Range    Reticulocyte count 3.5 (H) 0.5 - 2.3 %         Assessment:     Principal Problem:    Stroke ([facility] (3/10/2017)    Active Problems:    Chronic pain syndrome (2/27/2012)      [facility] (2/27/2012)      Hypokalemia (8/8/2015)      Sickle cell trait (HCC) ()      Hypertension ()      Hx of cocaine abuse ()      Embolic stroke ([facility] (4/44/5492)      Acute blood loss anemia (3/15/2017)      Neuropathic pain (3/15/2017)      DVT (deep venous thrombosis) ([facility] (3/16/2017)    hgb 10 after transfusion,   Thrombocytosis, on lovenox  Iron deficiency, ferritin 236.   Crohn's     Plan:   Clinic followup  I reviewed with Deonte Palacio        Signed By: Bryant Joseph MD     March 22, 2017

## 2017-03-22 NOTE — PROGRESS NOTES
Problem: Neurolinguistics Impaired (Adult)  Goal: *Acute Goals and Plan of Care (Insert Text)  Goals:   1. Pt will complete functional mathematical reasoning tasks (ex. Money, time) with 80% accuracy. 2. Pt will complete functional problem solving tasks by generating multiple solutions (3+) for given scenario with 80% accuracy. 3. Pt will complete complex comprehension tasks (ex. yes/no, commands) involving temporal and prepositional components to increase attention to detail and auditory processing with 80% accuracy. 4. Pt will verbalize/demonstrate insight into physical and cognitive deficits and impact on safety and return home with 80% accuracy. Outcome: Progressing Towards Goal  SPEECH LANGUAGE PATHOLOGY TREATMENT     Patient: Jose Francisco Silva (46 y.o. female)  Date: 3/22/2017  Diagnosis: Stroke [facility])  Stroke ([facility]  Hypokalemia  CVA (cerebral vascular accident) ([facility]  anemia Stroke ([facility]  Procedure(s) (LRB):  COLONOSCOPY; BIOPSY; (N/A) 5 Days Post-Op      ASSESSMENT:  Mild/moderate cognitive/communication impairment c/w RHD     Cognitive/communication tx continued with completion of functional reading task (prescription label). Pt demo essentially functional visual scanning of page to respond to basic contextual questions with 6/8 (75%) accuracy, however required redirection throughout task 2/2 overpersonalization of information and response aversion. Breakdown with increasing abstraction of information requiring generation of multiple solutions, completed with 2/4 (50%) accuracy with prompts. Attempted functional numerical reasoning tasks however pt tangential and non-participatory in tasks, suspect 2/2 task difficulty though pt with multiple avoidance behaviors.  Educated pt re: barriers to cognitive function including current headache, medications, complexity of medical care, though reinforced cognitive deficits in setting of CVA and recommendation to continue skilled therapy services to address such; pt voiced shallow insight/understanding. Ended session with recall strategy training using acronym for stroke symptom awareness; initially with only 33% carryover of information however with repeat trials x3 pt with +carryover of 90% details of \"FAST\" for stroke symptom recognition. Will continue recall strategy training, education, and above POC for cognitive/communication goals. Progression toward goals:  [X]       Improving appropriately and progressing toward goals  [ ]       Improving slowly and progressing toward goals  [ ]       Not making progress toward goals and plan of care will be adjusted       PLAN: continue cognitive/communication goals as above  Patient continues to benefit from skilled intervention to address the above impairments. Continue treatment per established plan of care. Discharge Recommendations:  Rehab vs Outpatient vs Home Health       SUBJECTIVE:   Patient stated I'm not thinking as clearly as I should.       OBJECTIVE:   Mental Status:  Neurologic State: Alert, Eyes open spontaneously  Orientation Level: Oriented X4  Cognition: Appropriate decision making, Follows commands  Perception: Cues to attend left visual field, Cues to attend to left side of body  Perseveration: No perseveration noted  Safety/Judgement: Decreased insight into deficits  Treatment & Interventions:   as above                                                                                                                     PAIN:  Start of Tx: 6  End of Tx: 6      After treatment:   [ ]       Patient left in no apparent distress sitting up in chair  [X]       Patient left in no apparent distress in bed  [X]       Call bell left within reach  [ ]       Nursing notified  [ ]       Caregiver present  [ ]       Bed alarm activated         COMMUNICATION/EDUCATION:   [X]             Compensatory speech/language/comprhension techniques     Darylene Duval, SLP  Time Calculation: 28 mins

## 2017-03-22 NOTE — ROUTINE PROCESS
Bedside and Verbal shift change report given to APPLE Bush RN (oncoming nurse) by Ethan Wray RN  (offgoing nurse). Report included the following information SBAR, Kardex, Cardiac Rhythm Sr and Alarm Parameters .

## 2017-03-22 NOTE — PROGRESS NOTES
Hospitalist Progress Note    Patient: Amanda Beach MRN: [id number]  CSN: [id number]    YOB: 1970  Age: 40 y.o. Sex: female    DOA: 3/10/2017 LOS:  LOS: 12 days                Assessment/Plan     Patient Active Problem List   Diagnosis Code    Chronic pain syndrome G89.4    Crohn disease ([facility] K50.90    Hypokalemia E87.6    [facility] I63.9    Sickle cell trait ([facility] D57.3    Hypertension I10    Hx of cocaine abuse C18.294    [facility] ([facility] T30.5    Acute blood loss anemia D62    Neuropathic pain M79.2    DVT (deep venous thrombosis) ([facility] I82.409            39 yo female admitted with left sided weakness.       Acute CVA - multifocal right hemispheric infarcts. Neurology following, on aspirin.      Acute on chronic anemia - related to crohn's disease. H/H improved after blood transfuion.      crohns disease - un treated, non compliant, seen by GI, s/p colonoscopy, on asacol and prednisone.      History of DVT - Heme/onc following. On anticoagulation with lovenox      Hypokalemia - repleted      PT/OT      Discharge planning.      Review of systems  General: No fevers or chills. Cardiovascular: No chest pain or pressure. No palpitations. Pulmonary: No shortness of breath. Gastrointestinal: No nausea, vomiting.           Physical Exam:  General: Awake, cooperative, no acute distress    HEENT: NC, Atraumatic. PERRLA, anicteric sclerae. Lungs: CTA Bilaterally. No Wheezing/Rhonchi/Rales. Heart: Regular rhythm,  No murmur, No Rubs, No Gallops  Abdomen: Soft, Non distended, Non tender.  +Bowel sounds,   Extremities: No c/c/e  Psych:   Not anxious or agitated. Neurologic:  Left sided weakness.        Vital signs/Intake and Output:  Visit Vitals    /68    Pulse 93    Temp 97.8 °F (36.6 °C)    Resp 18    Ht 5' 6\" (1.676 m)    Wt 82.3 kg (181 lb 7 oz)    SpO2 99%    Breastfeeding No    BMI 29.28 kg/m2     Current Shift:  03/22 0701 - 03/22 1900  In: 240 [P.O.:240]  Out: -   Last three shifts:  03/20 1901 - 03/22 0700  In: 1680 [P.O.:1040]  Out: 300 [Urine:300]            Labs: Results:       Chemistry No results for input(s): GLU, NA, K, CL, CO2, BUN, CREA, CA, AGAP, BUCR, TBIL, GPT, AP, TP, ALB, GLOB, AGRAT in the last 72 hours. CBC w/Diff Recent Labs      03/22/17   1107  03/21/17   0655  03/20/17   0928   WBC  11.6  8.3  7.1   RBC  3.93*  2.84*  2.87*   HGB  10.0*  6.7*  6.7*   HCT  31.5*  22.1*  22.7*   PLT  543*  441*  275      Cardiac Enzymes No results for input(s): CPK, CKND1, CHANDA in the last 72 hours. No lab exists for component: CKRMB, TROIP   Coagulation No results for input(s): PTP, INR, APTT in the last 72 hours. No lab exists for component: INREXT    Lipid Panel Lab Results   Component Value Date/Time    Cholesterol, total 145 03/12/2017 06:15 AM    HDL Cholesterol 37 03/12/2017 06:15 AM    LDL, calculated 91.6 03/12/2017 06:15 AM    VLDL, calculated 16.4 03/12/2017 06:15 AM    Triglyceride 82 03/12/2017 06:15 AM    CHOL/HDL Ratio 3.9 03/12/2017 06:15 AM      BNP No results for input(s): BNPP in the last 72 hours. Liver Enzymes No results for input(s): TP, ALB, TBIL, AP, SGOT, GPT in the last 72 hours.     No lab exists for component: DBIL   Thyroid Studies Lab Results   Component Value Date/Time    TSH 1.51 12/12/2009 09:48 AM        Procedures/imaging: see electronic medical records for all procedures/Xrays and details which were not copied into this note but were reviewed prior to creation of Plan

## 2017-03-22 NOTE — PROGRESS NOTES
Problem: Self Care Deficits Care Plan (Adult)  Goal: *Acute Goals and Plan of Care (Insert Text)  Occupational Therapy Goals  Initiated 3/11/2017 within 7 day(s). 1. Patient will perform lower body dressing with minimal assistance/contact guard assist while sitting on EOB and use of salbador-technique. (Progressing)    2. Patient will perform grooming with contact guard assist while standing at sink using BUE. (MET)  New Goal: Pt will perform grooming tasks while standing at sink with Mod I.    3. Patient will perform toilet transfers with contact guard assist with verbal and tactile cues. (MET)  New Goal: Pt will perform toilet transfers with Mod I.    4. Patient will perform all aspects of toileting with Contact guard assist and with verbal and tactile cues. (MET)  New Goal: Pt will perform toileting tasks with Mod I.    5. Patient will participate in upper extremity therapeutic exercise/activities with contact guard assist for 15 minutes. (Progressing)     Outcome: Progressing Towards Goal  OCCUPATIONAL THERAPY TREATMENT     Patient: Frankie Ellis (50 y.o. female)  Date: 3/22/2017  Diagnosis: Stroke [facility])  Stroke ([facility]  Hypokalemia  CVA (cerebral vascular accident) ([facility]  anemia Stroke ([facility]  Procedure(s) (LRB):  COLONOSCOPY; BIOPSY; (N/A) 5 Days Post-Op  Precautions: Fall  Chart, occupational therapy assessment, plan of care, and goals were reviewed. ASSESSMENT:  Pt supine in bed upon entering, agreeable to therapy. Pt completed supine to sit transfer with supervision. While seated EOB, pt donned socks with supervision. Pt donned gown like robe with supervision. Pt completed sit to stand transfer with supervision. Pt completed functional/bathroom mobility with and without use of RW with supervision. Pt completed grooming tasks while standing at sink with supervision. Pt left seated EOB with needs in reach.   EDUCATION: importance of continued use of LUE during ADL tasks  Progression toward goals:  [ ] Improving appropriately and progressing toward goals  [X]          Improving slowly and progressing toward goals  [ ]          Not making progress toward goals and plan of care will be adjusted       PLAN:  Patient continues to benefit from skilled intervention to address the above impairments. Continue treatment per established plan of care. Discharge Recommendations:  Rehab vs Home Health  Further Equipment Recommendations for Discharge:  rolling walker       SUBJECTIVE:   Patient stated I don't really have anyone at home.       OBJECTIVE DATA SUMMARY:      G CODES:       Cognitive/Behavioral Status:  Neurologic State: Alert  Orientation Level: Oriented X4  Cognition: Follows commands  Safety/Judgement: Fall prevention  Functional Mobility and Transfers for ADLs:              Bed Mobility:  Rolling: Supervision  Supine to Sit: Supervision  Sit to Supine: Supervision  Scooting: Supervision              Transfers:  Sit to Stand: Supervision              Bathroom Mobility: Supervision/set up                 Balance:  Sitting: Intact  Standing: Intact; With support  Standing - Static: Good  Standing - Dynamic : Fair  ADL Intervention:  Grooming  Grooming Assistance: Supervision/set up  Washing Face: Supervision/set-up  Washing Hands: Supervision/set-up  Brushing Teeth: Supervision/set-up     Upper Body Dressing Assistance  Dressing Assistance: Supervision/set-up  Shirt simulation with hospital gown: Supervision/set-up     Lower Body Dressing Assistance  Dressing Assistance: Supervision/set up  Socks: Supervision/set-up  Leg Crossed Method Used: Yes  Position Performed: Seated edge of bed     Cognitive Retraining  Safety/Judgement: Fall prevention     Pain:  Pre Treatment: 0  Post Treatment: c/o R foot pain and cramping     Activity Tolerance:    good  Please refer to the flowsheet for vital signs taken during this treatment.   After treatment:   [ ]  Patient left in no apparent distress sitting up in chair  [X] Patient left in no apparent distress EOB  [X]  Call bell left within reach  [ ]  Nursing notified  [ ]  Caregiver present  [ ]  Bed alarm activated     Marisel Santizo MS OTR/L  Time Calculation: 36 mins

## 2017-03-22 NOTE — ROUTINE PROCESS
Bedside and Verbal shift change report given to KEISHA Mack (oncoming nurse) by Hans Serrano RN (offgoing nurse). Report included the following information SBAR, Kardex and MAR. Patient had no acute events overnight. Currently resting in NAD. No express needs reported at this time.

## 2017-03-22 NOTE — PROGRESS NOTES
Cardiology Progress Note      3/22/2017 1:32 PM    Admit Date: 3/10/2017    Admit Diagnosis: Stroke [facility])  Stroke [facility])  Hypokalemia  CVA (cerebral vascular accident) ([facility]  anemia      Subjective:     Christina Lorenzana denies chest pain, chest pressure/discomfort, dyspnea, palpitations, irregular heart beats.     Visit Vitals    /68    Pulse 93    Temp 97.8 °F (36.6 °C)    Resp 18    Ht 5' 6\" (1.676 m)    Wt 82.3 kg (181 lb 7 oz)    SpO2 99%    Breastfeeding No    BMI 29.28 kg/m2     Current Facility-Administered Medications   Medication Dose Route Frequency    HYDROmorphone (PF) (DILAUDID) injection 0.5 mg  0.5 mg IntraVENous Q12H PRN    morphine injection 2 mg  2 mg IntraVENous Q6H PRN    gabapentin (NEURONTIN) capsule 400 mg  400 mg Oral TID    HYDROmorphone (DILAUDID) tablet 4 mg  4 mg Oral Q4H PRN    enoxaparin (LOVENOX) injection 80 mg  80 mg SubCUTAneous Q24H    mesalamine (PENTASA) CR capsule 1,000 mg  1 g Oral QID    predniSONE (DELTASONE) tablet 40 mg  40 mg Oral DAILY WITH BREAKFAST    ferrous sulfate tablet 325 mg  1 Tab Oral BID WITH MEALS    benzocaine (HURRICANE) 20 % spray   Mucous Membrane PRN    potassium chloride (KAON 10%) 20 mEq/15 mL oral liquid 20 mEq  20 mEq Oral DAILY    multivitamin, tx-iron-ca-min (THERA-M w/ IRON) tablet 1 Tab  1 Tab Oral DAILY    pantoprazole (PROTONIX) tablet 40 mg  40 mg Oral ACB    sodium chloride (NS) flush 5-10 mL  5-10 mL IntraVENous Q8H    sodium chloride (NS) flush 5-10 mL  5-10 mL IntraVENous PRN    ondansetron (ZOFRAN) injection 4 mg  4 mg IntraVENous Q6H PRN    acetaminophen (TYLENOL) tablet 650 mg  650 mg Oral Q4H PRN    ELECTROLYTE REPLACEMENT PROTOCOL - Potassium  1 Each Other PRN    ELECTROLYTE REPLACEMENT PROTOCOL - Magnesium  1 Each Other PRN    atorvastatin (LIPITOR) tablet 80 mg  80 mg Oral QHS         Objective:      Physical Exam:  Visit Vitals    /68    Pulse 93    Temp 97.8 °F (36.6 °C)    Resp 18    Ht 5' 6\" (1.676 m)    Wt 82.3 kg (181 lb 7 oz)    SpO2 99%    Breastfeeding No    BMI 29.28 kg/m2     General Appearance:  Well developed, well nourished,alert and oriented x 3, and individual in no acute distress. L sided weakness. Ears/Nose/Mouth/Throat:   Hearing grossly normal.         Neck: Supple. Chest:   Lungs clear to auscultation bilaterally. Cardiovascular:  Regular rate and rhythm, S1, S2 normal, no murmur. Abdomen:   Soft, non-tender, bowel sounds are active. Extremities: No edema bilaterally. Skin: Warm and dry. Data Review:   Labs:    Recent Results (from the past 24 hour(s))   CBC W/O DIFF    Collection Time: 03/22/17 11:07 AM   Result Value Ref Range    WBC 11.6 4.6 - 13.2 K/uL    RBC 3.93 (L) 4.20 - 5.30 M/uL    HGB 10.0 (L) 12.0 - 16.0 g/dL    HCT 31.5 (L) 35.0 - 45.0 %    MCV 80.2 74.0 - 97.0 FL    MCH 25.4 24.0 - 34.0 PG    MCHC 31.7 31.0 - 37.0 g/dL    RDW 17.7 (H) 11.6 - 14.5 %    PLATELET 377 (H) 172 - 420 K/uL    MPV 9.4 9.2 - 11.8 FL   RETICULOCYTE COUNT    Collection Time: 03/22/17 11:07 AM   Result Value Ref Range    Reticulocyte count 3.5 (H) 0.5 - 2.3 %       Telemetry: normal sinus rhythm      Assessment:     Principal Problem:    Stroke ([facility] (3/10/2017)    Active Problems:    Chronic pain syndrome (2/27/2012)      Crohn disease ([facility] (2/27/2012)      Hypokalemia (8/8/2015)      Sickle cell trait (HCC) ()      Hypertension ()      Hx of cocaine abuse ()      [facility] (5/14/0426)      Acute blood loss anemia (3/15/2017)      Neuropathic pain (3/15/2017)      DVT (deep venous thrombosis) ([facility] (3/16/2017)        Plan:     Stable from the cardiac standpoint. Ambulating when I visited.      Gordo Erickson MD

## 2017-03-22 NOTE — PROGRESS NOTES
Per [facility], no accepting acute rehab as pt's condition has improved; noted 3-21-17 PT rec of home health and . Met with pt and advised her of no accepting acute rehab due to her improved condition. Advised pt that if she were still interested in placement, we could explore nursing home bed availability for which pt would have to sign out of her [place] Medicaid into straight Medicaid and her check for the month would go to the accepting facility. Pt stated that she would go home with her n and home health. Pt with questions about ins auth of the IV infusion Entyvio as recommended by RISHI Blue. Met with Dr Rocío Blue; request for [facility] completed and faxed to [facility]. Will await their response.

## 2017-03-22 NOTE — PROGRESS NOTES
Problem: Mobility Impaired (Adult and Pediatric)  Goal: *Acute Goals and Plan of Care (Insert Text)  Physical Therapy Goals  Initiated 3/11/2017 and to be accomplished within 7 day(s)  1. Patient will move from supine to sit and sit to supine in bed with supervision/set-up. 2. Patient will transfer from bed to chair and chair to bed with supervision/set-up using the least restrictive device. 3. Patient will perform sit to stand with supervision/set-up. 4. Patient will ambulate with supervision/set-up for >100 feet with the least restrictive device. Re-evaluation on 3/18/2017. Goals to be met within 2-8 days. 1. Patient will move from supine to sit and sit to supine in bed with supervision/set-up. 2. Patient will transfer from bed to chair and chair to bed with supervision/set-up using the least restrictive device. 3. Patient will perform sit to stand with supervision/set-up. 4. Patient will ambulate with supervision/modified independence for >250 feet with the least restrictive device. 5. Patient will demonstrate compliance and understanding of home exercise program to maximize strength and functional return. Outcome: Progressing Towards Goal  PHYSICAL THERAPY TREATMENT     Patient: Frankie Ellis (83 y.o. female)  Date: 3/22/2017  Diagnosis: Stroke [facility])  Stroke ([facility] [address]  Hypokalemia  CVA (cerebral vascular accident) ([facility] [address]  anemia Stroke ([facility] [address]  Procedure(s) (LRB):  COLONOSCOPY; BIOPSY; (N/A) 5 Days Post-Op  Precautions: Fall   Chart, physical therapy assessment, plan of care and goals were reviewed. ASSESSMENT:  Patient is doing well with ambulatory mobility and is beginning to progress to ambulating without a walker. Patient ambulated within her room without a walker with CGA/S. Gait is slow and shuffled in appearance. Patient requested to use a walker while ambulating out in the hallway.  Patient ambulated about 400 feet with the walker and then participated in stair training, negotiating a box step about 8 times using the hand rails in the hallway. Patient did well negotiating box steps but is hesitant about the actual stairwell. Will continue PT for additional gait and stair training to maximize safety and independence. Progression toward goals:  [X]      Improving appropriately and progressing toward goals  [ ]      Improving slowly and progressing toward goals  [ ]      Not making progress toward goals and plan of care will be adjusted       PLAN:  Patient continues to benefit from skilled intervention to address the above impairments. Continue treatment per established plan of care. Discharge Recommendations: To Be Determined  Further Equipment Recommendations for Discharge:  N/A       SUBJECTIVE:   Patient stated I'm nervous about walking without the walker. I feel like I need it but I don't want to use it.       OBJECTIVE DATA SUMMARY:   Critical Behavior:  Neurologic State: Alert, Eyes open spontaneously  Orientation Level: Oriented X4  Cognition: Appropriate decision making, Follows commands  Safety/Judgement: Decreased insight into deficits  Functional Mobility Training:  Bed Mobility:  Rolling: Supervision  Supine to Sit: Supervision  Sit to Supine: Supervision  Scooting: Supervision  Transfers:  Sit to Stand: Supervision  Stand to Sit: Supervision  Balance:  Sitting: Intact  Standing: Intact; With support  Standing - Static: Good  Standing - Dynamic : Fair  Ambulation/Gait Training:  Distance (ft): 400 Feet (ft)  Assistive Device: Gait belt;Walker, rolling  Ambulation - Level of Assistance: Supervision  Gait Abnormalities: Decreased step clearance  Base of Support: Narrowed  Speed/Janice: Shuffled; Slow  Step Length: Right shortened;Left shortened  Swing Pattern: Right asymmetrical;Left asymmetrical     Pain:  Pain Scale 1: Numeric (0 - 10)  Pain Intensity 1: 10  Pain Location 1: Head;Leg  Pain Orientation 1: Right  Pain Description 1: Aching;Radiating  Pain Intervention(s) 1: Medication (see MAR)  Activity Tolerance:   Good  Please refer to the flowsheet for vital signs taken during this treatment.   After treatment:   [ ] Patient left in no apparent distress sitting up in chair  [X] Patient left in no apparent distress in bed  [X] Call bell left within reach  [X] Nursing notified  [ ] Caregiver present  [ ] Bed alarm activated      Evelyn Dick   Time Calculation: 37 mins

## 2017-03-23 ENCOUNTER — HOME HEALTH ADMISSION (OUTPATIENT)
Dept: HOME HEALTH SERVICES | Facility: HOME HEALTH | Age: 47
End: 2017-03-23
Payer: MEDICAID

## 2017-03-23 VITALS
SYSTOLIC BLOOD PRESSURE: 150 MMHG | HEART RATE: 85 BPM | HEIGHT: 66 IN | DIASTOLIC BLOOD PRESSURE: 99 MMHG | WEIGHT: 187.61 LBS | BODY MASS INDEX: 30.15 KG/M2 | TEMPERATURE: 98.1 F | RESPIRATION RATE: 18 BRPM | OXYGEN SATURATION: 100 %

## 2017-03-23 PROBLEM — D62 ACUTE BLOOD LOSS ANEMIA: Status: RESOLVED | Noted: 2017-03-15 | Resolved: 2017-03-23

## 2017-03-23 LAB
INR PPP: 0.9 (ref 0.8–1.2)
PROTHROMBIN TIME: 12 SEC (ref 11.5–15.2)

## 2017-03-23 PROCEDURE — 97530 THERAPEUTIC ACTIVITIES: CPT

## 2017-03-23 PROCEDURE — 74011250636 HC RX REV CODE- 250/636: Performed by: INTERNAL MEDICINE

## 2017-03-23 PROCEDURE — 74011636637 HC RX REV CODE- 636/637: Performed by: INTERNAL MEDICINE

## 2017-03-23 PROCEDURE — 74011250637 HC RX REV CODE- 250/637: Performed by: HOSPITALIST

## 2017-03-23 PROCEDURE — 85610 PROTHROMBIN TIME: CPT | Performed by: HOSPITALIST

## 2017-03-23 PROCEDURE — 97116 GAIT TRAINING THERAPY: CPT

## 2017-03-23 PROCEDURE — 74011250637 HC RX REV CODE- 250/637: Performed by: INTERNAL MEDICINE

## 2017-03-23 PROCEDURE — 97535 SELF CARE MNGMENT TRAINING: CPT

## 2017-03-23 PROCEDURE — 92507 TX SP LANG VOICE COMM INDIV: CPT

## 2017-03-23 RX ORDER — ENOXAPARIN SODIUM 100 MG/ML
80 INJECTION SUBCUTANEOUS EVERY 12 HOURS
Status: DISCONTINUED | OUTPATIENT
Start: 2017-03-23 | End: 2017-03-23 | Stop reason: HOSPADM

## 2017-03-23 RX ORDER — ENOXAPARIN SODIUM 100 MG/ML
80 INJECTION SUBCUTANEOUS EVERY 12 HOURS
Qty: 10 SYRINGE | Refills: 0 | OUTPATIENT
Start: 2017-03-23 | End: 2017-03-23

## 2017-03-23 RX ORDER — GABAPENTIN 400 MG/1
400 CAPSULE ORAL 3 TIMES DAILY
Qty: 90 CAP | Refills: 2 | Status: SHIPPED | OUTPATIENT
Start: 2017-03-23 | End: 2018-03-01 | Stop reason: ALTCHOICE

## 2017-03-23 RX ORDER — ATORVASTATIN CALCIUM 80 MG/1
80 TABLET, FILM COATED ORAL
Qty: 30 TAB | Refills: 2 | Status: SHIPPED | OUTPATIENT
Start: 2017-03-23

## 2017-03-23 RX ORDER — MESALAMINE 500 MG/1
1000 CAPSULE, EXTENDED RELEASE ORAL 4 TIMES DAILY
Qty: 120 CAP | Refills: 0 | Status: SHIPPED | OUTPATIENT
Start: 2017-03-23 | End: 2017-07-15 | Stop reason: ALTCHOICE

## 2017-03-23 RX ORDER — PREDNISONE 20 MG/1
40 TABLET ORAL
Qty: 20 TAB | Refills: 0 | Status: SHIPPED | OUTPATIENT
Start: 2017-03-23 | End: 2017-07-15

## 2017-03-23 RX ORDER — ENOXAPARIN SODIUM 100 MG/ML
80 INJECTION SUBCUTANEOUS EVERY 12 HOURS
Qty: 10 SYRINGE | Refills: 0 | Status: SHIPPED | OUTPATIENT
Start: 2017-03-23 | End: 2017-04-18

## 2017-03-23 RX ORDER — OXYCODONE AND ACETAMINOPHEN 5; 325 MG/1; MG/1
1 TABLET ORAL
Qty: 20 TAB | Refills: 0 | Status: ON HOLD | OUTPATIENT
Start: 2017-03-23 | End: 2017-04-18

## 2017-03-23 RX ORDER — ENOXAPARIN SODIUM 100 MG/ML
80 INJECTION SUBCUTANEOUS EVERY 24 HOURS
Qty: 10 SYRINGE | Refills: 0 | Status: SHIPPED | OUTPATIENT
Start: 2017-03-23 | End: 2017-03-23

## 2017-03-23 RX ORDER — WARFARIN SODIUM 5 MG/1
5 TABLET ORAL ONCE
Status: COMPLETED | OUTPATIENT
Start: 2017-03-23 | End: 2017-03-23

## 2017-03-23 RX ADMIN — ENOXAPARIN SODIUM 80 MG: 80 INJECTION SUBCUTANEOUS at 09:41

## 2017-03-23 RX ADMIN — WARFARIN SODIUM 5 MG: 5 TABLET ORAL at 18:00

## 2017-03-23 RX ADMIN — POTASSIUM CHLORIDE 20 MEQ: 20 SOLUTION ORAL at 09:40

## 2017-03-23 RX ADMIN — PANTOPRAZOLE SODIUM 40 MG: 40 TABLET, DELAYED RELEASE ORAL at 07:35

## 2017-03-23 RX ADMIN — FERROUS SULFATE TAB 325 MG (65 MG ELEMENTAL FE) 325 MG: 325 (65 FE) TAB at 07:35

## 2017-03-23 RX ADMIN — HYDROMORPHONE HYDROCHLORIDE 4 MG: 4 TABLET ORAL at 13:43

## 2017-03-23 RX ADMIN — MESALAMINE 1000 MG: 250 CAPSULE ORAL at 04:03

## 2017-03-23 RX ADMIN — PREDNISONE 40 MG: 20 TABLET ORAL at 07:35

## 2017-03-23 RX ADMIN — GABAPENTIN 400 MG: 400 CAPSULE ORAL at 09:40

## 2017-03-23 RX ADMIN — HYDROMORPHONE HYDROCHLORIDE 4 MG: 4 TABLET ORAL at 18:10

## 2017-03-23 RX ADMIN — MULTIPLE VITAMINS W/ MINERALS TAB 1 TABLET: TAB at 09:40

## 2017-03-23 RX ADMIN — MESALAMINE 1000 MG: 250 CAPSULE ORAL at 09:40

## 2017-03-23 RX ADMIN — HYDROMORPHONE HYDROCHLORIDE 4 MG: 4 TABLET ORAL at 09:40

## 2017-03-23 RX ADMIN — Medication 10 ML: at 14:00

## 2017-03-23 RX ADMIN — HYDROMORPHONE HYDROCHLORIDE 4 MG: 4 TABLET ORAL at 04:03

## 2017-03-23 RX ADMIN — FERROUS SULFATE TAB 325 MG (65 MG ELEMENTAL FE) 325 MG: 325 (65 FE) TAB at 17:35

## 2017-03-23 RX ADMIN — HYDROMORPHONE HYDROCHLORIDE 4 MG: 4 TABLET ORAL at 00:06

## 2017-03-23 RX ADMIN — Medication 10 ML: at 00:06

## 2017-03-23 RX ADMIN — Medication 10 ML: at 06:00

## 2017-03-23 RX ADMIN — MESALAMINE 1000 MG: 250 CAPSULE ORAL at 17:37

## 2017-03-23 RX ADMIN — GABAPENTIN 400 MG: 400 CAPSULE ORAL at 17:34

## 2017-03-23 NOTE — DISCHARGE SUMMARY
Discharge Summary    Patient: Amanda Beach MRN: [id number]  CSN: [id number]    YOB: 1970  Age: 40 y.o.   Sex: female    DOA: 3/10/2017 LOS:  LOS: 13 days   Discharge Date:      Primary Care Provider:  None    Admission Diagnoses: Stroke [facility])  Stroke [facility])  Hypokalemia  CVA (cerebral vascular accident) ([place] [address]  anemia    Discharge Diagnoses:    Problem List as of 3/23/2017  Date Reviewed: 3/15/2017          Codes Class Noted - Resolved    DVT (deep venous thrombosis) ([place] [address] ICD-10-CM: I82.409  ICD-9-CM: 453.40  3/16/2017 - Present        Neuropathic pain ICD-10-CM: M79.2  ICD-9-CM: 729.2  3/15/2017 - Present        Sickle cell trait ([place] [address] ICD-10-CM: D57.3  ICD-9-CM: 282.5  Unknown - Present        Hypertension ICD-10-CM: I10  ICD-9-CM: 401.9  Unknown - Present        Hx of cocaine abuse ICD-10-CM: Z87.898  ICD-9-CM: 305.63  Unknown - Present        Embolic stroke ([place] [address] KSB-15-: I63.9  ICD-9-CM: 434.11  3/10/2017 - Present        Chronic pain syndrome ICD-10-CM: G89.4  ICD-9-CM: 338.4  2/27/2012 - Present        Crohn disease ([place] [address] ICD-10-CM: K50.90  ICD-9-CM: 555.9  2/27/2012 - Present        RESOLVED: Acute blood loss anemia ICD-10-CM: D62  ICD-9-CM: 285.1  3/15/2017 - 3/23/2017        RESOLVED: Colitis ICD-10-CM: K52.9  ICD-9-CM: 558.9  8/8/2015 - 3/15/2017        RESOLVED: Crohn's disease with complication ([place] [address] DKG-94-RR: D26.602  ICD-9-CM: 555.9  8/8/2015 - 3/15/2017        RESOLVED: Dehydration ICD-10-CM: E86.0  ICD-9-CM: 276.51  8/8/2015 - 3/15/2017        RESOLVED: Hypokalemia ICD-10-CM: E87.6  ICD-9-CM: 276.8  8/8/2015 - 3/23/2017        RESOLVED: Abdominal pain ICD-10-CM: R10.9  ICD-9-CM: 789.00  2/27/2012 - 3/15/2017        RESOLVED: Obesity ICD-10-CM: E66.9  ICD-9-CM: 278.00  2/27/2012 - 3/15/2017              Discharge Medications:     Current Discharge Medication List      START taking these medications    Details   atorvastatin (LIPITOR) 80 mg tablet Take 1 Tab by mouth nightly. Qty: 30 Tab, Refills: 2      enoxaparin (LOVENOX) 80 mg/0.8 mL injection 80 mg by SubCUTAneous route every twenty-four (24) hours. Qty: 10 Syringe, Refills: 0      gabapentin (NEURONTIN) 400 mg capsule Take 1 Cap by mouth three (3) times daily. Qty: 90 Cap, Refills: 2      mesalamine (PENTASA) 500 mg CR capsule Take 2 Caps by mouth four (4) times daily. Indications: CROHN'S DISEASE  Qty: 120 Cap, Refills: 0      oxyCODONE-acetaminophen (PERCOCET) 5-325 mg per tablet Take 1 Tab by mouth every four (4) hours as needed for Pain. Max Daily Amount: 6 Tabs. Qty: 20 Tab, Refills: 0         CONTINUE these medications which have CHANGED    Details   predniSONE (DELTASONE) 20 mg tablet Take 2 Tabs by mouth daily (with breakfast). Indications: CROHN'S DISEASE  Qty: 20 Tab, Refills: 0         STOP taking these medications       hydroCHLOROthiazide (HYDRODIURIL) 12.5 mg tablet Comments:   Reason for Stopping:         promethazine (PHENERGAN) 25 mg tablet Comments:   Reason for Stopping:         mesalamine EC (ASACOL) 400 mg EC tablet Comments:   Reason for Stopping:         promethazine (PHENERGAN) 25 mg tablet Comments:   Reason for Stopping:         predniSONE (STERAPRED DS) 10 mg dose pack Comments:   Reason for Stopping:         promethazine (PHENERGAN) 25 mg tablet Comments:   Reason for Stopping:         traMADol (ULTRAM) 50 mg tablet Comments:   Reason for Stopping:             Coumadin prescription called to pharmacy    Discharge Condition: Good    Procedures : LAURIE, colonoscopy    Consults: Cardiology, Gastroenterology and Neurology      PHYSICAL EXAM   Visit Vitals    BP (!) 132/93 (BP 1 Location: Left arm, BP Patient Position: At rest)    Pulse 95    Temp 97.1 °F (36.2 °C)    Resp 17    Ht 5' 6\" (1.676 m)    Wt 85.1 kg (187 lb 9.8 oz)    SpO2 100%    Breastfeeding No    BMI 30.28 kg/m2     General: Awake, cooperative, no acute distress    HEENT: NC, Atraumatic. PERRLA, EOMI.  Anicteric sclerae. Lungs:  CTA Bilaterally. No Wheezing/Rhonchi/Rales. Heart:  Regular  rhythm,  No murmur, No Rubs, No Gallops  Abdomen: Soft, Non distended, Non tender. +Bowel sounds,   Extremities: Right leg pain  Psych:   Not anxious or agitated. Neurologic:  Left facial droop, slight weakness in left upper and lower ext, improving. Admission HPI : Per Dr. Mack Alisa is a 40 y.o. female who presents via EMS c/o left sided weakness and left facial droop since 6 am this morning (approximately 10h prior to arrival). EMS reports FBS 146mg/dL. They did notice LUE weakness and positive drift. She was seen at [facility] yesterday for acute abdominal pain and was discharged home. Pt apparently put on a Fentanyl 50 mcg patch about 13 hours ago and still had it on upon EMS arrival this afternoon. No known injury/trauma/fall. She does report having a bilateral frontal headache for the past few days. She denies any other medication changes. Denies drug, alcohol, tobacco use during my interview. No other complaints. Hospital Course :   Acute CVA - seen and followed by neurology. MRI brain showed 1. Multifocal acute infarctions, the largest is in the right MCA territory correlating with the abnormal CT. There are bilateral left greater than right cerebellar hemisphere infarctions. These infarctions are likely embolic with a central origin. 2. Possible hemorrhagic lesion within the pituitary. Scans dedicated to the pituitary could be performed. This could be artifact of volume averaging of the sellar floor but is at least suspect. 3. Chronic appearing right orbital floor fracture with protrusion of orbital fat and relative enophthalmos. MRA head and neck showed Occlusion inferior division right M2 segment MCA. This correlates with the acute infarction and is indeterminate in nature, most likely embolic certainly. No other significant intracranial vascular abnormality.    Echo with EF of 60-65%, no intracardiac shunt, LAURIE done with no evidence of intra cardiac thrombus. She is started on lovenox and will be bridged to coumadin. DVT - RLE, started on lovenox bridge to coumadin. She needs to follow up with PCP for INR checks. Crohn's disease - present for 14 years. Patient non compliant with medications, seen and followed by GI. S/p colonoscopy which showed diffuse crohn's on the left and transverse colon eith pseudo and inflammatory polyposis. Recommend repeat colonoscopy in one year. She is started on pentasa and prednisone. GI recommended starting on Entyvio IV as outpatient. Her Hep B and Quantiferon TB gold negative. She will follow up with neurology    Iron def anemia secondary to active crohn's disease, received blood transfusion with improvement in her H/H. Activity: Activity as tolerated    Diet: Regular Diet    Follow-up: PCP, GI, neurology    Disposition: home with home health    Minutes spent on discharge: 55       Labs: Results:       Chemistry No results for input(s): GLU, NA, K, CL, CO2, BUN, CREA, CA, AGAP, BUCR, TBIL, GPT, AP, TP, ALB, GLOB, AGRAT in the last 72 hours. CBC w/Diff Recent Labs      03/22/17   1107  03/21/17   0655   WBC  11.6  8.3   RBC  3.93*  2.84*   HGB  10.0*  6.7*   HCT  31.5*  22.1*   PLT  543*  441*      Cardiac Enzymes No results for input(s): CPK, CKND1, CHANDA in the last 72 hours. No lab exists for component: CKRMB, TROIP   Coagulation No results for input(s): PTP, INR, APTT in the last 72 hours. No lab exists for component: INREXT, INREXT    Lipid Panel Lab Results   Component Value Date/Time    Cholesterol, total 145 03/12/2017 06:15 AM    HDL Cholesterol 37 03/12/2017 06:15 AM    LDL, calculated 91.6 03/12/2017 06:15 AM    VLDL, calculated 16.4 03/12/2017 06:15 AM    Triglyceride 82 03/12/2017 06:15 AM    CHOL/HDL Ratio 3.9 03/12/2017 06:15 AM      BNP No results for input(s): BNPP in the last 72 hours.    Liver Enzymes No results for input(s): TP, ALB, TBIL, AP, SGOT, GPT in the last 72 hours. No lab exists for component: DBIL   Thyroid Studies Lab Results   Component Value Date/Time    TSH 1.51 12/12/2009 09:48 AM            Significant Diagnostic Studies: Mra Brain Wo Cont    Result Date: 3/11/2017  Brain MRA: Indication: Multiple acute infarctions. Evaluate for source of embolus. Procedure: Standard three dimensional time of flight MRA of the brain arterial vasculature was performed. Source images were reviewed soft copy. MIP angiographic reconstructions were generated of the entire head examination and also selected MIP angiographic reconstructions of the individual carotid systems and of the vertebrobasilar system. Findings: Apparent occlusion versus very high-grade stenosis proximal aspect inferior division right M2 segment MCA correlating with the right MCA infarction likely. Bilateral prominent posterior communicating arteries approach fetal origin with small vertebrobasilar system. Otherwise normal. No saccular aneurysm. IMPRESSION: Occlusion inferior division right M2 segment MCA. This correlates with the acute infarction and is indeterminate in nature, most likely embolic certainly. No other significant intracranial vascular abnormality. Mra Neck Wo Cont    Result Date: 3/11/2017  Neck MRA without contrast: INDICATION: Acute infarctions. Evaluate for source of embolus. PROCEDURE: Noncontrast technique was used for uncertain reasons. 2-dimensional time-of-flight and submental three-dimensional time-of-flight imaging. FINDINGS: Any stenosis described below uses the NASCET method, comparing the minimum residual lumen to the nontapering cervical internal carotid. Given noncontrast technique there is poor evaluation of the arch and proximal great vessels as typical. Likely normal branching pattern without definitive high-grade proximal stenosis. Vertebral arteries are codominant. Origins not well seen but grossly patent. No vertebral stenosis. No common or visualized internal carotid stenosis that is substantial. Patient motion obscures detail particularly on the three-dimensional acquisition which was not processed as MIP angiographic images given patient motion. IMPRESSION: Limited noncontrast neck vascular evaluation. No definite hemodynamically significant stenosis is documented. Mri Brain Wo Cont    Result Date: 3/11/2017  Brain MR without contrast: Indication: Possible acute infarction. Left-sided weakness. Abnormal head CT. History of sickle cell. Procedure: Sagittal spin echo T1, axial FSE FLAIR and T2 and axial diffusion weighted scanning was performed. Coronal spin echo T1 and T2 FSE images were also performed. No contrast was administered. Comparison head CT 3/10/2017. Findings: Diffusion:  Multifocal areas of restricted diffusion consistent with multifocal acute infarctions are noted. Multiple left greater than right cerebellar hemisphere infarctions are noted area there is a large right MCA territory infarction involving the insular cortex and posterior perisylvian temporal and inferior parietal lobe as well as the posterior inferior frontal lobe. This was in part hypodense on CT. No hemorrhage or significant mass effect associated with these lesions. The axial T2 star examination does not show any hemorrhage associated with the areas of acute infarction. There is however a strong suspicion of a hemorrhagic lesion within the pituitary eccentric to the left but does not appear artifactual. Brain parenchyma:  Other than the areas of acute infarction, no additional focal brain lesion. No mass or mass effect. Ventricles and sulci:  Normal.  No significant atrophy given age. Extra-axial:  No extra-axial fluid collection or mass is noted. Brain vasculature:  No vascular abnormality is appreciated on this routine brain MR examination.  Craniocervical junction:  Normal. Skull base, extracranial and calvarium: Protrusion of orbital fat through a defect in the right orbital floor is most likely a chronic orbital floor fracture. There does appear to be some relative right enophthalmos. Impression: 1. Multifocal acute infarctions, the largest is in the right MCA territory correlating with the abnormal CT. There are bilateral left greater than right cerebellar hemisphere infarctions. These infarctions are likely embolic with a central origin. 2. Possible hemorrhagic lesion within the pituitary. Scans dedicated to the pituitary could be performed. This could be artifact of volume averaging of the sellar floor but is at least suspect. 3. Chronic appearing right orbital floor fracture with protrusion of orbital fat and relative enophthalmos. Ct Head Wo Cont    Result Date: 3/10/2017  EXAM: CT head INDICATION: Left-sided paralysis and facial droop COMPARISON: CT performed March 11, 2008 TECHNIQUE: Axial CT imaging of the head was performed without intravenous contrast. One or more dose reduction techniques were used on this CT: automated exposure control, adjustment of the mAs and/or kVp according to patient's size, and iterative reconstruction techniques. The specific techniques utilized on this CT exam have been documented in the patient's electronic medical record. _______________ FINDINGS: Detail is mildly limited at the skull base secondary to motion artifact. BRAIN AND POSTERIOR FOSSA: There is focal loss of the normal cortical gray-white matter differentiation involving the inferior portions of the right parietal lobe, and extending into the midportion of the right temporal lobe. There is edema which is also noted to track along the sylvian fissure anteriorly, to involve the right-sided insular and subinsular cortex. There is focally increased density noted within the right MCA branch vessel on axial image 15. Mild local mass effect is present, with evidence of sulcal flattening.  The ventricular size and configuration is stable. The basilar cisterns are patent. No discrete intra-axial hemorrhage identified. EXTRA-AXIAL SPACES AND MENINGES: There are no abnormal extra-axial fluid collections. CALVARIUM: Intact. SINUSES: Clear. OTHER: None. _______________     IMPRESSION: 1. Focally abnormal gray-white matter differentiation with associated edema involving the right parietal and temporal lobes, spanning the sylvian fissure in keeping with developing infarct. Punctate area of increased density within the sylvian M2 segment is nonspecific, but may pertain to a small in situ thrombus. No evidence of acute intra-axial or extra-axial fluid collection. CODE S findings discussed with  in the ED at the time of the scan. Ir Fluoro Guide Picc Insertion    Result Date: 3/20/2017  PROCEDURE: RIGHT ARM PICC LINE PLACEMENT INDICATION: Poor peripheral venous access. Requires venous access for frequent blood draws. Radiation dose (reference air kerma): 5 mGy Timeout: 2283 Start time: 2713 Stop Time: 3932 Technique: Ultrasound evaluation of potential access sites was performed. The right brachial vein is patent. A permanent recording was created for the patient record. Maximal sterile barrier technique (catheter, mass, sterile gown, sterile gloves, large sterile sheet, hand hygiene, and cutaneous antisepsis with 2% chlorhexidine) was followed. Sterile ultrasound technique was used with sterile gel and sterile probe cover. The patient's right upper arm was prepped and draped in sterile fashion and bicarbonate buffered lidocaine was used for local anesthesia. Under documented fluoroscopy and ultrasound guidance with a 21-gauge needle, the right brachial vein was accessed. The tip of the needle was identified within the lumen of the patent vein under ultrasound. A permanent ultrasound recording was obtained. A guide wire was advanced under fluoroscopic control to the superior vena cava.  The distance between the superior vena cava and skin puncture site was measured and a 5 Western Debbie double lumen Bard Power PICC was cut to the appropriate length. Overall catheter length was 34 cm. The PICC was advanced to the SVC under fluoroscopic control. The line was flushed with heparinized saline and adhered to the skin with a Statlock device. An AP view of the chest was obtained to document catheter, fluoroscopy image was stored. The patient tolerated the procedure well and there were no immediate complications. Findings: Final AP view of the chest shows good position of the PICC with its tip in the SVC. Both ports flushed easily and there was good blood return. IMPRESSION: 1. Successful right upper extremity PICC line placement as described above. Both ports flushed easily and there is good blood return. No results found for this or any previous visit.         CC: None

## 2017-03-23 NOTE — PROGRESS NOTES
Problem: Self Care Deficits Care Plan (Adult)  Goal: *Acute Goals and Plan of Care (Insert Text)  Occupational Therapy Goals  Initiated 3/11/2017 within 7 day(s). 1. Patient will perform lower body dressing with minimal assistance/contact guard assist while sitting on EOB and use of salbador-technique. (Progressing)    2. Patient will perform grooming with contact guard assist while standing at sink using BUE. (MET)  New Goal: Pt will perform grooming tasks while standing at sink with Mod I.    3. Patient will perform toilet transfers with contact guard assist with verbal and tactile cues. (MET)  New Goal: Pt will perform toilet transfers with Mod I.    4. Patient will perform all aspects of toileting with Contact guard assist and with verbal and tactile cues. (MET)  New Goal: Pt will perform toileting tasks with Mod I.    5. Patient will participate in upper extremity therapeutic exercise/activities with contact guard assist for 15 minutes. (Progressing)     Outcome: Progressing Towards Goal  OCCUPATIONAL THERAPY TREATMENT     Patient: Blake Bowling (64 y.o. female)  Date: 3/23/2017  Diagnosis: Stroke [facility])  Stroke ([facility] [address]  Hypokalemia  CVA (cerebral vascular accident) ([facility] [address]  anemia <principal problem not specified>  Procedure(s) (LRB):  COLONOSCOPY; BIOPSY; (N/A) 6 Days Post-Op  Precautions: Fall  Chart, occupational therapy assessment, plan of care, and goals were reviewed. ASSESSMENT:  Pt seated EOB upon entering, agreeable to therapy. Pt donned/doffed socks with supervision. Pt donned gown like robe with min A. Pt completed sit to stand transfer and functional mobility with supervision without AE. Pt encouraged to complete UE exercises throughout day to improve UE strength and coordination. Pt left seated EOB with needs in reach.   EDUCATION: UE exercises  Progression toward goals:  [ ]          Improving appropriately and progressing toward goals  [X]          Improving slowly and progressing toward goals  [ ]          Not making progress toward goals and plan of care will be adjusted       PLAN:  Patient continues to benefit from skilled intervention to address the above impairments. Continue treatment per established plan of care. Discharge Recommendations:  Home Health  Further Equipment Recommendations for Discharge:  N/A       SUBJECTIVE:   Patient stated .      OBJECTIVE DATA SUMMARY:      G CODES:       Cognitive/Behavioral Status:  Neurologic State: Alert  Orientation Level: Oriented X4  Cognition: Follows commands  Safety/Judgement: Fall prevention  Functional Mobility and Transfers for ADLs:              Bed Mobility:  Rolling: Supervision  Supine to Sit: Supervision  Sit to Supine: Supervision  Scooting: Supervision              Transfers:  Sit to Stand: Supervision                 Balance:  Sitting: Intact  Standing: Intact; Without support  Standing - Static: Good  Standing - Dynamic : Fair (Fair+)  ADL Intervention:  Upper Body Dressing Assistance  Dressing Assistance: Minimum assistance  Shirt simulation with hospital gown: Minimum  assistance     Lower Body Dressing Assistance  Dressing Assistance: Supervision/set up  Socks: Supervision/set-up  Leg Crossed Method Used: Yes  Position Performed: Seated edge of bed     Cognitive Retraining  Safety/Judgement: Fall prevention     Therapeutic Exercises:   Encouraged to complete appropriate UE exercises including finger to thumb, digit flexion/extension     Pain:  Pre Treatment: c/o pain in R foot  Post Treatment: c/o pain in R foot     Activity Tolerance:    good  Please refer to the flowsheet for vital signs taken during this treatment.   After treatment:   [ ]  Patient left in no apparent distress sitting up in chair  [X]  Patient left in no apparent distress in bed  [X]  Call bell left within reach  [ ]  Nursing notified  [ ]  Caregiver present  [ ]  Bed alarm activated     Srinivasan Caldwell MS OTR/L  Time Calculation: 25 mins

## 2017-03-23 NOTE — PROGRESS NOTES
Problem: Mobility Impaired (Adult and Pediatric)  Goal: *Acute Goals and Plan of Care (Insert Text)  Physical Therapy Goals  Initiated 3/11/2017 and to be accomplished within 7 day(s)  1. Patient will move from supine to sit and sit to supine in bed with supervision/set-up. 2. Patient will transfer from bed to chair and chair to bed with supervision/set-up using the least restrictive device. 3. Patient will perform sit to stand with supervision/set-up. 4. Patient will ambulate with supervision/set-up for >100 feet with the least restrictive device. Re-evaluation on 3/18/2017. Goals to be met within 2-8 days. 1. Patient will move from supine to sit and sit to supine in bed with supervision/set-up. 2. Patient will transfer from bed to chair and chair to bed with supervision/set-up using the least restrictive device. 3. Patient will perform sit to stand with supervision/set-up. 4. Patient will ambulate with supervision/modified independence for >250 feet with the least restrictive device. 5. Patient will demonstrate compliance and understanding of home exercise program to maximize strength and functional return. Outcome: Progressing Towards Goal  PHYSICAL THERAPY TREATMENT     Patient: Blake Bowling (15 y.o. female)  Date: 3/23/2017  Diagnosis: Stroke [facility])  Stroke ([facility] [address]  Hypokalemia  CVA (cerebral vascular accident) ([facility] [address]  anemia <principal problem not specified>  Procedure(s) (LRB):  COLONOSCOPY; BIOPSY; (N/A) 6 Days Post-Op  Precautions: Fall   Chart, physical therapy assessment, plan of care and goals were reviewed. ASSESSMENT:  The patient participated in gait training without an assistive device. Patient ambulated about 300 feet with CGA/supervision. Gait is slow but steady with no overt loss of balance. Patient also participated in stair training, negotiating a full flight of stairs with use of hand rails.  Patient required additional time and verbal cues for sequencing when going downstairs, however did very well ascending steps with minimal need for cueing. Patient was then returned back to her room. Will continue PT to further improve independent mobility and work towards balance training. Recommend home health PT services at this time. Progression toward goals:  [X]      Improving appropriately and progressing toward goals  [ ]      Improving slowly and progressing toward goals  [ ]      Not making progress toward goals and plan of care will be adjusted       PLAN:  Patient continues to benefit from skilled intervention to address the above impairments. Continue treatment per established plan of care. Discharge Recommendations:  Home Health  Further Equipment Recommendations for Discharge:  N/A       SUBJECTIVE:   Patient stated I'm a little scared of losing my balance. I don't feel completely balanced when I walk.       OBJECTIVE DATA SUMMARY:   Critical Behavior:  Neurologic State: Alert, Eyes open spontaneously  Orientation Level: Oriented X4  Cognition: Appropriate decision making, Follows commands  Safety/Judgement: Fall prevention  Functional Mobility Training:  Bed Mobility:  Rolling: Supervision  Supine to Sit: Supervision  Sit to Supine: Supervision  Scooting: Supervision  Transfers:  Sit to Stand: Supervision  Stand to Sit: Supervision  Balance:  Sitting: Intact  Standing: Intact; Without support  Standing - Static: Good  Standing - Dynamic : Fair (Fair+)  Ambulation/Gait Training:  Distance (ft): 300 Feet (ft)  Assistive Device: Gait belt  Ambulation - Level of Assistance: Supervision  Gait Abnormalities: Decreased step clearance  Base of Support: Narrowed  Speed/Janice: Slow  Step Length: Right shortened;Left shortened  Swing Pattern: Left asymmetrical  Interventions: Safety awareness training;Verbal cues     Stairs:  Number of Stairs Trained: 12  Stairs - Level of Assistance: Contact guard assistance  Rail Use: Both     Pain:  Pain Scale 1: Numeric (0 - 10)  Pain Intensity 1: 8  Pain Location 1: Head  Pain Orientation 1: Anterior  Pain Description 1: Aching  Pain Intervention(s) 1: Medication (see MAR)  Activity Tolerance:   Good  Please refer to the flowsheet for vital signs taken during this treatment.   After treatment:   [ ] Patient left in no apparent distress sitting up in chair  [X] Patient left in no apparent distress in bed  [X] Call bell left within reach  [X] Nursing notified  [ ] Caregiver present  [ ] Bed alarm activated      Jasper Blas   Time Calculation: 25 mins

## 2017-03-23 NOTE — ROUTINE PROCESS
Bedside and Verbal shift change report given to Asad Milton RN (oncoming nurse) by Hans Serrano RN (offgoing nurse). Report included the following information SBAR and Kardex. Patient had no acute events overnight. Currently resting in NAD. No express needs reported at this time. Last medicated for pain at 0403.

## 2017-03-23 NOTE — PROGRESS NOTES
Cardiology Progress Note      3/23/2017 2:58 PM    Admit Date: 3/10/2017    Admit Diagnosis: Stroke [facility])  Stroke [facility])  Hypokalemia  CVA (cerebral vascular accident) ([facility] [address]  anemia      Subjective:     Jennifer Haro denies chest pain, chest pressure/discomfort, dyspnea, palpitations.     Visit Vitals    BP (!) 132/93 (BP 1 Location: Left arm, BP Patient Position: At rest)    Pulse 95    Temp 97.1 °F (36.2 °C)    Resp 17    Ht 5' 6\" (1.676 m)    Wt 85.1 kg (187 lb 9.8 oz)    SpO2 100%    Breastfeeding No    BMI 30.28 kg/m2     Current Facility-Administered Medications   Medication Dose Route Frequency    HYDROmorphone (PF) (DILAUDID) injection 0.5 mg  0.5 mg IntraVENous Q12H PRN    morphine injection 2 mg  2 mg IntraVENous Q6H PRN    gabapentin (NEURONTIN) capsule 400 mg  400 mg Oral TID    HYDROmorphone (DILAUDID) tablet 4 mg  4 mg Oral Q4H PRN    enoxaparin (LOVENOX) injection 80 mg  80 mg SubCUTAneous Q24H    mesalamine (PENTASA) CR capsule 1,000 mg  1 g Oral QID    predniSONE (DELTASONE) tablet 40 mg  40 mg Oral DAILY WITH BREAKFAST    ferrous sulfate tablet 325 mg  1 Tab Oral BID WITH MEALS    benzocaine (HURRICANE) 20 % spray   Mucous Membrane PRN    potassium chloride (KAON 10%) 20 mEq/15 mL oral liquid 20 mEq  20 mEq Oral DAILY    multivitamin, tx-iron-ca-min (THERA-M w/ IRON) tablet 1 Tab  1 Tab Oral DAILY    pantoprazole (PROTONIX) tablet 40 mg  40 mg Oral ACB    sodium chloride (NS) flush 5-10 mL  5-10 mL IntraVENous Q8H    sodium chloride (NS) flush 5-10 mL  5-10 mL IntraVENous PRN    ondansetron (ZOFRAN) injection 4 mg  4 mg IntraVENous Q6H PRN    acetaminophen (TYLENOL) tablet 650 mg  650 mg Oral Q4H PRN    ELECTROLYTE REPLACEMENT PROTOCOL - Potassium  1 Each Other PRN    ELECTROLYTE REPLACEMENT PROTOCOL - Magnesium  1 Each Other PRN    atorvastatin (LIPITOR) tablet 80 mg  80 mg Oral QHS         Objective:      Physical Exam:  Visit Vitals    BP (!) 132/93 (BP 1 Location: Left arm, BP Patient Position: At rest)    Pulse 95    Temp 97.1 °F (36.2 °C)    Resp 17    Ht 5' 6\" (1.676 m)    Wt 85.1 kg (187 lb 9.8 oz)    SpO2 100%    Breastfeeding No    BMI 30.28 kg/m2     General Appearance:  Well developed, well nourished,alert and oriented x 3, and individual in no acute distress. L sided weakness. Ears/Nose/Mouth/Throat:   Hearing grossly normal.         Neck: Supple. Chest:   Lungs clear to auscultation bilaterally. Cardiovascular:  Regular rate and rhythm, S1, S2 normal, no murmur. Abdomen:   Soft, non-tender, bowel sounds are active. Extremities: No edema bilaterally. Skin: Warm and dry. Data Review:   Labs:    Recent Results (from the past 24 hour(s))   PROTHROMBIN TIME + INR    Collection Time: 03/23/17  1:42 PM   Result Value Ref Range    Prothrombin time 12.0 11.5 - 15.2 sec    INR 0.9 0.8 - 1.2         Telemetry: normal sinus rhythm      Assessment:     Active Problems:    Chronic pain syndrome (2/27/2012)      Crohn disease ([facility] [address] (2/27/2012)      Sickle cell trait (HCC) ()      Hypertension ()      Hx of cocaine abuse ()      Embolic stroke ([facility] [address] (1/93/1538)      Neuropathic pain (3/15/2017)      DV[facility] (3/16/2017)        Plan:     Patient is stable from the cardiac standpoint. Neurology plans warfarin therapy. Continue to monitor.     Trniy Fraser MD

## 2017-03-23 NOTE — PROGRESS NOTES
T/C [facility] ([phone]) and rec'd notification that pt's Entyvio had been auth'd: # [id number] ORF; effective 3-22-17 to 3-21-18; 9 day services. Referral called to CMS to arrange infusion at [address] [facility] for Friday, 3-24-17. The following was arranged for pt's d/c home:   - [facility] - INPATIENT ([phone]); rolling walker has been delivered to pt's room   - Appointment for infusion of Entyvio with [address], [address] [place] [address] [phone]) on Friday, 3-24-27 at 1:00   - Appointment with new PCP Dr Ebony Bob, Monday, 3-27-17 at 10:20. Pt to call her [facility] (# on her ins card) to provide name of new PCP    Met with pt and discussed above arrangements. Advised pt that he appointments would be printed out on her discharge paperwork. Pt stated her chn would provide transport to appointments.

## 2017-03-23 NOTE — PROGRESS NOTES
Pharmacy Dosing Services: Warfarin    Consult for Warfarin Dosing by Pharmacy by Dr. Colton Ramirez provided for this 40 y.o.  female , for indication of Venous Thrombosis. Day of Therapy 1  Dose to achieve an INR goal of 2-3    Order entered for Warfarin 5 mg to be given today at 18:00. Lovenox order adjusted:  Enoxaparin 80 mg SQ q12h (telephone order, Dr. Adriane Bates)      LABS    PT/INR Lab Results   Component Value Date/Time    INR 0.9 03/23/2017 01:42 PM      Platelets Lab Results   Component Value Date/Time    PLATELET 681 74/20/2640 11:07 AM      H/H     Lab Results   Component Value Date/Time    HGB 10.0 03/22/2017 11:07 AM        Warfarin Administrations (last 168 hours)       None          Pharmacy to follow daily and will provide subsequent Warfarin dosing based on clinical status.   ANNE Villarreal  Contact information  [phone]

## 2017-03-23 NOTE — PROGRESS NOTES
Problem: Neurolinguistics Impaired (Adult)  Goal: *Acute Goals and Plan of Care (Insert Text)  Goals:   1. Pt will complete functional mathematical reasoning tasks (ex. Money, time) with 80% accuracy. 2. Pt will complete functional problem solving tasks by generating multiple solutions (3+) for given scenario with 80% accuracy. 3. Pt will complete complex comprehension tasks (ex. yes/no, commands) involving temporal and prepositional components to increase attention to detail and auditory processing with 80% accuracy. 4. Pt will verbalize/demonstrate insight into physical and cognitive deficits and impact on safety and return home with 80% accuracy. Outcome: Progressing Towards Goal  SPEECH LANGUAGE PATHOLOGY TREATMENT     Patient: Denote Palacio (78 y.o. female)  Date: 3/23/2017  Diagnosis: Stroke [facility])  Stroke ([facility] [address]  Hypokalemia  CVA (cerebral vascular accident) ([facility] [address]  anemia <principal problem not specified>  Procedure(s) (LRB):  COLONOSCOPY; BIOPSY; (N/A) 6 Days Post-Op      ASSESSMENT:  Mild/Moderate Cognitive-Communication Deficit secondary to RHD s/p R CVA. Patient demo decreased awareness of deficits, poor problem solving skills (including prioritizing tasks), perseveration of utterances during conversation, and continued attention deficits with impaired social skills (e.g. Decreased eye contact, flat affect, poor turn-taking) as noted during unstructured and structured functional ADL tasks. SLP implementation of hierarchy of cues to redirect the patient. Typically the patient benefits from verbal redirections; however, due to increased tasks to complete on \"mental\" to-do list, the patient perseverated on making a phone call and was unengaged during SLP guided therapeutic tasks. Motivation and engagement noted during completion of patient directed tasks to schedule an appointment, but breakdown assessed during moderately complex problem solving tasks x6 steps = 66% accuracy independently. Patient educated on continuing SLP services to address cognitive-communication deficits once discharged from acute care. Progression toward goals:  [ ]       Improving appropriately and progressing toward goals  [X]       Improving slowly and progressing toward goals  [ ]       Not making progress toward goals and plan of care will be adjusted       PLAN: POC as above   Patient continues to benefit from skilled intervention to address the above impairments. Continue treatment per established plan of care. Discharge Recommendations: Outpatient vs Home Health        SUBJECTIVE:   Patient stated I have to get these things done. OBJECTIVE:   Mental Status:  Neurologic State: Alert  Orientation Level: Oriented X4  Cognition: Follows commands, Decreased attention/concentration, Impaired decision making  Perception: Cues to attend to left side of body  Perseveration: Perseverates during conversation  Safety/Judgement: Decreased insight into deficits  Treatment & Interventions:      As above.       PAIN:  Start of Tx: 0  End of Tx: 0      After treatment:   [ ]       Patient left in no apparent distress sitting up in chair  [X]       Patient left in no apparent distress in bed  [X]       Call bell left within reach  [ ]       Nursing notified  [ ]       Caregiver present  [ ]       Bed alarm activated         COMMUNICATION/EDUCATION:   [X]             Compensatory speech/language/comprehension techniques     KIRILL Finn  Time Calculation: 16 mins

## 2017-03-24 ENCOUNTER — HOME CARE VISIT (OUTPATIENT)
Dept: SCHEDULING | Facility: HOME HEALTH | Age: 47
End: 2017-03-24

## 2017-03-24 ENCOUNTER — HOSPITAL ENCOUNTER (OUTPATIENT)
Dept: INFUSION THERAPY | Age: 47
Discharge: HOME | End: 2017-03-24
Payer: MEDICAID

## 2017-03-24 ENCOUNTER — HOME CARE VISIT (OUTPATIENT)
Dept: SCHEDULING | Facility: HOME HEALTH | Age: 47
End: 2017-03-24
Payer: MEDICAID

## 2017-03-24 VITALS
RESPIRATION RATE: 18 BRPM | HEART RATE: 94 BPM | TEMPERATURE: 98.2 F | SYSTOLIC BLOOD PRESSURE: 125 MMHG | OXYGEN SATURATION: 98 % | DIASTOLIC BLOOD PRESSURE: 90 MMHG

## 2017-03-24 LAB
INR BLD: 1 (ref 0.9–1.1)
PT POC: 12.2 SEC

## 2017-03-24 PROCEDURE — G0495 RN CARE TRAIN/EDU IN HH: HCPCS

## 2017-03-24 PROCEDURE — 96413 CHEMO IV INFUSION 1 HR: CPT

## 2017-03-24 PROCEDURE — 400013 HH SOC

## 2017-03-24 PROCEDURE — 74011250636 HC RX REV CODE- 250/636: Performed by: INTERNAL MEDICINE

## 2017-03-24 RX ORDER — SODIUM CHLORIDE 0.9 % (FLUSH) 0.9 %
10-40 SYRINGE (ML) INJECTION AS NEEDED
Status: DISCONTINUED | OUTPATIENT
Start: 2017-03-24 | End: 2017-03-28 | Stop reason: HOSPADM

## 2017-03-24 RX ORDER — SODIUM CHLORIDE 9 MG/ML
75 INJECTION, SOLUTION INTRAVENOUS CONTINUOUS
Status: DISPENSED | OUTPATIENT
Start: 2017-03-24 | End: 2017-03-25

## 2017-03-24 RX ADMIN — VEDOLIZUMAB 300 MG: 300 INJECTION, POWDER, LYOPHILIZED, FOR SOLUTION INTRAVENOUS at 14:27

## 2017-03-24 RX ADMIN — SODIUM CHLORIDE 75 ML/HR: 900 INJECTION, SOLUTION INTRAVENOUS at 14:24

## 2017-03-24 RX ADMIN — Medication 10 ML: at 14:20

## 2017-03-24 NOTE — PROGRESS NOTES
[facility]. Pt resting quietly in bed , no signs of distress, call bell within reach. 1 Rounded with , pt states that her left great toe hurts, Dr Shae Butler aware no orders given at this time. I applied Mepilex to area to help alleviate some pressure on area with weight bearing, pt states that it helps. Okay to take out PICC, pt states that she is unable to be picked up until [address] Picc removed, manual pressure held for 5 minutes, 4x4 and tegaderm placed, clean dry and intact pt understands to lay supine x 30 minutes. 1930 Pt able to sit up, tolerated well dressing clean dry intact. Pt and pt daughter were shown how to administer Lovenox, and given a handout on the medication, warfarin, as well as information of prescription being sent home with them. They did not have questions at this time. Shift summary- Throughout shift education was provided on stroke, dvt signs and symptoms, and how to administer Lovenox and what to expect with Lovenox/[place]afarin bridge. Pt did not have any additional question prior to leaving. Expressed gratitude for everything done during her time here.

## 2017-03-24 NOTE — PROGRESS NOTES
Goals ongoing at time on discharge. Will complete POC with recommendation for skilled ST services upon discharge.      Thank you for this referral,   Cony Bernal, SLP

## 2017-03-24 NOTE — DISCHARGE INSTRUCTIONS
DISCHARGE SUMMARY from Nurse    The following personal items are in your possession at time of discharge:    Dental Appliances: None  Visual Aid: None     Home Medications: None  Jewelry: None  Clothing: None  Other Valuables: Cell Phone, Purse             PATIENT INSTRUCTIONS:    After general anesthesia or intravenous sedation, for 24 hours or while taking prescription Narcotics:  · Limit your activities  · Do not drive and operate hazardous machinery  · Do not make important personal or business decisions  · Do  not drink alcoholic beverages  · If you have not urinated within 8 hours after discharge, please contact your surgeon on call. Report the following to your surgeon:  · Excessive pain, swelling, redness or odor of or around the surgical area  · Temperature over 100.5  · Nausea and vomiting lasting longer than 4 hours or if unable to take medications  · Any signs of decreased circulation or nerve impairment to extremity: change in color, persistent  numbness, tingling, coldness or increase pain  · Any questions        What to do at Home:  Recommended activity: Activity as tolerated    *  Please give a list of your current medications to your Primary Care Provider. *  Please update this list whenever your medications are discontinued, doses are      changed, or new medications (including over-the-counter products) are added. *  Please carry medication information at all times in case of emergency situations. These are general instructions for a healthy lifestyle:    No smoking/ No tobacco products/ Avoid exposure to second hand smoke    Surgeon General's Warning:  Quitting smoking now greatly reduces serious risk to your health.     Obesity, smoking, and sedentary lifestyle greatly increases your risk for illness    A healthy diet, regular physical exercise & weight monitoring are important for maintaining a healthy lifestyle    You may be retaining fluid if you have a history of heart failure or if you experience any of the following symptoms:  Weight gain of 3 pounds or more overnight or 5 pounds in a week, increased swelling in our hands or feet or shortness of breath while lying flat in bed. Please call your doctor as soon as you notice any of these symptoms; do not wait until your next office visit. Recognize signs and symptoms of STROKE:    F-face looks uneven    A-arms unable to move or move unevenly    S-speech slurred or non-existent    T-time-call 911 as soon as signs and symptoms begin-DO NOT go       Back to bed or wait to see if you get better-TIME IS BRAIN. Warning Signs of HEART ATTACK     Call 911 if you have these symptoms:   Chest discomfort. Most heart attacks involve discomfort in the center of the chest that lasts more than a few minutes, or that goes away and comes back. It can feel like uncomfortable pressure, squeezing, fullness, or pain.  Discomfort in other areas of the upper body. Symptoms can include pain or discomfort in one or both arms, the back, neck, jaw, or stomach.  Shortness of breath with or without chest discomfort.  Other signs may include breaking out in a cold sweat, nausea, or lightheadedness. Don't wait more than five minutes to call 911 - MINUTES MATTER! Fast action can save your life. Calling 911 is almost always the fastest way to get lifesaving treatment. Emergency Medical Services staff can begin treatment when they arrive -- up to an hour sooner than if someone gets to the hospital by car. The discharge information has been reviewed with the patient. The patient verbalized understanding. Discharge medications reviewed with the patient and appropriate educational materials and side effects teaching were provided.     Patient armband removed and shredded

## 2017-03-24 NOTE — PROGRESS NOTES
[address] [facility] Progress Note    Date: 2017    Name: Joseph Mason              MRN: [id number]              : 1970    Chemotherapy Cycle:   [facility]  Week zero      Ms. Stefanie Hatfield was assessed and education was provided. Patient given [employer] [facility] as well as information from [employer]. Patient states that she had a stroke 2 weeks ago and was discharged from the hospital yesterday. Patient has noticeable left facial droop, tongue deviation to the left, uneven smile. Hand grasps are strong and equal, gait is slightly uneven, using a walker for assistance. Left ankle is swollen 1+ w/o pitting, right is WNL. Patient has very poor venous access. IV was attempted 3 times by 2 nurses before being started. Discussed with patient with possible need for port insertion and recommended that she speak with Dr. Delroy Blanco regarding the need for access. Ms. Nhung Neumann vitals were reviewed. Visit Vitals    /90 (BP 1 Location: Left arm, BP Patient Position: At rest)    Pulse 94    Temp 98.2 °F (36.8 °C)    Resp 18    SpO2 98%    Breastfeeding No     Patient Vitals for the past 24 hrs:   Temp Pulse Resp BP SpO2   17 1537 - 94 18 125/90 -   17 1511 98.2 °F (36.8 °C) 91 18 124/87 -   17 1345 98.5 °F (36.9 °C) 95 18 111/78 98 %     IV started in back of right forearm w/22 gauge INT. Good blood return obtained, then flushed with 10 ml NS.    250 ml Normal Saline infused during treatment, then stopped. Lab results were obtained and reviewed. No results found for this or any previous visit (from the past 12 hour(s)). No pre-medications were ordered and chemotherapy was initiated. Entyvio 300 mg was infused at 500 ml/hr, infusing over 35 minutes. Patient was observed for 30 minutes, no s/s reaction noted. IV removed, no irritation noted at site, 2x2 guaze and Coban applied. Ms. Stefanie Hatfield tolerated infusion, and had no complaints at this time. Armband removed and shredded. Ms. Murali Herrera was discharged from [place] [address] in stable condition at [phone]. She is to return on 4/7/2017 at 1300 for her next appointment for Entyvio week 2.     Mckay Kumar RN  March 24, 2017  4:29 PM

## 2017-03-25 VITALS
RESPIRATION RATE: 16 BRPM | SYSTOLIC BLOOD PRESSURE: 135 MMHG | HEART RATE: 85 BPM | TEMPERATURE: 96.8 F | OXYGEN SATURATION: 99 % | DIASTOLIC BLOOD PRESSURE: 99 MMHG

## 2017-03-26 ENCOUNTER — HOME CARE VISIT (OUTPATIENT)
Dept: SCHEDULING | Facility: HOME HEALTH | Age: 47
End: 2017-03-26
Payer: MEDICAID

## 2017-03-26 PROCEDURE — G0299 HHS/HOSPICE OF RN EA 15 MIN: HCPCS

## 2017-03-27 ENCOUNTER — HOSPITAL ENCOUNTER (INPATIENT)
Age: 47
LOS: 22 days | Discharge: HOME HEALTH | DRG: 950 | End: 2017-04-18
Attending: INTERNAL MEDICINE | Admitting: INTERNAL MEDICINE
Payer: MEDICAID

## 2017-03-27 ENCOUNTER — HOME CARE VISIT (OUTPATIENT)
Dept: HOME HEALTH SERVICES | Facility: HOME HEALTH | Age: 47
End: 2017-03-27
Payer: MEDICAID

## 2017-03-27 VITALS
HEART RATE: 97 BPM | RESPIRATION RATE: 16 BRPM | OXYGEN SATURATION: 97 % | DIASTOLIC BLOOD PRESSURE: 80 MMHG | TEMPERATURE: 97.1 F | SYSTOLIC BLOOD PRESSURE: 120 MMHG

## 2017-03-27 DIAGNOSIS — Z79.01 ANTICOAGULATED ON COUMADIN: ICD-10-CM

## 2017-03-27 DIAGNOSIS — K62.5 RECTAL BLEEDING: ICD-10-CM

## 2017-03-27 DIAGNOSIS — K50.111 CROHN'S DISEASE OF LARGE INTESTINE WITH RECTAL BLEEDING (HCC): ICD-10-CM

## 2017-03-27 DIAGNOSIS — D50.9 IRON DEFICIENCY ANEMIA, UNSPECIFIED IRON DEFICIENCY ANEMIA TYPE: Primary | ICD-10-CM

## 2017-03-27 LAB
ALBUMIN SERPL BCP-MCNC: 2.6 G/DL (ref 3.4–5)
ALBUMIN/GLOB SERPL: 0.8 {RATIO} (ref 0.8–1.7)
ALP SERPL-CCNC: 42 U/L (ref 45–117)
ALT SERPL-CCNC: 37 U/L (ref 13–56)
ANION GAP BLD CALC-SCNC: 8 MMOL/L (ref 3–18)
APTT PPP: 37.9 SEC (ref 23–36.4)
AST SERPL W P-5'-P-CCNC: 29 U/L (ref 15–37)
BASOPHILS # BLD AUTO: 0 K/UL (ref 0–0.1)
BASOPHILS # BLD: 0 % (ref 0–3)
BILIRUB SERPL-MCNC: <0 MG/DL (ref 0.2–1)
BUN SERPL-MCNC: 16 MG/DL (ref 7–18)
BUN/CREAT SERPL: 21 (ref 12–20)
CALCIUM SERPL-MCNC: 8.4 MG/DL (ref 8.5–10.1)
CHLORIDE SERPL-SCNC: 106 MMOL/L (ref 100–108)
CK MB CFR SERPL CALC: NORMAL % (ref 0–4)
CK MB SERPL-MCNC: <1 NG/ML (ref 5–25)
CK SERPL-CCNC: 41 U/L (ref 26–192)
CO2 SERPL-SCNC: 30 MMOL/L (ref 21–32)
CREAT SERPL-MCNC: 0.77 MG/DL (ref 0.6–1.3)
DIFFERENTIAL METHOD BLD: ABNORMAL
EOSINOPHIL # BLD: 0 K/UL (ref 0–0.4)
EOSINOPHIL NFR BLD: 0 % (ref 0–5)
ERYTHROCYTE [DISTWIDTH] IN BLOOD BY AUTOMATED COUNT: 18.9 % (ref 11.6–14.5)
GLOBULIN SER CALC-MCNC: 3.3 G/DL (ref 2–4)
GLUCOSE SERPL-MCNC: 126 MG/DL (ref 74–99)
HCT VFR BLD AUTO: 21 % (ref 35–45)
HGB BLD-MCNC: 6.5 G/DL (ref 12–16)
INR PPP: 2 (ref 0.8–1.2)
LYMPHOCYTES # BLD AUTO: 10 % (ref 20–51)
LYMPHOCYTES # BLD: 1.4 K/UL (ref 0.8–3.5)
MCH RBC QN AUTO: 25 PG (ref 24–34)
MCHC RBC AUTO-ENTMCNC: 31 G/DL (ref 31–37)
MCV RBC AUTO: 80.8 FL (ref 74–97)
MONOCYTES # BLD: 0.7 K/UL (ref 0–1)
MONOCYTES NFR BLD AUTO: 5 % (ref 2–9)
NEUTS BAND NFR BLD MANUAL: 1 % (ref 0–5)
NEUTS SEG # BLD: 11.5 K/UL (ref 1.8–8)
NEUTS SEG NFR BLD AUTO: 84 % (ref 42–75)
PLATELET # BLD AUTO: 438 K/UL (ref 135–420)
PMV BLD AUTO: 9.2 FL (ref 9.2–11.8)
POTASSIUM SERPL-SCNC: 4.2 MMOL/L (ref 3.5–5.5)
PROT SERPL-MCNC: 5.9 G/DL (ref 6.4–8.2)
PROTHROMBIN TIME: 21.7 SEC (ref 11.5–15.2)
RBC # BLD AUTO: 2.6 M/UL (ref 4.2–5.3)
RBC MORPH BLD: ABNORMAL
SODIUM SERPL-SCNC: 144 MMOL/L (ref 136–145)
TROPONIN I SERPL-MCNC: <0.02 NG/ML (ref 0–0.06)
WBC # BLD AUTO: 13.7 K/UL (ref 4.6–13.2)
WBC MORPH BLD: ABNORMAL

## 2017-03-27 PROCEDURE — 86920 COMPATIBILITY TEST SPIN: CPT | Performed by: INTERNAL MEDICINE

## 2017-03-27 PROCEDURE — 74011250637 HC RX REV CODE- 250/637: Performed by: INTERNAL MEDICINE

## 2017-03-27 PROCEDURE — 85610 PROTHROMBIN TIME: CPT | Performed by: INTERNAL MEDICINE

## 2017-03-27 PROCEDURE — P9016 RBC LEUKOCYTES REDUCED: HCPCS | Performed by: INTERNAL MEDICINE

## 2017-03-27 PROCEDURE — 99283 EMERGENCY DEPT VISIT LOW MDM: CPT

## 2017-03-27 PROCEDURE — 85025 COMPLETE CBC W/AUTO DIFF WBC: CPT | Performed by: INTERNAL MEDICINE

## 2017-03-27 PROCEDURE — 86900 BLOOD TYPING SEROLOGIC ABO: CPT | Performed by: INTERNAL MEDICINE

## 2017-03-27 PROCEDURE — 82550 ASSAY OF CK (CPK): CPT | Performed by: INTERNAL MEDICINE

## 2017-03-27 PROCEDURE — 80053 COMPREHEN METABOLIC PANEL: CPT | Performed by: INTERNAL MEDICINE

## 2017-03-27 PROCEDURE — 36430 TRANSFUSION BLD/BLD COMPNT: CPT

## 2017-03-27 PROCEDURE — 65270000029 HC RM PRIVATE

## 2017-03-27 PROCEDURE — 85730 THROMBOPLASTIN TIME PARTIAL: CPT | Performed by: INTERNAL MEDICINE

## 2017-03-27 PROCEDURE — 74011250636 HC RX REV CODE- 250/636: Performed by: INTERNAL MEDICINE

## 2017-03-27 PROCEDURE — 93005 ELECTROCARDIOGRAM TRACING: CPT

## 2017-03-27 PROCEDURE — C9113 INJ PANTOPRAZOLE SODIUM, VIA: HCPCS | Performed by: INTERNAL MEDICINE

## 2017-03-27 RX ORDER — PANTOPRAZOLE SODIUM 40 MG/10ML
80 INJECTION, POWDER, LYOPHILIZED, FOR SOLUTION INTRAVENOUS
Status: COMPLETED | OUTPATIENT
Start: 2017-03-27 | End: 2017-03-27

## 2017-03-27 RX ORDER — SODIUM CHLORIDE 9 MG/ML
250 INJECTION, SOLUTION INTRAVENOUS AS NEEDED
Status: DISCONTINUED | OUTPATIENT
Start: 2017-03-27 | End: 2017-04-04 | Stop reason: SDUPTHER

## 2017-03-27 RX ORDER — OXYCODONE AND ACETAMINOPHEN 10; 325 MG/1; MG/1
1 TABLET ORAL
Status: COMPLETED | OUTPATIENT
Start: 2017-03-27 | End: 2017-03-27

## 2017-03-27 RX ADMIN — OXYCODONE HYDROCHLORIDE AND ACETAMINOPHEN 1 TABLET: 10; 325 TABLET ORAL at 22:33

## 2017-03-27 RX ADMIN — METHYLPREDNISOLONE SODIUM SUCCINATE 125 MG: 125 INJECTION, POWDER, FOR SOLUTION INTRAMUSCULAR; INTRAVENOUS at 22:36

## 2017-03-27 RX ADMIN — PANTOPRAZOLE SODIUM 80 MG: 40 INJECTION, POWDER, FOR SOLUTION INTRAVENOUS at 22:10

## 2017-03-27 RX ADMIN — SODIUM CHLORIDE 250 ML: 900 INJECTION, SOLUTION INTRAVENOUS at 23:01

## 2017-03-27 NOTE — IP AVS SNAPSHOT
Nomi Chen 
 
 
 [address] [facility] [address] 
[phone] Patient: Brain Santos MRN: [id number] INS:[birth year] You are allergic to the following Allergen Reactions Humira (Adalimumab) Other (comments) Remicade (Infliximab) Palpitations Recent Documentation Height Weight Breastfeeding? BMI OB Status Smoking Status 1.676 m 96.3 kg No 34.25 kg/m2 Having regular periods Former Smoker Emergency Contacts Name Discharge Info Relation Home Work Mobile Sherry Green DISCHARGE CAREGIVER [3] Parent [1] [phone] About your hospitalization You were admitted on:  March 27, 2017 You last received care in the:  [address] [place] [address] You were discharged on:  April 18, 2017 Unit phone number:  [phone] Why you were hospitalized Your primary diagnosis was:  Gi Bleed Your diagnoses also included:  Crohn Disease (Hcc), Dvt (Deep Venous Thrombosis) (Hcc), Embolic Stroke (Hcc), Neuropathic Pain, Acute Blood Loss Anemia, Elevated Wbc Count, Pad (Peripheral Artery Disease) ([facility]), Vascular Abnormality Providers Seen During Your Hospitalizations Provider Role Specialty Primary office phone Moen Alves MD Attending Provider Emergency Medicine [phone] Dot Best MD Attending Provider [facility] [phone] Jo Daniels DO Attending Provider Internal Medicine [phone] Your Primary Care Physician (PCP) Primary Care Physician Office Phone Office Fax Manny Roberson [phone] Follow-up Information Follow up With Details Comments Contact Info Jovanna Rueda MD On 4/25/2017 Follow up appointment scheduled for April 25, 2017 at 2:40 p.m. [address] [facility] [address] [facility] 
[phone] [address] [facility] to continue managing your healthcare needs [facility] [phone] Outpatient Infusion Center On 5/2/2017 Follow up appointment scheduled for May 2, 2017 at 11:00 a.m. Chosen to continue managing your healthcare needs Outpatient [facility] 
[address], [address], [address] 
[phone] Your Appointments Wednesday April 19, 2017 To Be Determined [id number] with Marti Parry RN  
 [address] CARE SCHEDULING/INTAKE [facility] [address] [facility] CARE SCHEDULING/INTAKE ([facility]) Thursday April 20, 2017 To Be Determined PT EVALUATION with MALAIKA Quiñonez [facility] CARE SCHEDULING/INTAKE ([facility]) [address] CARE SCHEDULING[facility]INTAKE [facility] Friday April 21, 2017 To Be Determined OT EVALUATION with Nabeel Thornton OTR/L  
[place] [address] CARE SCHEDULING/INTAKE ([facility]) [address] CARE SCHEDULING[facility]TAKE [facility]) Tuesday May 02, 2017 11:00 AM EDT INFUSION 60 with [facility] INFUSION NURSE 1 [facility] OP INFUSION [facility] (Sid Wallace [facility]) [address] [address]  
[phone] Current Discharge Medication List  
  
START taking these medications Dose & Instructions Dispensing Information Comments Morning Noon Evening Bedtime  
 carvedilol 3.125 mg tablet Commonly known as:  Lakia Alstrom Your last dose was: Your next dose is:    
   
   
 Dose:  3.125 mg Take 1 Tab by mouth two (2) times daily (with meals). Quantity:  60 Tab Refills:  0  
     
   
   
   
  
 methotrexate 2.5 mg tablet Commonly known as:  Roach Plaster Start taking on:  4/24/2017 Your last dose was:     
   
Your next dose is:    
   
   
 Dose:  10 mg  
 Take 4 Tabs by mouth every Monday for 30 days. Quantity:  16 Tab Refills:  0 CONTINUE these medications which have CHANGED Dose & Instructions Dispensing Information Comments Morning Noon Evening Bedtime  
 warfarin 7.5 mg tablet Commonly known as:  COUMADIN What changed:   
- medication strength 
- how much to take Your last dose was: Your next dose is:    
   
   
 Dose:  7.5 mg Take 1 Tab by mouth daily. Quantity:  10 Tab Refills:  0 CONTINUE these medications which have NOT CHANGED Dose & Instructions Dispensing Information Comments Morning Noon Evening Bedtime  
 atorvastatin 80 mg tablet Commonly known as:  LIPITOR Your last dose was: Your next dose is:    
   
   
 Dose:  80 mg Take 1 Tab by mouth nightly. Quantity:  30 Tab Refills:  2  
     
   
   
   
  
 gabapentin 400 mg capsule Commonly known as:  NEURONTIN Your last dose was: Your next dose is:    
   
   
 Dose:  400 mg Take 1 Cap by mouth three (3) times daily. Quantity:  90 Cap Refills:  2  
     
   
   
   
  
 mesalamine 500 mg CR capsule Commonly known as:  PENTASA Your last dose was: Your next dose is:    
   
   
 Dose:  1000 mg Take 2 Caps by mouth four (4) times daily. Indications: CROHN'S DISEASE Quantity:  120 Cap Refills:  0  
     
   
   
   
  
 oxyCODONE-acetaminophen 5-325 mg per tablet Commonly known as:  PERCOCET Your last dose was: Your next dose is:    
   
   
 Dose:  1 Tab Take 1 Tab by mouth every four (4) hours as needed for Pain. Max Daily Amount: 6 Tabs. Quantity:  20 Tab Refills:  0  
     
   
   
   
  
 predniSONE 20 mg tablet Commonly known as:  Lennart Spearing Your last dose was: Your next dose is:    
   
   
 Dose:  40 mg Take 2 Tabs by mouth daily (with breakfast). Indications: CROHN'S DISEASE Quantity:  20 Tab Refills:  0 STOP taking these medications   
 enoxaparin 80 mg/0.8 mL injection Commonly known as:  LOVENOX Where to Get Your Medications Information on where to get these meds will be given to you by the nurse or doctor. ! Ask your nurse or doctor about these medications  
  carvedilol 3.125 mg tablet  
 methotrexate 2.5 mg tablet  
 warfarin 7.5 mg tablet Discharge Instructions Crohn's Disease: Care Instructions Your Care Instructions Crohn's disease is a lifelong inflammatory bowel disease (IBD). Parts of the digestive tract get swollen and irritated and may develop deep sores called ulcers. Crohn's disease usually occurs in the last part of the small intestine and the first part of the large intestine. But it can develop anywhere from the mouth to the anus. The main symptoms of Crohn's disease are belly pain, diarrhea, fever, and weight loss. Some people may have constipation. Crohn's disease also sometimes causes problems with the joints, eyes, or skin. Your symptoms may be mild at some times and severe at others. The disease can also go into remission, which means that it is not active and you have no symptoms. Bad attacks of Crohn's disease often have to be treated in the hospital so that you can get medicines and liquids through a tube in your vein, called an IV. This gives your digestive system time to rest and recover. Talk with your doctor about the best treatments for you. You may need medicines that help prevent or treat flare-ups of the disease. You may need surgery to remove part of your bowel if you have an abnormal opening in the bowel (fistula), an abscess, or a bowel obstruction. In some cases, surgery is needed if medicines do not work. But symptoms often return to other areas of the intestines after surgery.  
Learning good self-care can help you reduce your symptoms and manage Crohn's disease. Follow-up care is a key part of your treatment and safety. Be sure to make and go to all appointments, and call your doctor if you are having problems. Its also a good idea to know your test results and keep a list of the medicines you take. How can you care for yourself at home? · Take your medicines exactly as prescribed. Call your doctor if you think you are having a problem with your medicine. You will get more details on the specific medicines your doctor prescribes. · Do not take anti-inflammatory medicines, such as aspirin, ibuprofen (Advil, Motrin), or naproxen (Aleve). They may make your symptoms worse. Do not take any other medicines or herbal products without talking to your doctor first. 
· Avoid foods that make your symptoms worse. These might include milk, alcohol, high-fiber foods, or spicy foods. · Eat a healthy diet. Make sure to get enough iron. Rectal bleeding may make you lose iron. Good sources of iron include beef, lentils, spinach, raisins, and iron-enriched breads and cereals. · Drink liquid meal replacements if your doctor recommends them. These are high in calories and contain vitamins and minerals. Severe symptoms may make it hard for your body to absorb vitamins and minerals. · Do not smoke. Smoking makes Crohn's disease worse. If you need help quitting, talk to your doctor about stop-smoking programs and medicines. These can increase your chances of quitting for good. · Seek support from friends and family to help cope with Crohn's disease. The illness can affect all parts of your life. Get counseling if you need it. When should you call for help? Call 911 anytime you think you may need emergency care. For example, call if: 
· You have severe belly pain. · You passed out (lost consciousness). Call your doctor now or seek immediate medical care if: 
· You have signs of needing more fluids.  You have sunken eyes and a dry mouth, and you pass only a little dark urine. · You have pain and swelling in the anal area, or you have pus draining from the anal area. · You have a fever or shaking chills. · Your belly is bloated. Watch closely for changes in your health, and be sure to contact your doctor if: 
· Your symptoms get worse. · You have diarrhea for more than 2 weeks. · Your pain is not steadily getting better. · You have unexplained weight loss. Where can you learn more? Go to http://johan-maria del carmen.info/. Enter [phone] in the search box to learn more about \"Crohn's Disease: Care Instructions. \" Current as of: August 9, 2016 Content Version: 11.2 © 2978-2277 [employer]. Care instructions adapted under license by [employer] (which disclaims liability or warranty for this information). If you have questions about a medical condition or this instruction, always ask your healthcare professional. [place] [address] any warranty or liability for your use of this information. Deep Vein Thrombosis: Care Instructions Your Care Instructions A deep vein thrombosis (DVT) is a blood clot in certain veins of the legs, pelvis, or arms. Blood clots in these veins need to be treated because they can get bigger, break loose, and travel through the bloodstream to the lungs. A blood clot in a lung can be life-threatening. The doctor may have given you a blood thinner (anticoagulant). A blood thinner can stop the blood clot from growing larger and prevent new clots from forming. You will need to take a blood thinner for 3 to 6 months or longer. The doctor has checked you carefully, but problems can develop later. If you notice any problems or new symptoms, get medical treatment right away. Follow-up care is a key part of your treatment and safety.  Be sure to make and go to all appointments, and call your doctor if you are having problems. It's also a good idea to know your test results and keep a list of the medicines you take. How can you care for yourself at home? · Take your medicines exactly as prescribed. Call your doctor if you think you are having a problem with your medicine. · If you are taking a blood thinner, be sure you get instructions about how to take your medicine safely. Blood thinners can cause serious bleeding problems. · Wear compression stockings if your doctor recommends them. These stockings are tighter at the feet than on the legs. They may reduce pain and swelling in your legs. You can get them with a prescription at a medical supply store or at some drugstores. · When you sit, use a pillow to raise the arm or leg that has the blood clot. Try to keep it above the level of your heart. When should you call for help? Call 911 anytime you think you may need emergency care. For example, call if: 
· You passed out (lost consciousness). · You have symptoms of a blood clot in your lung (called a pulmonary embolism). These include: 
¨ Sudden chest pain. ¨ Trouble breathing. ¨ Coughing up blood. Call your doctor now or seek immediate medical care if: 
· You have new or worse trouble breathing. · You are dizzy or lightheaded, or you feel like you may faint. · You have symptoms of a blood clot in your arm or leg. These may include: 
¨ Pain in the arm, calf, back of the knee, thigh, or groin. ¨ Redness and swelling in the arm, leg, or groin. Watch closely for changes in your health, and be sure to contact your doctor if: 
· You do not get better as expected. Where can you learn more? Go to http://johan-maria del carmen.info/. Enter Z428 in the search box to learn more about \"Deep Vein Thrombosis: Care Instructions. \" Current as of: October 13, 2016 Content Version: 11.2 © 3125-1752 [employer].  Care instructions adapted under license by [address] (which disclaims liability or warranty for this information). If you have questions about a medical condition or this instruction, always ask your healthcare professional. [address] any warranty or liability for your use of this information. Learning About Femoral-Tibial Bypass Surgery for Peripheral Arterial Disease What is a femoral-tibial bypass? Femoral-tibial bypass is surgery to bypass diseased blood vessels in the lower leg or foot. The surgery is most often done to help with severe pain or help heal foot sores caused by bad blood circulation. Your doctor uses a graft to bypass the blocked area of the blood vessel. The graft is most often a vein taken from another place in your leg. Sometimes the graft is a man-made blood vessel. The graft will carry blood from the femoral artery in your groin to the tibial artery in your lower leg or foot. This surgery is also known as infra-popliteal reconstruction. How is the surgery done? You will be asleep during the surgery, or you will be given medicine to numb your lower body and prevent pain. The doctor will make cuts (incisions) in your skin above and below the area where the blockage occurs. If the doctor is using one of your veins for a graft, he or she will make another cut in your leg to remove the vein. The doctor then connects one end of the graft to the femoral artery above the blocked area. He or she then connects the other end of the graft to the tibial artery in your lower leg or foot, below the blocked area. After the graft is in place and blood is flowing through the graft, the doctor will close the cuts in your skin with stitches or staples. What can you expect after the surgery? You may need to stay in the hospital for 3 to 5 days. You will have some pain from the cuts (incisions) the doctor made. This usually gets better after about 1 week.  You can expect your leg to be swollen at first. This is a normal part of recovery. It may last 2 or 3 months. You will need to take it easy for at least 2 to 6 weeks at home. It may take 6 to 12 weeks to fully recover. You will probably need to take at least 2 to 6 weeks off from work. It depends on the type of work you do and how you feel. Follow-up care is a key part of your treatment and safety. Be sure to make and go to all appointments, and call your doctor if you are having problems. It's also a good idea to know your test results and keep a list of the medicines you take. Where can you learn more? Go to http://johan-maria del carmen.info/. Lesly Lissette in the search box to learn more about \"Learning About Femoral-Tibial Bypass Surgery for Peripheral Arterial Disease. \" Current as of: June 4, 2016 Content Version: 11.2 © 6558-3463 [employer]. Care instructions adapted under license by [employer] (which disclaims liability or warranty for this information). If you have questions about a medical condition or this instruction, always ask your healthcare professional. [place] [address] any warranty or liability for your use of this information. High INR Test Result: Care Instructions Your Care Instructions You had a blood test to check how long it takes your blood to clot. This test is called a PT or prothrombin time test. The result of the test is called the INR level. A high INR level can happen when you take warfarin (Coumadin). Warfarin helps prevent blood clots. To do this, it slows the amount of time it takes for your blood to clot. This raises your INR level. The INR goal for people who take warfarin is usually from 2 to 3.5. A value higher than 3.5 increases the risk of bleeding problems. Many things can affect the way warfarin works. Some natural health products and other medicines can make warfarin work too well.  That can raise the risk of bleeding. If you drink a lot of alcohol, that may raise your INR. And severe diarrhea or vomiting can also raise your INR. The best way to lower your INR will depend on several things. In some cases, the doctor may have you stop taking warfarin for a few days. You may also be given other medicines to take. You will need to be tested often to make sure your INR level is going down. You will also need to watch for signs of bleeding. The doctor has checked you carefully, but problems can develop later. If you notice any problems or new symptoms, get medical treatment right away. Follow-up care is a key part of your treatment and safety. Be sure to make and go to all appointments, and call your doctor if you are having problems. It's also a good idea to know your test results and keep a list of the medicines you take. How can you care for yourself at home? Be careful with medicines and foods · Don't start or stop taking any medicines, vitamins, or natural remedies unless you first talk to your doctor. · Keep the amount of vitamin K in your diet about the same from day to day. Do not suddenly eat a lot more or a lot less food that is rich in vitamin K than you usually do. Vitamin K affects how warfarin works and how your blood clots. · Avoid cranberry juice and other cranberry products. They can increase the effects of warfarin. · Limit your use of alcohol. Avoid bleeding by preventing falls · Wear slippers or shoes with nonskid soles. · Remove throw rugs and clutter. · Rearrange furniture and electrical cords to keep them out of walking paths. · Keep stairways, porches, and outside walkways well lit. Use night-lights in hallways and bathrooms. · Be extra careful when you work with sharp tools or knives. When should you call for help? Call 911 anytime you think you may need emergency care.  For example, call if: 
· You have a sudden, severe headache that is different from past headaches. Call your doctor now or seek immediate medical care if: 
· You have any abnormal bleeding, such as: 
¨ Nosebleeds. ¨ Vaginal bleeding that is different (heavier, more frequent, at a different time of the month) than what you are used to. ¨ Bloody or black stools, or rectal bleeding. ¨ Bloody or pink urine. Watch closely for changes in your health, and be sure to contact your doctor if you have any problems. Where can you learn more? Go to [employer]be Enter C178 in the search box to learn more about \"High INR Test Result: Care Instructions. \"  
© 9887-6173 [employer], [employer]. Care instructions adapted under license by [employer] (which disclaims liability or warranty for this information). This care instruction is for use with your licensed healthcare professional. If you have questions about a medical condition or this instruction, always ask your healthcare professional. [place] [address] any warranty or liability for your use of this information. Content Version: 12.4.316293; Current as of: November 20, 2015 Gastrointestinal Bleeding: Care Instructions Your Care Instructions The digestive or gastrointestinal tract goes from the mouth to the anus. It is often called the GI tract. Bleeding can happen anywhere in the GI tract. It may be caused by an ulcer, an infection, or cancer. It may also be caused by medicines such as aspirin or ibuprofen. Light bleeding may not cause any symptoms at first. But if you continue to bleed for a while, you may feel very weak or tired. Sudden, heavy bleeding means you need to see a doctor right away. This kind of bleeding can be very dangerous. But it can usually be cured or controlled. The doctor may do some tests to find the cause of your bleeding. Follow-up care is a key part of your treatment and safety.  Be sure to make and go to all appointments, and call your doctor if you are having problems. It's also a good idea to know your test results and keep a list of the medicines you take. How can you care for yourself at home? · Be safe with medicines. Take your medicines exactly as prescribed. Call your doctor if you think you are having a problem with your medicine. You will get more details on the specific medicines your doctor prescribes. · Do not take aspirin or other anti-inflammatory medicines, such as naproxen (Aleve) or ibuprofen (Advil, Motrin), without talking to your doctor first. Ask your doctor if it is okay to use acetaminophen (Tylenol). · Do not drink alcohol. · The bleeding may make you lose iron. So it's important to eat foods that have a lot of iron. These include red meat, shellfish, poultry, and eggs. They also include beans, raisins, whole-grain breads, and leafy green vegetables. If you want help planning meals, you can make an appointment with a dietitian. When should you call for help? Call 911 anytime you think you may need emergency care. For example, call if: 
· You have sudden, severe belly pain. · You vomit blood or what looks like coffee grounds. · You passed out (lost consciousness). · Your stools are maroon or very bloody. Call your doctor now or seek immediate medical care if: 
· You are dizzy or lightheaded, or you feel like you may faint. · Your stools are black and look like tar, or they have streaks of blood. · You have belly pain. · You vomit or have nausea. · You have trouble swallowing, or it hurts when you swallow. Watch closely for changes in your health, and be sure to contact your doctor if: 
· You do not get better as expected. Where can you learn more? Go to http://johan-maria del carmen.info/. Enter P708 in the search box to learn more about \"Gastrointestinal Bleeding: Care Instructions. \" Current as of: May 27, 2016 Content Version: 11.2 © 8121-3182 [employer].  Care instructions adapted under license by [address] (which disclaims liability or warranty for this information). If you have questions about a medical condition or this instruction, always ask your healthcare professional. [address] any warranty or liability for your use of this information. Patient armband removed and shredded DISCHARGE SUMMARY from Nurse The following personal items are in your possession at time of discharge: 
 
Dental Appliances: None Visual Aid: None Jewelry: With patient, Kacie Glatter Clothing: Pants, Footwear, Undergarments, Shirt Other Valuables: Jayro Valderrama, [address] PATIENT INSTRUCTIONS: 
 
 
F-face looks uneven A-arms unable to move or move unevenly S-speech slurred or non-existent T-time-call 911 as soon as signs and symptoms begin-DO NOT go Back to bed or wait to see if you get better-TIME IS BRAIN. Warning Signs of HEART ATTACK Call 911 if you have these symptoms: 
? Chest discomfort. Most heart attacks involve discomfort in the center of the chest that lasts more than a few minutes, or that goes away and comes back. It can feel like uncomfortable pressure, squeezing, fullness, or pain. ? Discomfort in other areas of the upper body. Symptoms can include pain or discomfort in one or both arms, the back, neck, jaw, or stomach. ? Shortness of breath with or without chest discomfort. ? Other signs may include breaking out in a cold sweat, nausea, or lightheadedness. Don't wait more than five minutes to call [address]! Fast action can save your life.  Calling 911 is almost always the fastest way to get lifesaving treatment. Emergency Medical Services staff can begin treatment when they arrive  up to an hour sooner than if someone gets to the hospital by car. The discharge information has been reviewed with the patient. The patient verbalized understanding. Discharge medications reviewed with the patient and appropriate educational materials and side effects teaching were provided. Discharge Orders None Introducing \[facility] [place] [facility]\"" & [facility] SERVICES! Senthil Larsoner introduces [employer] patient portal. Now you can access parts of your medical record, email your doctor's office, and request medication refills online. 1. In your internet browser, go to [email or web address] 2. Click on the First Time User? Click Here link in the Sign In box. You will see the New Member Sign Up page. 3. Enter your [employer] Access Code exactly as it appears below. You will not need to use this code after youve completed the sign-up process. If you do not sign up before the expiration date, you must request a new code. · [employer] Access Code: [email or web address] Expires: 4/28/2017 12:08 AM 
 
4. Enter the last four digits of your Social Security Number (xxxx) and Date of Birth (mm/dd/yyyy) as indicated and click Submit. You will be taken to the next sign-up page. 5. Create a [employer] ID. This will be your [employer] login ID and cannot be changed, so think of one that is secure and easy to remember. 6. Create a [employer] password. You can change your password at any time. 7. Enter your Password Reset Question and Answer. This can be used at a later time if you forget your password. 8. Enter your e-mail address. You will receive e-mail notification when new information is available in [address] 9. Click Sign Up. You can now view and download portions of your medical record. 10. Click the Download Summary menu link to download a portable copy of your medical information. If you have questions, please visit the Frequently Asked Questions section of the MyChart website. Remember, MyChart is NOT to be used for urgent needs. For medical emergencies, dial 911. Now available from your iPhone and Android! General Information Please provide this summary of care documentation to your next provider. Patient Signature:  ____________________________________________________________ Date:  ____________________________________________________________  
  
Lavanda Ferries Provider Signature:  ____________________________________________________________ Date:  ____________________________________________________________

## 2017-03-27 NOTE — ED TRIAGE NOTES
Arrived into ed via W/C w/ c/o's her MD told her to come to ED for low hemaglobin - had labs drawn earlier today. Pt reports she had a stroke 1 week ago - seen and treated here. D/C'ed from here Thursday.

## 2017-03-28 ENCOUNTER — APPOINTMENT (OUTPATIENT)
Dept: GENERAL RADIOLOGY | Age: 47
DRG: 950 | End: 2017-03-28
Attending: INTERNAL MEDICINE
Payer: MEDICAID

## 2017-03-28 ENCOUNTER — APPOINTMENT (OUTPATIENT)
Dept: INTERVENTIONAL RADIOLOGY/VASCULAR | Age: 47
DRG: 950 | End: 2017-03-28
Attending: INTERNAL MEDICINE
Payer: MEDICAID

## 2017-03-28 ENCOUNTER — APPOINTMENT (OUTPATIENT)
Dept: NUCLEAR MEDICINE | Age: 47
DRG: 950 | End: 2017-03-28
Attending: INTERNAL MEDICINE
Payer: MEDICAID

## 2017-03-28 ENCOUNTER — APPOINTMENT (OUTPATIENT)
Dept: CT IMAGING | Age: 47
DRG: 950 | End: 2017-03-28
Attending: INTERNAL MEDICINE
Payer: MEDICAID

## 2017-03-28 PROBLEM — D72.829 ELEVATED WBC COUNT: Status: ACTIVE | Noted: 2017-03-28

## 2017-03-28 PROBLEM — K92.2 GI BLEED: Status: ACTIVE | Noted: 2017-03-28

## 2017-03-28 LAB
ALBUMIN SERPL BCP-MCNC: ABNORMAL G/DL (ref 3.4–5)
ALBUMIN/GLOB SERPL: ABNORMAL {RATIO} (ref 0.8–1.7)
ALP SERPL-CCNC: ABNORMAL U/L (ref 45–117)
ALT SERPL-CCNC: ABNORMAL U/L (ref 13–56)
ANION GAP BLD CALC-SCNC: 7 MMOL/L (ref 3–18)
AST SERPL W P-5'-P-CCNC: ABNORMAL U/L (ref 15–37)
BASOPHILS # BLD AUTO: 0 K/UL (ref 0–0.1)
BASOPHILS # BLD: 0 % (ref 0–3)
BILIRUB SERPL-MCNC: ABNORMAL MG/DL (ref 0.2–1)
BUN SERPL-MCNC: 15 MG/DL (ref 7–18)
BUN/CREAT SERPL: 27 (ref 12–20)
CALCIUM SERPL-MCNC: 7.5 MG/DL (ref 8.5–10.1)
CHLORIDE SERPL-SCNC: 109 MMOL/L (ref 100–108)
CO2 SERPL-SCNC: 22 MMOL/L (ref 21–32)
CREAT SERPL-MCNC: 0.55 MG/DL (ref 0.6–1.3)
DIFFERENTIAL METHOD BLD: ABNORMAL
EOSINOPHIL # BLD: 0 K/UL (ref 0–0.4)
EOSINOPHIL NFR BLD: 0 % (ref 0–5)
ERYTHROCYTE [DISTWIDTH] IN BLOOD BY AUTOMATED COUNT: 17.3 % (ref 11.6–14.5)
GLOBULIN SER CALC-MCNC: ABNORMAL G/DL (ref 2–4)
GLUCOSE SERPL-MCNC: 112 MG/DL (ref 74–99)
HCT VFR BLD AUTO: 20.4 % (ref 35–45)
HGB BLD-MCNC: 6.7 G/DL (ref 12–16)
LYMPHOCYTES # BLD AUTO: 13 % (ref 20–51)
LYMPHOCYTES # BLD: 2.9 K/UL (ref 0.8–3.5)
MCH RBC QN AUTO: 27.3 PG (ref 24–34)
MCHC RBC AUTO-ENTMCNC: 32.8 G/DL (ref 31–37)
MCV RBC AUTO: 83.3 FL (ref 74–97)
METAMYELOCYTES NFR BLD MANUAL: 2 %
MONOCYTES # BLD: 0.9 K/UL (ref 0–1)
MONOCYTES NFR BLD AUTO: 4 % (ref 2–9)
NEUTS SEG # BLD: 18.1 K/UL (ref 1.8–8)
NEUTS SEG NFR BLD AUTO: 81 % (ref 42–75)
PLATELET # BLD AUTO: 237 K/UL (ref 135–420)
PLATELET COMMENTS,PCOM: ABNORMAL
PMV BLD AUTO: 9.9 FL (ref 9.2–11.8)
POTASSIUM SERPL-SCNC: 4.5 MMOL/L (ref 3.5–5.5)
PROT SERPL-MCNC: ABNORMAL G/DL (ref 6.4–8.2)
RBC # BLD AUTO: 2.45 M/UL (ref 4.2–5.3)
RBC MORPH BLD: ABNORMAL
SODIUM SERPL-SCNC: 138 MMOL/L (ref 136–145)
WBC # BLD AUTO: 22.4 K/UL (ref 4.6–13.2)

## 2017-03-28 PROCEDURE — 70450 CT HEAD/BRAIN W/O DYE: CPT

## 2017-03-28 PROCEDURE — P9016 RBC LEUKOCYTES REDUCED: HCPCS | Performed by: INTERNAL MEDICINE

## 2017-03-28 PROCEDURE — 02HV33Z INSERTION OF INFUSION DEVICE INTO SUPERIOR VENA CAVA, PERCUTANEOUS APPROACH: ICD-10-PCS | Performed by: INTERNAL MEDICINE

## 2017-03-28 PROCEDURE — P9059 PLASMA, FRZ BETWEEN 8-24HOUR: HCPCS | Performed by: INTERNAL MEDICINE

## 2017-03-28 PROCEDURE — 74011250637 HC RX REV CODE- 250/637: Performed by: INTERNAL MEDICINE

## 2017-03-28 PROCEDURE — 74011000258 HC RX REV CODE- 258: Performed by: FAMILY MEDICINE

## 2017-03-28 PROCEDURE — 36430 TRANSFUSION BLD/BLD COMPNT: CPT

## 2017-03-28 PROCEDURE — 36415 COLL VENOUS BLD VENIPUNCTURE: CPT | Performed by: FAMILY MEDICINE

## 2017-03-28 PROCEDURE — 74011636637 HC RX REV CODE- 636/637: Performed by: FAMILY MEDICINE

## 2017-03-28 PROCEDURE — 71010 XR CHEST PORT: CPT

## 2017-03-28 PROCEDURE — 74011250637 HC RX REV CODE- 250/637: Performed by: FAMILY MEDICINE

## 2017-03-28 PROCEDURE — 74011000250 HC RX REV CODE- 250: Performed by: FAMILY MEDICINE

## 2017-03-28 PROCEDURE — 65610000006 HC RM INTENSIVE CARE

## 2017-03-28 PROCEDURE — A9560 TC99M LABELED RBC: HCPCS

## 2017-03-28 PROCEDURE — 74011250636 HC RX REV CODE- 250/636: Performed by: FAMILY MEDICINE

## 2017-03-28 PROCEDURE — 99218 HC RM OBSERVATION: CPT

## 2017-03-28 PROCEDURE — 30233K1 TRANSFUSION OF NONAUTOLOGOUS FROZEN PLASMA INTO PERIPHERAL VEIN, PERCUTANEOUS APPROACH: ICD-10-PCS | Performed by: FAMILY MEDICINE

## 2017-03-28 PROCEDURE — 74011250636 HC RX REV CODE- 250/636: Performed by: INTERNAL MEDICINE

## 2017-03-28 PROCEDURE — C9113 INJ PANTOPRAZOLE SODIUM, VIA: HCPCS | Performed by: INTERNAL MEDICINE

## 2017-03-28 PROCEDURE — 85025 COMPLETE CBC W/AUTO DIFF WBC: CPT | Performed by: FAMILY MEDICINE

## 2017-03-28 PROCEDURE — C9113 INJ PANTOPRAZOLE SODIUM, VIA: HCPCS | Performed by: FAMILY MEDICINE

## 2017-03-28 PROCEDURE — 30233N1 TRANSFUSION OF NONAUTOLOGOUS RED BLOOD CELLS INTO PERIPHERAL VEIN, PERCUTANEOUS APPROACH: ICD-10-PCS | Performed by: FAMILY MEDICINE

## 2017-03-28 PROCEDURE — 80053 COMPREHEN METABOLIC PANEL: CPT | Performed by: FAMILY MEDICINE

## 2017-03-28 PROCEDURE — 74011000250 HC RX REV CODE- 250: Performed by: INTERNAL MEDICINE

## 2017-03-28 PROCEDURE — 74011000258 HC RX REV CODE- 258: Performed by: INTERNAL MEDICINE

## 2017-03-28 RX ORDER — OXYCODONE AND ACETAMINOPHEN 5; 325 MG/1; MG/1
1 TABLET ORAL
Status: DISCONTINUED | OUTPATIENT
Start: 2017-03-28 | End: 2017-04-12

## 2017-03-28 RX ORDER — PREDNISONE 20 MG/1
40 TABLET ORAL
Status: DISCONTINUED | OUTPATIENT
Start: 2017-03-28 | End: 2017-03-28

## 2017-03-28 RX ORDER — SODIUM CHLORIDE 9 MG/ML
250 INJECTION, SOLUTION INTRAVENOUS AS NEEDED
Status: DISCONTINUED | OUTPATIENT
Start: 2017-03-28 | End: 2017-04-04 | Stop reason: SDUPTHER

## 2017-03-28 RX ORDER — DIPHENHYDRAMINE HYDROCHLORIDE 50 MG/ML
12.5 INJECTION, SOLUTION INTRAMUSCULAR; INTRAVENOUS
Status: DISCONTINUED | OUTPATIENT
Start: 2017-03-28 | End: 2017-04-18 | Stop reason: HOSPADM

## 2017-03-28 RX ORDER — LORAZEPAM 2 MG/ML
INJECTION INTRAMUSCULAR
Status: DISPENSED
Start: 2017-03-28 | End: 2017-03-28

## 2017-03-28 RX ORDER — PHYTONADIONE 5 MG/1
5 TABLET ORAL
Status: COMPLETED | OUTPATIENT
Start: 2017-03-28 | End: 2017-03-28

## 2017-03-28 RX ORDER — SODIUM CHLORIDE 9 MG/ML
1000 INJECTION, SOLUTION INTRAVENOUS CONTINUOUS
Status: DISPENSED | OUTPATIENT
Start: 2017-03-28 | End: 2017-03-29

## 2017-03-28 RX ORDER — SODIUM CHLORIDE 9 MG/ML
125 INJECTION, SOLUTION INTRAVENOUS CONTINUOUS
Status: DISPENSED | OUTPATIENT
Start: 2017-03-28 | End: 2017-03-29

## 2017-03-28 RX ORDER — ONDANSETRON 2 MG/ML
4 INJECTION INTRAMUSCULAR; INTRAVENOUS
Status: DISCONTINUED | OUTPATIENT
Start: 2017-03-28 | End: 2017-04-18 | Stop reason: HOSPADM

## 2017-03-28 RX ORDER — DEXTROSE MONOHYDRATE AND SODIUM CHLORIDE 5; .45 G/100ML; G/100ML
125 INJECTION, SOLUTION INTRAVENOUS CONTINUOUS
Status: DISCONTINUED | OUTPATIENT
Start: 2017-03-28 | End: 2017-03-28

## 2017-03-28 RX ORDER — MORPHINE SULFATE 2 MG/ML
2 INJECTION, SOLUTION INTRAMUSCULAR; INTRAVENOUS
Status: DISCONTINUED | OUTPATIENT
Start: 2017-03-28 | End: 2017-03-29

## 2017-03-28 RX ORDER — ACETAMINOPHEN 325 MG/1
650 TABLET ORAL
Status: DISCONTINUED | OUTPATIENT
Start: 2017-03-28 | End: 2017-04-18 | Stop reason: HOSPADM

## 2017-03-28 RX ADMIN — Medication 2 MG: at 21:25

## 2017-03-28 RX ADMIN — Medication 2 MG: at 02:07

## 2017-03-28 RX ADMIN — PHYTONADIONE 5 MG: 5 TABLET ORAL at 09:53

## 2017-03-28 RX ADMIN — CALCIUM GLUCONATE 2 G: 94 INJECTION, SOLUTION INTRAVENOUS at 17:02

## 2017-03-28 RX ADMIN — METHYLPREDNISOLONE SODIUM SUCCINATE 125 MG: 125 INJECTION, POWDER, FOR SOLUTION INTRAMUSCULAR; INTRAVENOUS at 14:59

## 2017-03-28 RX ADMIN — Medication 2 MG: at 18:43

## 2017-03-28 RX ADMIN — Medication 2 MG: at 06:40

## 2017-03-28 RX ADMIN — MESALAMINE 1000 MG: 250 CAPSULE ORAL at 21:28

## 2017-03-28 RX ADMIN — Medication 2 MG: at 14:12

## 2017-03-28 RX ADMIN — SODIUM CHLORIDE 1000 ML: 900 INJECTION, SOLUTION INTRAVENOUS at 08:12

## 2017-03-28 RX ADMIN — SODIUM CHLORIDE 80 MG: 9 INJECTION INTRAMUSCULAR; INTRAVENOUS; SUBCUTANEOUS at 09:52

## 2017-03-28 RX ADMIN — DEXTROSE MONOHYDRATE AND SODIUM CHLORIDE 125 ML/HR: 5; .45 INJECTION, SOLUTION INTRAVENOUS at 05:12

## 2017-03-28 RX ADMIN — SODIUM CHLORIDE 125 ML/HR: 900 INJECTION, SOLUTION INTRAVENOUS at 09:53

## 2017-03-28 RX ADMIN — Medication 1 MG: at 10:01

## 2017-03-28 RX ADMIN — PREDNISONE 40 MG: 20 TABLET ORAL at 09:52

## 2017-03-28 RX ADMIN — SODIUM CHLORIDE 40 MG: 9 INJECTION INTRAMUSCULAR; INTRAVENOUS; SUBCUTANEOUS at 21:25

## 2017-03-28 NOTE — PROCEDURES
Procedure:  Central Line Placement    Indication:  Inadequate venous access    Summary:    Informed consent was obtained from the patient. A triple lumen catheter was placed in the right subclavian vein with no difficulty. Sterile Seldinger technique was used after local 1% Lidocaine anesthesia. Aspiration of air was not evident during the procedure. A chest x-ray is pending.

## 2017-03-28 NOTE — PROGRESS NOTES
conducted a Follow up consultation and Spiritual Assessment for Travis Scruggs, who is a 40 y.o.,female. Patient had recently been brought downstairs after an RRT. She asked me to call her mom and/or sister to let them know she has been moved. I was able to contact her sister Jeffry Mckenzie ([phone]) who was going to then call their mother to tell her of her move to ICU. Patient was in good spirits. The  provided the following Interventions:  Continued the relationship of care and support. Listened empathically. Offered prayer and assurance of continued prayer on patients behalf. Chart reviewed. The following outcomes were achieved:  Patient expressed gratitude for Spiritual Care visit. Patient was reviewed in ICU Interdisciplinary Rounds. Assessment:  There are no further spiritual or Cheondoism issues which require Spiritual Care Services intervention at this time. Plan:  Chaplains will continue to follow and will provide pastoral care as needed or requested.  recommends bedside caregivers page the  on duty if patient shows signs of acute spiritual or emotional distress. [address] [place], [place].    Board Certified   [phone] - Office

## 2017-03-28 NOTE — ED PROVIDER NOTES
[address]  EMERGENCY DEPARTMENT HISTORY AND PHYSICAL EXAM       Date: 3/27/2017   Patient Name: Jl Tiwari   YOB: 1970  Medical Record Number: [id number]    History of Presenting Illness     Chief Complaint   Patient presents with    Shortness of Breath        History Provided By:  patient    Additional History:   8:44 PM   Jl Tiwari is a 40 y.o. female with a hx of Crohns disease, ulcerative colitis, and CVA (1 week ago) who presents to the emergency department c/o low hemoglobin today at her PCP's office. Associated symptoms include nausea, SOB, lightheadedness, blood in stool, and chills. Blood thinner use. PMHx of colonoscopy (last week with Stefanie Pierre MD for blood in stool and polyps were found). Pt denies vomiting, fever, and any other symptoms or complaints. Primary Care Provider: None   Specialist:    Past History     Past Medical History:   Past Medical History:   Diagnosis Date    Abdominal pain     Anemia NEC     sickle cell trait    C. difficile colitis     Crohn's disease ([facility] [address]     Gastrointestinal disorder     Crohns    Herpes zoster     Hx of cocaine abuse     Hyperkalemia     Hypertension     Iron deficiency anemia     MRSA (methicillin resistant Staphylococcus aureus)     Obesity     Pain management     Sickle cell trait (HCC)     Stroke ([facility] [address]     + cva 3/17        Past Surgical History:   Past Surgical History:   Procedure Laterality Date    COLONOSCOPY N/A 3/17/2017    COLONOSCOPY; BIOPSY; performed by Raj Guerra MD at [facility] ENDOSCOPY    HX GYN      tubal ligation    HX TUBAL LIGATION          Family History:   History reviewed. No pertinent family history. Social History:   Social History   Substance Use Topics    Smoking status: Former Smoker    Smokeless tobacco: None    Alcohol use No        Allergies:    Allergies   Allergen Reactions    Humira [Adalimumab] Other (comments)    Remicade [Infliximab] Palpitations        Review of Systems   Review of Systems   Constitutional: Positive for chills. Negative for fever. Respiratory: Positive for shortness of breath. Gastrointestinal: Positive for blood in stool and nausea. Negative for vomiting. Neurological: Positive for light-headedness. Hematological: Bruises/bleeds easily. All other systems reviewed and are negative. Physical Exam  Vitals:    03/27/17 1929 03/27/17 2221 03/27/17 2301 03/27/17 2310   BP: 108/71 117/76 106/56 120/84   Pulse: (!) 114 (!) 105 (!) 101 96   Resp: 18 18 20 18   Temp: 98.4 °F (36.9 °C) 98 °F (36.7 °C) 98.1 °F (36.7 °C) 98 °F (36.7 °C)   SpO2: 98% 100% 100% 100%   Weight: 82.1 kg (181 lb)      Height: 5' 6\" (1.676 m)          Physical Exam   Constitutional: She is oriented to person, place, and time. She appears well-developed and well-nourished. HENT:   Head: Normocephalic and atraumatic. Right Ear: External ear normal.   Left Ear: External ear normal.   Nose: Nose normal.   Mouth/Throat: Oropharynx is clear and moist.   Eyes: EOM are normal. Pupils are equal, round, and reactive to light. Right eye exhibits no discharge. Left eye exhibits no discharge. No scleral icterus. Pale conjunctiva     Neck: Normal range of motion. Neck supple. No JVD present. No tracheal deviation present. Cardiovascular: Regular rhythm, normal heart sounds and intact distal pulses. Tachycardia present. Pulmonary/Chest: Effort normal and breath sounds normal.   Abdominal: Soft. Bowel sounds are normal. She exhibits no distension. There is no tenderness. No HSM. Generalized discomfort throughout abdomen. Rectal exam deferred . Genitourinary: Rectal exam shows guaiac positive stool. Genitourinary Comments: Rectal exam done w/ Maggie, FRANCIS, as female chaperone.  Pt has external hemorrhoids, not actively bleeding, has nl rectal tone, no mass or impaction, stool, guiac +ve, control +ve;    Musculoskeletal: Normal range of motion. She exhibits no edema. Neurological: She is alert and oriented to person, place, and time. She has normal reflexes. She exhibits normal muscle tone. Coordination normal.   No focal motor weakness. Skin: Skin is warm and dry. No rash noted. Psychiatric: She has a normal mood and affect. Her behavior is normal.   Nursing note and vitals reviewed. Diagnostic Study Results     Labs -      Recent Results (from the past 12 hour(s))   EKG, 12 LEAD, INITIAL    Collection Time: 03/27/17  8:11 PM   Result Value Ref Range    Ventricular Rate 111 BPM    Atrial Rate 111 BPM    P-R Interval 134 ms    QRS Duration 80 ms    Q-T Interval 352 ms    QTC Calculation (Bezet) 478 ms    Calculated P Axis 27 degrees    Calculated R Axis -2 degrees    Calculated T Axis 40 degrees    Diagnosis       Sinus tachycardia  Anterior infarct , age undetermined  Abnormal ECG  When compared with ECG of 10-MAR-2017 17:23,  Anterior infarct is now present     CBC WITH AUTOMATED DIFF    Collection Time: 03/27/17  8:50 PM   Result Value Ref Range    WBC 13.7 (H) 4.6 - 13.2 K/uL    RBC 2.60 (L) 4.20 - 5.30 M/uL    HGB 6.5 (L) 12.0 - 16.0 g/dL    HCT 21.0 (L) 35.0 - 45.0 %    MCV 80.8 74.0 - 97.0 FL    MCH 25.0 24.0 - 34.0 PG    MCHC 31.0 31.0 - 37.0 g/dL    RDW 18.9 (H) 11.6 - 14.5 %    PLATELET 108 (H) 883 - 420 K/uL    MPV 9.2 9.2 - 11.8 FL    NEUTROPHILS 84 (H) 42 - 75 %    BAND NEUTROPHILS 1 0 - 5 %    LYMPHOCYTES 10 (L) 20 - 51 %    MONOCYTES 5 2 - 9 %    EOSINOPHILS 0 0 - 5 %    BASOPHILS 0 0 - 3 %    ABS. NEUTROPHILS 11.5 (H) 1.8 - 8.0 K/UL    ABS. LYMPHOCYTES 1.4 0.8 - 3.5 K/UL    ABS. MONOCYTES 0.7 0 - 1.0 K/UL    ABS. EOSINOPHILS 0.0 0.0 - 0.4 K/UL    ABS.  BASOPHILS 0.0 0.0 - 0.1 K/UL    RBC COMMENTS ANISOCYTOSIS  1+        RBC COMMENTS        POLYCHROMASIA  SLIGHT  HYPOCHROMIA  SLIGHT  TARGET CELLS  OCCASIONAL  OVALOCYTES  1+      RBC COMMENTS        SCHISTOCYTES  OCCASIONAL  TEARDROP CELLS  FEW  POIKILOCYTOSIS  2+      RBC COMMENTS MICROCYTOSIS  1+        WBC COMMENTS TOXIC GRANULATION      DF MANUAL     METABOLIC PANEL, COMPREHENSIVE    Collection Time: 03/27/17  8:50 PM   Result Value Ref Range    Sodium 144 136 - 145 mmol/L    Potassium 4.2 3.5 - 5.5 mmol/L    Chloride 106 100 - 108 mmol/L    CO2 30 21 - 32 mmol/L    Anion gap 8 3.0 - 18 mmol/L    Glucose 126 (H) 74 - 99 mg/dL    BUN 16 7.0 - 18 MG/DL    Creatinine 0.77 0.6 - 1.3 MG/DL    BUN/Creatinine ratio 21 (H) 12 - 20      GFR est AA >60 >60 ml/min/1.73m2    GFR est non-AA >60 >60 ml/min/1.73m2    Calcium 8.4 (L) 8.5 - 10.1 MG/DL    Bilirubin, total <0.0 (L) 0.2 - 1.0 MG/DL    ALT (SGPT) 37 13 - 56 U/L    AST (SGOT) 29 15 - 37 U/L    Alk.  phosphatase 42 (L) 45 - 117 U/L    Protein, total 5.9 (L) 6.4 - 8.2 g/dL    Albumin 2.6 (L) 3.4 - 5.0 g/dL    Globulin 3.3 2.0 - 4.0 g/dL    A-G Ratio 0.8 0.8 - 1.7     CARDIAC PANEL,(CK, CKMB & TROPONIN)    Collection Time: 03/27/17  8:50 PM   Result Value Ref Range    CK 41 26 - 192 U/L    CK - MB <1.0 <3.6 ng/ml    CK-MB Index Cannot be calulated 0.0 - 4.0 %    Troponin-I, Qt. <0.02 0.00 - 0.06 NG/ML   TYPE & SCREEN    Collection Time: 03/27/17  8:50 PM   Result Value Ref Range    Crossmatch Expiration 03/30/2017     ABO/Rh(D) A POSITIVE     Antibody screen NEG     CALLED TO: Tawana ABBOTT [id number] AT 2240      Unit number [id number]     Blood component type RC LR AS1     Unit division 00     Status of unit ALLOCATED     Crossmatch result Compatible     Unit number [id number]     Blood component type RC LR AS1     Unit division 00     Status of unit ISSUED     Crossmatch result Compatible     Unit number [id number]     Blood component type RC LR AS1     Unit division 00     Status of unit ALLOCATED     Crossmatch result Compatible     Unit number [id number]     Blood component type RC LR AS1     Unit division 00     Status of unit ALLOCATED     Crossmatch result Compatible    PROTHROMBIN TIME + INR    Collection Time: 03/27/17 8:50 PM   Result Value Ref Range    Prothrombin time 21.7 (H) 11.5 - 15.2 sec    INR 2.0 (H) 0.8 - 1.2     PTT    Collection Time: 03/27/17  8:50 PM   Result Value Ref Range    aPTT 37.9 (H) 23.0 - 36.4 SEC       Radiologic Studies -  No orders to display        Medical Decision Making   I am the first provider for this patient. I reviewed the vital signs, available nursing notes, past medical history, past surgical history, family history and social history. Vital Signs-Reviewed the patient's vital signs. Patient Vitals for the past 12 hrs:   Temp Pulse Resp BP SpO2   03/27/17 2310 98 °F (36.7 °C) 96 18 120/84 100 %   03/27/17 2301 98.1 °F (36.7 °C) (!) 101 20 106/56 100 %   03/27/17 2221 98 °F (36.7 °C) (!) 105 18 117/76 100 %   03/27/17 1929 98.4 °F (36.9 °C) (!) 114 18 108/71 98 %       Pulse Oximetry Analysis - Normal 98% on RA     EKG interpretation: (Preliminary)  20:11  Sinus tachycardia, 111 bpm, Q wave inferiorly, negative STEMI  EKG read by Karlos Aguilar MD     Old Medical Records: Nursing notes. Provider Notes:   Colitis, crohns, anti hypercoaguable state, pt on Lovenox, r/o cancer, other toxins    Medications Given in the ED:  Medications   0.9% sodium chloride infusion 250 mL (250 mL IntraVENous New Bag 3/27/17 2301)   pantoprazole (PROTONIX) injection 80 mg (80 mg IntraVENous Given 3/27/17 2210)   methylPREDNISolone (PF) (SOLU-MEDROL) injection 125 mg (125 mg IntraVENous Given 3/27/17 2236)   oxyCODONE-acetaminophen (PERCOCET 10)  mg per tablet 1 Tab (1 Tab Oral Given 3/27/17 2233)        PROGRESS NOTE:  8:44 PM  Initial assessment performed. CONSULT NOTE:   9:56 PM  Karlos Aguilar MD spoke with Kaela Padgett MD  via telephone consult  Specialty: Internal Medicine   Discussed pt's hx, disposition, available diagnostic, and imaging results. Reviewed care plans. Consulting physician agrees with plans as outlined.  He agrees to admit to remote telemetry and would like GI consult. 9:56 PM  Patient is being admitted to the hospital by Belen Miller MD. The results of their tests and reasons for their admission have been discussed with them and/or available family. They convey agreement and understanding for the need to be admitted and for their admission diagnosis. CONSULT NOTE:   10:27 PM  Perfecto Galarza MD  spoke with Margarita Espinosa MD via telephone consult  Specialty: Gastroenterology  Discussed pt's hx, disposition, available diagnostic, and imaging results. Reviewed care plans. Consulting physician agrees with plans as outlined. He agrees with admission, blood transfusion, requests Solumedrol 125mg , lower residue diet, and daily 40mg PO Prednisone for the next few weeks until Remicade kicks in. Critical Care Time:   10:02 PM  I have spent 35 minutes of critical care time involved in lab review, consultations with specialist, family decision-making, and documentation. During this entire length of time I was immediately available to the patient. Critical Care: The reason for providing this level of medical care for this critically ill patient was due a critical illness that impaired one or more vital organ systems such that there was a high probability of imminent or life threatening deterioration in the patients condition. This care involved high complexity decision making to assess, manipulate, and support vital system functions, to treat this degreee vital organ system failure and to prevent further life threatening deterioration of the patients condition. Diagnosis   Clinical Impression:   1. Iron deficiency anemia, unspecified iron deficiency anemia type    2. Rectal bleeding    3. Anticoagulated on Coumadin    4. Crohn's disease of large intestine with rectal bleeding ([facility] [address]         _______________________________   Attestations:      This note is prepared by John Joseph, acting as a Scribe for Perfecto Galarza MD  at 8:42 PM on 3/27/2017 Don Wells MD: The gerhardibe's documentation has been prepared under my direction and personally reviewed by me in its entirety.   _______________________________

## 2017-03-28 NOTE — PROGRESS NOTES
Dr. Briones Bone paged, Pt SBP 90's, still bleeding, filling up multiple bed pans logan red with clots. 2 units PRBC ordered. Calcium gluconate ordered.

## 2017-03-28 NOTE — ROUTINE PROCESS
TRANSFER - IN REPORT:    Verbal report received from [place], RN(name) on Brit Kay  being received from ER(unit) for routine progression of care      Report consisted of patients Situation, Background, Assessment and   Recommendations(SBAR). Information from the following report(s) SBAR, Kardex, ED Summary, Intake/Output, MAR and Recent Results was reviewed with the receiving nurse. Opportunity for questions and clarification was provided. Pt accompanied to room from ER, blood transfusion ongoing. Assessment completed upon patients arrival to unit and care assumed. 6913 assisted to bathroom, pt had bloody stool    0140 received order for Morphine and Zofran as needed IV. MD aware of bloody stool. Assisted pt to bathroom again - had large amount of bloody stool    0400 Dr. Jaquelin Figueroa made aware of MEWS score and frequency and amount of bloody stools    0650 Shift summary: pt is A/O x4, up with assist to bathroom, no complaint of dizziness but some weakness on standing. Had 3 bloody stools with clots - MD aware. Received 2 units of PRBC during shift, on IVF of D5 1/2 NS at 125 ml/hr after blood given. Had own walker in room. Dr. Marylu Cardenas aware of consult per ER nurse. L LE with trace - (+)1 edema, R LE tender to touch. Morphine given for pain control. 0750 Bedside and Verbal shift change report given to Yumiko Proctor RN (oncoming nurse) by Lynne Hollis RN   (offgoing nurse). Report included the following information SBAR, Kardex, ED Summary, Intake/Output, MAR and Recent Results.

## 2017-03-28 NOTE — CONSULTS
[address] [place] [address] Rd    Name:  Vignesh Sofia  MR#:  [id number]  :  1970  Account #:  [de-identified]  Date of Adm:  2017  Date of Consultation:  2017      REASON FOR CONSULTATION: GI bleed and Crohn disease. HISTORY OF PRESENT ILLNESS: This is a very unhealthy 41-year-  old female who was admitted back to the hospital with rectal bleeding  and anemia. She was recently treated with blood thinners for a DVT  and cerebral vascular accident. The patient has a known history of  Crohn disease in the sigmoid and left colon. She came to the  emergency room and was admitted to the hospital for further  evaluation and treatment. The patient has gotten some vitamin K and  plans are for FFP. She has gotten 2 units of blood. The blood thinners  have been stopped. She complains of a little bit of abdominal pain. No  nausea, vomiting, or hematemesis. She complains of some tingling of  her right lower extremity. PAST MEDICAL HISTORY: Remarkable for Crohn disease,  hypertension, sickle cell trait, neuropathic pain, chronic pain, DVT,  cerebral cerebrovascular accident, history of cocaine abuse. She has  had C difficile colitis in the past. She has had MRSA in the past.    PAST SURGICAL HISTORY: Have included a tubal ligation and she  did have a recent colonoscopy by Dr. Alexander Malagon. MEDICATIONS PRIOR TO ADMISSION  1. Coumadin. 2. Lipitor. 3. Neurontin. 4. Pentasa. 5. Prednisone. 6. Lovenox. 7. Oxycodone. ALLERGIES  1. HUMIRA. 2. REMICADE. REVIEW OF SYSTEMS: Positive chills and malaise. No fevers. No  acute weight loss. No headache, sore throat, tinnitus, rhinorrhea, or  vertigo. Positive shortness of breath. No angina, however. No history  of cardiac arrhythmia. No sputum production or cough or wheezing or  hemoptysis. No nausea or vomiting. Positive loose stools with blood. No hematuria or dysuria. No significant arthralgias or myalgias.  She  complains of tingling on the right leg. She has swelling of her legs as  well. No significant rash. No seizure disorder. PHYSICAL EXAMINATION  GENERAL: The patient is sleepy, but arousable. SKIN: Warm and dry. HEENT: Skin and sclerae are anicteric. NECK: No cervical lymphadenopathy or JVD. LUNGS: Decreased breath sounds. HEART: Regular rate and rhythm. ABDOMEN: Flat with bowel sounds. Soft, slightly tender in the left  lower quadrant. No peritoneal signs or masses. EXTREMITIES: Her left lower extremity has some edema. Her skin is  warm and dry. She has palpable left pedal pulse and radial pulse. The  right pedal pulse is not palpable, but she has normal motor function of  her lower extremities and upper extremities. IMPRESSION: Lower gastrointestinal bleed, likely due to  anticoagulation, and likely bleeding from the Crohn disease. Hopefully,  stopping the anticoagulation and giving her fresh-frozen plasma and  vitamin K, the bleeding will stop. As she has had a recent colonoscopy,  I do not think this needs to be repeated and I am not sure if she really  needs a bleeding scan either at this point. Will follow along with you  and may get Dr. Paige Hagen involved as she is her colorectal surgeon.         Tressa Chavez MD    EO / TB  D:  03/28/2017   11:00  T:  03/28/2017   11:29  Job #:  [id number]

## 2017-03-28 NOTE — PROGRESS NOTES
1850: Paged Dr. Nichelle Paz about Acute GI Bleed Scan results. Awaiting call back. 1910: Dr. Nichelle Paz called back. Aware of Scan results. No new orders.

## 2017-03-28 NOTE — PROGRESS NOTES
Hospitalist Progress Note    Patient: Lamberto Singh MRN: [id number]  CSN: [id number]    YOB: 1970  Age: 40 y.o. Sex: female    DOA: 3/27/2017 LOS:  LOS: 1 day            Assessment/Plan   1. Acute Gi bleed  2. Hypotension  3. ABL anemia  4. Crohn's colitis  5. Recent DVT  6. Recent embolic CVA    Plan:  - bolus I liter IV normal saline now  - repeat h/h STAT  - give dose of vitamin K now as well as FFP given significatn bleeding w hemodynamic instability  - h/h q4  - continue protonix, mesalamine, prednisone  - GI consulted; discussed w Dr Luis Neri, recommends surgery evaluation. Call out to Dr Barbi valencia  left  - low threshold to transfer to ICU    Addendum 10:43 AM  RRT called for hypotension and AMS  She had gotten out of bed to use bedside commode, pressure noted to be 63'H systolic. Moved to bed w improvement in BP to 86Y systolic, she is awake, oriented, moves all 4 extremities  Repeat hg noted 6.7    -> transfer to ICU  - > transfuse additional 2 units prbc  -> awaiting FFP  -> CT head given fall        Patient Active Problem List   Diagnosis Code    Chronic pain syndrome G89.4    Crohn disease ([facility] K50.90    Sickle cell trait ([facility] D57.3    Hypertension I10    Hx of cocaine abuse L72.643    Embolic stroke ([facility] P74.8    Acute blood loss anemia D62    Neuropathic pain M79.2    DVT [facility]deep venous thrombosis) ([facility] I82.409    GI bleed K92.2    Elevated WBC count D72.829               Subjective:    cc: \" Im a little dizzy\"  Pt with multiple bloody bowel movements overnight   Co cramping abdominal pain 6/10, no radiation no alleviating or aggravating factors  Dyspnea improved  BP during rounds 28/F systolic      REVIEW OF SYSTEMS:  General: No fevers or chills. Cardiovascular: No chest pain or pressure. No palpitations. Pulmonary: No shortness of breath. Gastrointestinal: No nausea, vomiting.      Objective:        Vital signs/Intake and Output:  Visit Vitals    BP (!) 76/39    Pulse 100    Temp 98.9 °F (37.2 °C)    Resp 15    Ht 5' 6\" (1.676 m)    Wt 82.1 kg (181 lb)    SpO2 100%    Breastfeeding No    BMI 29.21 kg/m2     Current Shift:     Last three shifts:  03/26 1901 - 03/28 0700  In: 330   Out: 700     Body mass index is 29.21 kg/(m^2).     Physical Exam:  GEN: Alert and oriented times three NAD  EYES: conjunctiva normal, lids with out lesions  HEENT: MMM, No thyromegaly, no lymphadenopathy  HEART: RRR +S1 +S2, no JVD, pulses 2+ distally  LUNGS: CTA B/L no wheezes, rales or rhonchi  ABDOMEN: + BS, soft NT/ND no organomegaly,  No rebound  EXTREMITIES: No edema cyanosis, cap refill normal   SKIN: no rashes or skin breakdown, no nodules, normal turgor  Current Facility-Administered Medications   Medication Dose Route Frequency    mesalamine (PENTASA) CR capsule 1,000 mg  1,000 mg Oral QID    oxyCODONE-acetaminophen (PERCOCET) 5-325 mg per tablet 1 Tab  1 Tab Oral Q4H PRN    predniSONE (DELTASONE) tablet 40 mg  40 mg Oral DAILY WITH BREAKFAST    acetaminophen (TYLENOL) tablet 650 mg  650 mg Oral Q4H PRN    diphenhydrAMINE (BENADRYL) injection 12.5 mg  12.5 mg IntraVENous Q4H PRN    pantoprazole (PROTONIX) 80 mg in sodium chloride 0.9 % 20 mL injection  80 mg IntraVENous DAILY    morphine injection 2 mg  2 mg IntraVENous Q4H PRN    ondansetron (ZOFRAN) injection 4 mg  4 mg IntraVENous Q6H PRN    0.9% sodium chloride infusion  125 mL/hr IntraVENous CONTINUOUS    0.9% sodium chloride infusion 1,000 mL  1,000 mL IntraVENous CONTINUOUS    phytonadione (vitamin K1) (MEPHYTON) tablet 5 mg  5 mg Oral NOW    phytonadione (vitamin K1) (AQUA-MEPHYTON) 10 mg in 0.9% sodium chloride 50 mL IVPB  10 mg IntraVENous ONCE    0.9% sodium chloride infusion 250 mL  250 mL IntraVENous PRN    0.9% sodium chloride infusion 250 mL  250 mL IntraVENous PRN         All the patient's labs over the past 24 hours were reviewed both during my initial daily workflow process and at the time notated as \"note time\" in [address] [facility]. (It is not time stamped separately in this workflow.)  Select labs are listed below. Labs: Results:       Chemistry Recent Labs      03/27/17 2050   GLU  126*   NA  144   K  4.2   CL  106   CO2  30   BUN  16   CREA  0.77   CA  8.4*   AGAP  8   BUCR  21*   AP  42*   TP  5.9*   ALB  2.6*   GLOB  3.3   AGRAT  0.8      CBC w/Diff Recent Labs      03/27/17 2050   WBC  13.7*   RBC  2.60*   HGB  6.5*   HCT  21.0*   PLT  438*   GRANS  84*   LYMPH  10*   EOS  0      Cardiac Enzymes Recent Labs      03/27/17 2050   CPK  41   CKND1  Cannot be calulated      Coagulation Recent Labs      03/27/17 2050   PTP  21.7*   INR  2.0*   APTT  37.9*       Lipid Panel Lab Results   Component Value Date/Time    Cholesterol, total 145 03/12/2017 06:15 AM    HDL Cholesterol 37 03/12/2017 06:15 AM    LDL, calculated 91.6 03/12/2017 06:15 AM    VLDL, calculated 16.4 03/12/2017 06:15 AM    Triglyceride 82 03/12/2017 06:15 AM    CHOL/HDL Ratio 3.9 03/12/2017 06:15 AM          Liver Enzymes Recent Labs      03/27/17 2050   TP  5.9*   ALB  2.6*   AP  42*   SGOT  29      Thyroid Studies Lab Results   Component Value Date/Time    TSH 1.51 12/12/2009 09:48 AM        Procedures/imaging: see electronic medical records for all procedures/Xrays and details which were not copied into this note but were reviewed prior to creation of Plan    Cc time: 70 minutes    The reason for providing this level of medical care for this critically ill patient was due a critical illness that impaired one or more vital organ systems such that there was a high probability of imminent or life threatening deterioration in the patients condition. This care involved high complexity decision making to assess, manipulate, and support vital system functions, to treat this degreee vital organ system failure and to prevent further life threatening deterioration of the patients condition. Niya Manzo, DO  Internal Medicine/Geriatrics

## 2017-03-28 NOTE — CONSULTS
Pulmonology Intensive Care Unit Initial Assessment    Subjective:        Subjective:     Critical Care Initial Evaluation Note: 3/28/2017 12:57 PM    This patient has been seen and evaluated at the request of Dr. Gerard Osman. Alen Pedraza is a 40 y.o. female with a hx of Crohns disease, ulcerative colitis, and CVA (1 week ago) and left leg DVT, presents to the emergency department c/o low hemoglobin today at her PCP's office. Patient was told to come to the ED for evaluation and possible blood trasfusion. Associated symptoms include nausea, SOB, lightheadedness, blood in stool, and chills. Patient recently started on lovenox and has been on coumadin for about 5 days. States that her stool is maroon in color for the last 2 days. PMHx of colonoscopy (last week with Roosevelt Morris MD for blood in stool and polyps were found). Patient was transferred to ICU for hypotension and lack of IV access     Past Medical History:   Diagnosis Date    Abdominal pain     Anemia NEC     sickle cell trait    C. difficile colitis     Crohn's disease ([address]     Gastrointestinal disorder     Crohns    Herpes zoster     Hx of cocaine abuse     Hyperkalemia     Hypertension     Iron deficiency anemia     MRSA (methicillin resistant Staphylococcus aureus)     Obesity     Pain management     Sickle cell trait ([address]     Stroke ([facility] [address]     + cva 3/17      Past Surgical History:   Procedure Laterality Date    COLONOSCOPY N/A 3/17/2017    COLONOSCOPY; BIOPSY; performed by Paris Bates MD at [facility] ENDOSCOPY    HX GYN      tubal ligation    HX TUBAL LIGATION        Prior to Admission medications    Medication Sig Start Date End Date Taking? Authorizing Provider   warfarin (COUMADIN) 5 mg tablet Take 5 mg by mouth daily. Yes Historical Provider   atorvastatin (LIPITOR) 80 mg tablet Take 1 Tab by mouth nightly.  3/23/17  Yes Mary Ken MD   gabapentin (NEURONTIN) 400 mg capsule Take 1 Cap by mouth three (3) times daily. 3/23/17  Yes Stewart Jacobo MD   mesalamine (PENTASA) 500 mg CR capsule Take 2 Caps by mouth four (4) times daily. Indications: CROHN'S DISEASE 3/23/17  Yes Stewart Jacobo MD   predniSONE (DELTASONE) 20 mg tablet Take 2 Tabs by mouth daily (with breakfast). Indications: CROHN'S DISEASE 3/23/17  Yes Stewart Jacobo MD   enoxaparin (LOVENOX) 80 mg/0.8 mL injection 80 mg by SubCUTAneous route every twelve (12) hours. 3/23/17  Yes Stewart Jacobo MD   oxyCODONE-acetaminophen (PERCOCET) 5-325 mg per tablet Take 1 Tab by mouth every four (4) hours as needed for Pain. Max Daily Amount: 6 Tabs. 3/23/17   Stewart Jacobo MD     Allergies   Allergen Reactions    Humira [Adalimumab] Other (comments)    Remicade [Infliximab] Palpitations      Social History   Substance Use Topics    Smoking status: Former Smoker    Smokeless tobacco: Not on file    Alcohol use No      History reviewed. No pertinent family history. Review of Systems    Constitutional: positive for fatigue  Eyes: negative for contacts/glasses and visual disturbance  Ears, nose, mouth, throat, and face: negative for nasal congestion  Respiratory: negative for asthma, wheezing or stridor  Cardiovascular: negative for chest pain, palpitations  Gastrointestinal: negative for dysphagia  Genitourinary:negative for frequency  Integument/breast: negative for rash and skin color change  Hematologic/lymphatic: negative for coughing up blood  Musculoskeletal:negative for neck pain and back pain  Neurological: negative for memory problems and speech problems  Behavioral/Psych: negative for bipolar  Endocrine: negative for fertility problems and temperature intolerance  Allergic/Immunologic: negative for urticaria and anaphylaxis    Objective:     Vital signs reviewed.        03/26 1901 - 03/28 0700  In: 580 [I.V.:250]  Out: 700     Physical Exam:     Additional comments:None    Data Review     Recent Results (from the past 24 hour(s))   EKG, 12 LEAD, INITIAL    Collection Time: 03/27/17  8:11 PM   Result Value Ref Range    Ventricular Rate 111 BPM    Atrial Rate 111 BPM    P-R Interval 134 ms    QRS Duration 80 ms    Q-T Interval 352 ms    QTC Calculation (Bezet) 478 ms    Calculated P Axis 27 degrees    Calculated R Axis -2 degrees    Calculated T Axis 40 degrees    Diagnosis       Sinus tachycardia  Anterior infarct , age undetermined  Abnormal ECG  When compared with ECG of 10-MAR-2017 17:23,  Anterior infarct is now present     CBC WITH AUTOMATED DIFF    Collection Time: 03/27/17  8:50 PM   Result Value Ref Range    WBC 13.7 (H) 4.6 - 13.2 K/uL    RBC 2.60 (L) 4.20 - 5.30 M/uL    HGB 6.5 (L) 12.0 - 16.0 g/dL    HCT 21.0 (L) 35.0 - 45.0 %    MCV 80.8 74.0 - 97.0 FL    MCH 25.0 24.0 - 34.0 PG    MCHC 31.0 31.0 - 37.0 g/dL    RDW 18.9 (H) 11.6 - 14.5 %    PLATELET 101 (H) 527 - 420 K/uL    MPV 9.2 9.2 - 11.8 FL    NEUTROPHILS 84 (H) 42 - 75 %    BAND NEUTROPHILS 1 0 - 5 %    LYMPHOCYTES 10 (L) 20 - 51 %    MONOCYTES 5 2 - 9 %    EOSINOPHILS 0 0 - 5 %    BASOPHILS 0 0 - 3 %    ABS. NEUTROPHILS 11.5 (H) 1.8 - 8.0 K/UL    ABS. LYMPHOCYTES 1.4 0.8 - 3.5 K/UL    ABS. MONOCYTES 0.7 0 - 1.0 K/UL    ABS. EOSINOPHILS 0.0 0.0 - 0.4 K/UL    ABS.  BASOPHILS 0.0 0.0 - 0.1 K/UL    RBC COMMENTS ANISOCYTOSIS  1+        RBC COMMENTS        POLYCHROMASIA  SLIGHT  HYPOCHROMIA  SLIGHT  TARGET CELLS  OCCASIONAL  OVALOCYTES  1+      RBC COMMENTS        SCHISTOCYTES  OCCASIONAL  TEARDROP CELLS  FEW  POIKILOCYTOSIS  2+      RBC COMMENTS MICROCYTOSIS  1+        WBC COMMENTS TOXIC GRANULATION      DF MANUAL     METABOLIC PANEL, COMPREHENSIVE    Collection Time: 03/27/17  8:50 PM   Result Value Ref Range    Sodium 144 136 - 145 mmol/L    Potassium 4.2 3.5 - 5.5 mmol/L    Chloride 106 100 - 108 mmol/L    CO2 30 21 - 32 mmol/L    Anion gap 8 3.0 - 18 mmol/L    Glucose 126 (H) 74 - 99 mg/dL    BUN 16 7.0 - 18 MG/DL    Creatinine 0.77 0.6 - 1.3 MG/DL    BUN/Creatinine ratio 21 (H) 12 - 20 GFR est AA >60 >60 ml/min/1.73m2    GFR est non-AA >60 >60 ml/min/1.73m2    Calcium 8.4 (L) 8.5 - 10.1 MG/DL    Bilirubin, total <0.0 (L) 0.2 - 1.0 MG/DL    ALT (SGPT) 37 13 - 56 U/L    AST (SGOT) 29 15 - 37 U/L    Alk.  phosphatase 42 (L) 45 - 117 U/L    Protein, total 5.9 (L) 6.4 - 8.2 g/dL    Albumin 2.6 (L) 3.4 - 5.0 g/dL    Globulin 3.3 2.0 - 4.0 g/dL    A-G Ratio 0.8 0.8 - 1.7     CARDIAC PANEL,(CK, CKMB & TROPONIN)    Collection Time: 03/27/17  8:50 PM   Result Value Ref Range    CK 41 26 - 192 U/L    CK - MB <1.0 <3.6 ng/ml    CK-MB Index Cannot be calulated 0.0 - 4.0 %    Troponin-I, Qt. <0.02 0.00 - 0.06 NG/ML   TYPE & SCREEN    Collection Time: 03/27/17  8:50 PM   Result Value Ref Range    Crossmatch Expiration 03/30/2017     ABO/Rh(D) A POSITIVE     Antibody screen NEG     CALLED TO: Zuleyka ABBOTT [id number] AT [id number]      Unit number [id number]     Blood component type  LR AS1     Unit division 00     Status of unit ISSUED     Crossmatch result Compatible     Unit number [id number]     Blood component type  LR AS1     Unit division 00     Status of unit TRANSFUSED     Crossmatch result Compatible     Unit number [id number]     Blood component type  LR AS1     Unit division 00     Status of unit ALLOCATED     Crossmatch result Compatible     Unit number [id number]     Blood component type  LR AS1     Unit division 00     Status of unit ALLOCATED     Crossmatch result Compatible    PROTHROMBIN TIME + INR    Collection Time: 03/27/17  8:50 PM   Result Value Ref Range    Prothrombin time 21.7 (H) 11.5 - 15.2 sec    INR 2.0 (H) 0.8 - 1.2     PTT    Collection Time: 03/27/17  8:50 PM   Result Value Ref Range    aPTT 37.9 (H) 23.0 - 36.4 SEC   FFP, ALLOCATE    Collection Time: 03/28/17  8:45 AM   Result Value Ref Range    Unit number [id number]     Blood component type FP24H,THAW     Unit division 00     Status of unit ISSUED    CBC WITH AUTOMATED DIFF    Collection Time: 03/28/17  9:01 AM Result Value Ref Range    WBC 22.4 (H) 4.6 - 13.2 K/uL    RBC 2.45 (L) 4.20 - 5.30 M/uL    HGB 6.7 (L) 12.0 - 16.0 g/dL    HCT 20.4 (L) 35.0 - 45.0 %    MCV 83.3 74.0 - 97.0 FL    MCH 27.3 24.0 - 34.0 PG    MCHC 32.8 31.0 - 37.0 g/dL    RDW 17.3 (H) 11.6 - 14.5 %    PLATELET 250 839 - 703 K/uL    MPV 9.9 9.2 - 11.8 FL    NEUTROPHILS 81 (H) 42 - 75 %    LYMPHOCYTES 13 (L) 20 - 51 %    MONOCYTES 4 2 - 9 %    EOSINOPHILS 0 0 - 5 %    BASOPHILS 0 0 - 3 %    METAMYELOCYTES 2 (H) 0 %    ABS. NEUTROPHILS 18.1 (H) 1.8 - 8.0 K/UL    ABS. LYMPHOCYTES 2.9 0.8 - 3.5 K/UL    ABS. MONOCYTES 0.9 0 - 1.0 K/UL    ABS. EOSINOPHILS 0.0 0.0 - 0.4 K/UL    ABS. BASOPHILS 0.0 0.0 - 0.1 K/UL    PLATELET COMMENTS CLUMPED PLATELETS      RBC COMMENTS ANISOCYTOSIS  1+        RBC COMMENTS POIKILOCYTOSIS  1+        RBC COMMENTS TEARDROP CELLS  1+  HYPOCHROMIA  1+        RBC COMMENTS POLYCHROMASIA  1+        DF MANUAL     METABOLIC PANEL, COMPREHENSIVE    Collection Time: 03/28/17  9:01 AM   Result Value Ref Range    Sodium 138 136 - 145 mmol/L    Potassium 4.5 3.5 - 5.5 mmol/L    Chloride 109 (H) 100 - 108 mmol/L    CO2 22 21 - 32 mmol/L    Anion gap 7 3.0 - 18 mmol/L    Glucose 112 (H) 74 - 99 mg/dL    BUN 15 7.0 - 18 MG/DL    Creatinine 0.55 (L) 0.6 - 1.3 MG/DL    BUN/Creatinine ratio 27 (H) 12 - 20      GFR est AA >60 >60 ml/min/1.73m2    GFR est non-AA >60 >60 ml/min/1.73m2    Calcium 7.5 (L) 8.5 - 10.1 MG/DL    Bilirubin, total QUANTITY NOT SUFFICIENT. SUGGEST RECOLLECTION 0.2 - 1.0 MG/DL    ALT (SGPT) QUANTITY NOT SUFFICIENT. SUGGEST RECOLLECTION 13 - 56 U/L    AST (SGOT) QUANTITY NOT SUFFICIENT. SUGGEST RECOLLECTION 15 - 37 U/L    Alk. phosphatase QUANTITY NOT SUFFICIENT. SUGGEST RECOLLECTION 45 - 117 U/L    Protein, total QUANTITY NOT SUFFICIENT. SUGGEST RECOLLECTION 6.4 - 8.2 g/dL    Albumin QUANTITY NOT SUFFICIENT.  SUGGEST RECOLLECTION 3.4 - 5.0 g/dL    Globulin Cannot be calulated 2.0 - 4.0 g/dL    A-G Ratio Cannot be calulated 0.8 - 1.7         Normal.      Assessment:     Principal Problem:    GI bleed (3/28/2017)    Active Problems:    Crohn disease ([facility] [address] (4/73/5897)      Embolic stroke ([facility]) (6/00/4127)      Acute blood loss anemia (3/15/2017)      Neuropathic pain (3/15/2017)      DVT (deep venous thrombosis) ([facility]) (3/16/2017)      Elevated WBC count (3/28/2017)        Plan:     Transfuse to maintain hg above 8  Central line placement  Serial h/h  Will need ivc filter  30 minutes of critical care time

## 2017-03-28 NOTE — PROGRESS NOTES

## 2017-03-28 NOTE — PROGRESS NOTES
7792 Patients blood pressure reported as 76/39 by manual cuff. Dr Jenifer Deleon in room with patient orders received. 0830 upon assessment patient c/o tingling to rt foot area is intact however there is a small reddened area below the great toe that is red. Patient states she came from home with this spot on her great toe. 1001 Patient given pain medication 1mg of morphine per Dr Emma Hampton for pain of 10 to lower extremities. Patient has been cautioned to remain in bed and call for assistance if she needs restroom. However she has also been cautioned that a bed pan may be more appropriate at this time    1030 Patient has experienced a fall. nursing assistant is present RRT called. Patient has been placed on bedside commode Dr Jenifer Deleon at bedside. Orders recieved Primary nurse at patients side,patient  is alert and oriented. states she is not experiencing any dizziness at this time. However based on patients B/P and low H and H she will be transferred to ICU. 1054 FFP started, Patient will be seen in CT prior to transfer to ICU foe head CT.    1155 TRANSFER - OUT REPORT:    Verbal report given to Elizabeth Felix (name) on Asutin Gonzales  being transferred to ICU(unit) for change in patient condition(Lower GI Bleeding low H and H)       Report consisted of patients Situation, Background, Assessment and   Recommendations(SBAR). Information from the following report(s) SBAR, Kardex, MAR, Recent Results and Cardiac Rhythm NSR was reviewed with the receiving nurse. Lines:   Triple Lumen 03/28/17 Right Subclavian (Active)       Peripheral IV 03/27/17 Left Forearm (Active)   Site Assessment Clean, dry, & intact 3/28/2017 12:11 PM   Phlebitis Assessment 0 3/28/2017 12:11 PM   Infiltration Assessment 0 3/28/2017 12:11 PM   Dressing Status Clean, dry, & intact 3/28/2017 12:11 PM   Dressing Type Transparent;Tape 3/28/2017 12:11 PM   Hub Color/Line Status Blue; Infusing 3/28/2017 12:11 PM   Alcohol Cap Used Yes 3/28/2017 12:11 PM        Opportunity for questions and clarification was provided

## 2017-03-28 NOTE — PROGRESS NOTES
INITIAL NUTRITION ASSESSMENT     RECOMMENDATIONS/PLAN:   - Suggest Ensure Clear TID while on CL diet to increase protein/kcal intake  - When medically indicated recommend diet advancement to Regular Low Fiber to minimize mechanical irritation of bowel    REASON FOR ASSESSMENT:   [x]  RN Referral:    [x] MST score >/=2  Malnutrition Screening Tool (MST):  Recently Lost Weight Without Trying: Yes  If Yes, How Much Weight Loss: 14 - 23 lbs  Eating Poorly Due to Decreased Appetite: Yes  MST Score: 3     NUTRITION ASSESSMENT:   Client History: 40 yrs old Female admitted with lower GI bleed likely due to anticoagulation with Crohn's disease per surgery note. PMHx: Crohn's disease, HTN, Fe deficiency anemia, CVA, DVT   Cultural/Synagogue Food Preferences: None Identified    FOOD/NUTRITION HISTORY  Diet History: decreased appetite PTA, noted with poor intake initially last adm 3/17 however PO intake improved to % of meals by the end of last adm   Food Allergies:  [x] NKFA     [] Yes      NUTRITION INTAKE   Diet Order:  Clear Liquid      Average PO Intake: No data found. Pertinent Medications:  [x] Reviewed; NS, ativan, mesalamine, prednisone, ppi, morphine  Insulin:  [] SSI  [] Pre-meal   []  Basal   [] Drip  [x] None  Pt expected to meet estimated nutrient needs through next review:          []  Yes     [x] No; CL diet is nutritionally insufficient    BIOCHEMICAL DATA & MEDICAL TESTS  Pertinent Labs:  [x] Reviewed; Hgb 6.7, Hct 20.4, Ca 7.5  ANTHROPOMETRICS  Height: 5' 6\" (167.6 cm)       Weight: 82.1 kg (181 lb)    BMI: 29.2 kg/m^2  -  overweight (25.0%-29.9% BMI)        Weight change: fairly stable over the past several months per chart review. Comparison to Reference Standards:  IBW: 130 lbs      %IBW: 139%      AdjBW: 65 kg    NUTRITION-FOCUSED PHYSICAL ASSESSMENT  Skin: intact.      GI: last BM 3/28- red/watery, +GIB, mild abdominal pain, no N/V.    NUTRITION PRESCRIPTION  Calories: 3516-9695 kcal/day based on 30-35 kcal/kg AdjBW  Protein: 65-85 g/day based on 1-1.3 g/kg AdjBW  CHO: 244 g/day based on 50% of total energy  Fluid: 5236-9181 ml/day based on 1 kcal/ml      NUTRITION DIAGNOSES:   1. At risk of inadequate oral intake related to Crohn's disease with GIB as evidenced by clear liquid diet is nutritionally inadequate. NUTRITION INTERVENTIONS:   INTERVENTIONS:        GOALS:  1. Ensure Clear TID, advance diet when appropriate  1. >50% PO intake of ONS, diet advanced beyond clears by next review 1-3 days     LEARNING NEEDS (Diet, Supplementation, Food/Nutrient-Drug Interaction):   [] None Identified  [] Inpatient education provided/documented    [x] Identified and patient:  [] Declined     [x] Was not appropriate/indicated  NUTRITION MONITORING /EVALUATION:   Follow PO intake  Monitor wt  Monitor renal labs, electrolytes, fluid status  Monitor for additional supplement needs    [x] Participated in Interdisciplinary Rounds  [x] [address] [place] [address] Reviewed/Documented  DISCHARGE NUTRITION RECOMMENDATIONS ADDRESSED:     [x] To be determined closer to discharge    NUTRITION RISK:     [x]  At risk                     []  Not currently at risk     Will follow-up per policy.   Bhavesh Yoder, ADIA  PAGER:  [phone]

## 2017-03-28 NOTE — PROGRESS NOTES
pt unstable to come to dept. at this time. spoke w/ receiving RN, Alli in ICU. Was told she will attempt cvc placement in ICU.

## 2017-03-28 NOTE — PROGRESS NOTES
Pt 2 large bloody stools, logan red with clots. Dr. Sallie Hollins by bedside, updated. PRBC transfusion going.

## 2017-03-28 NOTE — CONSULTS
[address] [place] [address] Rd    Name:  Tony Gardner  MR#:  [id number]  :  1970  Account #:  [de-identified]  Date of Adm:  2017  Date of Consultation:  2017      The patient is a 71-year-old female who is being admitted urgently to  the ICU because of rectal bleeding and severe acute on chronic  symptomatic blood loss anemia. The patient is known to have a severe  form of Crohn colitis, mostly of the left colon since age 16. SHE HAD  SEVERE IMMEDIATE REACTION TO REMICADE AND CIMZIA WITH  SHORTNESS OF BREATH and the medication had to be discontinued  after each first encounter. Subsequently, she was treated with Humira,  which she took for about a couple of years, up to early  and then  she stopped it because of diffuse blistering MRSA positive disease on  her legs and loss of her hair. The patient, after this, was treated with a  short-term steroid injection. I saw her last year during one admission in  2015. At that time, I recommended that she be treated  with [facility] but this never took place. She was supposed to go to  [facility] for further management. The patient was admitted recently on  03/10/2017 because of right-sided stroke with left hemiparesis and  also left upper extremity weakness. I have seen her at that time mostly  because of severe iron-deficiency anemia. The patient already has  sickle cell trait. She has a history of C difficile colitis. The patient was  initially labeled as ulcerative colitis, but this has been changed to  Crohn disease. I performed her last colonoscopy myself  on 2017, which revealed large external anal skin tags. The  preparation was poor with the presence of solid, formed, soft and semi  liquid stools throughout the whole colon.  There was, however,  evidence of severe Crohn disease of the sigmoid, descending colon,  and extending to a mild degree in the transverse colon with multiple  linear, deep, serpiginous, confluent ulcerations, multiple small ulcers,  many  Pseudo and inflammatory polyps. The few biopsies that were taken at that time did not  reveal any dysplasia, but there was evidence of severe chronic active  colitis. The right colon and the terminal ileum were completely normal.  The patient was put on oral steroid medication. She was released only  last Thursday on prednisone 40 mg daily and Pentasa 1 gram 4 times  a day. She was able to receive her first dose of Entyvio last Friday, 3  days ago. During her last admission, the patient was found to have  hypercoagulable state and beside her stroke, she was found to  have bilateral deep vein thrombosis and was started on Coumadin and  Lovenox. She claimed that since she was released from the hospital  on Thursday she started to bleed multiple times during the day  and was passing mostly fresh blood with some clots. This morning she  felt dizzy, lightheaded, and passed out, hit her head, and was  complaining of shortness of breath and mild substernal chest pain. When she arrived here, her hemoglobin was 6.5, when it was 10 on  03/22/2017. Prior to this, she was 6.7 and at that time she received at  least a couple of blood transfusions. Her reticulocyte on the 22nd was  3.5. Her platelets are normal. She was seen by Hematology, Dr. Regina Winslow for her hypercoagulable state, which was attributed to  factor 8 abnormality. The patient claimed that she has been having  lower abdominal cramps that were relieved with defecation. She has  not eaten much in the last week because of loss of appetite. She did  have mild nausea, but there was no vomiting. She did not have fever,  chills, shivers. No dysuria, hematuria, or burning sensation. She did  not have kidney stones. She has no rash. She denies taking any  NSAIDs. Her menstruation has been regular and spontaneous on the  6th day of every month, last one was on 03/06.     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 She is on anticoagulation. ALLERGIES: SHE IS ALLERGIC TO  1. REMICADE. [address] 3. INTOLERANT TO HUMIRA. MEDICATIONS AT HOME  1. Prednisone 40 mg once a day. 2. Pentasa 1 gram 4 times a day. 3. Percocet 5/325 p.r.n.  4. Lovenox 80 mg every 12 hours. 5. Neurontin 400 mg t.i.d.  6. Lipitor 80 mg.  7. Coumadin 5 mg. PHYSICAL EXAMINATION  VITAL SIGNS: On arrival, the patient was tachycardic at 114, blood  pressure 180/71. Presently, she is still tachycardic between 108-124,  blood pressure 100/78, breathing 12-17, saturation 100% with nasal  cannula at 2 L per minute. SKIN: Pale. GENERAL: The patient appears to be weak, but alert and oriented, in  no distress. She has some tattoos. HEENT: The conjunctivae are extremely pale The pupils are equal and  reactive to light. The sclerae are anicteric. The oropharyngeal cavity,  she has a dry and very pale mucous membrane, normal teeth. NECK:  Supple. No palpable mass. LUNGS: Clear to auscultation. CARDIAC: Rhythm is regular. S1, S2 normal.  ABDOMEN: Not distended, very soft. There is mild lower abdominal  tenderness on both sides. EXTREMITIES: Normal. No pedal edema. No clubbing. No tremor. NEUROLOGIC: She does have left hemiparesis, but better than the  last week. LABORATORY DATA: Hemoglobin presently is 6.7 with normal MCV  and MCH, platelets 127, WBC 84.5, 81% neutrophils. Basic metabolic  panel is normal with a BUN of 16, creatinine 0.77. LFTs normal except  for albumin of 2.6. CPK, CPK-MB, troponin normal. Ferritin 236. Her  iron saturation was 5 on 03/14/2007. CRP was 6.2 on 03/16. Her latest  C difficile on 08/2015 and also in 2009 have been negative.  CT scan of  the abdomen and pelvis on 02/09/2017 spoke about mid to distal  rectosigmoid thickening with mild surrounding infiltration suggestive of  colitis. CONCLUSION: This is a 50-year-old female who has severe Crohn  disease of the transverse and left colon, but not the rectum, who has  been progressing since age 16. She has severe reaction to many  biologics. She was stopped the Humira in early 2015, about 2 years  ago, and has been having multiple relapses. Colonoscopy on  03/17/2017 revealed that she has severe to moderate form of left-sided  and transverse colon Crohn colitis. She has been on Lovenox and  Coumadin for deep vein thrombosis and embolic CVA because of  factor 8 abnormality. She came here because of severe acute on  chronic blood loss anemia. She has been bleeding daily and  significantly since her discharge on Thursday, 4 days ago. She is still  actively bleeding. Most likely this is related to her active Crohn  disease, which would be too early for Entyvio to start making progress. Therefore, for now, we would like to increase his steroids at least  during this admission and later on she can be released on 40 mg once  a day. I am trying to reverse completely her anticoagulation for now  hoping that this will stop the bleeding. If she continues on bleeding,  then we have to do angio embolization, but this is risky somehow  because of her hypercoagulable state. There is also the option of  surgery, to which the patient is not very warm to have. She has  hypertension. She has history of substance abuse. She has poor  venous access and she has to have a central line. For now, we are going to hold her anticoagulation until she is more  stable before considering resuming any form of anticoagulation. She  may require to have a filter inserted to prevent pulmonary emboli, but  this would not prevent other embolic strokes.     The patient will need to continue taking the loading dose of the [facility]  and we have to wait at least 3-4 months before deciding if this  medication is going to help her or not. Further note will follow. EDELMIRA Escalante MD    [address] / [place]  D:  03/28/2017   16:08  T:  03/28/2017   17:31  Job #:  [id number]

## 2017-03-28 NOTE — H&P
History & Physical    Patient: Yolanda Patiño MRN: [id number]  CSN: [id number]    YOB: 1970  Age: 40 y.o. Sex: female      DOA: 3/27/2017  Primary Care Provider:  None      Assessment/Plan     Patient Active Problem List   Diagnosis Code    Chronic pain syndrome G89.4    Crohn disease ([facility] K50.90    Sickle cell trait ([facility] D57.3    Hypertension I10    Hx of cocaine abuse B36.621    Embolic stroke ([facility] H45.6    Acute blood loss anemia D62    Neuropathic pain M79.2    DVT (deep venous thrombosis) ([facility]) I82.409    GI bleed K92.2    Elevated WBC count D72.829       -GI bleed  -anemia due to acute blood loss  -elevated WBC  -crohns disease  -DVT    Plan;  -admit to remote telemetry  -hold anticoagulation  -IV hydration  -transfuse 2 units of pRBCs  -H/H q 6 hours; f/u with GI consultation  -protonix daily  -DVT; coumadin held (INR 2.0)        CC: anemia       HPI:     Yolanda Patiño is a 40 y.o. female with a hx of Crohns disease, ulcerative colitis, and CVA (1 week ago) and left leg DVT, presents to the emergency department c/o low hemoglobin today at her PCP's office. Patient was told to come to the ED for evaluation and possible blood trasfusion. Associated symptoms include nausea, SOB, lightheadedness, blood in stool, and chills. Patient recently started on lovenox and has been on coumadin for about 5 days. States that her stool is maroon in color for the last 2 days. PMHx of colonoscopy (last week with Linda Rich MD for blood in stool and polyps were found). Pt denies any other symptoms or complaints. On arrival to the ED her H/H was 6.5/21.0. HR and BP stable. INR of 2.0. Heme-occult is + for blood.  Anticoagulation held, protonix given and GI (Lily Moreno) consulted who recommended blood transfusion, solumedrol and will evaluate the patient in am.     Past Medical History:   Diagnosis Date    Abdominal pain     Anemia NEC     sickle cell trait    C. difficile colitis     Crohn's disease ([facility]     Gastrointestinal disorder     Crohns    Herpes zoster     Hx of cocaine abuse     Hyperkalemia     Hypertension     Iron deficiency anemia     MRSA (methicillin resistant Staphylococcus aureus)     Obesity     Pain management     Sickle cell trait (HCC)     Stroke ([facility]     + cva 3/17       Past Surgical History:   Procedure Laterality Date    COLONOSCOPY N/A 3/17/2017    COLONOSCOPY; BIOPSY; performed by Jennifer Flood MD at [facility]    HX GYN      tubal ligation    HX TUBAL LIGATION         History reviewed. No pertinent family history. Social History     Social History    Marital status:      Spouse name: N/A    Number of children: N/A    Years of education: N/A     Social History Main Topics    Smoking status: Former Smoker    Smokeless tobacco: None    Alcohol use No    Drug use: Yes    Sexual activity: Yes     Partners: Male     Birth control/ protection: Surgical     Other Topics Concern    None     Social History Narrative       Prior to Admission medications    Medication Sig Start Date End Date Taking? Authorizing Provider   warfarin (COUMADIN) 5 mg tablet Take 5 mg by mouth daily. Yes Historical Provider   atorvastatin (LIPITOR) 80 mg tablet Take 1 Tab by mouth nightly. 3/23/17  Yes Jany Lara MD   gabapentin (NEURONTIN) 400 mg capsule Take 1 Cap by mouth three (3) times daily. 3/23/17  Yes Jany Lara MD   mesalamine (PENTASA) 500 mg CR capsule Take 2 Caps by mouth four (4) times daily. Indications: CROHN'S DISEASE 3/23/17  Yes Jany Lara MD   predniSONE (DELTASONE) 20 mg tablet Take 2 Tabs by mouth daily (with breakfast). Indications: CROHN'S DISEASE 3/23/17  Yes Jany Lara MD   enoxaparin (LOVENOX) 80 mg/0.8 mL injection 80 mg by SubCUTAneous route every twelve (12) hours.  3/23/17  Yes Jany Lara MD   oxyCODONE-acetaminophen (PERCOCET) 5-325 mg per tablet Take 1 Tab by mouth every four (4) hours as needed for Pain. Max Daily Amount: 6 Tabs. 3/23/17   Jody Doe MD       Allergies   Allergen Reactions    Humira [Adalimumab] Other (comments)    Remicade [Infliximab] Palpitations       Review of Systems  Gen: No fever, + chills, +  malaise, weight loss/gain. Heent: No headache, rhinorrhea, epistaxis, ear pain, hearing loss, sinus pain, neck pain/stiffness, sore throat. Heart: + SOB. No chest pain, palpitations, POLANCO, pnd, or orthopnea. Resp: No cough, hemoptysis, wheezing and shortness of breath. GI: + blood in stool. No nausea, vomiting, diarrhea, constipation,    : No urinary obstruction, dysuria or hematuria. Derm: No rash, new skin lesion or pruritis. Musc/skeletal: no bone or joint complains. Vasc: No edema, cyanosis or claudication. Endo: No heat/cold intolerance, no polyuria,polydipsia or polyphagia. Neuro: + lightheadedness. No unilateral weakness, numbness, tingling. No seizures. Heme: + easy bruising or bleeding. Physical Exam:     Physical Exam:  Visit Vitals    /71 (BP 1 Location: Right arm)    Pulse 94    Temp 97.3 °F (36.3 °C)    Resp 15    Ht 5' 6\" (1.676 m)    Wt 82.1 kg (181 lb)    LMP 2017    SpO2 100%    BMI 29.21 kg/m2      O2 Device: Room air    Temp (24hrs), Av °F (36.7 °C), Min:97.3 °F (36.3 °C), Max:98.4 °F (36.9 °C)             General:  Awake, cooperative, no distress. Head:  Normocephalic, without obvious abnormality, atraumatic. Eyes:  Conjunctivae/corneas clear, sclera anicteric, PERRL, EOMs intact. Nose: Nares normal. No drainage or sinus tenderness. Throat: Lips, mucosa, and tongue normal.    Neck: Supple, symmetrical, trachea midline, no adenopathy. Lungs:   Clear to auscultation bilaterally. Heart:  Regular rate and rhythm, S1, S2 normal, no murmur, click, rub or gallop. Abdomen: Soft, + diffuse tenderness. Bowel sounds normal. No masses,  No organomegaly.    Extremities: Extremities normal, atraumatic, no cyanosis or edema. Capillary refill normal.   Pulses: 2+ and symmetric all extremities. Skin: Skin color pink, turgor normal. No rashes or lesions   Neurologic: CNII-XII intact. No focal motor or sensory deficit. EKG; Sinus tachycardia, 111 bpm, no STEMI      Labs Reviewed:    Recent Results (from the past 24 hour(s))   EKG, 12 LEAD, INITIAL    Collection Time: 03/27/17  8:11 PM   Result Value Ref Range    Ventricular Rate 111 BPM    Atrial Rate 111 BPM    P-R Interval 134 ms    QRS Duration 80 ms    Q-T Interval 352 ms    QTC Calculation (Bezet) 478 ms    Calculated P Axis 27 degrees    Calculated R Axis -2 degrees    Calculated T Axis 40 degrees    Diagnosis       Sinus tachycardia  Anterior infarct , age undetermined  Abnormal ECG  When compared with ECG of 10-MAR-2017 17:23,  Anterior infarct is now present     CBC WITH AUTOMATED DIFF    Collection Time: 03/27/17  8:50 PM   Result Value Ref Range    WBC 13.7 (H) 4.6 - 13.2 K/uL    RBC 2.60 (L) 4.20 - 5.30 M/uL    HGB 6.5 (L) 12.0 - 16.0 g/dL    HCT 21.0 (L) 35.0 - 45.0 %    MCV 80.8 74.0 - 97.0 FL    MCH 25.0 24.0 - 34.0 PG    MCHC 31.0 31.0 - 37.0 g/dL    RDW 18.9 (H) 11.6 - 14.5 %    PLATELET 177 (H) 156 - 420 K/uL    MPV 9.2 9.2 - 11.8 FL    NEUTROPHILS 84 (H) 42 - 75 %    BAND NEUTROPHILS 1 0 - 5 %    LYMPHOCYTES 10 (L) 20 - 51 %    MONOCYTES 5 2 - 9 %    EOSINOPHILS 0 0 - 5 %    BASOPHILS 0 0 - 3 %    ABS. NEUTROPHILS 11.5 (H) 1.8 - 8.0 K/UL    ABS. LYMPHOCYTES 1.4 0.8 - 3.5 K/UL    ABS. MONOCYTES 0.7 0 - 1.0 K/UL    ABS. EOSINOPHILS 0.0 0.0 - 0.4 K/UL    ABS.  BASOPHILS 0.0 0.0 - 0.1 K/UL    RBC COMMENTS ANISOCYTOSIS  1+        RBC COMMENTS        POLYCHROMASIA  SLIGHT  HYPOCHROMIA  SLIGHT  TARGET CELLS  OCCASIONAL  OVALOCYTES  1+      RBC COMMENTS        SCHISTOCYTES  OCCASIONAL  TEARDROP CELLS  FEW  POIKILOCYTOSIS  2+      RBC COMMENTS MICROCYTOSIS  1+        WBC COMMENTS TOXIC GRANULATION      DF MANUAL     METABOLIC PANEL, COMPREHENSIVE    Collection Time: 03/27/17  8:50 PM   Result Value Ref Range    Sodium 144 136 - 145 mmol/L    Potassium 4.2 3.5 - 5.5 mmol/L    Chloride 106 100 - 108 mmol/L    CO2 30 21 - 32 mmol/L    Anion gap 8 3.0 - 18 mmol/L    Glucose 126 (H) 74 - 99 mg/dL    BUN 16 7.0 - 18 MG/DL    Creatinine 0.77 0.6 - 1.3 MG/DL    BUN/Creatinine ratio 21 (H) 12 - 20      GFR est AA >60 >60 ml/min/1.73m2    GFR est non-AA >60 >60 ml/min/1.73m2    Calcium 8.4 (L) 8.5 - 10.1 MG/DL    Bilirubin, total <0.0 (L) 0.2 - 1.0 MG/DL    ALT (SGPT) 37 13 - 56 U/L    AST (SGOT) 29 15 - 37 U/L    Alk.  phosphatase 42 (L) 45 - 117 U/L    Protein, total 5.9 (L) 6.4 - 8.2 g/dL    Albumin 2.6 (L) 3.4 - 5.0 g/dL    Globulin 3.3 2.0 - 4.0 g/dL    A-G Ratio 0.8 0.8 - 1.7     CARDIAC PANEL,(CK, CKMB & TROPONIN)    Collection Time: 03/27/17  8:50 PM   Result Value Ref Range    CK 41 26 - 192 U/L    CK - MB <1.0 <3.6 ng/ml    CK-MB Index Cannot be calulated 0.0 - 4.0 %    Troponin-I, Qt. <0.02 0.00 - 0.06 NG/ML   TYPE & SCREEN    Collection Time: 03/27/17  8:50 PM   Result Value Ref Range    Crossmatch Expiration 03/30/2017     ABO/Rh(D) A POSITIVE     Antibody screen NEG     CALLED TO: Haleigh ABBOTT [id number] AT 2240      Unit number [id number]     Blood component type RC LR AS1     Unit division 00     Status of unit ALLOCATED     Crossmatch result Compatible     Unit number [id number]     Blood component type RC LR AS1     Unit division 00     Status of unit ISSUED     Crossmatch result Compatible     Unit number [id number]     Blood component type RC LR AS1     Unit division 00     Status of unit ALLOCATED     Crossmatch result Compatible     Unit number [id number]     Blood component type RC LR AS1     Unit division 00     Status of unit ALLOCATED     Crossmatch result Compatible    PROTHROMBIN TIME + INR    Collection Time: 03/27/17  8:50 PM   Result Value Ref Range    Prothrombin time 21.7 (H) 11.5 - 15.2 sec    INR 2.0 (H) 0.8 - 1.2 PTT    Collection Time: 03/27/17  8:50 PM   Result Value Ref Range    aPTT 37.9 (H) 23.0 - 36.4 SEC       Procedures/imaging: see electronic medical records for all procedures/Xrays and details which were not copied into this note but were reviewed prior to creation of Plan    CC: None

## 2017-03-28 NOTE — ROUTINE PROCESS
TRANSFER - OUT REPORT:    Verbal report given to Mabel Jiménez RN (name) on Mukund [place]  being transferred to Telemetry remote (unit) for routine progression of care       Report consisted of patients Situation, Background, Assessment and   Recommendations(SBAR). Information from the following report(s) SBAR, ED Summary, Intake/Output and MAR was reviewed with the receiving nurse. Lines:   Peripheral IV 03/27/17 Left Forearm (Active)   Site Assessment Clean, dry, & intact 3/27/2017  8:55 PM   Phlebitis Assessment 0 3/27/2017  8:55 PM   Infiltration Assessment 0 3/27/2017  8:55 PM   Dressing Status Clean, dry, & intact 3/27/2017  8:55 PM   Dressing Type Transparent 3/27/2017  8:55 PM   Hub Color/Line Status Blue;Patent; Flushed 3/27/2017  8:55 PM   Alcohol Cap Used Yes 3/27/2017  8:55 PM        Opportunity for questions and clarification was provided.       Patient transported with:   [employer]

## 2017-03-28 NOTE — PROGRESS NOTES
responded to Rapid Response for  Austin Gonzales, who is a 40 y.o.,female,     The  provided the following Interventions:  Provided Pastoral Presence. No family present at this time. Offered prayers on behalf for the patient. Chart reviewed. The following outcomes were achieved:  Patient is expected to be transferred to ICU. Assessment:  There are no further spiritual or Shinto issues which require intervention at this time. Plan:  Chaplains will continue to follow and will provide spiritual care as needed.  recommends bedside caregivers page  on duty if patient or family shows signs of acute spiritual or emotional distress. Rev.  [address]  [phone]

## 2017-03-28 NOTE — INTERDISCIPLINARY ROUNDS
Interdisciplinary Round Note   Patient Information: Mansi Kendrick                                      882/33 Reason for Admission: Rectal bleed, crohns, anemia, dizziness.   Quality Measure: Not applicable            Attending Provider:   Na Wade MD  Primary Care Physician:       None       None  Consulting:  IP CONSULT TO HOSPITALIST  IP CONSULT TO GASTROENTEROLOGY  IDR Rounding Physician:  Past Medical History:   Past Medical History:   Diagnosis Date    Abdominal pain     Anemia NEC     sickle cell trait    C. difficile colitis     [facility]     Gastrointestinal disorder     Crohns    Herpes zoster     Hx of cocaine abuse     Hyperkalemia     Hypertension     Iron deficiency anemia     MRSA (methicillin resistant Staphylococcus aureus)     Obesity     Pain management     Sickle cell trait ([facility]     [facility]     + cva 3/17        Hospital day: 1                          - - -  Estimated discharge date:   RRAT Score: Medium Risk            13       Total Score        3 Relationship with PCP    4 More than 1 Admission in calendar year    4 Patient Insurance is Medicare, Medicaid or Self Pay    2 Charlson Comorbidity Score        Criteria that do not apply:    Patient Living Status    Patient Length of Stay > 5         Primary Goals for Today: pain control, replete blood    Secondary Goals for Today: ***    Overnight Events: bloody stools    Gaming: no    Central Line: no    Isolation: no       IV Antibiotics? no       When started: n/a  Current Medication List:          Current Facility-Administered Medications   Medication Dose Route Frequency    mesalamine (PENTASA) CR capsule 1,000 mg  1,000 mg Oral QID    oxyCODONE-acetaminophen (PERCOCET) 5-325 mg per tablet 1 Tab  1 Tab Oral Q4H PRN    predniSONE (DELTASONE) tablet 40 mg  40 mg Oral DAILY WITH BREAKFAST    dextrose 5 % - 0.45% NaCl infusion  125 mL/hr IntraVENous CONTINUOUS    acetaminophen (TYLENOL) tablet 650 mg  650 mg Oral Q4H PRN    diphenhydrAMINE (BENADRYL) injection 12.5 mg  12.5 mg IntraVENous Q4H PRN    pantoprazole (PROTONIX) 80 mg in sodium chloride 0.9 % 20 mL injection  80 mg IntraVENous DAILY    morphine injection 2 mg  2 mg IntraVENous Q4H PRN    ondansetron (ZOFRAN) injection 4 mg  4 mg IntraVENous Q6H PRN    0.9% sodium chloride infusion 250 mL  250 mL IntraVENous PRN     Facility-Administered Medications Ordered in Other Encounters   Medication Dose Route Frequency    sodium chloride (NS) flush 10-40 mL  10-40 mL InterCATHeter PRN      VTE Prophylaxis                                 Lines, Drains, & Airways  [REMOVED] Peripheral IV 03/27/17 Left Antecubital-Site Assessment: Clean, dry, & intact  Peripheral IV 03/27/17 Left Forearm-Site Assessment: Clean, dry, & intact     Vital signs:  Visit Vitals    /57 (BP 1 Location: Right arm, BP Patient Position: At rest)    Pulse 98    Temp 98.8 °F (37.1 °C)    Resp 14    Ht 5' 6\" (1.676 m)    Wt 82.1 kg (181 lb)    SpO2 100%    Breastfeeding No    BMI 29.21 kg/m2        Intake and Output:            Stool Color: Red  Stool Appearance: Watery  Stool Amount: Large        Current Diet: DIET CLEAR LIQUID          Nutrition  Chewing/Swallowing Problems: No  Difficulty with Secretions: No  Speech Slurred/Thick/Garbled: No    NGT: no    Feeding Tube: no   Recent Glucose Results:   Lab Results   Component Value Date/Time     (H) 03/27/2017 08:50 PM    GI Prophylaxis: yes        Type: Protonix        Activity Level:       Needs assistance with ADLs: yes  PT Consult Status: ***  Equipment: walker  Surgical/Ortho Notes: ***   Current Immunizations:  Immunization History   Administered Date(s) Administered    Influenza Vaccine 11/01/2016    Pneumococcal Vaccine (Unspecified Type) 01/01/2010     RRAT Score:     Readmit Risk Tool  Support Systems: Family member(s)  Relationship with Primary Physician Group: Seen at least one time within the past 6 months   Recommendations:       Discharge Disposition: {PT D/C Recommendations:88730}    Needs for Discharge: ***           Recommendations from IDR team: ***    Other Notes: ***

## 2017-03-28 NOTE — PROGRESS NOTES
Dr. Marcus Durham by bedside, stool logan red blood with clots multiple times. 2nd unit PRBC transfusing at this time.

## 2017-03-29 LAB
ALBUMIN SERPL BCP-MCNC: 1.8 G/DL (ref 3.4–5)
ALBUMIN/GLOB SERPL: 0.9 {RATIO} (ref 0.8–1.7)
ALP SERPL-CCNC: 19 U/L (ref 45–117)
ALT SERPL-CCNC: 18 U/L (ref 13–56)
ANION GAP BLD CALC-SCNC: 5 MMOL/L (ref 3–18)
AST SERPL W P-5'-P-CCNC: 13 U/L (ref 15–37)
BASOPHILS # BLD AUTO: 0 K/UL (ref 0–0.06)
BASOPHILS # BLD: 0 % (ref 0–2)
BILIRUB SERPL-MCNC: 0.3 MG/DL (ref 0.2–1)
BLD PROD TYP BPU: NORMAL
BPU ID: NORMAL
BUN SERPL-MCNC: 18 MG/DL (ref 7–18)
BUN/CREAT SERPL: 34 (ref 12–20)
CALCIUM SERPL-MCNC: 7.4 MG/DL (ref 8.5–10.1)
CALLED TO:,BCALL1: NORMAL
CHLORIDE SERPL-SCNC: 111 MMOL/L (ref 100–108)
CO2 SERPL-SCNC: 27 MMOL/L (ref 21–32)
CREAT SERPL-MCNC: 0.53 MG/DL (ref 0.6–1.3)
DIFFERENTIAL METHOD BLD: ABNORMAL
EOSINOPHIL # BLD: 0 K/UL (ref 0–0.4)
EOSINOPHIL NFR BLD: 0 % (ref 0–5)
ERYTHROCYTE [DISTWIDTH] IN BLOOD BY AUTOMATED COUNT: 13.6 % (ref 11.6–14.5)
GLOBULIN SER CALC-MCNC: 2 G/DL (ref 2–4)
GLUCOSE SERPL-MCNC: 107 MG/DL (ref 74–99)
HCT VFR BLD AUTO: 19.8 % (ref 35–45)
HCT VFR BLD AUTO: 21.1 % (ref 35–45)
HCT VFR BLD AUTO: 28.2 % (ref 35–45)
HGB BLD-MCNC: 6.8 G/DL (ref 12–16)
HGB BLD-MCNC: 7.3 G/DL (ref 12–16)
HGB BLD-MCNC: 9.9 G/DL (ref 12–16)
INR PPP: 1.5 (ref 0.8–1.2)
INR PPP: 1.5 (ref 0.8–1.2)
LYMPHOCYTES # BLD AUTO: 8 % (ref 21–52)
LYMPHOCYTES # BLD: 1 K/UL (ref 0.9–3.6)
MCH RBC QN AUTO: 29.9 PG (ref 24–34)
MCHC RBC AUTO-ENTMCNC: 34.6 G/DL (ref 31–37)
MCV RBC AUTO: 86.5 FL (ref 74–97)
METAMYELOCYTES NFR BLD MANUAL: 1 %
MONOCYTES # BLD: 1 K/UL (ref 0.05–1.2)
MONOCYTES NFR BLD AUTO: 8 % (ref 3–10)
MYELOCYTES NFR BLD MANUAL: 1 %
NEUTS SEG # BLD: 10.5 K/UL (ref 1.8–8)
NEUTS SEG NFR BLD AUTO: 82 % (ref 40–73)
NRBC BLD-RTO: 1 PER 100 WBC
PLATELET # BLD AUTO: 115 K/UL (ref 135–420)
PMV BLD AUTO: 9.2 FL (ref 9.2–11.8)
POTASSIUM SERPL-SCNC: 4.2 MMOL/L (ref 3.5–5.5)
PROT SERPL-MCNC: 3.8 G/DL (ref 6.4–8.2)
PROTHROMBIN TIME: 17.3 SEC (ref 11.5–15.2)
PROTHROMBIN TIME: 17.7 SEC (ref 11.5–15.2)
RBC # BLD AUTO: 2.44 M/UL (ref 4.2–5.3)
RBC MORPH BLD: ABNORMAL
SODIUM SERPL-SCNC: 143 MMOL/L (ref 136–145)
STATUS OF UNIT,%ST: NORMAL
UNIT DIVISION, %UDIV: 0
WBC # BLD AUTO: 12.8 K/UL (ref 4.6–13.2)

## 2017-03-29 PROCEDURE — 65610000006 HC RM INTENSIVE CARE

## 2017-03-29 PROCEDURE — 80053 COMPREHEN METABOLIC PANEL: CPT | Performed by: FAMILY MEDICINE

## 2017-03-29 PROCEDURE — C9113 INJ PANTOPRAZOLE SODIUM, VIA: HCPCS | Performed by: INTERNAL MEDICINE

## 2017-03-29 PROCEDURE — 36415 COLL VENOUS BLD VENIPUNCTURE: CPT | Performed by: FAMILY MEDICINE

## 2017-03-29 PROCEDURE — 85610 PROTHROMBIN TIME: CPT | Performed by: INTERNAL MEDICINE

## 2017-03-29 PROCEDURE — P9059 PLASMA, FRZ BETWEEN 8-24HOUR: HCPCS | Performed by: INTERNAL MEDICINE

## 2017-03-29 PROCEDURE — 74011250636 HC RX REV CODE- 250/636: Performed by: INTERNAL MEDICINE

## 2017-03-29 PROCEDURE — 85018 HEMOGLOBIN: CPT | Performed by: INTERNAL MEDICINE

## 2017-03-29 PROCEDURE — 85025 COMPLETE CBC W/AUTO DIFF WBC: CPT | Performed by: FAMILY MEDICINE

## 2017-03-29 PROCEDURE — P9040 RBC LEUKOREDUCED IRRADIATED: HCPCS | Performed by: INTERNAL MEDICINE

## 2017-03-29 PROCEDURE — 74011000250 HC RX REV CODE- 250: Performed by: INTERNAL MEDICINE

## 2017-03-29 PROCEDURE — 36430 TRANSFUSION BLD/BLD COMPNT: CPT

## 2017-03-29 PROCEDURE — 74011250636 HC RX REV CODE- 250/636: Performed by: FAMILY MEDICINE

## 2017-03-29 PROCEDURE — P9016 RBC LEUKOCYTES REDUCED: HCPCS | Performed by: INTERNAL MEDICINE

## 2017-03-29 PROCEDURE — 74011250637 HC RX REV CODE- 250/637: Performed by: FAMILY MEDICINE

## 2017-03-29 PROCEDURE — 77010033678 HC OXYGEN DAILY

## 2017-03-29 RX ORDER — PHYTONADIONE 10 MG/ML
5 INJECTION, EMULSION INTRAMUSCULAR; INTRAVENOUS; SUBCUTANEOUS ONCE
Status: COMPLETED | OUTPATIENT
Start: 2017-03-29 | End: 2017-03-29

## 2017-03-29 RX ORDER — SODIUM CHLORIDE 9 MG/ML
250 INJECTION, SOLUTION INTRAVENOUS AS NEEDED
Status: DISCONTINUED | OUTPATIENT
Start: 2017-03-29 | End: 2017-04-04 | Stop reason: SDUPTHER

## 2017-03-29 RX ORDER — MORPHINE SULFATE 2 MG/ML
2 INJECTION, SOLUTION INTRAMUSCULAR; INTRAVENOUS
Status: DISCONTINUED | OUTPATIENT
Start: 2017-03-29 | End: 2017-04-04

## 2017-03-29 RX ADMIN — Medication 2 MG: at 11:16

## 2017-03-29 RX ADMIN — Medication 2 MG: at 15:59

## 2017-03-29 RX ADMIN — MESALAMINE 1000 MG: 250 CAPSULE ORAL at 22:07

## 2017-03-29 RX ADMIN — PHYTONADIONE 5 MG: 10 INJECTION, EMULSION INTRAMUSCULAR; INTRAVENOUS; SUBCUTANEOUS at 10:38

## 2017-03-29 RX ADMIN — MESALAMINE 1000 MG: 250 CAPSULE ORAL at 08:07

## 2017-03-29 RX ADMIN — Medication 2 MG: at 01:06

## 2017-03-29 RX ADMIN — Medication 2 MG: at 19:17

## 2017-03-29 RX ADMIN — Medication 2 MG: at 08:04

## 2017-03-29 RX ADMIN — MESALAMINE 1000 MG: 250 CAPSULE ORAL at 19:14

## 2017-03-29 RX ADMIN — METHYLPREDNISOLONE SODIUM SUCCINATE 25 MG: 40 INJECTION, POWDER, FOR SOLUTION INTRAMUSCULAR; INTRAVENOUS at 15:51

## 2017-03-29 RX ADMIN — Medication 2 MG: at 03:56

## 2017-03-29 RX ADMIN — SODIUM CHLORIDE 125 ML/HR: 900 INJECTION, SOLUTION INTRAVENOUS at 00:27

## 2017-03-29 RX ADMIN — SODIUM CHLORIDE 40 MG: 9 INJECTION INTRAMUSCULAR; INTRAVENOUS; SUBCUTANEOUS at 22:07

## 2017-03-29 RX ADMIN — Medication 2 MG: at 22:16

## 2017-03-29 RX ADMIN — METHYLPREDNISOLONE SODIUM SUCCINATE 25 MG: 40 INJECTION, POWDER, FOR SOLUTION INTRAMUSCULAR; INTRAVENOUS at 03:56

## 2017-03-29 RX ADMIN — SODIUM CHLORIDE 40 MG: 9 INJECTION INTRAMUSCULAR; INTRAVENOUS; SUBCUTANEOUS at 08:05

## 2017-03-29 RX ADMIN — MESALAMINE 1000 MG: 250 CAPSULE ORAL at 15:51

## 2017-03-29 NOTE — PROGRESS NOTES
Readmission Risk Assessment:     Moderate Risk and MSSP/Good Help ACO patients    RRAT Score:  13-20    Initial Assessment:chart reviewed pt admitted for GI bleed,pt is readmission last visit pt was d/c with [facility] [place] [facility] and appointment with [facility] , medicaid did approve entyvio medication,cm will cont to review and assess d/c needs     Emergency Contact:  See chart    Pertinent Medical Hx:  See chart       PCP/Specialists: Dr. Rod Willson:       DME:  Rolling walker        Moderate Risk Care Transition Plan:  1. Evaluate for [facility] or [facility], SNF, acute rehab, community care coordination of resources. 2. Involve patient/caregiver in assessment, planning, education and implement of intervention. 3. CM daily patient care huddles/interdisciplinary rounds. 4. PCP/Specialist appointment within 5  7 days made prior to discharge. 5. Facilitate transportation and logistics for follow-up appointments. 6. Medication reconciliation [id number]  7. Formal handoff between hospital provider and post-acute provider to transition patient  Handoff to [address] [facility] Nurse Navigator or PCP practice.

## 2017-03-29 NOTE — PROGRESS NOTES
[facility]  Pulmonary, Critical Care, and Sleep Medicine    Name: Rosetet Alicia MRN: [id number]   : 1970 Hospital: [facility] [place]   Date: 3/29/2017        Critical Care Follow Up                                                [x]I have reviewed the flowsheet and previous days notes. Events, vitals, medications and notes from last 24 hours reviewed. Care plan discussed with staff, patient, family and on multidisciplinary rounds. IMPRESSION:   GI bleed    Last H/H 7.3.1      Crohn disease ([facility] ()     Embolic stroke ([facility] ()     Acute blood loss anemia (3/15/2017)     Neuropathic pain (3/15/2017)     DVT (deep venous thrombosis) ([facility] (3/16/2017)     Elevated WBC count (3/28/2017)     · Code status:       RECOMMENDATIONS:   · Continue transfuse blood  2 more units of blood will be given  She will need IVC filter  seriel H/H           Subjective/History of Present Illness:     Rosette Alicia has been seen and evaluated in ICU/ER  Patient is a 40 y.o. female This patient has been seen and evaluated at the request of Dr. Diego Malone. Rosette Alicia is a 40 y.o. female with a hx of Crohns disease, ulcerative colitis, and CVA (1 week ago) and left leg DVT, presents to the emergency department c/o low hemoglobin today at her PCP's office. Patient was told to come to the ED for evaluation and possible blood trasfusion. Associated symptoms include nausea, SOB, lightheadedness, blood in stool, and chills. Patient recently started on lovenox and has been on coumadin for about 5 days. States that her stool is maroon in color for the last 2 days. PMHx of colonoscopy (last week with Mynor Jameson MD for blood in stool and polyps were found).   Patient was transferred to ICU for hypotension and lack of IV access     She had multiple blood transfusions PRBC and FFP    She will need to have IVC filter         Review of Systems:  Constitutional: positive for fatigue  Respiratory: negative  Cardiovascular: negative  Gastrointestinal: positive for bleeding   Genitourinary:negative     Allergies   Allergen Reactions    Humira [Adalimumab] Other (comments)    Remicade [Infliximab] Palpitations      Past Medical History:   Diagnosis Date    Abdominal pain     Anemia NEC     sickle cell trait    C. difficile colitis     [facility]'s disease ([facility]     Gastrointestinal disorder     Crohns    Herpes zoster     Hx of cocaine abuse     Hyperkalemia     Hypertension     Iron deficiency anemia     MRSA (methicillin resistant Staphylococcus aureus)     Obesity     Pain management     Sickle cell trait (HCC)     Stroke (HCC)     + cva 3/17      Current Facility-Administered Medications   Medication Dose Route Frequency    phytonadione (vitamin K1) (AQUA-MEPHYTON) injection 5 mg  5 mg SubCUTAneous ONCE    mesalamine (PENTASA) CR capsule 1,000 mg  1,000 mg Oral QID    pantoprazole (PROTONIX) 40 mg in sodium chloride 0.9 % 10 mL injection  40 mg IntraVENous Q12H    methylPREDNISolone (PF) (SOLU-MEDROL) injection 25 mg  25 mg IntraVENous Q12H       Lines: All central lines examined by me. No signs of erythema, induration, discharge. Peripherally Inserted Central Catheter:     Central Venous Catheter:  Triple Lumen 03/28/17 Right Subclavian (Active)   Central Line Being Utilized Yes 3/29/2017  4:00 AM   Criteria for Appropriate Use Hemodynamically unstable, requiring monitoring lines, vasopressors, or volume resuscitation 3/29/2017  4:00 AM   Site Assessment Clean, dry, & intact 3/29/2017  4:00 AM   Infiltration Assessment 0 3/29/2017  4:00 AM   Affected Extremity/Extremities Color distal to insertion site pink (or appropriate for race) 3/29/2017  4:00 AM   Date of Last Dressing Change 03/28/17 3/28/2017  4:03 PM   Dressing Status Clean, dry, & intact 3/29/2017  4:00 AM   Dressing Type Disk with Chlorhexadine gluconate (CHG); Transparent 3/29/2017  4:00 AM Action Taken Open ports on tubing capped 3/28/2017  4:03 PM   Proximal Hub Color/Line Status Infusing 3/29/2017  4:00 AM   Positive Blood Return (Medial Site) Yes 3/28/2017  4:03 PM   Medial Hub Color/Line Status Infusing 3/29/2017  4:00 AM   Positive Blood Return (Lateral Site) Yes 3/28/2017  4:03 PM   Distal Hub Color/Line Status Capped 3/29/2017  4:00 AM   Positive Blood Return (Site #3) Yes 3/28/2017  4:03 PM   Alcohol Cap Used Yes 3/28/2017  4:03 PM     Venous Access Device:     Peripheral Intravenous Line:  Peripheral IV 17 Left Forearm (Active)   Site Assessment Clean, dry, & intact 3/29/2017  4:00 AM   Phlebitis Assessment 0 3/29/2017  4:00 AM   Infiltration Assessment 0 3/29/2017  4:00 AM   Dressing Status Clean, dry, & intact 3/29/2017  4:00 AM   Dressing Type Transparent 3/29/2017  4:00 AM   Hub Color/Line Status Capped 3/29/2017  4:00 AM   Alcohol Cap Used Yes 3/28/2017  4:03 PM         Objective:   Vital Signs:    Visit Vitals    /65    Pulse 83    Temp 98.6 °F (37 °C)    Resp 13    Ht 5' 6\" (1.676 m)    Wt 82.1 kg (181 lb)    LMP 2017    SpO2 100%    Breastfeeding No    BMI 29.21 kg/m2       O2 Device: Nasal cannula   O2 Flow Rate (L/min): 2 l/min   Temp (24hrs), Av.3 °F (36.8 °C), Min:97.6 °F (36.4 °C), Max:98.6 °F (37 °C)       Intake/Output:   Last shift:       07 - 1900  In: -   Out: 595 [Urine:590]    Last 3 shifts: 1901 -  07  In: 4429.5 [I.V.:1328.3]  Out: 1200 [Urine:500]      Intake/Output Summary (Last 24 hours) at 17 0948  Last data filed at 17 0840   Gross per 24 hour   Intake          3849.53 ml   Output             1095 ml   Net          2754.53 ml       Last 3 Recorded Weights in this Encounter    17   Weight: 82.1 kg (181 lb)       Ventilator Settings:  Mode Rate Tidal Volume Pressure FiO2 PEEP                    Peak airway pressure:      Plateau pressure:     Tidal volume:    Minute ventilation: SPO2      ARDS network Guidelines: Lung protective strategy and Plateau pressure goals less than or equal to 30    Telemetry:normal sinus rhythm    Physical Exam:     General: Awake, NAD  Head: atraumatic, normocephalic  Eye: PERRLA, no scleral icterus, no pallor, no cyanosis  Nose: no sinus tenderness, no erythema, no induration, no discharge  Throat: no tonsillar enlargement, no erythema, no exudates, no oral thrush  Neck: supple, no thyromegaly, no JVD, no lymphadenopathy. Trachea midline  Lung: adequate air entry bilateral equal, no rales, no rhonchi, no wheezing  Heart: S1 S2 present, no murmur, no gallop  Abdomen: soft, NT, ND, +BS  LE: no pedal edema, no cyanosis, no clubbing, 2+ peripheral pulses in DP  Lymphatic: no cervical/supraclavicular/axillary lymphadenopathy  Neurologic: alert. L sided weak        Data:         Chemistry Recent Labs      03/29/17   0815  03/28/17   0901  03/27/17   2050   GLU  107*  112*  126*   NA  143  138  144   K  4.2  4.5  4.2   CL  111*  109*  106   CO2  27  22  30   BUN  18  15  16   CREA  0.53*  0.55*  0.77   CA  7.4*  7.5*  8.4*   AGAP  5  7  8   BUCR  34*  27*  21*   AP  19*  QUANTITY NOT SUFFICIENT. SUGGEST RECOLLECTION  42*   TP  3.8*  QUANTITY NOT SUFFICIENT. SUGGEST RECOLLECTION  5.9*   ALB  1.8*  QUANTITY NOT SUFFICIENT. SUGGEST RECOLLECTION  2.6*   GLOB  2.0  Cannot be calulated  3.3   AGRAT  0.9  Cannot be calulated  0.8        Lactic Acid Lactic acid   Date Value Ref Range Status   09/26/2016 1.3 0.4 - 2.0 MMOL/L Final     No results for input(s): LAC in the last 72 hours.      Liver Enzymes Protein, total   Date Value Ref Range Status   03/29/2017 3.8 (L) 6.4 - 8.2 g/dL Final     Albumin   Date Value Ref Range Status   03/29/2017 1.8 (L) 3.4 - 5.0 g/dL Final     Globulin   Date Value Ref Range Status   03/29/2017 2.0 2.0 - 4.0 g/dL Final     A-G Ratio   Date Value Ref Range Status   03/29/2017 0.9 0.8 - 1.7   Final     AST (SGOT)   Date Value Ref Range Status 03/29/2017 13 (L) 15 - 37 U/L Final     Alk. phosphatase   Date Value Ref Range Status   03/29/2017 19 (L) 45 - 117 U/L Final     Recent Labs      03/29/17   0815  03/28/17   0901  03/27/17 2050   TP  3.8*  QUANTITY NOT SUFFICIENT. SUGGEST RECOLLECTION  5.9*   ALB  1.8*  QUANTITY NOT SUFFICIENT. SUGGEST RECOLLECTION  2.6*   GLOB  2.0  Cannot be calulated  3.3   AGRAT  0.9  Cannot be calulated  0.8   SGOT  13*  QUANTITY NOT SUFFICIENT. SUGGEST RECOLLECTION  29   AP  19*  QUANTITY NOT SUFFICIENT. SUGGEST RECOLLECTION  42*        CBC w/Diff Recent Labs      03/29/17   0815  03/29/17   0005  03/28/17   0901  03/27/17 2050   WBC  12.8   --   22.4*  13.7*   RBC  2.44*   --   2.45*  2.60*   HGB  7.3*  6.8*  6.7*  6.5*   HCT  21.1*  19.8*  20.4*  21.0*   PLT  115*   --   237  438*   GRANS  PENDING   --   81*  84*   LYMPH  PENDING   --   13*  10*   EOS  PENDING   --   0  0        Cardiac Enzymes No results found for: CPK, CKMMB, CKMB, RCK3, CKMBT, CKNDX, CKND1, CHANDA, TROPT, TROIQ, FABIEN, TROPT, TNIPOC, BNP, BNPP     BNP No results found for: BNP, BNPP, XBNPT     Coagulation Recent Labs      03/29/17   0847  03/29/17   0016  03/27/17 2050   PTP  17.3*  17.7*  21.7*   INR  1.5*  1.5*  2.0*   APTT   --    --   37.9*         Thyroid  Lab Results   Component Value Date/Time    TSH 1.51 12/12/2009 09:48 AM          Lipid Panel Lab Results   Component Value Date/Time    Cholesterol, total 145 03/12/2017 06:15 AM    HDL Cholesterol 37 03/12/2017 06:15 AM    LDL, calculated 91.6 03/12/2017 06:15 AM    VLDL, calculated 16.4 03/12/2017 06:15 AM    Triglyceride 82 03/12/2017 06:15 AM    CHOL/HDL Ratio 3.9 03/12/2017 06:15 AM        ABG No results for input(s): PHI, PHI, PCO2I, PO2, PO2I, HCO3, HCO3I, FIO2, FIO2I in the last 72 hours.     No lab exists for component: POC2     Urinalysis Lab Results   Component Value Date/Time    Color YELLOW 03/10/2017 04:30 PM    Appearance CLEAR 03/10/2017 04:30 PM    Specific gravity 1.021 03/10/2017 04:30 PM    pH (UA) 6.5 03/10/2017 04:30 PM    Protein NEGATIVE  03/10/2017 04:30 PM    Glucose NEGATIVE  03/10/2017 04:30 PM    Ketone NEGATIVE  03/10/2017 04:30 PM    Bilirubin NEGATIVE  03/10/2017 04:30 PM    Urobilinogen 1.0 03/10/2017 04:30 PM    Nitrites NEGATIVE  03/10/2017 04:30 PM    Leukocyte Esterase NEGATIVE  03/10/2017 04:30 PM    Epithelial cells FEW 09/26/2016 07:18 PM    Bacteria FEW 09/26/2016 07:18 PM    WBC 0 to 3 09/26/2016 07:18 PM    RBC 0 to 3 09/26/2016 07:18 PM        Micro  No results for input(s): SDES, CULT in the last 72 hours. No results for input(s): CULT in the last 72 hours. XR (Most Recent). CXR reviewed by me and compared with previous CXR   Results from Hospital Encounter encounter on 03/27/17   XR CHEST PORT   Narrative Chest, single view    Indication: Line placement    Comparison: Several prior exams, most recently 2/26/2016    Findings:  Portable upright AP view of the chest was obtained. There is a right  subclavian approach central venous catheter with tip projecting over the  superior cavoatrial junction. The lungs are mildly underexpanded but clear. No  focal pneumonic opacity, pneumothorax, or pleural effusion. The cardiac size and  mediastinal contours are within normal limits. Pulmonary vascularity normal. No  acute osseous abnormality. Impression Impression:    1. Right subclavian approach central venous catheter in position as above. 2. Mild pulmonary hypoinflation without superimposed acute radiographic  abnormality. CT (Most Recent)   Results from Hospital Encounter encounter on 03/27/17   CT HEAD WO CONT   Narrative EXAM: CT head    INDICATION: 69-year-old patient with hypertension, altered mental status. COMPARISON: Several prior examinations, most recently brain MRI dated 3/10/2017.     TECHNIQUE: Axial CT imaging of the head was performed without intravenous  contrast.    One or more dose reduction techniques were used on this CT: automated exposure  control, adjustment of the mAs and/or kVp according to patient's size, and  iterative reconstruction techniques. The specific techniques utilized on this CT  exam have been documented in the patient's electronic medical record.     _______________    FINDINGS: Detail is mildly limited by motion artifact. BRAIN AND POSTERIOR FOSSA: As previously, there is redemonstration of abnormal  clear with matter differentiation involving the right centrum semiovale, corona  radiata, right-sided insular cortex, and right parietal/temporal lobes, spanning  the right sylvian fissure. There is mild associated mass effect upon the right  lateral ventricle, the appearance of which is similar to prior exam. No evidence  of midline shift. The basilar cisterns remain patent. The known multiple  cerebellar infarcts are characterized to better advantage on comparison brain  MRI. Mild increased density of the infarcted cortical surfaces is noted. No  intra-axial fluid collection. EXTRA-AXIAL SPACES AND MENINGES: There are no abnormal extra-axial fluid  collections. CALVARIUM: Intact. SINUSES: Paranasal sinuses and mastoid air cells are clear. OTHER: None.    _______________         Impression IMPRESSION:      1. Redemonstration of sequela of multifocal, likely embolic infarcts without  acute intra-axial or extra-axial fluid collection. 2. Mild persistent edema and mass effect upon the right lateral ventricle,  without evidence of midline shift. EKG No results found for this or any previous visit. ECHO No results found for this or any previous visit. PFT No flowsheet data found.      Other ASA reactivity:   Pre-albumin:   Ionized Calcium:   NH4:   T3, FT4:  Cortisol:  Urine Osm:  Urine Lytes:   HbA1c:      Recent Results (from the past 24 hour(s))   FFP, ALLOCATE    Collection Time: 03/28/17  3:45 PM   Result Value Ref Range    CALLED TO: Romi Rodrigues RN AT 7745 ON 3/28/17 JENA     Unit number [id number]     Blood component type FP24H,THAW     Unit division 00     Status of unit ISSUED     Unit number [id number]     Blood component type FP24H,THAW     Unit division 00     Status of unit ISSUED    HGB & HCT    Collection Time: 03/29/17 12:05 AM   Result Value Ref Range    HGB 6.8 (L) 12.0 - 16.0 g/dL    HCT 19.8 (L) 35.0 - 45.0 %   PROTHROMBIN TIME + INR    Collection Time: 03/29/17 12:16 AM   Result Value Ref Range    Prothrombin time 17.7 (H) 11.5 - 15.2 sec    INR 1.5 (H) 0.8 - 1.2     FFP, ALLOCATE    Collection Time: 03/29/17 12:45 AM   Result Value Ref Range    Unit number [id number]     Blood component type TD24,THAW     Unit division 00     Status of unit ISSUED    CBC WITH AUTOMATED DIFF    Collection Time: 03/29/17  8:15 AM   Result Value Ref Range    WBC 12.8 4.6 - 13.2 K/uL    RBC 2.44 (L) 4.20 - 5.30 M/uL    HGB 7.3 (L) 12.0 - 16.0 g/dL    HCT 21.1 (L) 35.0 - 45.0 %    MCV 86.5 74.0 - 97.0 FL    MCH 29.9 24.0 - 34.0 PG    MCHC 34.6 31.0 - 37.0 g/dL    RDW 13.6 11.6 - 14.5 %    PLATELET 107 (L) 865 - 420 K/uL    MPV 9.2 9.2 - 11.8 FL    NEUTROPHILS PENDING %    LYMPHOCYTES PENDING %    MONOCYTES PENDING %    EOSINOPHILS PENDING %    BASOPHILS PENDING %    ABS. NEUTROPHILS PENDING K/UL    ABS. LYMPHOCYTES PENDING K/UL    ABS. MONOCYTES PENDING K/UL    ABS. EOSINOPHILS PENDING K/UL    ABS.  BASOPHILS PENDING K/UL    DF PENDING    METABOLIC PANEL, COMPREHENSIVE    Collection Time: 03/29/17  8:15 AM   Result Value Ref Range    Sodium 143 136 - 145 mmol/L    Potassium 4.2 3.5 - 5.5 mmol/L    Chloride 111 (H) 100 - 108 mmol/L    CO2 27 21 - 32 mmol/L    Anion gap 5 3.0 - 18 mmol/L    Glucose 107 (H) 74 - 99 mg/dL    BUN 18 7.0 - 18 MG/DL    Creatinine 0.53 (L) 0.6 - 1.3 MG/DL    BUN/Creatinine ratio 34 (H) 12 - 20      GFR est AA >60 >60 ml/min/1.73m2    GFR est non-AA >60 >60 ml/min/1.73m2    Calcium 7.4 (L) 8.5 - 10.1 MG/DL    Bilirubin, total 0.3 0.2 - 1.0 MG/DL    ALT (SGPT) 18 13 - 56 U/L    AST (SGOT) 13 (L) 15 - 37 U/L    Alk. phosphatase 19 (L) 45 - 117 U/L    Protein, total 3.8 (L) 6.4 - 8.2 g/dL    Albumin 1.8 (L) 3.4 - 5.0 g/dL    Globulin 2.0 2.0 - 4.0 g/dL    A-G Ratio 0.9 0.8 - 1.7     PROTHROMBIN TIME + INR    Collection Time: 03/29/17  8:47 AM   Result Value Ref Range    Prothrombin time 17.3 (H) 11.5 - 15.2 sec    INR 1.5 (H) 0.8 - 1.2           Best practice : All below core measures reviewed and addressed:   [x] Antibiotic choice. [x] Fluids. [x] Lactic acid ordered- initial and repeat Q6hrs if elevated till normalized. [x] Cultures drawn. [x] Antibiotic administered within 1hr-ICU and 3hrs ED. [x] Large bore IV- 2 sites         CVP      [x] Pressors aim MAP >65mmHg. [x] Steroids. [x] Glycemic control. [x] Infection control. [] Mech. Ventilated patients- aim to keep peak plateau pressure 47-01MD H2O. [x] HOB at >= 30 degree. [x] Stress ulcer prophylaxis. [x] DVT prophylaxis. [x] Central Line Bundle Followed. [x] Gaming Bundle Followed. [x] Ventilator Bundle Followed. (Daily sedation holiday to assess readiness for weaning from ventilator & SBT trial starting at 6.00 am, HOB 30-degree elevation, Chlorhexidine mouth washes & Routine oral care every 4 hours, Lana tube to suction at 20-30 cm H2O, Maintain [place] tube with 5-10ml air every 4 hours, DVT prophylaxis, GI prophylaxis etc.).     The patient is: [] acutely ill Risk of deterioration: [x] moderate    [x] critically ill  [] high     [x]See my orders for details    My assessment, plan of care, findings, medications, side effects etc were discussed with:  [x] Nurse [x] PT/OT    [x] Respiratory therapy []     [x] Family: answered all questions to satisfaction [] Patient: answered all questions to satisfaction   [x] Pharmacist []      [x] Case & management strategies discussed today on multidisciplinary rounds    High complexity decision making was performed during the evaluation of this patient at high risk for decompensation with multiple organ involvement    [x]Total critical care time spent on reviewing the case/medical record/data/notes/EMR/patient examination/documentation/coordinating care with nurse/consultants, exclusive of procedures 40 minutes with complex decision making performed and > 50% time spent in face to face evaluation.       Brenda Cristobal MD  3/29/2017

## 2017-03-29 NOTE — PROGRESS NOTES
Gastrointestinal Progress Note    Patient Name: Bj Dave    YSCSB'P Date: 3/29/2017    Admit Date: 3/27/2017    Subjective:     Diet: Patient is on Clear Liquid and is tolerating. Nausea is present. Vomiting is not present. Pain: Patient does complain of abdominal pain. The pain is located in the lower abdomen. The pain is described as pressure, and is 3/10 in intensity. relieved by defffecation     Bowel Movements: None    Bleeding:  None    Current Facility-Administered Medications   Medication Dose Route Frequency    morphine injection 2 mg  2 mg IntraVENous Q3H PRN    0.9% sodium chloride infusion 250 mL  250 mL IntraVENous PRN    mesalamine (PENTASA) CR capsule 1,000 mg  1,000 mg Oral QID    oxyCODONE-acetaminophen (PERCOCET) 5-325 mg per tablet 1 Tab  1 Tab Oral Q4H PRN    acetaminophen (TYLENOL) tablet 650 mg  650 mg Oral Q4H PRN    diphenhydrAMINE (BENADRYL) injection 12.5 mg  12.5 mg IntraVENous Q4H PRN    ondansetron (ZOFRAN) injection 4 mg  4 mg IntraVENous Q6H PRN    0.9% sodium chloride infusion 250 mL  250 mL IntraVENous PRN    0.9% sodium chloride infusion 250 mL  250 mL IntraVENous PRN    pantoprazole (PROTONIX) 40 mg in sodium chloride 0.9 % 10 mL injection  40 mg IntraVENous Q12H    methylPREDNISolone (PF) (SOLU-MEDROL) injection 25 mg  25 mg IntraVENous Q12H    0.9% sodium chloride infusion 250 mL  250 mL IntraVENous PRN    0.9% sodium chloride infusion 250 mL  250 mL IntraVENous PRN    0.9% sodium chloride infusion 250 mL  250 mL IntraVENous PRN          Objective:     Visit Vitals    /68    Pulse 77    Temp 98.1 °F (36.7 °C)    Resp 17    Ht 5' 6\" (1.676 m)    Wt 82.1 kg (181 lb)    SpO2 100%    Breastfeeding No    BMI 29.21 kg/m2       03/27 1901 - 03/29 0700  In: 4429.5 [I.V.:1328.3]  Out: 1200 [Urine:500]    General appearance: alert, cooperative, no distress, appears stated age. Pale  Abdomen: soft,very mild tenderness in the suprapubic are. Bowel sounds normal. No masses,  no organomegaly  Extremities: extremities normal, atraumatic, no cyanosis or edema. Tattoo's    Data Review:    Labs: Results:       Chemistry Recent Labs      03/29/17   0815  03/28/17   0901  03/27/17 2050   GLU  107*  112*  126*   NA  143  138  144   K  4.2  4.5  4.2   CL  111*  109*  106   CO2  27  22  30   BUN  18  15  16   CREA  0.53*  0.55*  0.77   CA  7.4*  7.5*  8.4*   AGAP  5  7  8   BUCR  34*  27*  21*   AP  19*  QUANTITY NOT SUFFICIENT. SUGGEST RECOLLECTION  42*   TP  3.8*  QUANTITY NOT SUFFICIENT. SUGGEST RECOLLECTION  5.9*   ALB  1.8*  QUANTITY NOT SUFFICIENT. SUGGEST RECOLLECTION  2.6*   GLOB  2.0  Cannot be calulated  3.3   AGRAT  0.9  Cannot be calulated  0.8      CBC w/Diff Recent Labs      03/29/17   0815  03/29/17   0005  03/28/17   0901 03/27/17 2050   WBC  12.8   --   22.4*  13.7*   RBC  2.44*   --   2.45*  2.60*   HGB  7.3*  6.8*  6.7*  6.5*   HCT  21.1*  19.8*  20.4*  21.0*   PLT  115*   --   237  438*   GRANS  82*   --   81*  84*   LYMPH  8*   --   13*  10*   EOS  0   --   0  0      Coagulation Recent Labs      03/29/17   0847  03/29/17   0016  03/27/17 2050   PTP  17.3*  17.7*  21.7*   INR  1.5*  1.5*  2.0*   APTT   --    --   37.9*       Liver Enzymes Recent Labs      03/29/17 0815   TP  3.8*   ALB  1.8*   AP  19*   SGOT  13*          Assessment:     Principal Problem:    GI bleed (3/28/2017)    Active Problems:    Crohn disease ([facility] [address] (4/63/1057)      Embolic stroke (HCC) (3/17/8898)      Acute blood loss anemia (3/15/2017)      Neuropathic pain (3/15/2017)      DVT (deep venous thrombosis) (HCC) (3/16/2017)      Elevated WBC count (3/28/2017)        Plan:     Severe lower GI bleeding while on Lovenox and Coumadin for recent DVT and embolic CVA her Hgb went down to 6.5 but required a total of 8 PRBC transfusions and 3 FFP units. Last Hgb 7.3 at 8:30 this am. Bleeding is coming from the left colon where she has severe active Crohn's disease.  The bleeding stopped for now if it resumes her best option would be subtotal colectomy. Patient also concerned about having another CVA. We have to hold anticoagulation under the circustances for a minimum of 2 more days. meanwhile she should get IVC filter installed. Acute blood loss on chronic iron deficiency anemia secondary to her severe Crohn's disease     Severe Crohn's disease of the left and transverse colon since age 16. Started on Union which an IV infusion biologic only on March 24. She should be getting her second dose 2 weeks after. For now we are increasing her dose of steroids to Solumedrol 25 mg Iv bid. In my opinion the surgical option is a very good option as she has increased risk of Colon cancer and there is a good chance may not work well in this chronic and severe form of Crohn's disease. From endoscopic point of view I don't think that endoscopic hemostasis is a good choice for her because of the severity and the extent of her disease. Hypercoagulable state with bilateral DVT and embolic CVA on  March 10, 2010. She was started on Coumadin and Lovenox in my opinion is most likely secondary to the severe aggressive Crohn's disease.  Her INR strangely is still high at 1.5 despite the Vit K and >3 FFP        Magdiel Fernandez MD  March 29, 2017

## 2017-03-29 NOTE — INTERDISCIPLINARY ROUNDS
CRITICAL CARE INTERDISCIPLINARY ROUNDS    Patient Information:    Name:   Mansi Kendrick    Age:   40 y.o. Admission Date:   3/27/2017    Critical Care Day: 2 (RRT 3/28)    Surgery Date:      Attending Provider:   Josue Song DO    Surgeon:        Consultant(s):   Hugo Hamlin    Critical Care Physician:  Dinh Stark    Code Status: Full Code    Problem List:     Patient Active Problem List   Diagnosis Code    Chronic pain syndrome G89.4    Crohn disease ([address] [address] K50.90    Sickle cell trait ([facility] [address] D57.3    Hypertension I10    Hx of cocaine abuse J26.988    Embolic stroke ([facility] R07.8    Acute blood loss anemia D62    Neuropathic pain M79.2    DVT (deep venous thrombosis) ([facility]) I82.409    GI bleed K92.2    Elevated WBC count D72.829       Principal Problem:  GI bleed    During rounds the following quality care indicators and evidence based practices were addressed :  PUD Prophylaxis Gaming Day na (M-Care na) ; Central Line Day:2 Isolation:na; Antibiotic Stewardship: na; Probiotics Necessary: na    Ventilator Bundle:     Sepsis Order Set:     Glycemic Control:   Recent Labs      03/29/17   0815  03/28/17   0901  03/27/17 2050   GLU  107*  112*  126*   ; No results for input(s): PHI, PCO2I, PO2I in the last 72 hours. No lab exists for component: 3 Adjustments     Acute MI/PCI:   Not applicable    Door to Balloon: Admission Time: 2024      Heart Failure:    Not applicable     SCIP Measures for other Surgeries:   Not applicable     Pneumonia:    Not applicable    Interdisciplinary team rounds were held with the following team membersCare Management, Nursing, Pastoral Care, Pharmacy, Physician and Respiratory Therapy. Plan of care discussed. Goals of Care/ Recommendations: 2 more unit PRBC, monitoring H&H, hold on IR procedure. Possible IVCF in future - consult Dr Thomas Parker (possible), update Dr Lola Ace    See clinical pathway and/or care plan for interventions and desired outcomes.     Critical Care Discharge Status:  Red

## 2017-03-29 NOTE — PROGRESS NOTES
0628  Report received from Sammy Villarreal RN. Assumed patient care. 5521  Dr. Ruchi Velasco at bedside. 0004  Received call from Dr. Van Borjas, updated on patient status, orders received for 5mg sub-cu Vit-K and repeat INR. Additionally, he would like for Dr. Alanna Dickson to see patient today, RN will notify Dr. Alanna Dickson. [address] with Dr. Alanna Dickson, received order for consult to IR.      1003  Attempted to call IR, voicemail left. 1015  H&H 7.3/21/1, verbal orders received from Dr. Ruchi Velasco to transfuse 2 units PRBCs. 1055  Received call from Viet Coker in IR regarding consult, states that patient was reviewed by Dr. Darci Alberts yesterday. RN will notify Dr. Alanna Dickson. 1100  First unit of PRBCs infusing. 1200  Reassessment complete per flowsheet. 46  Patient's father at bedside, updated on patient condition. 1257  Received call from Dr. Hermila Casey, updated on patient status. [address]  Dr. Alanna Dickson updated on IR consult, states she will round on patient later today. [address]  Dr. Alanna Dickson at bedside. 1820  Received call from Dr. Ruchi Velasco, updated on current H&H, no further transfusion at this time. 1908  Bedside and Verbal shift change report given to IVAN Ga Rn (oncoming nurse) by Tres Soares. Lara Nash RN (offgoing nurse).  Report included the following information SBAR, Kardex, Intake/Output, MAR, Recent Results and Cardiac Rhythm SR.

## 2017-03-29 NOTE — PROGRESS NOTES
1945- Report and care received, assessment completed per flow sheet. Alert, pleasant, cooperative, updated regarding plan of care. Denies pain. Recent CVA, following are residual of CVA and without change per day RN= Left sided facial droop present, left  slightly weaker than right, moves BLE equally, reports tingling in left hand and right foot that is not new either. FFP transfusing, no signs of reaction. 1953- PRBC checked x 2 RN's, infusing via IV pump without difficulty, see flow sheet. 2300- Reassessment completed and without change. 36- Had 1 large maroon stool with clots, Dr.El- Dutta updated regarding status and repeat labs. Orders received for 2 units PRBCs and 1 FFP. See flow sheet for transfusion information. 0400- Reassessment completed and without change.

## 2017-03-29 NOTE — PROGRESS NOTES
VASCULAR & INTERVENTIONAL RADIOLOGY PROGRESS NOTE    IR consulted for mesenteric arteriogram with possible embolization. Patient admitted for rectal bleeding and blood loss anemia, acute on chronic. Patient has history of Crohn's colitis of the left colon. Most recent CT A/P 2/9/17 showed left sided colitis. Bleeding scan showed active GI hemorrhage involving the left colon. Vital signs have predominantly been stable without sustained hypotension or tachycardia. Currently /65 with pulse of 89. In our group's IR experience, although bleeding scans can be positive, mesenteric arteriograms are typically negative when the patient's vital signs are stable. Unusual case for active GI bleed from inflammatory bowel disease. Typical embolization of colonic bleeds are predominantly from diverticular disease. Patient and family will need to know that in the setting of inflammatory bowel disease, there may be longer segment of bleeding, requiring more proximal embolization involving longer segment of colon, increasing the risk for bowel ischemia/infarction. Patient also has history of hypercoagulable state. Would need input from hematology on severity as would not want to cause any arterial thrombosis from catheter manipulation in the smaller, more selective mesenteric vessels. As patient is stable at this point, would continue to support her with blood, reverse coagulopathy (patient on Coumadin with most recent INR 2.0) and recheck INR. D/W Dr. Radha Jones.     Asia Betancur MD  Vascular & Interventional Radiology  [facility]  3/28/2017

## 2017-03-30 LAB
ALBUMIN SERPL BCP-MCNC: 2 G/DL (ref 3.4–5)
ALBUMIN/GLOB SERPL: 0.9 {RATIO} (ref 0.8–1.7)
ALP SERPL-CCNC: 31 U/L (ref 45–117)
ALT SERPL-CCNC: 17 U/L (ref 13–56)
ANION GAP BLD CALC-SCNC: 6 MMOL/L (ref 3–18)
AST SERPL W P-5'-P-CCNC: 12 U/L (ref 15–37)
BASOPHILS # BLD AUTO: 0 K/UL (ref 0–0.06)
BASOPHILS # BLD: 0 % (ref 0–2)
BILIRUB SERPL-MCNC: 0.2 MG/DL (ref 0.2–1)
BLD PROD TYP BPU: NORMAL
BPU ID: NORMAL
BUN SERPL-MCNC: 15 MG/DL (ref 7–18)
BUN/CREAT SERPL: 25 (ref 12–20)
CALCIUM SERPL-MCNC: 7.2 MG/DL (ref 8.5–10.1)
CHLORIDE SERPL-SCNC: 109 MMOL/L (ref 100–108)
CO2 SERPL-SCNC: 27 MMOL/L (ref 21–32)
CREAT SERPL-MCNC: 0.6 MG/DL (ref 0.6–1.3)
DIFFERENTIAL METHOD BLD: ABNORMAL
EOSINOPHIL # BLD: 0.1 K/UL (ref 0–0.4)
EOSINOPHIL NFR BLD: 1 % (ref 0–5)
ERYTHROCYTE [DISTWIDTH] IN BLOOD BY AUTOMATED COUNT: 16.1 % (ref 11.6–14.5)
GLOBULIN SER CALC-MCNC: 2.2 G/DL (ref 2–4)
GLUCOSE BLD STRIP.AUTO-MCNC: 112 MG/DL (ref 70–110)
GLUCOSE BLD STRIP.AUTO-MCNC: 120 MG/DL (ref 70–110)
GLUCOSE SERPL-MCNC: 125 MG/DL (ref 74–99)
HCT VFR BLD AUTO: 25.1 % (ref 35–45)
HGB BLD-MCNC: 8.7 G/DL (ref 12–16)
INR PPP: 1 (ref 0.8–1.2)
LYMPHOCYTES # BLD AUTO: 17 % (ref 21–52)
LYMPHOCYTES # BLD: 1.7 K/UL (ref 0.9–3.6)
MCH RBC QN AUTO: 29.2 PG (ref 24–34)
MCHC RBC AUTO-ENTMCNC: 34.7 G/DL (ref 31–37)
MCV RBC AUTO: 84.2 FL (ref 74–97)
MONOCYTES # BLD: 1.3 K/UL (ref 0.05–1.2)
MONOCYTES NFR BLD AUTO: 13 % (ref 3–10)
NEUTS SEG # BLD: 7.1 K/UL (ref 1.8–8)
NEUTS SEG NFR BLD AUTO: 69 % (ref 40–73)
PLATELET # BLD AUTO: 117 K/UL (ref 135–420)
PMV BLD AUTO: 8.7 FL (ref 9.2–11.8)
POTASSIUM SERPL-SCNC: 3.6 MMOL/L (ref 3.5–5.5)
PROT SERPL-MCNC: 4.2 G/DL (ref 6.4–8.2)
PROTHROMBIN TIME: 12.9 SEC (ref 11.5–15.2)
RBC # BLD AUTO: 2.98 M/UL (ref 4.2–5.3)
SODIUM SERPL-SCNC: 142 MMOL/L (ref 136–145)
STATUS OF UNIT,%ST: NORMAL
UNIT DIVISION, %UDIV: 0
WBC # BLD AUTO: 10.2 K/UL (ref 4.6–13.2)

## 2017-03-30 PROCEDURE — 74011250636 HC RX REV CODE- 250/636: Performed by: INTERNAL MEDICINE

## 2017-03-30 PROCEDURE — 74011250637 HC RX REV CODE- 250/637: Performed by: FAMILY MEDICINE

## 2017-03-30 PROCEDURE — 74011250636 HC RX REV CODE- 250/636: Performed by: FAMILY MEDICINE

## 2017-03-30 PROCEDURE — 80053 COMPREHEN METABOLIC PANEL: CPT | Performed by: FAMILY MEDICINE

## 2017-03-30 PROCEDURE — 85025 COMPLETE CBC W/AUTO DIFF WBC: CPT | Performed by: FAMILY MEDICINE

## 2017-03-30 PROCEDURE — 74011000250 HC RX REV CODE- 250: Performed by: INTERNAL MEDICINE

## 2017-03-30 PROCEDURE — 82962 GLUCOSE BLOOD TEST: CPT

## 2017-03-30 PROCEDURE — 85610 PROTHROMBIN TIME: CPT | Performed by: FAMILY MEDICINE

## 2017-03-30 PROCEDURE — 74011250637 HC RX REV CODE- 250/637: Performed by: INTERNAL MEDICINE

## 2017-03-30 PROCEDURE — C9113 INJ PANTOPRAZOLE SODIUM, VIA: HCPCS | Performed by: INTERNAL MEDICINE

## 2017-03-30 PROCEDURE — 65610000006 HC RM INTENSIVE CARE

## 2017-03-30 RX ORDER — ENOXAPARIN SODIUM 100 MG/ML
30 INJECTION SUBCUTANEOUS EVERY 12 HOURS
Status: DISCONTINUED | OUTPATIENT
Start: 2017-03-30 | End: 2017-03-31

## 2017-03-30 RX ORDER — WARFARIN SODIUM 5 MG/1
7.5 TABLET ORAL EVERY EVENING
Status: DISCONTINUED | OUTPATIENT
Start: 2017-03-30 | End: 2017-03-30

## 2017-03-30 RX ORDER — WARFARIN SODIUM 5 MG/1
5 TABLET ORAL
Status: COMPLETED | OUTPATIENT
Start: 2017-03-30 | End: 2017-03-30

## 2017-03-30 RX ORDER — INSULIN LISPRO 100 [IU]/ML
INJECTION, SOLUTION INTRAVENOUS; SUBCUTANEOUS
Status: DISCONTINUED | OUTPATIENT
Start: 2017-03-30 | End: 2017-04-13

## 2017-03-30 RX ORDER — DOCUSATE SODIUM 100 MG/1
100 CAPSULE, LIQUID FILLED ORAL 2 TIMES DAILY
Status: DISCONTINUED | OUTPATIENT
Start: 2017-03-30 | End: 2017-04-18 | Stop reason: HOSPADM

## 2017-03-30 RX ORDER — MAGNESIUM SULFATE 100 %
4 CRYSTALS MISCELLANEOUS AS NEEDED
Status: DISCONTINUED | OUTPATIENT
Start: 2017-03-30 | End: 2017-04-18 | Stop reason: HOSPADM

## 2017-03-30 RX ORDER — DEXTROSE 50 % IN WATER (D50W) INTRAVENOUS SYRINGE
25-50 AS NEEDED
Status: DISCONTINUED | OUTPATIENT
Start: 2017-03-30 | End: 2017-04-18 | Stop reason: HOSPADM

## 2017-03-30 RX ADMIN — MESALAMINE 1000 MG: 250 CAPSULE ORAL at 07:40

## 2017-03-30 RX ADMIN — METHYLPREDNISOLONE SODIUM SUCCINATE 25 MG: 40 INJECTION, POWDER, FOR SOLUTION INTRAMUSCULAR; INTRAVENOUS at 15:36

## 2017-03-30 RX ADMIN — MESALAMINE 1000 MG: 250 CAPSULE ORAL at 17:08

## 2017-03-30 RX ADMIN — Medication 2 MG: at 03:55

## 2017-03-30 RX ADMIN — Medication 2 MG: at 07:39

## 2017-03-30 RX ADMIN — Medication 2 MG: at 18:42

## 2017-03-30 RX ADMIN — SODIUM CHLORIDE 40 MG: 9 INJECTION INTRAMUSCULAR; INTRAVENOUS; SUBCUTANEOUS at 20:14

## 2017-03-30 RX ADMIN — Medication 2 MG: at 15:36

## 2017-03-30 RX ADMIN — METHYLPREDNISOLONE SODIUM SUCCINATE 25 MG: 40 INJECTION, POWDER, FOR SOLUTION INTRAMUSCULAR; INTRAVENOUS at 03:54

## 2017-03-30 RX ADMIN — Medication 2 MG: at 11:11

## 2017-03-30 RX ADMIN — MESALAMINE 1000 MG: 250 CAPSULE ORAL at 21:59

## 2017-03-30 RX ADMIN — Medication 2 MG: at 22:00

## 2017-03-30 RX ADMIN — ONDANSETRON 4 MG: 2 INJECTION INTRAMUSCULAR; INTRAVENOUS at 01:01

## 2017-03-30 RX ADMIN — MESALAMINE 1000 MG: 250 CAPSULE ORAL at 12:16

## 2017-03-30 RX ADMIN — Medication 2 MG: at 00:48

## 2017-03-30 RX ADMIN — ENOXAPARIN SODIUM 30 MG: 30 INJECTION SUBCUTANEOUS at 21:59

## 2017-03-30 RX ADMIN — WARFARIN SODIUM 5 MG: 5 TABLET ORAL at 18:47

## 2017-03-30 RX ADMIN — SODIUM CHLORIDE 40 MG: 9 INJECTION INTRAMUSCULAR; INTRAVENOUS; SUBCUTANEOUS at 07:40

## 2017-03-30 NOTE — CONSULTS
Colorectal Surgery Consult    Subjective:     Patient is a 40 y.o.  female who is being seen with bloody diarrhea and crohn's. Onset of symptoms was abrupt with rapidly worsening course since that time. Patient also notes associated with hematochezia and abdominal pain. Symptoms improve with narcotics. Symptoms worsen with none. There is history of inflammatory bowel disease and recent stroke with initiation of blood thinners. There is no history of past abdominal surgery. Past history includes inflammatory bowel disease crohn's. Previous studies include colonoscopy showing crohn's in left colon and nuclear scan showing bleeding in left/spleen. Has had multiple transfusions of PRBCs and FFP. Patient Active Problem List    Diagnosis Date Noted    GI bleed 03/28/2017    Elevated WBC count 03/28/2017    [facility] 03/16/2017    Acute blood loss anemia 03/15/2017    Neuropathic pain 56/79/7633    Embolic stroke ([facility]) 18/72/2414    Sickle cell trait ([facility])     Hypertension     Hx of cocaine abuse     Chronic pain syndrome 02/27/2012    Crohn disease ([facility] [address] 02/27/2012     Past Medical History:   Diagnosis Date    Abdominal pain     Anemia NEC     sickle cell trait    C. difficile colitis     Crohn's disease ([address]     Gastrointestinal disorder     Crohns    Herpes zoster     Hx of cocaine abuse     Hyperkalemia     Hypertension     Iron deficiency anemia     MRSA (methicillin resistant Staphylococcus aureus)     Obesity     Pain management     Sickle cell trait ([address]     Stroke ([facility] [address]     + cva 3/17      Past Surgical History:   Procedure Laterality Date    COLONOSCOPY N/A 3/17/2017    COLONOSCOPY; BIOPSY; performed by Laith Pimentel MD at [facility] ENDOSCOPY    HX GYN      tubal ligation    HX TUBAL LIGATION       History reviewed. No pertinent family history.    Social History     Social History    Marital status:      Spouse name: N/A  Number of children: N/A    Years of education: N/A     Social History Main Topics    Smoking status: Former Smoker    Smokeless tobacco: None    Alcohol use No    Drug use:  Yes    Sexual activity: Yes     Partners: Male     Birth control/ protection: Surgical     Other Topics Concern    None     Social History Narrative       Current Facility-Administered Medications   Medication Dose Route Frequency Provider Last Rate Last Dose    morphine injection 2 mg  2 mg IntraVENous Q3H PRN Azael Clay MD   2 mg at 03/29/17 1917    0.9% sodium chloride infusion 250 mL  250 mL IntraVENous PRN Mike Gauthier MD        mesalamine (PENTASA) CR capsule 1,000 mg  1,000 mg Oral QID Azael Clay MD   1,000 mg at 03/29/17 1914    oxyCODONE-acetaminophen (PERCOCET) 5-325 mg per tablet 1 Tab  1 Tab Oral Q4H PRN Azael Clay MD        acetaminophen (TYLENOL) tablet 650 mg  650 mg Oral Q4H PRN Azael Clay MD        diphenhydrAMINE (BENADRYL) injection 12.5 mg  12.5 mg IntraVENous Q4H PRN Azael Clay MD        ondansetron [facility]) injection 4 mg  4 mg IntraVENous Q6H PRN Azael Clay MD        0.9% sodium chloride infusion 250 mL  250 mL IntraVENous PRN Adela Venegas DO        0.9% sodium chloride infusion 250 mL  250 mL IntraVENous PRN Adela Venegas DO        pantoprazole (PROTONIX) 40 mg in sodium chloride 0.9 % 10 mL injection  40 mg IntraVENous Q12H Adela Venegas DO   40 mg at 03/29/17 0805    methylPREDNISolone (PF) (SOLU-MEDROL) injection 25 mg  25 mg IntraVENous Q12H Magdiel Fernandez MD   25 mg at 03/29/17 1551    0.9% sodium chloride infusion 250 mL  250 mL IntraVENous PRN Magdiel Fernandez MD        0.9% sodium chloride infusion 250 mL  250 mL IntraVENous PRN Adela Venegas DO        0.9% sodium chloride infusion 250 mL  250 mL IntraVENous PRN Janine Pineda MD   Stopped at 03/28/17 1945         Allergies   Allergen Reactions    Humira [Adalimumab] Other (comments)  Remicade [Infliximab] Palpitations        Review of Systems:  Constitutional: negative for anorexia and weight loss  Eyes: negative for glaucoma, visual disturbance and icterus  Ears, Nose, Mouth, Throat, and Face: negative for ear drainage, nasal congestion and facial trauma  Respiratory: negative for hemoptysis or emphysema  Cardiovascular: negative for chest pain, irregular heart beats, claudication  Gastrointestinal: negative for dysphagia, reflux symptoms and jaundice  Genitourinary:negative for nocturia, urinary incontinence, hesitancy and decreased stream  Hematologic/Lymphatic: negative for lymphadenopathy, petechiae and coughing up blood  Musculoskeletal:negative for myalgias, stiff joints and bone pain  Neurological: negative for headaches and seizures  Behavioral/Psychiatric: negative for ADHD, anorexia and bipolar  Endocrine: negative for fertility problems and temperature intolerance  Allergic/Immunologic: negative for urticaria, angioedema and anaphylaxis     Objective:     Patient Vitals for the past 8 hrs:   BP Temp Pulse Resp SpO2   03/29/17 2100 113/70 - 74 11 98 %   03/29/17 2030 110/70 - 76 12 98 %   03/29/17 2000 126/81 - 81 16 99 %   03/29/17 1930 122/78 - 81 14 100 %   03/29/17 1900 128/70 - 96 12 100 %   03/29/17 1857 133/81 98.8 °F (37.1 °C) 87 18 100 %   03/29/17 1845 133/81 - 89 12 100 %   03/29/17 1830 130/80 - 83 17 100 %   03/29/17 1815 - - 87 16 100 %   03/29/17 1800 106/72 - 83 17 100 %   03/29/17 1745 136/76 - 81 13 100 %   03/29/17 1730 120/78 - 85 13 100 %   03/29/17 1715 - - 97 15 100 %   03/29/17 1700 125/76 - 91 20 99 %   03/29/17 1645 115/59 - 85 16 100 %   03/29/17 1630 118/70 - 83 14 -   03/29/17 1615 104/74 - 76 16 100 %   03/29/17 1607 123/76 98.2 °F (36.8 °C) 87 21 98 %   03/29/17 1600 123/76 98.2 °F (36.8 °C) 82 19 100 %   03/29/17 1545 127/72 - 80 16 100 %   03/29/17 1530 111/82 - 83 24 -   03/29/17 1515 - - 96 22 95 %   03/29/17 1500 115/75 - 71 (!) 3 100 % 03/29/17 1445 118/80 - 71 - 100 %   03/29/17 1430 125/84 - 69 17 100 %   03/29/17 1415 117/82 - 72 17 100 %   03/29/17 1400 112/70 - 72 14 100 %   03/29/17 1345 114/69 - 72 14 100 %       Physical Exam:   Visit Vitals    /70    Pulse 74    Temp 98.8 °F (37.1 °C)    Resp 11    Ht 5' 6\" (1.676 m)    Wt 82.1 kg (181 lb)    LMP 03/06/2017    SpO2 98%    Breastfeeding No    BMI 29.21 kg/m2     General:  Alert, cooperative, no distress, appears stated age. Head:  Normocephalic, without obvious abnormality, atraumatic. Eyes:  Conjunctivae/corneas clear. PERRL, EOMs intact. Fundi benign. Ears:  Normal TMs and external ear canals both ears. Nose: Nares normal. Septum midline. Mucosa normal. No drainage or sinus tenderness. Throat: Lips, mucosa, and tongue normal. Teeth and gums normal.   Neck: Supple, symmetrical, trachea midline, no adenopathy, thyroid: no enlargement/tenderness/nodules, no carotid bruit and no JVD. Back:   Symmetric, no curvature. ROM normal. No CVA tenderness. Lungs:   Clear to auscultation bilaterally. Chest wall:  No tenderness or deformity. Heart:  Regular rate and rhythm, S1, S2 normal, no murmur, click, rub or gallop. Breast Exam:  No tenderness, masses, or nipple abnormality. Abdomen:   Soft, non-tender. Bowel sounds normal. No masses,  No organomegaly. Genitalia:  Normal female without lesion, discharge or tenderness. Rectal:  Normal tone,  no masses or tenderness  Guaiac negative stool. Extremities: Extremities normal, atraumatic, no cyanosis or edema. Pulses: 2+ and symmetric all extremities. Skin: Skin color, texture, turgor normal. No rashes or lesions. Lymph nodes: Cervical, supraclavicular, and axillary nodes normal.   Neurologic: CNII-XII intact. Normal strength, sensation and reflexes throughout.        Lab/Data Review:  Recent Results (from the past 24 hour(s))   HGB & HCT    Collection Time: 03/29/17 12:05 AM   Result Value Ref Range HGB 6.8 (L) 12.0 - 16.0 g/dL    HCT 19.8 (L) 35.0 - 45.0 %   PROTHROMBIN TIME + INR    Collection Time: 03/29/17 12:16 AM   Result Value Ref Range    Prothrombin time 17.7 (H) 11.5 - 15.2 sec    INR 1.5 (H) 0.8 - 1.2     FFP, ALLOCATE    Collection Time: 03/29/17 12:45 AM   Result Value Ref Range    Unit number [id number]     Blood component type FP24H,THAW     Unit division 00     Status of unit ISSUED    CBC WITH AUTOMATED DIFF    Collection Time: 03/29/17  8:15 AM   Result Value Ref Range    WBC 12.8 4.6 - 13.2 K/uL    RBC 2.44 (L) 4.20 - 5.30 M/uL    HGB 7.3 (L) 12.0 - 16.0 g/dL    HCT 21.1 (L) 35.0 - 45.0 %    MCV 86.5 74.0 - 97.0 FL    MCH 29.9 24.0 - 34.0 PG    MCHC 34.6 31.0 - 37.0 g/dL    RDW 13.6 11.6 - 14.5 %    PLATELET 097 (L) 679 - 420 K/uL    MPV 9.2 9.2 - 11.8 FL    NEUTROPHILS 82 (H) 40 - 73 %    LYMPHOCYTES 8 (L) 21 - 52 %    MONOCYTES 8 3 - 10 %    EOSINOPHILS 0 0 - 5 %    BASOPHILS 0 0 - 2 %    METAMYELOCYTES 1 %    MYELOCYTES 1 %    NRBC 1.0  WBC    ABS. NEUTROPHILS 10.5 (H) 1.8 - 8.0 K/UL    ABS. LYMPHOCYTES 1.0 0.9 - 3.6 K/UL    ABS. MONOCYTES 1.0 0.05 - 1.2 K/UL    ABS. EOSINOPHILS 0.0 0.0 - 0.4 K/UL    ABS. BASOPHILS 0.0 0.0 - 0.06 K/UL    RBC COMMENTS ANISOCYTOSIS  1+        RBC COMMENTS HYPOCHROMIA  1+        RBC COMMENTS POLYCHROMASIA  1+        DF MANUAL     METABOLIC PANEL, COMPREHENSIVE    Collection Time: 03/29/17  8:15 AM   Result Value Ref Range    Sodium 143 136 - 145 mmol/L    Potassium 4.2 3.5 - 5.5 mmol/L    Chloride 111 (H) 100 - 108 mmol/L    CO2 27 21 - 32 mmol/L    Anion gap 5 3.0 - 18 mmol/L    Glucose 107 (H) 74 - 99 mg/dL    BUN 18 7.0 - 18 MG/DL    Creatinine 0.53 (L) 0.6 - 1.3 MG/DL    BUN/Creatinine ratio 34 (H) 12 - 20      GFR est AA >60 >60 ml/min/1.73m2    GFR est non-AA >60 >60 ml/min/1.73m2    Calcium 7.4 (L) 8.5 - 10.1 MG/DL    Bilirubin, total 0.3 0.2 - 1.0 MG/DL    ALT (SGPT) 18 13 - 56 U/L    AST (SGOT) 13 (L) 15 - 37 U/L    Alk.  phosphatase 19 (L) 45 - 117 U/L    Protein, total 3.8 (L) 6.4 - 8.2 g/dL    Albumin 1.8 (L) 3.4 - 5.0 g/dL    Globulin 2.0 2.0 - 4.0 g/dL    A-G Ratio 0.9 0.8 - 1.7     PROTHROMBIN TIME + INR    Collection Time: 03/29/17  8:47 AM   Result Value Ref Range    Prothrombin time 17.3 (H) 11.5 - 15.2 sec    INR 1.5 (H) 0.8 - 1.2     HGB & HCT    Collection Time: 03/29/17  5:35 PM   Result Value Ref Range    HGB 9.9 (L) 12.0 - 16.0 g/dL    HCT 28.2 (L) 35.0 - 45.0 %         Assessment:     Principal Problem:    GI bleed (3/28/2017)    Active Problems:    Crohn disease ([facility] [address] (8/25/8188)      Embolic stroke ([facility]) (4/79/1350)      Acute blood loss anemia (3/15/2017)      Neuropathic pain (3/15/2017)      DVT (deep venous thrombosis) ([facility]) (3/16/2017)      Elevated WBC count (3/28/2017)        Plan:   Transfuse with units PRBCs as needed. Fluid resuscitation, treatment of crohn's. Will plan for surgery. Pt has very difficult situation with recent stroke and need for anticoagulation. Anticoagulation is causing hemorrhage of crohn's segment. Risk of not operating even though GI Bleed has stopped is recurrent likely debilitating stroke. Risk of operating was discussed with pt. Also risk of crohn's flair in another segment of bowel which would cause the same scenario. Will try and limit segment removed because of possibility of crohn's complications in the future. Will rediscuss tomorrow with pt and family.     Signed By: Joe Myers DO     March 29, 2017

## 2017-03-30 NOTE — PROGRESS NOTES
1930- Report and care received, assessment completed per flow sheet. Alert, pleasant, cooperative. Left  weaker than right, left hand and right foot with tingling, left facial droop= these deficits are reportedly due to CVA PTA. NAD.    0045- Reassessment completed and without change, morphine administered per orders for c/o generalized abdominal pain. 0100- C/o nausea, Zofran administered per orders. 8353- Reassessment completed and without change.

## 2017-03-30 NOTE — DIABETES MGMT
GLYCEMIC CONTROL & NUTRITION:    - Discussed in rounds, no known h/o DM , last A1C 5.7%, meets criteria for pre-DM  - noted steroids on board, recommend add POCT + Humalog per protocol  - BG have been within target range on labs thus far    Recent Glucose Results:   Lab Results   Component Value Date/Time     (H) 03/30/2017 04:45 AM    GLUCPOC 112 (H) 03/30/2017 11:18 AM               Oneida Etienne, MPH, RD

## 2017-03-30 NOTE — PROGRESS NOTES
Hospitalist Progress Note    Patient: Laura Alford MRN: [id number]  CSN: [id number]    YOB: 1970  Age: 40 y.o. Sex: female    DOA: 3/27/2017 LOS:  LOS: 2 days          Chief Complaint:    Anemia      Assessment/Plan     Hospital Problems  Date Reviewed: 3/15/2017          Codes Class Noted POA    * (Principal)GI bleed ICD-10-CM: K92.2  ICD-9-CM: 578.9  3/28/2017 Yes        Elevated WBC count ICD-10-CM: D72.829  ICD-9-CM: 288.60  3/28/2017 Yes        DVT (deep venous thrombosis) ([facility] ICD-10-CM: I82.409  ICD-9-CM: 453.40  3/16/2017 Yes        Acute blood loss anemia ICD-10-CM: D62  ICD-9-CM: 285.1  3/15/2017 Yes        Neuropathic pain ICD-10-CM: M79.2  ICD-9-CM: 729.2  4/74/3193 Yes        Embolic stroke [facility]) WBS-21-JT: I63.9  ICD-9-CM: 434.11  3/10/2017 Yes        Crohn disease ([facility] ICD-10-CM: K50.90  ICD-9-CM: 555.9  2/27/2012 Yes            - Transfusing as needed w/ H/H monitoring  - GI and colorectal following  - Possible IR or vascular intervention pending localization of bleed  - continue protonix 40mg q12  - continue mesalamine CR 1000mg QID    Dispo pending localization and control of bleeding. Subjective:    Feeling better after getting blood. No more bloody BMs today.      Review of systems:    Constitutional: denies fevers, chills, myalgias  Respiratory: denies SOB, cough  Cardiovascular: denies chest pain, palpitations  Gastrointestinal: denies nausea, vomiting, diarrhea      Vital signs/Intake and Output:  Visit Vitals    /70    Pulse 74    Temp 98.8 °F (37.1 °C)    Resp 11    Ht 5' 6\" (1.676 m)    Wt 82.1 kg (181 lb)    SpO2 98%    Breastfeeding No    BMI 29.21 kg/m2     Current Shift:     Last three shifts:  03/28 0701 - 03/29 1900  In: 9152.5 [I.V.:1078.3]  Out: 1445 [Urine:1440]    Exam:    General: Well developed, alert, NAD, OX3  Head/Neck: NCAT, supple, No masses, No lymphadenopathy  CVS:Regular rate and rhythm, no M/R/G, S1/S2 heard, no thrill  Lungs:Clear to auscultation bilaterally, no wheezes, rhonchi, or rales  Abdomen: Soft, Nontender, No distention, Normal Bowel sounds, No hepatomegaly  Extremities: No C/C/E, pulses palpable 2+  Skin:normal texture and turgor, no rashes, no lesions  Neuro:grossly normal , follows commands  Psych:appropriate                Labs: Results:       Chemistry Recent Labs      03/29/17   0815  03/28/17   0901  03/27/17 2050   GLU  107*  112*  126*   NA  143  138  144   K  4.2  4.5  4.2   CL  111*  109*  106   CO2  27  22  30   BUN  18  15  16   CREA  0.53*  0.55*  0.77   CA  7.4*  7.5*  8.4*   AGAP  5  7  8   BUCR  34*  27*  21*   AP  19*  QUANTITY NOT SUFFICIENT. SUGGEST RECOLLECTION  42*   TP  3.8*  QUANTITY NOT SUFFICIENT. SUGGEST RECOLLECTION  5.9*   ALB  1.8*  QUANTITY NOT SUFFICIENT. SUGGEST RECOLLECTION  2.6*   GLOB  2.0  Cannot be calulated  3.3   AGRAT  0.9  Cannot be calulated  0.8      CBC w/Diff Recent Labs      03/29/17   1735  03/29/17   0815  03/29/17   0005  03/28/17   0901  03/27/17 2050   WBC   --   12.8   --   22.4*  13.7*   RBC   --   2.44*   --   2.45*  2.60*   HGB  9.9*  7.3*  6.8*  6.7*  6.5*   HCT  28.2*  21.1*  19.8*  20.4*  21.0*   PLT   --   115*   --   237  438*   GRANS   --   82*   --   81*  84*   LYMPH   --   8*   --   13*  10*   EOS   --   0   --   0  0      Cardiac Enzymes Recent Labs      03/27/17 2050   CPK  41   CKND1  Cannot be calulated      Coagulation Recent Labs      03/29/17   0847  03/29/17   0016  03/27/17 2050   PTP  17.3*  17.7*  21.7*   INR  1.5*  1.5*  2.0*   APTT   --    --   37.9*       Lipid Panel Lab Results   Component Value Date/Time    Cholesterol, total 145 03/12/2017 06:15 AM    HDL Cholesterol 37 03/12/2017 06:15 AM    LDL, calculated 91.6 03/12/2017 06:15 AM    VLDL, calculated 16.4 03/12/2017 06:15 AM    Triglyceride 82 03/12/2017 06:15 AM    CHOL/HDL Ratio 3.9 03/12/2017 06:15 AM      BNP No results for input(s): BNPP in the last 72 hours.    Liver Enzymes Recent Labs      03/29/17   0815   TP  3.8*   ALB  1.8*   AP  19*   SGOT  13*      Thyroid Studies Lab Results   Component Value Date/Time    TSH 1.51 12/12/2009 09:48 AM        Procedures/imaging: see electronic medical records for all procedures/Xrays and details which were not copied into this note but were reviewed prior to creation of Lma [id number], DO

## 2017-03-30 NOTE — CONSULTS
Surgery Consult      Patient: Bj Dave MRN: [id number]  CSN: [id number]      YOB: 1970    Age: 40 y.o. Sex: female      DOA: 3/27/2017       HPI:     Bj aDve is a 40 y.o. female who presents to [facility] with recurrent anemia and a GIB related to Crohn's disease. She had presented to [facility] earlier this month where she was found to have an acute CVA as well as a tibial DVT. There was concern that her CVA could possibly be due to her DVT and a LAURIE with bubble study was completed which did not show a right to left shunt. She was started on anticoagulation during her last admission and was discharged home once her treatment team was comfortable that her Crohn's was under control and she was no longer experiencing any GIBs. She presented again on 3/27 with recurrent bleeding and anemia and a consult was placed by Dr. Elie Oden to evaluate her for an IVC filter. Today she denies shortness of breath and states her right leg discomfort is improved. Past Medical History:   Diagnosis Date    Abdominal pain     Anemia NEC     sickle cell trait    C. difficile colitis     Crohn's disease (HCC)     Gastrointestinal disorder     Crohns    Herpes zoster     Hx of cocaine abuse     Hyperkalemia     Hypertension     Iron deficiency anemia     MRSA (methicillin resistant Staphylococcus aureus)     Obesity     Pain management     Sickle cell trait (HCC)     Stroke ([address]     + cva 3/17       Past Surgical History:   Procedure Laterality Date    COLONOSCOPY N/A 3/17/2017    COLONOSCOPY; BIOPSY; performed by Jazmin Hamilton MD at [facility] ENDOSCOPY    HX GYN      tubal ligation    HX TUBAL LIGATION         History reviewed. No pertinent family history.     Social History     Social History    Marital status:      Spouse name: N/A    Number of children: N/A    Years of education: N/A     Social History Main Topics    Smoking status: Former Smoker    Smokeless tobacco: None  Alcohol use No    Drug use: Yes    Sexual activity: Yes     Partners: Male     Birth control/ protection: Surgical     Other Topics Concern    None     Social History Narrative       Prior to Admission medications    Medication Sig Start Date End Date Taking? Authorizing Provider   warfarin (COUMADIN) 5 mg tablet Take 5 mg by mouth daily. Yes Historical Provider   atorvastatin (LIPITOR) 80 mg tablet Take 1 Tab by mouth nightly. 3/23/17  Yes Yessy Morin MD   gabapentin (NEURONTIN) 400 mg capsule Take 1 Cap by mouth three (3) times daily. 3/23/17  Yes Yessy Morin MD   mesalamine (PENTASA) 500 mg CR capsule Take 2 Caps by mouth four (4) times daily. Indications: CROHN'S DISEASE 3/23/17  Yes Yessy Morin MD   predniSONE (DELTASONE) 20 mg tablet Take 2 Tabs by mouth daily (with breakfast). Indications: CROHN'S DISEASE 3/23/17  Yes Yessy Morin MD   enoxaparin (LOVENOX) 80 mg/0.8 mL injection 80 mg by SubCUTAneous route every twelve (12) hours. 3/23/17  Yes Yessy Morin MD   oxyCODONE-acetaminophen (PERCOCET) 5-325 mg per tablet Take 1 Tab by mouth every four (4) hours as needed for Pain. Max Daily Amount: 6 Tabs. 3/23/17   Yessy Morin MD       Allergies   Allergen Reactions    Humira [Adalimumab] Other (comments)    Remicade [Infliximab] Palpitations       Physical Exam:      Visit Vitals    /82    Pulse 66    Temp 98.7 °F (37.1 °C)    Resp 16    Ht 5' 6\" (1.676 m)    Wt 181 lb (82.1 kg)    SpO2 100%    Breastfeeding No    BMI 29.21 kg/m2       GENERAL: alert, cooperative, no distress, appears stated age, EYE: negative findings: anicteric sclera, LYMPHATIC: Cervical, supraclavicular, and axillary nodes normal. , THROAT & NECK: normal, LUNG: clear to auscultation bilaterally, HEART: regular rate and rhythm, ABDOMEN: soft, non-tender. Bowel sounds normal. No masses,  no organomegaly, EXTREMITIES:  No significant edema. + pedal pulses bilaterally.   Evidence of arterial emboli on base of right foot stable compared to last evaluation    ROS:  Constitutional: positive for fatigue  Eyes: negative  Ears, nose, mouth, throat, and face: negative  Respiratory: negative  Cardiovascular: negative  Gastrointestinal: negative  Musculoskeletal:negative  Unless otherwise mentioned in the HPI. Data Review:    CBC:   Lab Results   Component Value Date/Time    WBC 10.2 03/30/2017 04:45 AM    RBC 2.98 03/30/2017 04:45 AM    HGB 8.7 03/30/2017 04:45 AM    HCT 25.1 03/30/2017 04:45 AM    PLATELET 900 80/57/6902 04:45 AM      BMP:   Lab Results   Component Value Date/Time    Glucose 125 03/30/2017 04:45 AM    Sodium 142 03/30/2017 04:45 AM    Potassium 3.6 03/30/2017 04:45 AM    Chloride 109 03/30/2017 04:45 AM    CO2 27 03/30/2017 04:45 AM    BUN 15 03/30/2017 04:45 AM    Creatinine 0.60 03/30/2017 04:45 AM    Calcium 7.2 03/30/2017 04:45 AM     Coagulation:   Lab Results   Component Value Date/Time    Prothrombin time 12.9 03/30/2017 04:45 AM    INR 1.0 03/30/2017 04:45 AM    aPTT 37.9 03/27/2017 08:50 PM         Assessment/Plan     Ms. Alvaro Kay is a very complex patient with Crohn's disease an some form of a hypercoaguable state with both arterial and venous thrombi formation. The patient has either a primary arterial thrombosis that lead to her CVA or an arterial embolus leading to her stroke. This was confirmed by her LAURIE which failed to show a right to left shunt that would explain a venous to arterial embolus. As such, she is at risk for further arterial thrombosis or emboli and an IVC filter would not prevent this from re occurring. The only option for her in regards to her arterial thrombosis is anticoagulation. The concern about placing an IVC filter would be that she would be at high risk for caval thrombosis without being anticoagulated. For these reasons she will require anticoagulation and I do not believe a filter is indicated.   I will re order a lower extremity doppler to see if her tibial DVT has propagated. We will continue to follow along with you.     Principal Problem:    GI bleed (3/28/2017)    Active Problems:    Crohn disease ([facility] (9/80/7214)      Embolic stroke ([facility] (9/95/4645)      Acute blood loss anemia (3/15/2017)      Neuropathic pain (3/15/2017)      DVT (deep venous thrombosis) ([facility] (3/16/2017)      Elevated WBC count (3/28/2017)        Soila Chavarria MD  March 30, 2017

## 2017-03-30 NOTE — PROGRESS NOTES
Per Dr. Jose De Jesus West, pt not going to OR tomorrow or Saturday. She Spoke with Dr. Bess Londono, decided to allow current Crohns treatment time to work. Ok to start anticoagulation. Paged Dr. Mirza Pineda for Anticoagulation orders. Pharmacy called to dose warfarin. 1831:Dr. Mirza Pineda called back, updated on plan. Ok to start lovenox and Coumadin as ordered. Awaiting pharmacy to dose.

## 2017-03-30 NOTE — PROGRESS NOTES
Pharmacy Dosing Services: Warfarin    Consult for Warfarin Dosing by Pharmacy by Dr. Shelley Stephenson provided for this 40 y.o.  female , for indication of Hypercoaguable State (arterial & Venous thrombi)  Day of Therapy 00  Dose to achieve an INR goal of 2-3    Warfarin will be *HELD* until after determination of GI surgery has been made. LABS    PT/INR Lab Results   Component Value Date/Time    INR 1.0 03/30/2017 04:45 AM      Platelets Lab Results   Component Value Date/Time    PLATELET 028 43/03/6124 04:45 AM      H/H     Lab Results   Component Value Date/Time    HGB 8.7 03/30/2017 04:45 AM        Warfarin Administrations (last 168 hours)       None          Pharmacy to follow daily and will provide subsequent Warfarin dosing based on clinical status. Douglas Segovia, Pharm. D.   Clinical Pharmacist  [phone]

## 2017-03-30 NOTE — PROGRESS NOTES
Hospitalist Progress Note    Patient: Lamberto Singh MRN: [id number]  CSN: [id number]    YOB: 1970  Age: 40 y.o. Sex: female    DOA: 3/27/2017 LOS:  LOS: 3 days          Chief Complaint:     Ac blood loss anemia      Assessment/Plan     41 yo lady with crohns disease recently in hospital for acute CVA and discopvery of right leg DVT-placed on anticoagulation for embolic CVA, but returned with GI bleeding and massive blood loss requiring FFP and multiple units PRBC    Crohns, severe-surgery looking at time for sat to perform partial colectomy-her risk for rebleeding is HIGH, of course we are concerned about her risk for another CVA or clot also-we should consider an IVC filter-have asked vasc surg to see her also    DVT right leg  CVA with dysarthric speech and weakness  Severe ac blood loss anemia  Sickle cell trait  HTN    Monitor in ICU  Hgb has stabilized, no further bleeding seen  Discussed with surgery-looking at possible surgery in 48 hrs-would consider low dose lovenox in interim but would recommend holding coumadin forfear of further bleeding from that bowel segment again    vasc consult  PPI  Light diet    Very complicated situation with hi risk for worsening illness including thromboembolic disease    Patient Active Problem List   Diagnosis Code    Chronic pain syndrome G89.4    Crohn disease ([facility] K50.90    Sickle cell trait ([facility] D57.3    Hypertension I10    Hx of cocaine abuse S55.287    Embolic stroke ([facility] U09.0    Acute blood loss anemia D62    Neuropathic pain M79.2    DVT (deep venous thrombosis) (HCC) I82.409    GI bleed K92.2    Elevated WBC count D72.829       Subjective:    i am worried whats next  Denies HA  No bleeding  No abd pain  No N/V    Review of systems:    Constitutional: denies fevers, chills, myalgias  Respiratory: denies SOB, cough  Cardiovascular: denies chest pain, palpitations  Gastrointestinal: denies nausea, vomiting, diarrhea      Vital signs/Intake and Output:  Visit Vitals    BP (!) 141/99 (BP 1 Location: Right arm, BP Patient Position: At rest;Head of bed elevated (Comment degrees))    Pulse 75    Temp 98.7 °F (37.1 °C)    Resp 13    Ht 5' 6\" (1.676 m)    Wt 82.1 kg (181 lb)    SpO2 100%    Breastfeeding No    BMI 29.21 kg/m2     Current Shift:  03/30 0701 - 03/30 1900  In: 120 [P.O.:120]  Out: 700 [Urine:700]  Last three shifts:  03/28 1901 - 03/30 0700  In: 2457.2 [P.O.:600]  Out: 1845 [Urine:1840]    Exam:    General: Well developed, alert, NAD, OX3  CVS:Regular rate and rhythm, no M/R/G, S1/S2 heard, no thrill  Lungs:Clear to auscultation bilaterally, no wheezes, rhonchi, or rales  Abdomen: Soft, Nontender, No distention, Normal Bowel sounds, No hepatomegaly  Extremities: No C/C/E, pulses palpable 2+  Skin:normal texture and turgor, no rashes, no lesions  Neuro:speech seems well, no change in weakness  Psych:appropriate                Labs: Results:       Chemistry Recent Labs      03/30/17 0445 03/29/17   0815  03/28/17   0901   GLU  125*  107*  112*   NA  142  143  138   K  3.6  4.2  4.5   CL  109*  111*  109*   CO2  27  27  22   BUN  15  18  15   CREA  0.60  0.53*  0.55*   CA  7.2*  7.4*  7.5*   AGAP  6  5  7   BUCR  25*  34*  27*   AP  31*  19*  QUANTITY NOT SUFFICIENT. SUGGEST RECOLLECTION   TP  4.2*  3.8*  QUANTITY NOT SUFFICIENT. SUGGEST RECOLLECTION   ALB  2.0*  1.8*  QUANTITY NOT SUFFICIENT. SUGGEST RECOLLECTION   GLOB  2.2  2.0  Cannot be calulated   AGRAT  0.9  0.9  Cannot be calulated      CBC w/Diff Recent Labs      03/30/17   0445  03/29/17   1735  03/29/17   0815   03/28/17   0901   WBC  10.2   --   12.8   --   22.4*   RBC  2.98*   --   2.44*   --   2.45*   HGB  8.7*  9.9*  7.3*   < >  6.7*   HCT  25.1*  28.2*  21.1*   < >  20.4*   PLT  117*   --   115*   --   237   GRANS  69   --   82*   --   81*   LYMPH  17*   --   8*   --   13*   EOS  1   --   0   --   0    < > = values in this interval not displayed. Cardiac Enzymes Recent Labs      03/27/17 2050   CPK  41   CKND1  Cannot be calulated      Coagulation Recent Labs      03/30/17   0445  03/29/17   0847   03/27/17 2050   PTP  12.9  17.3*   < >  21.7*   INR  1.0  1.5*   < >  2.0*   APTT   --    --    --   37.9*    < > = values in this interval not displayed. Lipid Panel Lab Results   Component Value Date/Time    Cholesterol, total 145 03/12/2017 06:15 AM    HDL Cholesterol 37 03/12/2017 06:15 AM    LDL, calculated 91.6 03/12/2017 06:15 AM    VLDL, calculated 16.4 03/12/2017 06:15 AM    Triglyceride 82 03/12/2017 06:15 AM    CHOL/HDL Ratio 3.9 03/12/2017 06:15 AM      BNP No results for input(s): BNPP in the last 72 hours.    Liver Enzymes Recent Labs      03/30/17 0445   TP  4.2*   ALB  2.0*   AP  31*   SGOT  12*      Thyroid Studies Lab Results   Component Value Date/Time    TSH 1.51 12/12/2009 09:48 AM        Procedures/imaging: see electronic medical records for all procedures/Xrays and details which were not copied into this note but were reviewed prior to creation of Adams Brady MD

## 2017-03-30 NOTE — PROGRESS NOTES
[facility]  Pulmonary, Critical Care, and Sleep Medicine    Name: Laura Alford MRN: [id number]   : 1970 Hospital: [facility] [place]   Date: 3/30/2017        Critical Care Follow Up                                                [x]I have reviewed the flowsheet and previous days notes. Events, vitals, medications and notes from last 24 hours reviewed. Care plan discussed with staff, patient, family and on multidisciplinary rounds. IMPRESSION:   GI bleed  Seems stopped. No BM since yesterday day shift/  H/H stable      Last H/H 8.7.1      Crohn disease ([facility] [address] (1/15/9886)     Embolic stroke ([facility] [address] (8955)  Will need to start anticoag again Lovenox and Coumadin?       Acute blood loss anemia (3/15/2017)  Continue monitor     Neuropathic pain (3/15/2017)     DVT (deep venous thrombosis) ([facility] [address] (3/16/2017)  If she will need to be started on Coumadin for CVA then no need for IVC filter     Elevated WBC count (3/28/2017)  resolved     · Code status: full      RECOMMENDATIONS:   · Continue monitor labs closely  · Discuss with GI regarding start Lovenox and Coumadin for CVA      seriel H/H    Will keep for few more hours in ICU           Subjective/History of Present Illness:     Laura Alford has been seen and evaluated in ICU/ER  Patient is a 40 y.o. female This patient has been seen and evaluated at the request of Dr. Qing Aldana. Laura Alford is a 40 y.o. female with a hx of Crohns disease, ulcerative colitis, and CVA (1 week ago) and left leg DVT, presents to the emergency department c/o low hemoglobin today at her PCP's office. Patient was told to come to the ED for evaluation and possible blood trasfusion. Associated symptoms include nausea, SOB, lightheadedness, blood in stool, and chills. Patient recently started on lovenox and has been on coumadin for about 5 days. States that her stool is maroon in color for the last 2 days.  PMHx of colonoscopy (last week with Steph Leyva MD for blood in stool and polyps were found). Patient was transferred to ICU for hypotension and lack of IV access     She had multiple blood transfusions PRBC and FFP    She will need to have IVC filter         Review of Systems:  Constitutional: positive for fatigue  Respiratory: negative  Cardiovascular: negative  Gastrointestinal: no more BM. No definite active GI bleed  Genitourinary:negative     Allergies   Allergen Reactions    Humira [Adalimumab] Other (comments)    Remicade [Infliximab] Palpitations      Past Medical History:   Diagnosis Date    Abdominal pain     Anemia NEC     sickle cell trait    C. difficile colitis     Crohn's disease ([facility] [address]     Gastrointestinal disorder     Crohns    Herpes zoster     Hx of cocaine abuse     Hyperkalemia     Hypertension     Iron deficiency anemia     MRSA (methicillin resistant Staphylococcus aureus)     Obesity     Pain management     Sickle cell trait (HCC)     Stroke (HCC)     + cva 3/17      Current Facility-Administered Medications   Medication Dose Route Frequency    mesalamine (PENTASA) CR capsule 1,000 mg  1,000 mg Oral QID    pantoprazole (PROTONIX) 40 mg in sodium chloride 0.9 % 10 mL injection  40 mg IntraVENous Q12H    methylPREDNISolone (PF) (SOLU-MEDROL) injection 25 mg  25 mg IntraVENous Q12H       Lines: All central lines examined by me. No signs of erythema, induration, discharge.           Peripherally Inserted Central Catheter:     Central Venous Catheter:  Triple Lumen 03/28/17 Right Subclavian (Active)   Central Line Being Utilized Yes 3/29/2017  4:00 AM   Criteria for Appropriate Use Hemodynamically unstable, requiring monitoring lines, vasopressors, or volume resuscitation 3/29/2017  4:00 AM   Site Assessment Clean, dry, & intact 3/29/2017  4:00 AM   Infiltration Assessment 0 3/29/2017  4:00 AM   Affected Extremity/Extremities Color distal to insertion site pink (or appropriate for race) 3/29/2017 4:00 AM   Date of Last Dressing Change 03/28/17 3/28/2017  4:03 PM   Dressing Status Clean, dry, & intact 3/29/2017  4:00 AM   Dressing Type Disk with Chlorhexadine gluconate (CHG); Transparent 3/29/2017  4:00 AM   Action Taken Open ports on tubing capped 3/28/2017  4:03 PM   Proximal Hub Color/Line Status Infusing 3/29/2017  4:00 AM   Positive Blood Return (Medial Site) Yes 3/28/2017  4:03 PM   Medial Hub Color/Line Status Infusing 3/29/2017  4:00 AM   Positive Blood Return (Lateral Site) Yes 3/28/2017  4:03 PM   Distal Hub Color/Line Status Capped 3/29/2017  4:00 AM   Positive Blood Return (Site #3) Yes 3/28/2017  4:03 PM   Alcohol Cap Used Yes 3/28/2017  4:03 PM     Venous Access Device:     Peripheral Intravenous Line:  Peripheral IV 17 Left Forearm (Active)   Site Assessment Clean, dry, & intact 3/29/2017  4:00 AM   Phlebitis Assessment 0 3/29/2017  4:00 AM   Infiltration Assessment 0 3/29/2017  4:00 AM   Dressing Status Clean, dry, & intact 3/29/2017  4:00 AM   Dressing Type Transparent 3/29/2017  4:00 AM   Hub Color/Line Status Capped 3/29/2017  4:00 AM   Alcohol Cap Used Yes 3/28/2017  4:03 PM         Objective:   Vital Signs:    Visit Vitals    /75 (BP 1 Location: Right arm, BP Patient Position: At rest;Head of bed elevated (Comment degrees))    Pulse 69    Temp 98.2 °F (36.8 °C)    Resp 12    Ht 5' 6\" (1.676 m)    Wt 82.1 kg (181 lb)    LMP 2017    SpO2 100%    Breastfeeding No    BMI 29.21 kg/m2       O2 Device: Room air   O2 Flow Rate (L/min): 2 l/min   Temp (24hrs), Av.3 °F (36.8 °C), Min:98.1 °F (36.7 °C), Max:98.8 °F (37.1 °C)       Intake/Output:   Last shift:           Last 3 shifts:  1901 -  0700  In: 2457.2 [P.O.:600]  Out: 2164 [Urine:1840]      Intake/Output Summary (Last 24 hours) at 17 0910  Last data filed at 17 0604   Gross per 24 hour   Intake             1220 ml   Output              750 ml   Net              470 ml       Last 3 Recorded Weights in this Encounter    03/27/17 1929   Weight: 82.1 kg (181 lb)       Ventilator Settings:  Mode Rate Tidal Volume Pressure FiO2 PEEP                    Peak airway pressure:      Plateau pressure:     Tidal volume:    Minute ventilation:     SPO2      ARDS network Guidelines: Lung protective strategy and Plateau pressure goals less than or equal to 30    Telemetry:normal sinus rhythm    Physical Exam:     General: Awake, NAD  Head: atraumatic, normocephalic  Eye: PERRLA, no scleral icterus, no pallor, no cyanosis  Nose: no sinus tenderness, no erythema, no induration, no discharge  Throat: no tonsillar enlargement, no erythema, no exudates, no oral thrush  Neck: supple, no thyromegaly, no JVD, no lymphadenopathy. Trachea midline  Lung: adequate air entry bilateral equal, no rales, no rhonchi, no wheezing  Heart: S1 S2 present, no murmur, no gallop  Abdomen: soft, NT, ND, +BS  LE: no pedal edema, no cyanosis, no clubbing, 2+ peripheral pulses in DP  Lymphatic: no cervical/supraclavicular/axillary lymphadenopathy  Neurologic: alert. L sided weak        Data:         Chemistry Recent Labs      03/30/17   0445  03/29/17   0815  03/28/17   0901   GLU  125*  107*  112*   NA  142  143  138   K  3.6  4.2  4.5   CL  109*  111*  109*   CO2  27  27  22   BUN  15  18  15   CREA  0.60  0.53*  0.55*   CA  7.2*  7.4*  7.5*   AGAP  6  5  7   BUCR  25*  34*  27*   AP  31*  19*  QUANTITY NOT SUFFICIENT. SUGGEST RECOLLECTION   TP  4.2*  3.8*  QUANTITY NOT SUFFICIENT. SUGGEST RECOLLECTION   ALB  2.0*  1.8*  QUANTITY NOT SUFFICIENT. SUGGEST RECOLLECTION   GLOB  2.2  2.0  Cannot be calulated   AGRAT  0.9  0.9  Cannot be calulated        Lactic Acid Lactic acid   Date Value Ref Range Status   09/26/2016 1.3 0.4 - 2.0 MMOL/L Final     No results for input(s): LAC in the last 72 hours.      Liver Enzymes Protein, total   Date Value Ref Range Status   03/30/2017 4.2 (L) 6.4 - 8.2 g/dL Final     Albumin   Date Value Ref Range Status   03/30/2017 2.0 (L) 3.4 - 5.0 g/dL Final     Globulin   Date Value Ref Range Status   03/30/2017 2.2 2.0 - 4.0 g/dL Final     A-G Ratio   Date Value Ref Range Status   03/30/2017 0.9 0.8 - 1.7   Final     AST (SGOT)   Date Value Ref Range Status   03/30/2017 12 (L) 15 - 37 U/L Final     Alk. phosphatase   Date Value Ref Range Status   03/30/2017 31 (L) 45 - 117 U/L Final     Recent Labs      03/30/17   0445  03/29/17   0815  03/28/17   0901   TP  4.2*  3.8*  QUANTITY NOT SUFFICIENT. SUGGEST RECOLLECTION   ALB  2.0*  1.8*  QUANTITY NOT SUFFICIENT. SUGGEST RECOLLECTION   GLOB  2.2  2.0  Cannot be calulated   AGRAT  0.9  0.9  Cannot be calulated   SGOT  12*  13*  QUANTITY NOT SUFFICIENT. SUGGEST RECOLLECTION   AP  31*  19*  QUANTITY NOT SUFFICIENT. SUGGEST RECOLLECTION        CBC w/Diff Recent Labs      03/30/17   0445  03/29/17   1735  03/29/17   0815   03/28/17   0901   WBC  10.2   --   12.8   --   22.4*   RBC  2.98*   --   2.44*   --   2.45*   HGB  8.7*  9.9*  7.3*   < >  6.7*   HCT  25.1*  28.2*  21.1*   < >  20.4*   PLT  117*   --   115*   --   237   GRANS  69   --   82*   --   81*   LYMPH  17*   --   8*   --   13*   EOS  1   --   0   --   0    < > = values in this interval not displayed.         Cardiac Enzymes No results found for: CPK, CKMMB, CKMB, RCK3, CKMBT, CKNDX, CKND1, CHANDA, TROPT, TROIQ, FABIEN, TROPT, TNIPOC, BNP, BNPP     BNP No results found for: BNP, BNPP, XBNPT     Coagulation Recent Labs      03/30/17   0445  03/29/17   0847  03/29/17   0016  03/27/17   2050   PTP  12.9  17.3*  17.7*  21.7*   INR  1.0  1.5*  1.5*  2.0*   APTT   --    --    --   37.9*         Thyroid  Lab Results   Component Value Date/Time    TSH 1.51 12/12/2009 09:48 AM          Lipid Panel Lab Results   Component Value Date/Time    Cholesterol, total 145 03/12/2017 06:15 AM    HDL Cholesterol 37 03/12/2017 06:15 AM    LDL, calculated 91.6 03/12/2017 06:15 AM    VLDL, calculated 16.4 03/12/2017 06:15 AM    Triglyceride 82 03/12/2017 06:15 AM    CHOL/HDL Ratio 3.9 03/12/2017 06:15 AM        ABG No results for input(s): PHI, PHI, PCO2I, PO2, PO2I, HCO3, HCO3I, FIO2, FIO2I in the last 72 hours. No lab exists for component: POC2     Urinalysis Lab Results   Component Value Date/Time    Color YELLOW 03/10/2017 04:30 PM    Appearance CLEAR 03/10/2017 04:30 PM    Specific gravity 1.021 03/10/2017 04:30 PM    pH (UA) 6.5 03/10/2017 04:30 PM    Protein NEGATIVE  03/10/2017 04:30 PM    Glucose NEGATIVE  03/10/2017 04:30 PM    Ketone NEGATIVE  03/10/2017 04:30 PM    Bilirubin NEGATIVE  03/10/2017 04:30 PM    Urobilinogen 1.0 03/10/2017 04:30 PM    Nitrites NEGATIVE  03/10/2017 04:30 PM    Leukocyte Esterase NEGATIVE  03/10/2017 04:30 PM    Epithelial cells FEW 09/26/2016 07:18 PM    Bacteria FEW 09/26/2016 07:18 PM    WBC 0 to 3 09/26/2016 07:18 PM    RBC 0 to 3 09/26/2016 07:18 PM        Micro  No results for input(s): SDES, CULT in the last 72 hours. No results for input(s): CULT in the last 72 hours. XR (Most Recent). CXR reviewed by me and compared with previous CXR   Results from Hospital Encounter encounter on 03/27/17   XR CHEST PORT   Narrative Chest, single view    Indication: Line placement    Comparison: Several prior exams, most recently 2/26/2016    Findings:  Portable upright AP view of the chest was obtained. There is a right  subclavian approach central venous catheter with tip projecting over the  superior cavoatrial junction. The lungs are mildly underexpanded but clear. No  focal pneumonic opacity, pneumothorax, or pleural effusion. The cardiac size and  mediastinal contours are within normal limits. Pulmonary vascularity normal. No  acute osseous abnormality. Impression Impression:    1. Right subclavian approach central venous catheter in position as above. 2. Mild pulmonary hypoinflation without superimposed acute radiographic  abnormality.                      CT (Most Recent)   [place] [facility] [place]27/17   CT HEAD WO CONT   Narrative EXAM: CT head    INDICATION: 52-year-old patient with hypertension, altered mental status. COMPARISON: Several prior examinations, most recently brain MRI dated 3/10/2017. TECHNIQUE: Axial CT imaging of the head was performed without intravenous  contrast.    One or more dose reduction techniques were used on this CT: automated exposure  control, adjustment of the mAs and/or kVp according to patient's size, and  iterative reconstruction techniques. The specific techniques utilized on this CT  exam have been documented in the patient's electronic medical record.     _______________    FINDINGS: Detail is mildly limited by motion artifact. BRAIN AND POSTERIOR FOSSA: As previously, there is redemonstration of abnormal  clear with matter differentiation involving the right centrum semiovale, corona  radiata, right-sided insular cortex, and right parietal/temporal lobes, spanning  the right sylvian fissure. There is mild associated mass effect upon the right  lateral ventricle, the appearance of which is similar to prior exam. No evidence  of midline shift. The basilar cisterns remain patent. The known multiple  cerebellar infarcts are characterized to better advantage on comparison brain  MRI. Mild increased density of the infarcted cortical surfaces is noted. No  intra-axial fluid collection. EXTRA-AXIAL SPACES AND MENINGES: There are no abnormal extra-axial fluid  collections. CALVARIUM: Intact. SINUSES: Paranasal sinuses and mastoid air cells are clear. OTHER: None.    _______________         Impression IMPRESSION:      1. Redemonstration of sequela of multifocal, likely embolic infarcts without  acute intra-axial or extra-axial fluid collection. 2. Mild persistent edema and mass effect upon the right lateral ventricle,  without evidence of midline shift. EKG No results found for this or any previous visit.      ECHO No results found for this or any previous visit. PFT No flowsheet data found. Other ASA reactivity:   Pre-albumin:   Ionized Calcium:   NH4:   T3, FT4:  Cortisol:  Urine Osm:  Urine Lytes:   HbA1c:      Recent Results (from the past 24 hour(s))   HGB & HCT    Collection Time: 03/29/17  5:35 PM   Result Value Ref Range    HGB 9.9 (L) 12.0 - 16.0 g/dL    HCT 28.2 (L) 35.0 - 45.0 %   CBC WITH AUTOMATED DIFF    Collection Time: 03/30/17  4:45 AM   Result Value Ref Range    WBC 10.2 4.6 - 13.2 K/uL    RBC 2.98 (L) 4.20 - 5.30 M/uL    HGB 8.7 (L) 12.0 - 16.0 g/dL    HCT 25.1 (L) 35.0 - 45.0 %    MCV 84.2 74.0 - 97.0 FL    MCH 29.2 24.0 - 34.0 PG    MCHC 34.7 31.0 - 37.0 g/dL    RDW 16.1 (H) 11.6 - 14.5 %    PLATELET 175 (L) 172 - 420 K/uL    MPV 8.7 (L) 9.2 - 11.8 FL    NEUTROPHILS 69 40 - 73 %    LYMPHOCYTES 17 (L) 21 - 52 %    MONOCYTES 13 (H) 3 - 10 %    EOSINOPHILS 1 0 - 5 %    BASOPHILS 0 0 - 2 %    ABS. NEUTROPHILS 7.1 1.8 - 8.0 K/UL    ABS. LYMPHOCYTES 1.7 0.9 - 3.6 K/UL    ABS. MONOCYTES 1.3 (H) 0.05 - 1.2 K/UL    ABS. EOSINOPHILS 0.1 0.0 - 0.4 K/UL    ABS. BASOPHILS 0.0 0.0 - 0.06 K/UL    DF AUTOMATED     METABOLIC PANEL, COMPREHENSIVE    Collection Time: 03/30/17  4:45 AM   Result Value Ref Range    Sodium 142 136 - 145 mmol/L    Potassium 3.6 3.5 - 5.5 mmol/L    Chloride 109 (H) 100 - 108 mmol/L    CO2 27 21 - 32 mmol/L    Anion gap 6 3.0 - 18 mmol/L    Glucose 125 (H) 74 - 99 mg/dL    BUN 15 7.0 - 18 MG/DL    Creatinine 0.60 0.6 - 1.3 MG/DL    BUN/Creatinine ratio 25 (H) 12 - 20      GFR est AA >60 >60 ml/min/1.73m2    GFR est non-AA >60 >60 ml/min/1.73m2    Calcium 7.2 (L) 8.5 - 10.1 MG/DL    Bilirubin, total 0.2 0.2 - 1.0 MG/DL    ALT (SGPT) 17 13 - 56 U/L    AST (SGOT) 12 (L) 15 - 37 U/L    Alk.  phosphatase 31 (L) 45 - 117 U/L    Protein, total 4.2 (L) 6.4 - 8.2 g/dL    Albumin 2.0 (L) 3.4 - 5.0 g/dL    Globulin 2.2 2.0 - 4.0 g/dL    A-G Ratio 0.9 0.8 - 1.7     PROTHROMBIN TIME + INR    Collection Time: 03/30/17  4:45 AM   Result Value Ref Range    Prothrombin time 12.9 11.5 - 15.2 sec    INR 1.0 0.8 - 1.2           Best practice : All below core measures reviewed and addressed:   [x] Antibiotic choice. [x] Fluids. [x] Lactic acid ordered- initial and repeat Q6hrs if elevated till normalized. [] Cultures drawn.  [] Antibiotic administered within 1hr-ICU and 3hrs ED. [] Large bore IV- 2 sites         CVP      [] Pressors aim MAP >65mmHg.  [] Steroids. [x] Glycemic control. [x] Infection control. [] Mech. Ventilated patients- aim to keep peak plateau pressure 14-04VA H2O.  [] HOB at >= 30 degree. [x] Stress ulcer prophylaxis. [x] DVT prophylaxis. [] Central Line Bundle Followed. [x] Gaming Bundle Followed. [] Ventilator Bundle Followed. (Daily sedation holiday to assess readiness for weaning from ventilator & SBT trial starting at 6.00 am, HOB 30-degree elevation, Chlorhexidine mouth washes & Routine oral care every 4 hours, Penrose tube to suction at 20-30 cm H2O, Maintain Penrose tube with 5-10ml air every 4 hours, DVT prophylaxis, GI prophylaxis etc.).     The patient is: [] acutely ill Risk of deterioration: [] moderate    [] critically ill  [] high     [x]See my orders for details    My assessment, plan of care, findings, medications, side effects etc were discussed with:  [x] Nurse [x] PT/OT    [x] Respiratory therapy []     [x] Family: answered all questions to satisfaction [] Patient: answered all questions to satisfaction   [x] Pharmacist []      [x] Case & management strategies discussed today on multidisciplinary rounds    High complexity decision making was performed during the evaluation of this patient at high risk for decompensation with multiple organ involvement      [x]Total critical care time spent on reviewing the case/medical record/data/notes/EMR/patient examination/documentation/coordinating care with nurse/consultants, exclusive of procedures 40 minutes with complex decision making performed and > 50% time spent in face to face evaluation. Discussed with Dr Anthony Thorpe  Will start Lovenox and Coumadin and watch    Later on the decision was made by hospitalist to hold on anti coag because the patient is may go for surgery on bowel on Saturday. My plan was to at least start Heparin drip because of CVA and that can be discontinued few hours earlier from surgery but will leave that to the primary attending.       Nicole Albarran MD  3/30/2017

## 2017-03-30 NOTE — INTERDISCIPLINARY ROUNDS
CRITICAL CARE INTERDISCIPLINARY ROUNDS    Patient Information:    Name:   Don Egan    Age:   40 y.o. Admission Date:   3/27/2017    Critical Care Day: 3 (RRT 3/28)    Surgery Date:      Attending Provider:   Mylene Keating DO    Surgeon:        Consultant(s):   Shayan Pulido    Critical Care Physician:  Salvador Deutsch    Code Status: Full Code    Problem List:     Patient Active Problem List   Diagnosis Code    Chronic pain syndrome G89.4    Crohn disease ([facility] [address] K50.90    Sickle cell trait ([facility] [address] D57.3    Hypertension I10    Hx of cocaine abuse P76.331    Embolic stroke ([facility] [address] N54.1    Acute blood loss anemia D62    Neuropathic pain M79.2    DVT (deep venous thrombosis) ([facility]) I82.409    GI bleed K92.2    Elevated WBC count D72.829       Principal Problem:  GI bleed    During rounds the following quality care indicators and evidence based practices were addressed :  PUD Prophylaxis Gaming Day na (M-Care na) ; Central Line Day:2 Isolation:na; Antibiotic Stewardship: na; Probiotics Necessary: na    Ventilator Bundle:     Sepsis Order Set:     Glycemic Control:   Recent Labs      03/30/17   0445  03/29/17   0815  03/28/17   0901  03/27/17   2050   GLU  125*  107*  112*  126*   ; No results for input(s): PHI, PCO2I, PO2I in the last 72 hours. No lab exists for component: 3 Adjustments     Acute MI/PCI:   Not applicable    Door to Balloon: Admission Time: 2024      Heart Failure:    Not applicable     SCIP Measures for other Surgeries:   Not applicable     Pneumonia:    Not applicable    Interdisciplinary team rounds were held with the following team membersCare Management, Nursing, Pastoral Care, Pharmacy, Physician and Respiratory Therapy. Plan of care discussed.      Goals of Care/ Recommendations:  Possible IVCF in future - consult Dr Sharyle Sand (possible), update Dr Jason Gilmore,  Possible surgery, POCT for 24 hr monitoring (steriods),     See clinical pathway and/or care plan for interventions and desired outcomes.     Critical Care Discharge Status:  Red

## 2017-03-31 LAB
ABO + RH BLD: NORMAL
ANION GAP BLD CALC-SCNC: 6 MMOL/L (ref 3–18)
BLD PROD TYP BPU: NORMAL
BLOOD GROUP ANTIBODIES SERPL: NORMAL
BPU ID: NORMAL
BUN SERPL-MCNC: 8 MG/DL (ref 7–18)
BUN/CREAT SERPL: 14 (ref 12–20)
CALCIUM SERPL-MCNC: 7.4 MG/DL (ref 8.5–10.1)
CALLED TO:,BCALL1: NORMAL
CHLORIDE SERPL-SCNC: 110 MMOL/L (ref 100–108)
CO2 SERPL-SCNC: 29 MMOL/L (ref 21–32)
CREAT SERPL-MCNC: 0.56 MG/DL (ref 0.6–1.3)
CROSSMATCH RESULT,%XM: NORMAL
ERYTHROCYTE [DISTWIDTH] IN BLOOD BY AUTOMATED COUNT: 16.5 % (ref 11.6–14.5)
GLUCOSE BLD STRIP.AUTO-MCNC: 106 MG/DL (ref 70–110)
GLUCOSE BLD STRIP.AUTO-MCNC: 106 MG/DL (ref 70–110)
GLUCOSE BLD STRIP.AUTO-MCNC: 135 MG/DL (ref 70–110)
GLUCOSE BLD STRIP.AUTO-MCNC: 139 MG/DL (ref 70–110)
GLUCOSE SERPL-MCNC: 102 MG/DL (ref 74–99)
HCT VFR BLD AUTO: 25.6 % (ref 35–45)
HCT VFR BLD AUTO: 27.3 % (ref 35–45)
HGB BLD-MCNC: 8.5 G/DL (ref 12–16)
HGB BLD-MCNC: 9.2 G/DL (ref 12–16)
INR PPP: 1 (ref 0.8–1.2)
MCH RBC QN AUTO: 28.9 PG (ref 24–34)
MCHC RBC AUTO-ENTMCNC: 33.2 G/DL (ref 31–37)
MCV RBC AUTO: 87.1 FL (ref 74–97)
PLATELET # BLD AUTO: 149 K/UL (ref 135–420)
PMV BLD AUTO: 8.8 FL (ref 9.2–11.8)
POTASSIUM SERPL-SCNC: 3.6 MMOL/L (ref 3.5–5.5)
PROTHROMBIN TIME: 12.6 SEC (ref 11.5–15.2)
RBC # BLD AUTO: 2.94 M/UL (ref 4.2–5.3)
SODIUM SERPL-SCNC: 145 MMOL/L (ref 136–145)
SPECIMEN EXP DATE BLD: NORMAL
STATUS OF UNIT,%ST: NORMAL
UNIT DIVISION, %UDIV: 0
WBC # BLD AUTO: 9.3 K/UL (ref 4.6–13.2)

## 2017-03-31 PROCEDURE — 74011250637 HC RX REV CODE- 250/637: Performed by: HOSPITALIST

## 2017-03-31 PROCEDURE — 74011250637 HC RX REV CODE- 250/637: Performed by: FAMILY MEDICINE

## 2017-03-31 PROCEDURE — 74011000250 HC RX REV CODE- 250: Performed by: INTERNAL MEDICINE

## 2017-03-31 PROCEDURE — 74011250636 HC RX REV CODE- 250/636: Performed by: INTERNAL MEDICINE

## 2017-03-31 PROCEDURE — 74011250636 HC RX REV CODE- 250/636: Performed by: FAMILY MEDICINE

## 2017-03-31 PROCEDURE — 93970 EXTREMITY STUDY: CPT

## 2017-03-31 PROCEDURE — 85027 COMPLETE CBC AUTOMATED: CPT | Performed by: HOSPITALIST

## 2017-03-31 PROCEDURE — C9113 INJ PANTOPRAZOLE SODIUM, VIA: HCPCS | Performed by: INTERNAL MEDICINE

## 2017-03-31 PROCEDURE — 82962 GLUCOSE BLOOD TEST: CPT

## 2017-03-31 PROCEDURE — 80048 BASIC METABOLIC PNL TOTAL CA: CPT | Performed by: HOSPITALIST

## 2017-03-31 PROCEDURE — 85610 PROTHROMBIN TIME: CPT | Performed by: INTERNAL MEDICINE

## 2017-03-31 PROCEDURE — 74011250637 HC RX REV CODE- 250/637: Performed by: INTERNAL MEDICINE

## 2017-03-31 PROCEDURE — 65610000006 HC RM INTENSIVE CARE

## 2017-03-31 PROCEDURE — 85018 HEMOGLOBIN: CPT | Performed by: INTERNAL MEDICINE

## 2017-03-31 PROCEDURE — 36592 COLLECT BLOOD FROM PICC: CPT

## 2017-03-31 RX ORDER — GABAPENTIN 100 MG/1
200 CAPSULE ORAL 3 TIMES DAILY
Status: DISCONTINUED | OUTPATIENT
Start: 2017-03-31 | End: 2017-04-06 | Stop reason: SDUPTHER

## 2017-03-31 RX ORDER — WARFARIN SODIUM 5 MG/1
5 TABLET ORAL ONCE
Status: COMPLETED | OUTPATIENT
Start: 2017-03-31 | End: 2017-03-31

## 2017-03-31 RX ORDER — ATORVASTATIN CALCIUM 20 MG/1
80 TABLET, FILM COATED ORAL
Status: DISCONTINUED | OUTPATIENT
Start: 2017-03-31 | End: 2017-04-18 | Stop reason: HOSPADM

## 2017-03-31 RX ORDER — CARVEDILOL 3.12 MG/1
3.12 TABLET ORAL 2 TIMES DAILY WITH MEALS
Status: DISCONTINUED | OUTPATIENT
Start: 2017-03-31 | End: 2017-04-18 | Stop reason: HOSPADM

## 2017-03-31 RX ORDER — ENOXAPARIN SODIUM 100 MG/ML
60 INJECTION SUBCUTANEOUS EVERY 12 HOURS
Status: DISCONTINUED | OUTPATIENT
Start: 2017-03-31 | End: 2017-04-04

## 2017-03-31 RX ADMIN — MESALAMINE 1000 MG: 250 CAPSULE ORAL at 17:19

## 2017-03-31 RX ADMIN — DOCUSATE SODIUM 100 MG: 100 CAPSULE, LIQUID FILLED ORAL at 20:32

## 2017-03-31 RX ADMIN — SODIUM CHLORIDE 40 MG: 9 INJECTION INTRAMUSCULAR; INTRAVENOUS; SUBCUTANEOUS at 20:33

## 2017-03-31 RX ADMIN — GABAPENTIN 200 MG: 100 CAPSULE ORAL at 08:37

## 2017-03-31 RX ADMIN — CARVEDILOL 3.12 MG: 3.12 TABLET, FILM COATED ORAL at 17:19

## 2017-03-31 RX ADMIN — METHYLPREDNISOLONE SODIUM SUCCINATE 25 MG: 40 INJECTION, POWDER, FOR SOLUTION INTRAMUSCULAR; INTRAVENOUS at 15:08

## 2017-03-31 RX ADMIN — CARVEDILOL 3.12 MG: 3.12 TABLET, FILM COATED ORAL at 11:44

## 2017-03-31 RX ADMIN — ENOXAPARIN SODIUM 60 MG: 60 INJECTION SUBCUTANEOUS at 17:19

## 2017-03-31 RX ADMIN — METHYLPREDNISOLONE SODIUM SUCCINATE 25 MG: 40 INJECTION, POWDER, FOR SOLUTION INTRAMUSCULAR; INTRAVENOUS at 03:22

## 2017-03-31 RX ADMIN — MESALAMINE 1000 MG: 250 CAPSULE ORAL at 22:08

## 2017-03-31 RX ADMIN — Medication 2 MG: at 10:13

## 2017-03-31 RX ADMIN — ENOXAPARIN SODIUM 30 MG: 30 INJECTION SUBCUTANEOUS at 09:42

## 2017-03-31 RX ADMIN — Medication 2 MG: at 20:37

## 2017-03-31 RX ADMIN — SODIUM CHLORIDE 40 MG: 9 INJECTION INTRAMUSCULAR; INTRAVENOUS; SUBCUTANEOUS at 08:38

## 2017-03-31 RX ADMIN — Medication 2 MG: at 13:58

## 2017-03-31 RX ADMIN — GABAPENTIN 200 MG: 100 CAPSULE ORAL at 22:07

## 2017-03-31 RX ADMIN — Medication 2 MG: at 03:21

## 2017-03-31 RX ADMIN — Medication 2 MG: at 17:22

## 2017-03-31 RX ADMIN — MESALAMINE 1000 MG: 250 CAPSULE ORAL at 12:36

## 2017-03-31 RX ADMIN — Medication 2 MG: at 06:59

## 2017-03-31 RX ADMIN — MESALAMINE 1000 MG: 250 CAPSULE ORAL at 08:38

## 2017-03-31 RX ADMIN — GABAPENTIN 200 MG: 100 CAPSULE ORAL at 15:08

## 2017-03-31 RX ADMIN — ATORVASTATIN CALCIUM 80 MG: 20 TABLET, FILM COATED ORAL at 22:08

## 2017-03-31 RX ADMIN — WARFARIN SODIUM 5 MG: 5 TABLET ORAL at 17:19

## 2017-03-31 RX ADMIN — DOCUSATE SODIUM 100 MG: 100 CAPSULE, LIQUID FILLED ORAL at 08:37

## 2017-03-31 NOTE — PROCEDURES
[facility]  *** FINAL REPORT ***    Name: Aneta Long  MRN: [id number]    Inpatient  : [address] 1970  HIS Order #: [id number]  [address] [facility] Visit #: [id number]  Date: 31 Mar 2017    TYPE OF TEST: Peripheral Venous Testing    REASON FOR TEST  Pain in limb    Right Leg:-  Deep venous thrombosis:           No  Superficial venous thrombosis:    No  Deep venous insufficiency:        Not examined  Superficial venous insufficiency: Not examined    Left Leg:-  Deep venous thrombosis:           No  Superficial venous thrombosis:    No  Deep venous insufficiency:        Not examined  Superficial venous insufficiency: Not examined      INTERPRETATION/FINDINGS  Duplex images were obtained using 2-D gray scale, color flow, and  spectral Doppler analysis. Bilateral lower extremity venous Doppler exam revealed patent vessels  with normal spontaneity and phasicity of signals with normal  augmentation. Deep venous valves appear competent. Duplex imaging revealed no intraluminal thrombus of the lower  extremities. IMPRESSION:  No evidence of bilateral lower extremity deep venous thrombosis or  significant venous valvular incompetence. ADDITIONAL COMMENTS    I have personally reviewed the data relevant to the interpretation of  this  study.     TECHNOLOGIST: Lluvia Abdi  Signed: 2017 04:33 PM    PHYSICIAN: Rogelio Rangel MD  Signed: 2017 01:47 PM

## 2017-03-31 NOTE — PROGRESS NOTES
Chart reviewed at this time pt likely will not be d/c over weekend,cm will remain available if needed.

## 2017-03-31 NOTE — PROGRESS NOTES
Hospitalist Progress Note    Patient: Don Egan MRN: [id number]  CSN: [id number]    YOB: 1970  Age: 40 y.o.   Sex: female    DOA: 3/27/2017 LOS:  LOS: 4 days          Chief Complaint:    GI bleed      Assessment/Plan     41 yo BF with recent CVA, started on coumadin for this, has hx crohns-had large lower GI bleeding due to her crohns that required multiple blood transfusions-also has DVT in right leg    Acute blood loss anemia-improved, no further bleeding-change labs to daily CBC  Recent CVA-no further neuro deficits-resume her neurontin for neuroopathy right leg  DVt right leg-no filter per vascular-resuming Ac treatment  GI bleed-for now concensus is to continue aggressive crohns treatments to try to avoid surgery for this segment which bled-lovenox and coumadin resumed-close monitoring in hospital for next few days to ensure she does not bleed again  Sickle cell trait    HTN-uncontrolled    styart coreg BID  Resume her statin QHS also  Transfer out of ICU    Patient Active Problem List   Diagnosis Code    Chronic pain syndrome G89.4    Crohn disease ([facility] [address] K50.90    Sickle cell trait ([facility] [address] D57.3    Hypertension I10    Hx of cocaine abuse P13.927    Embolic stroke ([facility] [address] Y10.0    Acute blood loss anemia D62    Neuropathic pain M79.2    DVT (deep venous thrombosis) ([facility] I82.409    GI bleed K92.2    Elevated WBC count D72.829       Subjective:    Denies abd pain  Right leg is painful as before with numbness also    Review of systems:    Constitutional: denies fevers, chills, myalgias  Respiratory: denies SOB, cough  Cardiovascular: denies chest pain, palpitations  Gastrointestinal: denies nausea, vomiting, diarrhea      Vital signs/Intake and Output:  Visit Vitals    BP (!) 152/101    Pulse 63    Temp 98.5 °F (36.9 °C)    Resp 15    Ht 5' 6\" (1.676 m)    Wt 89.2 kg (196 lb 10.4 oz)    SpO2 98%    Breastfeeding No    BMI 31.74 kg/m2     Current Shift:     Last three shifts:  03/29 1901 - 03/31 0700  In: 840 [P.O.:840]  Out: 2950 [Urine:2950]    Exam:    General: Well developed, alert, NAD, OX3  Head/Neck: NCAT, supple, No masses, No lymphadenopathy  CVS:Regular rate and rhythm, no M/R/G, S1/S2 heard, no thrill  Lungs:Clear to auscultation bilaterally, no wheezes, rhonchi, or rales  Abdomen: Soft, Nontender, No distention, Normal Bowel sounds, No hepatomegaly  Extremities: No C/C/E, pulses palpable 2+  Neuro:grossly normal , follows commands  Psych:appropriate                Labs: Results:       Chemistry Recent Labs      03/31/17 0430 03/30/17 0445 03/29/17   0815   GLU  102*  125*  107*   NA  145  142  143   K  3.6  3.6  4.2   CL  110*  109*  111*   CO2  29  27  27   BUN  8  15  18   CREA  0.56*  0.60  0.53*   CA  7.4*  7.2*  7.4*   AGAP  6  6  5   BUCR  14  25*  34*   AP   --   31*  19*   TP   --   4.2*  3.8*   ALB   --   2.0*  1.8*   GLOB   --   2.2  2.0   AGRAT   --   0.9  0.9      CBC w/Diff Recent Labs      03/31/17   0945  03/31/17 0430 03/30/17 0445 03/29/17   0815   WBC   --   9.3  10.2   --   12.8   RBC   --   2.94*  2.98*   --   2.44*   HGB  9.2*  8.5*  8.7*   < >  7.3*   HCT  27.3*  25.6*  25.1*   < >  21.1*   PLT   --   149  117*   --   115*   GRANS   --    --   69   --   82*   LYMPH   --    --   17*   --   8*   EOS   --    --   1   --   0    < > = values in this interval not displayed. Cardiac Enzymes No results for input(s): CPK, CKND1, CHANDA in the last 72 hours.     No lab exists for component: CKRMB, TROIP   Coagulation Recent Labs      03/31/17   0430  03/30/17   0445   PTP  12.6  12.9   INR  1.0  1.0       Lipid Panel Lab Results   Component Value Date/Time    Cholesterol, total 145 03/12/2017 06:15 AM    HDL Cholesterol 37 03/12/2017 06:15 AM    LDL, calculated 91.6 03/12/2017 06:15 AM    VLDL, calculated 16.4 03/12/2017 06:15 AM    Triglyceride 82 03/12/2017 06:15 AM    CHOL/HDL Ratio 3.9 03/12/2017 06:15 AM      BNP No results for input(s): BNPP in the last 72 hours.    Liver Enzymes Recent Labs      03/30/17   0445   TP  4.2*   ALB  2.0*   AP  31*   SGOT  12*      Thyroid Studies Lab Results   Component Value Date/Time    TSH 1.51 12/12/2009 09:48 AM        Procedures/imaging: see electronic medical records for all procedures/Xrays and details which were not copied into this note but were reviewed prior to creation of Yancy Metz MD

## 2017-03-31 NOTE — PROGRESS NOTES
[facility]  Pulmonary, Critical Care, and Sleep Medicine    Name: Korey Solorzano MRN: [id number]   : 1970 Hospital: [facility] [place]   Date: 3/31/2017        Critical Care Follow Up                                                [x]I have reviewed the flowsheet and previous days notes. Events, vitals, medications and notes from last 24 hours reviewed. Care plan discussed with staff, patient, family and on multidisciplinary rounds. IMPRESSION:   GI bleed  Seems stopped. No BM since yesterday day shift/  H/H little lower  Will observe       Last H/H 8.5/.6    Crohn disease ([address] (2012) GI follow up     Embolic stroke ([address] (3742)  Started on Lovenox and Coumadin after plan not to proceed with surgery       Acute blood loss anemia (3/15/2017)  Continue monitor     Neuropathic pain (3/15/2017)     DVT (deep venous thrombosis) ([facility] [address] (3/16/2017)  If she will need to be started on Coumadin for CVA then no need for IVC filter     Elevated WBC count (3/28/2017)  resolved     · Code status: full      RECOMMENDATIONS:   · Continue monitor labs closely    seriel H/H    Will keepher may be through the day in ICU           Subjective/History of Present Illness:     Korey Solorzano has been seen and evaluated in ICU/ER  Patient is a 40 y.o. female This patient has been seen and evaluated at the request of Dr. Cody Waller. Korey Solorzano is a 40 y.o. female with a hx of Crohns disease, ulcerative colitis, and CVA (1 week ago) and left leg DVT, presents to the emergency department c/o low hemoglobin today at her PCP's office. Patient was told to come to the ED for evaluation and possible blood trasfusion. Associated symptoms include nausea, SOB, lightheadedness, blood in stool, and chills. Patient recently started on lovenox and has been on coumadin for about 5 days. States that her stool is maroon in color for the last 2 days.  PMHx of colonoscopy (last week with SANYA Libby Moyer MD for blood in stool and polyps were found). Patient was transferred to ICU for hypotension and lack of IV access     She had multiple blood transfusions PRBC and FFP    Her bleeding stopped and she is back on Lovenox and Coumadin with close monitor of her H/H         Review of Systems:  Constitutional: negative  Respiratory: negative  Cardiovascular: negative  Gastrointestinal: no more BM. No definite active GI bleed  Genitourinary:negative     Allergies   Allergen Reactions    Humira [Adalimumab] Other (comments)    Remicade [Infliximab] Palpitations      Past Medical History:   Diagnosis Date    Abdominal pain     Anemia NEC     sickle cell trait    C. difficile colitis     Cro[facility]'s disease ([facility] [address]     Gastrointestinal disorder     Crohns    Herpes zoster     Hx of cocaine abuse     Hyperkalemia     Hypertension     Iron deficiency anemia     MRSA (methicillin resistant Staphylococcus aureus)     Obesity     Pain management     Sickle cell trait (HCC)     Stroke (HCC)     + cva 3/17      Current Facility-Administered Medications   Medication Dose Route Frequency    gabapentin (NEURONTIN) capsule 200 mg  200 mg Oral TID    enoxaparin (LOVENOX) injection 30 mg  30 mg SubCUTAneous Q12H    insulin lispro (HUMALOG) injection   SubCUTAneous AC&HS    docusate sodium (COLACE) capsule 100 mg  100 mg Oral BID    Warfarin- Rx to Dose & Monitor  1 Each Other Rx Dosing/Monitoring    mesalamine (PENTASA) CR capsule 1,000 mg  1,000 mg Oral QID    pantoprazole (PROTONIX) 40 mg in sodium chloride 0.9 % 10 mL injection  40 mg IntraVENous Q12H    methylPREDNISolone (PF) (SOLU-MEDROL) injection 25 mg  25 mg IntraVENous Q12H       Lines: All central lines examined by me. No signs of erythema, induration, discharge.           Peripherally Inserted Central Catheter:         Venous Access Device:     Peripheral Intravenous Line:  Peripheral IV 03/27/17 Left Forearm (Active)   Site Assessment Clean, dry, & intact 3/29/2017  4:00 AM   Phlebitis Assessment 0 3/29/2017  4:00 AM   Infiltration Assessment 0 3/29/2017  4:00 AM   Dressing Status Clean, dry, & intact 3/29/2017  4:00 AM   Dressing Type Transparent 3/29/2017  4:00 AM   Hub Color/Line Status Capped 3/29/2017  4:00 AM   Alcohol Cap Used Yes 3/28/2017  4:03 PM         Objective:   Vital Signs:    Visit Vitals    BP (!) 147/97    Pulse 77    Temp 98.5 °F (36.9 °C)    Resp 19    Ht 5' 6\" (1.676 m)    Wt 89.2 kg (196 lb 10.4 oz)    LMP 2017    SpO2 97%    Breastfeeding No    BMI 31.74 kg/m2       O2 Device: Room air   O2 Flow Rate (L/min): 2 l/min   Temp (24hrs), Av.2 °F (36.8 °C), Min:96.7 °F (35.9 °C), Max:98.7 °F (37.1 °C)       Intake/Output:   Last shift:           Last 3 shifts:  1901 -  0700  In: 840 [P.O.:840]  Out: 2950 [Urine:2950]      Intake/Output Summary (Last 24 hours) at 17 0824  Last data filed at 17 8894   Gross per 24 hour   Intake              240 ml   Output             1850 ml   Net            -1610 ml       Last 3 Recorded Weights in this Encounter    17 1929 17 0448   Weight: 82.1 kg (181 lb) 89.2 kg (196 lb 10.4 oz)       Ventilator Settings:  Mode Rate Tidal Volume Pressure FiO2 PEEP                    Peak airway pressure:      Plateau pressure:     Tidal volume:    Minute ventilation:     SPO2      ARDS network Guidelines: Lung protective strategy and Plateau pressure goals less than or equal to 30    Telemetry:normal sinus rhythm    Physical Exam:     General: Awake, NAD  Head: atraumatic, normocephalic  Eye: PERRLA, no scleral icterus, no pallor, no cyanosis  Nose: no sinus tenderness, no erythema, no induration, no discharge  Throat: no tonsillar enlargement, no erythema, no exudates, no oral thrush  Neck: supple, no thyromegaly, no JVD, no lymphadenopathy.  Trachea midline  Lung: adequate air entry bilateral equal, no rales, no rhonchi, no wheezing  Heart: S1 S2 present, no murmur, no gallop  Abdomen: soft, NT, ND, +BS  LE: no pedal edema, no cyanosis, no clubbing, 2+ peripheral pulses in DP  Lymphatic: no cervical/supraclavicular/axillary lymphadenopathy  Neurologic: alert. L sided weak        Data:         Chemistry Recent Labs      03/31/17   0430  03/30/17   0445  03/29/17   0815  03/28/17   0901   GLU  102*  125*  107*  112*   NA  145  142  143  138   K  3.6  3.6  4.2  4.5   CL  110*  109*  111*  109*   CO2  29  27  27  22   BUN  8  15  18  15   CREA  0.56*  0.60  0.53*  0.55*   CA  7.4*  7.2*  7.4*  7.5*   AGAP  6  6  5  7   BUCR  14  25*  34*  27*   AP   --   31*  19*  QUANTITY NOT SUFFICIENT. SUGGEST RECOLLECTION   TP   --   4.2*  3.8*  QUANTITY NOT SUFFICIENT. SUGGEST RECOLLECTION   ALB   --   2.0*  1.8*  QUANTITY NOT SUFFICIENT. SUGGEST RECOLLECTION   GLOB   --   2.2  2.0  Cannot be calulated   AGRAT   --   0.9  0.9  Cannot be calulated        Lactic Acid Lactic acid   Date Value Ref Range Status   09/26/2016 1.3 0.4 - 2.0 MMOL/L Final     No results for input(s): LAC in the last 72 hours. Liver Enzymes Protein, total   Date Value Ref Range Status   03/30/2017 4.2 (L) 6.4 - 8.2 g/dL Final     Albumin   Date Value Ref Range Status   03/30/2017 2.0 (L) 3.4 - 5.0 g/dL Final     Globulin   Date Value Ref Range Status   03/30/2017 2.2 2.0 - 4.0 g/dL Final     A-G Ratio   Date Value Ref Range Status   03/30/2017 0.9 0.8 - 1.7   Final     AST (SGOT)   Date Value Ref Range Status   03/30/2017 12 (L) 15 - 37 U/L Final     Alk. phosphatase   Date Value Ref Range Status   03/30/2017 31 (L) 45 - 117 U/L Final     Recent Labs      03/30/17   0445  03/29/17   0815  03/28/17   0901   TP  4.2*  3.8*  QUANTITY NOT SUFFICIENT. SUGGEST RECOLLECTION   ALB  2.0*  1.8*  QUANTITY NOT SUFFICIENT. SUGGEST RECOLLECTION   GLOB  2.2  2.0  Cannot be calulated   AGRAT  0.9  0.9  Cannot be calulated   SGOT  12*  13*  QUANTITY NOT SUFFICIENT.  SUGGEST RECOLLECTION   AP  31*  19* QUANTITY NOT SUFFICIENT. SUGGEST RECOLLECTION        CBC w/Diff Recent Labs      03/31/17   0430  03/30/17   0445  03/29/17   1735  03/29/17   0815   03/28/17   0901   WBC  9.3  10.2   --   12.8   --   22.4*   RBC  2.94*  2.98*   --   2.44*   --   2.45*   HGB  8.5*  8.7*  9.9*  7.3*   < >  6.7*   HCT  25.6*  25.1*  28.2*  21.1*   < >  20.4*   PLT  149  117*   --   115*   --   237   GRANS   --   69   --   82*   --   81*   LYMPH   --   17*   --   8*   --   13*   EOS   --   1   --   0   --   0    < > = values in this interval not displayed. Cardiac Enzymes No results found for: CPK, CKMMB, CKMB, RCK3, CKMBT, CKNDX, CKND1, CHANDA, TROPT, TROIQ, FABIEN, TROPT, TNIPOC, BNP, BNPP     BNP No results found for: BNP, BNPP, XBNPT     Coagulation Recent Labs      03/31/17   0430  03/30/17   0445  03/29/17   0847   PTP  12.6  12.9  17.3*   INR  1.0  1.0  1.5*         Thyroid  Lab Results   Component Value Date/Time    TSH 1.51 12/12/2009 09:48 AM          Lipid Panel Lab Results   Component Value Date/Time    Cholesterol, total 145 03/12/2017 06:15 AM    HDL Cholesterol 37 03/12/2017 06:15 AM    LDL, calculated 91.6 03/12/2017 06:15 AM    VLDL, calculated 16.4 03/12/2017 06:15 AM    Triglyceride 82 03/12/2017 06:15 AM    CHOL/HDL Ratio 3.9 03/12/2017 06:15 AM        ABG No results for input(s): PHI, PHI, PCO2I, PO2, PO2I, HCO3, HCO3I, FIO2, FIO2I in the last 72 hours.     No lab exists for component: POC2     Urinalysis Lab Results   Component Value Date/Time    Color YELLOW 03/10/2017 04:30 PM    Appearance CLEAR 03/10/2017 04:30 PM    Specific gravity 1.021 03/10/2017 04:30 PM    pH (UA) 6.5 03/10/2017 04:30 PM    Protein NEGATIVE  03/10/2017 04:30 PM    Glucose NEGATIVE  03/10/2017 04:30 PM    Ketone NEGATIVE  03/10/2017 04:30 PM    Bilirubin NEGATIVE  03/10/2017 04:30 PM    Urobilinogen 1.0 03/10/2017 04:30 PM    Nitrites NEGATIVE  03/10/2017 04:30 PM    Leukocyte Esterase NEGATIVE  03/10/2017 04:30 PM    Epithelial cells FEW 09/26/2016 07:18 PM    Bacteria FEW 09/26/2016 07:18 PM    WBC 0 to 3 09/26/2016 07:18 PM    RBC 0 to 3 09/26/2016 07:18 PM        Micro  No results for input(s): SDES, CULT in the last 72 hours. No results for input(s): CULT in the last 72 hours. XR (Most Recent). CXR reviewed by me and compared with previous CXR   Results from Hospital Encounter encounter on 03/27/17   XR CHEST PORT   Narrative Chest, single view    Indication: Line placement    Comparison: Several prior exams, most recently 2/26/2016    Findings:  Portable upright AP view of the chest was obtained. There is a right  subclavian approach central venous catheter with tip projecting over the  superior cavoatrial junction. The lungs are mildly underexpanded but clear. No  focal pneumonic opacity, pneumothorax, or pleural effusion. The cardiac size and  mediastinal contours are within normal limits. Pulmonary vascularity normal. No  acute osseous abnormality. Impression Impression:    1. Right subclavian approach central venous catheter in position as above. 2. Mild pulmonary hypoinflation without superimposed acute radiographic  abnormality. CT (Most Recent)   Results from Hospital Encounter encounter on 03/27/17   CT HEAD WO CONT   Narrative EXAM: CT head    INDICATION: 55-year-old patient with hypertension, altered mental status. COMPARISON: Several prior examinations, most recently brain MRI dated 3/10/2017. TECHNIQUE: Axial CT imaging of the head was performed without intravenous  contrast.    One or more dose reduction techniques were used on this CT: automated exposure  control, adjustment of the mAs and/or kVp according to patient's size, and  iterative reconstruction techniques. The specific techniques utilized on this CT  exam have been documented in the patient's electronic medical record.     _______________    FINDINGS: Detail is mildly limited by motion artifact.     BRAIN AND POSTERIOR FOSSA: As previously, there is redemonstration of abnormal  clear with matter differentiation involving the right centrum semiovale, corona  radiata, right-sided insular cortex, and right parietal/temporal lobes, spanning  the right sylvian fissure. There is mild associated mass effect upon the right  lateral ventricle, the appearance of which is similar to prior exam. No evidence  of midline shift. The basilar cisterns remain patent. The known multiple  cerebellar infarcts are characterized to better advantage on comparison brain  MRI. Mild increased density of the infarcted cortical surfaces is noted. No  intra-axial fluid collection. EXTRA-AXIAL SPACES AND MENINGES: There are no abnormal extra-axial fluid  collections. CALVARIUM: Intact. SINUSES: Paranasal sinuses and mastoid air cells are clear. OTHER: None.    _______________         Impression IMPRESSION:      1. Redemonstration of sequela of multifocal, likely embolic infarcts without  acute intra-axial or extra-axial fluid collection. 2. Mild persistent edema and mass effect upon the right lateral ventricle,  without evidence of midline shift. EKG No results found for this or any previous visit. ECHO No results found for this or any previous visit. PFT No flowsheet data found.      Other ASA reactivity:   Pre-albumin:   Ionized Calcium:   NH4:   T3, FT4:  Cortisol:  Urine Osm:  Urine Lytes:   HbA1c:      Recent Results (from the past 24 hour(s))   GLUCOSE, POC    Collection Time: 03/30/17 11:18 AM   Result Value Ref Range    Glucose (POC) 112 (H) 70 - 110 mg/dL   GLUCOSE, POC    Collection Time: 03/30/17  4:00 PM   Result Value Ref Range    Glucose (POC) 120 (H) 70 - 110 mg/dL   PROTHROMBIN TIME + INR    Collection Time: 03/31/17  4:30 AM   Result Value Ref Range    Prothrombin time 12.6 11.5 - 15.2 sec    INR 1.0 0.8 - 1.2     METABOLIC PANEL, BASIC    Collection Time: 03/31/17  4:30 AM   Result Value Ref Range    Sodium 145 136 - 145 mmol/L    Potassium 3.6 3.5 - 5.5 mmol/L    Chloride 110 (H) 100 - 108 mmol/L    CO2 29 21 - 32 mmol/L    Anion gap 6 3.0 - 18 mmol/L    Glucose 102 (H) 74 - 99 mg/dL    BUN 8 7.0 - 18 MG/DL    Creatinine 0.56 (L) 0.6 - 1.3 MG/DL    BUN/Creatinine ratio 14 12 - 20      GFR est AA >60 >60 ml/min/1.73m2    GFR est non-AA >60 >60 ml/min/1.73m2    Calcium 7.4 (L) 8.5 - 10.1 MG/DL   CBC W/O DIFF    Collection Time: 03/31/17  4:30 AM   Result Value Ref Range    WBC 9.3 4.6 - 13.2 K/uL    RBC 2.94 (L) 4.20 - 5.30 M/uL    HGB 8.5 (L) 12.0 - 16.0 g/dL    HCT 25.6 (L) 35.0 - 45.0 %    MCV 87.1 74.0 - 97.0 FL    MCH 28.9 24.0 - 34.0 PG    MCHC 33.2 31.0 - 37.0 g/dL    RDW 16.5 (H) 11.6 - 14.5 %    PLATELET 547 839 - 891 K/uL    MPV 8.8 (L) 9.2 - 11.8 FL   GLUCOSE, POC    Collection Time: 03/31/17  7:21 AM   Result Value Ref Range    Glucose (POC) 106 70 - 110 mg/dL         Best practice : All below core measures reviewed and addressed:   [x] Antibiotic choice. [x] Fluids. [x] Lactic acid ordered- initial and repeat Q6hrs if elevated till normalized. [] Cultures drawn.  [] Antibiotic administered within 1hr-ICU and 3hrs ED. [] Large bore IV- 2 sites         CVP      [] Pressors aim MAP >65mmHg.  [] Steroids. [x] Glycemic control. [x] Infection control. [] Mech. Ventilated patients- aim to keep peak plateau pressure 44-23OV H2O.  [] HOB at >= 30 degree. [x] Stress ulcer prophylaxis. [x] DVT prophylaxis. [] Central Line Bundle Followed. [x] Gaming Bundle Followed. [] Ventilator Bundle Followed. (Daily sedation holiday to assess readiness for weaning from ventilator & SBT trial starting at 6.00 am, HOB 30-degree elevation, Chlorhexidine mouth washes & Routine oral care every 4 hours, [place] tube to suction at 20-30 cm H2O, Maintain [place] tube with 5-10ml air every 4 hours, DVT prophylaxis, GI prophylaxis etc.).     [x]See my orders for details    My assessment, plan of care, findings, medications, side effects etc were discussed with:  [x] Nurse [x] PT/OT    [x] Respiratory therapy []     [x] Family: answered all questions to satisfaction [] Patient: answered all questions to satisfaction   [x] Pharmacist []      [x] Case & management strategies discussed today on multidisciplinary rounds    Discussed with Dr Ever Garcia  Will start Lovenox and Coumadin and watch  Started yesterday      She is being transferred to floor    New info to me that the patient used to do IV drug use and heroin.       Royal Liz MD  3/31/2017

## 2017-03-31 NOTE — PROGRESS NOTES
1900: Bedside and Verbal shift change report received from Alex Tello RN. Report included the following information SBAR, Intake/Output, MAR, Recent Results and Cardiac Rhythm NSR.    2000: Assessment complete, see flow sheets. 2200: Family at the bedside. NAD. Nursing care continues. 0000: Reassessment, no changes. 0400: Reassessment, no changes. 0715: Bedside and Verbal shift change report given to NARESH Rashid RN (oncoming nurse) by Vivian Parry RN   (offgoing nurse). Report included the following information SBAR, Intake/Output, MAR, Recent Results, Cardiac Rhythm NSR and Alarm Parameters .

## 2017-03-31 NOTE — PROGRESS NOTES
Pharmacy Dosing Services:  Warfarin    Consult for Warfarin Dosing by Pharmacy by Dr. Kristen Capps provided for this 40 y.o.  female , for indication of DVT Prevention. Dose to achieve an INR goal of 2-3    Order entered for  Warfarin  5 (mg) ordered to be given today at 18:00. Patient also on Lovenox 30 mg sq q12hrs. LABS    PT/INR Lab Results   Component Value Date/Time    INR 1.0 03/31/2017 04:30 AM      Platelets Lab Results   Component Value Date/Time    PLATELET 100 46/45/4542 04:30 AM      H/H     Lab Results   Component Value Date/Time    HGB 8.5 03/31/2017 04:30 AM        Warfarin Administrations (last 168 hours)     Date/Time Action Medication Dose    03/30/17 1847 Given    warfarin (COUMADIN) tablet 5 mg 5 mg          Pharmacy to follow daily and will provide subsequent Warfarin dosing based on clinical status.   Tai Samson [facility]      Contact information     [phone]

## 2017-03-31 NOTE — PROGRESS NOTES
Pt SR on monitor, on room air,  A&Ox4, left side weaker, left facial droop noted. Left hand and right foot numbness/tingling noted - baseline, gabapentin started this shift. Diet advanced, no BM this shift, no signs on bleeding at this time. On lovenox and coumadin per pharmacy.

## 2017-03-31 NOTE — PROGRESS NOTES
NUTRITION FOLLOW-UP    RECOMMENDATIONS/PLAN:   - Suggest beginning MVI daily due to micronutrient malabsorption with severe Crohn's  - D/c Ensure Clear as pt tolerating diet, good appetite  - Discussed nutritional implications of Crohn's disease i.e. Avoid high fiber foods during flare  - Recommend Cardiac Low Fiber diet until severity of Crohn's improves    NUTRITION ASSESSMENT:   Client Update: 40 yrs old Female with severe aggressive Crohn's disease, no surgery needed at this time per GI, to begin new medication- entyvio. Pt reports good appetite, had first meal of solid food this AM since adm. Has h/o Crohn's, discussed nutrition recommendations, pt reports she is familiar with these. FOOD/NUTRITION INTAKE   Diet Order:  Cardiac   Supplements: Ensure Clear TID   Food Allergies: NKFA  Average PO Intake:   ~50%     Pertinent Medications:  [x] Reviewed; colace, neurontin, mesalamine, solu-medrol, morphine, zofran, percocet, ppi, warfarin   Insulin:  [x]SSI  []Pre-meal   []Basal    []Drip  []None  Cultural/Mormonism Food Preferences: None Identified    BIOCHEMICAL DATA & MEDICAL TESTS  Pertinent Labs:  [x] Reviewed; HbA1c 5.7%   ANTHROPOMETRICS  Height: 5' 6\" (167.6 cm)       Weight: 89.2 kg (196 lb 10.4 oz)         BMI: 31.8 kg/m^2 obese (30%-39.9% BMI)   Adm Weight: 181 lbs         Weight change: +15 lbs (?accuracy)  Adjusted Body Weight: 65 kg     NUTRITION-FOCUSED PHYSICAL ASSESSMENT  Skin: intact      GI: last BM 3/29, bleeding now stopped per GI. +nausea, abdominal pain. NUTRITION PRESCRIPTION  Calories: 5518-6951 kcal/day based on 30-35 kcal/kg AdjBW  Protein: 65-85 g/day based on 1-1.3 g/kg AdjBW  CHO: 244 g/day based on 50% of total energy  Fluid: 5304-8286 ml/day based on 1 kcal/ml      NUTRITION DIAGNOSES:   1.  At risk of inadequate oral intake related to Crohn's disease as evidenced by nausea, abdominal pain, 50% po intake of meals  - ongoing    NUTRITION INTERVENTIONS: INTERVENTIONS:        GOALS:  1. MVI, low fiber diet, d/c ONS 1. >50% PO intake of meals, BM q 1- 3 days, prevent/correct micronutrient deficiency by next review 3-5 days     LEARNING NEEDS (Diet, Supplementation, Food/Nutrient-Drug Interaction):   [] None Identified   [x] Education provided/documented      Identified and patient: [] Declined   [] Was not appropriate/indicated        NUTRITION MONITORING /EVALUATION:   Follow PO intake  Monitor wt  Monitor renal labs, electrolytes, fluid status     Previous Recommendations Implemented: yes        Previous Goals Met:  yes       [] Participated in Interdisciplinary Rounds    [x] Interdisciplinary Care Plan Reviewed  DISCHARGE NUTRITION RECOMMENDATIONS ADDRESSED:     [x] Yes- recommended low fiber, gradually increase fiber intake    NUTRITION RISK:           [x] At risk                        [] Not currently at risk        Will follow-up per policy.   Delfina Healy RD  PAGER:  [phone]

## 2017-03-31 NOTE — PROGRESS NOTES
Colorectal Surgery Consult    Subjective:     Patient is a 40 y.o.  female who is being seen with bloody diarrhea and crohn's. Onset of symptoms was abrupt with rapidly worsening course since that time. Patient also notes associated with hematochezia and abdominal pain. Symptoms improve with narcotics. Symptoms worsen with none. There is history of inflammatory bowel disease and recent stroke with initiation of blood thinners. There is no history of past abdominal surgery. Past history includes inflammatory bowel disease crohn's. Previous studies include colonoscopy showing crohn's in left colon and nuclear scan showing bleeding in left/spleen. Has had multiple transfusions of PRBCs and FFP. NO bleeding events past 24 hrs. Pt terrified of repeat stroke. Patient Active Problem List    Diagnosis Date Noted    GI bleed 03/28/2017    Elevated WBC count 03/28/2017    [facility]) 03/16/2017    Acute blood loss anemia 03/15/2017    Neuropathic pain 33/18/9921    Embolic stroke ([facility]) 41/14/3729    Sickle cell trait ([facility])     Hypertension     Hx of cocaine abuse     Chronic pain syndrome 02/27/2012    Crohn disease ([address] 02/27/2012     Past Medical History:   Diagnosis Date    Abdominal pain     Anemia NEC     sickle cell trait    C. difficile colitis     Crohn's disease ([address]     Gastrointestinal disorder     Crohns    Herpes zoster     Hx of cocaine abuse     Hyperkalemia     Hypertension     Iron deficiency anemia     MRSA (methicillin resistant Staphylococcus aureus)     Obesity     Pain management     Sickle cell trait ([address]     Stroke ([facility]     + cva 3/17      Past Surgical History:   Procedure Laterality Date    COLONOSCOPY N/A 3/17/2017    COLONOSCOPY; BIOPSY; performed by Laith Pimentel MD at [facility] ENDOSCOPY    HX GYN      tubal ligation    HX TUBAL LIGATION       History reviewed. No pertinent family history.    Social History Social History    Marital status:      Spouse name: N/A    Number of children: N/A    Years of education: N/A     Social History Main Topics    Smoking status: Former Smoker    Smokeless tobacco: None    Alcohol use No    Drug use:  Yes    Sexual activity: Yes     Partners: Male     Birth control/ protection: Surgical     Other Topics Concern    None     Social History Narrative       Current Facility-Administered Medications   Medication Dose Route Frequency Provider Last Rate Last Dose    enoxaparin (LOVENOX) injection 30 mg  30 mg SubCUTAneous Q12H Ze Castillo MD   30 mg at 03/30/17 2159    insulin lispro (HUMALOG) injection   SubCUTAneous AC&HS Ze Castillo MD   Stopped at 03/30/17 1130    glucose chewable tablet 16 g  4 Tab Oral PRN Ze Castillo MD        glucagon (GLUCAGEN) injection 1 mg  1 mg IntraMUSCular PRN Ze Castillo MD        dextrose (D50W) injection syrg 12.5-25 g  25-50 mL IntraVENous PRN Ze Castillo MD        docusate sodium (COLACE) capsule 100 mg  100 mg Oral BID Luci Salazar MD        Warfarin- Rx to Dose & Monitor  1 Each Other Rx Dosing/Monitoring Ze Castillo MD        morphine injection 2 mg  2 mg IntraVENous Q3H PRN Presley Babb MD   2 mg at 03/30/17 2200    0.9% sodium chloride infusion 250 mL  250 mL IntraVENous PRN Ze Castillo MD        mesalamine (PENTASA) CR capsule 1,000 mg  1,000 mg Oral QID Presley Babb MD   1,000 mg at 03/30/17 2159    oxyCODONE-acetaminophen (PERCOCET) 5-325 mg per tablet 1 Tab  1 Tab Oral Q4H PRN Presley Babb MD        acetaminophen (TYLENOL) tablet 650 mg  650 mg Oral Q4H PRN Presley Babb MD        diphenhydrAMINE (BENADRYL) injection 12.5 mg  12.5 mg IntraVENous Q4H PRN Presley Babb MD        ondansetron [facility]) injection 4 mg  4 mg IntraVENous Q6H PRN Presley Babb MD   4 mg at 03/30/17 0101    0.9% sodium chloride infusion 250 mL  250 mL IntraVENous PRN Ijeoma Sissy, DO        0.9% sodium chloride infusion 250 mL  250 mL IntraVENous PRN Ijeoma Sissy, DO        pantoprazole (PROTONIX) 40 mg in sodium chloride 0.9 % 10 mL injection  40 mg IntraVENous Q12H Ijeoma Sissy, DO   40 mg at 03/30/17 2014    methylPREDNISolone (PF) (SOLU-MEDROL) injection 25 mg  25 mg IntraVENous Q12H Laith Pimentel MD   25 mg at 03/30/17 1536    0.9% sodium chloride infusion 250 mL  250 mL IntraVENous PRN Laith Pimentel MD        0.9% sodium chloride infusion 250 mL  250 mL IntraVENous PRN Ijeoma Sissy, DO        0.9% sodium chloride infusion 250 mL  250 mL IntraVENous PRN [address], MD   Stopped at 03/28/17 1945         Allergies   Allergen Reactions    Humira [Adalimumab] Other (comments)    Remicade [Infliximab] Palpitations        Review of Systems:  Constitutional: negative for anorexia and weight loss  Eyes: negative for glaucoma, visual disturbance and icterus  Ears, Nose, Mouth, Throat, and Face: negative for ear drainage, nasal congestion and facial trauma  Respiratory: negative for hemoptysis or emphysema  Cardiovascular: negative for chest pain, irregular heart beats, claudication  Gastrointestinal: negative for dysphagia, reflux symptoms and jaundice  Genitourinary:negative for nocturia, urinary incontinence, hesitancy and decreased stream  Hematologic/Lymphatic: negative for lymphadenopathy, petechiae and coughing up blood  Musculoskeletal:negative for myalgias, stiff joints and bone pain  Neurological: negative for headaches and seizures  Behavioral/Psychiatric: negative for ADHD, anorexia and bipolar  Endocrine: negative for fertility problems and temperature intolerance  Allergic/Immunologic: negative for urticaria, angioedema and anaphylaxis     Objective:     Patient Vitals for the past 8 hrs:   BP Temp Pulse Resp SpO2   03/30/17 2305 - 98.6 °F (37 °C) - - -   03/30/17 2300 (!) 147/97 - 77 - 97 % 03/30/17 2200 - - 69 19 100 %   03/30/17 2100 137/88 - 67 14 99 %   03/30/17 2000 (!) 131/96 - 72 21 100 %   03/30/17 1900 130/86 - 75 19 100 %   03/30/17 1800 127/81 - 70 14 99 %   03/30/17 1720 - 97.9 °F (36.6 °C) - - -   03/30/17 1700 126/86 - 67 14 100 %   03/30/17 1600 (!) 136/100 - 74 12 100 %       Physical Exam:   Visit Vitals    BP (!) 147/97    Pulse 77    Temp 98.6 °F (37 °C)    Resp 19    Ht 5' 6\" (1.676 m)    Wt 82.1 kg (181 lb)    LMP 03/06/2017    SpO2 97%    Breastfeeding No    BMI 29.21 kg/m2     General:  Alert, cooperative, no distress, appears stated age. Head:  Normocephalic, without obvious abnormality, atraumatic. Eyes:  Conjunctivae/corneas clear. PERRL, EOMs intact. Fundi benign. Ears:  Normal TMs and external ear canals both ears. Nose: Nares normal. Septum midline. Mucosa normal. No drainage or sinus tenderness. Throat: Lips, mucosa, and tongue normal. Teeth and gums normal.   Neck: Supple, symmetrical, trachea midline, no adenopathy, thyroid: no enlargement/tenderness/nodules, no carotid bruit and no JVD. Back:   Symmetric, no curvature. ROM normal. No CVA tenderness. Lungs:   Clear to auscultation bilaterally. Chest wall:  No tenderness or deformity. Heart:  Regular rate and rhythm, S1, S2 normal, no murmur, click, rub or gallop. Breast Exam:  No tenderness, masses, or nipple abnormality. Abdomen:   Soft, non-tender. Bowel sounds normal. No masses,  No organomegaly. Genitalia:  Normal female without lesion, discharge or tenderness. Rectal:  Normal tone,  no masses or tenderness  Guaiac negative stool. Extremities: Extremities normal, atraumatic, no cyanosis or edema. Pulses: 2+ and symmetric all extremities. Skin: Skin color, texture, turgor normal. No rashes or lesions. Lymph nodes: Cervical, supraclavicular, and axillary nodes normal.   Neurologic: CNII-XII intact. Normal strength, sensation and reflexes throughout.        Michell Barker Review:  Recent Results (from the past 24 hour(s))   CBC WITH AUTOMATED DIFF    Collection Time: 03/30/17  4:45 AM   Result Value Ref Range    WBC 10.2 4.6 - 13.2 K/uL    RBC 2.98 (L) 4.20 - 5.30 M/uL    HGB 8.7 (L) 12.0 - 16.0 g/dL    HCT 25.1 (L) 35.0 - 45.0 %    MCV 84.2 74.0 - 97.0 FL    MCH 29.2 24.0 - 34.0 PG    MCHC 34.7 31.0 - 37.0 g/dL    RDW 16.1 (H) 11.6 - 14.5 %    PLATELET 080 (L) 766 - 420 K/uL    MPV 8.7 (L) 9.2 - 11.8 FL    NEUTROPHILS 69 40 - 73 %    LYMPHOCYTES 17 (L) 21 - 52 %    MONOCYTES 13 (H) 3 - 10 %    EOSINOPHILS 1 0 - 5 %    BASOPHILS 0 0 - 2 %    ABS. NEUTROPHILS 7.1 1.8 - 8.0 K/UL    ABS. LYMPHOCYTES 1.7 0.9 - 3.6 K/UL    ABS. MONOCYTES 1.3 (H) 0.05 - 1.2 K/UL    ABS. EOSINOPHILS 0.1 0.0 - 0.4 K/UL    ABS. BASOPHILS 0.0 0.0 - 0.06 K/UL    DF AUTOMATED     METABOLIC PANEL, COMPREHENSIVE    Collection Time: 03/30/17  4:45 AM   Result Value Ref Range    Sodium 142 136 - 145 mmol/L    Potassium 3.6 3.5 - 5.5 mmol/L    Chloride 109 (H) 100 - 108 mmol/L    CO2 27 21 - 32 mmol/L    Anion gap 6 3.0 - 18 mmol/L    Glucose 125 (H) 74 - 99 mg/dL    BUN 15 7.0 - 18 MG/DL    Creatinine 0.60 0.6 - 1.3 MG/DL    BUN/Creatinine ratio 25 (H) 12 - 20      GFR est AA >60 >60 ml/min/1.73m2    GFR est non-AA >60 >60 ml/min/1.73m2    Calcium 7.2 (L) 8.5 - 10.1 MG/DL    Bilirubin, total 0.2 0.2 - 1.0 MG/DL    ALT (SGPT) 17 13 - 56 U/L    AST (SGOT) 12 (L) 15 - 37 U/L    Alk.  phosphatase 31 (L) 45 - 117 U/L    Protein, total 4.2 (L) 6.4 - 8.2 g/dL    Albumin 2.0 (L) 3.4 - 5.0 g/dL    Globulin 2.2 2.0 - 4.0 g/dL    A-G Ratio 0.9 0.8 - 1.7     PROTHROMBIN TIME + INR    Collection Time: 03/30/17  4:45 AM   Result Value Ref Range    Prothrombin time 12.9 11.5 - 15.2 sec    INR 1.0 0.8 - 1.2     GLUCOSE, POC    Collection Time: 03/30/17 11:18 AM   Result Value Ref Range    Glucose (POC) 112 (H) 70 - 110 mg/dL   GLUCOSE, POC    Collection Time: 03/30/17  4:00 PM   Result Value Ref Range    Glucose (POC) 120 (H) 70 - 110 mg/dL         Assessment:     Principal Problem:    GI bleed (3/28/2017)    Active Problems:    Crohn disease ([facility] (2/80/9154)      Embolic stroke ([facility]) (2/10/5953)      Acute blood loss anemia (3/15/2017)      Neuropathic pain (3/15/2017)      DVT (deep venous thrombosis) ([facility] (3/16/2017)      Elevated WBC count (3/28/2017)        Plan:   Appreciate ICU, Dr. Gamal Collado, Vascular, GI. No IVC filter will be placed, pt needs anticoagulation due to high risk with venous and arterial clots. GI initiated new crohn's medication. Discussed with Dr. Alexander Malagon. Recommend restarting anticoagulation and seeing response. If crohn's improved with new med, may avoid risky surgery. Will follow. No plan for surgery at this time. Informed Dr. Gamal Collado.     Signed By: Rebecca Bañuelos DO     March 30, 2017

## 2017-03-31 NOTE — INTERDISCIPLINARY ROUNDS
CRITICAL CARE INTERDISCIPLINARY ROUNDS    Patient Information:    Name:   Clifford Noyola    Age:   40 y.o. Admission Date:   3/27/2017    Critical Care Day: 4 (RRT 3/28)    Surgery Date:      Attending Provider:   Yuliana Tipton DO    Surgeon:        Consultant(s):   Stephanie Gaviria    Critical Care Physician:  Gilbert Marrow    Code Status: Full Code    Problem List:     Patient Active Problem List   Diagnosis Code    Chronic pain syndrome G89.4    Crohn disease ([facility] K50.90    Sickle cell trait ([facility] D57.3    Hypertension I10    Hx of cocaine abuse K45.555    Embolic stroke ([facility] D83.0    Acute blood loss anemia D62    Neuropathic pain M79.2    DVT [facility]deep [facility] thrombosis) [facility] I82.409    GI bleed K92.2    Elevated WBC count D72.829       Principal Problem:  GI bleed    During rounds the following quality care indicators and evidence based practices were addressed :  PUD Prophylaxis Gaming Day na (M-Care na) ; Central Line Day:3 Isolation:na; Antibiotic Stewardship: na; Probiotics Necessary: na    Ventilator Bundle:     Sepsis Order Set:     Glycemic Control:   Recent Labs      03/31/17   0430  03/30/17   0445  03/29/17   0815   GLU  102*  125*  107*   ; No results for input(s): PHI, PCO2I, PO2I in the last 72 hours. No lab exists for component: 3 Adjustments     Acute MI/PCI:   Not applicable    Door to Balloon: Admission Time: 2024      Heart Failure:    Not applicable     SCIP Measures for other Surgeries:   Not applicable     Pneumonia:    Not applicable    Interdisciplinary team rounds were held with the following team membersCare Management, Nursing, Pastoral Care, Pharmacy, Physician and Respiratory Therapy. Plan of care discussed. Goals of Care/ Recommendations:  Coumadin restarted, LE doppler study. Lovenox restarted and adjusted 60 mg q12, clarify any special diet for crohen's, keep POCT for another 24 hrs.       See clinical pathway and/or care plan for interventions and desired outcomes.     Critical Care Discharge Status:  Green tele -remote

## 2017-03-31 NOTE — PROGRESS NOTES
Gastrointestinal Progress Note    Patient Name: Brain Santos    ZPZVI'H Date: 3/30/2017    Admit Date: 3/27/2017    Subjective:     Diet: Patient is on Clear Liquid and is tolerating. Wants to eat more    Nausea is present. Vomiting is not present. Pain: Patient does complain of abdominal pain. The pain is located in the lower abdomen. The pain is described as pressure, and is 3/10 in intensity.  relieved by defffecation     Bowel Movements: None    Bleeding:  None    Current Facility-Administered Medications   Medication Dose Route Frequency    enoxaparin (LOVENOX) injection 30 mg  30 mg SubCUTAneous Q12H    insulin lispro (HUMALOG) injection   SubCUTAneous AC&HS    glucose chewable tablet 16 g  4 Tab Oral PRN    glucagon (GLUCAGEN) injection 1 mg  1 mg IntraMUSCular PRN    dextrose (D50W) injection syrg 12.5-25 g  25-50 mL IntraVENous PRN    docusate sodium (COLACE) capsule 100 mg  100 mg Oral BID    Warfarin- Rx to Dose & Monitor  1 Each Other Rx Dosing/Monitoring    morphine injection 2 mg  2 mg IntraVENous Q3H PRN    0.9% sodium chloride infusion 250 mL  250 mL IntraVENous PRN    mesalamine (PENTASA) CR capsule 1,000 mg  1,000 mg Oral QID    oxyCODONE-acetaminophen (PERCOCET) 5-325 mg per tablet 1 Tab  1 Tab Oral Q4H PRN    acetaminophen (TYLENOL) tablet 650 mg  650 mg Oral Q4H PRN    diphenhydrAMINE (BENADRYL) injection 12.5 mg  12.5 mg IntraVENous Q4H PRN    ondansetron (ZOFRAN) injection 4 mg  4 mg IntraVENous Q6H PRN    0.9% sodium chloride infusion 250 mL  250 mL IntraVENous PRN    0.9% sodium chloride infusion 250 mL  250 mL IntraVENous PRN    pantoprazole (PROTONIX) 40 mg in sodium chloride 0.9 % 10 mL injection  40 mg IntraVENous Q12H    methylPREDNISolone (PF) (SOLU-MEDROL) injection 25 mg  25 mg IntraVENous Q12H    0.9% sodium chloride infusion 250 mL  250 mL IntraVENous PRN    0.9% sodium chloride infusion 250 mL  250 mL IntraVENous PRN    0.9% sodium chloride infusion 250 mL  250 mL IntraVENous PRN          Objective:     Visit Vitals    /81    Pulse 70    Temp 97.9 °F (36.6 °C)    Resp 14    Ht 5' 6\" (1.676 m)    Wt 82.1 kg (181 lb)    SpO2 99%    Breastfeeding No    BMI 29.21 kg/m2       03/29 0701 - 03/30 1900  In: 2349 [P.O.:840]  Out: 1763 [Urine:2840]    General appearance: alert, cooperative, no distress, appears stated age. Pale  Abdomen: soft,very mild tenderness in the suprapubic are. Bowel sounds normal. No masses,  no organomegaly  Extremities: extremities normal, atraumatic, no cyanosis or edema. Tattoo's    Data Review:    Labs: Results:       Chemistry Recent Labs      03/30/17 0445 03/29/17   0815  03/28/17   0901   GLU  125*  107*  112*   NA  142  143  138   K  3.6  4.2  4.5   CL  109*  111*  109*   CO2  27  27  22   BUN  15  18  15   CREA  0.60  0.53*  0.55*   CA  7.2*  7.4*  7.5*   AGAP  6  5  7   BUCR  25*  34*  27*   AP  31*  19*  QUANTITY NOT SUFFICIENT. SUGGEST RECOLLECTION   TP  4.2*  3.8*  QUANTITY NOT SUFFICIENT. SUGGEST RECOLLECTION   ALB  2.0*  1.8*  QUANTITY NOT SUFFICIENT. SUGGEST RECOLLECTION   GLOB  2.2  2.0  Cannot be calulated   AGRAT  0.9  0.9  Cannot be calulated      CBC w/Diff Recent Labs      03/30/17 0445 03/29/17   1735  03/29/17   0815   03/28/17   0901   WBC  10.2   --   12.8   --   22.4*   RBC  2.98*   --   2.44*   --   2.45*   HGB  8.7*  9.9*  7.3*   < >  6.7*   HCT  25.1*  28.2*  21.1*   < >  20.4*   PLT  117*   --   115*   --   237   GRANS  69   --   82*   --   81*   LYMPH  17*   --   8*   --   13*   EOS  1   --   0   --   0    < > = values in this interval not displayed.       Coagulation Recent Labs      03/30/17 0445 03/29/17   0847   PTP  12.9  17.3*   INR  1.0  1.5*       Liver Enzymes Recent Labs      03/30/17   0445   TP  4.2*   ALB  2.0*   AP  31*   SGOT  12*          Assessment:     Principal Problem:    GI bleed (3/28/2017)    Active Problems:    Crohn disease ([facility] [address] (7/94/5142)      Embolic stroke ([facility] (3/10/2017)      Acute blood loss anemia (3/15/2017)      Neuropathic pain (3/15/2017)      DVT (deep venous thrombosis) ([facility] [address] (3/16/2017)      Elevated WBC count (3/28/2017)        Plan:     Severe lower GI bleeding while on Lovenox and Coumadin for recent DVT and embolic CVA her Hgb went down to 6.5 but required a total of 8 PRBC transfusions and 3 FFP units. Bleeding is coming from the left colon where she has severe active Crohn's disease. The bleeding stopped for now if it resumes her best option would be subtotal colectomy. Patient also concerned about having another CVA. We have to hold anticoagulation under the circustances for a minimum of 2 more days. meanwhile she should get IVC filter installed. Acute blood loss on chronic iron deficiency anemia secondary to her severe Crohn's disease     Severe Crohn's disease of the left and transverse colon since age 16. Started on Union which an IV infusion biologic only on March 24. She should be getting her second dose 2 weeks after. For now we are increasing her dose of steroids to Solumedrol 25 mg Iv bid. In my opinion the surgical option is not the best option under the circumstances, we should give the Entyvio a second chance. From endoscopic point of view I don't think that endoscopic hemostasis is a good choice for her because of the severity and the extent of her disease. Hypercoagulable state with bilateral DVT and embolic CVA on  March 10, 2010. She was started on Coumadin and Lovenox in my opinion is most likely secondary to the severe aggressive Crohn's disease.  Her INR 1 after the Vit K and >3 FFP resume coumadin tomorrow         Joseph Siddiqui MD  March 30, 2017

## 2017-03-31 NOTE — DIABETES MGMT
GLYCEMIC CONTROL & NUTRITION:    - Discussed in rounds, no known h/o DM , last A1C 5.7%, meets criteria for pre-DM  - noted steroids on board, recommend continue POCT + Humalog per protocol  - BG within target range    Recent Glucose Results:   Lab Results   Component Value Date/Time     (H) 03/31/2017 04:30 AM    GLUCPOC 106 03/31/2017 07:21 AM    GLUCPOC 120 (H) 03/30/2017 04:00 PM    GLUCPOC 112 (H) 03/30/2017 11:18 AM               Chato Harding, MPH, RD

## 2017-03-31 NOTE — PROGRESS NOTES
Bedside and Verbal shift change report received from FRANCIS Ojeda (offgoing nurse). Report included the following information SBAR, Kardex, Intake/Output, MAR and Recent Results. Alarm parameters reviewed and opportunities for questions provided.      1945 Shift assessment completed,see EMR    0000 Reassessment completed, see EMR    0400 Reassessment completed, see EMR

## 2017-04-01 LAB
ANION GAP BLD CALC-SCNC: 7 MMOL/L (ref 3–18)
BUN SERPL-MCNC: 12 MG/DL (ref 7–18)
BUN/CREAT SERPL: 18 (ref 12–20)
CALCIUM SERPL-MCNC: 8 MG/DL (ref 8.5–10.1)
CHLORIDE SERPL-SCNC: 108 MMOL/L (ref 100–108)
CO2 SERPL-SCNC: 29 MMOL/L (ref 21–32)
CREAT SERPL-MCNC: 0.68 MG/DL (ref 0.6–1.3)
ERYTHROCYTE [DISTWIDTH] IN BLOOD BY AUTOMATED COUNT: 16.4 % (ref 11.6–14.5)
GLUCOSE BLD STRIP.AUTO-MCNC: 104 MG/DL (ref 70–110)
GLUCOSE BLD STRIP.AUTO-MCNC: 106 MG/DL (ref 70–110)
GLUCOSE BLD STRIP.AUTO-MCNC: 110 MG/DL (ref 70–110)
GLUCOSE BLD STRIP.AUTO-MCNC: 114 MG/DL (ref 70–110)
GLUCOSE BLD STRIP.AUTO-MCNC: 124 MG/DL (ref 70–110)
GLUCOSE SERPL-MCNC: 104 MG/DL (ref 74–99)
HCT VFR BLD AUTO: 28 % (ref 35–45)
HGB BLD-MCNC: 9.2 G/DL (ref 12–16)
INR PPP: 1 (ref 0.8–1.2)
MCH RBC QN AUTO: 29.1 PG (ref 24–34)
MCHC RBC AUTO-ENTMCNC: 32.9 G/DL (ref 31–37)
MCV RBC AUTO: 88.6 FL (ref 74–97)
PLATELET # BLD AUTO: 189 K/UL (ref 135–420)
PMV BLD AUTO: 8.9 FL (ref 9.2–11.8)
POTASSIUM SERPL-SCNC: 3.8 MMOL/L (ref 3.5–5.5)
PROTHROMBIN TIME: 13.1 SEC (ref 11.5–15.2)
RBC # BLD AUTO: 3.16 M/UL (ref 4.2–5.3)
SODIUM SERPL-SCNC: 144 MMOL/L (ref 136–145)
WBC # BLD AUTO: 13.1 K/UL (ref 4.6–13.2)

## 2017-04-01 PROCEDURE — 74011000250 HC RX REV CODE- 250: Performed by: INTERNAL MEDICINE

## 2017-04-01 PROCEDURE — 74011250636 HC RX REV CODE- 250/636: Performed by: FAMILY MEDICINE

## 2017-04-01 PROCEDURE — 65660000000 HC RM CCU STEPDOWN

## 2017-04-01 PROCEDURE — 36415 COLL VENOUS BLD VENIPUNCTURE: CPT | Performed by: INTERNAL MEDICINE

## 2017-04-01 PROCEDURE — 82962 GLUCOSE BLOOD TEST: CPT

## 2017-04-01 PROCEDURE — 74011250637 HC RX REV CODE- 250/637: Performed by: FAMILY MEDICINE

## 2017-04-01 PROCEDURE — 74011250637 HC RX REV CODE- 250/637: Performed by: INTERNAL MEDICINE

## 2017-04-01 PROCEDURE — 85610 PROTHROMBIN TIME: CPT | Performed by: INTERNAL MEDICINE

## 2017-04-01 PROCEDURE — 85027 COMPLETE CBC AUTOMATED: CPT | Performed by: INTERNAL MEDICINE

## 2017-04-01 PROCEDURE — 74011250637 HC RX REV CODE- 250/637: Performed by: HOSPITALIST

## 2017-04-01 PROCEDURE — 74011250636 HC RX REV CODE- 250/636: Performed by: INTERNAL MEDICINE

## 2017-04-01 PROCEDURE — 80048 BASIC METABOLIC PNL TOTAL CA: CPT | Performed by: INTERNAL MEDICINE

## 2017-04-01 PROCEDURE — C9113 INJ PANTOPRAZOLE SODIUM, VIA: HCPCS | Performed by: INTERNAL MEDICINE

## 2017-04-01 RX ORDER — WARFARIN 6 MG/1
6 TABLET ORAL ONCE
Status: COMPLETED | OUTPATIENT
Start: 2017-04-01 | End: 2017-04-01

## 2017-04-01 RX ADMIN — Medication 2 MG: at 07:17

## 2017-04-01 RX ADMIN — ATORVASTATIN CALCIUM 80 MG: 20 TABLET, FILM COATED ORAL at 22:09

## 2017-04-01 RX ADMIN — SODIUM CHLORIDE 40 MG: 9 INJECTION INTRAMUSCULAR; INTRAVENOUS; SUBCUTANEOUS at 09:03

## 2017-04-01 RX ADMIN — MESALAMINE 1000 MG: 250 CAPSULE ORAL at 14:06

## 2017-04-01 RX ADMIN — SODIUM CHLORIDE 40 MG: 9 INJECTION INTRAMUSCULAR; INTRAVENOUS; SUBCUTANEOUS at 22:08

## 2017-04-01 RX ADMIN — MESALAMINE 1000 MG: 250 CAPSULE ORAL at 17:55

## 2017-04-01 RX ADMIN — MESALAMINE 1000 MG: 250 CAPSULE ORAL at 10:44

## 2017-04-01 RX ADMIN — MESALAMINE 1000 MG: 250 CAPSULE ORAL at 22:09

## 2017-04-01 RX ADMIN — OXYCODONE HYDROCHLORIDE AND ACETAMINOPHEN 1 TABLET: 5; 325 TABLET ORAL at 22:09

## 2017-04-01 RX ADMIN — GABAPENTIN 200 MG: 100 CAPSULE ORAL at 09:03

## 2017-04-01 RX ADMIN — Medication 2 MG: at 04:11

## 2017-04-01 RX ADMIN — Medication 2 MG: at 20:05

## 2017-04-01 RX ADMIN — METHYLPREDNISOLONE SODIUM SUCCINATE 25 MG: 40 INJECTION, POWDER, FOR SOLUTION INTRAMUSCULAR; INTRAVENOUS at 04:10

## 2017-04-01 RX ADMIN — OXYCODONE HYDROCHLORIDE AND ACETAMINOPHEN 1 TABLET: 5; 325 TABLET ORAL at 16:18

## 2017-04-01 RX ADMIN — ENOXAPARIN SODIUM 60 MG: 60 INJECTION SUBCUTANEOUS at 06:31

## 2017-04-01 RX ADMIN — CARVEDILOL 3.12 MG: 3.12 TABLET, FILM COATED ORAL at 17:55

## 2017-04-01 RX ADMIN — Medication 2 MG: at 00:01

## 2017-04-01 RX ADMIN — Medication 2 MG: at 10:43

## 2017-04-01 RX ADMIN — GABAPENTIN 200 MG: 100 CAPSULE ORAL at 16:18

## 2017-04-01 RX ADMIN — MULTIPLE VITAMINS W/ MINERALS TAB 1 TABLET: TAB at 09:03

## 2017-04-01 RX ADMIN — DOCUSATE SODIUM 100 MG: 100 CAPSULE, LIQUID FILLED ORAL at 22:10

## 2017-04-01 RX ADMIN — DOCUSATE SODIUM 100 MG: 100 CAPSULE, LIQUID FILLED ORAL at 09:03

## 2017-04-01 RX ADMIN — CARVEDILOL 3.12 MG: 3.12 TABLET, FILM COATED ORAL at 09:03

## 2017-04-01 RX ADMIN — METHYLPREDNISOLONE SODIUM SUCCINATE 25 MG: 40 INJECTION, POWDER, FOR SOLUTION INTRAMUSCULAR; INTRAVENOUS at 14:06

## 2017-04-01 RX ADMIN — GABAPENTIN 200 MG: 100 CAPSULE ORAL at 22:09

## 2017-04-01 RX ADMIN — WARFARIN SODIUM 6 MG: 6 TABLET ORAL at 17:55

## 2017-04-01 RX ADMIN — ENOXAPARIN SODIUM 60 MG: 60 INJECTION SUBCUTANEOUS at 17:55

## 2017-04-01 NOTE — PROGRESS NOTES
Hospitalist Progress Note    Patient: Alen Pedraza MRN: [id number]  CSN: [id number]    YOB: 1970  Age: 40 y.o.   Sex: female    DOA: 3/27/2017 LOS:  LOS: 5 days          Chief Complaint:    Anemia      Assessment/Plan     Hospital Problems  Date Reviewed: 3/15/2017          Codes Class Noted POA    * (Principal)GI bleed ICD-10-CM: K92.2  ICD-9-CM: 578.9  3/28/2017 Yes        Elevated WBC count ICD-10-CM: D72.829  ICD-9-CM: 288.60  3/28/2017 Yes        DVT (deep venous thrombosis) ([facility]) ICD-10-CM: I82.409  ICD-9-CM: 453.40  3/16/2017 Yes        Acute blood loss anemia ICD-10-CM: D62  ICD-9-CM: 285.1  3/15/2017 Yes        Neuropathic pain ICD-10-CM: M79.2  ICD-9-CM: 729.2  0/67/5437 Yes        Embolic stroke [facility]) PGD-52-HU: I63.9  ICD-9-CM: 434.11  3/10/2017 Yes        Crohn disease ([facility] ICD-10-CM: K50.90  ICD-9-CM: 555.9  2/27/2012 Yes            DVT  - Anticoagulation resumed  - no IVC filter  - Coumadin + Lovenox bridge  - monitor for bleeding  - H/H stable; daily CBC  - Pharmacy to dose coumadin    Continue aggressive crohns tx to avoid surgery on bleeding segment of bowel  - IV steroids  - Mesalamine 1000mg QID  - protonix 40mg IV q12h  - colorectal surgery signed off    BP control improved  - coreg 3.125mg PO BID     Dispo: pending stable H/H and bridge to coumadin; home +/- w/ home health    Subjective:    Transferred out of ICU early this AM.   AFVSS; no acute events overnight; no complaints    Review of systems:    Constitutional: denies fevers, chills, myalgias  Respiratory: denies SOB, cough  Cardiovascular: denies chest pain, palpitations  Gastrointestinal: denies nausea, vomiting, diarrhea      Vital signs/Intake and Output:  Visit Vitals    /89 (BP 1 Location: Right arm, BP Patient Position: At rest)    Pulse 75    Temp 98.3 °F (36.8 °C)    Resp 13    Ht 5' 6\" (1.676 m)    Wt 89.2 kg (196 lb 10.4 oz)    SpO2 100%    Breastfeeding No    BMI 31.74 kg/m2     Current Shift:  04/01 0701 - 04/01 1900  In: 240 [P.O.:240]  Out: -   Last three shifts:  03/30 1901 - 04/01 0700  In: -   Out: 2275 [Urine:2275]    Exam:    General: Well developed, alert, NAD, OX3  Head/Neck: NCAT, supple, No masses, No lymphadenopathy  CVS:Regular rate and rhythm, no M/R/G, S1/S2 heard, no thrill  Lungs:Clear to auscultation bilaterally, no wheezes, rhonchi, or rales  Abdomen: Soft, Nontender, No distention, Normal Bowel sounds, No hepatomegaly  Extremities: No C/C/E, pulses palpable 2+  Skin:normal texture and turgor, no rashes, no lesions  Neuro:grossly normal , follows commands  Psych:appropriate                Labs: Results:       Chemistry Recent Labs      04/01/17 0600 03/31/17 0430 03/30/17 0445   GLU  104*  102*  125*   NA  144  145  142   K  3.8  3.6  3.6   CL  108  110*  109*   CO2  29  29  27   BUN  12  8  15   CREA  0.68  0.56*  0.60   CA  8.0*  7.4*  7.2*   AGAP  7  6  6   BUCR  18  14  25*   AP   --    --   31*   TP   --    --   4.2*   ALB   --    --   2.0*   GLOB   --    --   2.2   AGRAT   --    --   0.9      CBC w/Diff Recent Labs      04/01/17 0600 03/31/17   0945  03/31/17 0430 03/30/17 0445   WBC  13.1   --   9.3  10.2   RBC  3.16*   --   2.94*  2.98*   HGB  9.2*  9.2*  8.5*  8.7*   HCT  28.0*  27.3*  25.6*  25.1*   PLT  189   --   149  117*   GRANS   --    --    --   69   LYMPH   --    --    --   17*   EOS   --    --    --   1      Cardiac Enzymes No results for input(s): CPK, CKND1, CHANDA in the last 72 hours.     No lab exists for component: CKRMB, TROIP   Coagulation Recent Labs      04/01/17   0600  03/31/17   0430   PTP  13.1  12.6   INR  1.0  1.0       Lipid Panel Lab Results   Component Value Date/Time    Cholesterol, total 145 03/12/2017 06:15 AM    HDL Cholesterol 37 03/12/2017 06:15 AM    LDL, calculated 91.6 03/12/2017 06:15 AM    VLDL, calculated 16.4 03/12/2017 06:15 AM    Triglyceride 82 03/12/2017 06:15 AM    CHOL/HDL Ratio 3.9 03/12/2017 06:15 AM      BNP No results for input(s): BNPP in the last 72 hours.    Liver Enzymes Recent Labs      03/30/17   0445   TP  4.2*   ALB  2.0*   AP  31*   SGOT  12*      Thyroid Studies Lab Results   Component Value Date/Time    TSH 1.51 12/12/2009 09:48 AM        Procedures/imaging: see electronic medical records for all procedures/Xrays and details which were not copied into this note but were reviewed prior to creation of [id number], DO

## 2017-04-01 NOTE — ROUTINE PROCESS
TRANSFER - IN REPORT:    Verbal report received from  NAOMIE Guthrie RN (name) on [facility]  being received from  ICU (unit) for routine progression of care      Report consisted of patients Situation, Background, Assessment and   Recommendations(SBAR). Information from the following report(s) SBAR, Kardex, Recent Results and Med Rec Status was reviewed with the receiving nurse. Opportunity for questions and clarification was provided. Assessment completed upon patients arrival to unit and care assumed.

## 2017-04-01 NOTE — ROUTINE PROCESS
Walking rounds with off shift nurse. Pt awake, alert. Calm and pleasant. NO ISSUES VERBALIZED. R upper chest with triple lumen patent/ dry. Waiting for BF tray. Will monitor. 1100 am- up to BR with assist. unsteady gait. . C/o pain see pain scale. r foot / hx of DVT. 1143 am . Resting quietly in bed. R leg elevated up on 2 pillow. All needs met. 1620 pm Bedside and Verbal shift change report given to Donald Grant RN (oncoming nurse) by Vera Denson RN   (offgoing nurse).  Report included the following information SBAR, Kardex, Intake/Output, MAR, Recent Results, Med Rec Status and Cardiac Rhythm SR.

## 2017-04-01 NOTE — PROGRESS NOTES
Colorectal Surgery Consult    Subjective:     Patient is a 40 y.o.  female who is being seen with bloody diarrhea and crohn's. Onset of symptoms was abrupt with rapidly worsening course since that time. Patient also notes associated with hematochezia and abdominal pain. Symptoms improve with narcotics. Symptoms worsen with none. There is history of inflammatory bowel disease and recent stroke with initiation of blood thinners. There is no history of past abdominal surgery. Past history includes inflammatory bowel disease crohn's. Previous studies include colonoscopy showing crohn's in left colon and nuclear scan showing bleeding in left/spleen. Has had multiple transfusions of PRBCs and FFP. NO bleeding events past 24 hrs. Pt terrified of repeat stroke. Patient Active Problem List    Diagnosis Date Noted    GI bleed 03/28/2017    Elevated WBC count 03/28/2017    D[facility] thrombosis) ([facility]) 03/16/2017    Acute blood loss anemia 03/15/2017    Neuropathic pain 51/64/1140    Embolic stroke ([facility]) 70/93/9345    Sickle cell trait ([facility])     Hypertension     Hx of cocaine abuse     Chronic pain syndrome 02/27/2012    Crohn disease ([facility] 02/27/2012     Past Medical History:   Diagnosis Date    Abdominal pain     Anemia NEC     sickle cell trait    C. difficile colitis     Crohn's disease ([address]     Gastrointestinal disorder     Crohns    Herpes zoster     Hx of cocaine abuse     Hyperkalemia     Hypertension     Iron deficiency anemia     MRSA (methicillin resistant Staphylococcus aureus)     Obesity     Pain management     Sickle cell trait ([address]     Stroke ([facility]     + cva 3/17      Past Surgical History:   Procedure Laterality Date    COLONOSCOPY N/A 3/17/2017    COLONOSCOPY; BIOPSY; performed by Bharathi Palomares MD at [facility] ENDOSCOPY    HX GYN      tubal ligation    HX TUBAL LIGATION       History reviewed. No pertinent family history.    Social History Social History    Marital status:      Spouse name: N/A    Number of children: N/A    Years of education: N/A     Social History Main Topics    Smoking status: Former Smoker    Smokeless tobacco: None    Alcohol use No    Drug use:  Yes    Sexual activity: Yes     Partners: Male     Birth control/ protection: Surgical     Other Topics Concern    None     Social History Narrative       Current Facility-Administered Medications   Medication Dose Route Frequency Provider Last Rate Last Dose    gabapentin (NEURONTIN) capsule 200 mg  200 mg Oral TID Anna Fitzgerald MD   200 mg at 03/31/17 1508    atorvastatin (LIPITOR) tablet 80 mg  80 mg Oral QHS Anna Fitzgerald MD        carvedilol (COREG) tablet 3.125 mg  3.125 mg Oral BID WITH MEALS Anna Fitzgerald MD   3.125 mg at 03/31/17 1719    enoxaparin (LOVENOX) injection 60 mg  60 mg SubCUTAneous Q12H Mike Gauthier MD   60 mg at 03/31/17 1719    [START ON 4/1/2017] multivitamin, tx-iron-ca-min (THERA-M w/ IRON) tablet 1 Tab  1 Tab Oral DAILY Adela Venegas DO        insulin lispro (HUMALOG) injection   SubCUTAneous AC&HS Mike Gauthier MD   Stopped at 03/30/17 1130    glucose chewable tablet 16 g  4 Tab Oral PRN Mike Gauthier MD        glucagon (GLUCAGEN) injection 1 mg  1 mg IntraMUSCular PRN Mike Gauthier MD        dextrose (D50W) injection syrg 12.5-25 g  25-50 mL IntraVENous PRN Mike Gauthier MD        docusate sodium (COLACE) capsule 100 mg  100 mg Oral BID Magdiel Fernandez MD   100 mg at 03/31/17 2032    Warfarin- Rx to Dose & Monitor  1 Each Other Rx Dosing/Monitoring Mike Gauthier MD        morphine injection 2 mg  2 mg IntraVENous Q3H PRN Azael Clay MD   2 mg at 03/31/17 2037    0.9% sodium chloride infusion 250 mL  250 mL IntraVENous PRN Mike Gauthier MD        mesalamine (PENTASA) CR capsule 1,000 mg  1,000 mg Oral QID Azael Clay MD   1,000 mg at 03/31/17 [facility] (PERCOCET) 5-325 mg per tablet 1 Tab  1 Tab Oral Q4H PRN Angel Monteiro MD        acetaminophen (TYLENOL) tablet 650 mg  650 mg Oral Q4H PRN Angel Monteiro MD        diphenhydrAMINE (BENADRYL) injection 12.5 mg  12.5 mg IntraVENous Q4H PRN Angel Monteiro MD        ondansetron [facility] injection 4 mg  4 mg IntraVENous Q6H PRN Angel Monteiro MD   4 mg at 03/30/17 0101    0.9% sodium chloride infusion 250 mL  250 mL IntraVENous PRN Rhianna Backers, DO        0.9% sodium chloride infusion 250 mL  250 mL IntraVENous PRN Rhianna Backers, DO        pantoprazole (PROTONIX) 40 mg in sodium chloride 0.9 % 10 mL injection  40 mg IntraVENous Q12H Rhianna Backers, DO   40 mg at 03/31/17 2033    methylPREDNISolone (PF) (SOLU-MEDROL) injection 25 mg  25 mg IntraVENous Q12H Yin So MD   25 mg at 03/31/17 1508    0.9% sodium chloride infusion 250 mL  250 mL IntraVENous PRN Yin So MD        0.9% sodium chloride infusion 250 mL  250 mL IntraVENous PRN Rhianna Backers, DO        0.9% sodium chloride infusion 250 mL  250 mL IntraVENous PRN Governor Toan MD   Stopped at 03/28/17 1945     Facility-Administered Medications Ordered in Other Encounters   Medication Dose Route Frequency Provider Last Rate Last Dose    [START ON 4/7/2017] vedolizumab (ENTYVIO) 300 mg in 0.9% sodium chloride 250 mL infusion  300 mg IntraVENous ONCE Yin So MD             Allergies   Allergen Reactions    Humira [Adalimumab] Other (comments)    Remicade [Infliximab] Palpitations        Review of Systems:  Constitutional: negative for anorexia and weight loss  Eyes: negative for glaucoma, visual disturbance and icterus  Ears, Nose, Mouth, Throat, and Face: negative for ear drainage, nasal congestion and facial trauma  Respiratory: negative for hemoptysis or emphysema  Cardiovascular: negative for chest pain, irregular heart beats, claudication  Gastrointestinal: negative for dysphagia, reflux symptoms and jaundice  Genitourinary:negative for nocturia, urinary incontinence, hesitancy and decreased stream  Hematologic/Lymphatic: negative for lymphadenopathy, petechiae and coughing up blood  Musculoskeletal:negative for myalgias, stiff joints and bone pain  Neurological: negative for headaches and seizures  Behavioral/Psychiatric: negative for ADHD, anorexia and bipolar  Endocrine: negative for fertility problems and temperature intolerance  Allergic/Immunologic: negative for urticaria, angioedema and anaphylaxis     Objective:     Patient Vitals for the past 8 hrs:   BP Temp Pulse Resp SpO2   03/31/17 2000 125/80 - 84 17 100 %   03/31/17 1942 - 98.4 °F (36.9 °C) - - -   03/31/17 1900 (!) 127/97 - 91 - -   03/31/17 1800 134/85 - 86 16 99 %   03/31/17 1719 (!) 124/91 - 90 - -   03/31/17 1718 (!) 124/91 - 91 17 100 %   03/31/17 1624 137/88 98.6 °F (37 °C) 88 17 99 %   03/31/17 1600 137/88 - 82 16 100 %   03/31/17 1508 139/82 - 78 18 100 %   03/31/17 1500 - - 74 17 100 %   03/31/17 1400 - - 84 18 100 %       Physical Exam:   Visit Vitals    /80    Pulse 84    Temp 98.4 °F (36.9 °C)    Resp 17    Ht 5' 6\" (1.676 m)    Wt 89.2 kg (196 lb 10.4 oz)    LMP 03/06/2017    SpO2 100%    Breastfeeding No    BMI 31.74 kg/m2     General:  Alert, cooperative, no distress, appears stated age. Head:  Normocephalic, without obvious abnormality, atraumatic. Eyes:  Conjunctivae/corneas clear. PERRL, EOMs intact. Fundi benign. Ears:  Normal TMs and external ear canals both ears. Nose: Nares normal. Septum midline. Mucosa normal. No drainage or sinus tenderness. Throat: Lips, mucosa, and tongue normal. Teeth and gums normal.   Neck: Supple, symmetrical, trachea midline, no adenopathy, thyroid: no enlargement/tenderness/nodules, no carotid bruit and no JVD. Back:   Symmetric, no curvature. ROM normal. No CVA tenderness. Lungs:   Clear to auscultation bilaterally.    Chest wall:  No tenderness or deformity. Heart:  Regular rate and rhythm, S1, S2 normal, no murmur, click, rub or gallop. Breast Exam:  No tenderness, masses, or nipple abnormality. Abdomen:   Soft, non-tender. Bowel sounds normal. No masses,  No organomegaly. Genitalia:  Normal female without lesion, discharge or tenderness. Rectal:  Normal tone,  no masses or tenderness  Guaiac negative stool. Extremities: Extremities normal, atraumatic, no cyanosis or edema. Pulses: 2+ and symmetric all extremities. Skin: Skin color, texture, turgor normal. No rashes or lesions. Lymph nodes: Cervical, supraclavicular, and axillary nodes normal.   Neurologic: CNII-XII intact. Normal strength, sensation and reflexes throughout.        Lab/Data Review:  Recent Results (from the past 24 hour(s))   PROTHROMBIN TIME + INR    Collection Time: 03/31/17  4:30 AM   Result Value Ref Range    Prothrombin time 12.6 11.5 - 15.2 sec    INR 1.0 0.8 - 1.2     METABOLIC PANEL, BASIC    Collection Time: 03/31/17  4:30 AM   Result Value Ref Range    Sodium 145 136 - 145 mmol/L    Potassium 3.6 3.5 - 5.5 mmol/L    Chloride 110 (H) 100 - 108 mmol/L    CO2 29 21 - 32 mmol/L    Anion gap 6 3.0 - 18 mmol/L    Glucose 102 (H) 74 - 99 mg/dL    BUN 8 7.0 - 18 MG/DL    Creatinine 0.56 (L) 0.6 - 1.3 MG/DL    BUN/Creatinine ratio 14 12 - 20      GFR est AA >60 >60 ml/min/1.73m2    GFR est non-AA >60 >60 ml/min/1.73m2    Calcium 7.4 (L) 8.5 - 10.1 MG/DL   CBC W/O DIFF    Collection Time: 03/31/17  4:30 AM   Result Value Ref Range    WBC 9.3 4.6 - 13.2 K/uL    RBC 2.94 (L) 4.20 - 5.30 M/uL    HGB 8.5 (L) 12.0 - 16.0 g/dL    HCT 25.6 (L) 35.0 - 45.0 %    MCV 87.1 74.0 - 97.0 FL    MCH 28.9 24.0 - 34.0 PG    MCHC 33.2 31.0 - 37.0 g/dL    RDW 16.5 (H) 11.6 - 14.5 %    PLATELET 532 949 - 175 K/uL    MPV 8.8 (L) 9.2 - 11.8 FL   GLUCOSE, POC    Collection Time: 03/31/17  7:21 AM   Result Value Ref Range    Glucose (POC) 106 70 - 110 mg/dL   HGB & HCT    Collection Time: 03/31/17  9:45 AM   Result Value Ref Range    HGB 9.2 (L) 12.0 - 16.0 g/dL    HCT 27.3 (L) 35.0 - 45.0 %   GLUCOSE, POC    Collection Time: 03/31/17 11:44 AM   Result Value Ref Range    Glucose (POC) 106 70 - 110 mg/dL   GLUCOSE, POC    Collection Time: 03/31/17  4:21 PM   Result Value Ref Range    Glucose (POC) 135 (H) 70 - 110 mg/dL   GLUCOSE, POC    Collection Time: 03/31/17  9:31 PM   Result Value Ref Range    Glucose (POC) 139 (H) 70 - 110 mg/dL         Assessment:     Principal Problem:    GI bleed (3/28/2017)    Active Problems:    Crohn disease ([facility] (2/73/8838)      Embolic stroke ([facility]) (5/19/4803)      Acute blood loss anemia (3/15/2017)      Neuropathic pain (3/15/2017)      DVT (deep venous thrombosis) ([facility] (3/16/2017)      Elevated WBC count (3/28/2017)        Plan:   No bleeding. Pt re-starting anticoagulation due to high risk of stroke and Crohn's being managed by GI. No surgical intervention at this time. Surgery signing off. Call if change in status.     Signed By: Bel Barros DO     March 31, 2017

## 2017-04-01 NOTE — PROGRESS NOTES
Pharmacy Dosing Services:  Warfarin    Consult for Warfarin Dosing by Pharmacy by Dr. Francesco Harrell provided for this 40 y.o.  female , for indication of DVT Prevention. Dose to achieve an INR goal of 2-3    Order entered for  Warfarin  6 (mg) ordered to be given today at 18:00. Patient also on Lovenox 60 mg SQ q12hrs ( Adjusted Treatment dose due to Bleed - Dr. Ophelia Tucker )    LABS    PT/INR Lab Results   Component Value Date/Time    INR 1.0 04/01/2017 06:00 AM      Platelets Lab Results   Component Value Date/Time    PLATELET 488 57/65/8890 06:00 AM      H/H     Lab Results   Component Value Date/Time    HGB 9.2 04/01/2017 06:00 AM        Warfarin Administrations (last 168 hours)     Date/Time Action Medication Dose    03/31/17 1719 Given    warfarin (COUMADIN) tablet 5 mg 5 mg    03/30/17 1847 Given    warfarin (COUMADIN) tablet 5 mg 5 mg          Pharmacy to follow daily and will provide subsequent Warfarin dosing based on clinical status.   Gini Jama [facility]  Contact information   [phone]

## 2017-04-01 NOTE — PROGRESS NOTES
Arrived on unit earlier. Complains of right foot and leg pain from stroke/ neuropathy and requesting her ordered medication. Skin warm and dry. Instructed not to get out of bed alone but to call for assistance. TLC in place to right subclavian area.

## 2017-04-01 NOTE — ROUTINE PROCESS
Bedside and Verbal shift change report given to FAYE Arndt RN  (oncoming nurse) by  FAYE Vernon RN  (offgoing nurse). Report included the following information SBAR, Kardex, Recent Results and Med Rec Status.

## 2017-04-01 NOTE — ROUTINE PROCESS
TRANSFER - OUT REPORT:    Verbal report given to FRANCIS Vernon(name) on Mansi Kendrick  being transferred to medical (unit)/ remote telemetry for routine progression of care       Report consisted of patients Situation, Background, Assessment and   Recommendations(SBAR). Information from the following report(s) SBAR, Kardex, Intake/Output, MAR and Recent Results was reviewed with the receiving nurse. Lines:   Triple Lumen 03/28/17 Right Subclavian (Active)   Central Line Being Utilized Yes 4/1/2017  4:00 AM   Criteria for Appropriate Use Limited/no vessel suitable for conventional peripheral access 4/1/2017  4:00 AM   Site Assessment Clean, dry, & intact 4/1/2017  4:00 AM   Infiltration Assessment 0 4/1/2017  4:00 AM   Affected Extremity/Extremities Color distal to insertion site pink (or appropriate for race) 4/1/2017  4:00 AM   Date of Last Dressing Change 03/28/17 4/1/2017  4:00 AM   Dressing Status Clean, dry, & intact 4/1/2017  4:00 AM   Dressing Type Disk with Chlorhexadine gluconate (CHG); Tape;Transparent 4/1/2017  4:00 AM   Action Taken Open ports on tubing capped 4/1/2017  4:00 AM   Proximal Hub Color/Line Status Capped;Flushed 4/1/2017  4:00 AM   Positive Blood Return (Medial Site) Yes 4/1/2017  4:00 AM   Medial Hub Color/Line Status Capped;Flushed 4/1/2017  4:00 AM   Positive Blood Return (Lateral Site) Yes 4/1/2017  4:00 AM   Distal Hub Color/Line Status Capped;Flushed 4/1/2017  4:00 AM   Positive Blood Return (Site #3) Yes 4/1/2017  4:00 AM   Alcohol Cap Used Yes 4/1/2017  4:00 AM        Opportunity for questions and clarification was provided.       Patient transported with:   Monitor  Registered Nurse

## 2017-04-02 ENCOUNTER — APPOINTMENT (OUTPATIENT)
Dept: GENERAL RADIOLOGY | Age: 47
DRG: 950 | End: 2017-04-02
Attending: INTERNAL MEDICINE
Payer: MEDICAID

## 2017-04-02 LAB
ANION GAP BLD CALC-SCNC: 7 MMOL/L (ref 3–18)
ATRIAL RATE: 111 BPM
BUN SERPL-MCNC: 13 MG/DL (ref 7–18)
BUN/CREAT SERPL: 16 (ref 12–20)
CALCIUM SERPL-MCNC: 7.7 MG/DL (ref 8.5–10.1)
CALCULATED P AXIS, ECG09: 27 DEGREES
CALCULATED R AXIS, ECG10: -2 DEGREES
CALCULATED T AXIS, ECG11: 40 DEGREES
CHLORIDE SERPL-SCNC: 106 MMOL/L (ref 100–108)
CO2 SERPL-SCNC: 30 MMOL/L (ref 21–32)
CREAT SERPL-MCNC: 0.82 MG/DL (ref 0.6–1.3)
DIAGNOSIS, 93000: NORMAL
ERYTHROCYTE [DISTWIDTH] IN BLOOD BY AUTOMATED COUNT: 16.2 % (ref 11.6–14.5)
GLUCOSE BLD STRIP.AUTO-MCNC: 116 MG/DL (ref 70–110)
GLUCOSE BLD STRIP.AUTO-MCNC: 123 MG/DL (ref 70–110)
GLUCOSE BLD STRIP.AUTO-MCNC: 124 MG/DL (ref 70–110)
GLUCOSE SERPL-MCNC: 130 MG/DL (ref 74–99)
HCT VFR BLD AUTO: 26.5 % (ref 35–45)
HGB BLD-MCNC: 8.7 G/DL (ref 12–16)
INR PPP: 1.1 (ref 0.8–1.2)
MCH RBC QN AUTO: 29.3 PG (ref 24–34)
MCHC RBC AUTO-ENTMCNC: 32.8 G/DL (ref 31–37)
MCV RBC AUTO: 89.2 FL (ref 74–97)
P-R INTERVAL, ECG05: 134 MS
PLATELET # BLD AUTO: 224 K/UL (ref 135–420)
PMV BLD AUTO: 9.2 FL (ref 9.2–11.8)
POTASSIUM SERPL-SCNC: 3.7 MMOL/L (ref 3.5–5.5)
PROTHROMBIN TIME: 13.8 SEC (ref 11.5–15.2)
Q-T INTERVAL, ECG07: 352 MS
QRS DURATION, ECG06: 80 MS
QTC CALCULATION (BEZET), ECG08: 478 MS
RBC # BLD AUTO: 2.97 M/UL (ref 4.2–5.3)
SODIUM SERPL-SCNC: 143 MMOL/L (ref 136–145)
VENTRICULAR RATE, ECG03: 111 BPM
WBC # BLD AUTO: 15.6 K/UL (ref 4.6–13.2)

## 2017-04-02 PROCEDURE — 74011250637 HC RX REV CODE- 250/637: Performed by: INTERNAL MEDICINE

## 2017-04-02 PROCEDURE — 82962 GLUCOSE BLOOD TEST: CPT

## 2017-04-02 PROCEDURE — 74011250636 HC RX REV CODE- 250/636: Performed by: FAMILY MEDICINE

## 2017-04-02 PROCEDURE — 73630 X-RAY EXAM OF FOOT: CPT

## 2017-04-02 PROCEDURE — 74011250637 HC RX REV CODE- 250/637: Performed by: FAMILY MEDICINE

## 2017-04-02 PROCEDURE — 74011000250 HC RX REV CODE- 250: Performed by: INTERNAL MEDICINE

## 2017-04-02 PROCEDURE — 74011250636 HC RX REV CODE- 250/636: Performed by: INTERNAL MEDICINE

## 2017-04-02 PROCEDURE — 85610 PROTHROMBIN TIME: CPT | Performed by: INTERNAL MEDICINE

## 2017-04-02 PROCEDURE — 74011250637 HC RX REV CODE- 250/637: Performed by: HOSPITALIST

## 2017-04-02 PROCEDURE — 65660000000 HC RM CCU STEPDOWN

## 2017-04-02 PROCEDURE — C9113 INJ PANTOPRAZOLE SODIUM, VIA: HCPCS | Performed by: INTERNAL MEDICINE

## 2017-04-02 PROCEDURE — 85027 COMPLETE CBC AUTOMATED: CPT | Performed by: INTERNAL MEDICINE

## 2017-04-02 PROCEDURE — 80048 BASIC METABOLIC PNL TOTAL CA: CPT | Performed by: INTERNAL MEDICINE

## 2017-04-02 RX ORDER — PREDNISONE 20 MG/1
40 TABLET ORAL
Status: DISCONTINUED | OUTPATIENT
Start: 2017-04-03 | End: 2017-04-12

## 2017-04-02 RX ORDER — METHOTREXATE 25 MG/ML
25 INJECTION INTRA-ARTERIAL; INTRAMUSCULAR; INTRATHECAL; INTRAVENOUS ONCE
Status: COMPLETED | OUTPATIENT
Start: 2017-04-03 | End: 2017-04-03

## 2017-04-02 RX ADMIN — METHYLPREDNISOLONE SODIUM SUCCINATE 25 MG: 40 INJECTION, POWDER, FOR SOLUTION INTRAMUSCULAR; INTRAVENOUS at 03:56

## 2017-04-02 RX ADMIN — Medication 2 MG: at 01:13

## 2017-04-02 RX ADMIN — OXYCODONE HYDROCHLORIDE AND ACETAMINOPHEN 1 TABLET: 5; 325 TABLET ORAL at 18:35

## 2017-04-02 RX ADMIN — MULTIPLE VITAMINS W/ MINERALS TAB 1 TABLET: TAB at 09:19

## 2017-04-02 RX ADMIN — WARFARIN SODIUM 7 MG: 6 TABLET ORAL at 18:35

## 2017-04-02 RX ADMIN — ENOXAPARIN SODIUM 60 MG: 60 INJECTION SUBCUTANEOUS at 18:34

## 2017-04-02 RX ADMIN — OXYCODONE HYDROCHLORIDE AND ACETAMINOPHEN 1 TABLET: 5; 325 TABLET ORAL at 14:48

## 2017-04-02 RX ADMIN — Medication 2 MG: at 16:08

## 2017-04-02 RX ADMIN — MESALAMINE 1000 MG: 250 CAPSULE ORAL at 21:17

## 2017-04-02 RX ADMIN — OXYCODONE HYDROCHLORIDE AND ACETAMINOPHEN 1 TABLET: 5; 325 TABLET ORAL at 22:43

## 2017-04-02 RX ADMIN — Medication 2 MG: at 21:01

## 2017-04-02 RX ADMIN — MESALAMINE 1000 MG: 250 CAPSULE ORAL at 09:18

## 2017-04-02 RX ADMIN — MESALAMINE 1000 MG: 250 CAPSULE ORAL at 18:34

## 2017-04-02 RX ADMIN — ATORVASTATIN CALCIUM 80 MG: 20 TABLET, FILM COATED ORAL at 21:19

## 2017-04-02 RX ADMIN — SODIUM CHLORIDE 40 MG: 9 INJECTION INTRAMUSCULAR; INTRAVENOUS; SUBCUTANEOUS at 09:19

## 2017-04-02 RX ADMIN — GABAPENTIN 200 MG: 100 CAPSULE ORAL at 21:19

## 2017-04-02 RX ADMIN — DOCUSATE SODIUM 100 MG: 100 CAPSULE, LIQUID FILLED ORAL at 09:19

## 2017-04-02 RX ADMIN — OXYCODONE HYDROCHLORIDE AND ACETAMINOPHEN 1 TABLET: 5; 325 TABLET ORAL at 03:56

## 2017-04-02 RX ADMIN — Medication 2 MG: at 12:41

## 2017-04-02 RX ADMIN — METHYLPREDNISOLONE SODIUM SUCCINATE 25 MG: 40 INJECTION, POWDER, FOR SOLUTION INTRAMUSCULAR; INTRAVENOUS at 14:48

## 2017-04-02 RX ADMIN — ENOXAPARIN SODIUM 60 MG: 60 INJECTION SUBCUTANEOUS at 05:33

## 2017-04-02 RX ADMIN — CARVEDILOL 3.12 MG: 3.12 TABLET, FILM COATED ORAL at 16:08

## 2017-04-02 RX ADMIN — GABAPENTIN 200 MG: 100 CAPSULE ORAL at 16:08

## 2017-04-02 RX ADMIN — Medication 2 MG: at 09:26

## 2017-04-02 RX ADMIN — DOCUSATE SODIUM 100 MG: 100 CAPSULE, LIQUID FILLED ORAL at 21:01

## 2017-04-02 RX ADMIN — MESALAMINE 1000 MG: 250 CAPSULE ORAL at 12:41

## 2017-04-02 RX ADMIN — Medication 2 MG: at 05:34

## 2017-04-02 RX ADMIN — CARVEDILOL 3.12 MG: 3.12 TABLET, FILM COATED ORAL at 09:18

## 2017-04-02 RX ADMIN — GABAPENTIN 200 MG: 100 CAPSULE ORAL at 09:18

## 2017-04-02 RX ADMIN — OXYCODONE HYDROCHLORIDE AND ACETAMINOPHEN 1 TABLET: 5; 325 TABLET ORAL at 10:43

## 2017-04-02 NOTE — PROGRESS NOTES
Shift summary: uneventful; assumed care of pt. At 2330. Assisted pt. To the bathroom as needed. Scd's in use. Pain managed per MAR throughout the night. Call light within reach. Patient Vitals for the past 12 hrs:   Temp Pulse Resp BP SpO2   04/02/17 0413 98.1 °F (36.7 °C) 73 16 108/74 99 %   04/02/17 0050 - - - - 100 %   04/01/17 1930 97.5 °F (36.4 °C) 80 16 (!) 130/93 100 %         Bedside and Verbal shift change report given to Pamela Goodman RN (oncoming nurse) by Jennifer Patel   (offgoing nurse). Report included the following information SBAR, Kardex and MAR.

## 2017-04-02 NOTE — PROGRESS NOTES
1250  Pt. Requested dilaudid instead of morphine because she said morphine was not working. Dr. Chambers Marking informed and said no changes. Shift Summary  Pt. Complains pain medications are not managing her pain. Denies any nausea. Dressing for triple lumen changed today. appetite ok.

## 2017-04-02 NOTE — ROUTINE PROCESS
Bedside and Verbal shift change report given to SANYA Borges (oncoming nurse) by Rios Akers   (offgoing nurse). Report included the following information SBAR, Kardex, Procedure Summary, Intake/Output, MAR, Recent Results and Med Rec Status.

## 2017-04-02 NOTE — PROGRESS NOTES
Pharmacy Dosing Services:  Warfarin    Consult for Warfarin Dosing by Pharmacy by Dr. Kasey Reeder provided for this 40 y.o.  female , for indication of DVT Prevention. Dose to achieve an INR goal of 2-3    Order entered for  Warfarin  7 (mg) ordered to be given today at 18:00. Patient also on Lovenox 60 mg SQ q12hrs ( Adjusted Treatment Dose due to Alyssa Rose - Dr. Gilbert Otero )    LABS    PT/INR Lab Results   Component Value Date/Time    INR 1.1 04/02/2017 04:00 AM      Platelets Lab Results   Component Value Date/Time    PLATELET 673 85/61/8403 04:00 AM      H/H     Lab Results   Component Value Date/Time    HGB 8.7 04/02/2017 04:00 AM        Warfarin Administrations (last 168 hours)     Date/Time Action Medication Dose    04/01/17 1755 Given   [INR 1.0]    warfarin (COUMADIN) tablet 6 mg 6 mg    03/31/17 1719 Given    warfarin (COUMADIN) tablet 5 mg 5 mg    03/30/17 1847 Given    warfarin (COUMADIN) tablet 5 mg 5 mg          Pharmacy to follow daily and will provide subsequent Warfarin dosing based on clinical status.   Caren Garcia, [facility]  Contact information    [phone]

## 2017-04-02 NOTE — ROUTINE PROCESS
.Bedside and Verbal shift change report given to Alistair Lu (oncoming nurse) by Ramya Steele (offgoing nurse). Report included the following information SBAR, Kardex and MAR   .

## 2017-04-02 NOTE — PROGRESS NOTES
Gastrointestinal Progress Note    Patient Name: Janet HANDY Date: 4/2/2017    Admit Date: 3/27/2017    Subjective:     Diet: Patient is on softdiet and is tolerating. Nausea is not present. Vomiting is not present. Pain: Patient does not complain of abdominal pain.       Bowel Movements: Normal soft dark but no diarrhea or blood    Bleeding:  None   C/o right foot pain since admission for CVA 3/10/ 2017 with cold toes    Current Facility-Administered Medications   Medication Dose Route Frequency    warfarin (COUMADIN) tablet 7 mg  7 mg Oral ONCE    [START ON 4/3/2017] methotrexate (PF) chemo syringe 25 mg  25 mg SubCUTAneous ONCE    [START ON 4/3/2017] predniSONE (DELTASONE) tablet 40 mg  40 mg Oral DAILY WITH BREAKFAST    gabapentin (NEURONTIN) capsule 200 mg  200 mg Oral TID    atorvastatin (LIPITOR) tablet 80 mg  80 mg Oral QHS    carvedilol (COREG) tablet 3.125 mg  3.125 mg Oral BID WITH MEALS    enoxaparin (LOVENOX) injection 60 mg  60 mg SubCUTAneous Q12H    multivitamin, tx-iron-ca-min (THERA-M w/ IRON) tablet 1 Tab  1 Tab Oral DAILY    insulin lispro (HUMALOG) injection   SubCUTAneous AC&HS    glucose chewable tablet 16 g  4 Tab Oral PRN    glucagon (GLUCAGEN) injection 1 mg  1 mg IntraMUSCular PRN    dextrose (D50W) injection syrg 12.5-25 g  25-50 mL IntraVENous PRN    docusate sodium (COLACE) capsule 100 mg  100 mg Oral BID    Warfarin- Rx to Dose & Monitor  1 Each Other Rx Dosing/Monitoring    morphine injection 2 mg  2 mg IntraVENous Q3H PRN    0.9% sodium chloride infusion 250 mL  250 mL IntraVENous PRN    mesalamine (PENTASA) CR capsule 1,000 mg  1,000 mg Oral QID    oxyCODONE-acetaminophen (PERCOCET) 5-325 mg per tablet 1 Tab  1 Tab Oral Q4H PRN    acetaminophen (TYLENOL) tablet 650 mg  650 mg Oral Q4H PRN    diphenhydrAMINE (BENADRYL) injection 12.5 mg  12.5 mg IntraVENous Q4H PRN    ondansetron (ZOFRAN) injection 4 mg  4 mg IntraVENous Q6H PRN    0.9% sodium chloride infusion 250 mL  250 mL IntraVENous PRN    0.9% sodium chloride infusion 250 mL  250 mL IntraVENous PRN    0.9% sodium chloride infusion 250 mL  250 mL IntraVENous PRN    0.9% sodium chloride infusion 250 mL  250 mL IntraVENous PRN    0.9% sodium chloride infusion 250 mL  250 mL IntraVENous PRN     Facility-Administered Medications Ordered in Other Encounters   Medication Dose Route Frequency    [START ON 4/7/2017] vedolizumab (ENTYVIO) 300 mg in 0.9% sodium chloride 250 mL infusion  300 mg IntraVENous ONCE          Objective:     Visit Vitals    /79    Pulse 75    Temp 98.4 °F (36.9 °C)    Resp 16    Ht 5' 6\" (1.676 m)    Wt 89 kg (196 lb 3.2 oz)    SpO2 98%    Breastfeeding No    BMI 31.67 kg/m2       03/31 1901 - 04/02 0700  In: 7712 [P.O.:1290]  Out: 1025 [Urine:1025]    General appearance: alert, cooperative, no distress, appears stated age. Pale  Abdomen: soft,very mild tenderness in the suprapubic are. Bowel sounds normal. No masses,  no organomegaly  Extremities: extremities normal, atraumatic, no edema.  Tattoo's  Right foot cold no dorsal pulse cold toes purplish discoloration underneath the great toe raising the possibility of embolic event    Data Review:    Labs: Results:       Chemistry Recent Labs      04/02/17   0400  04/01/17   0600  03/31/17   0430   GLU  130*  104*  102*   NA  143  144  145   K  3.7  3.8  3.6   CL  106  108  110*   CO2  30  29  29   BUN  13  12  8   CREA  0.82  0.68  0.56*   CA  7.7*  8.0*  7.4*   AGAP  7  7  6   BUCR  16  18  14      CBC w/Diff Recent Labs      04/02/17   0400  04/01/17   0600  03/31/17   0945  03/31/17   0430   WBC  15.6*  13.1   --   9.3   RBC  2.97*  3.16*   --   2.94*   HGB  8.7*  9.2*  9.2*  8.5*   HCT  26.5*  28.0*  27.3*  25.6*   PLT  224  189   --   149      Coagulation Recent Labs      04/02/17   0400  04/01/17   0600   PTP  13.8  13.1   INR  1.1  1.0       Liver Enzymes No results for input(s): TP, ALB, TBIL, AP, SGOT, GPT in the last 72 hours. No lab exists for component: DBIL       Assessment:     Principal Problem:    GI bleed (3/28/2017)    Active Problems:    Crohn disease ([address] (8/74/0064)      Embolic stroke ([facility]) (1/20/8283)      Acute blood loss anemia (3/15/2017)      Neuropathic pain (3/15/2017)      DVT (deep venous thrombosis) ([facility]) (3/16/2017)      Elevated WBC count (3/28/2017)        Plan:     Severe lower GI bleeding while on Lovenox and Coumadin for recent DVT and embolic CVA her Hgb went down to 6.5 She required a total of 8 PRBC transfusions and 3 FFP units. Bleeding is coming from the left colon where she has severe active Crohn's disease. The bleeding stopped x at least 5 days no recurrence since she was started on Lovenox and Coumadin on March 31. INR is still normal. If bleeds again her best option would be subtotal colectomy. Acute blood loss on chronic iron deficiency anemia secondary to her severe Crohn's disease     Severe Crohn's disease of the left and transverse colon since age 16. Started on Union which an IV infusion biologic only on March 24. She should be getting her second dose 2 weeks after and then in 6 weeks after this every 8 weeks. She is doing well for now I would like to stop the Solumedrol 25 mg Iv bid and put her on Prednisone 40 mg daily. I discussed her case during a special session on difficult cases of IBD, It was suggested to accelerate her cure and giving all the chances for Jocelnyn to do the maximum job because it can take up to 4 months to have the maximum effect mean while I can not just rely on high dose steroids. I adding the Methotrexate in this case. The first case would be subcutaneous as a loading dose 25 mg  But on discharge she would be getting merely 10 mg once a week hope this would help her case. The world renowned expert on this subject is coming to [place] on April 10 to give a talk moming from [facility].  I would like to meet with him to discuss this case further. Our worst enemy in her case with history of severe MRSA positive infection and Shingles would be the steroids. I explained to the patient the rational behind adding the Methotrexate at [facility] for the first 6 months. I told her that there is most ly an increased risk mostly of infection and very rare for neoplasm or cancer. She told me that she has tubal ligation as the methotrexate is teratogenic. Patient did not want to have a colectomy. Hypercoagulable state with bilateral DVT and embolic CVA on  March 10, 2017 and possibly emboli to her right foot. She was started on Coumadin and Lovenox in my opinion is most likely secondary to the severe aggressive Crohn's disease. Right forefoot  Pain with purplish discoloration under the great toe and cold foot Raise the possibility of embolic event? ? Do tomorrow Duplex study  Right ankle x ray on March 11: 1. Mild bimalleolar right ankle soft tissue swelling without acute osseous mabnormality.   We need also an x ray of the foot      Magdiel Fernandez MD  April 2, 2017

## 2017-04-02 NOTE — PROGRESS NOTES
Hospitalist Progress Note    Patient: Gerhardt Leghorn MRN: [id number]  CSN: [id number]    YOB: 1970  Age: 40 y.o.   Sex: female    DOA: 3/27/2017 LOS:  LOS: 6 days          Chief Complaint:    Anemia      Assessment/Plan     Hospital Problems  Date Reviewed: 3/15/2017          Codes Class Noted POA    * (Principal)GI bleed ICD-10-CM: K92.2  ICD-9-CM: 578.9  3/28/2017 Yes        Elevated WBC count ICD-10-CM: D72.829  ICD-9-CM: 288.60  3/28/2017 Yes        DVT (deep venous thrombosis) ([facility]) ICD-10-CM: I82.409  ICD-9-CM: 453.40  3/16/2017 Yes        Acute blood loss anemia ICD-10-CM: D62  ICD-9-CM: 285.1  3/15/2017 Yes        Neuropathic pain ICD-10-CM: M79.2  ICD-9-CM: 729.2  9/06/4026 Yes        Embolic stroke [facility]) XTR-38-HT: I63.9  ICD-9-CM: 434.11  3/10/2017 Yes        Cro[facility] disease ([facility] ICD-10-CM: K50.90  ICD-9-CM: 555.9  2/27/2012 Yes            DVT  - Anticoagulation resumed  - no IVC filter  - Coumadin + Lovenox bridge  - monitor for bleeding  - H/H stable; daily CBC  - Pharmacy dosing coumadin  - INR remains sub-therapeutic    Continue aggressive crohns tx to avoid surgery on bleeding segment of bowel  - IV steroids  - Mesalamine 1000mg QID  - protonix 40mg IV q12h  - colorectal surgery signed off    BP control improved  - coreg 3.125mg PO BID     Dispo: pending stable H/H and bridge to coumadin; home +/- w/ home health    Subjective:    AFVSS; no acute events overnight; no complaints    Review of systems:    Constitutional: denies fevers, chills, myalgias  Respiratory: denies SOB, cough  Cardiovascular: denies chest pain, palpitations  Gastrointestinal: denies nausea, vomiting, diarrhea      Vital signs/Intake and Output:  Visit Vitals    /85 (BP 1 Location: Right arm, BP Patient Position: At rest)    Pulse 81    Temp 98 °F (36.7 °C)    Resp 16    Ht 5' 6\" (1.676 m)    Wt 89 kg (196 lb 3.2 oz)    SpO2 100%    Breastfeeding No    BMI 31.67 kg/m2     Current Shift:  04/02 0701 - 04/02 1900  In: 360 [P.O.:360]  Out: -   Last three shifts:  03/31 1901 - 04/02 0700  In: 1290 [P.O.:1290]  Out: 1025 [Urine:1025]    Exam:    General: Well developed, alert, NAD, OX3  Head/Neck: NCAT, supple, No masses, No lymphadenopathy  CVS:Regular rate and rhythm, no M/R/G, S1/S2 heard, no thrill  Lungs:Clear to auscultation bilaterally, no wheezes, rhonchi, or rales  Abdomen: Soft, Nontender, No distention, Normal Bowel sounds, No hepatomegaly  Extremities: No C/C/E, pulses palpable 2+  Skin:normal texture and turgor, no rashes, no lesions  Neuro:grossly normal , follows commands  Psych:appropriate                Labs: Results:       Chemistry Recent Labs      04/02/17   0400  04/01/17   0600  03/31/17   0430   GLU  130*  104*  102*   NA  143  144  145   K  3.7  3.8  3.6   CL  106  108  110*   CO2  30  29  29   BUN  13  12  8   CREA  0.82  0.68  0.56*   CA  7.7*  8.0*  7.4*   AGAP  7  7  6   BUCR  16  18  14      CBC w/Diff Recent Labs      04/02/17   0400  04/01/17   0600  03/31/17   0945  03/31/17   0430   WBC  15.6*  13.1   --   9.3   RBC  2.97*  3.16*   --   2.94*   HGB  8.7*  9.2*  9.2*  8.5*   HCT  26.5*  28.0*  27.3*  25.6*   PLT  224  189   --   149      Cardiac Enzymes No results for input(s): CPK, CKND1, CHANDA in the last 72 hours. No lab exists for component: CKRMB, TROIP   Coagulation Recent Labs      04/02/17   0400  04/01/17   0600   PTP  13.8  13.1   INR  1.1  1.0       Lipid Panel Lab Results   Component Value Date/Time    Cholesterol, total 145 03/12/2017 06:15 AM    HDL Cholesterol 37 03/12/2017 06:15 AM    LDL, calculated 91.6 03/12/2017 06:15 AM    VLDL, calculated 16.4 03/12/2017 06:15 AM    Triglyceride 82 03/12/2017 06:15 AM    CHOL/HDL Ratio 3.9 03/12/2017 06:15 AM      BNP No results for input(s): BNPP in the last 72 hours. Liver Enzymes No results for input(s): TP, ALB, TBIL, AP, SGOT, GPT in the last 72 hours.     No lab exists for component: DBIL   Thyroid Studies Lab Results   Component Value Date/Time    TSH 1.51 12/12/2009 09:48 AM        Procedures/imaging: see electronic medical records for all procedures/Xrays and details which were not copied into this note but were reviewed prior to creation of [facility], DO

## 2017-04-03 LAB
ANION GAP BLD CALC-SCNC: 5 MMOL/L (ref 3–18)
BUN SERPL-MCNC: 14 MG/DL (ref 7–18)
BUN/CREAT SERPL: 25 (ref 12–20)
CALCIUM SERPL-MCNC: 8 MG/DL (ref 8.5–10.1)
CHLORIDE SERPL-SCNC: 107 MMOL/L (ref 100–108)
CO2 SERPL-SCNC: 30 MMOL/L (ref 21–32)
CREAT SERPL-MCNC: 0.56 MG/DL (ref 0.6–1.3)
ERYTHROCYTE [DISTWIDTH] IN BLOOD BY AUTOMATED COUNT: 15.8 % (ref 11.6–14.5)
GLUCOSE BLD STRIP.AUTO-MCNC: 104 MG/DL (ref 70–110)
GLUCOSE BLD STRIP.AUTO-MCNC: 107 MG/DL (ref 70–110)
GLUCOSE BLD STRIP.AUTO-MCNC: 133 MG/DL (ref 70–110)
GLUCOSE BLD STRIP.AUTO-MCNC: 84 MG/DL (ref 70–110)
GLUCOSE SERPL-MCNC: 114 MG/DL (ref 74–99)
HCT VFR BLD AUTO: 25.2 % (ref 35–45)
HGB BLD-MCNC: 8 G/DL (ref 12–16)
INR PPP: 1.3 (ref 0.8–1.2)
MCH RBC QN AUTO: 28.7 PG (ref 24–34)
MCHC RBC AUTO-ENTMCNC: 31.7 G/DL (ref 31–37)
MCV RBC AUTO: 90.3 FL (ref 74–97)
PLATELET # BLD AUTO: 220 K/UL (ref 135–420)
PMV BLD AUTO: 9.2 FL (ref 9.2–11.8)
POTASSIUM SERPL-SCNC: 3.2 MMOL/L (ref 3.5–5.5)
PROTHROMBIN TIME: 16.1 SEC (ref 11.5–15.2)
RBC # BLD AUTO: 2.79 M/UL (ref 4.2–5.3)
SODIUM SERPL-SCNC: 142 MMOL/L (ref 136–145)
WBC # BLD AUTO: 12.2 K/UL (ref 4.6–13.2)

## 2017-04-03 PROCEDURE — 74011250636 HC RX REV CODE- 250/636: Performed by: INTERNAL MEDICINE

## 2017-04-03 PROCEDURE — 74011250637 HC RX REV CODE- 250/637: Performed by: INTERNAL MEDICINE

## 2017-04-03 PROCEDURE — 74011250637 HC RX REV CODE- 250/637: Performed by: FAMILY MEDICINE

## 2017-04-03 PROCEDURE — 85027 COMPLETE CBC AUTOMATED: CPT | Performed by: INTERNAL MEDICINE

## 2017-04-03 PROCEDURE — 65660000000 HC RM CCU STEPDOWN

## 2017-04-03 PROCEDURE — 74011636637 HC RX REV CODE- 636/637: Performed by: INTERNAL MEDICINE

## 2017-04-03 PROCEDURE — 82962 GLUCOSE BLOOD TEST: CPT

## 2017-04-03 PROCEDURE — 80048 BASIC METABOLIC PNL TOTAL CA: CPT | Performed by: INTERNAL MEDICINE

## 2017-04-03 PROCEDURE — 74011250637 HC RX REV CODE- 250/637: Performed by: HOSPITALIST

## 2017-04-03 PROCEDURE — 93926 LOWER EXTREMITY STUDY: CPT

## 2017-04-03 PROCEDURE — 85610 PROTHROMBIN TIME: CPT | Performed by: INTERNAL MEDICINE

## 2017-04-03 PROCEDURE — 36591 DRAW BLOOD OFF VENOUS DEVICE: CPT

## 2017-04-03 PROCEDURE — 74011250636 HC RX REV CODE- 250/636: Performed by: FAMILY MEDICINE

## 2017-04-03 RX ORDER — PROMETHAZINE HYDROCHLORIDE 25 MG/1
12.5-25 TABLET ORAL
Status: DISCONTINUED | OUTPATIENT
Start: 2017-04-03 | End: 2017-04-18 | Stop reason: HOSPADM

## 2017-04-03 RX ADMIN — Medication 2 MG: at 21:23

## 2017-04-03 RX ADMIN — DOCUSATE SODIUM 100 MG: 100 CAPSULE, LIQUID FILLED ORAL at 08:25

## 2017-04-03 RX ADMIN — ENOXAPARIN SODIUM 60 MG: 60 INJECTION SUBCUTANEOUS at 18:28

## 2017-04-03 RX ADMIN — GABAPENTIN 200 MG: 100 CAPSULE ORAL at 08:25

## 2017-04-03 RX ADMIN — Medication 2 MG: at 05:22

## 2017-04-03 RX ADMIN — GABAPENTIN 200 MG: 100 CAPSULE ORAL at 15:29

## 2017-04-03 RX ADMIN — PREDNISONE 40 MG: 20 TABLET ORAL at 08:26

## 2017-04-03 RX ADMIN — Medication 25 MG: at 14:45

## 2017-04-03 RX ADMIN — PROMETHAZINE HYDROCHLORIDE 25 MG: 25 TABLET ORAL at 15:29

## 2017-04-03 RX ADMIN — MULTIPLE VITAMINS W/ MINERALS TAB 1 TABLET: TAB at 08:25

## 2017-04-03 RX ADMIN — CARVEDILOL 3.12 MG: 3.12 TABLET, FILM COATED ORAL at 08:25

## 2017-04-03 RX ADMIN — ENOXAPARIN SODIUM 60 MG: 60 INJECTION SUBCUTANEOUS at 05:38

## 2017-04-03 RX ADMIN — MESALAMINE 1000 MG: 250 CAPSULE ORAL at 08:26

## 2017-04-03 RX ADMIN — Medication 2 MG: at 08:26

## 2017-04-03 RX ADMIN — MESALAMINE 1000 MG: 250 CAPSULE ORAL at 18:28

## 2017-04-03 RX ADMIN — Medication 2 MG: at 15:28

## 2017-04-03 RX ADMIN — OXYCODONE HYDROCHLORIDE AND ACETAMINOPHEN 1 TABLET: 5; 325 TABLET ORAL at 07:43

## 2017-04-03 RX ADMIN — CARVEDILOL 3.12 MG: 3.12 TABLET, FILM COATED ORAL at 16:59

## 2017-04-03 RX ADMIN — Medication 2 MG: at 12:05

## 2017-04-03 RX ADMIN — MESALAMINE 1000 MG: 250 CAPSULE ORAL at 13:54

## 2017-04-03 RX ADMIN — Medication 2 MG: at 18:34

## 2017-04-03 RX ADMIN — WARFARIN SODIUM 7 MG: 6 TABLET ORAL at 18:27

## 2017-04-03 RX ADMIN — OXYCODONE HYDROCHLORIDE AND ACETAMINOPHEN 1 TABLET: 5; 325 TABLET ORAL at 16:59

## 2017-04-03 RX ADMIN — Medication 2 MG: at 00:51

## 2017-04-03 NOTE — ROUTINE PROCESS
1925  Bedside and Verbal shift change report given to Alyssia Jacob RN (oncoming nurse) by Erum Ogden RN (offgoing nurse). Report included the following information SBAR, Kardex and MAR.

## 2017-04-03 NOTE — PROGRESS NOTES
Alert, stable  Has pain right foot since stroke, arterial embolization multiple sites  Improved initially with anticoagulation but this has been difficult with crohn's / GI bleed  Currently on low dose Lovenox  I am very hesitant to do a lysis (catheter based) procedure as she has high risk of further clotting  Will get a CTA and discuss with her possible surgical intervention

## 2017-04-03 NOTE — PROGRESS NOTES
Shift summary: Pt is up with assist, no distress noted, on Tele monitor - NSR. Pain to R foot controlled by Morphine and Percocet. For  Doppler study of R LE in am. Blood drawn from TLC on R chest. X-Ray of R foot done last night for complaint of pain, pt had history of DVT of R leg and stroke. BS ACHS. TLC on R chest - blood drawn for labs, Potassium level of 3.2, INR 1.3.  Shift uneventful

## 2017-04-03 NOTE — PROGRESS NOTES
Pharmacy Dosing Services: Warfarin    Consult for Warfarin Dosing by Pharmacy by Dr. Geovanny Rizvi provided for this 40 y.o.  female , for indication of DVT Prevention    Dose to achieve an INR goal of 2-3    Order entered for  Warfarin  7 mg to be given today at 18:00. Pt receiving Lovenox 60 mg SQ for Bridge Tx (Dose adjusted by MD due to bleed)    LABS    PT/INR Lab Results   Component Value Date/Time    INR 1.3 04/03/2017 02:50 AM      Platelets Lab Results   Component Value Date/Time    PLATELET 236 53/46/0840 02:50 AM      H/H     Lab Results   Component Value Date/Time    HGB 8.0 04/03/2017 02:50 AM        Warfarin Administrations (last 168 hours)       Date/Time Action Medication Dose      04/02/17 1835 Given    warfarin (COUMADIN) tablet 7 mg 7 mg    04/01/17 1755 Given   [INR 1.0]    warfarin (COUMADIN) tablet 6 mg 6 mg    03/31/17 1719 Given    warfarin (COUMADIN) tablet 5 mg 5 mg    03/30/17 1847 Given    warfarin (COUMADIN) tablet 5 mg 5 mg          Pharmacy to follow daily and will provide subsequent Warfarin dosing based on clinical status. Gal Nelson, Pharm. D.   Clinical Pharmacist  [phone]

## 2017-04-03 NOTE — ROUTINE PROCESS
Bedside and Verbal shift change report given to Mary Arias RN (oncoming nurse) by Nehemiah Canales RN (offgoing nurse). Report included the following information SBAR, Kardex, ED Summary, Procedure Summary, Intake/Output, MAR, Recent Results, Med Rec Status and Cardiac Rhythm NSR.

## 2017-04-03 NOTE — ROUTINE PROCESS
Bedside and Verbal shift change report given to Reid Sanches RN (oncoming nurse) by Armando Gates RN   (offgoing nurse). Report included the following information SBAR, Kardex, Intake/Output, MAR and Recent Results.

## 2017-04-03 NOTE — PROGRESS NOTES
Hospitalist Progress Note    Patient: Gerhardt Leghorn MRN: [id number]  CSN: [id number]    YOB: 1970  Age: 40 y.o. Sex: female    DOA: 3/27/2017 LOS:  LOS: 7 days                Assessment/Plan     Patient Active Problem List   Diagnosis Code    Chronic pain syndrome G89.4    Cro[facility] disease ([facility] K50.90    S[facility] D57.3    Hypertension I10    Hx of cocaine abuse R97.895    Embolic stroke ([facility] M84.0    Acute blood loss anemia D62    Neuropathic pain M79.2    DVT (deep venous thrombosis) ([facility]) I82.409    GI bleed K92.2    Elevated WBC count D72.829            39 yo female admitted for GI bleed,  Recent admission for embolic CVA and recent DVT. GI bleed - GI following, now bleeding has stopped. Anemia - acute blood loss, s/p blood transfusion, H/H improved. Recent DVT/CVA - anticoagulation restarted. On lovenox, pharmacy dosing coumadin. Right leg pain - right leg arterial duplex >75%stenosis of anterior tibial artery. Vascular consulted. Crohn's disease - management per GI. On steroids, mesalamine and protonix. Seen by colorectal surgery, bleeding stopped, if patient bleeds again she is a candidate of colectomy. HTN - controlled. Review of systems  General: No fevers or chills. Cardiovascular: No chest pain or pressure. No palpitations. Pulmonary: No shortness of breath. Gastrointestinal: No nausea, vomiting. Physical Exam:  General: Awake, cooperative, no acute distress    HEENT: NC, Atraumatic. PERRLA, anicteric sclerae. Lungs: CTA Bilaterally. No Wheezing/Rhonchi/Rales. Heart:  Regular  rhythm,  No murmur, No Rubs, No Gallops  Abdomen: Soft, Non distended, Non tender.  +Bowel sounds,   Extremities: No c/c/e  Psych:   Not anxious or agitated. Neurologic:  No acute neurological deficit.                  Vital signs/Intake and Output:  Visit Vitals    /61 (BP 1 Location: Left arm)    Pulse 85    Temp 98.4 °F (36.9 °C)    Resp 18    Ht 5' 6\" (1.676 m)    Wt 87.3 kg (192 lb 8 oz)    SpO2 99%    Breastfeeding No    BMI 31.07 kg/m2     Current Shift:  04/03 0701 - 04/03 1900  In: 240 [P.O.:240]  Out: 500 [Urine:500]  Last three shifts:  04/01 1901 - 04/03 0700  In: 1810 [P.O.:1810]  Out: 550 [Urine:550]            Labs: Results:       Chemistry Recent Labs      04/03/17   0250  04/02/17   0400  04/01/17   0600   GLU  114*  130*  104*   NA  142  143  144   K  3.2*  3.7  3.8   CL  107  106  108   CO2  30  30  29   BUN  14  13  12   CREA  0.56*  0.82  0.68   CA  8.0*  7.7*  8.0*   AGAP  5  7  7   BUCR  25*  16  18      CBC w/Diff Recent Labs      04/03/17 0250 04/02/17   0400  04/01/17   0600   WBC  12.2  15.6*  13.1   RBC  2.79*  2.97*  3.16*   HGB  8.0*  8.7*  9.2*   HCT  25.2*  26.5*  28.0*   PLT  220  224  189      Cardiac Enzymes No results for input(s): CPK, CKND1, CHANDA in the last 72 hours. No lab exists for component: CKRMB, TROIP   Coagulation Recent Labs      04/03/17 0250 04/02/17   0400   PTP  16.1*  13.8   INR  1.3*  1.1       Lipid Panel Lab Results   Component Value Date/Time    Cholesterol, total 145 03/12/2017 06:15 AM    HDL Cholesterol 37 03/12/2017 06:15 AM    LDL, calculated 91.6 03/12/2017 06:15 AM    VLDL, calculated 16.4 03/12/2017 06:15 AM    Triglyceride 82 03/12/2017 06:15 AM    CHOL/HDL Ratio 3.9 03/12/2017 06:15 AM      BNP No results for input(s): BNPP in the last 72 hours. Liver Enzymes No results for input(s): TP, ALB, TBIL, AP, SGOT, GPT in the last 72 hours.     No lab exists for component: DBIL   Thyroid Studies Lab Results   Component Value Date/Time    TSH 1.51 12/12/2009 09:48 AM        Procedures/imaging: see electronic medical records for all procedures/Xrays and details which were not copied into this note but were reviewed prior to creation of Plan

## 2017-04-03 NOTE — PROGRESS NOTES
8:31 AM   Pt medicated with Morphine 2 mg IV for pain level 10/10 . As per pt, pain on rightr foot is severe, with burning sensation on whole foot. Right foot is cold  and unable to feel pulse. . Pt has slight brownish patches to right big toe. Pt  going for Doppler study  this morning. LUngs are clear. Abdomen is soft with active BS.  0915  Pt went down for Doppler study. 1045   Pt back to Room. Pt has Vascular consult. 1155  IDR was done with Dr Verna Wood, ,  Nurse manager, and RN. 12:09 PM   Pt medicated with Morphine 2 mg IV for pain level 10/10.    1230  Right foot +  Pedal pulse was obtained by Doppler . 2:14 PM   Pt not decided if she will take methotrexate sq  as ordered for her Chronn's Desease. Pt wants to speak to dr Kaylie Ayala. Dr Kaylie Ayala was paged. 2:30 PM   Dr Kaylie Ayala answered page and phone was transferred to pt's phone. 2:58 PM  Pt spoke to Dr Kaylie Ayala . Pt was given methotrexate Sq by Alyssa Brink RN after speaking to MD.   PRN Phenergan po ordered for n/v by dr Kaylie Ayala. 3:33 PM   Pt ambulated to BR to void. Pt medicated with Phenergan 25 mg po for nausea and Morphine 2 mg IV for Pain level 10/10.    5:01 PM   Pt medicated with Percocet 5/325 mg po for pain level 10/10.   6:41 PM  Pt ambulated to BR to void. . Medicated with Morphine 2 mg IV for pain level 10/10.  1910  Dr Tawnya Villanueva from Vascular surgery in to see pt.

## 2017-04-03 NOTE — PROCEDURES
[facility]  *** FINAL REPORT ***    Name: Tono Agrawal  MRN: [id number]    Inpatient  : [address] 1970  HIS Order #: [id number]  [id number] [facility] Visit #: [id number]  Date: 2017    TYPE OF TEST: Extremity Arterial Duplex    REASON FOR TEST  Pulseless, Cold sensitivity                            Right                     Left  Artery               PSV   Finding             PSV   Finding  ------------------  -----  ---------------    -----  ---------------  External iliac:  Common femoral:     120.0  Profunda femoris:    72.0  Proximal SFA:       101.0  Mid SFA:             96.0  Distal SFA:          73.0  Popliteal:           34.0  Anterior tibial:     56.0  >75% stenosis  Posterior tibial:    Pressures               Right     Left               -----     -----     Brachial:           DP:           PT:            :            Toe: INTERPRETATION/FINDINGS  Duplex images were obtained using 2-D gray scale, color flow, and  spectral Doppler analysis. Right leg :  1. >75% stenosis of the anterior tibial artery. 2. A monophasic signal is demonstrated in the popliteal (bk), anterior   tibial, posterior tibial and peroneal arteries. 3. Normal signal and flow present in right common femoral artery,  profunda, superficial femoral artery and popliteal artery above knee. Thrombus noticed in the popliteal artery below knee and very little  doppler signal noticed distally. No color flow noticed in the vessels  distal to popliteal artery except anterior tibial artery which is  monophaisc. .    ADDITIONAL COMMENTS  Verbal report given ot the ordering Doctor Joseph Moore MD)    I have personally reviewed the data relevant to the interpretation of  this  study.     TECHNOLOGIST: Surekha Shanks  Signed: 2017 10:56 AM    PHYSICIAN: Rogelio Kc MD  Signed: 2017 01:41 PM

## 2017-04-04 ENCOUNTER — APPOINTMENT (OUTPATIENT)
Dept: CT IMAGING | Age: 47
DRG: 950 | End: 2017-04-04
Attending: SURGERY
Payer: MEDICAID

## 2017-04-04 PROBLEM — I73.9 PAD (PERIPHERAL ARTERY DISEASE) (HCC): Status: ACTIVE | Noted: 2017-04-04

## 2017-04-04 LAB
ANION GAP BLD CALC-SCNC: 7 MMOL/L (ref 3–18)
ANION GAP BLD CALC-SCNC: 9 MMOL/L (ref 3–18)
APTT PPP: 38 SEC (ref 23–36.4)
BUN SERPL-MCNC: 10 MG/DL (ref 7–18)
BUN SERPL-MCNC: 11 MG/DL (ref 7–18)
BUN/CREAT SERPL: 16 (ref 12–20)
BUN/CREAT SERPL: 18 (ref 12–20)
CALCIUM SERPL-MCNC: 8 MG/DL (ref 8.5–10.1)
CALCIUM SERPL-MCNC: 8.3 MG/DL (ref 8.5–10.1)
CHLORIDE SERPL-SCNC: 105 MMOL/L (ref 100–108)
CHLORIDE SERPL-SCNC: 106 MMOL/L (ref 100–108)
CO2 SERPL-SCNC: 29 MMOL/L (ref 21–32)
CO2 SERPL-SCNC: 29 MMOL/L (ref 21–32)
CREAT SERPL-MCNC: 0.55 MG/DL (ref 0.6–1.3)
CREAT SERPL-MCNC: 0.68 MG/DL (ref 0.6–1.3)
ERYTHROCYTE [DISTWIDTH] IN BLOOD BY AUTOMATED COUNT: 15.7 % (ref 11.6–14.5)
ERYTHROCYTE [DISTWIDTH] IN BLOOD BY AUTOMATED COUNT: 15.7 % (ref 11.6–14.5)
GLUCOSE BLD STRIP.AUTO-MCNC: 126 MG/DL (ref 70–110)
GLUCOSE BLD STRIP.AUTO-MCNC: 135 MG/DL (ref 70–110)
GLUCOSE BLD STRIP.AUTO-MCNC: 92 MG/DL (ref 70–110)
GLUCOSE BLD STRIP.AUTO-MCNC: 99 MG/DL (ref 70–110)
GLUCOSE SERPL-MCNC: 123 MG/DL (ref 74–99)
GLUCOSE SERPL-MCNC: 127 MG/DL (ref 74–99)
HCT VFR BLD AUTO: 23.8 % (ref 35–45)
HCT VFR BLD AUTO: 25.4 % (ref 35–45)
HGB BLD-MCNC: 7.7 G/DL (ref 12–16)
HGB BLD-MCNC: 8.1 G/DL (ref 12–16)
INR PPP: 1.6 (ref 0.8–1.2)
MCH RBC QN AUTO: 28.6 PG (ref 24–34)
MCH RBC QN AUTO: 28.9 PG (ref 24–34)
MCHC RBC AUTO-ENTMCNC: 31.9 G/DL (ref 31–37)
MCHC RBC AUTO-ENTMCNC: 32.4 G/DL (ref 31–37)
MCV RBC AUTO: 89.5 FL (ref 74–97)
MCV RBC AUTO: 89.8 FL (ref 74–97)
PLATELET # BLD AUTO: 206 K/UL (ref 135–420)
PLATELET # BLD AUTO: 231 K/UL (ref 135–420)
PMV BLD AUTO: 8.4 FL (ref 9.2–11.8)
PMV BLD AUTO: 8.6 FL (ref 9.2–11.8)
POTASSIUM SERPL-SCNC: 3.4 MMOL/L (ref 3.5–5.5)
POTASSIUM SERPL-SCNC: 3.4 MMOL/L (ref 3.5–5.5)
PROTHROMBIN TIME: 18.3 SEC (ref 11.5–15.2)
RBC # BLD AUTO: 2.66 M/UL (ref 4.2–5.3)
RBC # BLD AUTO: 2.83 M/UL (ref 4.2–5.3)
SODIUM SERPL-SCNC: 142 MMOL/L (ref 136–145)
SODIUM SERPL-SCNC: 143 MMOL/L (ref 136–145)
WBC # BLD AUTO: 7.2 K/UL (ref 4.6–13.2)
WBC # BLD AUTO: 9.5 K/UL (ref 4.6–13.2)

## 2017-04-04 PROCEDURE — 85027 COMPLETE CBC AUTOMATED: CPT | Performed by: INTERNAL MEDICINE

## 2017-04-04 PROCEDURE — 74011636637 HC RX REV CODE- 636/637: Performed by: INTERNAL MEDICINE

## 2017-04-04 PROCEDURE — 80048 BASIC METABOLIC PNL TOTAL CA: CPT | Performed by: PHYSICIAN ASSISTANT

## 2017-04-04 PROCEDURE — 74011250636 HC RX REV CODE- 250/636: Performed by: SURGERY

## 2017-04-04 PROCEDURE — 74011250636 HC RX REV CODE- 250/636: Performed by: FAMILY MEDICINE

## 2017-04-04 PROCEDURE — 74011250637 HC RX REV CODE- 250/637: Performed by: INTERNAL MEDICINE

## 2017-04-04 PROCEDURE — 86920 COMPATIBILITY TEST SPIN: CPT | Performed by: SURGERY

## 2017-04-04 PROCEDURE — 74011250636 HC RX REV CODE- 250/636: Performed by: HOSPITALIST

## 2017-04-04 PROCEDURE — 85610 PROTHROMBIN TIME: CPT | Performed by: INTERNAL MEDICINE

## 2017-04-04 PROCEDURE — 36430 TRANSFUSION BLD/BLD COMPNT: CPT

## 2017-04-04 PROCEDURE — 75635 CT ANGIO ABDOMINAL ARTERIES: CPT

## 2017-04-04 PROCEDURE — 74011250636 HC RX REV CODE- 250/636: Performed by: INTERNAL MEDICINE

## 2017-04-04 PROCEDURE — 80048 BASIC METABOLIC PNL TOTAL CA: CPT | Performed by: INTERNAL MEDICINE

## 2017-04-04 PROCEDURE — P9016 RBC LEUKOCYTES REDUCED: HCPCS | Performed by: SURGERY

## 2017-04-04 PROCEDURE — 85730 THROMBOPLASTIN TIME PARTIAL: CPT | Performed by: SURGERY

## 2017-04-04 PROCEDURE — 82962 GLUCOSE BLOOD TEST: CPT

## 2017-04-04 PROCEDURE — 74011250636 HC RX REV CODE- 250/636

## 2017-04-04 PROCEDURE — 86900 BLOOD TYPING SEROLOGIC ABO: CPT | Performed by: SURGERY

## 2017-04-04 PROCEDURE — 74011250637 HC RX REV CODE- 250/637: Performed by: HOSPITALIST

## 2017-04-04 PROCEDURE — 65660000000 HC RM CCU STEPDOWN

## 2017-04-04 PROCEDURE — 74011636320 HC RX REV CODE- 636/320: Performed by: INTERNAL MEDICINE

## 2017-04-04 PROCEDURE — 36600 WITHDRAWAL OF ARTERIAL BLOOD: CPT

## 2017-04-04 PROCEDURE — 74011250637 HC RX REV CODE- 250/637: Performed by: FAMILY MEDICINE

## 2017-04-04 RX ORDER — HEPARIN SODIUM 200 [USP'U]/100ML
500 INJECTION, SOLUTION INTRAVENOUS CONTINUOUS
Status: DISCONTINUED | OUTPATIENT
Start: 2017-04-04 | End: 2017-04-04

## 2017-04-04 RX ORDER — HEPARIN SODIUM 10000 [USP'U]/100ML
500 INJECTION, SOLUTION INTRAVENOUS CONTINUOUS
Status: DISCONTINUED | OUTPATIENT
Start: 2017-04-04 | End: 2017-04-06

## 2017-04-04 RX ORDER — HYDROMORPHONE HYDROCHLORIDE 1 MG/ML
1 INJECTION, SOLUTION INTRAMUSCULAR; INTRAVENOUS; SUBCUTANEOUS
Status: DISCONTINUED | OUTPATIENT
Start: 2017-04-04 | End: 2017-04-06

## 2017-04-04 RX ORDER — HYDROMORPHONE HYDROCHLORIDE 1 MG/ML
INJECTION, SOLUTION INTRAMUSCULAR; INTRAVENOUS; SUBCUTANEOUS
Status: COMPLETED
Start: 2017-04-04 | End: 2017-04-04

## 2017-04-04 RX ORDER — SODIUM CHLORIDE 9 MG/ML
250 INJECTION, SOLUTION INTRAVENOUS AS NEEDED
Status: DISCONTINUED | OUTPATIENT
Start: 2017-04-04 | End: 2017-04-18 | Stop reason: HOSPADM

## 2017-04-04 RX ADMIN — GABAPENTIN 200 MG: 100 CAPSULE ORAL at 08:32

## 2017-04-04 RX ADMIN — Medication 2 MG: at 08:32

## 2017-04-04 RX ADMIN — PREDNISONE 40 MG: 20 TABLET ORAL at 08:32

## 2017-04-04 RX ADMIN — OXYCODONE HYDROCHLORIDE AND ACETAMINOPHEN 1 TABLET: 5; 325 TABLET ORAL at 02:16

## 2017-04-04 RX ADMIN — IOPAMIDOL 125 ML: 755 INJECTION, SOLUTION INTRAVENOUS at 00:21

## 2017-04-04 RX ADMIN — Medication 2 MG: at 00:50

## 2017-04-04 RX ADMIN — MESALAMINE 1000 MG: 250 CAPSULE ORAL at 00:49

## 2017-04-04 RX ADMIN — OXYCODONE HYDROCHLORIDE AND ACETAMINOPHEN 1 TABLET: 5; 325 TABLET ORAL at 06:37

## 2017-04-04 RX ADMIN — CARVEDILOL 3.12 MG: 3.12 TABLET, FILM COATED ORAL at 16:48

## 2017-04-04 RX ADMIN — DOCUSATE SODIUM 100 MG: 100 CAPSULE, LIQUID FILLED ORAL at 08:32

## 2017-04-04 RX ADMIN — OXYCODONE HYDROCHLORIDE AND ACETAMINOPHEN 1 TABLET: 5; 325 TABLET ORAL at 11:07

## 2017-04-04 RX ADMIN — HYDROMORPHONE HYDROCHLORIDE 1 MG: 1 INJECTION, SOLUTION INTRAMUSCULAR; INTRAVENOUS; SUBCUTANEOUS at 21:29

## 2017-04-04 RX ADMIN — ATORVASTATIN CALCIUM 80 MG: 20 TABLET, FILM COATED ORAL at 21:29

## 2017-04-04 RX ADMIN — MESALAMINE 1000 MG: 250 CAPSULE ORAL at 12:32

## 2017-04-04 RX ADMIN — HYDROMORPHONE HYDROCHLORIDE 1 MG: 1 INJECTION, SOLUTION INTRAMUSCULAR; INTRAVENOUS; SUBCUTANEOUS at 16:48

## 2017-04-04 RX ADMIN — PROMETHAZINE HYDROCHLORIDE 25 MG: 25 TABLET ORAL at 23:56

## 2017-04-04 RX ADMIN — ATORVASTATIN CALCIUM 80 MG: 20 TABLET, FILM COATED ORAL at 00:49

## 2017-04-04 RX ADMIN — HEPARIN SODIUM AND DEXTROSE 500 UNITS/HR: 10000; 5 INJECTION INTRAVENOUS at 12:52

## 2017-04-04 RX ADMIN — Medication 2 MG: at 03:52

## 2017-04-04 RX ADMIN — MULTIPLE VITAMINS W/ MINERALS TAB 1 TABLET: TAB at 08:32

## 2017-04-04 RX ADMIN — ENOXAPARIN SODIUM 60 MG: 60 INJECTION SUBCUTANEOUS at 06:37

## 2017-04-04 RX ADMIN — CARVEDILOL 3.12 MG: 3.12 TABLET, FILM COATED ORAL at 08:32

## 2017-04-04 RX ADMIN — MESALAMINE 1000 MG: 250 CAPSULE ORAL at 18:31

## 2017-04-04 RX ADMIN — HYDROMORPHONE HYDROCHLORIDE 1 MG: 1 INJECTION, SOLUTION INTRAMUSCULAR; INTRAVENOUS; SUBCUTANEOUS at 12:37

## 2017-04-04 RX ADMIN — PROMETHAZINE HYDROCHLORIDE 25 MG: 25 TABLET ORAL at 16:48

## 2017-04-04 RX ADMIN — MESALAMINE 1000 MG: 250 CAPSULE ORAL at 08:32

## 2017-04-04 RX ADMIN — MESALAMINE 1000 MG: 250 CAPSULE ORAL at 21:29

## 2017-04-04 RX ADMIN — DOCUSATE SODIUM 100 MG: 100 CAPSULE, LIQUID FILLED ORAL at 00:49

## 2017-04-04 RX ADMIN — GABAPENTIN 200 MG: 100 CAPSULE ORAL at 00:49

## 2017-04-04 RX ADMIN — DOCUSATE SODIUM 100 MG: 100 CAPSULE, LIQUID FILLED ORAL at 21:29

## 2017-04-04 RX ADMIN — GABAPENTIN 200 MG: 100 CAPSULE ORAL at 16:48

## 2017-04-04 RX ADMIN — GABAPENTIN 200 MG: 100 CAPSULE ORAL at 21:29

## 2017-04-04 NOTE — PROGRESS NOTES
65- Received pt from Raya Lawrence RN. Pt in bed asking questions about treatment. Pt educated on plans for the day. 1278- Paged Dr. Naomi Dale for clarification of Heparin orders. Per Dr. Greg Gardiner to stay on continuos 500 units/hr infusion of heparin. Dr. Maria De Jesus Zhang PTT drawn twice a day and CBC done daily. Orders put in    1021- Pt wishes to speak with Dr. Naomi Dale. Dr. Milana Ding and will return call after seeing pt in office. 1136- IDR rounding at this time. 1220- Pt stated that morphine and percocet are not working. Dr. Greenberg Daily Dilaudid 1 mg q4h PRN. Pt given pain medicine at this time. 1336- Started first unit of blood. Dual verified with Lily BLANCO. Pt educated on transfusion reactions. This nurse stayed with pt for first 15 minutes of transfusion. Will continue to monitor. 1709- Second unit of blood started. Dual verified with JIMI Bueno RN. This nurse stayed with pt for first 15 minutes of transfusion.

## 2017-04-04 NOTE — PROGRESS NOTES
Late entry for 4/3/17: Discussed case with Dr. Nav Mae and nurse during bedside rounds. Pt not ready for discharge at this time.

## 2017-04-04 NOTE — ROUTINE PROCESS
Bedside and Verbal shift change report given to IVAN Valentin RN (oncoming nurse) by Yolanda Diaz RN (offgoing nurse). Report included the following information SBAR, Kardex, Intake/Output, MAR and Recent Results.

## 2017-04-04 NOTE — ROUTINE PROCESS
Bedside and Verbal shift change report given to Kary Oconnor RN (oncoming nurse) by Tg Simpson RN (offgoing nurse). Report included the following information SBAR, Kardex, ED Summary, Procedure Summary, Intake/Output, MAR, Recent Results, Med Rec Status and Cardiac Rhythm NSR.

## 2017-04-04 NOTE — PROGRESS NOTES
Discussed case with Dr. Matilda Foster and nurse during bedside rounds, where they stated pt not ready for discharge. Pt's daughter in visiting her today, and pt states her daughter lives with her. Pt states she ambulates to bathroom; pt state she had a stroke recently. CM placed FYI to M. D.to consider orders for P.T. eval.

## 2017-04-04 NOTE — PROGRESS NOTES
NUTRITION FOLLOW-UP    RECOMMENDATIONS/PLAN:   - Replete potassium  - Continue Regular Low Fiber diet    NUTRITION ASSESSMENT:   Client Update: 40 yrs old Female with severe Crohn's disease- bleeding now stopped. H/H improved. No N/V, eating well at this time. FOOD/NUTRITION INTAKE   Diet Order:  Regular Low Fiber   Supplements: none   Food Allergies: NKFA  Average PO Intake:       % Diet Eaten   04/04/17 0938 100 %   04/03/17 1219 100 %   04/02/17 1156 100 %   04/02/17 0952 100 %   04/02/17 0936 100 %   04/01/17 1801 100 %   04/01/17 1300 100 %   04/01/17 0915 100 %   Pertinent Medications:  [x] Reviewed; lipitor, colace, neurontin, mesalamine, MVI, percocet, phenergan  Insulin:  [x]SSI  []Pre-meal   []Basal    []Drip  []None  Cultural/Restorationism Food Preferences: None Identified ANTHROPOMETRICS  Height: 5' 6\" (167.6 cm)       Weight: 91.7 kg (202 lb 2.6 oz)         BMI: 32.6 kg/m^2 obese (30%-39.9% BMI)   Adjusted Body Weight: 65 kg     NUTRITION-FOCUSED PHYSICAL ASSESSMENT  Skin: intact, trace edema to BLE      GI: no N/V/D, last BM 4/03- soft, dark    BIOCHEMICAL DATA & MEDICAL TESTS  Pertinent Labs:  [x] Reviewed; K 3.4, Hgb 8.1, Hct 25.4      NUTRITION PRESCRIPTION  Calories: 4432-8583 kcal/day based on 30-35 kcal/kg AdjBW  Protein: 65-85 g/day based on 1-1.3 g/kg AdjBW  CHO: 244 g/day based on 50% of total energy  Fluid: 4434-8918 ml/day based on 1 kcal/ml      NUTRITION DIAGNOSES:   1. At risk of inadequate oral intake related to Crohn's disease as evidenced by nausea, abdominal pain, 50% po intake of meals   - resolved    NUTRITION INTERVENTIONS:   INTERVENTIONS:        GOALS:  1.  Replace potassium continue current diet 1. >50% PO intake of meals, lytes WNL, BM q 1-3 days by next review 3-5 days     LEARNING NEEDS (Diet, Supplementation, Food/Nutrient-Drug Interaction):   [x] None Identified   [x] Education provided/documented      Identified and patient: [] Declined   [] Was not appropriate/indicated NUTRITION MONITORING /EVALUATION:   Follow PO intake  Monitor wt  Monitor renal labs, electrolytes, fluid status     Previous Recommendations Implemented: yes        Previous Goals Met:  yes       [] Participated in Interdisciplinary Rounds    [x] Interdisciplinary Care Plan Reviewed  DISCHARGE NUTRITION RECOMMENDATIONS ADDRESSED:     [x] Yes- recommended low fiber, gradually increase fiber intake    NUTRITION RISK:           [x] At risk                        [] Not currently at risk        Will follow-up per policy.   Quin Caballero, ADIA  PAGER:  [phone]

## 2017-04-04 NOTE — PROGRESS NOTES
Hospitalist Progress Note    Patient: Cosme Martinez MRN: [id number]  CSN: [id number]    YOB: 1970  Age: 40 y.o. Sex: female    DOA: 3/27/2017 LOS:  LOS: 8 days                Assessment/Plan     Patient Active Problem List   Diagnosis Code    Chronic pain syndrome G89.4    Crohn disease ([facility] K50.90    Sickle cell trait ([facility] D57.3    Hypertension I10    Hx of cocaine abuse T07.265    Embolic stroke ([facility] K74.4    Acute blood loss anemia D62    Neuropathic pain M79.2    DVT (deep venous thrombosis) ([facility] I82.409    GI bleed K92.2    Elevated WBC count D72.829    PAD (peripheral artery disease) ([facility] I73.9            39 yo female admitted for GI bleed,  Recent admission for embolic CVA and recent DVT.      GI bleed - GI following, now bleeding has stopped.      Anemia - acute blood loss, s/p blood transfusion, H/H improved.      Recent DVT/CVA - anticoagulation restarted. On heparin drip.      Right leg pain - right leg arterial duplex >75%stenosis of anterior tibial artery. Vascular following, CTA with right popliteal occlusion, endo vs surgical revascularization. started on heparin drip.     Crohn's disease - management per GI. On steroids, mesalamine and protonix. Seen by colorectal surgery, bleeding stopped, if patient bleeds again she is a candidate of colectomy.      HTN - controlled.     Review of systems  General: No fevers or chills. Cardiovascular: No chest pain or pressure. No palpitations. Pulmonary: No shortness of breath. Gastrointestinal: No nausea, vomiting.         Physical Exam:  General: Awake, cooperative, no acute distress    HEENT: NC, Atraumatic. PERRLA, anicteric sclerae. Lungs: CTA Bilaterally. No Wheezing/Rhonchi/Rales. Heart:  Regular rhythm,  No murmur, No Rubs, No Gallops  Abdomen: Soft, Non distended, Non tender.  +Bowel sounds,   Extremities: No c/c/e  Psych:   Not anxious or agitated.   Neurologic:  No acute neurological deficit.          Vital signs/Intake and Output:  Visit Vitals    /54    Pulse 92    Temp 98.6 °F (37 °C)    Resp 18    Ht 5' 6\" (1.676 m)    Wt 91.7 kg (202 lb 2.6 oz)    SpO2 100%    Breastfeeding No    BMI 32.63 kg/m2     Current Shift:  04/04 0701 - 04/04 1900  In: 240 [P.O.:240]  Out: -   Last three shifts:  04/02 1901 - 04/04 0700  In: 880 [P.O.:880]  Out: 850 [Urine:850]            Labs: Results:       Chemistry Recent Labs      04/04/17   0926  04/04/17   0635  04/03/17   0250   GLU  127*  123*  114*   NA  143  142  142   K  3.4*  3.4*  3.2*   CL  105  106  107   CO2  29  29  30   BUN  11  10  14   CREA  0.68  0.55*  0.56*   CA  8.3*  8.0*  8.0*   AGAP  9  7  5   BUCR  16  18  25*      CBC w/Diff Recent Labs      04/04/17   0926  04/04/17   0635  04/03/17   0250   WBC  9.5  7.2  12.2   RBC  2.83*  2.66*  2.79*   HGB  8.1*  7.7*  8.0*   HCT  25.4*  23.8*  25.2*   PLT  231  206  220      Cardiac Enzymes No results for input(s): CPK, CKND1, CHANDA in the last 72 hours. No lab exists for component: CKRMB, TROIP   Coagulation Recent Labs      04/04/17   1154  04/04/17   0400  04/03/17   0250   PTP   --   18.3*  16.1*   INR   --   1.6*  1.3*   APTT  38.0*   --    --        Lipid Panel Lab Results   Component Value Date/Time    Cholesterol, total 145 03/12/2017 06:15 AM    HDL Cholesterol 37 03/12/2017 06:15 AM    LDL, calculated 91.6 03/12/2017 06:15 AM    VLDL, calculated 16.4 03/12/2017 06:15 AM    Triglyceride 82 03/12/2017 06:15 AM    CHOL/HDL Ratio 3.9 03/12/2017 06:15 AM      BNP No results for input(s): BNPP in the last 72 hours. Liver Enzymes No results for input(s): TP, ALB, TBIL, AP, SGOT, GPT in the last 72 hours.     No lab exists for component: DBIL   Thyroid Studies Lab Results   Component Value Date/Time    TSH 1.51 12/12/2009 09:48 AM        Procedures/imaging: see electronic medical records for all procedures/Xrays and details which were not copied into this note but were reviewed prior to creation of Plan

## 2017-04-04 NOTE — PROGRESS NOTES
Right popliteal occlusion by cta  Will discuss endo vs surgical revasc with pt, likely occlusion is weeks old  Will need to hold coumadin, transfuse prior to surg (anemic), electrolyte correction  Will change Lovenox to low dose heparin

## 2017-04-05 ENCOUNTER — ANESTHESIA EVENT (OUTPATIENT)
Dept: SURGERY | Age: 47
DRG: 950 | End: 2017-04-05
Payer: MEDICAID

## 2017-04-05 LAB
ABO + RH BLD: NORMAL
APTT PPP: 32.5 SEC (ref 23–36.4)
APTT PPP: 38 SEC (ref 23–36.4)
BLD PROD TYP BPU: NORMAL
BLD PROD TYP BPU: NORMAL
BLOOD GROUP ANTIBODIES SERPL: NORMAL
BPU ID: NORMAL
BPU ID: NORMAL
CALLED TO:,BCALL1: NORMAL
CROSSMATCH RESULT,%XM: NORMAL
CROSSMATCH RESULT,%XM: NORMAL
ERYTHROCYTE [DISTWIDTH] IN BLOOD BY AUTOMATED COUNT: 15.8 % (ref 11.6–14.5)
GLUCOSE BLD STRIP.AUTO-MCNC: 101 MG/DL (ref 70–110)
GLUCOSE BLD STRIP.AUTO-MCNC: 104 MG/DL (ref 70–110)
GLUCOSE BLD STRIP.AUTO-MCNC: 111 MG/DL (ref 70–110)
GLUCOSE BLD STRIP.AUTO-MCNC: 120 MG/DL (ref 70–110)
HCT VFR BLD AUTO: 31.7 % (ref 35–45)
HGB BLD-MCNC: 10.4 G/DL (ref 12–16)
INR PPP: 1.2 (ref 0.8–1.2)
MCH RBC QN AUTO: 28.6 PG (ref 24–34)
MCHC RBC AUTO-ENTMCNC: 32.8 G/DL (ref 31–37)
MCV RBC AUTO: 87.1 FL (ref 74–97)
PLATELET # BLD AUTO: 240 K/UL (ref 135–420)
PMV BLD AUTO: 8.8 FL (ref 9.2–11.8)
PROTHROMBIN TIME: 15.2 SEC (ref 11.5–15.2)
RBC # BLD AUTO: 3.64 M/UL (ref 4.2–5.3)
SPECIMEN EXP DATE BLD: NORMAL
STATUS OF UNIT,%ST: NORMAL
STATUS OF UNIT,%ST: NORMAL
UNIT DIVISION, %UDIV: 0
UNIT DIVISION, %UDIV: 0
WBC # BLD AUTO: 7.7 K/UL (ref 4.6–13.2)

## 2017-04-05 PROCEDURE — 74011250637 HC RX REV CODE- 250/637: Performed by: HOSPITALIST

## 2017-04-05 PROCEDURE — 74011250637 HC RX REV CODE- 250/637: Performed by: INTERNAL MEDICINE

## 2017-04-05 PROCEDURE — 85610 PROTHROMBIN TIME: CPT | Performed by: INTERNAL MEDICINE

## 2017-04-05 PROCEDURE — 74011250636 HC RX REV CODE- 250/636: Performed by: HOSPITALIST

## 2017-04-05 PROCEDURE — 65660000000 HC RM CCU STEPDOWN

## 2017-04-05 PROCEDURE — 74011636637 HC RX REV CODE- 636/637: Performed by: INTERNAL MEDICINE

## 2017-04-05 PROCEDURE — 85730 THROMBOPLASTIN TIME PARTIAL: CPT | Performed by: SURGERY

## 2017-04-05 PROCEDURE — 85027 COMPLETE CBC AUTOMATED: CPT | Performed by: INTERNAL MEDICINE

## 2017-04-05 PROCEDURE — 74011250637 HC RX REV CODE- 250/637: Performed by: FAMILY MEDICINE

## 2017-04-05 PROCEDURE — 82962 GLUCOSE BLOOD TEST: CPT

## 2017-04-05 RX ADMIN — MULTIPLE VITAMINS W/ MINERALS TAB 1 TABLET: TAB at 08:19

## 2017-04-05 RX ADMIN — MESALAMINE 1000 MG: 250 CAPSULE ORAL at 21:22

## 2017-04-05 RX ADMIN — OXYCODONE HYDROCHLORIDE AND ACETAMINOPHEN 1 TABLET: 5; 325 TABLET ORAL at 12:18

## 2017-04-05 RX ADMIN — HYDROMORPHONE HYDROCHLORIDE 1 MG: 1 INJECTION, SOLUTION INTRAMUSCULAR; INTRAVENOUS; SUBCUTANEOUS at 14:04

## 2017-04-05 RX ADMIN — PREDNISONE 40 MG: 20 TABLET ORAL at 08:19

## 2017-04-05 RX ADMIN — HYDROMORPHONE HYDROCHLORIDE 1 MG: 1 INJECTION, SOLUTION INTRAMUSCULAR; INTRAVENOUS; SUBCUTANEOUS at 04:59

## 2017-04-05 RX ADMIN — MESALAMINE 1000 MG: 250 CAPSULE ORAL at 12:18

## 2017-04-05 RX ADMIN — CARVEDILOL 3.12 MG: 3.12 TABLET, FILM COATED ORAL at 08:19

## 2017-04-05 RX ADMIN — MESALAMINE 1000 MG: 250 CAPSULE ORAL at 18:37

## 2017-04-05 RX ADMIN — ATORVASTATIN CALCIUM 80 MG: 20 TABLET, FILM COATED ORAL at 21:21

## 2017-04-05 RX ADMIN — HYDROMORPHONE HYDROCHLORIDE 1 MG: 1 INJECTION, SOLUTION INTRAMUSCULAR; INTRAVENOUS; SUBCUTANEOUS at 09:08

## 2017-04-05 RX ADMIN — MESALAMINE 1000 MG: 250 CAPSULE ORAL at 08:18

## 2017-04-05 RX ADMIN — HYDROMORPHONE HYDROCHLORIDE 1 MG: 1 INJECTION, SOLUTION INTRAMUSCULAR; INTRAVENOUS; SUBCUTANEOUS at 21:23

## 2017-04-05 RX ADMIN — DOCUSATE SODIUM 100 MG: 100 CAPSULE, LIQUID FILLED ORAL at 08:19

## 2017-04-05 RX ADMIN — HYDROMORPHONE HYDROCHLORIDE 1 MG: 1 INJECTION, SOLUTION INTRAMUSCULAR; INTRAVENOUS; SUBCUTANEOUS at 18:38

## 2017-04-05 RX ADMIN — GABAPENTIN 200 MG: 100 CAPSULE ORAL at 18:37

## 2017-04-05 RX ADMIN — CARVEDILOL 3.12 MG: 3.12 TABLET, FILM COATED ORAL at 18:37

## 2017-04-05 RX ADMIN — GABAPENTIN 200 MG: 100 CAPSULE ORAL at 08:20

## 2017-04-05 RX ADMIN — GABAPENTIN 200 MG: 100 CAPSULE ORAL at 21:21

## 2017-04-05 NOTE — PROGRESS NOTES
Pt feeling ok  Was anxious about pending procedure    Would like to do a percutaneous procedure but fear high risk of complication  Will try thrombectomy, also high risk for failure  Discussed is at risk for amp  Plan for intervention tomorrow

## 2017-04-05 NOTE — INTERDISCIPLINARY ROUNDS
Interdisciplinary Round Note   Patient Information: Latosha Olsen                                      211/47 Reason for Admission: Rectal bleed, crohns, anemia, dizziness.   GI bleed  Quality Measure: Not applicable            Attending Provider:   Phani Branch DO  Primary Care Physician:       None       None  Consulting:  IP CONSULT TO HOSPITALIST  IP CONSULT TO GASTROENTEROLOGY  IP CONSULT TO GENERAL SURGERY  IP CONSULT TO VASCULAR SURGERY  IP CONSULT TO VASCULAR SURGERY  IDR Rounding Physician:  Past Medical History:   Past Medical History:   Diagnosis Date    Abdominal pain     Anemia NEC     sickle cell trait    C. difficile colitis     Crohn's disease ([address]     Gastrointestinal disorder     Crohns    Herpes zoster     Hx of cocaine abuse     Hyperkalemia     Hypertension     Iron deficiency anemia     MRSA (methicillin resistant Staphylococcus aureus)     Obesity     Pain management     S[facility]     Stroke ([facility]     + cva 3/17        Hospital day: 9                          3d 4h  Estimated discharge date:   RRAT Score: Medium Risk            16       Total Score        3 Relationship with PCP    3 Patient Length of Stay > 5    4 More than 1 Admission in calendar year    4 Patient Insurance is Medicare, Medicaid or Self Pay    2 Charlson Comorbidity Score        Criteria that do not apply:    Patient Living Status         Primary Goals for Today: Continue current treatment    Secondary Goals for Today:     Overnight Events: Pain to left foot    Gaming: no    Central Line: Yes    Isolation: no       IV Antibiotics? no       When started:   Current Medication List:          Current Facility-Administered Medications   Medication Dose Route Frequency    0.9% sodium chloride infusion 250 mL  250 mL IntraVENous PRN    heparin 25,000 units in D5W 250 ml infusion  500 Units/hr IntraVENous CONTINUOUS    HYDROmorphone (PF) (DILAUDID) injection 1 mg  1 mg IntraVENous Q4H PRN    promethazine (PHENERGAN) tablet 12.5-25 mg  12.5-25 mg Oral Q6H PRN    predniSONE (DELTASONE) tablet 40 mg  40 mg Oral DAILY WITH BREAKFAST    gabapentin (NEURONTIN) capsule 200 mg  200 mg Oral TID    atorvastatin (LIPITOR) tablet 80 mg  80 mg Oral QHS    carvedilol (COREG) tablet 3.125 mg  3.125 mg Oral BID WITH MEALS    multivitamin, tx-iron-ca-min (THERA-M w/ IRON) tablet 1 Tab  1 Tab Oral DAILY    insulin lispro (HUMALOG) injection   SubCUTAneous AC&HS    glucose chewable tablet 16 g  4 Tab Oral PRN    glucagon (GLUCAGEN) injection 1 mg  1 mg IntraMUSCular PRN    dextrose (D50W) injection syrg 12.5-25 g  25-50 mL IntraVENous PRN    docusate sodium (COLACE) capsule 100 mg  100 mg Oral BID    mesalamine (PENTASA) CR capsule 1,000 mg  1,000 mg Oral QID    oxyCODONE-acetaminophen (PERCOCET) 5-325 mg per tablet 1 Tab  1 Tab Oral Q4H PRN    acetaminophen (TYLENOL) tablet 650 mg  650 mg Oral Q4H PRN    diphenhydrAMINE (BENADRYL) injection 12.5 mg  12.5 mg IntraVENous Q4H PRN    ondansetron (ZOFRAN) injection 4 mg  4 mg IntraVENous Q6H PRN     Facility-Administered Medications Ordered in Other Encounters   Medication Dose Route Frequency    [START ON 4/7/2017] vedolizumab (ENTYVIO) 300 mg in 0.9% sodium chloride 250 mL infusion  300 mg IntraVENous ONCE      VTE Prophylaxis               Sequential Compression Device: Bilateral                 Lines, Drains, & Airways  Triple Lumen 03/28/17 Right Subclavian-Site Assessment: Clean, dry, & intact  [REMOVED] Peripheral IV 03/27/17 Left Antecubital-Site Assessment: Clean, dry, & intact  [REMOVED] Peripheral IV 03/27/17 Left Forearm-Site Assessment: Clean, dry, & intact     Vital signs:  Visit Vitals    BP (!) 133/94 (BP 1 Location: Left arm, BP Patient Position: At rest)    Pulse 73    Temp 98 °F (36.7 °C)    Resp 16    Ht 5' 6\" (1.676 m)    Wt 91.7 kg (202 lb 2.6 oz)    SpO2 100%    Breastfeeding No    BMI 32.63 kg/m2        Intake and Output:   04/03 0701 - 04/04 1900  In: 480 [P.O.:480]  Out: 500 [Urine:500]  04/04 1901 - 04/05 0700  In: 240 [P.O.:240]  Out: 300 [Urine:300]  Last Bowel Movement Date: 04/04/17  Stool Color: Brown (dark)  Stool Appearance: Soft  Stool Amount: Large  Stool Source/Status: Rectum     Current Diet: DIET CARDIAC Regular       Abdominal   Abdominal Assessment: Intact  Appetite: Good  Bowel Sounds: Active   Nutrition  Chewing/Swallowing Problems: No  Difficulty with Secretions: No  Speech Slurred/Thick/Garbled: No    NGT: no    Feeding Tube: no   Recent Glucose Results:   Lab Results   Component Value Date/Time     (H) 04/04/2017 09:26 AM     (H) 04/04/2017 06:35 AM    GLUCPOC 99 04/04/2017 09:36 PM    GLUCPOC 135 (H) 04/04/2017 04:35 PM    GLUCPOC 126 (H) 04/04/2017 11:53 AM    GI Prophylaxis: no        Type:         Activity Level: Activity Level:  Up with Assistance    Needs assistance with ADLs: no  PT Consult Status: no current order  Equipment: none  Surgical/Ortho Notes: see notes   Current Immunizations:  Immunization History   Administered Date(s) Administered    Influenza Vaccine 11/01/2016    Pneumococcal Vaccine (Unspecified Type) 01/01/2010     RRAT Score:     Readmit Risk Tool  Support Systems: Family member(s)  Relationship with Primary Physician Group: Seen at least one time within the past 6 months   Recommendations:       Discharge Disposition: Unable to determine    Needs for Discharge: unknown at this time           Recommendations from IDR team: vascular surgery today    Other Notes: transfer to ICU after surgery

## 2017-04-05 NOTE — PROGRESS NOTES
Hospitalist Progress Note    Patient: Fariba Seaman MRN: [id number]  CSN: [id number]    YOB: 1970  Age: 40 y.o. Sex: female    DOA: 3/27/2017 LOS:  LOS: 9 days                Assessment/Plan     Patient Active Problem List   Diagnosis Code    Chronic pain syndrome G89.4    Cro[facility] disease ([facility] K50.90    Sickle cell trait ([facility] D57.3    Hypertension I10    Hx of cocaine abuse O97.285    Embolic stroke ([facility] W88.2    Acute blood loss anemia D62    Neuropathic pain M79.2    D[facility] venous thrombosis) ([facility] I82.409    GI bleed K92.2    Elevated WBC count D72.829    PAD (peripheral artery disease) ([facility] I73.9            41 yo female admitted for GI bleed,  Recent admission for embolic CVA and recent DVT.     GI bleed - GI following, now bleeding has stopped.       Anemia - acute blood loss, s/p blood transfusion, H/H improved.       Recent DVT/CVA - anticoagulation restarted. On heparin drip.       Right leg pain - right leg arterial duplex >75%stenosis of anterior tibial artery. Vascular following, CTA with right popliteal occlusion, endo vs surgical revascularization. started on heparin drip.      Crohn's disease - management per GI. On steroids, mesalamine and protonix. Seen by colorectal surgery, bleeding stopped, if patient bleeds again she is a candidate of colectomy.       HTN - controlled.      Review of systems  General: No fevers or chills. Cardiovascular: No chest pain or pressure. No palpitations. Pulmonary: No shortness of breath. Gastrointestinal: No nausea, vomiting.           Physical Exam:  General: Awake, cooperative, no acute distress    HEENT: NC, Atraumatic. PERRLA, anicteric sclerae. Lungs: CTA Bilaterally. No Wheezing/Rhonchi/Rales. Heart: Regular rhythm,  No murmur, No Rubs, No Gallops  Abdomen: Soft, Non distended, Non tender.  +Bowel sounds,   Extremities: No c/c/e  Psych:   Not anxious or agitated. Neurologic:  No acute neurological deficit. Vital signs/Intake and Output:  Visit Vitals    /74 (BP 1 Location: Right arm, BP Patient Position: At rest)    Pulse 86    Temp 98.5 °F (36.9 °C)    Resp 18    Ht 5' 6\" (1.676 m)    Wt 91.7 kg (202 lb 2.6 oz)    SpO2 100%    Breastfeeding No    BMI 32.63 kg/m2     Current Shift:  04/05 0701 - 04/05 1900  In: 360 [P.O.:360]  Out: 325 [Urine:325]  Last three shifts:  04/03 1901 - 04/05 0700  In: 570.6 [P.O.:480; I.V.:90.6]  Out: 300 [Urine:300]            Labs: Results:       Chemistry Recent Labs      04/04/17   0926  04/04/17   0635  04/03/17   0250   GLU  127*  123*  114*   NA  143  142  142   K  3.4*  3.4*  3.2*   CL  105  106  107   CO2  29  29  30   BUN  11  10  14   CREA  0.68  0.55*  0.56*   CA  8.3*  8.0*  8.0*   AGAP  9  7  5   BUCR  16  18  25*      CBC w/Diff Recent Labs      04/05/17   0420  04/04/17   0926  04/04/17   0635   WBC  7.7  9.5  7.2   RBC  3.64*  2.83*  2.66*   HGB  10.4*  8.1*  7.7*   HCT  31.7*  25.4*  23.8*   PLT  240  231  206      Cardiac Enzymes No results for input(s): CPK, CKND1, CHANDA in the last 72 hours. No lab exists for component: CKRMB, TROIP   Coagulation Recent Labs      04/05/17   1220  04/05/17   0420  04/04/17   2350   04/04/17   0400   PTP   --   15.2   --    --   18.3*   INR   --   1.2   --    --   1.6*   APTT  32.5   --   38.0*   < >   --     < > = values in this interval not displayed. Lipid Panel Lab Results   Component Value Date/Time    Cholesterol, total 145 03/12/2017 06:15 AM    HDL Cholesterol 37 03/12/2017 06:15 AM    LDL, calculated 91.6 03/12/2017 06:15 AM    VLDL, calculated 16.4 03/12/2017 06:15 AM    Triglyceride 82 03/12/2017 06:15 AM    CHOL/HDL Ratio 3.9 03/12/2017 06:15 AM      BNP No results for input(s): BNPP in the last 72 hours. Liver Enzymes No results for input(s): TP, ALB, TBIL, AP, SGOT, GPT in the last 72 hours.     No lab exists for component: DBIL   Thyroid Studies Lab Results   Component Value Date/Time    TSH 1.51 12/12/2009 09:48 AM        Procedures/imaging: see electronic medical records for all procedures/Xrays and details which were not copied into this note but were reviewed prior to creation of Plan

## 2017-04-05 NOTE — PROGRESS NOTES
Gastrointestinal Progress Note    Patient Name: Korey Solorzano    FHPAT'T Date: 4/4/2017    Admit Date: 3/27/2017    Subjective:     Diet: Patient is on  Regular diet and is tolerating. Nausea is not present. Vomiting is not present. Pain: Patient does not complain of abdominal pain.       Bowel Movements: Normal x 2 no diarrhea or blood    Bleeding:  None   C/o right foot pain since admission for CVA 3/10/ 2017 with cold toes    Current Facility-Administered Medications   Medication Dose Route Frequency    0.9% sodium chloride infusion 250 mL  250 mL IntraVENous PRN    heparin 25,000 units in D5W 250 ml infusion  500 Units/hr IntraVENous CONTINUOUS    HYDROmorphone (PF) (DILAUDID) injection 1 mg  1 mg IntraVENous Q4H PRN    promethazine (PHENERGAN) tablet 12.5-25 mg  12.5-25 mg Oral Q6H PRN    predniSONE (DELTASONE) tablet 40 mg  40 mg Oral DAILY WITH BREAKFAST    gabapentin (NEURONTIN) capsule 200 mg  200 mg Oral TID    atorvastatin (LIPITOR) tablet 80 mg  80 mg Oral QHS    carvedilol (COREG) tablet 3.125 mg  3.125 mg Oral BID WITH MEALS    multivitamin, tx-iron-ca-min (THERA-M w/ IRON) tablet 1 Tab  1 Tab Oral DAILY    insulin lispro (HUMALOG) injection   SubCUTAneous AC&HS    glucose chewable tablet 16 g  4 Tab Oral PRN    glucagon (GLUCAGEN) injection 1 mg  1 mg IntraMUSCular PRN    dextrose (D50W) injection syrg 12.5-25 g  25-50 mL IntraVENous PRN    docusate sodium (COLACE) capsule 100 mg  100 mg Oral BID    mesalamine (PENTASA) CR capsule 1,000 mg  1,000 mg Oral QID    oxyCODONE-acetaminophen (PERCOCET) 5-325 mg per tablet 1 Tab  1 Tab Oral Q4H PRN    acetaminophen (TYLENOL) tablet 650 mg  650 mg Oral Q4H PRN    diphenhydrAMINE (BENADRYL) injection 12.5 mg  12.5 mg IntraVENous Q4H PRN    ondansetron (ZOFRAN) injection 4 mg  4 mg IntraVENous Q6H PRN     Facility-Administered Medications Ordered in Other Encounters   Medication Dose Route Frequency    [START ON 4/7/2017] vedolizumab (ENTYVIO) 300 mg in 0.9% sodium chloride 250 mL infusion  300 mg IntraVENous ONCE          Objective:     Visit Vitals    /88    Pulse 85    Temp 98.7 °F (37.1 °C)    Resp 16    Ht 5' 6\" (1.676 m)    Wt 91.7 kg (202 lb 2.6 oz)    SpO2 99%    Breastfeeding No    BMI 32.63 kg/m2       04/03 0701 - 04/04 1900  In: 480 [P.O.:480]  Out: 500 [Urine:500]    General appearance: alert, cooperative, no distress, appears stated age. Pale  Abdomen: soft,very mild tenderness in the suprapubic are. Bowel sounds normal. No masses,  no organomegaly  Extremities: extremities normal, atraumatic, no edema. Tattoo's  Right foot cold no dorsal pulse cold toes purplish discoloration underneath the great toe raising the possibility of embolic event    Data Review:    Labs: Results:       Chemistry Recent Labs      04/04/17   0926  04/04/17   0635  04/03/17   0250   GLU  127*  123*  114*   NA  143  142  142   K  3.4*  3.4*  3.2*   CL  105  106  107   CO2  29  29  30   BUN  11  10  14   CREA  0.68  0.55*  0.56*   CA  8.3*  8.0*  8.0*   AGAP  9  7  5   BUCR  16  18  25*      CBC w/Diff Recent Labs      04/04/17   0926  04/04/17   0635  04/03/17   0250   WBC  9.5  7.2  12.2   RBC  2.83*  2.66*  2.79*   HGB  8.1*  7.7*  8.0*   HCT  25.4*  23.8*  25.2*   PLT  231  206  220      Coagulation Recent Labs      04/04/17   1154  04/04/17   0400  04/03/17   0250   PTP   --   18.3*  16.1*   INR   --   1.6*  1.3*   APTT  38.0*   --    --        Liver Enzymes No results for input(s): TP, ALB, TBIL, AP, SGOT, GPT in the last 72 hours.     No lab exists for component: DBIL       Assessment:     Principal Problem:    GI bleed (3/28/2017)    Active Problems:    Crohn disease ([address] (4/38/9972)      Embolic stroke ([facility]) (4/05/9433)      Acute blood loss anemia (3/15/2017)      Neuropathic pain (3/15/2017)      DVT (deep venous thrombosis) ([facility]) (3/16/2017)      Elevated WBC count (3/28/2017)      PAD (peripheral artery disease) ([facility] (4/4/2017)        Plan:     Severe lower GI bleeding while on Lovenox and Coumadin for recent DVT and embolic CVA her Hgb went down to 6.5 She required a total of 8 PRBC transfusions and 3 FFP units. Bleeding is coming from the left colon where she has severe active Crohn's disease. The bleeding stopped x at least 5 days no recurrence since she was started on Lovenox and Coumadin on March 31. Presently on the Heparin drip. If bleeds again her best option would be subtotal colectomy. Acute blood loss on chronic iron deficiency anemia secondary to her severe Crohn's disease     Severe Crohn's disease of the left and transverse colon since age 16. Started on [place] which an IV infusion biologic only on March 24. She should be getting her second dose 2 weeks after and then in 6 weeks after this every 8 weeks. She is doing well for now I would like to stop the Solumedrol 25 mg Iv bid and put her on Prednisone 40 mg daily. I discussed her case during a special session on difficult cases of IBD, It was suggested to accelerate her cure and giving all the chances for Jocelynn to do the maximum job because it can take up to 4 months to have the maximum effect mean while I can not just rely on high dose steroids. I adding the Methotrexate in this case. The first case would be subcutaneous as a loading dose 25 mg  But on discharge she would be getting merely 10 mg once a week hope this would help her case. The world renowned expert on this subject is coming to [place] on April 10 to give a talk moming from [facility]. I would like to meet with him to discuss this case further. Our worst enemy in her case with history of severe MRSA positive infection and Shingles would be the steroids. I explained to the patient the rational behind adding low dose Methotrexate at least for the first 6 months. I told her that there is most ly an increased risk mostly of infection and very rare for neoplasm or cancer.  She told me that she has tubal ligation as the methotrexate is teratogenic. Patient did not want to have a colectomy. On discharge she would be getting on top of the steroids taper. The Pentasa and the Entyvio she would be getting Methotrexate 10 mg once weekly ( 2.5 mg in 4 separate doses once a week) x 6 months    Hypercoagulable state with bilateral DVT and embolic CVA on  March 10, 2017 and possibly emboli to her right foot. She was started on Coumadin and Lovenox in my opinion is most likely secondary to the severe aggressive Crohn's disease. Right forefoot and right leg pain with purplish discoloration under the great toe and cold foot Raise is due to popliteal artery thrombosis/emboli. Supposed to get surgery. Presently on Heparin.  She has no more rectal bleeding so she could get the full dose anticoagulation      Raj Guerra MD  April 4, 2017

## 2017-04-05 NOTE — PROGRESS NOTES
Shift Summary- Pt medicated for nausea and pain during the night. Heparin drip continues. Labs drawn from line and sent to lab. Brown port to central line leaking when flushed.       Patient Vitals for the past 12 hrs:   Temp Pulse Resp BP SpO2   04/05/17 0524 98 °F (36.7 °C) 73 16 (!) 133/94 100 %   04/05/17 0016 97.8 °F (36.6 °C) 75 16 128/78 100 %   04/04/17 2020 98.7 °F (37.1 °C) 85 16 129/88 99 %

## 2017-04-05 NOTE — ROUTINE PROCESS
Bedside shift change report given to Jolanda Meckel RN (oncoming nurse) by Axel Hdz RN (offgoing nurse). Report included the following information SBAR, Kardex, MAR and Recent Results.  NSR on tele

## 2017-04-06 ENCOUNTER — APPOINTMENT (OUTPATIENT)
Dept: INFUSION THERAPY | Age: 47
End: 2017-04-06

## 2017-04-06 ENCOUNTER — ANESTHESIA (OUTPATIENT)
Dept: SURGERY | Age: 47
DRG: 950 | End: 2017-04-06
Payer: MEDICAID

## 2017-04-06 ENCOUNTER — APPOINTMENT (OUTPATIENT)
Dept: GENERAL RADIOLOGY | Age: 47
DRG: 950 | End: 2017-04-06
Attending: SURGERY
Payer: MEDICAID

## 2017-04-06 PROBLEM — I99.9 VASCULAR ABNORMALITY: Status: ACTIVE | Noted: 2017-04-06

## 2017-04-06 LAB
APTT PPP: 28.6 SEC (ref 23–36.4)
ERYTHROCYTE [DISTWIDTH] IN BLOOD BY AUTOMATED COUNT: 15.6 % (ref 11.6–14.5)
GLUCOSE BLD STRIP.AUTO-MCNC: 109 MG/DL (ref 70–110)
GLUCOSE BLD STRIP.AUTO-MCNC: 80 MG/DL (ref 70–110)
GLUCOSE BLD STRIP.AUTO-MCNC: 82 MG/DL (ref 70–110)
HCG SERPL QL: NEGATIVE
HCT VFR BLD AUTO: 30 % (ref 35–45)
HGB BLD-MCNC: 9.9 G/DL (ref 12–16)
INR PPP: 1 (ref 0.8–1.2)
MCH RBC QN AUTO: 28.7 PG (ref 24–34)
MCHC RBC AUTO-ENTMCNC: 33 G/DL (ref 31–37)
MCV RBC AUTO: 87 FL (ref 74–97)
PLATELET # BLD AUTO: 246 K/UL (ref 135–420)
PMV BLD AUTO: 8.6 FL (ref 9.2–11.8)
PROTHROMBIN TIME: 12.7 SEC (ref 11.5–15.2)
RBC # BLD AUTO: 3.45 M/UL (ref 4.2–5.3)
WBC # BLD AUTO: 9.4 K/UL (ref 4.6–13.2)

## 2017-04-06 PROCEDURE — 74011250636 HC RX REV CODE- 250/636: Performed by: SURGERY

## 2017-04-06 PROCEDURE — 74011636637 HC RX REV CODE- 636/637: Performed by: INTERNAL MEDICINE

## 2017-04-06 PROCEDURE — 77030020782 HC GWN BAIR PAWS FLX 3M -B: Performed by: SURGERY

## 2017-04-06 PROCEDURE — 77030011265 HC ELECTRD BLD HEX COVD -A: Performed by: SURGERY

## 2017-04-06 PROCEDURE — 76010000108 HC CV SURG 2 TO 2.5 HR: Performed by: SURGERY

## 2017-04-06 PROCEDURE — 77010033678 HC OXYGEN DAILY

## 2017-04-06 PROCEDURE — 88305 TISSUE EXAM BY PATHOLOGIST: CPT | Performed by: SURGERY

## 2017-04-06 PROCEDURE — 77030031139 HC SUT VCRL2 J&J -A: Performed by: SURGERY

## 2017-04-06 PROCEDURE — 77030002935 HC SUT MCRYL J&J -C: Performed by: SURGERY

## 2017-04-06 PROCEDURE — 74011250637 HC RX REV CODE- 250/637: Performed by: INTERNAL MEDICINE

## 2017-04-06 PROCEDURE — 77030010514 HC APPL CLP LIG COVD -B: Performed by: SURGERY

## 2017-04-06 PROCEDURE — 82962 GLUCOSE BLOOD TEST: CPT

## 2017-04-06 PROCEDURE — 76210000006 HC OR PH I REC 0.5 TO 1 HR: Performed by: SURGERY

## 2017-04-06 PROCEDURE — 04CM0ZZ EXTIRPATION OF MATTER FROM RIGHT POPLITEAL ARTERY, OPEN APPROACH: ICD-10-PCS | Performed by: SURGERY

## 2017-04-06 PROCEDURE — 77030008467 HC STPLR SKN COVD -B: Performed by: SURGERY

## 2017-04-06 PROCEDURE — 74011250636 HC RX REV CODE- 250/636: Performed by: ANESTHESIOLOGY

## 2017-04-06 PROCEDURE — 74011000272 HC RX REV CODE- 272: Performed by: SURGERY

## 2017-04-06 PROCEDURE — 36591 DRAW BLOOD OFF VENOUS DEVICE: CPT

## 2017-04-06 PROCEDURE — 74011250636 HC RX REV CODE- 250/636: Performed by: HOSPITALIST

## 2017-04-06 PROCEDURE — 77030010507 HC ADH SKN DERMBND J&J -B: Performed by: SURGERY

## 2017-04-06 PROCEDURE — C1768 GRAFT, VASCULAR: HCPCS | Performed by: SURGERY

## 2017-04-06 PROCEDURE — 74011250637 HC RX REV CODE- 250/637: Performed by: SURGERY

## 2017-04-06 PROCEDURE — 74011250636 HC RX REV CODE- 250/636

## 2017-04-06 PROCEDURE — 76060000035 HC ANESTHESIA 2 TO 2.5 HR: Performed by: SURGERY

## 2017-04-06 PROCEDURE — 85730 THROMBOPLASTIN TIME PARTIAL: CPT | Performed by: SURGERY

## 2017-04-06 PROCEDURE — 77030002987 HC SUT PROL J&J -B: Performed by: SURGERY

## 2017-04-06 PROCEDURE — 77030002986 HC SUT PROL J&J -A: Performed by: SURGERY

## 2017-04-06 PROCEDURE — 85027 COMPLETE CBC AUTOMATED: CPT | Performed by: SURGERY

## 2017-04-06 PROCEDURE — 77030010512 HC APPL CLP LIG J&J -C: Performed by: SURGERY

## 2017-04-06 PROCEDURE — 77030018836 HC SOL IRR NACL ICUM -A: Performed by: SURGERY

## 2017-04-06 PROCEDURE — 74011000250 HC RX REV CODE- 250

## 2017-04-06 PROCEDURE — 77030011640 HC PAD GRND REM COVD -A: Performed by: SURGERY

## 2017-04-06 PROCEDURE — 84703 CHORIONIC GONADOTROPIN ASSAY: CPT | Performed by: SURGERY

## 2017-04-06 PROCEDURE — 85610 PROTHROMBIN TIME: CPT | Performed by: SURGERY

## 2017-04-06 PROCEDURE — 74011250637 HC RX REV CODE- 250/637: Performed by: HOSPITALIST

## 2017-04-06 PROCEDURE — 77030002996 HC SUT SLK J&J -A: Performed by: SURGERY

## 2017-04-06 PROCEDURE — 04UM0KZ SUPPLEMENT RIGHT POPLITEAL ARTERY WITH NONAUTOLOGOUS TISSUE SUBSTITUTE, OPEN APPROACH: ICD-10-PCS | Performed by: SURGERY

## 2017-04-06 PROCEDURE — 65610000006 HC RM INTENSIVE CARE

## 2017-04-06 PROCEDURE — 77030034850: Performed by: SURGERY

## 2017-04-06 PROCEDURE — 77030002933 HC SUT MCRYL J&J -A: Performed by: SURGERY

## 2017-04-06 PROCEDURE — 36415 COLL VENOUS BLD VENIPUNCTURE: CPT | Performed by: SURGERY

## 2017-04-06 PROCEDURE — 88304 TISSUE EXAM BY PATHOLOGIST: CPT | Performed by: SURGERY

## 2017-04-06 PROCEDURE — 8E0YXBF COMPUTER ASSISTED PROCEDURE OF LOWER EXTREMITY, WITH FLUOROSCOPY: ICD-10-PCS | Performed by: SURGERY

## 2017-04-06 PROCEDURE — C1757 CATH, THROMBECTOMY/EMBOLECT: HCPCS | Performed by: SURGERY

## 2017-04-06 PROCEDURE — 74011250637 HC RX REV CODE- 250/637: Performed by: FAMILY MEDICINE

## 2017-04-06 DEVICE — XENOSURE BIOLOGIC PATCH, 0.8CM X 8CM, EIFU
Type: IMPLANTABLE DEVICE | Site: KNEE | Status: FUNCTIONAL
Brand: XENOSURE BIOLOGIC PATCH

## 2017-04-06 RX ORDER — GUAIFENESIN 100 MG/5ML
81 LIQUID (ML) ORAL DAILY
Status: DISCONTINUED | OUTPATIENT
Start: 2017-04-07 | End: 2017-04-18 | Stop reason: HOSPADM

## 2017-04-06 RX ORDER — HEPARIN SODIUM 10000 [USP'U]/100ML
800 INJECTION, SOLUTION INTRAVENOUS CONTINUOUS
Status: DISCONTINUED | OUTPATIENT
Start: 2017-04-06 | End: 2017-04-18

## 2017-04-06 RX ORDER — PROPOFOL 10 MG/ML
INJECTION, EMULSION INTRAVENOUS AS NEEDED
Status: DISCONTINUED | OUTPATIENT
Start: 2017-04-06 | End: 2017-04-06 | Stop reason: HOSPADM

## 2017-04-06 RX ORDER — HYDROMORPHONE HYDROCHLORIDE 1 MG/ML
1 INJECTION, SOLUTION INTRAMUSCULAR; INTRAVENOUS; SUBCUTANEOUS
Status: DISCONTINUED | OUTPATIENT
Start: 2017-04-06 | End: 2017-04-08

## 2017-04-06 RX ORDER — FLUMAZENIL 0.1 MG/ML
0.2 INJECTION INTRAVENOUS
Status: DISCONTINUED | OUTPATIENT
Start: 2017-04-06 | End: 2017-04-06 | Stop reason: HOSPADM

## 2017-04-06 RX ORDER — FENTANYL CITRATE 50 UG/ML
50 INJECTION, SOLUTION INTRAMUSCULAR; INTRAVENOUS AS NEEDED
Status: DISCONTINUED | OUTPATIENT
Start: 2017-04-06 | End: 2017-04-06 | Stop reason: HOSPADM

## 2017-04-06 RX ORDER — WARFARIN SODIUM 5 MG/1
5 TABLET ORAL DAILY
Status: DISCONTINUED | OUTPATIENT
Start: 2017-04-07 | End: 2017-04-10 | Stop reason: DRUGHIGH

## 2017-04-06 RX ORDER — DEXTROSE 50 % IN WATER (D50W) INTRAVENOUS SYRINGE
25-50 AS NEEDED
Status: DISCONTINUED | OUTPATIENT
Start: 2017-04-06 | End: 2017-04-06 | Stop reason: HOSPADM

## 2017-04-06 RX ORDER — GLYCOPYRROLATE 0.2 MG/ML
INJECTION INTRAMUSCULAR; INTRAVENOUS AS NEEDED
Status: DISCONTINUED | OUTPATIENT
Start: 2017-04-06 | End: 2017-04-06 | Stop reason: HOSPADM

## 2017-04-06 RX ORDER — SODIUM CHLORIDE 0.9 % (FLUSH) 0.9 %
5-10 SYRINGE (ML) INJECTION AS NEEDED
Status: DISCONTINUED | OUTPATIENT
Start: 2017-04-06 | End: 2017-04-06 | Stop reason: HOSPADM

## 2017-04-06 RX ORDER — CEFAZOLIN SODIUM 2 G/50ML
2 SOLUTION INTRAVENOUS ONCE
Status: COMPLETED | OUTPATIENT
Start: 2017-04-06 | End: 2017-04-06

## 2017-04-06 RX ORDER — LIDOCAINE HYDROCHLORIDE 20 MG/ML
INJECTION, SOLUTION EPIDURAL; INFILTRATION; INTRACAUDAL; PERINEURAL AS NEEDED
Status: DISCONTINUED | OUTPATIENT
Start: 2017-04-06 | End: 2017-04-06 | Stop reason: HOSPADM

## 2017-04-06 RX ORDER — GABAPENTIN 400 MG/1
400 CAPSULE ORAL 3 TIMES DAILY
Status: DISCONTINUED | OUTPATIENT
Start: 2017-04-06 | End: 2017-04-18 | Stop reason: HOSPADM

## 2017-04-06 RX ORDER — HYDROMORPHONE HYDROCHLORIDE 2 MG/ML
INJECTION, SOLUTION INTRAMUSCULAR; INTRAVENOUS; SUBCUTANEOUS AS NEEDED
Status: DISCONTINUED | OUTPATIENT
Start: 2017-04-06 | End: 2017-04-06 | Stop reason: HOSPADM

## 2017-04-06 RX ORDER — SODIUM CHLORIDE, SODIUM LACTATE, POTASSIUM CHLORIDE, CALCIUM CHLORIDE 600; 310; 30; 20 MG/100ML; MG/100ML; MG/100ML; MG/100ML
1000 INJECTION, SOLUTION INTRAVENOUS CONTINUOUS
Status: DISCONTINUED | OUTPATIENT
Start: 2017-04-06 | End: 2017-04-18 | Stop reason: HOSPADM

## 2017-04-06 RX ORDER — KETOROLAC TROMETHAMINE 15 MG/ML
15 INJECTION, SOLUTION INTRAMUSCULAR; INTRAVENOUS
Status: DISPENSED | OUTPATIENT
Start: 2017-04-06 | End: 2017-04-11

## 2017-04-06 RX ORDER — NALOXONE HYDROCHLORIDE 0.4 MG/ML
0.1 INJECTION, SOLUTION INTRAMUSCULAR; INTRAVENOUS; SUBCUTANEOUS AS NEEDED
Status: DISCONTINUED | OUTPATIENT
Start: 2017-04-06 | End: 2017-04-06 | Stop reason: HOSPADM

## 2017-04-06 RX ORDER — MAGNESIUM SULFATE 100 %
4 CRYSTALS MISCELLANEOUS AS NEEDED
Status: DISCONTINUED | OUTPATIENT
Start: 2017-04-06 | End: 2017-04-06 | Stop reason: HOSPADM

## 2017-04-06 RX ORDER — ATORVASTATIN CALCIUM 20 MG/1
80 TABLET, FILM COATED ORAL
Status: DISCONTINUED | OUTPATIENT
Start: 2017-04-06 | End: 2017-04-06 | Stop reason: SDUPTHER

## 2017-04-06 RX ORDER — HYDROMORPHONE HYDROCHLORIDE 2 MG/ML
0.5 INJECTION, SOLUTION INTRAMUSCULAR; INTRAVENOUS; SUBCUTANEOUS
Status: DISCONTINUED | OUTPATIENT
Start: 2017-04-06 | End: 2017-04-06 | Stop reason: HOSPADM

## 2017-04-06 RX ORDER — SODIUM CHLORIDE, SODIUM LACTATE, POTASSIUM CHLORIDE, CALCIUM CHLORIDE 600; 310; 30; 20 MG/100ML; MG/100ML; MG/100ML; MG/100ML
INJECTION, SOLUTION INTRAVENOUS
Status: DISCONTINUED | OUTPATIENT
Start: 2017-04-06 | End: 2017-04-06 | Stop reason: HOSPADM

## 2017-04-06 RX ORDER — MIDAZOLAM HYDROCHLORIDE 1 MG/ML
INJECTION, SOLUTION INTRAMUSCULAR; INTRAVENOUS AS NEEDED
Status: DISCONTINUED | OUTPATIENT
Start: 2017-04-06 | End: 2017-04-06 | Stop reason: HOSPADM

## 2017-04-06 RX ORDER — HEPARIN SODIUM 1000 [USP'U]/ML
INJECTION, SOLUTION INTRAVENOUS; SUBCUTANEOUS AS NEEDED
Status: DISCONTINUED | OUTPATIENT
Start: 2017-04-06 | End: 2017-04-06 | Stop reason: HOSPADM

## 2017-04-06 RX ORDER — KETOROLAC TROMETHAMINE 15 MG/ML
15 INJECTION, SOLUTION INTRAMUSCULAR; INTRAVENOUS EVERY 6 HOURS
Status: DISCONTINUED | OUTPATIENT
Start: 2017-04-07 | End: 2017-04-06

## 2017-04-06 RX ORDER — ONDANSETRON 2 MG/ML
INJECTION INTRAMUSCULAR; INTRAVENOUS AS NEEDED
Status: DISCONTINUED | OUTPATIENT
Start: 2017-04-06 | End: 2017-04-06 | Stop reason: HOSPADM

## 2017-04-06 RX ORDER — FENTANYL CITRATE 50 UG/ML
INJECTION, SOLUTION INTRAMUSCULAR; INTRAVENOUS AS NEEDED
Status: DISCONTINUED | OUTPATIENT
Start: 2017-04-06 | End: 2017-04-06 | Stop reason: HOSPADM

## 2017-04-06 RX ADMIN — PROPOFOL 200 MG: 10 INJECTION, EMULSION INTRAVENOUS at 15:23

## 2017-04-06 RX ADMIN — HYDROMORPHONE HYDROCHLORIDE 1 MG: 1 INJECTION, SOLUTION INTRAMUSCULAR; INTRAVENOUS; SUBCUTANEOUS at 05:33

## 2017-04-06 RX ADMIN — GABAPENTIN 200 MG: 100 CAPSULE ORAL at 08:45

## 2017-04-06 RX ADMIN — OXYCODONE HYDROCHLORIDE AND ACETAMINOPHEN 1 TABLET: 5; 325 TABLET ORAL at 08:44

## 2017-04-06 RX ADMIN — CEFAZOLIN SODIUM 2 G: 2 SOLUTION INTRAVENOUS at 15:14

## 2017-04-06 RX ADMIN — MESALAMINE 1000 MG: 250 CAPSULE ORAL at 21:10

## 2017-04-06 RX ADMIN — SODIUM CHLORIDE, SODIUM LACTATE, POTASSIUM CHLORIDE, AND CALCIUM CHLORIDE 1000 ML: 600; 310; 30; 20 INJECTION, SOLUTION INTRAVENOUS at 13:18

## 2017-04-06 RX ADMIN — HYDROMORPHONE HYDROCHLORIDE 1 MG: 1 INJECTION, SOLUTION INTRAMUSCULAR; INTRAVENOUS; SUBCUTANEOUS at 01:34

## 2017-04-06 RX ADMIN — FENTANYL CITRATE 50 MCG: 50 INJECTION, SOLUTION INTRAMUSCULAR; INTRAVENOUS at 16:29

## 2017-04-06 RX ADMIN — FENTANYL CITRATE 100 MCG: 50 INJECTION, SOLUTION INTRAMUSCULAR; INTRAVENOUS at 15:23

## 2017-04-06 RX ADMIN — FENTANYL CITRATE 50 MCG: 50 INJECTION, SOLUTION INTRAMUSCULAR; INTRAVENOUS at 16:24

## 2017-04-06 RX ADMIN — ONDANSETRON 4 MG: 2 INJECTION INTRAMUSCULAR; INTRAVENOUS at 15:26

## 2017-04-06 RX ADMIN — GABAPENTIN 400 MG: 100 CAPSULE ORAL at 21:10

## 2017-04-06 RX ADMIN — HYDROMORPHONE HYDROCHLORIDE 1 MG: 2 INJECTION, SOLUTION INTRAMUSCULAR; INTRAVENOUS; SUBCUTANEOUS at 15:23

## 2017-04-06 RX ADMIN — MULTIPLE VITAMINS W/ MINERALS TAB 1 TABLET: TAB at 08:44

## 2017-04-06 RX ADMIN — HEPARIN SODIUM AND DEXTROSE 500 UNITS/HR: 10000; 5 INJECTION INTRAVENOUS at 19:20

## 2017-04-06 RX ADMIN — HYDROMORPHONE HYDROCHLORIDE 1 MG: 1 INJECTION, SOLUTION INTRAMUSCULAR; INTRAVENOUS; SUBCUTANEOUS at 21:12

## 2017-04-06 RX ADMIN — HEPARIN SODIUM 6000 UNITS: 1000 INJECTION, SOLUTION INTRAVENOUS; SUBCUTANEOUS at 16:15

## 2017-04-06 RX ADMIN — SODIUM CHLORIDE, SODIUM LACTATE, POTASSIUM CHLORIDE, CALCIUM CHLORIDE: 600; 310; 30; 20 INJECTION, SOLUTION INTRAVENOUS at 15:46

## 2017-04-06 RX ADMIN — MIDAZOLAM HYDROCHLORIDE 2 MG: 1 INJECTION, SOLUTION INTRAMUSCULAR; INTRAVENOUS at 15:10

## 2017-04-06 RX ADMIN — HYDROMORPHONE HYDROCHLORIDE 0.5 MG: 2 INJECTION, SOLUTION INTRAMUSCULAR; INTRAVENOUS; SUBCUTANEOUS at 16:55

## 2017-04-06 RX ADMIN — HYDROMORPHONE HYDROCHLORIDE 1 MG: 1 INJECTION, SOLUTION INTRAMUSCULAR; INTRAVENOUS; SUBCUTANEOUS at 18:49

## 2017-04-06 RX ADMIN — OXYCODONE HYDROCHLORIDE AND ACETAMINOPHEN 1 TABLET: 5; 325 TABLET ORAL at 02:27

## 2017-04-06 RX ADMIN — ATORVASTATIN CALCIUM 80 MG: 20 TABLET, FILM COATED ORAL at 21:11

## 2017-04-06 RX ADMIN — HYDROMORPHONE HYDROCHLORIDE 1 MG: 1 INJECTION, SOLUTION INTRAMUSCULAR; INTRAVENOUS; SUBCUTANEOUS at 23:11

## 2017-04-06 RX ADMIN — FENTANYL CITRATE 50 MCG: 50 INJECTION, SOLUTION INTRAMUSCULAR; INTRAVENOUS at 17:51

## 2017-04-06 RX ADMIN — FENTANYL CITRATE 50 MCG: 50 INJECTION, SOLUTION INTRAMUSCULAR; INTRAVENOUS at 18:00

## 2017-04-06 RX ADMIN — MESALAMINE 1000 MG: 250 CAPSULE ORAL at 08:45

## 2017-04-06 RX ADMIN — PREDNISONE 40 MG: 20 TABLET ORAL at 08:45

## 2017-04-06 RX ADMIN — FENTANYL CITRATE 50 MCG: 50 INJECTION, SOLUTION INTRAMUSCULAR; INTRAVENOUS at 16:11

## 2017-04-06 RX ADMIN — LIDOCAINE HYDROCHLORIDE 80 MG: 20 INJECTION, SOLUTION EPIDURAL; INFILTRATION; INTRACAUDAL; PERINEURAL at 15:23

## 2017-04-06 RX ADMIN — SODIUM CHLORIDE, SODIUM LACTATE, POTASSIUM CHLORIDE, CALCIUM CHLORIDE: 600; 310; 30; 20 INJECTION, SOLUTION INTRAVENOUS at 15:10

## 2017-04-06 RX ADMIN — HYDROMORPHONE HYDROCHLORIDE 0.5 MG: 2 INJECTION, SOLUTION INTRAMUSCULAR; INTRAVENOUS; SUBCUTANEOUS at 17:04

## 2017-04-06 RX ADMIN — GLYCOPYRROLATE 0.2 MG: 0.2 INJECTION INTRAMUSCULAR; INTRAVENOUS at 15:10

## 2017-04-06 RX ADMIN — CARVEDILOL 3.12 MG: 3.12 TABLET, FILM COATED ORAL at 08:45

## 2017-04-06 RX ADMIN — DOCUSATE SODIUM 100 MG: 100 CAPSULE, LIQUID FILLED ORAL at 08:45

## 2017-04-06 NOTE — PROGRESS NOTES
Vascular Surgery Progress Note    Admit Date: 3/27/2017  POD Day of Surgery    Procedure:  Procedure(s):  RIGHT LEG THROMBECTOMY W/REPAIR POPLITEAL ARTERY W/C-ARM,ANGIOGRAM - ROOM# 319      Subjective:     Patient has no new complaints. pain right foot    Objective:     Blood pressure 125/82, pulse 71, temperature 98.4 °F (36.9 °C), resp. rate 16, height 5' 6\" (1.676 m), weight 197 lb 5 oz (89.5 kg), last menstrual period 2017, SpO2 100 %, not currently breastfeeding. Temp (24hrs), Av.5 °F (36.9 °C), Min:98 °F (36.7 °C), Max:98.9 °F (37.2 °C)      Physical Exam:  LUNG:  clear to auscultation bilaterally, HEART:  S1, S2 normal, ABDOMEN:  no change and right foot with no pulse, ischemic pain, ischemia at right base first toe    Labs: Results:       Chemistry Recent Labs      17   0926  17   0635   GLU  127*  123*   NA  143  142   K  3.4*  3.4*   CL  105  106   CO2  29  29   BUN  11  10   CREA  0.68  0.55*   CA  8.3*  8.0*   AGAP  9  7   BUCR  16  18      CBC w/Diff Recent Labs      17   0030  17   0420  17   0926   WBC  9.4  7.7  9.5   RBC  3.45*  3.64*  2.83*   HGB  9.9*  10.4*  8.1*   HCT  30.0*  31.7*  25.4*   PLT  246  240  231      Microbiology No results for input(s): CULT in the last 72 hours.    Coagulation Recent Labs      17   0030  17   1220  17   0420   PTP  12.7   --   15.2   INR  1.0   --   1.2   APTT  28.6  32.5   --          Data Review: images and reports reviewed    Assessment:     Principal Problem:    GI bleed (3/28/2017)    Active Problems:    Crohn disease ([facility] ()      [facility] stroke ([facility] ()      Acute blood loss anemia (3/15/2017)      Neuropathic pain (3/15/2017)      DVT (deep venous thrombosis) ([facility] (3/16/2017)      Elevated WBC count (3/28/2017)      PAD (peripheral artery disease) ([facility] (2017)        Plan/Recommendations/Medical Decision Making:     discussed at length, will advise attempt thrombectomy Cath based approach may prove more thrombogenic  High risk of re occlusion and failure  Amputation toes or leg at risk

## 2017-04-06 NOTE — PERIOP NOTES
TRANSFER - IN REPORT:    Verbal report received from Bernard Carballo CRNA and Shawn Subramanian RN (name) on Lori Bar  being received from OR (unit) for routine progression of care      Report consisted of patients Situation, Background, Assessment and   Recommendations(SBAR). Information from the following report(s) SBAR, OR Summary, Procedure Summary, Intake/Output and MAR was reviewed with the receiving nurse. Opportunity for questions and clarification was provided. Assessment completed upon patients arrival to unit and care assumed.

## 2017-04-06 NOTE — ANESTHESIA POSTPROCEDURE EVALUATION
Post-Anesthesia Evaluation and Assessment    Cardiovascular Function/Vital Signs  Visit Vitals    /81    Pulse 83    Temp 36.9 °C (98.5 °F)    Resp 11    Ht 5' 6\" (1.676 m)    Wt 89.5 kg (197 lb 5 oz)    SpO2 100%    Breastfeeding No    BMI 31.85 kg/m2       Patient is status post Procedure(s) with comments:  RIGHT LEG THROMBECTOMY WITH REPAIR POPLITEAL ARTERY WITH C-ARM - Right Popliteal Artery. Nausea/Vomiting: Controlled. Postoperative hydration reviewed and adequate. Pain:  Pain Scale 1: Numeric (0 - 10) (04/06/17 1750)  Pain Intensity 1: 6 (04/06/17 1740)   Managed. Neurological Status:   Neuro (WDL): Within Defined Limits (04/06/17 1730)   At baseline. Mental Status and Level of Consciousness: Arousable. Pulmonary Status:   O2 Device: Room air (04/06/17 1745)   Adequate oxygenation and airway patent. Complications related to anesthesia: None    Post-anesthesia assessment completed. No concerns. Patient has met all discharge requirements.     Signed By: Elliott Mosley CRNA    April 6, 2017

## 2017-04-06 NOTE — PROGRESS NOTES
Shift Progress note:  Assumed care of patient in bed with visitors @ bedside. Patient now npo for procedure. VSS.  continues to c/o right leg pain call bell with in reach VSS  Patient Vitals for the past 12 hrs:   Temp Pulse Resp BP SpO2   04/06/17 0434 98 °F (36.7 °C) 74 16 133/88 100 %   04/06/17 0044 98.4 °F (36.9 °C) 83 16 113/66 99 %   04/05/17 2018 98.8 °F (37.1 °C) 79 16 129/85 99 %

## 2017-04-06 NOTE — ROUTINE PROCESS
Bedside and Verbal shift change report given to Kanika Thornton RN (oncoming nurse) by Julia Plaza RN (offgoing nurse). Report included the following information SBAR, Kardex, Procedure Summary, Intake/Output, MAR, Accordion, Recent Results and Med Rec Status.

## 2017-04-06 NOTE — ANESTHESIA PREPROCEDURE EVALUATION
Anesthetic History   No history of anesthetic complications            Review of Systems / Medical History  Patient summary reviewed, nursing notes reviewed and pertinent labs reviewed    Pulmonary  Within defined limits                 Neuro/Psych       CVA  TIA     Cardiovascular    Hypertension              Exercise tolerance: >4 METS     GI/Hepatic/Renal  Within defined limits              Endo/Other             Other Findings              Physical Exam    Airway  Mallampati: II  TM Distance: 4 - 6 cm  Neck ROM: normal range of motion   Mouth opening: Normal     Cardiovascular  Regular rate and rhythm,  S1 and S2 normal,  no murmur, click, rub, or gallop             Dental  No notable dental hx       Pulmonary  Breath sounds clear to auscultation               Abdominal  GI exam deferred       Other Findings            Anesthetic Plan    ASA: 3  Anesthesia type: general          Induction: Intravenous  Anesthetic plan and risks discussed with: Patient

## 2017-04-06 NOTE — PROGRESS NOTES
Pt Michaelgianni Alanis, 41 yo female. Pt in bed during bedside report. Pt has pain in right leg and foot due to DVT of popliteal occlusion and left side weakness due to CVA. Pt able to ambulate to bathroom with assistance. Pt request pain medication during shift to manage pain in right leg. IV dilaudid reduces pain from 9 out of 10 to 6 out of 10. Pt states that is manageable for her. Pt spoke with Dr. RICHIE DOBBS [facility] for angiogram procedure tomorrow. Consent is signed and in pt chart. Pt in bed, daughter at bedside during bedside report.

## 2017-04-06 NOTE — PROGRESS NOTES
Pt Ruchi Oliverio, 41 yo female. Pt in bed during bedside report. Pt reporting pain in right leg, pt informed she may have dilaudid after 0930.  0820 Pt continues on heparin drip. Dr. Erica Pelayo paged for further orders to discontinue heparin drip prior to angiogram today if necessary. 0921 Dr. Erica Pelayo returned page, order received to stop heparin at 1200.    1110 Pt sitting on the edge of bed during IDR. Pt states she is ready for surgery today. Pt informed she will be transferred to ICU after her surgery today. 1240 Pt transported to pre-op. CHG wipes and nasal swabs complete. SBAR given.

## 2017-04-06 NOTE — ROUTINE PROCESS
Bedside and Verbal shift change report given to John Ding (oncoming nurse) by Monica Maldonado RN   (offgoing nurse). Report included the following information SBAR, Kardex, MAR and Recent Results.

## 2017-04-06 NOTE — PROGRESS NOTES
Hospitalist Progress Note    Patient: Alen Pedraza MRN: [id number]  CSN: [id number]    YOB: 1970  Age: 40 y.o. Sex: female    DOA: 3/27/2017 LOS:  LOS: 10 days                Assessment/Plan     Patient Active Problem List   Diagnosis Code    Chronic pain syndrome G89.4    Crohn disease ([facility] K50.90    Sickle cell trait ([facility] D57.3    Hypertension I10    Hx of cocaine abuse O03.446    Embolic stroke ([facility] L47.9    Acute blood loss anemia D62    Neuropathic pain M79.2    DVT (deep venous thrombosis) ([facility]) I82.409    GI bleed K92.2    Elevated WBC count D72.829    PAD (peripheral artery disease) ([facility] I73.9            41 yo female admitted for GI bleed,  Recent admission for embolic CVA and recent DVT.     GI bleed - GI following, now bleeding has stopped.       Anemia - acute blood loss, s/p blood transfusion, H/H improved.       Recent DVT/CVA - anticoagulation restarted. On heparin drip.       Right leg pain - right leg arterial duplex >75%stenosis of anterior tibial artery. Vascular following, CTA with right popliteal occlusion, for RIGHT LEG THROMBECTOMY W/REPAIR POPLITEAL ARTERY W/C-ARM,ANGIOGRAM  today      Crohn's disease - management per GI. On steroids, mesalamine and protonix. Seen by colorectal surgery, bleeding stopped, if patient bleeds again she is a candidate of colectomy.       HTN - controlled.      Review of systems  General: No fevers or chills. Cardiovascular: No chest pain or pressure. No palpitations. Pulmonary: No shortness of breath. Gastrointestinal: No nausea, vomiting.           Physical Exam:  General: Awake, cooperative, no acute distress    HEENT: NC, Atraumatic. PERRLA, anicteric sclerae. Lungs: CTA Bilaterally. No Wheezing/Rhonchi/Rales. Heart: Regular rhythm,  No murmur, No Rubs, No Gallops  Abdomen: Soft, Non distended, Non tender.  +Bowel sounds,   Extremities: No c/c/e  Psych:   Not anxious or agitated.   Neurologic:  No acute neurological deficit.          Vital signs/Intake and Output:  Visit Vitals    /82 (BP 1 Location: Right arm, BP Patient Position: At rest)    Pulse 71    Temp 98.4 °F (36.9 °C)    Resp 16    Ht 5' 6\" (1.676 m)    Wt 89.5 kg (197 lb 5 oz)    SpO2 100%    Breastfeeding No    BMI 31.85 kg/m2     Current Shift:     Last three shifts:  04/04 1901 - 04/06 0700  In: 988.3 [P.O.:840; I.V.:148.3]  Out: [facility]: Results:       Chemistry Recent Labs      04/04/17   0926  04/04/17   0635   GLU  127*  123*   NA  143  142   K  3.4*  3.4*   CL  105  106   CO2  29  29   BUN  11  10   CREA  0.68  0.55*   CA  8.3*  8.0*   AGAP  9  7   BUCR  16  18      CBC w/Diff Recent Labs      04/06/17   0030  04/05/17   0420  04/04/17   0926   WBC  9.4  7.7  9.5   RBC  3.45*  3.64*  2.83*   HGB  9.9*  10.4*  8.1*   HCT  30.0*  31.7*  25.4*   PLT  246  240  231      Cardiac Enzymes No results for input(s): CPK, CKND1, CHANDA in the last 72 hours. No lab exists for component: CKRMB, TROIP   Coagulation Recent Labs      04/06/17   0030  04/05/17   1220  04/05/17   0420   PTP  12.7   --   15.2   INR  1.0   --   1.2   APTT  28.6  32.5   --        Lipid Panel Lab Results   Component Value Date/Time    Cholesterol, total 145 03/12/2017 06:15 AM    HDL Cholesterol 37 03/12/2017 06:15 AM    LDL, calculated 91.6 03/12/2017 06:15 AM    VLDL, calculated 16.4 03/12/2017 06:15 AM    Triglyceride 82 03/12/2017 06:15 AM    CHOL/HDL Ratio 3.9 03/12/2017 06:15 AM      BNP No results for input(s): BNPP in the last 72 hours. Liver Enzymes No results for input(s): TP, ALB, TBIL, AP, SGOT, GPT in the last 72 hours.     No lab exists for component: DBIL   Thyroid Studies Lab Results   Component Value Date/Time    TSH 1.51 12/12/2009 09:48 AM        Procedures/imaging: see electronic medical records for all procedures/Xrays and details which were not copied into this note but were reviewed prior to creation of Plan

## 2017-04-06 NOTE — BRIEF OP NOTE
BRIEF OPERATIVE NOTE    Date of Procedure: 4/6/2017   Preoperative Diagnosis: RIGHT POPLITEAL OCCLUSION  Postoperative Diagnosis: RIGHT POPLITEAL OCCLUSION    Procedure(s):  RIGHT LEG THROMBECTOMY W/REPAIR POPLITEAL ARTERY W/C-ARM,ANGIOGRAM - ROOM# 319  Surgeon(s) and Role:     * Lj Coffey MD - Primary            Surgical Staff:  Circ-1: Madalyn Corea RN  Circ-Relief: Inna Pitts  Scrub Tech-1: Cezar Canal Cushing  Surg Asst-1: Edwina Hale. Earmon Rom  Surg Asst-2: Mariana Vasques  Event Time In   Incision Start 1539   Incision Close      Anesthesia: General   Estimated Blood Loss: 25ml  Specimens:   ID Type Source Tests Collected by Time Destination   1 : Right Popliteal Artery Emboli  Fresh Artery  Lj Coffey MD 4/6/2017 1636 Pathology      Findings: acute popliteal occlusion   Complications: none  Implants:   Implant Name Type Inv.  Item Serial No.  Lot No. LRB No. Used Action   PATCH VASC PERIPATCH 0.8X8CM -- Jose Francisco Montanez - [id number]   PATCH VASC PERIPATCH 0.8X8CM -- [employer]   Right 1 Implanted

## 2017-04-06 NOTE — PROGRESS NOTES
CM met with pt and Dr. Silvia Gallegos this morning during bedside rounds, where it was reported pt is going to OR today and then to ICU. Pt states her discharge plan will be to return home, where she resides with her children. Pt states she is able to ambulate independently. CM notified ICU Care Manager Makenna Mccollum of the planned transfer.

## 2017-04-07 LAB
APTT PPP: 26.7 SEC (ref 23–36.4)
APTT PPP: 28.1 SEC (ref 23–36.4)
ERYTHROCYTE [DISTWIDTH] IN BLOOD BY AUTOMATED COUNT: 15.2 % (ref 11.6–14.5)
GLUCOSE BLD STRIP.AUTO-MCNC: 102 MG/DL (ref 70–110)
GLUCOSE BLD STRIP.AUTO-MCNC: 123 MG/DL (ref 70–110)
GLUCOSE BLD STRIP.AUTO-MCNC: 123 MG/DL (ref 70–110)
GLUCOSE BLD STRIP.AUTO-MCNC: 132 MG/DL (ref 70–110)
HCT VFR BLD AUTO: 31 % (ref 35–45)
HGB BLD-MCNC: 10 G/DL (ref 12–16)
INR PPP: 0.9 (ref 0.8–1.2)
MCH RBC QN AUTO: 28.4 PG (ref 24–34)
MCHC RBC AUTO-ENTMCNC: 32.3 G/DL (ref 31–37)
MCV RBC AUTO: 88.1 FL (ref 74–97)
PLATELET # BLD AUTO: 287 K/UL (ref 135–420)
PMV BLD AUTO: 8.8 FL (ref 9.2–11.8)
PROTHROMBIN TIME: 12 SEC (ref 11.5–15.2)
RBC # BLD AUTO: 3.52 M/UL (ref 4.2–5.3)
WBC # BLD AUTO: 10.4 K/UL (ref 4.6–13.2)

## 2017-04-07 PROCEDURE — 74011250636 HC RX REV CODE- 250/636: Performed by: SURGERY

## 2017-04-07 PROCEDURE — 36415 COLL VENOUS BLD VENIPUNCTURE: CPT | Performed by: SURGERY

## 2017-04-07 PROCEDURE — 85027 COMPLETE CBC AUTOMATED: CPT | Performed by: INTERNAL MEDICINE

## 2017-04-07 PROCEDURE — 74011250637 HC RX REV CODE- 250/637: Performed by: INTERNAL MEDICINE

## 2017-04-07 PROCEDURE — 85610 PROTHROMBIN TIME: CPT | Performed by: INTERNAL MEDICINE

## 2017-04-07 PROCEDURE — 74011636637 HC RX REV CODE- 636/637: Performed by: INTERNAL MEDICINE

## 2017-04-07 PROCEDURE — 82962 GLUCOSE BLOOD TEST: CPT

## 2017-04-07 PROCEDURE — 65610000006 HC RM INTENSIVE CARE

## 2017-04-07 PROCEDURE — 85730 THROMBOPLASTIN TIME PARTIAL: CPT | Performed by: SURGERY

## 2017-04-07 PROCEDURE — 74011250637 HC RX REV CODE- 250/637: Performed by: FAMILY MEDICINE

## 2017-04-07 PROCEDURE — 74011250637 HC RX REV CODE- 250/637: Performed by: HOSPITALIST

## 2017-04-07 PROCEDURE — 74011250637 HC RX REV CODE- 250/637: Performed by: SURGERY

## 2017-04-07 RX ORDER — EPINEPHRINE 0.1 MG/ML
1 INJECTION INTRACARDIAC; INTRAVENOUS
Status: DISCONTINUED | OUTPATIENT
Start: 2017-04-07 | End: 2017-04-07

## 2017-04-07 RX ORDER — DEXTROMETHORPHAN/PSEUDOEPHED 2.5-7.5/.8
1.2 DROPS ORAL
Status: DISCONTINUED | OUTPATIENT
Start: 2017-04-07 | End: 2017-04-18 | Stop reason: HOSPADM

## 2017-04-07 RX ORDER — ATROPINE SULFATE 0.1 MG/ML
0.5 INJECTION INTRAVENOUS
Status: DISCONTINUED | OUTPATIENT
Start: 2017-04-07 | End: 2017-04-07

## 2017-04-07 RX ADMIN — DOCUSATE SODIUM 100 MG: 100 CAPSULE, LIQUID FILLED ORAL at 09:31

## 2017-04-07 RX ADMIN — MESALAMINE 1000 MG: 250 CAPSULE ORAL at 23:20

## 2017-04-07 RX ADMIN — HYDROMORPHONE HYDROCHLORIDE 1 MG: 1 INJECTION, SOLUTION INTRAMUSCULAR; INTRAVENOUS; SUBCUTANEOUS at 07:35

## 2017-04-07 RX ADMIN — MESALAMINE 1000 MG: 250 CAPSULE ORAL at 09:30

## 2017-04-07 RX ADMIN — MULTIPLE VITAMINS W/ MINERALS TAB 1 TABLET: TAB at 09:31

## 2017-04-07 RX ADMIN — HYDROMORPHONE HYDROCHLORIDE 1 MG: 1 INJECTION, SOLUTION INTRAMUSCULAR; INTRAVENOUS; SUBCUTANEOUS at 16:02

## 2017-04-07 RX ADMIN — GABAPENTIN 400 MG: 100 CAPSULE ORAL at 16:02

## 2017-04-07 RX ADMIN — CARVEDILOL 3.12 MG: 3.12 TABLET, FILM COATED ORAL at 18:01

## 2017-04-07 RX ADMIN — GABAPENTIN 400 MG: 100 CAPSULE ORAL at 23:21

## 2017-04-07 RX ADMIN — PREDNISONE 40 MG: 20 TABLET ORAL at 09:30

## 2017-04-07 RX ADMIN — HYDROMORPHONE HYDROCHLORIDE 1 MG: 1 INJECTION, SOLUTION INTRAMUSCULAR; INTRAVENOUS; SUBCUTANEOUS at 05:32

## 2017-04-07 RX ADMIN — HYDROMORPHONE HYDROCHLORIDE 1 MG: 1 INJECTION, SOLUTION INTRAMUSCULAR; INTRAVENOUS; SUBCUTANEOUS at 13:43

## 2017-04-07 RX ADMIN — PROMETHAZINE HYDROCHLORIDE 25 MG: 25 TABLET ORAL at 19:01

## 2017-04-07 RX ADMIN — HYDROMORPHONE HYDROCHLORIDE 1 MG: 1 INJECTION, SOLUTION INTRAMUSCULAR; INTRAVENOUS; SUBCUTANEOUS at 01:22

## 2017-04-07 RX ADMIN — HYDROMORPHONE HYDROCHLORIDE 1 MG: 1 INJECTION, SOLUTION INTRAMUSCULAR; INTRAVENOUS; SUBCUTANEOUS at 23:20

## 2017-04-07 RX ADMIN — HYDROMORPHONE HYDROCHLORIDE 1 MG: 1 INJECTION, SOLUTION INTRAMUSCULAR; INTRAVENOUS; SUBCUTANEOUS at 18:01

## 2017-04-07 RX ADMIN — ASPIRIN 81 MG: 81 TABLET, CHEWABLE ORAL at 09:30

## 2017-04-07 RX ADMIN — DOCUSATE SODIUM 100 MG: 100 CAPSULE, LIQUID FILLED ORAL at 20:38

## 2017-04-07 RX ADMIN — MESALAMINE 1000 MG: 250 CAPSULE ORAL at 18:01

## 2017-04-07 RX ADMIN — CARVEDILOL 3.12 MG: 3.12 TABLET, FILM COATED ORAL at 09:31

## 2017-04-07 RX ADMIN — HEPARIN SODIUM AND DEXTROSE 800 UNITS/HR: 10000; 5 INJECTION INTRAVENOUS at 19:39

## 2017-04-07 RX ADMIN — WARFARIN SODIUM 5 MG: 5 TABLET ORAL at 18:01

## 2017-04-07 RX ADMIN — HYDROMORPHONE HYDROCHLORIDE 1 MG: 1 INJECTION, SOLUTION INTRAMUSCULAR; INTRAVENOUS; SUBCUTANEOUS at 09:29

## 2017-04-07 RX ADMIN — PROMETHAZINE HYDROCHLORIDE 25 MG: 25 TABLET ORAL at 11:38

## 2017-04-07 RX ADMIN — MESALAMINE 1000 MG: 250 CAPSULE ORAL at 14:09

## 2017-04-07 RX ADMIN — GABAPENTIN 400 MG: 100 CAPSULE ORAL at 09:30

## 2017-04-07 RX ADMIN — ATORVASTATIN CALCIUM 80 MG: 20 TABLET, FILM COATED ORAL at 23:21

## 2017-04-07 RX ADMIN — HYDROMORPHONE HYDROCHLORIDE 1 MG: 1 INJECTION, SOLUTION INTRAMUSCULAR; INTRAVENOUS; SUBCUTANEOUS at 11:38

## 2017-04-07 RX ADMIN — HYDROMORPHONE HYDROCHLORIDE 1 MG: 1 INJECTION, SOLUTION INTRAMUSCULAR; INTRAVENOUS; SUBCUTANEOUS at 20:38

## 2017-04-07 RX ADMIN — HEPARIN SODIUM AND DEXTROSE 800 UNITS/HR: 10000; 5 INJECTION INTRAVENOUS at 13:36

## 2017-04-07 NOTE — ROUTINE PROCESS
2140 Bedside and Verbal shift change  Received from Dann Luz, [address] [facility] (outgoing nurse), to JIMI Ding (oncoming)  Pt. Is AOX 4. IV patent and infusing well, Pt. To RLE  pain at this time. Report included the following information SBAR, Kardex, Procedure Summary, Intake/Output, MAR, Recent Lab Results, and  Cardiac Rhythm @ NSR. Will resume care and monitor Pt. Condition. Pt. Educated on call bell when in need of help and assistance. Pt. verbalized understanding. Pt. Head to toe Assessment Done and documented. Pt. Educated on pain medication for pain management and dosage and frequency. Pt. Verbalized understanding. Pt. Educated that RN will assist pt with wash-up and chlorhexidene bath before Pt. Goes to sleep. Pt. Stated she is not sleepy yet and not in a rush. 2312  Given dilaudid for pain to RLE per [facility] for pain management. 0115  Given dilaudid per MAR. Pt. Educated on Diet. 0210  Pt. Resting with eyes closed, easily awaken. 5928 Offer to give wash-up Pt. Stated she is hungry. Pt. Express hunger. Provided snack (hospital box snack). Offer to give wash-up Pt. Stated she is hungry. 0400  Pt. Resting in bed with eyes closed, no sign of distress. 0520  Pt. Offered chlorhexidene bath, pt. Refused at this time. 0532  Pt. Given dilaudid per [facility] for pain management. 0630  Resting comfortably in bed.    0735  Given pain medication per [facility] for pain management.

## 2017-04-07 NOTE — PROGRESS NOTES
S/P right popliteal thrombectomy with patch  Feels much better, wants to walk  Palpable pulses now at right foot, ischemia looks resolved  Will need indefinite anticoagulation as tolerated (multiple site arterial embolization)  Start PT, watch for foot drop

## 2017-04-07 NOTE — PROGRESS NOTES
Bedside and Verbal shift change report received from FRANCIS Ojeda (offgoing nurse). Report included the following information SBAR, Kardex, Intake/Output, MAR and Recent Results. Alarm parameters reviewed and opportunities for questions provided. 2000 Shift assessment completed. Patient alert and oriented x4. Pulses palpable to bilateral upper and lower extremity, right inner leg just below the knee incision well approximated, closed with dermabond glue and KEYLA. Lung sound clear, positive bowel sounds. Patient complaint of pain 1 mg dilaudid was given an hour ago. Informed patient that she has orders for PRN percocet, patient stated \" I don't want that it makes me itch. \"    2015 Called Dr. Dee Brown informed him that patient had 1 mg dilaudid at [phone] and was ineffective. New order given for Toradol 15 mg, every 6 hours as needed. 2040 Went in room with Toradol as ordered patient refused medication and stated \" what's that going to do for me that's like eating candy. \"      2055 Called  Dr. Ju Moreno and informed him of patient's current pain status and that she refused percocet and Toradol which was offered. New order given for dilaudid 1 mg every 2 hours. 2040 SBAR report given to JIMI Velasco whom assumed care of patient at that time.

## 2017-04-07 NOTE — PROGRESS NOTES
Bedside and Verbal shift change report received from Davida Wakefield (offgoing nurse). Report included the following information SBAR, Kardex, Intake/Output, MAR and Recent Results. Alarm parameters reviewed and opportunities for questions provided. 1950 Shift assessment completed, see EMR. Right inner leg surgical site KEYLA, incision well approximated with dermabond.      0015 Reassessment completed, see EMR    [id number] Reassessment completed, see EMR

## 2017-04-07 NOTE — PROGRESS NOTES
Chart reviewed, patient received to ICU after vascular procedure, at this time PT eval pending til order received from vascular cm will cont to review and assess for d/c needs.

## 2017-04-07 NOTE — ROUTINE PROCESS
Verbal and bedside Shift changed report given to Manny Dyson RN (oncoming RN) on Pt. Condition. Report consisted of patients Situation, History, Activities, intake/output,  Background, Assessment and Recommendations(SBAR). Information from the following report(s) Kardex, order Summary, Lab results and MAR was reviewed with the receiving nurse. Opportunity for questions and clarification was provided.

## 2017-04-07 NOTE — ROUTINE PROCESS
Bedside and Verbal shift change report given to Mercy Rivas RN (oncoming nurse) by Kathleen Osorio RN (offgoing nurse).  Report included the following information SBAR, Kardex, Procedure Summary, Intake/Output, MAR, Recent Results, Med Rec Status and Cardiac Rhythm SR.

## 2017-04-07 NOTE — PROGRESS NOTES
0929--Dilaudid 1mg IVP given for pain in RLE rated 10/10.     0959--PAIN REASSESSMENT:     1138--Dilaudid 1mg IVP given for pain in RLE 10/1.     1208--PAIN REASSESSMENT:     1343--Patient medicated with Dilaudid 1mg IVP by Pranay Perry RN. Reported pain 7/10. During my absence Dr. Gordon Pantoja increased heparin gtt to 800units/hr and okay to resume coumadin. Okay to also start PT.      1413--PAIN REASSESSMENT:    1602--Dilaudid 1mg IVP given for pain in RLE 10/10.     1615--Central like dressing changed. Dressing was wet d/t attempting to use leaking lumen. 1632--PAIN REASSESSMENT:    1754--Carepartner brought to my attention that she found 5 purple capsules with G231 on them in the patient's bed underneath her. Appears to be prilosec capsules; most likely from out of her purse. Will ask patient to give nursing staff any home medications for safety reasons. 1815--Patient gave me 3 pill bottles and a bag of lovenox syringes to lock in patient specific medication bin.

## 2017-04-07 NOTE — PROGRESS NOTES
NUTRITION FOLLOW-UP    RECOMMENDATIONS/PLAN:   - Continue Regular diet, pt aware of foods best tolerated for her Crohn's disease    NUTRITION ASSESSMENT:   Client Update: 40 yrs old Female with severe Crohn's disease, s/p thrombectomy yesterday. Spoke with pt during meal rounds, continues with good PO intake. FOOD/NUTRITION INTAKE   Diet Order:  Regular   Supplements: none   Food Allergies: NKFA  Average PO Intake:      Patient Vitals for the past 100 hrs:   % Diet Eaten   04/05/17 1850 100 %   04/05/17 0848 100 %   04/04/17 0938 100 %   04/03/17 1219 100 %      Pertinent Medications:  [x] Reviewed; lipitor, colace, neurontin, dilaudid, LR, mesalamine, MVI, prednisone, percocet, simethicone  Insulin:  [x]SSI  []Pre-meal   []Basal    []Drip  []None  Cultural/Denominational Food Preferences: None Identified ANTHROPOMETRICS  Height: 5' 6\" (167.6 cm)       Weight: 89.5 kg (197 lb 5 oz)         BMI: 31.9 kg/m^2 obese (30%-39.9% BMI)   Adm Weight: 202 lbs                Weight change: -5 lbs  Adjusted Body Weight: 65 kg     NUTRITION-FOCUSED PHYSICAL ASSESSMENT  Skin: incision to leg      GI: last BM 4/06    BIOCHEMICAL DATA & MEDICAL TESTS  Pertinent Labs:  [x] Reviewed; K 3.4, Hgb 10, Hct 31      NUTRITION PRESCRIPTION  Calories: 3873-1903 kcal/day based on 30-35 kcal/kg AdjBW  Protein: 65-85 g/day based on 1-1.3 g/kg AdjBW  CHO: 244 g/day based on 50% of total energy  Fluid: 2194-6349 ml/day based on 1 kcal/ml      NUTRITION DIAGNOSES:   1. At risk of inadequate oral intake related to Crohn's disease as evidenced by nausea, abdominal pain, 50% po intake of meals  - resolved  2. No nutrition diagnosis at this time. NUTRITION INTERVENTIONS:   INTERVENTIONS:        GOALS:  1.  Regular diet 1. >50% PO intake of meals, maintain wt, BM q 1-3 days by next review 5-7 days     LEARNING NEEDS (Diet, Supplementation, Food/Nutrient-Drug Interaction):   [] None Identified   [x] Education provided/documented      Identified and patient: [] Declined   [] Was not appropriate/indicated        NUTRITION MONITORING /EVALUATION:   Follow PO intake  Monitor wt     Previous Recommendations Implemented: yes        Previous Goals Met:  yes       [] Participated in Interdisciplinary Rounds    [x] Interdisciplinary Care Plan Reviewed  DISCHARGE NUTRITION RECOMMENDATIONS ADDRESSED:     [x] Yes- recommended low fiber, gradually increase fiber intake    NUTRITION RISK:           [x] At risk                        [] Not currently at risk        Will follow-up per policy.   Issac Villanueva RD  PAGER:  [phone]

## 2017-04-07 NOTE — CONSULTS
[facility]  Pulmonary, Critical Care, and Sleep Medicine    Name: Kaveh Mccrary MRN: [id number]   : 1970 Hospital: [facility] [place]   Date: 2017        Critical Care Initial Patient Consult    Requesting MD:                                                  Reason for CC Consult:  IMPRESSION:   · S/p rt popliteal artery thrombectomy  · Rt MCA ischemic stroke  · GI bleed  With anticoagulation  · Hx of crohnx  · Hx of cocaine abuse  · Sickle cell trait  · Chronic pain      RECOMMENDATIONS:   · Continue heparin as directed by vascular surgery, transition to coumadin   · Prn dilaudid for pain  · H/h stable  · Continue asa, statin and beta blockers  · Continue prednisone and mesalamine for crohns  · Remove bradshaw  · Pt consult if ok with vascular     Subjective/History: This patient has been seen and evaluated at the request of Dr. Gordo Ruiz  for post op management. Patient is a 40 y.o. female with multiple medical problems listed below. Pt was recently dx with Rt MCA 3/2017 distribution stroke, was placed on coumadin, came in acute GI bleed. Pt required mutiple units of blood transfusion. During this hospitalization, noted RLE pain,  vacular study confirmed  Rt popliteal artery occlusion. Pt underwent thrombectomy yesterday. Pt was transferred to ICU. I was asked to assist in management.          Past Medical History:   Diagnosis Date    Abdominal pain     Anemia NEC     sickle cell trait    C. difficile colitis     Crohn's disease (HCC)     Gastrointestinal disorder     Crohns    Herpes zoster     Hx of cocaine abuse     Hyperkalemia     Hypertension     Iron deficiency anemia     MRSA (methicillin resistant Staphylococcus aureus)     Obesity     Pain management     S[facility]     Stroke ([facility]     + cva 3/17      Past Surgical History:   Procedure Laterality Date    COLONOSCOPY N/A 3/17/2017    COLONOSCOPY; BIOPSY; performed by Wiley HALL MD [facility] at [facility] ENDOSCOPY    HX GYN      tubal ligation    HX TUBAL LIGATION        Prior to Admission medications    Medication Sig Start Date End Date Taking? Authorizing Provider   warfarin (COUMADIN) 5 mg tablet Take 5 mg by mouth daily. Yes Historical Provider   atorvastatin (LIPITOR) 80 mg tablet Take 1 Tab by mouth nightly. 3/23/17  Yes Emerson Tripathi MD   gabapentin (NEURONTIN) 400 mg capsule Take 1 Cap by mouth three (3) times daily. 3/23/17  Yes Emerson Tripathi MD   mesalamine (PENTASA) 500 mg CR capsule Take 2 Caps by mouth four (4) times daily. Indications: CROHN'S DISEASE 3/23/17  Yes Emerson Tripathi MD   predniSONE (DELTASONE) 20 mg tablet Take 2 Tabs by mouth daily (with breakfast). Indications: CROHN'S DISEASE 3/23/17  Yes Emerson Tripathi MD   enoxaparin (LOVENOX) 80 mg/0.8 mL injection 80 mg by SubCUTAneous route every twelve (12) hours. 3/23/17  Yes Emerson Tripathi MD   oxyCODONE-acetaminophen (PERCOCET) 5-325 mg per tablet Take 1 Tab by mouth every four (4) hours as needed for Pain. Max Daily Amount: 6 Tabs.  3/23/17   Emerson Tripathi MD     Current Facility-Administered Medications   Medication Dose Route Frequency    lactated ringers infusion 1,000 mL  1,000 mL IntraVENous CONTINUOUS    heparin 25,000 units in D5W 250 ml infusion  500 Units/hr IntraVENous CONTINUOUS    gabapentin (NEURONTIN) capsule 400 mg  400 mg Oral TID    warfarin (COUMADIN) tablet 5 mg  5 mg Oral DAILY    aspirin chewable tablet 81 mg  81 mg Oral DAILY    predniSONE (DELTASONE) tablet 40 mg  40 mg Oral DAILY WITH BREAKFAST    atorvastatin (LIPITOR) tablet 80 mg  80 mg Oral QHS    carvedilol (COREG) tablet 3.125 mg  3.125 mg Oral BID WITH MEALS    multivitamin, tx-iron-ca-min (THERA-M w/ IRON) tablet 1 Tab  1 Tab Oral DAILY    insulin lispro (HUMALOG) injection   SubCUTAneous AC&HS    docusate sodium (COLACE) capsule 100 mg  100 mg Oral BID    mesalamine (PENTASA) CR capsule 1,000 mg  1,000 mg Oral QID     Allergies   Allergen Reactions    Humira [Adalimumab] Other (comments)    Remicade [Infliximab] Palpitations      Social History   Substance Use Topics    Smoking status: Former Smoker    Smokeless tobacco: Not on file    Alcohol use No      History reviewed. No pertinent family history. Review of Systems:  A comprehensive review of systems was negative except for that written in the HPI. Objective:   Vital Signs:    Visit Vitals    /73    Pulse 90    Temp 98.4 °F (36.9 °C)    Resp 15    Ht 5' 6\" (1.676 m)    Wt 89.5 kg (197 lb 5 oz)    LMP 2017    SpO2 100%    Breastfeeding No    BMI 31.85 kg/m2       O2 Device: Room air   O2 Flow Rate (L/min): 2 l/min   Temp (24hrs), Av.3 °F (36.8 °C), Min:98 °F (36.7 °C), Max:98.5 °F (36.9 °C)       Intake/Output:   Last shift:       07 -  1900  In: 720 [P.O.:720]  Out: -   Last 3 shifts:  190 -  0700  In: 2417.7 [P.O.:360; I.V.:2057.7]  Out: 2275 [Urine:2250]    Intake/Output Summary (Last 24 hours) at 17 0945  Last data filed at 17 7275   Gross per 24 hour   Intake             3080 ml   Output             2275 ml   Net              805 ml     Hemodynamics:   . @MAP     . @CVP       Ventilator Settings:  Mode Rate Tidal Volume Pressure FiO2 PEEP                    Peak airway pressure:      Minute ventilation:        ARDS network Guidelines: Lung protective strategy and Pl pressure goals____________    Physical Exam:    General:  Alert, cooperative, no distress, appears stated age. Head:  Normocephalic, without obvious abnormality, atraumatic. Eyes:  Conjunctivae/corneas clear. PERRL, EOMs intact. Nose: Nares normal. Septum midline. Mucosa normal. No drainage or sinus tenderness. Throat: Lips, mucosa, and tongue normal. Teeth and gums normal.   Neck: Supple, symmetrical, trachea midline, no adenopathy, thyroid: no enlargment/tenderness/nodules, no carotid bruit and no JVD.    Back:   Symmetric, no curvature. ROM normal.   Lungs:   Clear to auscultation bilaterally. Chest wall:  No tenderness or deformity. Heart:  Regular rate and rhythm, S1, S2 normal, no murmur, click, rub or gallop. Abdomen:   Soft, non-tender. Bowel sounds normal. No masses,  No organomegaly. Extremities: Extremities normal, atraumatic, no cyanosis or edema. Pulses: 2+ and symmetric all extremities.    Skin: Skin color, texture, turgor normal. No rashes or lesions   Lymph nodes: Cervical, supraclavicular, and axillary nodes normal.   Neurologic: Grossly nonfocal       Data:     Recent Results (from the past 24 hour(s))   GLUCOSE, POC    Collection Time: 04/06/17 12:29 PM   Result Value Ref Range    Glucose (POC) 109 70 - 110 mg/dL   HCG QL SERUM    Collection Time: 04/06/17  1:06 PM   Result Value Ref Range    HCG, Ql. NEGATIVE  NEG     GLUCOSE, POC    Collection Time: 04/06/17  9:19 PM   Result Value Ref Range    Glucose (POC) 80 70 - 110 mg/dL   PROTHROMBIN TIME + INR    Collection Time: 04/07/17  2:45 AM   Result Value Ref Range    Prothrombin time 12.0 11.5 - 15.2 sec    INR 0.9 0.8 - 1.2     CBC W/O DIFF    Collection Time: 04/07/17  2:45 AM   Result Value Ref Range    WBC 10.4 4.6 - 13.2 K/uL    RBC 3.52 (L) 4.20 - 5.30 M/uL    HGB 10.0 (L) 12.0 - 16.0 g/dL    HCT 31.0 (L) 35.0 - 45.0 %    MCV 88.1 74.0 - 97.0 FL    MCH 28.4 24.0 - 34.0 PG    MCHC 32.3 31.0 - 37.0 g/dL    RDW 15.2 (H) 11.6 - 14.5 %    PLATELET 982 724 - 501 K/uL    MPV 8.8 (L) 9.2 - 11.8 FL   PTT    Collection Time: 04/07/17  2:45 AM   Result Value Ref Range    aPTT 28.1 23.0 - 36.4 SEC   GLUCOSE, POC    Collection Time: 04/07/17  6:52 AM   Result Value Ref Range    Glucose (POC) 132 (H) 70 - 110 mg/dL             Telemetry:normal sinus rhythm    Imaging:  I have personally reviewed the patients radiographs and have reviewed the reports:     Results    CTA ABD ART W RUNOFF W WO CONT (Accession [id number]) (Order [id number])         Allergies        High: Humira [Adalimumab]; Remicade [Infliximab]       Result Information      Status Provider Status        Final result (Exam End: 4/4/2017 12:35 AM) Open        Study Result      PROCEDURE: CT angiogram of the abdomen, pelvis and bilateral lower extremities.     CLINICAL HISTORY: Diminished pulses, leg, embolism, aorta, branches. Pain in  right foot.     COMPARISON: No prior CTA examination     TECHNIQUE: Contrast-enhanced CT angiogram of the abdomen, pelvis and both lower  extremities was performed after the uneventful administration of 125 mL of  Isovue 370. Axial images acquired from the domes of the diaphragms through both  feet. Coronal and sagittal reformatted images were generated from the axial  data. MIP and curved reformatted images were generated on a separate  workstation.     --- CTA FINDINGS ---     Visualized descending thoracic aorta is unremarkable.     The abdominal aorta has a normal caliber normally tapering vessel without  aneurysm, dissection or stenosis. No aortic thrombus is seen.     The celiac axis, superior mesenteric artery, single bilateral renal arteries and  inferior mesenteric arteries are normal caliber widely patent vessels.     The aortic bifurcation is patent. The common iliac arteries are patent. The  iliac bifurcations are patent, the internal and external iliac arteries  bilaterally are patent.     Patent common femoral arteries. Patent profunda femoris arteries.      There is some patient motion artifact which partially obscures the proximal  superficial femoral arteries bilaterally, they are suspected to be widely patent  through this region of motion artifact although suboptimally evaluated over this  short segment. The remainder of the bilateral superficial femoral arteries  appear widely patent.     The above-knee popliteal arteries are patent bilaterally.  The left below knee  popliteal artery is widely patent.     There is a short segment occlusion of the below knee right popliteal artery. The  occlusion extends into the origin of the right anterior tibial artery and  tibioperoneal trunk. The right anterior tibial artery and peroneal artery  reconstitute via collaterals. The right posterior tibial artery is occluded  along its length.      The left tibial runoff appears overall intact. Dominant runoff vessel is the  anterior tibial artery. The tibioperoneal trunk and peroneal artery are widely  patent. The posterior tibial artery is diminutive proximally and is largely fed  by a peroneal collateral distally.     --- CT FINDINGS ---     Mild dependent atelectasis lung bases. Visualized heart and pericardium are  unremarkable.     Contracted gallbladder. Small right hepatic cyst and focal fatty infiltration  along the margins for fissure for ligamentum teres left hepatic lobe. No  suspicious hepatic abnormality.     Unremarkable spleen, pancreas, adrenal glands and kidneys.     No retroperitoneal masses or adenopathy.     The abdominal bowel is unremarkable.     Within the pelvis the appendix is noted to be normal. There is no pericolonic  inflammatory stranding. No significant free fluid. Short segment of the sigmoid  colon is underdistended, results in a slightly thick walled appearance.      Uterus and ovaries are present. Heterogeneity in coarse calcification at the  uterine fundus suggests underlying fibroids.     No pelvic masses or adenopathy.     There are regions of nonspecific stranding within the subcutaneous fat of the  body wall and involving the lower extremities, left greater than right, could be  related to overall volume status and/or the presence of cellulitis. Note of a  small likely Baker's cyst at the left knee.     Foci simultaneous gas within the anterior abdominal wall are presumably related  to injections.     Bones appear normal for age.        IMPRESSION  IMPRESSION:     1. No aortic or inflow lesions.     2.  There is a short segment occlusion of the below knee right popliteal artery. The occlusion extends into the origin of the right anterior tibial artery and  tibioperoneal trunk. The right anterior tibial artery and peroneal artery  reconstitute via collaterals. The right posterior tibial artery is occluded  along its length.      3. The left tibial runoff appears overall intact. Dominant runoff vessel is the  anterior tibial artery. The tibioperoneal trunk and peroneal artery are widely  patent. The posterior tibial artery is diminutive proximally and is largely fed  by a peroneal collateral distally.          Status Provider Status        Final result (Exam End: 3/28/2017 11:38 AM) Open        Study Result      EXAM: CT head     INDICATION: 52-year-old patient with hypertension, altered mental status.     COMPARISON: Several prior examinations, most recently brain MRI dated 3/10/2017.     TECHNIQUE: Axial CT imaging of the head was performed without intravenous  contrast.     One or more dose reduction techniques were used on this CT: automated exposure  control, adjustment of the mAs and/or kVp according to patient's size, and  iterative reconstruction techniques. The specific techniques utilized on this CT  exam have been documented in the patient's electronic medical record.      _______________     FINDINGS: Detail is mildly limited by motion artifact.     BRAIN AND POSTERIOR FOSSA: As previously, there is redemonstration of abnormal  clear with matter differentiation involving the right centrum semiovale, corona  radiata, right-sided insular cortex, and right parietal/temporal lobes, spanning  the right sylvian fissure. There is mild associated mass effect upon the right  lateral ventricle, the appearance of which is similar to prior exam. No evidence  of midline shift. The basilar cisterns remain patent. The known multiple  cerebellar infarcts are characterized to better advantage on comparison brain  MRI.  Mild increased density of the infarcted cortical surfaces is noted. No  intra-axial fluid collection.     EXTRA-AXIAL SPACES AND MENINGES: There are no abnormal extra-axial fluid  collections.     CALVARIUM: Intact.     SINUSES: Paranasal sinuses and mastoid air cells are clear.     OTHER: None.     _______________     IMPRESSION  IMPRESSION:        1. Redemonstration of sequela of multifocal, likely embolic infarcts without  acute intra-axial or extra-axial fluid collection. 2. Mild persistent edema and mass effect upon the right lateral ventricle,  without evidence of midline shift. Allergies        High: Humira [Adalimumab]; Remicade [Infliximab]       Result Information      Status Provider Status        Final result (Exam End: 3/10/2017  6:02 PM) Open        Study Result      Brain MR without contrast:     Indication: Possible acute infarction. Left-sided weakness. Abnormal head CT. History of sickle cell.     Procedure: Sagittal spin echo T1, axial FSE FLAIR and T2 and axial diffusion  weighted scanning was performed. Coronal spin echo T1 and T2 FSE images were  also performed. No contrast was administered.     Comparison head CT 3/10/2017.     Findings: Diffusion: Multifocal areas of restricted diffusion consistent with  multifocal acute infarctions are noted. Multiple left greater than right  cerebellar hemisphere infarctions are noted area there is a large right MCA  territory infarction involving the insular cortex and posterior perisylvian  temporal and inferior parietal lobe as well as the posterior inferior frontal  lobe. This was in part hypodense on CT. No hemorrhage or significant mass effect  associated with these lesions.     The axial T2 star examination does not show any hemorrhage associated with the  areas of acute infarction.  There is however a strong suspicion of a hemorrhagic  lesion within the pituitary eccentric to the left but does not appear  artifactual.     Brain parenchyma: Other than the areas of acute infarction, no additional focal  brain lesion. No mass or mass effect.     Ventricles and sulci: Normal. No significant atrophy given age.     Extra-axial: No extra-axial fluid collection or mass is noted.     Brain vasculature: No vascular abnormality is appreciated on this routine brain  MR examination.     Craniocervical junction: Normal.     Skull base, extracranial and calvarium: Protrusion of orbital fat through a  defect in the right orbital floor is most likely a chronic orbital floor  fracture. There does appear to be some relative right enophthalmos.     IMPRESSION  Impression:        1. Multifocal acute infarctions, the largest is in the right MCA territory  correlating with the abnormal CT. There are bilateral left greater than right  cerebellar hemisphere infarctions. These infarctions are likely embolic with a  central origin. 2. Possible hemorrhagic lesion within the pituitary. Scans dedicated to the  pituitary could be performed. This could be artifact of volume averaging of the  sellar floor but is at least suspect. 3. Chronic appearing right orbital floor fracture with protrusion of orbital fat  and relative enophthalmos.                Best practice :  ASA on arrival  , Beta Blocker  and Statin Cardiovascular: An arterial systolic blood pressure (SBP) of < or equal to 90mm Hg or a mean arterial pressure (MAP) < or equal to 70mm Hg for at least 1 hour despite adequate fluid resuscitation or adequate intravascular volume status; or the need for vasopressors to maintain SBP>or equal to 90mm Hg or MAP > or equal to 70mm Hg. Continue current therapy  Gaming Bundle Followed     Total critical care time exclusive of procedures: 35 minutes  Amanda Burrell MD  4/7/2017, 9:45 AM

## 2017-04-07 NOTE — PROGRESS NOTES
Hospitalist Progress Note    Patient: Brit Villanueva MRN: [id number]  CSN: [id number]    YOB: 1970  Age: 40 y.o. Sex: female    DOA: 3/27/2017 LOS:  LOS: 11 days          Chief Complaint:  GI bleed          Assessment/Plan       Patient Active Problem List   Diagnosis Code    Chronic pain syndrome G89.4    Crohn disease ([facility] K50.90    Sickle cell trait ([facility] D57.3    Hypertension I10    Hx of cocaine abuse P20.592    Embolic stroke ([facility] Y63.7    Acute blood loss anemia D62    Neuropathic pain M79.2    DVT (deep venous thrombosis) ([facility] I82.409    GI bleed K92.2    Elevated WBC count D72.829    PAD (peripheral artery disease) ([facility] I73.9    Vascular abnormality I99.9       Recent admission for embolic CVA and recent DVT.     GI bleed - GI following, now bleeding has stopped.       Anemia - acute blood loss, s/p blood transfusion, H/H improved.       Recent DVT/CVA - anticoagulation restarted. On heparin drip.       Right leg pain - right leg arterial duplex >75%stenosis of anterior tibial artery. Vascular following, CTA with right popliteal occlusion, s/p RIGHT LEG THROMBECTOMY W/REPAIR POPLITEAL ARTERY W/C-ARM,ANGIOGRAM yesterday. Postoperative management per vascular surgery.      Crohn's disease - management per GI. On steroids, mesalamine and protonix. Seen by colorectal surgery, bleeding stopped, if patient bleeds again she is a candidate of colectomy.       HTN - controlled. Subjective:    No events over night. Patient doing well this AM.  No new complaints.       Review of systems:    Constitutional: denies fevers, chills, myalgias  Respiratory: denies SOB, cough  Cardiovascular: denies chest pain, palpitations  Gastrointestinal: denies nausea, vomiting, diarrhea      Vital signs/Intake and Output:  Visit Vitals    /73    Pulse 90    Temp 98.4 °F (36.9 °C)    Resp 15    Ht 5' 6\" (1.676 m)    Wt 89.5 kg (197 lb 5 oz)    SpO2 100%    Breastfeeding No  BMI 31.85 kg/m2     Current Shift:  04/07 0701 - 04/07 1900  In: 720 [P.O.:720]  Out: -   Last three shifts:  04/05 1901 - 04/07 0700  In: 2417.7 [P.O.:360; I.V.:2057.7]  Out: 2275 [Urine:2250]    Exam:    General: Well developed, alert, NAD, OX3  Head/Neck: NCAT, supple, No masses, No lymphadenopathy  CVS:Regular rate and rhythm, no M/R/G, S1/S2 heard, no thrill  Lungs:Clear to auscultation bilaterally, no wheezes, rhonchi, or rales  Abdomen: Soft, Nontender, No distention, Normal Bowel sounds, No hepatomegaly  Extremities: No C/C/E, pulses palpable 2+  Skin:normal texture and turgor, no rashes, no lesions  Neuro:grossly normal , follows commands  Psych:appropriate                Labs: Results:       Chemistry Recent Labs      04/04/17   0926   GLU  127*   NA  143   K  3.4*   CL  105   CO2  29   BUN  11   CREA  0.68   CA  8.3*   AGAP  9   BUCR  16      CBC w/Diff Recent Labs      04/07/17   0245  04/06/17   0030  04/05/17   0420   WBC  10.4  9.4  7.7   RBC  3.52*  3.45*  3.64*   HGB  10.0*  9.9*  10.4*   HCT  31.0*  30.0*  31.7*   PLT  287  246  240      Cardiac Enzymes No results for input(s): CPK, CKND1, CHANDA in the last 72 hours. No lab exists for component: CKRMB, TROIP   Coagulation Recent Labs      04/07/17   0245  04/06/17   0030   PTP  12.0  12.7   INR  0.9  1.0   APTT  28.1  28.6       Lipid Panel Lab Results   Component Value Date/Time    Cholesterol, total 145 03/12/2017 06:15 AM    HDL Cholesterol 37 03/12/2017 06:15 AM    LDL, calculated 91.6 03/12/2017 06:15 AM    VLDL, calculated 16.4 03/12/2017 06:15 AM    Triglyceride 82 03/12/2017 06:15 AM    CHOL/HDL Ratio 3.9 03/12/2017 06:15 AM      BNP No results for input(s): BNPP in the last 72 hours. Liver Enzymes No results for input(s): TP, ALB, TBIL, AP, SGOT, GPT in the last 72 hours.     No lab exists for component: DBIL   Thyroid Studies Lab Results   Component Value Date/Time    TSH 1.51 12/12/2009 09:48 AM        Procedures/imaging: see electronic medical records for all procedures/Xrays and details which were not copied into this note but were reviewed prior to creation of Nay Remy MD

## 2017-04-08 LAB
ANION GAP BLD CALC-SCNC: 8 MMOL/L (ref 3–18)
APTT PPP: 30.1 SEC (ref 23–36.4)
APTT PPP: 34 SEC (ref 23–36.4)
BASOPHILS # BLD AUTO: 0 K/UL (ref 0–0.06)
BASOPHILS # BLD: 0 % (ref 0–2)
BUN SERPL-MCNC: 11 MG/DL (ref 7–18)
BUN/CREAT SERPL: 20 (ref 12–20)
CALCIUM SERPL-MCNC: 8.6 MG/DL (ref 8.5–10.1)
CHLORIDE SERPL-SCNC: 104 MMOL/L (ref 100–108)
CO2 SERPL-SCNC: 30 MMOL/L (ref 21–32)
CREAT SERPL-MCNC: 0.54 MG/DL (ref 0.6–1.3)
DIFFERENTIAL METHOD BLD: ABNORMAL
EOSINOPHIL # BLD: 0.3 K/UL (ref 0–0.4)
EOSINOPHIL NFR BLD: 3 % (ref 0–5)
ERYTHROCYTE [DISTWIDTH] IN BLOOD BY AUTOMATED COUNT: 15.3 % (ref 11.6–14.5)
GLUCOSE BLD STRIP.AUTO-MCNC: 101 MG/DL (ref 70–110)
GLUCOSE BLD STRIP.AUTO-MCNC: 123 MG/DL (ref 70–110)
GLUCOSE BLD STRIP.AUTO-MCNC: 125 MG/DL (ref 70–110)
GLUCOSE BLD STRIP.AUTO-MCNC: 131 MG/DL (ref 70–110)
GLUCOSE SERPL-MCNC: 130 MG/DL (ref 74–99)
HCT VFR BLD AUTO: 31 % (ref 35–45)
HGB BLD-MCNC: 9.9 G/DL (ref 12–16)
INR PPP: 0.9 (ref 0.8–1.2)
LYMPHOCYTES # BLD AUTO: 21 % (ref 21–52)
LYMPHOCYTES # BLD: 1.7 K/UL (ref 0.9–3.6)
MAGNESIUM SERPL-MCNC: 1.7 MG/DL (ref 1.6–2.6)
MCH RBC QN AUTO: 28.4 PG (ref 24–34)
MCHC RBC AUTO-ENTMCNC: 31.9 G/DL (ref 31–37)
MCV RBC AUTO: 88.8 FL (ref 74–97)
MONOCYTES # BLD: 0.8 K/UL (ref 0.05–1.2)
MONOCYTES NFR BLD AUTO: 10 % (ref 3–10)
NEUTS SEG # BLD: 5.5 K/UL (ref 1.8–8)
NEUTS SEG NFR BLD AUTO: 66 % (ref 40–73)
PLATELET # BLD AUTO: 252 K/UL (ref 135–420)
PMV BLD AUTO: 8.2 FL (ref 9.2–11.8)
POTASSIUM SERPL-SCNC: 4.3 MMOL/L (ref 3.5–5.5)
PROTHROMBIN TIME: 11.7 SEC (ref 11.5–15.2)
RBC # BLD AUTO: 3.49 M/UL (ref 4.2–5.3)
SODIUM SERPL-SCNC: 142 MMOL/L (ref 136–145)
WBC # BLD AUTO: 8.3 K/UL (ref 4.6–13.2)

## 2017-04-08 PROCEDURE — 97161 PT EVAL LOW COMPLEX 20 MIN: CPT

## 2017-04-08 PROCEDURE — 85730 THROMBOPLASTIN TIME PARTIAL: CPT | Performed by: SURGERY

## 2017-04-08 PROCEDURE — 74011636637 HC RX REV CODE- 636/637: Performed by: INTERNAL MEDICINE

## 2017-04-08 PROCEDURE — 74011250637 HC RX REV CODE- 250/637: Performed by: INTERNAL MEDICINE

## 2017-04-08 PROCEDURE — 74011250637 HC RX REV CODE- 250/637: Performed by: SURGERY

## 2017-04-08 PROCEDURE — 85025 COMPLETE CBC W/AUTO DIFF WBC: CPT | Performed by: SURGERY

## 2017-04-08 PROCEDURE — 74011250636 HC RX REV CODE- 250/636: Performed by: SURGERY

## 2017-04-08 PROCEDURE — 74011250637 HC RX REV CODE- 250/637: Performed by: FAMILY MEDICINE

## 2017-04-08 PROCEDURE — 97116 GAIT TRAINING THERAPY: CPT

## 2017-04-08 PROCEDURE — 80048 BASIC METABOLIC PNL TOTAL CA: CPT | Performed by: FAMILY MEDICINE

## 2017-04-08 PROCEDURE — 74011250637 HC RX REV CODE- 250/637: Performed by: HOSPITALIST

## 2017-04-08 PROCEDURE — 74011250636 HC RX REV CODE- 250/636: Performed by: FAMILY MEDICINE

## 2017-04-08 PROCEDURE — 85610 PROTHROMBIN TIME: CPT | Performed by: SURGERY

## 2017-04-08 PROCEDURE — 74011250636 HC RX REV CODE- 250/636: Performed by: ANESTHESIOLOGY

## 2017-04-08 PROCEDURE — 82962 GLUCOSE BLOOD TEST: CPT

## 2017-04-08 PROCEDURE — 36592 COLLECT BLOOD FROM PICC: CPT

## 2017-04-08 PROCEDURE — 83735 ASSAY OF MAGNESIUM: CPT | Performed by: FAMILY MEDICINE

## 2017-04-08 PROCEDURE — 65270000029 HC RM PRIVATE

## 2017-04-08 RX ORDER — HYDROMORPHONE HYDROCHLORIDE 2 MG/1
2 TABLET ORAL
Status: DISCONTINUED | OUTPATIENT
Start: 2017-04-08 | End: 2017-04-08

## 2017-04-08 RX ORDER — HYDROMORPHONE HYDROCHLORIDE 2 MG/1
1-2 TABLET ORAL
Status: DISCONTINUED | OUTPATIENT
Start: 2017-04-08 | End: 2017-04-09

## 2017-04-08 RX ORDER — HYDROMORPHONE HYDROCHLORIDE 1 MG/ML
1 INJECTION, SOLUTION INTRAMUSCULAR; INTRAVENOUS; SUBCUTANEOUS
Status: DISCONTINUED | OUTPATIENT
Start: 2017-04-08 | End: 2017-04-09

## 2017-04-08 RX ADMIN — ATORVASTATIN CALCIUM 80 MG: 20 TABLET, FILM COATED ORAL at 21:06

## 2017-04-08 RX ADMIN — SODIUM CHLORIDE, SODIUM LACTATE, POTASSIUM CHLORIDE, AND CALCIUM CHLORIDE 1000 ML: 600; 310; 30; 20 INJECTION, SOLUTION INTRAVENOUS at 23:47

## 2017-04-08 RX ADMIN — WARFARIN SODIUM 5 MG: 5 TABLET ORAL at 17:27

## 2017-04-08 RX ADMIN — MESALAMINE 1000 MG: 250 CAPSULE ORAL at 17:27

## 2017-04-08 RX ADMIN — DOCUSATE SODIUM 100 MG: 100 CAPSULE, LIQUID FILLED ORAL at 20:38

## 2017-04-08 RX ADMIN — HYDROMORPHONE HYDROCHLORIDE 2 MG: 2 TABLET ORAL at 21:06

## 2017-04-08 RX ADMIN — OXYCODONE HYDROCHLORIDE AND ACETAMINOPHEN 1 TABLET: 5; 325 TABLET ORAL at 23:45

## 2017-04-08 RX ADMIN — MULTIPLE VITAMINS W/ MINERALS TAB 1 TABLET: TAB at 07:40

## 2017-04-08 RX ADMIN — CARVEDILOL 3.12 MG: 3.12 TABLET, FILM COATED ORAL at 07:40

## 2017-04-08 RX ADMIN — HEPARIN SODIUM AND DEXTROSE 800 UNITS/HR: 10000; 5 INJECTION INTRAVENOUS at 15:05

## 2017-04-08 RX ADMIN — CARVEDILOL 3.12 MG: 3.12 TABLET, FILM COATED ORAL at 17:27

## 2017-04-08 RX ADMIN — GABAPENTIN 400 MG: 100 CAPSULE ORAL at 07:40

## 2017-04-08 RX ADMIN — SODIUM CHLORIDE, SODIUM LACTATE, POTASSIUM CHLORIDE, AND CALCIUM CHLORIDE 1000 ML: 600; 310; 30; 20 INJECTION, SOLUTION INTRAVENOUS at 16:30

## 2017-04-08 RX ADMIN — MESALAMINE 1000 MG: 250 CAPSULE ORAL at 21:06

## 2017-04-08 RX ADMIN — MESALAMINE 1000 MG: 250 CAPSULE ORAL at 12:41

## 2017-04-08 RX ADMIN — GABAPENTIN 400 MG: 100 CAPSULE ORAL at 15:59

## 2017-04-08 RX ADMIN — PREDNISONE 40 MG: 20 TABLET ORAL at 07:40

## 2017-04-08 RX ADMIN — HYDROMORPHONE HYDROCHLORIDE 1 MG: 1 INJECTION, SOLUTION INTRAMUSCULAR; INTRAVENOUS; SUBCUTANEOUS at 07:41

## 2017-04-08 RX ADMIN — MESALAMINE 1000 MG: 250 CAPSULE ORAL at 07:40

## 2017-04-08 RX ADMIN — HYDROMORPHONE HYDROCHLORIDE 1 MG: 1 INJECTION, SOLUTION INTRAMUSCULAR; INTRAVENOUS; SUBCUTANEOUS at 10:16

## 2017-04-08 RX ADMIN — OXYCODONE HYDROCHLORIDE AND ACETAMINOPHEN 1 TABLET: 5; 325 TABLET ORAL at 17:27

## 2017-04-08 RX ADMIN — KETOROLAC TROMETHAMINE 15 MG: 15 INJECTION, SOLUTION INTRAMUSCULAR; INTRAVENOUS at 15:59

## 2017-04-08 RX ADMIN — DOCUSATE SODIUM 100 MG: 100 CAPSULE, LIQUID FILLED ORAL at 07:40

## 2017-04-08 RX ADMIN — ASPIRIN 81 MG: 81 TABLET, CHEWABLE ORAL at 07:40

## 2017-04-08 RX ADMIN — GABAPENTIN 400 MG: 100 CAPSULE ORAL at 21:06

## 2017-04-08 RX ADMIN — PROMETHAZINE HYDROCHLORIDE 25 MG: 25 TABLET ORAL at 08:05

## 2017-04-08 RX ADMIN — HYDROMORPHONE HYDROCHLORIDE 2 MG: 2 TABLET ORAL at 11:43

## 2017-04-08 RX ADMIN — HYDROMORPHONE HYDROCHLORIDE 2 MG: 2 TABLET ORAL at 15:59

## 2017-04-08 NOTE — PROGRESS NOTES
Problem: Mobility Impaired (Adult and Pediatric)  Goal: *Acute Goals and Plan of Care (Insert Text)  Physical Therapy ST/LT Goals  Initiated 4/8/2017 and to be accomplished within 7 day(s)  1. Patient will move from supine to sit and sit to supine in bed with supervision/set-up. 2. Patient will transfer from bed to chair and chair to bed with supervision/set-up using the least restrictive device. 3. Patient will perform sit to stand with supervision/set-up. 4. Patient will ambulate with supervision/set-up for >150 feet with the least restrictive device. 5. Patient will ascend/descend >2 stairs with U handrail(s) with supervision/set-up. PHYSICAL THERAPY EVALUATION     Patient: Kaveh Mccrary (75 y.o. female)  Date: 4/8/2017  Primary Diagnosis: Rectal bleed, crohns, anemia, dizziness. GI bleed  RIGHT POPLITEAL OCCLUSION  Vascular abnormality  Procedure(s) (LRB):  RIGHT LEG THROMBECTOMY WITH REPAIR POPLITEAL ARTERY WITH C-ARM (Right) 2 Days Post-Op   Precautions:   Fall, WBAT      ASSESSMENT :  Based on the objective data described below, the patient presents with decreased functional mobility and independence in regard to bed mobility, transfers, gt quality and tolerance, L foot numbness residual from CVA, decreased R DF actively, generalized dec strength, pain, stair negotiation and safety due to recent R LE thrombectomy surgery. Pt reporting pain in R LE 10/10 on numerical pain scale pre and post evaluation. Pt was premedicated for PT. Pt required additional time for all mobility and motor processing. Pt required min A for R LE management during transfers supine to sit and sit to supine. Discussed w/ Nurse Donald Bishop and pt is WBAT on R LE. Pt able to participate in gt training w/ RW, WBAT, GB and min A w/ antalgic pattern and poor clearance but no foot drop noted. Pt c/o R foot numbness once supine in bed. Pt returned to supine in bed w/ all needs within reach. Nurse Donald Bishop aware.   Recommend HH upon hospital d/c. Patient will benefit from skilled intervention to address the above impairments. Patients rehabilitation potential is considered to be Good  Factors which may influence rehabilitation potential include:   [ ]         None noted  [ ]         Mental ability/status  [X]         Medical condition  [ ]         Home/family situation and support systems  [ ]         Safety awareness  [X]         Pain tolerance/management  [ ]         Other:        PLAN :  Recommendations and Planned Interventions:  [X]           Bed Mobility Training             [ ]    Neuromuscular Re-Education  [X]           Transfer Training                   [ ]    Orthotic/Prosthetic Training  [X]           Gait Training                          [ ]    Modalities  [X]           Therapeutic Exercises          [ ]    Edema Management/Control  [X]           Therapeutic Activities            [X]    Patient and Family Training/Education  [ ]           Other (comment):     Frequency/Duration: Patient will be followed by physical therapy daily to address goals. Discharge Recommendations: Home Health  Further Equipment Recommendations for Discharge: rolling walker       SUBJECTIVE:   Patient stated I am having a lot of pain.       OBJECTIVE DATA SUMMARY:       Past Medical History:   Diagnosis Date    Abdominal pain      Anemia NEC       sickle cell trait    C. difficile colitis      [facility]      Gastrointestinal disorder       Crohns    Herpes zoster      Hx of cocaine abuse      Hyperkalemia      Hypertension      Iron deficiency anemia      MRSA (methicillin resistant Staphylococcus aureus)      Obesity      Pain management      Sickle cell trait ([facility])      Stroke ([facility]       + cva 3/17     Past Surgical History:   Procedure Laterality Date    COLONOSCOPY N/A 3/17/2017     COLONOSCOPY; BIOPSY; performed by Estefany Wood MD at [facility] END[facility]COPY    HX GYN         tubal ligation    HX TUBAL LIGATION         Barriers to Learning/Limitations: None  Compensate with: visual, verbal, tactile, kinesthetic cues/model  Prior Level of Function/Home Situation:   Home Situation  Home Environment: Private residence  # Steps to Enter: 2  One/Two Story Residence: Two story  # of Interior Steps: 20  Height of Each Step (in): 7 inches  Interior Rails: Right  Lift Chair Available: No  Living Alone: No  Support Systems: Family member(s), Child(margaret)  Patient Expects to be Discharged to[de-identified] Private residence  Current DME Used/Available at Home: Walker, rolling  Critical Behavior:  Neurologic State: Alert;Irritable  Orientation Level: Oriented X4  Cognition: Follows commands  Safety/Judgement: Decreased insight into deficits; Decreased awareness of need for safety; Fall prevention  Psychosocial  Patient Behaviors: Calm; Cooperative  Skin Condition/Temp: Warm  Skin Integrity: Incision (comment) (R LE)  Skin Integumentary  Skin Color: Appropriate for ethnicity  Skin Condition/Temp: Warm  Skin Integrity: Incision (comment) (R LE)  Strength:    Strength: Generally decreased, functional  Tone & Sensation:   Tone: Normal  Sensation: Impaired  Range Of Motion:  AROM: Generally decreased, functional  PROM: Within functional limits  Functional Mobility:  Bed Mobility:  Supine to Sit: Minimum assistance; Additional time (vc)  Sit to Supine: Minimum assistance; Additional time (vc)  Scooting: Stand-by asssistance; Additional time (vc)  Transfers:  Sit to Stand: Minimum assistance (vc)  Stand to Sit: Contact guard assistance (vc)  Balance:   Sitting: Intact  Standing: Intact; With support  Ambulation/Gait Training:  Distance (ft): 2 Feet (ft)  Assistive Device: Walker, rolling;Gait belt  Ambulation - Level of Assistance: Minimal assistance (vc)  Gait Abnormalities: Antalgic;Decreased step clearance;Shuffling gait; Step to gait  Right Side Weight Bearing: As tolerated  Base of Support: Shift to left; Widened  Stance: Right decreased  Speed/Janice: Slow;Shuffled  Step Length: Left shortened;Right shortened  Swing Pattern: Left asymmetrical;Right asymmetrical  Interventions: Safety awareness training; Tactile cues; Verbal cues  Therapeutic Exercises:   Pain:  Pain Scale 1: Numeric (0 - 10)  Pain Intensity 1: 10  Pain Location 1: Leg  Pain Orientation 1: Right  Pain Description 1: Aching;Numb; Throbbing  Pain Intervention(s) 1: Medication (see MAR)  Activity Tolerance:   Fair   Please refer to the flowsheet for vital signs taken during this treatment. After treatment:   [ ]         Patient left in no apparent distress sitting up in chair  [X]         Patient left in no apparent distress in bed  [X]         Call bell left within reach  [X]         Nursing notified  [ ]         Caregiver present  [ ]         Bed alarm activated      COMMUNICATION/EDUCATION:   [X]         Fall prevention education was provided and the patient/caregiver indicated understanding. [X]         Patient/family have participated as able in goal setting and plan of care. [X]         Patient/family agree to work toward stated goals and plan of care. [ ]         Patient understands intent and goals of therapy, but is neutral about his/her participation. [ ]         Patient is unable to participate in goal setting and plan of care.      Thank you for this referral.  Kaleigh Santoro, PT   Time Calculation: 30 mins  Eval Complexity: History: HIGH Complexity :3+ comorbidities / personal factors will impact the outcome/ POC Exam:LOW Complexity : 1-2 Standardized tests and measures addressing body structure, function, activity limitation and / or participation in recreation  Presentation: MEDIUM Complexity : Evolving with changing characteristics  Clinical Decision Making:Medium Complexity amb <30' RW Overall Complexity:LOW

## 2017-04-08 NOTE — PROGRESS NOTES
0741--Patient medicated with 1mg IVP Dilaudid for pain in RLE 10/10.     0811--PAIN REASSESSMENT: 5/10    1016--Dilaudid 1mg IVP given for pain 10/10 in RLE.     1026--Spoke c Dr. Douglas Jones about pain medication and dosing. There were some concerns by night shift RN of oversedation. Patient was falling sleep during care and during my shift yesterday requesting pain medication every 2 hours consistantly. Patient is refusing percocet as ordered stating \"that does not work for [employer] and \"makes me itch\". After speaking with the MD I received orders to give PO dilaudid 1-2tabs every 4 hours as needed and IV dilaudid every 3 hours as needed. Patient was NOT happy with plan. She was very upset stated \"I have a right to pain medication\" and that she should just go home if we are going to leave her in pain. I did try to convince the patient we have to try and given her sedation last night it would be inappropriate to keep giving her medication like this without trying to wean her to some thing oral.     1143--Gave 2mg PO dilaudid for pain 10/10 in RLE.     1145--Dr. Douglas Jones present at bedside. I clarify Dilaudid order for 1-2mg PO (instead of 1-2 tabs which would equate 2-4mgs and double her previous IV dose). Also received orders patient okay to transfer to medical.     1300--PT reported to me patient was feeling numbness in RLE following exercises. No reports of numbness to RLE at this time, just pain. 1311--Gaming cath removed. 1314--Labs drawn via cental line. 1325--Attempted to call report to Abhishek Brooke on [facility]; awaiting call back. 1435--TRANSFER - OUT REPORT:    Verbal report given to Milind Escobar RN (name) on Cosme Martinez  being transferred to [place] [address](unit) for routine progression of care       Report consisted of patients Situation, Background, Assessment and   Recommendations(SBAR).      Information from the following report(s) SBAR, Kardex, Procedure Summary, Intake/Output, MAR, Recent Results, Med Rec Status and Cardiac Rhythm SR was reviewed with the receiving nurse. Lines:   Triple Lumen 03/28/17 Right Subclavian (Active)   Central Line Being Utilized Yes 4/8/2017 11:30 AM   Criteria for Appropriate Use Limited/no vessel suitable for conventional peripheral access 4/8/2017 11:30 AM   Site Assessment Clean, dry, & intact 4/8/2017 11:30 AM   Infiltration Assessment 0 4/8/2017 11:30 AM   Affected Extremity/Extremities Color distal to insertion site pink (or appropriate for race) 4/8/2017 11:30 AM   Date of Last Dressing Change 04/07/17 4/8/2017 11:30 AM   Dressing Status Clean, dry, & intact 4/8/2017 11:30 AM   Dressing Type Disk with Chlorhexadine gluconate (CHG) 4/8/2017 11:30 AM   Action Taken Open ports on tubing capped 4/8/2017 11:30 AM   Proximal Hub Color/Line Status White; Infusing 4/8/2017 11:30 AM   Positive Blood Return (Medial Site) Yes 4/8/2017 11:30 AM   Medial Hub Color/Line Status Blue;Capped 4/8/2017 11:30 AM   Positive Blood Return (Lateral Site) Yes 4/8/2017 11:30 AM   Distal Hub Color/Line Status Brown 4/8/2017 11:30 AM   Positive Blood Return (Site #3) No 4/8/2017 11:30 AM   Alcohol Cap Used Yes 4/8/2017 11:30 AM        Opportunity for questions and clarification was provided.       Patient transported with:   Patients medications from home  Registered Nurse

## 2017-04-08 NOTE — ROUTINE PROCESS
Bedside and Verbal shift change report given to IVAN Cunningham RN (oncoming nurse). Report included the following information SBAR, Kardex, Intake/Output and Recent Results. Alarm parameters reviewed and opportunities for questions provided.

## 2017-04-08 NOTE — PROGRESS NOTES
Right foot is w/w/p. Will start po dilaudid to get off constant use of iv dilaudid. Okay to discharge from ICU to floor status not need for tele from our point of view. Once patient tolerating po pain meds would be okay for discharge from vascular point of view.

## 2017-04-08 NOTE — PROGRESS NOTES
1515 - Bedside report received from IVAN Singleton RN. Patient A&O x4. Resting comfortably with no c/o, sob, distress noted. Plan of care for today is PAIN CONTROL. 1545 - Pt c/o 8/10 PAIN. PRN MEDICATION GIVEN, PILLOW PLACED UNDER LEG. 1725 - Pt c/o 8/10 PAIN. PRN MEDICATION GIVEN. SHIFT SUMMARY:    Patient has family at bedside. Eating evening meal. Pain is controled with PO medication. Patient Vitals for the past 4 hrs:   Temp Pulse Resp BP SpO2   04/08/17 1819 97.3 °F (36.3 °C) 94 18 119/82 100 %   04/08/17 1730 98.2 °F (36.8 °C) 87 18 121/84 100 %   04/08/17 1610 98.4 °F (36.9 °C) 92 18 118/72 100 %   04/08/17 1516 99 °F (37.2 °C) 99 20 116/69 -       Bedside and Verbal shift change report given to Greg Maurice RN (oncoming nurse) by Domonique Perez RN (off going nurse). Report included the following information SBAR, Kardex, Intake/Output, MAR and Recent Results.

## 2017-04-08 NOTE — PROGRESS NOTES
Hospitalist Progress Note    Patient: Lilo Russo MRN: [id number]  CSN: [id number]    YOB: 1970  Age: 40 y.o. Sex: female    DOA: 3/27/2017 LOS:  LOS: 12 days          Chief Complaint: GI bleed, stroke    Assessment/Plan     Patient Active Problem List   Diagnosis Code    Chronic pain syndrome G89.4    Crohn disease ([facility] K50.90    Sickle cell trait ([facility] D57.3    Hypertension I10    Hx of cocaine abuse Y52.278    Embolic stroke ([facility] I90.6    Acute blood loss anemia D62    Neuropathic pain M79.2    DVT (deep venous thrombosis) ([facility]) I82.409    GI bleed K92.2    Elevated WBC count D72.829    PAD (peripheral artery disease) ([facility]) I73.9    Vascular abnormality I99.9     Recent admission for embolic CVA and recent DVT.     GI bleed - GI following, now bleeding has stopped.       Anemia - acute blood loss, s/p blood transfusion, H/H improved 9.9/31.0 this am      Recent DVT/CVA - anticoagulation restarted. On heparin drip.       Right leg pain - right leg arterial duplex >75%stenosis of anterior tibial artery. Vascular following, CTA with right popliteal occlusion, s/p RIGHT LEG THROMBECTOMY W/REPAIR POPLITEAL ARTERY W/C-ARM,ANGIOGRAM by Dr. Gely Florence. Postoperative management per vascular surgery.      Crohn's disease - management per GI. On steroids, mesalamine and protonix. Seen by colorectal surgery, bleeding stopped, if patient bleeds again she is a candidate of colectomy.       HTN - controlled.       Subjective:  No acute events overnight. C/o pain at the right leg surgical site.       Review of systems:    Constitutional: denies fevers, chills, myalgias  Respiratory: denies SOB, cough  Cardiovascular: denies chest pain, palpitations  Gastrointestinal: denies nausea, vomiting, diarrhea      Vital signs/Intake and Output:  Visit Vitals    /73    Pulse 92    Temp 98.1 °F (36.7 °C)    Resp 15    Ht 5' 6\" (1.676 m)    Wt 81.6 kg (179 lb 14.3 oz)    SpO2 100%    Breastfeeding No    BMI 29.04 kg/m2     Current Shift:     Last three shifts:  04/06 1901 - 04/08 0700  In: 9544 [P.O.:1080; I.V.:99]  Out: 3500 [Urine:3500]    Exam:    General: Well developed, alert, NAD, OX3  Head/Neck: NCAT, supple, No masses, No lymphadenopathy  CVS:Regular rate and rhythm, no M/R/G, S1/S2 heard, no thrill  Lungs:Clear to auscultation bilaterally, no wheezes, rhonchi, or rales  Abdomen: Soft, Nontender, No distention, Normal Bowel sounds, No hepatomegaly  Extremities: No C/C/E, pulses palpable 2+  Skin:normal texture and turgor, no rashes, no lesions, healing surgical scar at the right leg. Neuro:grossly normal , follows commands  Psych:appropriate                Labs: Results:       Chemistry No results for input(s): GLU, NA, K, CL, CO2, BUN, CREA, CA, AGAP, BUCR, TBIL, GPT, AP, TP, ALB, GLOB, AGRAT in the last 72 hours. CBC w/Diff Recent Labs      04/08/17   0545  04/07/17   0245  04/06/17   0030   WBC  8.3  10.4  9.4   RBC  3.49*  3.52*  3.45*   HGB  9.9*  10.0*  9.9*   HCT  31.0*  31.0*  30.0*   PLT  252  287  246   GRANS  66   --    --    LYMPH  21   --    --    EOS  3   --    --       Cardiac Enzymes No results for input(s): CPK, CKND1, CHANDA in the last 72 hours. No lab exists for component: CKRMB, TROIP   Coagulation Recent Labs      04/08/17   0545  04/07/17   2349  04/07/17   1205  04/07/17   0245   PTP  11.7   --    --   12.0   INR  0.9   --    --   0.9   APTT   --   34.0  26.7  28.1       Lipid Panel Lab Results   Component Value Date/Time    Cholesterol, total 145 03/12/2017 06:15 AM    HDL Cholesterol 37 03/12/2017 06:15 AM    LDL, calculated 91.6 03/12/2017 06:15 AM    VLDL, calculated 16.4 03/12/2017 06:15 AM    Triglyceride 82 03/12/2017 06:15 AM    CHOL/HDL Ratio 3.9 03/12/2017 06:15 AM      BNP No results for input(s): BNPP in the last 72 hours. Liver Enzymes No results for input(s): TP, ALB, TBIL, AP, SGOT, GPT in the last 72 hours.     No lab exists for component: DBIL   Thyroid Studies Lab Results   Component Value Date/Time    TSH 1.51 12/12/2009 09:48 AM        Procedures/imaging: see electronic medical records for all procedures/Xrays and details which were not copied into this note but were reviewed prior to creation of Nikhil Mackenzie MD

## 2017-04-08 NOTE — PROGRESS NOTES
TRANSFER - IN REPORT:    Verbal report received from IVAN Singleton RN (name) on Rose Orwendy  being received from ICU (unit) for routine progression of care      Report consisted of patients Situation, Background, Assessment and   Recommendations(SBAR). Information from the following report(s) SBAR, Kardex, Procedure Summary, Intake/Output, MAR and Recent Results was reviewed with the receiving nurse. Opportunity for questions and clarification was provided. Assessment completed upon patients arrival to unit and care assumed.

## 2017-04-08 NOTE — PROGRESS NOTES
[facility]  Pulmonary, Critical Care, and Sleep Medicine    Name: Gwen Bajwa MRN: [id number]   : 1970 Hospital: [facility] [place]   Date: 2017             Requesting MD:                                                  Reason for CC Consult:  IMPRESSION:   · S/p rt popliteal artery thrombectomyRt MCA ischemic stroke  · GI bleed  With anticoagulation  · Hx of crohns  · Hx of cocaine abuse  · Sickle cell trait  · Chronic pain      RECOMMENDATIONS:   · Continue heparin as directed by vascular surgery, transition to coumadin   · Prn dilaudid for pain  · H/h stable  · Continue asa, statin and beta blockers  · Continue prednisone and mesalamine for crohns  · Remove bradshaw  · Transfer patient to medical floor if ok with vascular. Subjective/History:     NO issues overnight. Pain tolerable. Still on heparin drip. No respiratory issues  HD stable. This patient has been seen and evaluated at the request of Dr. Rosalind Jha  for post op management. Patient is a 40 y.o. female with multiple medical problems listed below. Pt was recently dx with Rt MCA 3/2017 distribution stroke, was placed on coumadin, came in acute GI bleed. Pt required mutiple units of blood transfusion. During this hospitalization, noted RLE pain,  vacular study confirmed  Rt popliteal artery occlusion. Pt underwent thrombectomy yesterday. Pt was transferred to ICU. I was asked to assist in management.          Past Medical History:   Diagnosis Date    Abdominal pain     Anemia NEC     sickle cell trait    C. difficile colitis     Crohn's disease (HCC)     Gastrointestinal disorder     Crohns    Herpes zoster     Hx of cocaine abuse     Hyperkalemia     Hypertension     Iron deficiency anemia     MRSA (methicillin resistant Staphylococcus aureus)     Obesity     Pain management     Sick[facility] trait [facility] [address]     Stroke ([facility] [address]     + cva 3/17      Past Surgical History:   Procedure Laterality Date    COLONOSCOPY N/A 3/17/2017    COLONOSCOPY; BIOPSY; performed by William Gao MD at [facility] ENDOSCOPY    HX GYN      tubal ligation    HX TUBAL LIGATION        Prior to Admission medications    Medication Sig Start Date End Date Taking? Authorizing Provider   warfarin (COUMADIN) 5 mg tablet Take 5 mg by mouth daily. Yes Historical Provider   atorvastatin (LIPITOR) 80 mg tablet Take 1 Tab by mouth nightly. 3/23/17  Yes Emerson Tripathi MD   gabapentin (NEURONTIN) 400 mg capsule Take 1 Cap by mouth three (3) times daily. 3/23/17  Yes Emerson Tripathi MD   mesalamine (PENTASA) 500 mg CR capsule Take 2 Caps by mouth four (4) times daily. Indications: CROHN'S DISEASE 3/23/17  Yes Emerson Tripathi MD   predniSONE (DELTASONE) 20 mg tablet Take 2 Tabs by mouth daily (with breakfast). Indications: CROHN'S DISEASE 3/23/17  Yes Emerson Tripathi MD   enoxaparin (LOVENOX) 80 mg/0.8 mL injection 80 mg by SubCUTAneous route every twelve (12) hours. 3/23/17  Yes Emerson Tripathi MD   oxyCODONE-acetaminophen (PERCOCET) 5-325 mg per tablet Take 1 Tab by mouth every four (4) hours as needed for Pain. Max Daily Amount: 6 Tabs.  3/23/17   Emerson Tripathi MD     Current Facility-Administered Medications   Medication Dose Route Frequency    lactated ringers infusion 1,000 mL  1,000 mL IntraVENous CONTINUOUS    heparin 25,000 units in D5W 250 ml infusion  800 Units/hr IntraVENous CONTINUOUS    gabapentin (NEURONTIN) capsule 400 mg  400 mg Oral TID    warfarin (COUMADIN) tablet 5 mg  5 mg Oral DAILY    aspirin chewable tablet 81 mg  81 mg Oral DAILY    predniSONE (DELTASONE) tablet 40 mg  40 mg Oral DAILY WITH BREAKFAST    atorvastatin (LIPITOR) tablet 80 mg  80 mg Oral QHS    carvedilol (COREG) tablet 3.125 mg  3.125 mg Oral BID WITH MEALS    multivitamin, tx-iron-ca-min (THERA-M w/ IRON) tablet 1 Tab  1 Tab Oral DAILY    insulin lispro (HUMALOG) injection   SubCUTAneous AC&HS    docusate sodium (COLACE) capsule 100 mg  100 mg Oral BID    mesalamine (PENTASA) CR capsule 1,000 mg  1,000 mg Oral QID     Allergies   Allergen Reactions    Humira [Adalimumab] Other (comments)    Remicade [Infliximab] Palpitations      Social History   Substance Use Topics    Smoking status: Former Smoker    Smokeless tobacco: Not on file    Alcohol use No      History reviewed. No pertinent family history. Review of Systems:  A comprehensive review of systems was negative except for that written in the HPI. Objective:   Vital Signs:    Visit Vitals    /74    Pulse 94    Temp 98.1 °F (36.7 °C)    Resp 13    Ht 5' 6\" (1.676 m)    Wt 81.6 kg (179 lb 14.3 oz)    LMP 2017    SpO2 100%    Breastfeeding No    BMI 29.04 kg/m2       O2 Device: Room air   O2 Flow Rate (L/min): 2 l/min   Temp (24hrs), Av.1 °F (36.7 °C), Min:97.9 °F (36.6 °C), Max:98.6 °F (37 °C)       Intake/Output:   Last shift:         Last 3 shifts:  1901 -  0700  In: 8740 [P.O.:1080; I.V.:99]  Out: 3500 [Urine:3500]    Intake/Output Summary (Last 24 hours) at 17 0916  Last data filed at 17 0309   Gross per 24 hour   Intake            98.99 ml   Output             2550 ml   Net         -2451.01 ml     Hemodynamics:   . @MAP     . @CVP       Ventilator Settings:  Mode Rate Tidal Volume Pressure FiO2 PEEP                    Peak airway pressure:      Minute ventilation:        ARDS network Guidelines: Lung protective strategy and Pl pressure goals____________    Physical Exam:    General:  Alert, cooperative, no distress, appears stated age. Head:  Normocephalic, without obvious abnormality, atraumatic. Eyes:  Conjunctivae/corneas clear. PERRL, EOMs intact. Nose: Nares normal. Septum midline. Mucosa normal. No drainage or sinus tenderness.    Throat: Lips, mucosa, and tongue normal. Teeth and gums normal.   Neck: Supple, symmetrical, trachea midline, no adenopathy, thyroid: no enlargment/tenderness/nodules, no carotid bruit and no JVD. Back:   Symmetric, no curvature. ROM normal.   Lungs:   Clear to auscultation bilaterally. Chest wall:  No tenderness or deformity. Heart:  Regular rate and rhythm, S1, S2 normal, no murmur, click, rub or gallop. Abdomen:   Soft, non-tender. Bowel sounds normal. No masses,  No organomegaly. Extremities: Extremities normal, atraumatic, no cyanosis or edema. Pulses: 2+ and symmetric all extremities. Skin: Skin color, texture, turgor normal. No rashes or lesions   Lymph nodes: Cervical, supraclavicular, and axillary nodes normal.   Neurologic: Grossly nonfocal       Data:     Recent Results (from the past 24 hour(s))   GLUCOSE, POC    Collection Time: 04/07/17 11:36 AM   Result Value Ref Range    Glucose (POC) 123 (H) 70 - 110 mg/dL   PTT    Collection Time: 04/07/17 12:05 PM   Result Value Ref Range    aPTT 26.7 23.0 - 36.4 SEC   GLUCOSE, POC    Collection Time: 04/07/17  4:33 PM   Result Value Ref Range    Glucose (POC) 123 (H) 70 - 110 mg/dL   GLUCOSE, POC    Collection Time: 04/07/17  9:42 PM   Result Value Ref Range    Glucose (POC) 102 70 - 110 mg/dL   PTT    Collection Time: 04/07/17 11:49 PM   Result Value Ref Range    aPTT 34.0 23.0 - 36.4 SEC   PROTHROMBIN TIME + INR    Collection Time: 04/08/17  5:45 AM   Result Value Ref Range    Prothrombin time 11.7 11.5 - 15.2 sec    INR 0.9 0.8 - 1.2     CBC WITH AUTOMATED DIFF    Collection Time: 04/08/17  5:45 AM   Result Value Ref Range    WBC 8.3 4.6 - 13.2 K/uL    RBC 3.49 (L) 4.20 - 5.30 M/uL    HGB 9.9 (L) 12.0 - 16.0 g/dL    HCT 31.0 (L) 35.0 - 45.0 %    MCV 88.8 74.0 - 97.0 FL    MCH 28.4 24.0 - 34.0 PG    MCHC 31.9 31.0 - 37.0 g/dL    RDW 15.3 (H) 11.6 - 14.5 %    PLATELET 815 753 - 036 K/uL    MPV 8.2 (L) 9.2 - 11.8 FL    NEUTROPHILS 66 40 - 73 %    LYMPHOCYTES 21 21 - 52 %    MONOCYTES 10 3 - 10 %    EOSINOPHILS 3 0 - 5 %    BASOPHILS 0 0 - 2 %    ABS. NEUTROPHILS 5.5 1.8 - 8.0 K/UL    ABS.  LYMPHOCYTES 1.7 0.9 - 3.6 K/UL    ABS. MONOCYTES 0.8 0.05 - 1.2 K/UL    ABS. EOSINOPHILS 0.3 0.0 - 0.4 K/UL    ABS. BASOPHILS 0.0 0.0 - 0.06 K/UL    DF AUTOMATED     GLUCOSE, POC    Collection Time: 04/08/17  6:46 AM   Result Value Ref Range    Glucose (POC) 101 70 - 110 mg/dL             Telemetry:normal sinus rhythm    Imaging:  I have personally reviewed the patients radiographs and have reviewed the reports:     Results    CTA ABD ART W RUNOFF W WO CONT (Accession [id number]) (Order [id number])         Allergies        High: Humira [Adalimumab]; Remicade [Infliximab]       Result Information      Status Provider Status        Final result (Exam End: 4/4/2017 12:35 AM) Open        Study Result      PROCEDURE: CT angiogram of the abdomen, pelvis and bilateral lower extremities.     CLINICAL HISTORY: Diminished pulses, leg, embolism, aorta, branches. Pain in  right foot.     COMPARISON: No prior CTA examination     TECHNIQUE: Contrast-enhanced CT angiogram of the abdomen, pelvis and both lower  extremities was performed after the uneventful administration of 125 mL of  Isovue 370. Axial images acquired from the domes of the diaphragms through both  feet. Coronal and sagittal reformatted images were generated from the axial  data. MIP and curved reformatted images were generated on a separate  workstation.     --- CTA FINDINGS ---     Visualized descending thoracic aorta is unremarkable.     The abdominal aorta has a normal caliber normally tapering vessel without  aneurysm, dissection or stenosis. No aortic thrombus is seen.     The celiac axis, superior mesenteric artery, single bilateral renal arteries and  inferior mesenteric arteries are normal caliber widely patent vessels.     The aortic bifurcation is patent. The common iliac arteries are patent. The  iliac bifurcations are patent, the internal and external iliac arteries  bilaterally are patent.     Patent common femoral arteries.  Patent profunda femoris arteries.      There is some patient motion artifact which partially obscures the proximal  superficial femoral arteries bilaterally, they are suspected to be widely patent  through this region of motion artifact although suboptimally evaluated over this  short segment. The remainder of the bilateral superficial femoral arteries  appear widely patent.     The above-knee popliteal arteries are patent bilaterally. The left below knee  popliteal artery is widely patent.     There is a short segment occlusion of the below knee right popliteal artery. The  occlusion extends into the origin of the right anterior tibial artery and  tibioperoneal trunk. The right anterior tibial artery and peroneal artery  reconstitute via collaterals. The right posterior tibial artery is occluded  along its length.      The left tibial runoff appears overall intact. Dominant runoff vessel is the  anterior tibial artery. The tibioperoneal trunk and peroneal artery are widely  patent. The posterior tibial artery is diminutive proximally and is largely fed  by a peroneal collateral distally.     --- CT FINDINGS ---     Mild dependent atelectasis lung bases. Visualized heart and pericardium are  unremarkable.     Contracted gallbladder. Small right hepatic cyst and focal fatty infiltration  along the margins for fissure for ligamentum teres left hepatic lobe. No  suspicious hepatic abnormality.     Unremarkable spleen, pancreas, adrenal glands and kidneys.     No retroperitoneal masses or adenopathy.     The abdominal bowel is unremarkable.     Within the pelvis the appendix is noted to be normal. There is no pericolonic  inflammatory stranding. No significant free fluid. Short segment of the sigmoid  colon is underdistended, results in a slightly thick walled appearance.      Uterus and ovaries are present.  Heterogeneity in coarse calcification at the  uterine fundus suggests underlying fibroids.     No pelvic masses or adenopathy.     There are regions of nonspecific stranding within the subcutaneous fat of the  body wall and involving the lower extremities, left greater than right, could be  related to overall volume status and/or the presence of cellulitis. Note of a  small likely Baker's cyst at the left knee.     Foci simultaneous gas within the anterior abdominal wall are presumably related  to injections.     Bones appear normal for age.        IMPRESSION  IMPRESSION:     1. No aortic or inflow lesions.     2. There is a short segment occlusion of the below knee right popliteal artery. The occlusion extends into the origin of the right anterior tibial artery and  tibioperoneal trunk. The right anterior tibial artery and peroneal artery  reconstitute via collaterals. The right posterior tibial artery is occluded  along its length.      3. The left tibial runoff appears overall intact. Dominant runoff vessel is the  anterior tibial artery. The tibioperoneal trunk and peroneal artery are widely  patent. The posterior tibial artery is diminutive proximally and is largely fed  by a peroneal collateral distally.          Status Provider Status        Final result (Exam End: 3/28/2017 11:38 AM) Open        Study Result      EXAM: CT head     INDICATION: 21-year-old patient with hypertension, altered mental status.     COMPARISON: Several prior examinations, most recently brain MRI dated 3/10/2017.     TECHNIQUE: Axial CT imaging of the head was performed without intravenous  contrast.     One or more dose reduction techniques were used on this CT: automated exposure  control, adjustment of the mAs and/or kVp according to patient's size, and  iterative reconstruction techniques.  The specific techniques utilized on this CT  exam have been documented in the patient's electronic medical record.      _______________     FINDINGS: Detail is mildly limited by motion artifact.     BRAIN AND POSTERIOR FOSSA: As previously, there is redemonstration of abnormal  clear with matter differentiation involving the right centrum semiovale, corona  radiata, right-sided insular cortex, and right parietal/temporal lobes, spanning  the right sylvian fissure. There is mild associated mass effect upon the right  lateral ventricle, the appearance of which is similar to prior exam. No evidence  of midline shift. The basilar cisterns remain patent. The known multiple  cerebellar infarcts are characterized to better advantage on comparison brain  MRI. Mild increased density of the infarcted cortical surfaces is noted. No  intra-axial fluid collection.     EXTRA-AXIAL SPACES AND MENINGES: There are no abnormal extra-axial fluid  collections.     CALVARIUM: Intact.     SINUSES: Paranasal sinuses and mastoid air cells are clear.     OTHER: None.     _______________     IMPRESSION  IMPRESSION:        1. Redemonstration of sequela of multifocal, likely embolic infarcts without  acute intra-axial or extra-axial fluid collection. 2. Mild persistent edema and mass effect upon the right lateral ventricle,  without evidence of midline shift. Allergies        High: Humira [Adalimumab]; Remicade [Infliximab]       Result Information      Status Provider Status        Final result (Exam End: 3/10/2017  6:02 PM) Open        Study Result      Brain MR without contrast:     Indication: Possible acute infarction. Left-sided weakness. Abnormal head CT. History of sickle cell.     Procedure: Sagittal spin echo T1, axial FSE FLAIR and T2 and axial diffusion  weighted scanning was performed. Coronal spin echo T1 and T2 FSE images were  also performed. No contrast was administered.     Comparison head CT 3/10/2017.     Findings: Diffusion: Multifocal areas of restricted diffusion consistent with  multifocal acute infarctions are noted.  Multiple left greater than right  cerebellar hemisphere infarctions are noted area there is a large right MCA  territory infarction involving the insular cortex and posterior perisylvian  temporal and inferior parietal lobe as well as the posterior inferior frontal  lobe. This was in part hypodense on CT. No hemorrhage or significant mass effect  associated with these lesions.     The axial T2 star examination does not show any hemorrhage associated with the  areas of acute infarction. There is however a strong suspicion of a hemorrhagic  lesion within the pituitary eccentric to the left but does not appear  artifactual.     Brain parenchyma: Other than the areas of acute infarction, no additional focal  brain lesion. No mass or mass effect.     Ventricles and sulci: Normal. No significant atrophy given age.     Extra-axial: No extra-axial fluid collection or mass is noted.     Brain vasculature: No vascular abnormality is appreciated on this routine brain  MR examination.     Craniocervical junction: Normal.     Skull base, extracranial and calvarium: Protrusion of orbital fat through a  defect in the right orbital floor is most likely a chronic orbital floor  fracture. There does appear to be some relative right enophthalmos.     IMPRESSION  Impression:        1. Multifocal acute infarctions, the largest is in the right MCA territory  correlating with the abnormal CT. There are bilateral left greater than right  cerebellar hemisphere infarctions. These infarctions are likely embolic with a  central origin. 2. Possible hemorrhagic lesion within the pituitary. Scans dedicated to the  pituitary could be performed. This could be artifact of volume averaging of the  sellar floor but is at least suspect. 3. Chronic appearing right orbital floor fracture with protrusion of orbital fat  and relative enophthalmos.                Best practice :  ASA on arrival  , Beta Blocker  and Statin Cardiovascular:  An arterial systolic blood pressure (SBP) of < or equal to 90mm Hg or a mean arterial pressure (MAP) < or equal to 70mm Hg for at least 1 hour despite adequate fluid resuscitation or adequate intravascular volume status; or the need for vasopressors to maintain SBP>or equal to 90mm Hg or MAP > or equal to 70mm Hg. Continue current therapy  Gaming Bundle Followed     Total critical care time exclusive of procedures: 35 minutes  Tiffani Rosado MD  4/8/2017, 9:45 AM

## 2017-04-08 NOTE — ROUTINE PROCESS
Bedside and Verbal shift change report given to Ramesh Mclain RN (oncoming nurse) by Evan Dixon RN (offgoing nurse).  Report included the following information SBAR, Kardex, Intake/Output, MAR, Recent Results, Med Rec Status and Cardiac Rhythm SR.

## 2017-04-09 LAB
ANION GAP BLD CALC-SCNC: 6 MMOL/L (ref 3–18)
APTT PPP: 28.2 SEC (ref 23–36.4)
APTT PPP: 33.2 SEC (ref 23–36.4)
BASOPHILS # BLD AUTO: 0 K/UL (ref 0–0.06)
BASOPHILS # BLD: 0 % (ref 0–2)
BUN SERPL-MCNC: 16 MG/DL (ref 7–18)
BUN/CREAT SERPL: 23 (ref 12–20)
CALCIUM SERPL-MCNC: 8.4 MG/DL (ref 8.5–10.1)
CHLORIDE SERPL-SCNC: 107 MMOL/L (ref 100–108)
CO2 SERPL-SCNC: 30 MMOL/L (ref 21–32)
CREAT SERPL-MCNC: 0.69 MG/DL (ref 0.6–1.3)
DIFFERENTIAL METHOD BLD: ABNORMAL
EOSINOPHIL # BLD: 0.2 K/UL (ref 0–0.4)
EOSINOPHIL NFR BLD: 3 % (ref 0–5)
ERYTHROCYTE [DISTWIDTH] IN BLOOD BY AUTOMATED COUNT: 15.3 % (ref 11.6–14.5)
GLUCOSE BLD STRIP.AUTO-MCNC: 124 MG/DL (ref 70–110)
GLUCOSE BLD STRIP.AUTO-MCNC: 128 MG/DL (ref 70–110)
GLUCOSE BLD STRIP.AUTO-MCNC: 143 MG/DL (ref 70–110)
GLUCOSE BLD STRIP.AUTO-MCNC: 99 MG/DL (ref 70–110)
GLUCOSE SERPL-MCNC: 113 MG/DL (ref 74–99)
HCT VFR BLD AUTO: 29.3 % (ref 35–45)
HGB BLD-MCNC: 9.3 G/DL (ref 12–16)
INR PPP: 0.9 (ref 0.8–1.2)
LYMPHOCYTES # BLD AUTO: 18 % (ref 21–52)
LYMPHOCYTES # BLD: 1.6 K/UL (ref 0.9–3.6)
MAGNESIUM SERPL-MCNC: 1.7 MG/DL (ref 1.6–2.6)
MCH RBC QN AUTO: 28.4 PG (ref 24–34)
MCHC RBC AUTO-ENTMCNC: 31.7 G/DL (ref 31–37)
MCV RBC AUTO: 89.3 FL (ref 74–97)
MONOCYTES # BLD: 0.8 K/UL (ref 0.05–1.2)
MONOCYTES NFR BLD AUTO: 10 % (ref 3–10)
NEUTS SEG # BLD: 5.9 K/UL (ref 1.8–8)
NEUTS SEG NFR BLD AUTO: 69 % (ref 40–73)
PLATELET # BLD AUTO: 259 K/UL (ref 135–420)
PMV BLD AUTO: 8.4 FL (ref 9.2–11.8)
POTASSIUM SERPL-SCNC: 3.6 MMOL/L (ref 3.5–5.5)
PROTHROMBIN TIME: 12 SEC (ref 11.5–15.2)
RBC # BLD AUTO: 3.28 M/UL (ref 4.2–5.3)
SODIUM SERPL-SCNC: 143 MMOL/L (ref 136–145)
WBC # BLD AUTO: 8.6 K/UL (ref 4.6–13.2)

## 2017-04-09 PROCEDURE — 74011250637 HC RX REV CODE- 250/637: Performed by: SURGERY

## 2017-04-09 PROCEDURE — 74011250637 HC RX REV CODE- 250/637: Performed by: INTERNAL MEDICINE

## 2017-04-09 PROCEDURE — 74011250637 HC RX REV CODE- 250/637: Performed by: HOSPITALIST

## 2017-04-09 PROCEDURE — 97116 GAIT TRAINING THERAPY: CPT

## 2017-04-09 PROCEDURE — 74011250636 HC RX REV CODE- 250/636: Performed by: SURGERY

## 2017-04-09 PROCEDURE — 74011250637 HC RX REV CODE- 250/637: Performed by: FAMILY MEDICINE

## 2017-04-09 PROCEDURE — 74011636637 HC RX REV CODE- 636/637: Performed by: INTERNAL MEDICINE

## 2017-04-09 PROCEDURE — 83735 ASSAY OF MAGNESIUM: CPT | Performed by: SURGERY

## 2017-04-09 PROCEDURE — 80048 BASIC METABOLIC PNL TOTAL CA: CPT | Performed by: SURGERY

## 2017-04-09 PROCEDURE — 85730 THROMBOPLASTIN TIME PARTIAL: CPT | Performed by: SURGERY

## 2017-04-09 PROCEDURE — 36415 COLL VENOUS BLD VENIPUNCTURE: CPT | Performed by: SURGERY

## 2017-04-09 PROCEDURE — 85610 PROTHROMBIN TIME: CPT | Performed by: SURGERY

## 2017-04-09 PROCEDURE — 85027 COMPLETE CBC AUTOMATED: CPT | Performed by: SURGERY

## 2017-04-09 PROCEDURE — 74011250636 HC RX REV CODE- 250/636: Performed by: ANESTHESIOLOGY

## 2017-04-09 PROCEDURE — 82962 GLUCOSE BLOOD TEST: CPT

## 2017-04-09 PROCEDURE — 65270000029 HC RM PRIVATE

## 2017-04-09 RX ORDER — HYDROMORPHONE HYDROCHLORIDE 2 MG/1
2-4 TABLET ORAL
Status: DISCONTINUED | OUTPATIENT
Start: 2017-04-09 | End: 2017-04-12

## 2017-04-09 RX ADMIN — PROMETHAZINE HYDROCHLORIDE 25 MG: 25 TABLET ORAL at 08:57

## 2017-04-09 RX ADMIN — GABAPENTIN 400 MG: 100 CAPSULE ORAL at 08:57

## 2017-04-09 RX ADMIN — OXYCODONE HYDROCHLORIDE AND ACETAMINOPHEN 1 TABLET: 5; 325 TABLET ORAL at 09:09

## 2017-04-09 RX ADMIN — MESALAMINE 1000 MG: 250 CAPSULE ORAL at 08:57

## 2017-04-09 RX ADMIN — PREDNISONE 40 MG: 20 TABLET ORAL at 08:57

## 2017-04-09 RX ADMIN — MULTIPLE VITAMINS W/ MINERALS TAB 1 TABLET: TAB at 08:57

## 2017-04-09 RX ADMIN — MESALAMINE 1000 MG: 250 CAPSULE ORAL at 15:19

## 2017-04-09 RX ADMIN — DOCUSATE SODIUM 100 MG: 100 CAPSULE, LIQUID FILLED ORAL at 08:57

## 2017-04-09 RX ADMIN — WARFARIN SODIUM 5 MG: 5 TABLET ORAL at 17:23

## 2017-04-09 RX ADMIN — DOCUSATE SODIUM 100 MG: 100 CAPSULE, LIQUID FILLED ORAL at 21:35

## 2017-04-09 RX ADMIN — HYDROMORPHONE HYDROCHLORIDE 4 MG: 2 TABLET ORAL at 19:39

## 2017-04-09 RX ADMIN — CARVEDILOL 3.12 MG: 3.12 TABLET, FILM COATED ORAL at 08:57

## 2017-04-09 RX ADMIN — HEPARIN SODIUM AND DEXTROSE 800 UNITS/HR: 10000; 5 INJECTION INTRAVENOUS at 05:00

## 2017-04-09 RX ADMIN — SODIUM CHLORIDE, SODIUM LACTATE, POTASSIUM CHLORIDE, AND CALCIUM CHLORIDE 1000 ML: 600; 310; 30; 20 INJECTION, SOLUTION INTRAVENOUS at 19:44

## 2017-04-09 RX ADMIN — HYDROMORPHONE HYDROCHLORIDE 2 MG: 2 TABLET ORAL at 01:37

## 2017-04-09 RX ADMIN — CARVEDILOL 3.12 MG: 3.12 TABLET, FILM COATED ORAL at 17:23

## 2017-04-09 RX ADMIN — HYDROMORPHONE HYDROCHLORIDE 4 MG: 2 TABLET ORAL at 15:19

## 2017-04-09 RX ADMIN — ASPIRIN 81 MG: 81 TABLET, CHEWABLE ORAL at 08:57

## 2017-04-09 RX ADMIN — GABAPENTIN 400 MG: 100 CAPSULE ORAL at 21:35

## 2017-04-09 RX ADMIN — HYDROMORPHONE HYDROCHLORIDE 2 MG: 2 TABLET ORAL at 11:17

## 2017-04-09 RX ADMIN — OXYCODONE HYDROCHLORIDE AND ACETAMINOPHEN 1 TABLET: 5; 325 TABLET ORAL at 04:52

## 2017-04-09 RX ADMIN — PROMETHAZINE HYDROCHLORIDE 25 MG: 25 TABLET ORAL at 21:36

## 2017-04-09 RX ADMIN — HYDROMORPHONE HYDROCHLORIDE 2 MG: 2 TABLET ORAL at 06:44

## 2017-04-09 RX ADMIN — GABAPENTIN 400 MG: 100 CAPSULE ORAL at 15:19

## 2017-04-09 RX ADMIN — ATORVASTATIN CALCIUM 80 MG: 20 TABLET, FILM COATED ORAL at 21:35

## 2017-04-09 RX ADMIN — MESALAMINE 1000 MG: 250 CAPSULE ORAL at 21:36

## 2017-04-09 RX ADMIN — HEPARIN SODIUM AND DEXTROSE 800 UNITS/HR: 10000; 5 INJECTION INTRAVENOUS at 19:40

## 2017-04-09 NOTE — PROGRESS NOTES
[facility]  Pulmonary, Critical Care, and Sleep Medicine    Name: Lori Bar MRN: [id number]   : 1970 Hospital: [facility] [place]   Date: 2017             Requesting MD:                                                  Reason for CC Consult:  IMPRESSION:   · S/p rt popliteal artery thrombectomy  · Hx of Rt MCA ischemic stroke  · GI bleed  With anticoagulation  · Hx of crohns  · Hx of cocaine abuse  · Sickle cell trait  · Chronic pain      RECOMMENDATIONS:   · Continue heparin as directed by vascular surgery, transition to coumadin   · Prn dilaudid for pain  · H/h stable  · Continue asa, statin and beta blockers  · Continue prednisone and mesalamine for crohns  · Will sign off. Subjective/History:     Out icu  No respiratory issues  HD stable     This patient has been seen and evaluated at the request of Dr. Concepcion Bernstein  for post op management. Patient is a 40 y.o. female with multiple medical problems listed below. Pt was recently dx with Rt MCA 3/2017 distribution stroke, was placed on coumadin, came in acute GI bleed. Pt required mutiple units of blood transfusion. During this hospitalization, noted RLE pain,  vacular study confirmed  Rt popliteal artery occlusion. Pt underwent thrombectomy yesterday. Pt was transferred to ICU. I was asked to assist in management.          Past Medical History:   Diagnosis Date    Abdominal pain     Anemia NEC     sickle cell trait    C. difficile colitis     Crohn's disease (HCC)     Gastrointestinal disorder     Crohns    Herpes zoster     Hx of cocaine abuse     Hyperkalemia     Hypertension     Iron deficiency anemia     MRSA (methicillin resistant Staphylococcus aureus)     Obesity     Pain management     Sickle cell trait (HCC)     Stroke ([facility] [address]     + cva 3/17      Past Surgical History:   Procedure Laterality Date    COLONOSCOPY N/A 3/17/2017    COLONOSCOPY; BIOPSY; performed by Radha Jiménez MD at [facility] ENDOSCOPY    HX GYN      tubal ligation    HX TUBAL LIGATION        Prior to Admission medications    Medication Sig Start Date End Date Taking? Authorizing Provider   warfarin (COUMADIN) 5 mg tablet Take 5 mg by mouth daily. Yes Historical Provider   atorvastatin (LIPITOR) 80 mg tablet Take 1 Tab by mouth nightly. 3/23/17  Yes Nhung Buitrago MD   gabapentin (NEURONTIN) 400 mg capsule Take 1 Cap by mouth three (3) times daily. 3/23/17  Yes Nhung Buitrago MD   mesalamine (PENTASA) 500 mg CR capsule Take 2 Caps by mouth four (4) times daily. Indications: CROHN'S DISEASE 3/23/17  Yes Nhung Buitrago MD   predniSONE (DELTASONE) 20 mg tablet Take 2 Tabs by mouth daily (with breakfast). Indications: CROHN'S DISEASE 3/23/17  Yes Nhung Buitrago MD   enoxaparin (LOVENOX) 80 mg/0.8 mL injection 80 mg by SubCUTAneous route every twelve (12) hours. 3/23/17  Yes Nhung Buitrago MD   oxyCODONE-acetaminophen (PERCOCET) 5-325 mg per tablet Take 1 Tab by mouth every four (4) hours as needed for Pain. Max Daily Amount: 6 Tabs.  3/23/17   Nhung Buitrago MD     Current Facility-Administered Medications   Medication Dose Route Frequency    lactated ringers infusion 1,000 mL  1,000 mL IntraVENous CONTINUOUS    heparin 25,000 units in D5W 250 ml infusion  800 Units/hr IntraVENous CONTINUOUS    gabapentin (NEURONTIN) capsule 400 mg  400 mg Oral TID    warfarin (COUMADIN) tablet 5 mg  5 mg Oral DAILY    aspirin chewable tablet 81 mg  81 mg Oral DAILY    predniSONE (DELTASONE) tablet 40 mg  40 mg Oral DAILY WITH BREAKFAST    atorvastatin (LIPITOR) tablet 80 mg  80 mg Oral QHS    carvedilol (COREG) tablet 3.125 mg  3.125 mg Oral BID WITH MEALS    multivitamin, tx-iron-ca-min (THERA-M w/ IRON) tablet 1 Tab  1 Tab Oral DAILY    insulin lispro (HUMALOG) injection   SubCUTAneous AC&HS    docusate sodium (COLACE) capsule 100 mg  100 mg Oral BID    mesalamine (PENTASA) CR capsule 1,000 mg  1,000 mg Oral QID Allergies   Allergen Reactions    Humira [Adalimumab] Other (comments)    Remicade [Infliximab] Palpitations      Social History   Substance Use Topics    Smoking status: Former Smoker    Smokeless tobacco: Not on file    Alcohol use No      History reviewed. No pertinent family history. Review of Systems:  A comprehensive review of systems was negative except for that written in the HPI. Objective:   Vital Signs:    Visit Vitals    /61 (BP 1 Location: Left arm, BP Patient Position: At rest)    Pulse 92    Temp 98 °F (36.7 °C)    Resp 18    Ht 5' 6\" (1.676 m)    Wt 90.3 kg (199 lb 1.2 oz)    LMP 2017    SpO2 100%    Breastfeeding No    BMI 32.13 kg/m2       O2 Device: Room air   O2 Flow Rate (L/min): 2 l/min   Temp (24hrs), Av.2 °F (36.8 °C), Min:97.3 °F (36.3 °C), Max:99 °F (37.2 °C)       Intake/Output:   Last shift:      701 - 1900  In: 1951 [P.O.:255; I.V.:1696]  Out: -   Last 3 shifts: 1901 -  0700  In: 640.1 [P.O.:480; I.V.:160.1]  Out: 2500 [Urine:2500]    Intake/Output Summary (Last 24 hours) at 17 1219  Last data filed at 17 0956   Gross per 24 hour   Intake          2015.13 ml   Output             1125 ml   Net           890.13 ml     Hemodynamics:   . @MAP     . @CVP       Ventilator Settings:  Mode Rate Tidal Volume Pressure FiO2 PEEP                    Peak airway pressure:      Minute ventilation:        ARDS network Guidelines: Lung protective strategy and Pl pressure goals____________    Physical Exam:    General:  Alert, cooperative, no distress, appears stated age. Head:  Normocephalic, without obvious abnormality, atraumatic. Eyes:  Conjunctivae/corneas clear. PERRL, EOMs intact. Nose: Nares normal. Septum midline. Mucosa normal. No drainage or sinus tenderness.    Throat: Lips, mucosa, and tongue normal. Teeth and gums normal.   Neck: Supple, symmetrical, trachea midline, no adenopathy, thyroid: no enlargment/tenderness/nodules, no carotid bruit and no JVD. Back:   Symmetric, no curvature. ROM normal.   Lungs:   Clear to auscultation bilaterally. Chest wall:  No tenderness or deformity. Heart:  Regular rate and rhythm, S1, S2 normal, no murmur, click, rub or gallop. Abdomen:   Soft, non-tender. Bowel sounds normal. No masses,  No organomegaly. Extremities: Extremities normal, atraumatic, no cyanosis or edema. Pulses: 2+ and symmetric all extremities.    Skin: Skin color, texture, turgor normal. No rashes or lesions   Lymph nodes: Cervical, supraclavicular, and axillary nodes normal.   Neurologic: Grossly nonfocal       Data:     Recent Results (from the past 24 hour(s))   GLUCOSE, POC    Collection Time: 04/08/17 12:38 PM   Result Value Ref Range    Glucose (POC) 131 (H) 70 - 767 mg/dL   METABOLIC PANEL, BASIC    Collection Time: 04/08/17  1:14 PM   Result Value Ref Range    Sodium 142 136 - 145 mmol/L    Potassium 4.3 3.5 - 5.5 mmol/L    Chloride 104 100 - 108 mmol/L    CO2 30 21 - 32 mmol/L    Anion gap 8 3.0 - 18 mmol/L    Glucose 130 (H) 74 - 99 mg/dL    BUN 11 7.0 - 18 MG/DL    Creatinine 0.54 (L) 0.6 - 1.3 MG/DL    BUN/Creatinine ratio 20 12 - 20      GFR est AA >60 >60 ml/min/1.73m2    GFR est non-AA >60 >60 ml/min/1.73m2    Calcium 8.6 8.5 - 10.1 MG/DL   MAGNESIUM    Collection Time: 04/08/17  1:14 PM   Result Value Ref Range    Magnesium 1.7 1.6 - 2.6 mg/dL   PTT    Collection Time: 04/08/17  1:14 PM   Result Value Ref Range    aPTT 30.1 23.0 - 36.4 SEC   GLUCOSE, POC    Collection Time: 04/08/17  4:49 PM   Result Value Ref Range    Glucose (POC) 125 (H) 70 - 110 mg/dL   GLUCOSE, POC    Collection Time: 04/08/17  9:28 PM   Result Value Ref Range    Glucose (POC) 123 (H) 70 - 110 mg/dL   PTT    Collection Time: 04/09/17  1:33 AM   Result Value Ref Range    aPTT 33.2 23.0 - 36.4 SEC   PROTHROMBIN TIME + INR    Collection Time: 04/09/17  1:33 AM   Result Value Ref Range    Prothrombin time 12.0 11.5 - 15.2 sec    INR 0.9 0.8 - 1.2     CBC W/O DIFF    Collection Time: 04/09/17  1:33 AM   Result Value Ref Range    WBC 8.6 4.6 - 13.2 K/uL    RBC 3.28 (L) 4.20 - 5.30 M/uL    HGB 9.3 (L) 12.0 - 16.0 g/dL    HCT 29.3 (L) 35.0 - 45.0 %    MCV 89.3 74.0 - 97.0 FL    MCH 28.4 24.0 - 34.0 PG    MCHC 31.7 31.0 - 37.0 g/dL    RDW 15.3 (H) 11.6 - 14.5 %    PLATELET 725 514 - 987 K/uL    MPV 8.4 (L) 9.2 - 85.0 FL   METABOLIC PANEL, BASIC    Collection Time: 04/09/17  1:33 AM   Result Value Ref Range    Sodium 143 136 - 145 mmol/L    Potassium 3.6 3.5 - 5.5 mmol/L    Chloride 107 100 - 108 mmol/L    CO2 30 21 - 32 mmol/L    Anion gap 6 3.0 - 18 mmol/L    Glucose 113 (H) 74 - 99 mg/dL    BUN 16 7.0 - 18 MG/DL    Creatinine 0.69 0.6 - 1.3 MG/DL    BUN/Creatinine ratio 23 (H) 12 - 20      GFR est AA >60 >60 ml/min/1.73m2    GFR est non-AA >60 >60 ml/min/1.73m2    Calcium 8.4 (L) 8.5 - 10.1 MG/DL   DIFFERENTIAL, AUTO    Collection Time: 04/09/17  1:33 AM   Result Value Ref Range    NEUTROPHILS 69 40 - 73 %    LYMPHOCYTES 18 (L) 21 - 52 %    MONOCYTES 10 3 - 10 %    EOSINOPHILS 3 0 - 5 %    BASOPHILS 0 0 - 2 %    ABS. NEUTROPHILS 5.9 1.8 - 8.0 K/UL    ABS. LYMPHOCYTES 1.6 0.9 - 3.6 K/UL    ABS. MONOCYTES 0.8 0.05 - 1.2 K/UL    ABS. EOSINOPHILS 0.2 0.0 - 0.4 K/UL    ABS.  BASOPHILS 0.0 0.0 - 0.06 K/UL    DF AUTOMATED     MAGNESIUM    Collection Time: 04/09/17  1:33 AM   Result Value Ref Range    Magnesium 1.7 1.6 - 2.6 mg/dL   GLUCOSE, POC    Collection Time: 04/09/17  8:21 AM   Result Value Ref Range    Glucose (POC) 99 70 - 110 mg/dL   GLUCOSE, POC    Collection Time: 04/09/17 12:16 PM   Result Value Ref Range    Glucose (POC) 143 (H) 70 - 110 mg/dL             Telemetry:normal sinus rhythm    Imaging:  I have personally reviewed the patients radiographs and have reviewed the reports:     Results    CTA ABD ART W RUNOFF W WO CONT (Accession [id number]) (Order [id number])         Allergies        High: Humira [Adalimumab]; Remicade [Infliximab]       Result Information      Status Provider Status        Final result (Exam End: 4/4/2017 12:35 AM) Open        Study Result      PROCEDURE: CT angiogram of the abdomen, pelvis and bilateral lower extremities.     CLINICAL HISTORY: Diminished pulses, leg, embolism, aorta, branches. Pain in  right foot.     COMPARISON: No prior CTA examination     TECHNIQUE: Contrast-enhanced CT angiogram of the abdomen, pelvis and both lower  extremities was performed after the uneventful administration of 125 mL of  Isovue 370. Axial images acquired from the domes of the diaphragms through both  feet. Coronal and sagittal reformatted images were generated from the axial  data. MIP and curved reformatted images were generated on a separate  workstation.     --- CTA FINDINGS ---     Visualized descending thoracic aorta is unremarkable.     The abdominal aorta has a normal caliber normally tapering vessel without  aneurysm, dissection or stenosis. No aortic thrombus is seen.     The celiac axis, superior mesenteric artery, single bilateral renal arteries and  inferior mesenteric arteries are normal caliber widely patent vessels.     The aortic bifurcation is patent. The common iliac arteries are patent. The  iliac bifurcations are patent, the internal and external iliac arteries  bilaterally are patent.     Patent common femoral arteries. Patent profunda femoris arteries.      There is some patient motion artifact which partially obscures the proximal  superficial femoral arteries bilaterally, they are suspected to be widely patent  through this region of motion artifact although suboptimally evaluated over this  short segment. The remainder of the bilateral superficial femoral arteries  appear widely patent.     The above-knee popliteal arteries are patent bilaterally. The left below knee  popliteal artery is widely patent.     There is a short segment occlusion of the below knee right popliteal artery. The  occlusion extends into the origin of the right anterior tibial artery and  tibioperoneal trunk. The right anterior tibial artery and peroneal artery  reconstitute via collaterals. The right posterior tibial artery is occluded  along its length.      The left tibial runoff appears overall intact. Dominant runoff vessel is the  anterior tibial artery. The tibioperoneal trunk and peroneal artery are widely  patent. The posterior tibial artery is diminutive proximally and is largely fed  by a peroneal collateral distally.     --- CT FINDINGS ---     Mild dependent atelectasis lung bases. Visualized heart and pericardium are  unremarkable.     Contracted gallbladder. Small right hepatic cyst and focal fatty infiltration  along the margins for fissure for ligamentum teres left hepatic lobe. No  suspicious hepatic abnormality.     Unremarkable spleen, pancreas, adrenal glands and kidneys.     No retroperitoneal masses or adenopathy.     The abdominal bowel is unremarkable.     Within the pelvis the appendix is noted to be normal. There is no pericolonic  inflammatory stranding. No significant free fluid. Short segment of the sigmoid  colon is underdistended, results in a slightly thick walled appearance.      Uterus and ovaries are present. Heterogeneity in coarse calcification at the  uterine fundus suggests underlying fibroids.     No pelvic masses or adenopathy.     There are regions of nonspecific stranding within the subcutaneous fat of the  body wall and involving the lower extremities, left greater than right, could be  related to overall volume status and/or the presence of cellulitis. Note of a  small likely Baker's cyst at the left knee.     Foci simultaneous gas within the anterior abdominal wall are presumably related  to injections.     Bones appear normal for age.        IMPRESSION  IMPRESSION:     1. No aortic or inflow lesions.     2.  There is a short segment occlusion of the below knee right popliteal artery. The occlusion extends into the origin of the right anterior tibial artery and  tibioperoneal trunk. The right anterior tibial artery and peroneal artery  reconstitute via collaterals. The right posterior tibial artery is occluded  along its length.      3. The left tibial runoff appears overall intact. Dominant runoff vessel is the  anterior tibial artery. The tibioperoneal trunk and peroneal artery are widely  patent. The posterior tibial artery is diminutive proximally and is largely fed  by a peroneal collateral distally.          Status Provider Status        Final result (Exam End: 3/28/2017 11:38 AM) Open        Study Result      EXAM: CT head     INDICATION: 18-year-old patient with hypertension, altered mental status.     COMPARISON: Several prior examinations, most recently brain MRI dated 3/10/2017.     TECHNIQUE: Axial CT imaging of the head was performed without intravenous  contrast.     One or more dose reduction techniques were used on this CT: automated exposure  control, adjustment of the mAs and/or kVp according to patient's size, and  iterative reconstruction techniques. The specific techniques utilized on this CT  exam have been documented in the patient's electronic medical record.      _______________     FINDINGS: Detail is mildly limited by motion artifact.     BRAIN AND POSTERIOR FOSSA: As previously, there is redemonstration of abnormal  clear with matter differentiation involving the right centrum semiovale, corona  radiata, right-sided insular cortex, and right parietal/temporal lobes, spanning  the right sylvian fissure. There is mild associated mass effect upon the right  lateral ventricle, the appearance of which is similar to prior exam. No evidence  of midline shift. The basilar cisterns remain patent. The known multiple  cerebellar infarcts are characterized to better advantage on comparison brain  MRI. Mild increased density of the infarcted cortical surfaces is noted. No  intra-axial fluid collection.     EXTRA-AXIAL SPACES AND MENINGES: There are no abnormal extra-axial fluid  collections.     CALVARIUM: Intact.     SINUSES: Paranasal sinuses and mastoid air cells are clear.     OTHER: None.     _______________     IMPRESSION  IMPRESSION:        1. Redemonstration of sequela of multifocal, likely embolic infarcts without  acute intra-axial or extra-axial fluid collection. 2. Mild persistent edema and mass effect upon the right lateral ventricle,  without evidence of midline shift. Allergies        High: Humira [Adalimumab]; Remicade [Infliximab]       Result Information      Status Provider Status        Final result (Exam End: 3/10/2017  6:02 PM) Open        Study Result      Brain MR without contrast:     Indication: Possible acute infarction. Left-sided weakness. Abnormal head CT. History of sickle cell.     Procedure: Sagittal spin echo T1, axial FSE FLAIR and T2 and axial diffusion  weighted scanning was performed. Coronal spin echo T1 and T2 FSE images were  also performed. No contrast was administered.     Comparison head CT 3/10/2017.     Findings: Diffusion: Multifocal areas of restricted diffusion consistent with  multifocal acute infarctions are noted. Multiple left greater than right  cerebellar hemisphere infarctions are noted area there is a large right MCA  territory infarction involving the insular cortex and posterior perisylvian  temporal and inferior parietal lobe as well as the posterior inferior frontal  lobe. This was in part hypodense on CT. No hemorrhage or significant mass effect  associated with these lesions.     The axial T2 star examination does not show any hemorrhage associated with the  areas of acute infarction.  There is however a strong suspicion of a hemorrhagic  lesion within the pituitary eccentric to the left but does not appear  artifactual.     Brain parenchyma: Other than the areas of acute infarction, no additional focal  brain lesion. No mass or mass effect.     Ventricles and sulci: Normal. No significant atrophy given age.     Extra-axial: No extra-axial fluid collection or mass is noted.     Brain vasculature: No vascular abnormality is appreciated on this routine brain  MR examination.     Craniocervical junction: Normal.     Skull base, extracranial and calvarium: Protrusion of orbital fat through a  defect in the right orbital floor is most likely a chronic orbital floor  fracture. There does appear to be some relative right enophthalmos.     IMPRESSION  Impression:        1. Multifocal acute infarctions, the largest is in the right MCA territory  correlating with the abnormal CT. There are bilateral left greater than right  cerebellar hemisphere infarctions. These infarctions are likely embolic with a  central origin. 2. Possible hemorrhagic lesion within the pituitary. Scans dedicated to the  pituitary could be performed. This could be artifact of volume averaging of the  sellar floor but is at least suspect. 3. Chronic appearing right orbital floor fracture with protrusion of orbital fat  and relative enophthalmos.                Best practice :  ASA on arrival  , Beta Blocker  and Statin Cardiovascular: An arterial systolic blood pressure (SBP) of < or equal to 90mm Hg or a mean arterial pressure (MAP) < or equal to 70mm Hg for at least 1 hour despite adequate fluid resuscitation or adequate intravascular volume status; or the need for vasopressors to maintain SBP>or equal to 90mm Hg or MAP > or equal to 70mm Hg. Continue current therapy  Gaming Bundle Followed     Total critical care time exclusive of procedures: 35 minutes  Leeanna Kern MD  4/9/2017, 9:45 AM

## 2017-04-09 NOTE — PROGRESS NOTES
Received report from Maren Diaz RN.      7172 patient assessment was completed at this time. Patient states that she is having pain 10 out of 10. Pain medications given at this time. No other needs were voiced at this time. Shift summary - patient got up and used the bathroom several times though ou the shift.   Pain medications were changed and patient is receiving 2 to 4 Mg Oral.

## 2017-04-09 NOTE — PROGRESS NOTES
Shift summary: Patient A/Ox4, drowsy. Reported severe pain to right leg and foot, medicated with PO dilaudid and percocet PRN with relief. Frequently asked about having IV narcotics. Right leg incision with dermabond, no signs of infection. Elevated right leg. Pedal pulses heard with doppler. Up with assistance to bathroom, voided. Weakness to BLE. Used walker. Triple lumen intact, no blood return from brown port.      Patient Vitals for the past 12 hrs:   Temp Pulse Resp BP SpO2   04/09/17 0424 98.2 °F (36.8 °C) 78 18 108/82 100 %   04/08/17 2333 98.2 °F (36.8 °C) 88 18 118/72 100 %   04/08/17 1959 98.2 °F (36.8 °C) 92 18 123/68 100 %

## 2017-04-09 NOTE — PROGRESS NOTES
Right leg is w/w/p with healing incision. No current pain at this time. Continue care currently off iv pain meds.

## 2017-04-09 NOTE — PROGRESS NOTES
Problem: Mobility Impaired (Adult and Pediatric)  Goal: *Acute Goals and Plan of Care (Insert Text)  Physical Therapy ST/LT Goals  Initiated 4/8/2017 and to be accomplished within 7 day(s)  1. Patient will move from supine to sit and sit to supine in bed with supervision/set-up. 2. Patient will transfer from bed to chair and chair to bed with supervision/set-up using the least restrictive device. 3. Patient will perform sit to stand with supervision/set-up. 4. Patient will ambulate with supervision/set-up for >150 feet with the least restrictive device. 5. Patient will ascend/descend >2 stairs with U handrail(s) with supervision/set-up. Outcome: Progressing Towards Goal  PHYSICAL THERAPY TREATMENT     Patient: Gwen Bajwa (32 y.o. female)  Date: 4/9/2017  Diagnosis: Rectal bleed, crohns, anemia, dizziness. GI bleed  RIGHT POPLITEAL OCCLUSION  Vascular abnormality GI bleed  Procedure(s) (LRB):  RIGHT LEG THROMBECTOMY WITH REPAIR POPLITEAL ARTERY WITH C-ARM (Right) 3 Days Post-Op  Precautions: Fall, WBAT   Chart, physical therapy assessment, plan of care and goals were reviewed. ASSESSMENT:  Pt is able to greatly increase ambulation distance and quality, although mostly using step to pattern. Pt's R LE pain increases, but she notes it being above knee. No foot drop seen, but delay with focused DF (Fair motion above neutral). Will need to accomplish stair amb and demonstrate safe transfers without cuing. Progression toward goals:  [X]      Improving appropriately and progressing toward goals  [ ]      Improving slowly and progressing toward goals  [ ]      Not making progress toward goals and plan of care will be adjusted       PLAN:  Patient continues to benefit from skilled intervention to address the above impairments. Continue treatment per established plan of care.   Discharge Recommendations:  Home Health  Further Equipment Recommendations for Discharge:  bedside commode and rolling walker SUBJECTIVE:   Patient stated I've been up. I'm going back to bed and that's my therapy.  (Pt in restroom)      OBJECTIVE DATA SUMMARY:   Critical Behavior:  Neurologic State: Drowsy, Irritable  Orientation Level: Oriented X4  Cognition: Decreased attention/concentration, Follows commands  Safety/Judgement: Decreased insight into deficits, Decreased awareness of need for safety, Fall prevention  Functional Mobility Training:  Bed Mobility:  Sit to Supine: Stand-by asssistance  Scooting: Stand-by asssistance  Transfers:  Sit to Stand: Stand-by asssistance  Stand to Sit: Stand-by asssistance  Balance:  Sitting: Intact  Standing: Intact; With support  Ambulation/Gait Training:  Distance (ft): 200 Feet (ft)  Assistive Device: Walker, rolling;Gait belt  Ambulation - Level of Assistance: Stand-by asssistance;Contact guard assistance  Gait Abnormalities: Antalgic;Decreased step clearance;Shuffling gait; Step to gait  Right Side Weight Bearing: As tolerated  Base of Support: Shift to right  Speed/Janice: Slow;Shuffled  Step Length: Left shortened;Right shortened  Swing Pattern: Right asymmetrical;Left asymmetrical  Therapeutic Exercises: Ankle pumping and PF/DF stretch  Pain:  Pain Scale 1: Numeric (0 - 10)  Pain Intensity 1: 10  Pain Location 1: Leg  Pain Orientation 1: Lower;Right  Pain Description 1: Throbbing  Pain Intervention(s) 1: Medication (see MAR)  Activity Tolerance:   Fair+  Please refer to the flowsheet for vital signs taken during this treatment.   After treatment:   [ ] Patient left in no apparent distress sitting up in chair  [X] Patient left in no apparent distress in bed  [X] Call bell left within reach  [X] Nursing notified  [ ] Caregiver present  [ ] Bed alarm activated      Asuncion Salamanca PTA   Time Calculation: 30 mins

## 2017-04-09 NOTE — ROUTINE PROCESS
Bedside and Verbal shift change report given to FAYE Lopez RN (oncoming nurse) by [facility]. Hannah Rolon RN (offgoing nurse). Report included the following information SBAR, Kardex, Intake/Output and MAR.    Visit Vitals    /71    Pulse 88    Temp 99 °F (37.2 °C)    Resp 18    Ht 5' 6\" (1.676 m)    Wt 90.3 kg (199 lb 1.2 oz)    LMP 03/06/2017    SpO2 100%    Breastfeeding No    BMI 32.13 kg/m2

## 2017-04-09 NOTE — ROUTINE PROCESS
Bedside and Verbal shift change report given to SANYA Nielsen RN (oncoming nurse) by Amy Mcfarlane RN   (offgoing nurse). Report included the following information SBAR, Kardex, Intake/Output, MAR and Recent Results.

## 2017-04-10 LAB
ANION GAP BLD CALC-SCNC: 6 MMOL/L (ref 3–18)
APTT PPP: 32.9 SEC (ref 23–36.4)
APTT PPP: 39.8 SEC (ref 23–36.4)
BASOPHILS # BLD AUTO: 0 K/UL (ref 0–0.06)
BASOPHILS # BLD: 0 % (ref 0–2)
BUN SERPL-MCNC: 9 MG/DL (ref 7–18)
BUN/CREAT SERPL: 17 (ref 12–20)
CALCIUM SERPL-MCNC: 8.7 MG/DL (ref 8.5–10.1)
CHLORIDE SERPL-SCNC: 107 MMOL/L (ref 100–108)
CO2 SERPL-SCNC: 31 MMOL/L (ref 21–32)
CREAT SERPL-MCNC: 0.54 MG/DL (ref 0.6–1.3)
DIFFERENTIAL METHOD BLD: ABNORMAL
EOSINOPHIL # BLD: 0.2 K/UL (ref 0–0.4)
EOSINOPHIL NFR BLD: 3 % (ref 0–5)
ERYTHROCYTE [DISTWIDTH] IN BLOOD BY AUTOMATED COUNT: 15.3 % (ref 11.6–14.5)
GLUCOSE BLD STRIP.AUTO-MCNC: 101 MG/DL (ref 70–110)
GLUCOSE BLD STRIP.AUTO-MCNC: 123 MG/DL (ref 70–110)
GLUCOSE BLD STRIP.AUTO-MCNC: 132 MG/DL (ref 70–110)
GLUCOSE SERPL-MCNC: 107 MG/DL (ref 74–99)
HCT VFR BLD AUTO: 29.2 % (ref 35–45)
HGB BLD-MCNC: 9.1 G/DL (ref 12–16)
INR PPP: 1.1 (ref 0.8–1.2)
LYMPHOCYTES # BLD AUTO: 21 % (ref 21–52)
LYMPHOCYTES # BLD: 1.8 K/UL (ref 0.9–3.6)
MAGNESIUM SERPL-MCNC: 1.5 MG/DL (ref 1.6–2.6)
MCH RBC QN AUTO: 28 PG (ref 24–34)
MCHC RBC AUTO-ENTMCNC: 31.2 G/DL (ref 31–37)
MCV RBC AUTO: 89.8 FL (ref 74–97)
MONOCYTES # BLD: 0.9 K/UL (ref 0.05–1.2)
MONOCYTES NFR BLD AUTO: 10 % (ref 3–10)
NEUTS SEG # BLD: 5.9 K/UL (ref 1.8–8)
NEUTS SEG NFR BLD AUTO: 66 % (ref 40–73)
PLATELET # BLD AUTO: 323 K/UL (ref 135–420)
PMV BLD AUTO: 8.7 FL (ref 9.2–11.8)
POTASSIUM SERPL-SCNC: 3.6 MMOL/L (ref 3.5–5.5)
PROTHROMBIN TIME: 13.5 SEC (ref 11.5–15.2)
RBC # BLD AUTO: 3.25 M/UL (ref 4.2–5.3)
SODIUM SERPL-SCNC: 144 MMOL/L (ref 136–145)
WBC # BLD AUTO: 8.8 K/UL (ref 4.6–13.2)

## 2017-04-10 PROCEDURE — 82962 GLUCOSE BLOOD TEST: CPT

## 2017-04-10 PROCEDURE — 97116 GAIT TRAINING THERAPY: CPT

## 2017-04-10 PROCEDURE — 83735 ASSAY OF MAGNESIUM: CPT | Performed by: SURGERY

## 2017-04-10 PROCEDURE — 97530 THERAPEUTIC ACTIVITIES: CPT

## 2017-04-10 PROCEDURE — 85025 COMPLETE CBC W/AUTO DIFF WBC: CPT | Performed by: SURGERY

## 2017-04-10 PROCEDURE — 97110 THERAPEUTIC EXERCISES: CPT

## 2017-04-10 PROCEDURE — 36415 COLL VENOUS BLD VENIPUNCTURE: CPT | Performed by: SURGERY

## 2017-04-10 PROCEDURE — 65270000029 HC RM PRIVATE

## 2017-04-10 PROCEDURE — 36592 COLLECT BLOOD FROM PICC: CPT

## 2017-04-10 PROCEDURE — 74011250637 HC RX REV CODE- 250/637: Performed by: SURGERY

## 2017-04-10 PROCEDURE — 74011250637 HC RX REV CODE- 250/637: Performed by: INTERNAL MEDICINE

## 2017-04-10 PROCEDURE — 74011250637 HC RX REV CODE- 250/637: Performed by: HOSPITALIST

## 2017-04-10 PROCEDURE — 80048 BASIC METABOLIC PNL TOTAL CA: CPT | Performed by: SURGERY

## 2017-04-10 PROCEDURE — 85730 THROMBOPLASTIN TIME PARTIAL: CPT | Performed by: SURGERY

## 2017-04-10 PROCEDURE — 74011636637 HC RX REV CODE- 636/637: Performed by: INTERNAL MEDICINE

## 2017-04-10 PROCEDURE — 74011250636 HC RX REV CODE- 250/636: Performed by: SURGERY

## 2017-04-10 PROCEDURE — 85610 PROTHROMBIN TIME: CPT | Performed by: SURGERY

## 2017-04-10 PROCEDURE — 74011250636 HC RX REV CODE- 250/636: Performed by: ANESTHESIOLOGY

## 2017-04-10 PROCEDURE — 74011250637 HC RX REV CODE- 250/637: Performed by: FAMILY MEDICINE

## 2017-04-10 RX ORDER — WARFARIN 6 MG/1
6 TABLET ORAL ONCE
Status: COMPLETED | OUTPATIENT
Start: 2017-04-10 | End: 2017-04-10

## 2017-04-10 RX ADMIN — HYDROMORPHONE HYDROCHLORIDE 4 MG: 2 TABLET ORAL at 11:20

## 2017-04-10 RX ADMIN — CARVEDILOL 3.12 MG: 3.12 TABLET, FILM COATED ORAL at 09:09

## 2017-04-10 RX ADMIN — OXYCODONE HYDROCHLORIDE AND ACETAMINOPHEN 1 TABLET: 5; 325 TABLET ORAL at 20:26

## 2017-04-10 RX ADMIN — HYDROMORPHONE HYDROCHLORIDE 4 MG: 2 TABLET ORAL at 14:35

## 2017-04-10 RX ADMIN — MULTIPLE VITAMINS W/ MINERALS TAB 1 TABLET: TAB at 09:10

## 2017-04-10 RX ADMIN — HEPARIN SODIUM AND DEXTROSE 800 UNITS/HR: 10000; 5 INJECTION INTRAVENOUS at 20:24

## 2017-04-10 RX ADMIN — MESALAMINE 1000 MG: 250 CAPSULE ORAL at 09:07

## 2017-04-10 RX ADMIN — HYDROMORPHONE HYDROCHLORIDE 4 MG: 2 TABLET ORAL at 18:30

## 2017-04-10 RX ADMIN — HYDROMORPHONE HYDROCHLORIDE 4 MG: 2 TABLET ORAL at 07:32

## 2017-04-10 RX ADMIN — PROMETHAZINE HYDROCHLORIDE 25 MG: 25 TABLET ORAL at 04:27

## 2017-04-10 RX ADMIN — ATORVASTATIN CALCIUM 80 MG: 20 TABLET, FILM COATED ORAL at 22:37

## 2017-04-10 RX ADMIN — HEPARIN SODIUM AND DEXTROSE 800 UNITS/HR: 10000; 5 INJECTION INTRAVENOUS at 13:50

## 2017-04-10 RX ADMIN — HYDROMORPHONE HYDROCHLORIDE 4 MG: 2 TABLET ORAL at 22:41

## 2017-04-10 RX ADMIN — PREDNISONE 40 MG: 20 TABLET ORAL at 09:08

## 2017-04-10 RX ADMIN — GABAPENTIN 400 MG: 100 CAPSULE ORAL at 16:53

## 2017-04-10 RX ADMIN — SODIUM CHLORIDE, SODIUM LACTATE, POTASSIUM CHLORIDE, AND CALCIUM CHLORIDE 1000 ML: 600; 310; 30; 20 INJECTION, SOLUTION INTRAVENOUS at 11:20

## 2017-04-10 RX ADMIN — ASPIRIN 81 MG: 81 TABLET, CHEWABLE ORAL at 09:09

## 2017-04-10 RX ADMIN — SODIUM CHLORIDE, SODIUM LACTATE, POTASSIUM CHLORIDE, AND CALCIUM CHLORIDE 1000 ML: 600; 310; 30; 20 INJECTION, SOLUTION INTRAVENOUS at 20:01

## 2017-04-10 RX ADMIN — GABAPENTIN 400 MG: 100 CAPSULE ORAL at 09:09

## 2017-04-10 RX ADMIN — HYDROMORPHONE HYDROCHLORIDE 4 MG: 2 TABLET ORAL at 02:57

## 2017-04-10 RX ADMIN — DOCUSATE SODIUM 100 MG: 100 CAPSULE, LIQUID FILLED ORAL at 22:35

## 2017-04-10 RX ADMIN — OXYCODONE HYDROCHLORIDE AND ACETAMINOPHEN 1 TABLET: 5; 325 TABLET ORAL at 16:46

## 2017-04-10 RX ADMIN — WARFARIN SODIUM 6 MG: 6 TABLET ORAL at 18:29

## 2017-04-10 RX ADMIN — MESALAMINE 1000 MG: 250 CAPSULE ORAL at 22:36

## 2017-04-10 RX ADMIN — MESALAMINE 1000 MG: 250 CAPSULE ORAL at 18:29

## 2017-04-10 RX ADMIN — DOCUSATE SODIUM 100 MG: 100 CAPSULE, LIQUID FILLED ORAL at 09:09

## 2017-04-10 RX ADMIN — GABAPENTIN 400 MG: 100 CAPSULE ORAL at 22:37

## 2017-04-10 RX ADMIN — CARVEDILOL 3.12 MG: 3.12 TABLET, FILM COATED ORAL at 16:53

## 2017-04-10 RX ADMIN — MESALAMINE 1000 MG: 250 CAPSULE ORAL at 13:52

## 2017-04-10 NOTE — PROGRESS NOTES
Shift summary: Pt A&Ox4, up with assistance, ambulating to the bathroom with walker. Pt reports pain 10/10, medicated per MAR. Slept through most of the night. Appears to be resting comfortably. No visible signs of distress.

## 2017-04-10 NOTE — PROGRESS NOTES
conducted a Follow up consultation and Spiritual Assessment for Lori Bar, who is a 40 y.o.,female. The  provided the following Interventions:  Continued the relationship of care and support. Listened empathically. Offered prayer and assurance of continued prayer on patients behalf. Chart reviewed. The following outcomes were achieved:  Patient expressed gratitude for pastoral care visit. Assessment:  There are no further spiritual or [employer] issues which require Spiritual Care Services interventions at this time. Plan:  Chaplains will continue to follow and will provide pastoral care on an as needed/requested basis.  recommends bedside caregivers page  on duty if patient shows signs of acute spiritual or emotional distress. Rev.  [facility]  [phone]

## 2017-04-10 NOTE — ROUTINE PROCESS
Bedside and Verbal shift change report given to Gunnar Wiggins RN (oncoming nurse) by Peace Fragoso RN   (offgoing nurse). Report included the following information SBAR, Kardex, Intake/Output, MAR, Recent Results and Med Rec Status.

## 2017-04-10 NOTE — PROGRESS NOTES
Hospitalist Progress Note    Patient: Mickie Mera MRN: [id number]  CSN: [id number]    YOB: 1970  Age: 40 y.o. Sex: female    DOA: 3/27/2017 LOS:  LOS: 14 days                Assessment/Plan     Patient Active Problem List   Diagnosis Code    Chronic pain syndrome G89.4    Crohn disease ([facility] [address] K50.90    Sickle cell trait ([facility] D57.3    Hypertension I10    Hx of cocaine abuse U37.639    Embolic stroke ([facility] U63.3    Acute blood loss anemia D62    Neuropathic pain M79.2    DVT (deep venous thrombosis) ([facility]) I82.409    GI bleed K92.2    Elevated WBC count D72.829    PAD (peripheral artery disease) ([facility]) I73.9    Vascular abnormality I99.9            41 yo female admitted for GI bleed,  Recent admission for embolic CVA and recent DVT.     GI bleed - GI following, now bleeding has stopped.       Anemia - acute blood loss, s/p blood transfusion, H/H improved.       Recent DVT/CVA - anticoagulation restarted. On heparin drip. Bridge to coumadin, pharmacy dosing.      Right leg pain - right leg arterial duplex >75%stenosis of anterior tibial artery. Vascular following, CTA with right popliteal occlusion, s/p LEG THROMBECTOMY W/REPAIR POPLITEAL ARTERY W/C-ARM,ANGIOGRAM  On 4/6/2017. Per vascular will need indefinite anticoagulation as tolerated.      Crohn's disease - management per GI. On steroids, mesalamine and protonix. Seen by colorectal surgery, bleeding stopped, if patient bleeds again she is a candidate of colectomy.       HTN - controlled. PT/OT    Discharge planning      Review of systems  General: No fevers or chills. Cardiovascular: No chest pain or pressure. No palpitations. Pulmonary: No shortness of breath. Gastrointestinal: No nausea, vomiting.           Physical Exam:  General: Awake, cooperative, no acute distress    HEENT: NC, Atraumatic. PERRLA, anicteric sclerae. Lungs: CTA Bilaterally. No Wheezing/Rhonchi/Rales.   Heart: Regular rhythm,  No murmur, No Rubs, No Gallops  Abdomen: Soft, Non distended, Non tender.  +Bowel sounds,   Extremities: right distal pulse now palpable. Psych:   Not anxious or agitated. Neurologic:  No acute neurological deficit. Vital signs/Intake and Output:  Visit Vitals    /66 (BP 1 Location: Left arm, BP Patient Position: At rest)    Pulse 99    Temp 98.1 °F (36.7 °C)    Resp 20    Ht 5' 6\" (1.676 m)    Wt 90.3 kg (199 lb 1.2 oz)    SpO2 100%    Breastfeeding No    BMI 32.13 kg/m2     Current Shift:  04/10 0701 - 04/10 1900  In: 230 [P.O.:230]  Out: -   Last three shifts:  04/08 1901 - 04/10 0700  In: 3456 [P.O.:485; I.V.:2971]  Out: 702 [Urine:701]            Labs: Results:       Chemistry Recent Labs      04/10/17   0300 04/09/17 0133 04/08/17   1314   GLU  107*  113*  130*   NA  144  143  142   K  3.6  3.6  4.3   CL  107  107  104   CO2  31  30  30   BUN  9  16  11   CREA  0.54*  0.69  0.54*   CA  8.7  8.4*  8.6   AGAP  6  6  8   BUCR  17  23*  20      CBC w/Diff Recent Labs      04/10/17   0300 04/09/17 0133 04/08/17   0545   WBC  8.8  8.6  8.3   RBC  3.25*  3.28*  3.49*   HGB  9.1*  9.3*  9.9*   HCT  29.2*  29.3*  31.0*   PLT  323  259  252   GRANS  66  69  66   LYMPH  21  18*  21   EOS  3  3  3      Cardiac Enzymes No results for input(s): CPK, CKND1, CHANDA in the last 72 hours. No lab exists for component: CKRMB, TROIP   Coagulation Recent Labs      04/10/17   1215  04/10/17   0300   04/09/17   0133   PTP   --   13.5   --   12.0   INR   --   1.1   --   0.9   APTT  32.9  39.8*   < >  33.2    < > = values in this interval not displayed. Lipid Panel Lab Results   Component Value Date/Time    Cholesterol, total 145 03/12/2017 06:15 AM    HDL Cholesterol 37 03/12/2017 06:15 AM    LDL, calculated 91.6 03/12/2017 06:15 AM    VLDL, calculated 16.4 03/12/2017 06:15 AM    Triglyceride 82 03/12/2017 06:15 AM    CHOL/HDL Ratio 3.9 03/12/2017 06:15 AM      BNP No results for input(s): BNPP in the last 72 hours. Liver Enzymes No results for input(s): TP, ALB, TBIL, AP, SGOT, GPT in the last 72 hours.     No lab exists for component: DBIL   Thyroid Studies Lab Results   Component Value Date/Time    TSH 1.51 12/12/2009 09:48 AM        Procedures/imaging: see electronic medical records for all procedures/Xrays and details which were not copied into this note but were reviewed prior to creation of Plan

## 2017-04-10 NOTE — PROGRESS NOTES
Pharmacy Dosing Services: Warfarin     Consult for Warfarin Dosing by Pharmacy by Dr. Gricelda Sheehan provided for this 40 y.o.  female , for indication of DVT    H/o Crohn's dx   Prior to admission , she was on warfarin and Lovenox treatment for a DVT and was then admitted for a GI bleed on 3/27/17. Warfarin was re-started on 3/30/17  Consult for pharmacy to dose warfarin placed today 4/10/17 as INR is still sub-therapeutic at 1.1  Home warfarin dose listed as 5 mg po daily    Dose to achieve an INR goal of 2-3    Order entered for  Warfarin 6 mg ordered to be given today at 18:00. Heparin drip   Aspirin 81 mg po daily   PT/INR Lab Results   Component Value Date/Time    INR 1.1 04/10/2017 03:00 AM      Platelets Lab Results   Component Value Date/Time    PLATELET 550 21/28/9160 03:00 AM      H/H     Lab Results   Component Value Date/Time    HGB 9.1 04/10/2017 03:00 AM        Warfarin Administrations (last 168 hours)       Date/Time Action Medication Dose      04/09/17 1723 Given    warfarin (COUMADIN) tablet 5 mg 5 mg    04/08/17 1727 Given    warfarin (COUMADIN) tablet 5 mg 5 mg    04/07/17 1801 Given    warfarin (COUMADIN) tablet 5 mg 5 mg    04/03/17 1827 Given    warfarin (COUMADIN) tablet 7 mg 7 mg          Pharmacy to follow daily and will provide subsequent Warfarin dosing based on clinical status.   Stephanie Martin, [facility])  Contact information [phone]

## 2017-04-10 NOTE — PROGRESS NOTES
Reviewed chart and discussed case during bedside rounds with nurse and Dr. Storm Somers. Pt not ready for discharge today. P.T. Recommending home health physical therapy follow-up.  CM provided pt with [facility]OC list of [facility] agencies and she stated it is her preference to use [facility], as she has used them in the past. CM notified CMS of the referral.

## 2017-04-10 NOTE — PROGRESS NOTES
Problem: Mobility Impaired (Adult and Pediatric)  Goal: *Acute Goals and Plan of Care (Insert Text)  Physical Therapy ST/LT Goals  Initiated 4/8/2017 and to be accomplished within 7 day(s)  1. Patient will move from supine to sit and sit to supine in bed with supervision/set-up. 2. Patient will transfer from bed to chair and chair to bed with supervision/set-up using the least restrictive device. 3. Patient will perform sit to stand with supervision/set-up. 4. Patient will ambulate with supervision/set-up for >150 feet with the least restrictive device. 5. Patient will ascend/descend >2 stairs with U handrail(s) with supervision/set-up. PHYSICAL THERAPY TREATMENT     Patient: Lamberto Singh (7 y.o. female)  Date: 4/10/2017  Diagnosis: Rectal bleed, crohns, anemia, dizziness. GI bleed  RIGHT POPLITEAL OCCLUSION  Vascular abnormality GI bleed  Procedure(s) (LRB):  RIGHT LEG THROMBECTOMY WITH REPAIR POPLITEAL ARTERY WITH C-ARM (Right) 4 Days Post-Op  Precautions: Fall, WBAT   Chart, physical therapy assessment, plan of care and goals were reviewed. ASSESSMENT:  Patient moving well but requires repeatated vc for safety due to decreased retention. Pt ambulated 400' with RW and S/SBA, slow antalgic gait pattern but with active R ankle dorsiflexion and clearance of foot during swing phase, good stability with no LOB. Pt requires cues for safe hand placement sit<>stand and for correct sequencing up/down 1 step with RW. Pt instructed and supervised with RLE ankle dfl ex and using sheet for R stretch of heel cords. Recommend HHPT at discharge. Pt states she has RW at home. Pt left up in chair needs in reach, nurse Delbert Michaels aware.   Progression toward goals:  [ ]      Improving appropriately and progressing toward goals  [X]      Improving slowly and progressing toward goals  [ ]      Not making progress toward goals and plan of care will be adjusted       PLAN:  Patient continues to benefit from skilled intervention to address the above impairments. Continue treatment per established plan of care. Discharge Recommendations:  Home Health  Further Equipment Recommendations for Discharge:  N/A       SUBJECTIVE:   Patient stated i am trying to order breakfast and have been on hold.       OBJECTIVE DATA SUMMARY:   Critical Behavior:  Neurologic State: Drowsy, Irritable  Orientation Level: Oriented X4  Cognition: Decreased attention/concentration, Follows commands  Safety/Judgement: Decreased insight into deficits, Decreased awareness of need for safety, Fall prevention  Functional Mobility Training:  Bed Mobility:  Supine to Sit: Supervision  Scooting: Supervision  Transfers:  Sit to Stand: Stand-by asssistance (vc)  Stand to Sit: Stand-by asssistance (vc)  Balance:  Sitting: Intact  Standing: Intact; With support  Ambulation/Gait Training:  Distance (ft): 400 Feet (ft)  Assistive Device: Gait belt;Walker, rolling  Ambulation - Level of Assistance: Stand-by asssistance;Supervision  Gait Abnormalities: Decreased step clearance; Antalgic  Right Side Weight Bearing: As tolerated  Base of Support: Shift to left  Stance: Right decreased  Speed/Janice: Slow  Step Length: Left shortened;Right shortened  Swing Pattern: Right asymmetrical     Stairs:  Number of Stairs Trained: 1 (with RW, CGA and vc)     Therapeutic Exercises:   Seated and supine X 10 ankle dorsiflexion, seated heel cord stretch in sitting with knee extended  30 sec each X 5  Pain:  Pain Scale 1: Visual  Pain Intensity 1: 0  Pain Location 1: Leg  Pain Orientation 1: Right; Lower  Pain Intervention(s) 1: Medication (see MAR)  Activity Tolerance:   good  Please refer to the flowsheet for vital signs taken during this treatment.   After treatment:   [X] Patient left in no apparent distress sitting up in chair  [ ] Patient left in no apparent distress in bed  [X] Call bell left within reach  [X] Nursing notified  [ ] Caregiver present  [ ] Bed alarm activated Matt Martines, PT   Time Calculation: 40 mins

## 2017-04-10 NOTE — ROUTINE PROCESS
Bedside and Verbal shift change report given to Johnny Noel RN (oncoming nurse) by FAYE Tucker RN   (offgoing nurse). Report included the following information SBAR, Kardex, Intake/Output and MAR.

## 2017-04-10 NOTE — PROGRESS NOTES
Pt seen and examined. Right foot warm. Incision c/d/i. No hematoma noted. Pt is currently not anticoagulated as she is not in the theraputic range. She is at risk for re thrombosing her popliteal artery or other arterial flow. Would not discharge until fully anticoagulated and without evidence of bleeding.

## 2017-04-11 LAB
APTT PPP: 34.7 SEC (ref 23–36.4)
APTT PPP: 36.5 SEC (ref 23–36.4)
BASOPHILS # BLD AUTO: 0 K/UL (ref 0–0.06)
BASOPHILS # BLD: 0 % (ref 0–2)
DIFFERENTIAL METHOD BLD: ABNORMAL
EOSINOPHIL # BLD: 0.2 K/UL (ref 0–0.4)
EOSINOPHIL NFR BLD: 2 % (ref 0–5)
ERYTHROCYTE [DISTWIDTH] IN BLOOD BY AUTOMATED COUNT: 15.3 % (ref 11.6–14.5)
GLUCOSE BLD STRIP.AUTO-MCNC: 135 MG/DL (ref 70–110)
GLUCOSE BLD STRIP.AUTO-MCNC: 140 MG/DL (ref 70–110)
GLUCOSE BLD STRIP.AUTO-MCNC: 158 MG/DL (ref 70–110)
HCT VFR BLD AUTO: 28.6 % (ref 35–45)
HGB BLD-MCNC: 9 G/DL (ref 12–16)
INR PPP: 1.2 (ref 0.8–1.2)
LYMPHOCYTES # BLD AUTO: 19 % (ref 21–52)
LYMPHOCYTES # BLD: 1.6 K/UL (ref 0.9–3.6)
MAGNESIUM SERPL-MCNC: 1.6 MG/DL (ref 1.6–2.6)
MCH RBC QN AUTO: 28.3 PG (ref 24–34)
MCHC RBC AUTO-ENTMCNC: 31.5 G/DL (ref 31–37)
MCV RBC AUTO: 89.9 FL (ref 74–97)
MONOCYTES # BLD: 1.5 K/UL (ref 0.05–1.2)
MONOCYTES NFR BLD AUTO: 17 % (ref 3–10)
NEUTS SEG # BLD: 5.4 K/UL (ref 1.8–8)
NEUTS SEG NFR BLD AUTO: 62 % (ref 40–73)
PLATELET # BLD AUTO: 346 K/UL (ref 135–420)
PMV BLD AUTO: 8.8 FL (ref 9.2–11.8)
PROTHROMBIN TIME: 14.4 SEC (ref 11.5–15.2)
RBC # BLD AUTO: 3.18 M/UL (ref 4.2–5.3)
WBC # BLD AUTO: 8.6 K/UL (ref 4.6–13.2)

## 2017-04-11 PROCEDURE — 74011250637 HC RX REV CODE- 250/637: Performed by: FAMILY MEDICINE

## 2017-04-11 PROCEDURE — 74011250637 HC RX REV CODE- 250/637: Performed by: INTERNAL MEDICINE

## 2017-04-11 PROCEDURE — 85730 THROMBOPLASTIN TIME PARTIAL: CPT | Performed by: SURGERY

## 2017-04-11 PROCEDURE — 74011250636 HC RX REV CODE- 250/636: Performed by: ANESTHESIOLOGY

## 2017-04-11 PROCEDURE — 74011250637 HC RX REV CODE- 250/637: Performed by: HOSPITALIST

## 2017-04-11 PROCEDURE — 74011636637 HC RX REV CODE- 636/637: Performed by: INTERNAL MEDICINE

## 2017-04-11 PROCEDURE — 74011250637 HC RX REV CODE- 250/637: Performed by: SURGERY

## 2017-04-11 PROCEDURE — 83735 ASSAY OF MAGNESIUM: CPT | Performed by: SURGERY

## 2017-04-11 PROCEDURE — 65270000029 HC RM PRIVATE

## 2017-04-11 PROCEDURE — 97116 GAIT TRAINING THERAPY: CPT

## 2017-04-11 PROCEDURE — 77030020847 HC STATLOK BARD -A

## 2017-04-11 PROCEDURE — 82962 GLUCOSE BLOOD TEST: CPT

## 2017-04-11 PROCEDURE — 85025 COMPLETE CBC W/AUTO DIFF WBC: CPT | Performed by: SURGERY

## 2017-04-11 PROCEDURE — 85610 PROTHROMBIN TIME: CPT | Performed by: SURGERY

## 2017-04-11 RX ORDER — WARFARIN 6 MG/1
6 TABLET ORAL ONCE
Status: COMPLETED | OUTPATIENT
Start: 2017-04-11 | End: 2017-04-11

## 2017-04-11 RX ADMIN — OXYCODONE HYDROCHLORIDE AND ACETAMINOPHEN 1 TABLET: 5; 325 TABLET ORAL at 23:50

## 2017-04-11 RX ADMIN — GABAPENTIN 400 MG: 100 CAPSULE ORAL at 09:04

## 2017-04-11 RX ADMIN — DOCUSATE SODIUM 100 MG: 100 CAPSULE, LIQUID FILLED ORAL at 21:15

## 2017-04-11 RX ADMIN — ASPIRIN 81 MG: 81 TABLET, CHEWABLE ORAL at 09:04

## 2017-04-11 RX ADMIN — MESALAMINE 1000 MG: 250 CAPSULE ORAL at 12:47

## 2017-04-11 RX ADMIN — HYDROMORPHONE HYDROCHLORIDE 4 MG: 2 TABLET ORAL at 21:15

## 2017-04-11 RX ADMIN — SODIUM CHLORIDE, SODIUM LACTATE, POTASSIUM CHLORIDE, AND CALCIUM CHLORIDE 1000 ML: 600; 310; 30; 20 INJECTION, SOLUTION INTRAVENOUS at 12:47

## 2017-04-11 RX ADMIN — MESALAMINE 1000 MG: 250 CAPSULE ORAL at 19:42

## 2017-04-11 RX ADMIN — OXYCODONE HYDROCHLORIDE AND ACETAMINOPHEN 1 TABLET: 5; 325 TABLET ORAL at 00:49

## 2017-04-11 RX ADMIN — MESALAMINE 1000 MG: 250 CAPSULE ORAL at 09:43

## 2017-04-11 RX ADMIN — PROMETHAZINE HYDROCHLORIDE 25 MG: 25 TABLET ORAL at 05:25

## 2017-04-11 RX ADMIN — DOCUSATE SODIUM 100 MG: 100 CAPSULE, LIQUID FILLED ORAL at 09:04

## 2017-04-11 RX ADMIN — GABAPENTIN 400 MG: 100 CAPSULE ORAL at 22:24

## 2017-04-11 RX ADMIN — SODIUM CHLORIDE, SODIUM LACTATE, POTASSIUM CHLORIDE, AND CALCIUM CHLORIDE 1000 ML: 600; 310; 30; 20 INJECTION, SOLUTION INTRAVENOUS at 03:33

## 2017-04-11 RX ADMIN — GABAPENTIN 400 MG: 100 CAPSULE ORAL at 16:43

## 2017-04-11 RX ADMIN — ATORVASTATIN CALCIUM 80 MG: 20 TABLET, FILM COATED ORAL at 22:24

## 2017-04-11 RX ADMIN — OXYCODONE HYDROCHLORIDE AND ACETAMINOPHEN 1 TABLET: 5; 325 TABLET ORAL at 09:43

## 2017-04-11 RX ADMIN — HYDROMORPHONE HYDROCHLORIDE 4 MG: 2 TABLET ORAL at 16:43

## 2017-04-11 RX ADMIN — CARVEDILOL 3.12 MG: 3.12 TABLET, FILM COATED ORAL at 09:04

## 2017-04-11 RX ADMIN — CARVEDILOL 3.12 MG: 3.12 TABLET, FILM COATED ORAL at 16:43

## 2017-04-11 RX ADMIN — HYDROMORPHONE HYDROCHLORIDE 4 MG: 2 TABLET ORAL at 07:55

## 2017-04-11 RX ADMIN — WARFARIN SODIUM 6 MG: 6 TABLET ORAL at 19:42

## 2017-04-11 RX ADMIN — SODIUM CHLORIDE, SODIUM LACTATE, POTASSIUM CHLORIDE, AND CALCIUM CHLORIDE 1000 ML: 600; 310; 30; 20 INJECTION, SOLUTION INTRAVENOUS at 21:27

## 2017-04-11 RX ADMIN — OXYCODONE HYDROCHLORIDE AND ACETAMINOPHEN 1 TABLET: 5; 325 TABLET ORAL at 14:48

## 2017-04-11 RX ADMIN — HYDROMORPHONE HYDROCHLORIDE 4 MG: 2 TABLET ORAL at 12:47

## 2017-04-11 RX ADMIN — PREDNISONE 40 MG: 20 TABLET ORAL at 09:04

## 2017-04-11 RX ADMIN — MULTIPLE VITAMINS W/ MINERALS TAB 1 TABLET: TAB at 09:04

## 2017-04-11 RX ADMIN — MESALAMINE 1000 MG: 250 CAPSULE ORAL at 22:24

## 2017-04-11 RX ADMIN — HYDROMORPHONE HYDROCHLORIDE 4 MG: 2 TABLET ORAL at 03:30

## 2017-04-11 RX ADMIN — PROMETHAZINE HYDROCHLORIDE 25 MG: 25 TABLET ORAL at 22:24

## 2017-04-11 RX ADMIN — OXYCODONE HYDROCHLORIDE AND ACETAMINOPHEN 1 TABLET: 5; 325 TABLET ORAL at 19:42

## 2017-04-11 NOTE — ROUTINE PROCESS
Bedside and Verbal shift change report given to Montse Castro (oncoming nurse) by Chloé Bro RN   (offgoing nurse). Report included the following information SBAR, Kardex, MAR and Recent Results.

## 2017-04-11 NOTE — ROUTINE PROCESS
Bedside and Verbal shift change report given to Joshua Madrid RN (oncoming nurse) by Waldo Anderson RN   (offgoing nurse). Report included the following information SBAR, Kardex, Intake/Output, MAR and Recent Results.

## 2017-04-11 NOTE — PROGRESS NOTES
Problem: Mobility Impaired (Adult and Pediatric)  Goal: *Acute Goals and Plan of Care (Insert Text)  Physical Therapy ST/LT Goals  Initiated 4/8/2017 and to be accomplished within 7 day(s)  1. Patient will move from supine to sit and sit to supine in bed with supervision/set-up. 2. Patient will transfer from bed to chair and chair to bed with supervision/set-up using the least restrictive device. 3. Patient will perform sit to stand with supervision/set-up. 4. Patient will ambulate with supervision/set-up for >150 feet with the least restrictive device. 5. Patient will ascend/descend >2 stairs with U handrail(s) with supervision/set-up. Outcome: Progressing Towards Goal  PHYSICAL THERAPY TREATMENT     Patient: Nicolasa Crury (89 y.o. female)  Date: 4/11/2017  Diagnosis: Rectal bleed, crohns, anemia, dizziness. GI bleed  RIGHT POPLITEAL OCCLUSION  Vascular abnormality GI bleed  Procedure(s) (LRB):  RIGHT LEG THROMBECTOMY WITH REPAIR POPLITEAL ARTERY WITH C-ARM (Right) 5 Days Post-Op  Precautions: Fall, WBAT   Chart, physical therapy assessment, plan of care and goals were reviewed. ASSESSMENT:  Pt is able to increase ambulation quality and distance. Decrease in antalgia and increase of reciprocal pattern. Pt reporting less pain, but continued tightness. Progression toward goals:  [ ]      Improving appropriately and progressing toward goals  [X]      Improving slowly and progressing toward goals  [ ]      Not making progress toward goals and plan of care will be adjusted       PLAN:  Patient continues to benefit from skilled intervention to address the above impairments. Continue treatment per established plan of care. Discharge Recommendations:  Home Health  Further Equipment Recommendations for Discharge:  bedside commode and rolling walker       SUBJECTIVE:   Patient stated I'm not going now, it's too late.       OBJECTIVE DATA SUMMARY:   Critical Behavior:  Neurologic State: Alert, Appropriate for age  Orientation Level: Oriented X4  Cognition: Appropriate decision making  Safety/Judgement: Decreased insight into deficits, Decreased awareness of need for safety, Fall prevention  Functional Mobility Training:  Transfers:  Sit to Stand: Stand-by asssistance  Stand to Sit: Stand-by asssistance  Balance:  Sitting: Intact  Standing: Intact; With support  Ambulation/Gait Training:  Distance (ft): 450 Feet (ft)  Assistive Device: Gait belt;Walker, rolling  Ambulation - Level of Assistance: Stand-by asssistance;Supervision  Gait Abnormalities: Decreased step clearance; Antalgic  Right Side Weight Bearing: As tolerated  Base of Support: Shift to left  Speed/Janice: Slow  Step Length: Left shortened;Right shortened  Swing Pattern: Right asymmetrical;Left asymmetrical  Therapeutic Exercises:   AP, AC and LAQ  Pain:  Pain Scale 1: Numeric (0 - 10)  Pain Intensity 1: 8  Pain Location 1: Leg  Pain Orientation 1: Right  Pain Description 1: Aching;Burning;Constant; Throbbing  Pain Intervention(s) 1: Medication (see MAR)  Activity Tolerance:   Good  Please refer to the flowsheet for vital signs taken during this treatment.   After treatment:   [ ] Patient left in no apparent distress sitting up in chair  [X] Patient left in no apparent distress in bed  [X] Call bell left within reach  [X] Nursing notified  [ ] Caregiver present  [ ] Bed alarm activated      Yamilet Roberts PTA   Time Calculation: 36 mins

## 2017-04-11 NOTE — PROGRESS NOTES
Pharmacy Dosing Services: Warfarin    Consult for Warfarin Dosing by Pharmacy by Dr. Sunshine Avila provided for this 40 y.o.  female , for indication of Venous Thrombosis. Dose to achieve an INR goal of 2-3    Order entered for Warfarin 6 mg to be given today at 18:00. Patient is also on a heparin drip and aspirin 81 mg po daily. LABS    PT/INR Lab Results   Component Value Date/Time    INR 1.2 04/11/2017 12:40 AM      Platelets Lab Results   Component Value Date/Time    PLATELET 678 39/46/3376 12:40 AM      H/H     Lab Results   Component Value Date/Time    HGB 9.0 04/11/2017 12:40 AM        Warfarin Administrations (last 168 hours)       Date/Time Action Medication Dose      04/10/17 1829 Given    warfarin (COUMADIN) tablet 6 mg 6 mg    04/09/17 1723 Given    warfarin (COUMADIN) tablet 5 mg 5 mg    04/08/17 1727 Given    warfarin (COUMADIN) tablet 5 mg 5 mg    04/07/17 1801 Given    warfarin (COUMADIN) tablet 5 mg 5 mg          Pharmacy to follow daily and will provide subsequent Warfarin dosing based on clinical status.   ANNE Alas  Contact information [phone]

## 2017-04-11 NOTE — PROGRESS NOTES
Shift summary: pt is 5 days S/P R leg thrombectomy, repair of popliteal artery, with dermabond over incision to medial aspect of R knee. Up with assist and a walker to bathroom, BLE edema L>R - elevated on pillows. Teaching done regarding MD's order of changing IV pain meds to oral. Pt had a boxed lunch and several ramiro crackers packs and later asked for Phenergan for nausea, no vomiting noted.

## 2017-04-11 NOTE — PROGRESS NOTES
Hospitalist Progress Note    Patient: Jl Tiwari MRN: [id number]  CSN: [id number]    YOB: 1970  Age: 40 y.o. Sex: female    DOA: 3/27/2017 LOS:  LOS: 15 days                Assessment/Plan     Patient Active Problem List   Diagnosis Code    Chronic pain syndrome G89.4    Crohn disease ([facility] K50.90    Sickle cell trait ([facility] D57.3    Hypertension I10    Hx of cocaine abuse F78.956    Embolic stroke ([facility] L44.8    Acute blood loss anemia D62    Neuropathic pain M79.2    DVT (deep venous thrombosis) ([facility]) I82.409    GI bleed K92.2    Elevated WBC count D72.829    PAD (peripheral artery disease) ([facility]) I73.9    Vascular abnormality I99.9            41 yo female admitted for GI bleed,  Recent admission for embolic CVA and recent DVT.     GI bleed - GI following, now bleeding has stopped.       Anemia - acute blood loss, s/p blood transfusion, H/H improved.       Recent DVT/CVA - anticoagulation restarted. On heparin drip. Bridge to coumadin, pharmacy dosing.      Right leg pain - right leg arterial duplex >75%stenosis of anterior tibial artery. Vascular following, CTA with right popliteal occlusion, s/p LEG THROMBECTOMY W/REPAIR POPLITEAL ARTERY W/C-ARM,ANGIOGRAM On 4/6/2017. Per vascular will need indefinite anticoagulation as tolerated.      Crohn's disease - management per GI. On steroids, mesalamine and protonix. Seen by colorectal surgery, bleeding stopped, if patient bleeds again she is a candidate of colectomy.       HTN - controlled.     PT/OT     Discharge planning, discharge once INR in therapeutic range. Drug seeking, patient specifically asking for IV dilaudid. Addressed pain control and management. Monitoring her HR and blood pressure. She is getting close to discharge and need to wean her off pain medication.      Review of systems  General: No fevers or chills. Cardiovascular: No chest pain or pressure. No palpitations. Pulmonary: No shortness of breath. Gastrointestinal: No nausea, vomiting.           Physical Exam:  General: Awake, cooperative, no acute distress    HEENT: NC, Atraumatic. PERRLA, anicteric sclerae. Lungs: CTA Bilaterally. No Wheezing/Rhonchi/Rales. Heart: Regular rhythm,  No murmur, No Rubs, No Gallops  Abdomen: Soft, Non distended, Non tender.  +Bowel sounds,   Extremities: right distal pulse now palpable. Psych:   Not anxious or agitated. Neurologic:  No acute neurological deficit.          Vital signs/Intake and Output:  Visit Vitals    /87    Pulse 80    Temp 97.8 °F (36.6 °C)    Resp 18    Ht 5' 6\" (1.676 m)    Wt 90.3 kg (199 lb 1.2 oz)    SpO2 100%    Breastfeeding No    BMI 32.13 kg/m2     Current Shift:     Last three shifts:  04/09 1901 - 04/11 0700  In: 5309 [P.O.:230; I.V.:1064]  Out: 1300 [Urine:1300]            Labs: Results:       Chemistry Recent Labs      04/10/17   0300  04/09/17   0133   GLU  107*  113*   NA  144  143   K  3.6  3.6   CL  107  107   CO2  31  30   BUN  9  16   CREA  0.54*  0.69   CA  8.7  8.4*   AGAP  6  6   BUCR  17  23*      CBC w/Diff Recent Labs      04/11/17   0040  04/10/17   0300  04/09/17   0133   WBC  8.6  8.8  8.6   RBC  3.18*  3.25*  3.28*   HGB  9.0*  9.1*  9.3*   HCT  28.6*  29.2*  29.3*   PLT  346  323  259   GRANS  62  66  69   LYMPH  19*  21  18*   EOS  2  3  3      Cardiac Enzymes No results for input(s): CPK, CKND1, CHANDA in the last 72 hours.     No lab exists for component: CKRMB, TROIP   Coagulation Recent Labs      04/11/17   0040  04/10/17   1215  04/10/17   0300   PTP  14.4   --   13.5   INR  1.2   --   1.1   APTT  34.7  32.9  39.8*       Lipid Panel Lab Results   Component Value Date/Time    Cholesterol, total 145 03/12/2017 06:15 AM    HDL Cholesterol 37 03/12/2017 06:15 AM    LDL, calculated 91.6 03/12/2017 06:15 AM    VLDL, calculated 16.4 03/12/2017 06:15 AM    Triglyceride 82 03/12/2017 06:15 AM    CHOL/HDL Ratio 3.9 03/12/2017 06:15 AM      BNP No results for input(s): BNPP in the last 72 hours. Liver Enzymes No results for input(s): TP, ALB, TBIL, AP, SGOT, GPT in the last 72 hours.     No lab exists for component: DBIL   Thyroid Studies Lab Results   Component Value Date/Time    TSH 1.51 12/12/2009 09:48 AM        Procedures/imaging: see electronic medical records for all procedures/Xrays and details which were not copied into this note but were reviewed prior to creation of Plan

## 2017-04-12 LAB
APTT PPP: 35.4 SEC (ref 23–36.4)
APTT PPP: 49 SEC (ref 23–36.4)
BASOPHILS # BLD AUTO: 0 K/UL (ref 0–0.06)
BASOPHILS # BLD: 0 % (ref 0–2)
DIFFERENTIAL METHOD BLD: ABNORMAL
EOSINOPHIL # BLD: 0.3 K/UL (ref 0–0.4)
EOSINOPHIL NFR BLD: 3 % (ref 0–5)
ERYTHROCYTE [DISTWIDTH] IN BLOOD BY AUTOMATED COUNT: 15.3 % (ref 11.6–14.5)
GLUCOSE BLD STRIP.AUTO-MCNC: 112 MG/DL (ref 70–110)
GLUCOSE BLD STRIP.AUTO-MCNC: 131 MG/DL (ref 70–110)
GLUCOSE BLD STRIP.AUTO-MCNC: 71 MG/DL (ref 70–110)
HCT VFR BLD AUTO: 27.3 % (ref 35–45)
HGB BLD-MCNC: 8.5 G/DL (ref 12–16)
INR PPP: 1.2 (ref 0.8–1.2)
LYMPHOCYTES # BLD AUTO: 24 % (ref 21–52)
LYMPHOCYTES # BLD: 2.1 K/UL (ref 0.9–3.6)
MAGNESIUM SERPL-MCNC: 1.5 MG/DL (ref 1.6–2.6)
MCH RBC QN AUTO: 28 PG (ref 24–34)
MCHC RBC AUTO-ENTMCNC: 31.1 G/DL (ref 31–37)
MCV RBC AUTO: 89.8 FL (ref 74–97)
MONOCYTES # BLD: 1 K/UL (ref 0.05–1.2)
MONOCYTES NFR BLD AUTO: 12 % (ref 3–10)
NEUTS SEG # BLD: 5.2 K/UL (ref 1.8–8)
NEUTS SEG NFR BLD AUTO: 61 % (ref 40–73)
PLATELET # BLD AUTO: 343 K/UL (ref 135–420)
PMV BLD AUTO: 8.6 FL (ref 9.2–11.8)
PROTHROMBIN TIME: 14.9 SEC (ref 11.5–15.2)
RBC # BLD AUTO: 3.04 M/UL (ref 4.2–5.3)
WBC # BLD AUTO: 8.5 K/UL (ref 4.6–13.2)

## 2017-04-12 PROCEDURE — 74011250637 HC RX REV CODE- 250/637: Performed by: HOSPITALIST

## 2017-04-12 PROCEDURE — 74011250637 HC RX REV CODE- 250/637: Performed by: INTERNAL MEDICINE

## 2017-04-12 PROCEDURE — 74011250636 HC RX REV CODE- 250/636: Performed by: SURGERY

## 2017-04-12 PROCEDURE — 74011636637 HC RX REV CODE- 636/637: Performed by: INTERNAL MEDICINE

## 2017-04-12 PROCEDURE — 85730 THROMBOPLASTIN TIME PARTIAL: CPT | Performed by: SURGERY

## 2017-04-12 PROCEDURE — 85025 COMPLETE CBC W/AUTO DIFF WBC: CPT | Performed by: SURGERY

## 2017-04-12 PROCEDURE — 74011250636 HC RX REV CODE- 250/636: Performed by: ANESTHESIOLOGY

## 2017-04-12 PROCEDURE — 36592 COLLECT BLOOD FROM PICC: CPT

## 2017-04-12 PROCEDURE — 74011250637 HC RX REV CODE- 250/637: Performed by: FAMILY MEDICINE

## 2017-04-12 PROCEDURE — 74011250637 HC RX REV CODE- 250/637: Performed by: SURGERY

## 2017-04-12 PROCEDURE — 82962 GLUCOSE BLOOD TEST: CPT

## 2017-04-12 PROCEDURE — 65270000029 HC RM PRIVATE

## 2017-04-12 PROCEDURE — 83735 ASSAY OF MAGNESIUM: CPT | Performed by: SURGERY

## 2017-04-12 PROCEDURE — 85730 THROMBOPLASTIN TIME PARTIAL: CPT | Performed by: HOSPITALIST

## 2017-04-12 PROCEDURE — 85610 PROTHROMBIN TIME: CPT | Performed by: SURGERY

## 2017-04-12 RX ORDER — LANOLIN ALCOHOL/MO/W.PET/CERES
400 CREAM (GRAM) TOPICAL 2 TIMES DAILY
Status: DISCONTINUED | OUTPATIENT
Start: 2017-04-12 | End: 2017-04-18 | Stop reason: HOSPADM

## 2017-04-12 RX ORDER — WARFARIN 7.5 MG/1
7.5 TABLET ORAL ONCE
Status: COMPLETED | OUTPATIENT
Start: 2017-04-12 | End: 2017-04-12

## 2017-04-12 RX ORDER — METHOTREXATE 25 MG/ML
25 INJECTION INTRA-ARTERIAL; INTRAMUSCULAR; INTRATHECAL; INTRAVENOUS ONCE
Status: COMPLETED | OUTPATIENT
Start: 2017-04-13 | End: 2017-04-13

## 2017-04-12 RX ORDER — HYDROMORPHONE HYDROCHLORIDE 2 MG/1
2 TABLET ORAL
Status: DISCONTINUED | OUTPATIENT
Start: 2017-04-12 | End: 2017-04-18 | Stop reason: HOSPADM

## 2017-04-12 RX ADMIN — GABAPENTIN 400 MG: 100 CAPSULE ORAL at 16:37

## 2017-04-12 RX ADMIN — HYDROMORPHONE HYDROCHLORIDE 4 MG: 2 TABLET ORAL at 09:43

## 2017-04-12 RX ADMIN — MULTIPLE VITAMINS W/ MINERALS TAB 1 TABLET: TAB at 09:34

## 2017-04-12 RX ADMIN — PROMETHAZINE HYDROCHLORIDE 25 MG: 25 TABLET ORAL at 08:03

## 2017-04-12 RX ADMIN — SODIUM CHLORIDE, SODIUM LACTATE, POTASSIUM CHLORIDE, AND CALCIUM CHLORIDE 1000 ML: 600; 310; 30; 20 INJECTION, SOLUTION INTRAVENOUS at 15:22

## 2017-04-12 RX ADMIN — DOCUSATE SODIUM 100 MG: 100 CAPSULE, LIQUID FILLED ORAL at 09:35

## 2017-04-12 RX ADMIN — MESALAMINE 1000 MG: 250 CAPSULE ORAL at 09:35

## 2017-04-12 RX ADMIN — PREDNISONE 40 MG: 20 TABLET ORAL at 09:35

## 2017-04-12 RX ADMIN — DOCUSATE SODIUM 100 MG: 100 CAPSULE, LIQUID FILLED ORAL at 20:38

## 2017-04-12 RX ADMIN — HYDROMORPHONE HYDROCHLORIDE 4 MG: 2 TABLET ORAL at 01:45

## 2017-04-12 RX ADMIN — MESALAMINE 1000 MG: 250 CAPSULE ORAL at 14:03

## 2017-04-12 RX ADMIN — MESALAMINE 1000 MG: 250 CAPSULE ORAL at 18:34

## 2017-04-12 RX ADMIN — CARVEDILOL 3.12 MG: 3.12 TABLET, FILM COATED ORAL at 09:34

## 2017-04-12 RX ADMIN — OXYCODONE HYDROCHLORIDE AND ACETAMINOPHEN 1 TABLET: 5; 325 TABLET ORAL at 11:25

## 2017-04-12 RX ADMIN — GABAPENTIN 400 MG: 100 CAPSULE ORAL at 09:34

## 2017-04-12 RX ADMIN — HYDROMORPHONE HYDROCHLORIDE 2 MG: 2 TABLET ORAL at 14:03

## 2017-04-12 RX ADMIN — Medication 400 MG: at 20:38

## 2017-04-12 RX ADMIN — ASPIRIN 81 MG: 81 TABLET, CHEWABLE ORAL at 09:34

## 2017-04-12 RX ADMIN — PROMETHAZINE HYDROCHLORIDE 25 MG: 25 TABLET ORAL at 20:38

## 2017-04-12 RX ADMIN — HYDROMORPHONE HYDROCHLORIDE 4 MG: 2 TABLET ORAL at 05:41

## 2017-04-12 RX ADMIN — Medication 400 MG: at 14:03

## 2017-04-12 RX ADMIN — CARVEDILOL 3.12 MG: 3.12 TABLET, FILM COATED ORAL at 16:37

## 2017-04-12 RX ADMIN — WARFARIN SODIUM 7.5 MG: 7.5 TABLET ORAL at 18:34

## 2017-04-12 RX ADMIN — HEPARIN SODIUM AND DEXTROSE 800 UNITS/HR: 10000; 5 INJECTION INTRAVENOUS at 01:52

## 2017-04-12 NOTE — PROGRESS NOTES
Gastrointestinal Progress Note    Patient Name: Laura Alford    JGBAD'Y Date: 4/12/2017    Admit Date: 3/27/2017    Subjective:   Depressed wants to go home because she still has pain in her right leg despite the surgery  Diet: Patient is on  Regular diet and is tolerating. Nausea is not present. Vomiting is not present. Pain: Patient does complain of very mild  Lower abdominal pain.       Bowel Movements: Normal soft x 2 no diarrhea  Bleeding:  Small amount of blood today  C/o right foot pain since admission for CVA 3/10/ 2017 had popliteal surgery few days ago    Current Facility-Administered Medications   Medication Dose Route Frequency    magnesium oxide (MAG-OX) tablet 400 mg  400 mg Oral BID    HYDROmorphone (DILAUDID) tablet 2 mg  2 mg Oral Q4H PRN    warfarin (COUMADIN) tablet 7.5 mg  7.5 mg Oral ONCE    [START ON 4/13/2017] predniSONE (DELTASONE) tablet 30 mg  30 mg Oral DAILY WITH BREAKFAST    [START ON 4/13/2017] methotrexate (PF) chemo syringe 25 mg  25 mg SubCUTAneous ONCE    mesalamine (PENTASA) CR capsule 1,000 mg  1 g Oral QID    Warfarin - Pharmacy to dose   Other Rx Dosing/Monitoring    simethicone (MYLICON) 53HP/5.7SU oral drops 80 mg  1.2 mL Oral Multiple    lactated ringers infusion 1,000 mL  1,000 mL IntraVENous CONTINUOUS    heparin 25,000 units in D5W 250 ml infusion  800 Units/hr IntraVENous CONTINUOUS    gabapentin (NEURONTIN) capsule 400 mg  400 mg Oral TID    aspirin chewable tablet 81 mg  81 mg Oral DAILY    0.9% sodium chloride infusion 250 mL  250 mL IntraVENous PRN    promethazine (PHENERGAN) tablet 12.5-25 mg  12.5-25 mg Oral Q6H PRN    atorvastatin (LIPITOR) tablet 80 mg  80 mg Oral QHS    carvedilol (COREG) tablet 3.125 mg  3.125 mg Oral BID WITH MEALS    multivitamin, tx-iron-ca-min (THERA-M w/ IRON) tablet 1 Tab  1 Tab Oral DAILY    insulin lispro (HUMALOG) injection   SubCUTAneous AC&HS    glucose chewable tablet 16 g  4 Tab Oral PRN    glucagon (GLUCAGEN) injection 1 mg  1 mg IntraMUSCular PRN    dextrose (D50W) injection syrg 12.5-25 g  25-50 mL IntraVENous PRN    docusate sodium (COLACE) capsule 100 mg  100 mg Oral BID    acetaminophen (TYLENOL) tablet 650 mg  650 mg Oral Q4H PRN    diphenhydrAMINE (BENADRYL) injection 12.5 mg  12.5 mg IntraVENous Q4H PRN    ondansetron (ZOFRAN) injection 4 mg  4 mg IntraVENous Q6H PRN          Objective:     Visit Vitals    BP (!) 125/91    Pulse 93    Temp 98.7 °F (37.1 °C)    Resp 18    Ht 5' 6\" (1.676 m)    Wt 97.8 kg (215 lb 8 oz)    SpO2 100%    Breastfeeding No    BMI 34.78 kg/m2       04/10 1901 - 04/12 0700  In: 3092.7 [P.O.:920; I.V.:2172.7]  Out: 850 [Urine:850]    General appearance: alert, cooperative, no distress, appears stated age. Pale  Abdomen: soft,very mild tenderness in the suprapubic are. Bowel sounds normal. No masses,  no organomegaly  Extremities: extremities mild ankle edema. Tattoo's      Data Review:    Labs: Results:       Chemistry Recent Labs      04/10/17   0300   GLU  107*   NA  144   K  3.6   CL  107   CO2  31   BUN  9   CREA  0.54*   CA  8.7   AGAP  6   BUCR  17      CBC w/Diff Recent Labs      04/12/17   0528  04/11/17   0040  04/10/17   0300   WBC  8.5  8.6  8.8   RBC  3.04*  3.18*  3.25*   HGB  8.5*  9.0*  9.1*   HCT  27.3*  28.6*  29.2*   PLT  343  346  323   GRANS  61  62  66   LYMPH  24  19*  21   EOS  3  2  3      Coagulation Recent Labs      04/12/17   1336  04/12/17   0528   04/11/17   0040   PTP   --   14.9   --   14.4   INR   --   1.2   --   1.2   APTT  49.0*  35.4   < >  34.7    < > = values in this interval not displayed. Liver Enzymes No results for input(s): TP, ALB, TBIL, AP, SGOT, GPT in the last 72 hours.     No lab exists for component: DBIL       Assessment:     Principal Problem:    GI bleed (3/28/2017)    Active Problems:    Crohn disease ([facility] (5/90/7105)      Embolic stroke ([facility] (5/93/6243)      Acute blood loss anemia (3/15/2017) Neuropathic pain (3/15/2017)      DVT (deep venous thrombosis) ([facility]) (3/16/2017)      Elevated WBC count (3/28/2017)      PAD (peripheral artery disease) ([facility] (4/4/2017)      Vascular abnormality (4/6/2017)        Plan:     Severe lower GI bleeding while on Lovenox and Coumadin for recent DVT and embolic CVA her Hgb went down to 6.5 She required a total of 8 PRBC transfusions and 3 FFP units. Bleeding is coming from the left colon where she has severe active Crohn's disease. Hgb is presently 8.5 The bleeding stopped she is back on Heparin and Coumadin but INR subtherapeutic  Acute blood loss on chronic iron deficiency anemia secondary to her severe Crohn's disease Hgb 8.5    Severe Crohn's disease of the left and transverse colon since age 16. Started on Union which an IV infusion biologic only on March 24. She missed getting her second dose 2 weeks after she did not call the infusion center to get it. Told her to call them again and get it asap. Her next dose is in 6 weeks after this and then every 8 weeks. She is doing well for now I would like to reduce the Prednisone to 30 mg daily. I discussed her case during a special session on difficult cases of IBD, I am giving her a second dose of sc Methotrexate 25 mg while she is still in the hospital but on discharge she would be getting merely 10 mg once a week in divided dose hope this would help her case. Our worst enemy in her case with history of severe MRSA positive infection and Shingles would be the steroids. I explained to the patient the rational behind adding low dose Methotrexate at least for the first 6 months. I told her that there is mostly an increased risk mostly of infection and very rare for neoplasm or cancer. She told me that she has tubal ligation as the methotrexate is teratogenic. Patient did not want to have a colectomy. On discharge she would be getting on top of the steroids taper.  The Pentasa and the Entyvio she would be getting Methotrexate 10 mg once weekly ( 2.5 mg in 4 separate doses once a week) x 6 months    Hypercoagulable state with bilateral DVT and embolic CVA on  March 10, 2017 and possibly emboli to her right foot. She was started on Coumadin and Lovenox in my opinion is most likely secondary to the severe aggressive Crohn's disease. Right forefoot and right leg pain despite popliteal artery thrombectomy. Presently on Heparin and Coumadin.  Repeat Duplex because of the pain    Depression    Pérez Almanza MD  April 12, 2017

## 2017-04-12 NOTE — INTERDISCIPLINARY ROUNDS
Interdisciplinary Round Note   Patient Information: Mega Fernández                                      [birth year] Reason for Admission: Rectal bleed, crohns, anemia, dizziness.   GI bleed  RIGHT POPLITEAL OCCLUSION  Vascular abnormality  Quality Measure: Not applicable            Attending Provider:   Elizabeth Ames DO  Primary Care Physician:       None       None  Consulting:  IP CONSULT TO HOSPITALIST  IP CONSULT TO GASTROENTEROLOGY  IP CONSULT TO INTENSIVIST  IP CONSULT TO GENERAL SURGERY  IP CONSULT TO VASCULAR SURGERY  IP CONSULT TO VASCULAR SURGERY  IDR Rounding Physician:  Past Medical History:   Past Medical History:   Diagnosis Date    Abdominal pain     Anemia NEC     sickle cell trait    C. difficile colitis     Cro[facility]'s disease ([facility]     Gastrointestinal disorder     Crohns    Herpes zoster     Hx of cocaine abuse     Hyperkalemia     Hypertension     Iron deficiency anemia     MRSA (methicillin resistant Staphylococcus aureus)     Obesity     Pain management     Sickle cell trait ([facility]     Stroke ([facility]     + cva 3/17        Hospital day: 16                          5d 9h  Estimated discharge date:   RRAT Score: Medium Risk            16       Total Score        3 Relationship with PCP    3 Patient Length of Stay > 5    4 More than 1 Admission in calendar year    4 Patient Insurance is Medicare, Medicaid or Self Pay    2 Charlson Comorbidity Score        Criteria that do not apply:    Patient Living Status         Primary Goals for Today: pain control, monitor PTT    Secondary Goals for Today: continue Lovenox, Coumadin    Overnight Events: no    Gaming: no    Central Line: yes    Isolation: no       IV Antibiotics? no       When started: n/a  Current Medication List:          Current Facility-Administered Medications   Medication Dose Route Frequency    mesalamine (PENTASA) CR capsule 1,000 mg  1 g Oral QID    Warfarin - Pharmacy to dose   Other Rx Dosing/Monitoring    HYDROmorphone (DILAUDID) tablet 2-4 mg  2-4 mg Oral Q4H PRN    simethicone (MYLICON) 85OE/7.8HM oral drops 80 mg  1.2 mL Oral Multiple    lactated ringers infusion 1,000 mL  1,000 mL IntraVENous CONTINUOUS    heparin 25,000 units in D5W 250 ml infusion  800 Units/hr IntraVENous CONTINUOUS    gabapentin (NEURONTIN) capsule 400 mg  400 mg Oral TID    aspirin chewable tablet 81 mg  81 mg Oral DAILY    0.9% sodium chloride infusion 250 mL  250 mL IntraVENous PRN    promethazine (PHENERGAN) tablet 12.5-25 mg  12.5-25 mg Oral Q6H PRN    predniSONE (DELTASONE) tablet 40 mg  40 mg Oral DAILY WITH BREAKFAST    atorvastatin (LIPITOR) tablet 80 mg  80 mg Oral QHS    carvedilol (COREG) tablet 3.125 mg  3.125 mg Oral BID WITH MEALS    multivitamin, tx-iron-ca-min (THERA-M w/ IRON) tablet 1 Tab  1 Tab Oral DAILY    insulin lispro (HUMALOG) injection   SubCUTAneous AC&HS    glucose chewable tablet 16 g  4 Tab Oral PRN    glucagon (GLUCAGEN) injection 1 mg  1 mg IntraMUSCular PRN    dextrose (D50W) injection syrg 12.5-25 g  25-50 mL IntraVENous PRN    docusate sodium (COLACE) capsule 100 mg  100 mg Oral BID    oxyCODONE-acetaminophen (PERCOCET) 5-325 mg per tablet 1 Tab  1 Tab Oral Q4H PRN    acetaminophen (TYLENOL) tablet 650 mg  650 mg Oral Q4H PRN    diphenhydrAMINE (BENADRYL) injection 12.5 mg  12.5 mg IntraVENous Q4H PRN    ondansetron (ZOFRAN) injection 4 mg  4 mg IntraVENous Q6H PRN      VTE Prophylaxis               Sequential Compression Device: Bilateral                 Lines, Drains, & Airways  Triple Lumen 03/28/17 Right Subclavian-Site Assessment: Clean, dry, & intact  [REMOVED] Peripheral IV 03/27/17 Left Antecubital-Site Assessment: Clean, dry, & intact  [REMOVED] Peripheral IV 03/27/17 Left Forearm-Site Assessment: Clean, dry, & intact  [REMOVED] Urinary Catheter 04/06/17 Gaming-Criteria for Appropriate Use: Surgical procedure  Vital signs:  Visit Vitals    /82 (BP 1 Location: Left arm, BP Patient Position: At rest)    Pulse 91    Temp 98.1 °F (36.7 °C)    Resp 18    Ht 5' 6\" (1.676 m)    Wt 97.8 kg (215 lb 8 oz)    SpO2 100%    Breastfeeding No    BMI 34.78 kg/m2        Intake and Output:   04/10 0701 - 04/11 1900  In: 2214 [P.O.:1150; I.V.:1064]  Out: 700 [Urine:700]  04/11 1901 - 04/12 0700  In: 1108.7 [I.V.:1108.7]  Out: 150 [Urine:150]  Last Bowel Movement Date: 04/11/17  Stool Color: Brown (dark)  Stool Appearance: Soft  Stool Amount: Large  Stool Source/Status: Rectum     Current Diet: DIET REGULAR       Abdominal   Abdominal Assessment: Soft, Intact  Appetite: Good  Bowel Sounds: Active   Nutrition  Chewing/Swallowing Problems: No  Difficulty with Secretions: No  Speech Slurred/Thick/Garbled: No    NGT: no    Feeding Tube: no   Recent Glucose Results:   Lab Results   Component Value Date/Time    GLUCPOC 135 (H) 04/11/2017 09:11 PM    GLUCPOC 140 (H) 04/11/2017 04:46 PM    GLUCPOC 158 (H) 04/11/2017 09:01 AM    GI Prophylaxis: no        Type: n/a        Activity Level: Activity Level:  Up with Assistance    Needs assistance with ADLs: no  PT Consult Status: in progress  Equipment: walker  Surgical/Ortho Notes: vascular   Current Immunizations:  Immunization History   Administered Date(s) Administered    Influenza Vaccine 11/01/2016    Pneumococcal Vaccine (Unspecified Type) 01/01/2010     RRAT Score:     Readmit Risk Tool  Support Systems: Family member(s), Child(margaret)  Relationship with Primary Physician Group: Seen at least one time within the past 6 months   Recommendations:       Discharge Disposition: TBD, pending progress    Needs for Discharge: follow up with care teams           Recommendations from IDR team: continue with PT     Other Notes: monitor PTT and INR

## 2017-04-12 NOTE — PROGRESS NOTES
Pharmacy Dosing Services: Warfarin    Consult for Warfarin Dosing by Pharmacy by Dr. Moo Maynard provided for this 40 y.o.  female , for indication of Venous Thrombosis. Dose to achieve an INR goal of 2-3    Order entered for Warfarin 7.5 mg to be given today at 18:00. Patient is also on a heparin drip and aspirin 81 mg po daily. LABS    PT/INR Lab Results   Component Value Date/Time    INR 1.2 04/12/2017 05:28 AM      Platelets Lab Results   Component Value Date/Time    PLATELET 240 55/22/6213 05:28 AM      H/H     Lab Results   Component Value Date/Time    HGB 8.5 04/12/2017 05:28 AM        Warfarin Administrations (last 168 hours)       Date/Time Action Medication Dose      04/11/17 1942 Given    warfarin (COUMADIN) tablet 6 mg 6 mg    04/10/17 1829 Given    warfarin (COUMADIN) tablet 6 mg 6 mg    04/09/17 1723 Given    warfarin (COUMADIN) tablet 5 mg 5 mg    04/08/17 1727 Given    warfarin (COUMADIN) tablet 5 mg 5 mg    04/07/17 1801 Given    warfarin (COUMADIN) tablet 5 mg 5 mg          Pharmacy to follow daily and will provide subsequent Warfarin dosing based on clinical status.   ANNE Wen  Contact information [phone]

## 2017-04-12 NOTE — ROUTINE PROCESS
SHIFT SUMMARY: No events during this night shift. Pt requests for pain medications dilaudid and percocet while awake, and request for phenergan. Refuses zofran, as she states that it does not help her. Incision on right inner thigh is intact and has no drainage. Some edema present in right leg. Elevated while in bed. Heparin maintained at 8 ml/hr.

## 2017-04-12 NOTE — ROUTINE PROCESS
Bedside and Verbal shift change report given to EMANUEL Rand RN (oncoming nurse) by Angel Macias RN  (offgoing nurse). Report included the following information SBAR, Kardex, Intake/Output and MAR.

## 2017-04-12 NOTE — PROGRESS NOTES
Patient with c/o pain 9/10 to right leg. Patient alert and having enjoyable conversation with spa team staff, not showing any signs of pain, distress, or discomfort. Patient positioned herself into bed from sitting at edge of bed to supine position, without any difficulty and full use of all extremities showing no signs of weakness. Prn meds giving as ordered. Call light within reach, safety and comfort measures are in place.

## 2017-04-12 NOTE — PROGRESS NOTES
Bedside, Verbal and Written shift change report given to Camilo Abbott RN (oncoming nurse) by Brigid Lanes, LPN (offgoing nurse). Report included the following information SBAR, Kardex, Procedure Summary, Intake/Output and MAR.

## 2017-04-12 NOTE — ROUTINE PROCESS
Bedside and Verbal shift change report given to Gregorio Bennett RN (oncoming nurse) by Efren Huitron RN   (offgoing nurse). Report included the following information SBAR, Kardex, Intake/Output, MAR, Recent Results and Med Rec Status.

## 2017-04-12 NOTE — PROGRESS NOTES
Patient alert and awake with c/o pain. Due and prn meds given as ordered. Assessment complete. Patient expressed that she and writer may not get along because writer will not bring prn pain meds early. Informed patient that no nurse will be giving prn meds early. Patient stated that they have before.  Call light in reach, safety and comfort measures are in place

## 2017-04-12 NOTE — PROGRESS NOTES
1700- received pt alert and oriented x 4 lungs clear , BS x 4. Pedal pulses diminished due to edema+1. Lower extremities elevated on two pillows for comfort. Pt states pain in her right lower extremity pain 10/10. Equal . Pt ambulates to BR with walker. Tolerates well / no assist. Incision KEYLA with dermabond on right lower extremity. Call bell at side        1800- pt ambulated to BR and states she had a \"brown stool\" but she flushed it. BTB with walker tolerated well. Pt sitting up on side of bed. Dinner arrive. Call bell at side.

## 2017-04-12 NOTE — PROGRESS NOTES
Problem: Mobility Impaired (Adult and Pediatric)  Goal: *Acute Goals and Plan of Care (Insert Text)  Physical Therapy ST/LT Goals  Initiated 4/8/2017 and to be accomplished within 7 day(s)  1. Patient will move from supine to sit and sit to supine in bed with supervision/set-up. 2. Patient will transfer from bed to chair and chair to bed with supervision/set-up using the least restrictive device. 3. Patient will perform sit to stand with supervision/set-up. 4. Patient will ambulate with supervision/set-up for >150 feet with the least restrictive device. 5. Patient will ascend/descend >2 stairs with U handrail(s) with supervision/set-up. 4/12/17 PT attempted x 2 this afternoon. Pt initially on phone. Pt later declineded participation with all PT at this time. States she is upset due to not being able to get pain medication. Report pain 10/10 R lower leg. States she was going to leave [facility]) but then won't be able to get prescriptions. States her ride will be here tomorrow. Not treatment given at this time. Nurse Andrea Lyle notified. Will f/u tomorrow.    Emilia Butler, PT

## 2017-04-12 NOTE — PROGRESS NOTES
Hospitalist Progress Note    Patient: Cosme Martinez MRN: [id number]  CSN: [id number]    YOB: 1970  Age: 40 y.o. Sex: female    DOA: 3/27/2017 LOS:  LOS: 16 days                Assessment/Plan     Patient Active Problem List   Diagnosis Code    Chronic pain syndrome G89.4    Crohn disease ([facility] K50.90    Sickle cell trait ([facility] D57.3    Hypertension I10    Hx of cocaine abuse N04.347    Embolic stroke ([facility] M17.7    Acute blood loss anemia D62    Neuropathic pain M79.2    DVT (deep venous thrombosis) ([facility]) I82.409    GI bleed K92.2    Elevated WBC count D72.829    PAD (peripheral artery disease) ([facility]) I73.9    Vascular abnormality I99.9            39 yo female admitted for GI bleed,  Recent admission for embolic CVA and recent DVT.     GI bleed - GI following, now bleeding has stopped.       Anemia - acute blood loss, s/p blood transfusion, H/H improved.       Recent DVT/CVA - anticoagulation restarted. On heparin drip. Bridge to coumadin, pharmacy dosing.      Right leg pain - right leg arterial duplex >75%stenosis of anterior tibial artery. Vascular following, CTA with right popliteal occlusion, s/p LEG THROMBECTOMY W/REPAIR POPLITEAL ARTERY W/C-ARM,ANGIOGRAM On 4/6/2017. Per vascular will need indefinite anticoagulation as tolerated.      Crohn's disease - management per GI. On steroids, mesalamine and protonix. Seen by colorectal surgery, bleeding stopped.       HTN - controlled.      PT/OT      Discharge planning, discharge once INR in therapeutic range.     Drug seeking, patient specifically asking for IV dilaudid. She is hardly able to open eyes when conversing, she does not remember our conversation yesterday. Addressed pain control and management. Monitoring her HR and blood pressure. She is getting close to discharge and need to wean her off pain medication. 40 total min's spent on patient care including >50% on counseling/coordinating care.  Discussed the above assessments. also discussed labs, medications and hospital course.        Review of systems  General: No fevers or chills. Cardiovascular: No chest pain or pressure. No palpitations. Pulmonary: No shortness of breath. Gastrointestinal: No nausea, vomiting.           Physical Exam:  General: Awake, cooperative, no acute distress    HEENT: NC, Atraumatic. PERRLA, anicteric sclerae. Lungs: CTA Bilaterally. No Wheezing/Rhonchi/Rales. Heart: Regular rhythm,  No murmur, No Rubs, No Gallops  Abdomen: Soft, Non distended, Non tender.  +Bowel sounds,   Extremities: right distal pulse now palpable. Psych:   Not anxious or agitated. Neurologic:  No acute neurological deficit.          Vital signs/Intake and Output:  Visit Vitals    BP (!) 125/91    Pulse 93    Temp 98.7 °F (37.1 °C)    Resp 18    Ht 5' 6\" (1.676 m)    Wt 97.8 kg (215 lb 8 oz)    SpO2 100%    Breastfeeding No    BMI 34.78 kg/m2     Current Shift:     Last three shifts:  04/11 0701 - 04/12 1900  In: 2777.7 [P.O.:920; I.V.:1857.7]  Out: 450 [Urine:450]            Labs: Results:       Chemistry Recent Labs      04/10/17   0300   GLU  107*   NA  144   K  3.6   CL  107   CO2  31   BUN  9   CREA  0.54*   CA  8.7   AGAP  6   BUCR  17      CBC w/Diff Recent Labs      04/12/17   0528  04/11/17   0040  04/10/17   0300   WBC  8.5  8.6  8.8   RBC  3.04*  3.18*  3.25*   HGB  8.5*  9.0*  9.1*   HCT  27.3*  28.6*  29.2*   PLT  343  346  323   GRANS  61  62  66   LYMPH  24  19*  21   EOS  3  2  3      Cardiac Enzymes No results for input(s): CPK, CKND1, CHANDA in the last 72 hours. No lab exists for component: CKRMB, TROIP   Coagulation Recent Labs      04/12/17   1336  04/12/17   0528   04/11/17   0040   PTP   --   14.9   --   14.4   INR   --   1.2   --   1.2   APTT  49.0*  35.4   < >  34.7    < > = values in this interval not displayed.        Lipid Panel Lab Results   Component Value Date/Time    Cholesterol, total 145 03/12/2017 06:15 AM    HDL Cholesterol 37 03/12/2017 06:15 AM    LDL, calculated 91.6 03/12/2017 06:15 AM    VLDL, calculated 16.4 03/12/2017 06:15 AM    Triglyceride 82 03/12/2017 06:15 AM    CHOL/HDL Ratio 3.9 03/12/2017 06:15 AM      BNP No results for input(s): BNPP in the last 72 hours. Liver Enzymes No results for input(s): TP, ALB, TBIL, AP, SGOT, GPT in the last 72 hours.     No lab exists for component: DBIL   Thyroid Studies Lab Results   Component Value Date/Time    TSH 1.51 12/12/2009 09:48 AM        Procedures/imaging: see electronic medical records for all procedures/Xrays and details which were not copied into this note but were reviewed prior to creation of Plan

## 2017-04-13 LAB
APTT PPP: 42.2 SEC (ref 23–36.4)
APTT PPP: 55.9 SEC (ref 23–36.4)
BASOPHILS # BLD AUTO: 0 K/UL (ref 0–0.06)
BASOPHILS # BLD: 0 % (ref 0–2)
DIFFERENTIAL METHOD BLD: ABNORMAL
EOSINOPHIL # BLD: 0.4 K/UL (ref 0–0.4)
EOSINOPHIL NFR BLD: 5 % (ref 0–5)
ERYTHROCYTE [DISTWIDTH] IN BLOOD BY AUTOMATED COUNT: 15.3 % (ref 11.6–14.5)
HCT VFR BLD AUTO: 28.4 % (ref 35–45)
HGB BLD-MCNC: 8.8 G/DL (ref 12–16)
INR PPP: 1.3 (ref 0.8–1.2)
LYMPHOCYTES # BLD AUTO: 34 % (ref 21–52)
LYMPHOCYTES # BLD: 2.7 K/UL (ref 0.9–3.6)
MAGNESIUM SERPL-MCNC: 1.7 MG/DL (ref 1.6–2.6)
MCH RBC QN AUTO: 27.6 PG (ref 24–34)
MCHC RBC AUTO-ENTMCNC: 31 G/DL (ref 31–37)
MCV RBC AUTO: 89 FL (ref 74–97)
MONOCYTES # BLD: 1 K/UL (ref 0.05–1.2)
MONOCYTES NFR BLD AUTO: 12 % (ref 3–10)
NEUTS SEG # BLD: 4 K/UL (ref 1.8–8)
NEUTS SEG NFR BLD AUTO: 49 % (ref 40–73)
PLATELET # BLD AUTO: 319 K/UL (ref 135–420)
PMV BLD AUTO: 8.5 FL (ref 9.2–11.8)
PROTHROMBIN TIME: 16.1 SEC (ref 11.5–15.2)
RBC # BLD AUTO: 3.19 M/UL (ref 4.2–5.3)
WBC # BLD AUTO: 8.1 K/UL (ref 4.6–13.2)

## 2017-04-13 PROCEDURE — 36592 COLLECT BLOOD FROM PICC: CPT

## 2017-04-13 PROCEDURE — 74011250637 HC RX REV CODE- 250/637: Performed by: INTERNAL MEDICINE

## 2017-04-13 PROCEDURE — 85025 COMPLETE CBC W/AUTO DIFF WBC: CPT | Performed by: SURGERY

## 2017-04-13 PROCEDURE — 85730 THROMBOPLASTIN TIME PARTIAL: CPT | Performed by: SURGERY

## 2017-04-13 PROCEDURE — 74011250637 HC RX REV CODE- 250/637: Performed by: FAMILY MEDICINE

## 2017-04-13 PROCEDURE — 65270000029 HC RM PRIVATE

## 2017-04-13 PROCEDURE — 74011250637 HC RX REV CODE- 250/637: Performed by: SURGERY

## 2017-04-13 PROCEDURE — 74011250636 HC RX REV CODE- 250/636: Performed by: SURGERY

## 2017-04-13 PROCEDURE — 74011250637 HC RX REV CODE- 250/637: Performed by: HOSPITALIST

## 2017-04-13 PROCEDURE — 97110 THERAPEUTIC EXERCISES: CPT

## 2017-04-13 PROCEDURE — 80307 DRUG TEST PRSMV CHEM ANLYZR: CPT | Performed by: INTERNAL MEDICINE

## 2017-04-13 PROCEDURE — 74011250636 HC RX REV CODE- 250/636: Performed by: ANESTHESIOLOGY

## 2017-04-13 PROCEDURE — 74011636637 HC RX REV CODE- 636/637: Performed by: INTERNAL MEDICINE

## 2017-04-13 PROCEDURE — 85610 PROTHROMBIN TIME: CPT | Performed by: SURGERY

## 2017-04-13 PROCEDURE — 93926 LOWER EXTREMITY STUDY: CPT

## 2017-04-13 PROCEDURE — 83735 ASSAY OF MAGNESIUM: CPT | Performed by: SURGERY

## 2017-04-13 PROCEDURE — 74011250636 HC RX REV CODE- 250/636: Performed by: INTERNAL MEDICINE

## 2017-04-13 RX ORDER — NALOXONE HYDROCHLORIDE 0.4 MG/ML
0.4 INJECTION, SOLUTION INTRAMUSCULAR; INTRAVENOUS; SUBCUTANEOUS AS NEEDED
Status: DISCONTINUED | OUTPATIENT
Start: 2017-04-13 | End: 2017-04-18 | Stop reason: HOSPADM

## 2017-04-13 RX ORDER — WARFARIN 10 MG/1
10 TABLET ORAL ONCE
Status: COMPLETED | OUTPATIENT
Start: 2017-04-13 | End: 2017-04-13

## 2017-04-13 RX ADMIN — CARVEDILOL 3.12 MG: 3.12 TABLET, FILM COATED ORAL at 17:35

## 2017-04-13 RX ADMIN — ATORVASTATIN CALCIUM 80 MG: 20 TABLET, FILM COATED ORAL at 00:28

## 2017-04-13 RX ADMIN — MESALAMINE 1000 MG: 250 CAPSULE ORAL at 14:01

## 2017-04-13 RX ADMIN — MESALAMINE 1000 MG: 250 CAPSULE ORAL at 00:29

## 2017-04-13 RX ADMIN — SODIUM CHLORIDE, SODIUM LACTATE, POTASSIUM CHLORIDE, AND CALCIUM CHLORIDE 1000 ML: 600; 310; 30; 20 INJECTION, SOLUTION INTRAVENOUS at 00:36

## 2017-04-13 RX ADMIN — ATORVASTATIN CALCIUM 80 MG: 20 TABLET, FILM COATED ORAL at 22:18

## 2017-04-13 RX ADMIN — HYDROMORPHONE HYDROCHLORIDE 2 MG: 2 TABLET ORAL at 22:19

## 2017-04-13 RX ADMIN — MESALAMINE 1000 MG: 250 CAPSULE ORAL at 22:19

## 2017-04-13 RX ADMIN — HYDROMORPHONE HYDROCHLORIDE 2 MG: 2 TABLET ORAL at 05:06

## 2017-04-13 RX ADMIN — Medication 400 MG: at 20:01

## 2017-04-13 RX ADMIN — Medication 400 MG: at 09:57

## 2017-04-13 RX ADMIN — DOCUSATE SODIUM 100 MG: 100 CAPSULE, LIQUID FILLED ORAL at 09:57

## 2017-04-13 RX ADMIN — ASPIRIN 81 MG: 81 TABLET, CHEWABLE ORAL at 09:57

## 2017-04-13 RX ADMIN — HYDROMORPHONE HYDROCHLORIDE 2 MG: 2 TABLET ORAL at 00:28

## 2017-04-13 RX ADMIN — PREDNISONE 30 MG: 20 TABLET ORAL at 09:57

## 2017-04-13 RX ADMIN — CARVEDILOL 3.12 MG: 3.12 TABLET, FILM COATED ORAL at 09:56

## 2017-04-13 RX ADMIN — HYDROMORPHONE HYDROCHLORIDE 2 MG: 2 TABLET ORAL at 09:57

## 2017-04-13 RX ADMIN — HYDROMORPHONE HYDROCHLORIDE 2 MG: 2 TABLET ORAL at 14:01

## 2017-04-13 RX ADMIN — HYDROMORPHONE HYDROCHLORIDE 2 MG: 2 TABLET ORAL at 17:34

## 2017-04-13 RX ADMIN — SODIUM CHLORIDE, SODIUM LACTATE, POTASSIUM CHLORIDE, AND CALCIUM CHLORIDE 1000 ML: 600; 310; 30; 20 INJECTION, SOLUTION INTRAVENOUS at 17:34

## 2017-04-13 RX ADMIN — WARFARIN SODIUM 10 MG: 10 TABLET ORAL at 17:34

## 2017-04-13 RX ADMIN — GABAPENTIN 400 MG: 100 CAPSULE ORAL at 00:28

## 2017-04-13 RX ADMIN — SODIUM CHLORIDE, SODIUM LACTATE, POTASSIUM CHLORIDE, AND CALCIUM CHLORIDE 1000 ML: 600; 310; 30; 20 INJECTION, SOLUTION INTRAVENOUS at 07:32

## 2017-04-13 RX ADMIN — MESALAMINE 1000 MG: 250 CAPSULE ORAL at 17:34

## 2017-04-13 RX ADMIN — MULTIPLE VITAMINS W/ MINERALS TAB 1 TABLET: TAB at 09:57

## 2017-04-13 RX ADMIN — GABAPENTIN 400 MG: 100 CAPSULE ORAL at 22:19

## 2017-04-13 RX ADMIN — PROMETHAZINE HYDROCHLORIDE 25 MG: 25 TABLET ORAL at 20:01

## 2017-04-13 RX ADMIN — GABAPENTIN 400 MG: 100 CAPSULE ORAL at 09:57

## 2017-04-13 RX ADMIN — DOCUSATE SODIUM 100 MG: 100 CAPSULE, LIQUID FILLED ORAL at 20:01

## 2017-04-13 RX ADMIN — HEPARIN SODIUM AND DEXTROSE 800 UNITS/HR: 10000; 5 INJECTION INTRAVENOUS at 07:33

## 2017-04-13 RX ADMIN — Medication 25 MG: at 00:42

## 2017-04-13 RX ADMIN — GABAPENTIN 400 MG: 100 CAPSULE ORAL at 17:35

## 2017-04-13 RX ADMIN — MESALAMINE 1000 MG: 250 CAPSULE ORAL at 09:57

## 2017-04-13 NOTE — PROGRESS NOTES
Pharmacy Dosing Services: Warfarin    Consult for Warfarin Dosing by Pharmacy by Dr. Laura Vazquez provided for this 40 y.o.  female , for indication of Venous Thrombosis. Day of Therapy 07  Dose to achieve an INR goal of 2-3    Order entered for  Warfarin  10 mg to be given today at 18:00. (INR reluctant to move)    Bridge Therapy: Heparin Drip    LABS    PT/INR Lab Results   Component Value Date/Time    INR 1.3 04/13/2017 03:15 AM      Platelets Lab Results   Component Value Date/Time    PLATELET 837 99/18/7538 03:15 AM      H/H     Lab Results   Component Value Date/Time    HGB 8.8 04/13/2017 03:15 AM        Warfarin Administrations (last 168 hours)       Date/Time Action Medication Dose      04/12/17 1834 Given    warfarin (COUMADIN) tablet 7.5 mg 7.5 mg    04/11/17 1942 Given    warfarin (COUMADIN) tablet 6 mg 6 mg    04/10/17 1829 Given    warfarin (COUMADIN) tablet 6 mg 6 mg    04/09/17 1723 Given    warfarin (COUMADIN) tablet 5 mg 5 mg    04/08/17 1727 Given    warfarin (COUMADIN) tablet 5 mg 5 mg    04/07/17 1801 Given    warfarin (COUMADIN) tablet 5 mg 5 mg          Pharmacy to follow daily and will provide subsequent Warfarin dosing based on clinical status. Maggie Campbell D.   Clinical Pharmacist  [phone]

## 2017-04-13 NOTE — ROUTINE PROCESS
Bedside and Verbal shift change report given to APPLE Newell (oncoming nurse) by Tawanna Larry RN   (offgoing nurse). Report included the following information SBAR, Kardex, Intake/Output, MAR, Recent Results and Med Rec Status.

## 2017-04-13 NOTE — PROCEDURES
[facility]  *** FINAL REPORT ***    Name: Rani Parr  MRN: [id number]    Inpatient  : [address] 1970  HIS Order #: [id number]  [address] [facility] Visit #: [id number]  Date: 2017    TYPE OF TEST: Extremity Arterial Duplex    REASON FOR TEST  Rest pain (right side)                            Right                     Left  Artery               PSV   Finding             PSV   Finding  ------------------  -----  ---------------    -----  ---------------  External iliac:  Common femoral:     117.0  Profunda femoris:  Proximal SFA:       121.0  Mid SFA:            107.0  Distal SFA:         108.0  Popliteal:           46.0  Anterior tibial:     54.0  Posterior tibial:    79.0    Pressures               Right     Left               -----     -----     Brachial:           DP:           PT:            :            Toe: INTERPRETATION/FINDINGS  Duplex images were obtained using 2-D gray scale, color flow and  spectral doppler analysis of the right lower extremety. 1. The right common femoral, superficial femoral, popliteal, posterior   tibial, peroneal and anterior tibial arteries are patent without  evidence of significant stenosis. 2. Normal Triphasic signals noted throughout. ADDITIONAL COMMENTS    I have personally reviewed the data relevant to the interpretation of  this  study. TECHNOLOGIST: Leonarda Delcid  Signed: 2017 04:24 PM    PHYSICIAN: Rodney Joiner.  Francois Hermosillo MD  Signed: 2017 10:58 AM

## 2017-04-13 NOTE — PROGRESS NOTES
2021 Reviewed with pt that medications are tablets, including phenergan and dilaudid. Pt reports some fluid leaking from right leg incision, described as clear and yellow. Right leg with swelling. 0023 Methotrexate subQ shot due, but no subQ needle available. Informed pharmacy. 5299 Methotrexate administered in right arm.     0324 Pt states that her leg was hurting while she slept, but she falls asleep immediately after drawing blood from central line. Medication held. SHIFT SUMMARY: Pt placed on hazardous drug precaution due to methotrexate. Continues to ask for pain medication when she is awake. Phenergan given for nausea. BM during this shift. Pt on prednisone, and POC glucose checks have been ACHS, but there has no need to cover blood sugars. Recommended to oncoming nurse for glucose check to discontinue. Heparin verified to be 8 during this shift.

## 2017-04-13 NOTE — PROGRESS NOTES
Problem: Mobility Impaired (Adult and Pediatric)  Goal: *Acute Goals and Plan of Care (Insert Text)  Physical Therapy ST/LT Goals  Initiated 4/8/2017 and to be accomplished within 7 day(s)  1. Patient will move from supine to sit and sit to supine in bed with supervision/set-up. 2. Patient will transfer from bed to chair and chair to bed with supervision/set-up using the least restrictive device. 3. Patient will perform sit to stand with supervision/set-up. 4. Patient will ambulate with supervision/set-up for >150 feet with the least restrictive device. 5. Patient will ascend/descend >2 stairs with U handrail(s) with supervision/set-up. Outcome: Progressing Towards Goal  PHYSICAL THERAPY TREATMENT     Patient: Cosme Martinez (12 y.o. female)  Date: 4/13/2017  Diagnosis: Rectal bleed, crohns, anemia, dizziness. GI bleed  RIGHT POPLITEAL OCCLUSION  Vascular abnormality GI bleed  Procedure(s) (LRB):  RIGHT LEG THROMBECTOMY WITH REPAIR POPLITEAL ARTERY WITH C-ARM (Right) 7 Days Post-Op  Precautions: Fall   Chart, physical therapy assessment, plan of care and goals were reviewed. ASSESSMENT:  Pt very self-limiting in her participation with PT at this time rating her pain 10/10 in B LEs, but at the same time joking with therapy staff. Pt's L LE/foot was noted to have increased edema as compared to L LE; educated pt to elevate L LE as well as R LE during the day and complete ankle pumps to reduce edema. Pt tolerated therex with minor c/o increasing pain. Recommend HHPT at time of discharge. Kyaw Biswas RN in the room administering pain medication at the end of PT session. Progression toward goals:  [ ]      Improving appropriately and progressing toward goals  [X]      Improving slowly and progressing toward goals  [ ]      Not making progress toward goals and plan of care will be adjusted       PLAN:  Patient continues to benefit from skilled intervention to address the above impairments.   Continue treatment per established plan of care. Discharge Recommendations:  Home Health  Further Equipment Recommendations for Discharge:  rolling walker       SUBJECTIVE:   Patient stated I am in too much pain to even do any of this.       OBJECTIVE DATA SUMMARY:   Critical Behavior:  Neurologic State: Alert, Appropriate for age  Orientation Level: Appropriate for age, Oriented X4  Cognition: Appropriate decision making, Appropriate for age attention/concentration, Appropriate safety awareness, Follows commands  Safety/Judgement: Awareness of environment, Fall prevention  Functional Mobility Training:  Bed Mobility:   Supine to Sit: Modified independent  Sit to Supine: Modified independent  Scooting: Modified independent   Transfers:   Pt declined any transfer training today;rating her pain 10/10   Balance:  Sitting: Intact  Ambulation/Gait Training:   Pt declined any ambulation/gait training today stating she had 10/10 pain in B LEs. Therapeutic Exercises:   B ankle pumps, B heel slides, LAQ x 10 reps  Pain:  Pain Scale 1: Numeric (0 - 10)  Pain Intensity 1: 10  Pain Location 1: Leg  Pain Orientation 1: Right  Pain Description 1: Aching;Burning;Constant; Throbbing  Pain Intervention(s) 1: Medication (see MAR)  Activity Tolerance:   Fair  Please refer to the flowsheet for vital signs taken during this treatment.   After treatment:   [ ] Patient left in no apparent distress sitting up in chair  [X] Patient left in no apparent distress in bed  [X] Call bell left within reach  [X] Nursing notified  [ ] Caregiver present  [ ] Bed alarm activated        Miguel Boyd PT, DPT       Time Calculation: 10 mins

## 2017-04-13 NOTE — PROGRESS NOTES
Hospitalist Progress Note    Patient: Alen Pedraza MRN: [id number]  CSN: [id number]    YOB: 1970  Age: 40 y.o. Sex: female    DOA: 3/27/2017 LOS:  LOS: 17 days                Assessment/Plan     Patient Active Problem List   Diagnosis Code    Chronic pain syndrome G89.4    Crohn disease ([facility] K50.90    Sickle cell trait ([facility] D57.3    Hypertension I10    Hx of cocaine abuse Q22.280    Embolic stroke ([facility] Z15.7    Acute blood loss anemia D62    Neuropathic pain M79.2    DVT (deep venous thrombosis) ([facility]) I82.409    GI bleed K92.2    Elevated WBC count D72.829    PAD (peripheral artery disease) ([facility]) I73.9    Vascular abnormality I99.9            39 yo female admitted for GI bleed,  Recent admission for embolic CVA and recent DVT.     GI bleed - GI following, now bleeding has stopped.       Anemia - acute blood loss, s/p blood transfusion, H/H improved.       Recent DVT/CVA - anticoagulation restarted. On heparin drip. Bridge to coumadin, pharmacy dosing.      Right leg pain - right leg arterial duplex >75%stenosis of anterior tibial artery. Vascular following, CTA with right popliteal occlusion, s/p LEG THROMBECTOMY W/REPAIR POPLITEAL ARTERY W/C-ARM,ANGIOGRAM On 4/6/2017. Per vascular will need indefinite anticoagulation as tolerated.      Crohn's disease - management per GI. On steroids, mesalamine and protonix. Seen by colorectal surgery, bleeding stopped. Given dose of methotrexate.      HTN - controlled.      PT/OT      Discharge planning, discharge once INR in therapeutic range.               Review of systems  General: No fevers or chills. Cardiovascular: No chest pain or pressure. No palpitations. Pulmonary: No shortness of breath. Gastrointestinal: No nausea, vomiting.           Physical Exam:  General: Awake, cooperative, no acute distress    HEENT: NC, Atraumatic. PERRLA, anicteric sclerae. Lungs: CTA Bilaterally. No Wheezing/Rhonchi/Rales.   Heart: Regular rhythm,  No murmur, No Rubs, No Gallops  Abdomen: Soft, Non distended, Non tender.  +Bowel sounds,   Extremities: right distal pulse now palpable. Psych:   Not anxious or agitated. Neurologic:  No acute neurological deficit.             Vital signs/Intake and Output:  Visit Vitals    /78    Pulse 94    Temp 98.9 °F (37.2 °C)    Resp 18    Ht 5' 6\" (1.676 m)    Wt 95.5 kg (210 lb 8 oz)    SpO2 98%    Breastfeeding No    BMI 33.98 kg/m2     Current Shift:  04/13 0701 - 04/13 1900  In: 2947 [P.O.:160; I.V.:2787]  Out: 48 [Urine:50]  Last three shifts:  04/11 1901 - 04/13 0700  In: 3604.7 [I.V.:3604.7]  Out: 350 [Urine:350]            Labs: Results:       Chemistry No results for input(s): GLU, NA, K, CL, CO2, BUN, CREA, CA, AGAP, BUCR, TBIL, GPT, AP, TP, ALB, GLOB, AGRAT in the last 72 hours. CBC w/Diff Recent Labs      04/13/17   0315  04/12/17   0528  04/11/17   0040   WBC  8.1  8.5  8.6   RBC  3.19*  3.04*  3.18*   HGB  8.8*  8.5*  9.0*   HCT  28.4*  27.3*  28.6*   PLT  319  343  346   GRANS  49  61  62   LYMPH  34  24  19*   EOS  5  3  2      Cardiac Enzymes No results for input(s): CPK, CKND1, CHANDA in the last 72 hours. No lab exists for component: CKRMB, TROIP   Coagulation Recent Labs      04/13/17   0315  04/12/17   1336  04/12/17   0528   PTP  16.1*   --   14.9   INR  1.3*   --   1.2   APTT  55.9*  49.0*  35.4       Lipid Panel Lab Results   Component Value Date/Time    Cholesterol, total 145 03/12/2017 06:15 AM    HDL Cholesterol 37 03/12/2017 06:15 AM    LDL, calculated 91.6 03/12/2017 06:15 AM    VLDL, calculated 16.4 03/12/2017 06:15 AM    Triglyceride 82 03/12/2017 06:15 AM    CHOL/HDL Ratio 3.9 03/12/2017 06:15 AM      BNP No results for input(s): BNPP in the last 72 hours. Liver Enzymes No results for input(s): TP, ALB, TBIL, AP, SGOT, GPT in the last 72 hours.     No lab exists for component: DBIL   Thyroid Studies Lab Results   Component Value Date/Time    TSH 1.51 12/12/2009 09:48 AM        Procedures/imaging: see electronic medical records for all procedures/Xrays and details which were not copied into this note but were reviewed prior to creation of Plan

## 2017-04-13 NOTE — PROGRESS NOTES
Pt not ready for dc. [facility] following along and ready to assist at time of dc. Care Management Interventions  PCP Verified by CM: Yes  Mode of Transport at Discharge:  Other (see comment)  Transition of Care Consult (CM Consult): [address]: Yes  Physical Therapy Consult: Yes  Occupational Therapy Consult: Yes  Current Support Network: Family Lives Nearby  Confirm Follow Up Transport: Family  Plan discussed with Pt/Family/Caregiver: Yes  Freedom of Choice Offered: Yes  Discharge Location  Discharge Placement: Home with home health

## 2017-04-13 NOTE — ROUTINE PROCESS
Bedside and Verbal shift change report given to Burgess Kelsey RN (oncoming nurse) by Nelson Rodriguez RN  (offgoing nurse). Report included the following information SBAR, Kardex, Intake/Output and MAR.

## 2017-04-13 NOTE — PROGRESS NOTES
0730 Bedside report received from Albert Silva RN.    6884 Pt transported to vascular    1450 Pt returned from vascular.

## 2017-04-13 NOTE — PROGRESS NOTES
NUTRITION FOLLOW-UP    RECOMMENDATIONS/PLAN:   - Continue Regular diet, pt with excellent PO intake of meals    NUTRITION ASSESSMENT:   Client Update: 40 yrs old Female with severe Crohn's disease, s/p thrombectomy, recent DVT and CVA. FOOD/NUTRITION INTAKE   Diet Order:  Regular   Supplements: none   Food Allergies: NKFA  Average PO Intake:      Patient Vitals for the past 100 hrs:   % Diet Eaten   04/13/17 1108 100 %   04/11/17 1649 100 %   04/10/17 2302 100 %   04/10/17 0913 100 %   04/09/17 1800 100 %      Pertinent Medications:  [x] Reviewed; lipitor, colace, neurontin, dilaudid, LR, mesalamine, MVI, prednisone, percocet, simethicone  Insulin:  [x]SSI  []Pre-meal   []Basal    []Drip  []None  Cultural/Zoroastrian Food Preferences: None Identified ANTHROPOMETRICS  Height: 5' 6\" (167.6 cm)       Weight: 95.5 kg (210 lb 8 oz)         BMI: 34 kg/m^2 obese (30%-39.9% BMI)   Adm Weight: 202 lbs                Weight change: +8 lbs  Adjusted Body Weight: 65 kg     NUTRITION-FOCUSED PHYSICAL ASSESSMENT  Skin: incision to R leg      GI: last BM 4/13    BIOCHEMICAL DATA & MEDICAL TESTS  Pertinent Labs:  [x] Reviewed; POC BG     NUTRITION PRESCRIPTION  Calories: 9616-2774 kcal/day based on 30-35 kcal/kg AdjBW  Protein: 65-85 g/day based on 1-1.3 g/kg AdjBW  CHO: 244 g/day based on 50% of total energy  Fluid: 9087-0648 ml/day based on 1 kcal/ml      NUTRITION DIAGNOSES:   1. At risk of inadequate oral intake related to Crohn's disease as evidenced by nausea, abdominal pain, 50% po intake of meals  - resolved  2. No nutrition diagnosis at this time. NUTRITION INTERVENTIONS:   INTERVENTIONS:        GOALS:  1.  Regular diet 1. >50% PO intake of meals, maintain wt, BM q 1-3 days by next review 5-7 days     LEARNING NEEDS (Diet, Supplementation, Food/Nutrient-Drug Interaction):   [] None Identified   [x] Education provided/documented      Identified and patient: [] Declined   [] Was not appropriate/indicated NUTRITION MONITORING /EVALUATION:   Follow PO intake  Monitor wt     Previous Recommendations Implemented: yes        Previous Goals Met:  yes       [] Participated in Interdisciplinary Rounds    [x] Interdisciplinary Care Plan Reviewed  DISCHARGE NUTRITION RECOMMENDATIONS ADDRESSED:     [x] Yes- recommended low fiber, gradually increase fiber intake    NUTRITION RISK:           [x] At risk                        [] Not currently at risk        Will follow-up per policy.   Cliff Andre, RD  PAGER:  [phone]

## 2017-04-13 NOTE — PROGRESS NOTES
Discussed case with Dr. Shannon Syed and nurse during bedside rounds, where Dr. Celena Prince pt not ready for discharge yet.

## 2017-04-14 ENCOUNTER — HOME HEALTH ADMISSION (OUTPATIENT)
Dept: HOME HEALTH SERVICES | Facility: HOME HEALTH | Age: 47
End: 2017-04-14

## 2017-04-14 LAB
AMPHET UR QL SCN: NEGATIVE
APTT PPP: 45.7 SEC (ref 23–36.4)
APTT PPP: 46.4 SEC (ref 23–36.4)
BARBITURATES UR QL SCN: NEGATIVE
BASOPHILS # BLD AUTO: 0 K/UL (ref 0–0.06)
BASOPHILS # BLD: 0 % (ref 0–2)
BENZODIAZ UR QL: NEGATIVE
CANNABINOIDS UR QL SCN: NEGATIVE
COCAINE UR QL SCN: NEGATIVE
DIFFERENTIAL METHOD BLD: ABNORMAL
EOSINOPHIL # BLD: 0.4 K/UL (ref 0–0.4)
EOSINOPHIL NFR BLD: 6 % (ref 0–5)
ERYTHROCYTE [DISTWIDTH] IN BLOOD BY AUTOMATED COUNT: 15.4 % (ref 11.6–14.5)
HCT VFR BLD AUTO: 28.4 % (ref 35–45)
HDSCOM,HDSCOM: ABNORMAL
HGB BLD-MCNC: 9 G/DL (ref 12–16)
INR PPP: 1.6 (ref 0.8–1.2)
LYMPHOCYTES # BLD AUTO: 29 % (ref 21–52)
LYMPHOCYTES # BLD: 2 K/UL (ref 0.9–3.6)
MAGNESIUM SERPL-MCNC: 1.7 MG/DL (ref 1.6–2.6)
MCH RBC QN AUTO: 28.2 PG (ref 24–34)
MCHC RBC AUTO-ENTMCNC: 31.7 G/DL (ref 31–37)
MCV RBC AUTO: 89 FL (ref 74–97)
METHADONE UR QL: NEGATIVE
MONOCYTES # BLD: 0.6 K/UL (ref 0.05–1.2)
MONOCYTES NFR BLD AUTO: 9 % (ref 3–10)
NEUTS SEG # BLD: 3.9 K/UL (ref 1.8–8)
NEUTS SEG NFR BLD AUTO: 56 % (ref 40–73)
OPIATES UR QL: POSITIVE
PCP UR QL: NEGATIVE
PLATELET # BLD AUTO: 367 K/UL (ref 135–420)
PMV BLD AUTO: 8.6 FL (ref 9.2–11.8)
PROTHROMBIN TIME: 18 SEC (ref 11.5–15.2)
RBC # BLD AUTO: 3.19 M/UL (ref 4.2–5.3)
WBC # BLD AUTO: 6.9 K/UL (ref 4.6–13.2)

## 2017-04-14 PROCEDURE — 74011250637 HC RX REV CODE- 250/637: Performed by: FAMILY MEDICINE

## 2017-04-14 PROCEDURE — 65270000029 HC RM PRIVATE

## 2017-04-14 PROCEDURE — 74011636637 HC RX REV CODE- 636/637: Performed by: INTERNAL MEDICINE

## 2017-04-14 PROCEDURE — 74011250637 HC RX REV CODE- 250/637: Performed by: SURGERY

## 2017-04-14 PROCEDURE — 74011250637 HC RX REV CODE- 250/637: Performed by: INTERNAL MEDICINE

## 2017-04-14 PROCEDURE — 83735 ASSAY OF MAGNESIUM: CPT | Performed by: SURGERY

## 2017-04-14 PROCEDURE — 93970 EXTREMITY STUDY: CPT

## 2017-04-14 PROCEDURE — 74011250636 HC RX REV CODE- 250/636: Performed by: SURGERY

## 2017-04-14 PROCEDURE — 74011250637 HC RX REV CODE- 250/637: Performed by: HOSPITALIST

## 2017-04-14 PROCEDURE — 85025 COMPLETE CBC W/AUTO DIFF WBC: CPT | Performed by: SURGERY

## 2017-04-14 PROCEDURE — 97116 GAIT TRAINING THERAPY: CPT

## 2017-04-14 PROCEDURE — 74011250636 HC RX REV CODE- 250/636: Performed by: ANESTHESIOLOGY

## 2017-04-14 PROCEDURE — 85730 THROMBOPLASTIN TIME PARTIAL: CPT | Performed by: SURGERY

## 2017-04-14 PROCEDURE — 85610 PROTHROMBIN TIME: CPT | Performed by: SURGERY

## 2017-04-14 PROCEDURE — 36591 DRAW BLOOD OFF VENOUS DEVICE: CPT

## 2017-04-14 RX ORDER — WARFARIN 10 MG/1
10 TABLET ORAL ONCE
Status: COMPLETED | OUTPATIENT
Start: 2017-04-14 | End: 2017-04-14

## 2017-04-14 RX ADMIN — Medication 400 MG: at 11:24

## 2017-04-14 RX ADMIN — MESALAMINE 1000 MG: 250 CAPSULE ORAL at 17:49

## 2017-04-14 RX ADMIN — HYDROMORPHONE HYDROCHLORIDE 2 MG: 2 TABLET ORAL at 11:24

## 2017-04-14 RX ADMIN — DOCUSATE SODIUM 100 MG: 100 CAPSULE, LIQUID FILLED ORAL at 21:01

## 2017-04-14 RX ADMIN — MESALAMINE 1000 MG: 250 CAPSULE ORAL at 11:24

## 2017-04-14 RX ADMIN — PROMETHAZINE HYDROCHLORIDE 25 MG: 25 TABLET ORAL at 05:00

## 2017-04-14 RX ADMIN — ACETAMINOPHEN 650 MG: 325 TABLET, FILM COATED ORAL at 17:49

## 2017-04-14 RX ADMIN — CARVEDILOL 3.12 MG: 3.12 TABLET, FILM COATED ORAL at 17:49

## 2017-04-14 RX ADMIN — SODIUM CHLORIDE, SODIUM LACTATE, POTASSIUM CHLORIDE, AND CALCIUM CHLORIDE 1000 ML: 600; 310; 30; 20 INJECTION, SOLUTION INTRAVENOUS at 17:44

## 2017-04-14 RX ADMIN — PREDNISONE 30 MG: 20 TABLET ORAL at 11:24

## 2017-04-14 RX ADMIN — GABAPENTIN 400 MG: 100 CAPSULE ORAL at 15:44

## 2017-04-14 RX ADMIN — HYDROMORPHONE HYDROCHLORIDE 2 MG: 2 TABLET ORAL at 02:18

## 2017-04-14 RX ADMIN — DOCUSATE SODIUM 100 MG: 100 CAPSULE, LIQUID FILLED ORAL at 11:25

## 2017-04-14 RX ADMIN — SODIUM CHLORIDE, SODIUM LACTATE, POTASSIUM CHLORIDE, AND CALCIUM CHLORIDE 1000 ML: 600; 310; 30; 20 INJECTION, SOLUTION INTRAVENOUS at 07:10

## 2017-04-14 RX ADMIN — HYDROMORPHONE HYDROCHLORIDE 2 MG: 2 TABLET ORAL at 07:10

## 2017-04-14 RX ADMIN — ATORVASTATIN CALCIUM 80 MG: 20 TABLET, FILM COATED ORAL at 21:02

## 2017-04-14 RX ADMIN — WARFARIN SODIUM 10 MG: 10 TABLET ORAL at 17:49

## 2017-04-14 RX ADMIN — HEPARIN SODIUM AND DEXTROSE 800 UNITS/HR: 10000; 5 INJECTION INTRAVENOUS at 17:44

## 2017-04-14 RX ADMIN — HYDROMORPHONE HYDROCHLORIDE 2 MG: 2 TABLET ORAL at 21:02

## 2017-04-14 RX ADMIN — GABAPENTIN 400 MG: 100 CAPSULE ORAL at 11:24

## 2017-04-14 RX ADMIN — MESALAMINE 1000 MG: 250 CAPSULE ORAL at 21:00

## 2017-04-14 RX ADMIN — ASPIRIN 81 MG: 81 TABLET, CHEWABLE ORAL at 11:24

## 2017-04-14 RX ADMIN — CARVEDILOL 3.12 MG: 3.12 TABLET, FILM COATED ORAL at 11:25

## 2017-04-14 RX ADMIN — MESALAMINE 1000 MG: 250 CAPSULE ORAL at 12:55

## 2017-04-14 RX ADMIN — GABAPENTIN 400 MG: 100 CAPSULE ORAL at 21:02

## 2017-04-14 RX ADMIN — HYDROMORPHONE HYDROCHLORIDE 2 MG: 2 TABLET ORAL at 15:44

## 2017-04-14 RX ADMIN — MULTIPLE VITAMINS W/ MINERALS TAB 1 TABLET: TAB at 11:24

## 2017-04-14 RX ADMIN — Medication 400 MG: at 21:01

## 2017-04-14 NOTE — PROGRESS NOTES
Hospitalist Progress Note    Patient: Simba Argueta MRN: [id number]  CSN: [id number]    YOB: 1970  Age: 40 y.o. Sex: female    DOA: 3/27/2017 LOS:  LOS: 18 days          Chief Complaint:          Assessment/Plan       Patient Active Problem List   Diagnosis Code    Chronic pain syndrome G89.4    Crohn disease ([facility] K50.90    Sickle cell trait ([facility] D57.3    Hypertension I10    Hx of cocaine abuse P36.961    Embolic stroke ([facility] I55.7    Acute blood loss anemia D62    Neuropathic pain M79.2    DVT (deep venous thrombosis) ([facility]) I82.409    GI bleed K92.2    Elevated WBC count D72.829    PAD (peripheral artery disease) ([facility]) I73.9    Vascular abnormality I99.9       Recent admission for embolic CVA and recent DVT.     GI bleed - GI following, now bleeding has stopped.       Anemia - acute blood loss, s/p blood transfusion, H/H improved.       Recent DVT/CVA - anticoagulation restarted. On heparin drip. Bridge to coumadin, pharmacy dosing. INR 1.6 today. Discharge once therapeutic      Right leg pain - right leg arterial duplex >75%stenosis of anterior tibial artery. Vascular following, CTA with right popliteal occlusion, s/p LEG THROMBECTOMY W/REPAIR POPLITEAL ARTERY W/C-ARM,ANGIOGRAM On 4/6/2017. Per vascular will need indefinite anticoagulation as tolerated.      Crohn's disease - management per GI. On steroids, mesalamine and protonix. Seen by colorectal surgery, bleeding stopped. Given dose of methotrexate.      HTN - controlled.      PT/OT      Discharge planning, discharge once INR in therapeutic range. Subjective:    No events over night. Patient doing well this AM.  No new complaints.       Review of systems:    Constitutional: denies fevers, chills, myalgias  Respiratory: denies SOB, cough  Cardiovascular: denies chest pain, palpitations  Gastrointestinal: denies nausea, vomiting, diarrhea      Vital signs/Intake and Output:  Visit Vitals    /78 (BP 1 Location: Left arm, BP Patient Position: At rest)    Pulse 99    Temp 99 °F (37.2 °C)    Resp 20    Ht 5' 6\" (1.676 m)    Wt 92 kg (202 lb 12.8 oz)    SpO2 99%    Breastfeeding No    BMI 32.73 kg/m2     Current Shift:  04/14 0701 - 04/14 1900  In: 420 [P.O.:240; I.V.:180]  Out: -   Last three shifts:  04/12 1901 - 04/14 0700  In: 8433.9 [P.O.:400; I.V.:8033.9]  Out: 1500 [Urine:1500]    Exam:    General: Well developed, alert, NAD, OX3  Head/Neck: NCAT, supple, No masses, No lymphadenopathy  CVS:Regular rate and rhythm, no M/R/G, S1/S2 heard, no thrill  Lungs:Clear to auscultation bilaterally, no wheezes, rhonchi, or rales  Abdomen: Soft, Nontender, No distention, Normal Bowel sounds, No hepatomegaly  Extremities: Well approxiate RLE surgical site. No C/C/E, pulses palpable 2+  Skin:normal texture and turgor, no rashes, no lesions  Neuro:grossly normal , follows commands  Psych:appropriate                Labs: Results:       Chemistry No results for input(s): GLU, NA, K, CL, CO2, BUN, CREA, CA, AGAP, BUCR, TBIL, GPT, AP, TP, ALB, GLOB, AGRAT in the last 72 hours. CBC w/Diff Recent Labs      04/14/17   0500  04/13/17   0315  04/12/17   0528   WBC  6.9  8.1  8.5   RBC  3.19*  3.19*  3.04*   HGB  9.0*  8.8*  8.5*   HCT  28.4*  28.4*  27.3*   PLT  367  319  343   GRANS  56  49  61   LYMPH  29  34  24   EOS  6*  5  3      Cardiac Enzymes No results for input(s): CPK, CKND1, CHANDA in the last 72 hours.     No lab exists for component: CKRMB, TROIP   Coagulation Recent Labs      04/14/17   0500  04/13/17   1750  04/13/17   0315   PTP  18.0*   --   16.1*   INR  1.6*   --   1.3*   APTT  46.4*  42.2*  55.9*       Lipid Panel Lab Results   Component Value Date/Time    Cholesterol, total 145 03/12/2017 06:15 AM    HDL Cholesterol 37 03/12/2017 06:15 AM    LDL, calculated 91.6 03/12/2017 06:15 AM    VLDL, calculated 16.4 03/12/2017 06:15 AM    Triglyceride 82 03/12/2017 06:15 AM    CHOL/HDL Ratio 3.9 03/12/2017 06:15 AM BNP No results for input(s): BNPP in the last 72 hours. Liver Enzymes No results for input(s): TP, ALB, TBIL, AP, SGOT, GPT in the last 72 hours.     No lab exists for component: DBIL   Thyroid Studies Lab Results   Component Value Date/Time    TSH 1.51 12/12/2009 09:48 AM        Procedures/imaging: see electronic medical records for all procedures/Xrays and details which were not copied into this note but were reviewed prior to creation of Carlos Tanner MD

## 2017-04-14 NOTE — CONSULTS
[facility] [address]    Name:  Isabel Thapa  MR#:  [id number]  :  1970  Account #:  [de-identified]  Date of Adm:  2017  Date of Consultation:  2017      PSYCHIATRIC CONSULTATION    REFERRING PHYSICIAN: Miguel Stinson MD    REASON FOR CONSULTATION: Crohn disease, on steroids;  depression and increased need for narcotics. CHIEF COMPLAINT: \"My pain is still quite bad. \"    HISTORY OF PRESENT ILLNESS: The patient is a 44-year-old,  ,  female who presented to the hospital with  complaints of blood loss. She had previously been released from the  hospital 1 week prior to this admission, with a history of stroke. This  writer was requested by Dr. Milla Matthews to evaluate her due to concerns  of depression and her increased requests for narcotic pain  medications. On approach, the patient appeared very somatically preoccupied. She  stated that she was having pain in her right leg where she had venous  clot removed, left upper thigh paraesthesia and dysesthesia, and pain  in her oral mucosa. She stated she was unable to sleep all night  because of the pain. She indicated that she is currently getting  Dilaudid, which actually helps with the pain, but she has not been pain-  free. She reports in the past when she has taken Percocet, she did  better and she was actually pain-free. She indicated that she would like  to be comfortable while in the hospital and fears that with this kind of  pain, it will be hard for her to adjust to home life if she were to be  discharged. When asked if her pain had been impacting her sense of  well being and mood, she indicated that she has been having ups and  downs because of the pain. When I asked about sleep, she says she has not been able to sleep  because of pain. She reports difficulty initiating and maintaining sleep. Her appetite, by her report, is good.  She stated that she feels bloated  and she has fluid in her legs, and that might have caused some weight  gain. Her attention span and concentration are fine. Her energy level is  okay. She denied any suicidal ideation or homicidal ideation, intent or  plan. She indicated that she feels stressed, and was somewhat  confused about her treatment and discharge plan, but after speaking  with Dr. Alexis Cano she is feeling better now. She denies feeling  hopeless, helpless, or worthless. Her memory following her stroke has  not been that good. She denied any crying spells. She denied any  symptoms or behaviors consistent with hypomania or arnav, or  obsessive-compulsive disorder. PAST PSYCHIATRIC HISTORY: The patient denied ever seeing a  psychiatrist, being psychiatrically treated with any psychotropic  medications, being psychiatrically hospitalized, and also denied any  previous suicide attempts. FAMILY PSYCHIATRIC HISTORY: The patient denied any family  history of depression, bipolar affective disorder, schizophrenia,  psychiatric hospitalizations, attempted or completed suicides, alcohol  or illicit substance use disorders. PAST MEDICAL HISTORY  1. Crohn disease. 2. Embolic stroke. 3. Acute blood loss anemia. 4. Neuropathic pain. 5. Deep vein thrombosis. 6. Peripheral artery disease. 7. Vascular abnormality. 8. Hypertension. 9. Leukocytosis. 10. Sickle cell anemia. 11. Status post thrombectomy. HISTORY OF CENTRAL NERVOUS SYSTEM TRAUMA: The patient  reports that she has had falls, but did not bump her head. She stated  that she lost consciousness when she had a stroke. She, however,  denied ever having seizures. ALLERGIES  SHE IS ALLERGIC TO:  1. HUMIRA. 2. REMICADE. DENIED ANY FOOD ALLERGIES. MEDICATIONS: Please refer to her [facility] for specific names and  dosages of the medications that she takes in the hospital.    SOCIAL HISTORY: The patient was born and raised in [address] [place] [address].  She was raised in an intact family with her parents. She is the  middle child. She has 1 brother and 1 sister. She reports having a  good childhood. She graduated from high school, but never attended  college. 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 She is currently receiving [employer] benefits for the same. She was  for about 5 years. She is currently . Her  children live with her. She has a son who is 32 from a previous  relationship; and a daughter, age 21, from a previous relationship. She  has a daughter, age 16, with her ex-. She identifies herself as  a [place], but has not been attending Spiritism. She denied any legal problems currently or in the past.    SUBSTANCE ABUSE HISTORY: The patient indicated that she first  tried alcohol when she was probably 26. She stated she quit drinking  because she has stomach upset every time she drinks. She denied  ever using any illicit substances, including cocaine. When pressed if  she has ever used cocaine, she stated \"never. \" She used to smoke  cigarettes, and quit smoking cigarettes in 2012. She started smoking  cigarettes at age 26 or 24, and was smoking about 4 or 5 cigarettes a  day. MENTAL STATUS EXAMINATION: The patient is an obese,   American female who was sitting propped up in her hospital bed. When this writer approached her, she swung her legs down the side of  the bed. She was cooperative with interview, but she gets somatically  preoccupied. Although she was complaining of pain, she did not  appear to be particularly dysphoric from pain. Her hygiene was fair. Her hair was slightly disheveled. She made fair eye contact. Her  speech was spontaneous and fluent. Her thought processes were  generally linear, logical and goal directed. She described her mood as  \"not good, I'm in pain. \" Her affect was slightly restricted. She denied  any suicidal ideation or homicidal ideation, intent or plan.  She denied  any perceptual disturbances, paranoia, or intrusive thoughts. She did  not reveal any obsessions or delusions in the interview. Insight--fair. Judgment--fair. Estimated intelligence--average. ASSESSMENT: The patient is a 43-year-old, ,   American female who was admitted with a gastrointestinal bleed and  acute blood loss anemia, and who has also had deep vein thrombosis  with embolectomy; currently complains of pain associated with surgical  site and neuropathic pain. She was not suicidal, homicidal or psychotic  on my exam. She appears somewhat receptive to trying an  antidepressant dually acting properties of addressing pain, and anxiety  /mood symptoms. She, however, appeared to prefer switch of narcotics and  pain medication from Dilaudid to Percocet, which she believes actually  helps her better. DIAGNOSTIC IMPRESSION  1. 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 2. Somatic symptom disorder, predominantly pain, moderate. 3. Crohn disease. 4. Embolic stroke. 5. Acute blood loss anemia. 6. Neuropathic pain. 7. Deep vein thrombosis. 8. Peripheral artery disease. 9. Vascular abnormality. 10. Hypertension. 11. Leukocytosis. 12. Sickle cell trait. TREATMENT RECOMMENDATIONS  1. Thank you for requesting the consultation. I evaluated the patient  during a face-to-face interview. 2. Based on her presentation, if it is feasible at all, it might be  reasonable to consider giving her Percocet while in the hospital, and  educate her about long-term use of Percocet could be counter-  productive. 3. In terms of managing both her mood symptoms and pain, it might be  reasonable to offer her dually acting antidepressants, such as  Venlafaxine XR or duloxetine. In terms of dosing, duloxetine might be a  better choice.  You can start her at 20 or 30 mg daily, and titrate the  dose to clinical effect, provided that there are no absolute  contraindications to such a trial and she is able to tolerate the initiation  of treatment. At the time, however, of my interview with her, she was  somewhat ambivalent about taking a dual-acting antidepressant while  requesting the opiate pain medication to be switched. 4. Please do not hesitate to contact me with any questions or concerns  regarding this consultation. You can reach me at [phone]. Evan Alcantara M.D.     AMANDA FARRAR  D:  04/14/2017   13:04  T:  04/14/2017   14:00  Job #:  [id number]

## 2017-04-14 NOTE — PROGRESS NOTES
Pt seen and examined. She is complaining of abdominal discomfort today. She states she just had a BM. She states her right foot is sore, but better. Visit Vitals    /88    Pulse 95    Temp 98 °F (36.7 °C)    Resp 20    Ht 5' 6\" (1.676 m)    Wt 202 lb 12.8 oz (92 kg)    LMP 03/06/2017    SpO2 100%    Breastfeeding No    BMI 32.73 kg/m2      NAD  Soft, TTP  Right foot warm with + DP pulse. Incision c/d/i. I did examine the patient's stool myself and there is blood in her stool. She absolutely needs to be anticoagulated however to prevent further arterial thrombosis or embolization which could result in a further stroke, MI, bowel ischemia or limb ischemia. Will defer to primary team on this difficult patient.   Will continue to follow along with you

## 2017-04-14 NOTE — PROGRESS NOTES
Pharmacy Dosing Services: Warfarin    Consult for Warfarin Dosing by Pharmacy by Dr. Lonnie Wolfe provided for this 40 y.o.  female , for indication of Venous Thrombosis. Day of Therapy 8  Dose to achieve an INR goal of 2-3    Order entered for  Warfarin 10 mg to be given today at 18:00. Once therapeutic would recommend discharge dose of approximately 5 mg per day with strict monitoring of INR. Significant drug interactions: None  LABS    PT/INR Lab Results   Component Value Date/Time    INR 1.6 04/14/2017 05:00 AM      Platelets Lab Results   Component Value Date/Time    PLATELET 162 82/63/9845 05:00 AM      H/H     Lab Results   Component Value Date/Time    HGB 9.0 04/14/2017 05:00 AM        Warfarin Administrations (last 168 hours)       Date/Time Action Medication Dose      04/13/17 1734 Given    warfarin (COUMADIN) tablet 10 mg 10 mg    04/12/17 1834 Given    warfarin (COUMADIN) tablet 7.5 mg 7.5 mg    04/11/17 1942 Given    warfarin (COUMADIN) tablet 6 mg 6 mg    04/10/17 1829 Given    warfarin (COUMADIN) tablet 6 mg 6 mg    04/09/17 1723 Given    warfarin (COUMADIN) tablet 5 mg 5 mg    04/08/17 1727 Given    warfarin (COUMADIN) tablet 5 mg 5 mg    04/07/17 1801 Given    warfarin (COUMADIN) tablet 5 mg 5 mg        Pharmacy to follow daily and will provide subsequent Warfarin dosing based on clinical status. Monika Worthy, Pharm. D.   Clinical Pharmacist  [phone]

## 2017-04-14 NOTE — PROGRESS NOTES
Chart reviewed and noted Pt is pending INR lab draw today. Once pt is therapeutic  She will be able to dc home with [address] [facility] [address] [phone] who will be managed. CM remains available for assistance. Care Management Interventions  PCP Verified by CM: Yes  Mode of Transport at Discharge:  Other (see comment)  Transition of Care Consult (CM Consult): [address]: Yes  Physical Therapy Consult: Yes  Occupational Therapy Consult: Yes  Current Support Network: Family Lives Nearby  Confirm Follow Up Transport: Family  Plan discussed with Pt/Family/Caregiver: Yes  Freedom of Choice Offered: Yes  Discharge Location  Discharge Placement: Home with home health

## 2017-04-14 NOTE — PROGRESS NOTES
Pt Adi Medina, 39 yo female. Pt in bed during bedside report. Pt states she is having pain in her legs, pt will be medicated when medication is available according to orders. Pt states PO dilaudid is not adequately managing her pain. Pt request additional pain medication. Dr. Pierre Linda informed of pt request, Dr. Luciana Macario not increasing current medication. Pt called Tereza, Hospitalist manager, to request additional pain medication. Pt pain medication will not be increased at this time. Pt transported to vascular ultrasound via wheel chair at shift change.

## 2017-04-14 NOTE — PROGRESS NOTES
2005 Assessment. Left leg 1+ edema. Some sensitivity in right leg, 1+ edema. Pt reports some scant drainage from leg incision, but not readily apparent at this time. Pedal pulses palpable and cap refill <3 sec in both legs. 2149 Per report, pt may be taking medications on her own, and pt has been \"dazed\" during the day. Medications so far not observed by this nurse. Informed Dr. Georgina Rodriguez, and recommended narcan prn. Order received for 0.4mg IV narcan for decreased resp, overdose, or AMS. Also reviewed with him that pt on POC glucose for prednisone, but bg had been stable. Recommended discontinue of POC check. Humalog and POC glucose discontinued. Ian 23 Dr. Alexis Cano rounded on pt and discussed care with pt. Reinforced with pt:   -Pt to follow up entyvio. Shot is late by several days. -Methtrexate to start high, and taper down as PO  -Drink water  Pt agrees. Dr. Cora Bob was also informed that pt had been acting somewhat strange and always requests for pain medication; suspicion that pt may be taking home medications. Dr. Cora Bob put in consult for psych and urine drug screen. 0300 Pt c/o pain, swellling in leg. Offered Acetaminophen but pt refused. Dr. Georgina Rodriguez was informed about swelling and pain in both legs, especially that swelling increased in left, and asked if duplex should be obtained in legs. Order for duplex BLE obtained for dvt. Reinforced to organize for Entyvio. SHIFT SUMMARY: Pt continues to c/o leg pain. Urine sent to lab. Pt on drug precuations for methotrexate, which may be discontinued at 0000 4/15.

## 2017-04-14 NOTE — PROGRESS NOTES
Problem: Mobility Impaired (Adult and Pediatric)  Goal: *Acute Goals and Plan of Care (Insert Text)  Physical Therapy ST/LT Goals  Initiated 4/8/2017 and to be accomplished within 7 day(s)  1. Patient will move from supine to sit and sit to supine in bed with supervision/set-up. 2. Patient will transfer from bed to chair and chair to bed with supervision/set-up using the least restrictive device. 3. Patient will perform sit to stand with supervision/set-up. 4. Patient will ambulate with supervision/set-up for >150 feet with the least restrictive device. 5. Patient will ascend/descend >2 stairs with U handrail(s) with supervision/set-up. Outcome: Progressing Towards Goal  PHYSICAL THERAPY TREATMENT     Patient: Janet Fan (73 y.o. female)  Date: 4/14/2017  Diagnosis: Rectal bleed, crohns, anemia, dizziness. GI bleed  RIGHT POPLITEAL OCCLUSION  Vascular abnormality GI bleed  Procedure(s) (LRB):  RIGHT LEG THROMBECTOMY WITH REPAIR POPLITEAL ARTERY WITH C-ARM (Right) 8 Days Post-Op  Precautions: Fall  Chart, physical therapy assessment, plan of care and goals were reviewed. ASSESSMENT:  Pt found in bed ruano position prior to treatment. Prior to treatment pt c/o pain in R leg 7/10. Pt was able to ambulate 200 ft w/ RW/GB w/o difficulty or any signs of LOB. Pt performed stair negotiation w/ right HR but had difficulty with stair negotiation and required constant VC's for safe and proper sequencing. Pt will require at least one more treatment session to assess her ability to safely perform stair negotiation. Pt was transferred back to bed after treatment w/ call bell and tray next to patient. Upon exiting pt reported that she felt her R LE was oozing. Nurse was notified after treatment.    Progression toward goals:  [X]      Improving appropriately and progressing toward goals  [ ]      Improving slowly and progressing toward goals  [ ]      Not making progress toward goals and plan of care will be adjusted       PLAN:  Patient continues to benefit from skilled intervention to address the above impairments. Continue treatment per established plan of care. Discharge Recommendations:  Home Health  Further Equipment Recommendations for Discharge:  rolling walker vs SPC       SUBJECTIVE:   Patient stated I guess I will move so I can get out of here.       OBJECTIVE DATA SUMMARY:   Critical Behavior:  Neurologic State: Alert, Appropriate for age  Orientation Level: Oriented X4  Cognition: Appropriate decision making, Appropriate for age attention/concentration, Appropriate safety awareness, Follows commands  Safety/Judgement: Awareness of environment, Fall prevention  Functional Mobility Training:  Bed Mobility:  Supine to Sit: Modified independent;Supervision  Sit to Supine: Modified independent;Supervision  Scooting: Modified independent;Supervision  ]Transfers:  Sit to Stand: Modified independent;Supervision  Stand to Sit: Modified independent;Supervision  Ambulation/Gait Training:  Distance (ft): 200 Feet (ft)  Assistive Device: Gait belt;Walker, rolling  Ambulation - Level of Assistance: Modified independent;Supervision  Gait Abnormalities: Decreased step clearance (Forward head posture)  Base of Support: Other (comment) (WNL)  Speed/Janice: Other (comment) (WNL)  Step Length: Other (comment) (WNL)  Stairs:  Number of Stairs Trained: 4 (steps)  Stairs - Level of Assistance: Contact guard assistance;Minimum assistance  Rail Use: Right  (HR)  Pain:  Pain Scale 1: Numeric (0 - 10)  Pain Intensity 1: 7  Pain Location 1: Leg  Pain Orientation 1: Right  Pain Description 1: Sharp; Aching  Pain Intervention(s) 1: Medication (see MAR)  Activity Tolerance:   good  Please refer to the flowsheet for vital signs taken during this treatment.   After treatment:   [ ] Patient left in no apparent distress sitting up in chair  [X] Patient left in no apparent distress in bed  [X] Call bell left within reach  [X] Nursing notified  [ ] Caregiver present  [ ] Bed alarm activated      Roleadenike Alonzo PT   Time Calculation: 20 mins

## 2017-04-14 NOTE — ROUTINE PROCESS
Bedside and Verbal shift change report given to FAYE Santoyo (oncoming nurse) by Elena Tafoya RN  (offgoing nurse). Report included the following information SBAR, Kardex, Intake/Output and MAR.

## 2017-04-14 NOTE — PROGRESS NOTES
Gastrointestinal Progress Note    Patient Name: Fatemeh Wong    OHTPU'E Date: 4/13/2017    Admit Date: 3/27/2017    Subjective:   Depressed wants to go home because she still has pain in her right leg despite the surgery  Diet: Patient is on  Regular diet and is tolerating. Nausea is mild present. Vomiting is not present. Pain: Patient does complain of very mild  Lower abdominal pain.       Bowel Movements: Normal soft x 2 no diarrhea  Bleeding:  None today  C/o right foot pain since admission for CVA 3/10/ 2017 had popliteal surgery few days ago    Current Facility-Administered Medications   Medication Dose Route Frequency    naloxone (NARCAN) injection 0.4 mg  0.4 mg IntraVENous PRN    magnesium oxide (MAG-OX) tablet 400 mg  400 mg Oral BID    HYDROmorphone (DILAUDID) tablet 2 mg  2 mg Oral Q4H PRN    predniSONE (DELTASONE) tablet 30 mg  30 mg Oral DAILY WITH BREAKFAST    mesalamine (PENTASA) CR capsule 1,000 mg  1 g Oral QID    Warfarin - Pharmacy to dose   Other Rx Dosing/Monitoring    simethicone (MYLICON) 54DU/5.3SR oral drops 80 mg  1.2 mL Oral Multiple    lactated ringers infusion 1,000 mL  1,000 mL IntraVENous CONTINUOUS    heparin 25,000 units in D5W 250 ml infusion  800 Units/hr IntraVENous CONTINUOUS    gabapentin (NEURONTIN) capsule 400 mg  400 mg Oral TID    aspirin chewable tablet 81 mg  81 mg Oral DAILY    0.9% sodium chloride infusion 250 mL  250 mL IntraVENous PRN    promethazine (PHENERGAN) tablet 12.5-25 mg  12.5-25 mg Oral Q6H PRN    atorvastatin (LIPITOR) tablet 80 mg  80 mg Oral QHS    carvedilol (COREG) tablet 3.125 mg  3.125 mg Oral BID WITH MEALS    multivitamin, tx-iron-ca-min (THERA-M w/ IRON) tablet 1 Tab  1 Tab Oral DAILY    glucose chewable tablet 16 g  4 Tab Oral PRN    glucagon (GLUCAGEN) injection 1 mg  1 mg IntraMUSCular PRN    dextrose (D50W) injection syrg 12.5-25 g  25-50 mL IntraVENous PRN    docusate sodium (COLACE) capsule 100 mg  100 mg Oral BID    acetaminophen (TYLENOL) tablet 650 mg  650 mg Oral Q4H PRN    diphenhydrAMINE (BENADRYL) injection 12.5 mg  12.5 mg IntraVENous Q4H PRN    ondansetron (ZOFRAN) injection 4 mg  4 mg IntraVENous Q6H PRN          Objective:     Visit Vitals    /84 (BP 1 Location: Left arm, BP Patient Position: At rest)    Pulse 98    Temp 99.6 °F (37.6 °C)    Resp 20    Ht 5' 6\" (1.676 m)    Wt 95.5 kg (210 lb 8 oz)    SpO2 98%    Breastfeeding No    BMI 33.98 kg/m2       04/12 0701 - 04/13 1900  In: 6965.9 [P.O.:160; I.V.:6805.9]  Out: 250 [Urine:250]    General appearance: alert, cooperative, no distress, appears stated age. Pale  Abdomen: soft,very mild tenderness in the suprapubic are. Bowel sounds normal. No masses,  no organomegaly  Extremities: extremities mild ankle edema. Tattoo's      Data Review:    Labs: Results:       Chemistry No results for input(s): GLU, NA, K, CL, CO2, BUN, CREA, CA, AGAP, BUCR, TBIL, GPT, AP, TP, ALB, GLOB, AGRAT in the last 72 hours. CBC w/Diff Recent Labs      04/13/17 0315 04/12/17   0528  04/11/17   0040   WBC  8.1  8.5  8.6   RBC  3.19*  3.04*  3.18*   HGB  8.8*  8.5*  9.0*   HCT  28.4*  27.3*  28.6*   PLT  319  343  346   GRANS  49  61  62   LYMPH  34  24  19*   EOS  5  3  2      Coagulation Recent Labs      04/13/17   1750  04/13/17   0315   04/12/17   0528   PTP   --   16.1*   --   14.9   INR   --   1.3*   --   1.2   APTT  42.2*  55.9*   < >  35.4    < > = values in this interval not displayed. Liver Enzymes No results for input(s): TP, ALB, TBIL, AP, SGOT, GPT in the last 72 hours.     No lab exists for component: DBIL       Assessment:     Principal Problem:    GI bleed (3/28/2017)    Active Problems:    Crohn disease ([facility] [address] (4/66/5213)      Embolic stroke ([facility] (0/88/9381)      Acute blood loss anemia (3/15/2017)      Neuropathic pain (3/15/2017)      DVT (deep venous thrombosis) ([facility]) (3/16/2017)      Elevated WBC count (3/28/2017)      PAD (peripheral artery disease) ([facility] [address] (4/4/2017)      Vascular abnormality (4/6/2017)        Plan:     Severe lower GI bleeding while on Lovenox and Coumadin for recent DVT and embolic CVA her Hgb went down to 6.5 She required a total of 8 PRBC transfusions and 3 FFP units. Bleeding is coming from the left colon where she has severe active Crohn's disease. Hgb is presently 8.5 The bleeding stopped she is back on Heparin and Coumadin but INR subtherapeutic  Acute blood loss on chronic iron deficiency anemia secondary to her severe Crohn's disease Hgb 8.5    Severe Crohn's disease of the left and transverse colon since age 16. Started on Union which an IV infusion biologic only on March 24. She missed getting her second dose 2 weeks after she needs to call call the infusion center asap to set for her missed second dose of Entyvio. Her next dose is in 6 weeks after this and then every 8 weeks. She is doing well for now I would like to reduce the Prednisone to 30 mg daily. I discussed her case during a special session on difficult cases of IBD, today 4/13/ 2017 she got a second dose of sc Methotrexate 25 mg while she is still in the hospital but on discharge she would be getting merely 10 mg once a week in divided dose hope this would help her case. Our worst enemy in her case with history of severe MRSA positive infection and Shingles would be the steroids. I explained to the patient the rational behind adding low dose Methotrexate at least for the first 6 months. I told her that there is mostly an increased risk mostly of infection and very rare for neoplasm or cancer. She told me that she has tubal ligation as the methotrexate is teratogenic. Patient did not want to have a colectomy. On discharge she would be getting on top of the steroids taper.  The Pentasa and the Entyvio she would be getting Methotrexate 10 mg once weekly ( 2.5 mg in 4 separate doses once a week) x 6 months    Hypercoagulable state with bilateral DVT, embolic CVA and right polpliteal thrombus on  March 10, 2017 and possibly emboli to her right foot. She was started on Coumadin and Lovenox in my opinion is most likely secondary to the severe aggressive Crohn's disease. Right popliteal artery thrombectomy. Presently on Heparin and Coumadin. Repeat Duplex did not show any obstruction or thrombus    Depression  Abnormal behavior with slow mentation dissociated affect, as if she is under the influence of a drug? Do Drug screen. The narcotic medication has been reduced.  Consult with psychiatry  She appears to have dependence to narcoitc medications    Aguilar Christensen MD  April 13, 2017

## 2017-04-15 LAB
AMPHET UR QL SCN: NEGATIVE
APTT PPP: 46.3 SEC (ref 23–36.4)
APTT PPP: 48.1 SEC (ref 23–36.4)
BARBITURATES UR QL SCN: NEGATIVE
BASOPHILS # BLD AUTO: 0 K/UL (ref 0–0.06)
BASOPHILS # BLD: 0 % (ref 0–2)
BENZODIAZ UR QL: NEGATIVE
CANNABINOIDS UR QL SCN: NEGATIVE
COCAINE UR QL SCN: NEGATIVE
DIFFERENTIAL METHOD BLD: ABNORMAL
EOSINOPHIL # BLD: 0.4 K/UL (ref 0–0.4)
EOSINOPHIL NFR BLD: 5 % (ref 0–5)
ERYTHROCYTE [DISTWIDTH] IN BLOOD BY AUTOMATED COUNT: 15.4 % (ref 11.6–14.5)
HCT VFR BLD AUTO: 27.8 % (ref 35–45)
HDSCOM,HDSCOM: ABNORMAL
HGB BLD-MCNC: 8.7 G/DL (ref 12–16)
INR PPP: 1.8 (ref 0.8–1.2)
INR PPP: 1.8 (ref 0.8–1.2)
LYMPHOCYTES # BLD AUTO: 30 % (ref 21–52)
LYMPHOCYTES # BLD: 2.2 K/UL (ref 0.9–3.6)
MAGNESIUM SERPL-MCNC: 1.9 MG/DL (ref 1.6–2.6)
MCH RBC QN AUTO: 27.6 PG (ref 24–34)
MCHC RBC AUTO-ENTMCNC: 31.3 G/DL (ref 31–37)
MCV RBC AUTO: 88.3 FL (ref 74–97)
METHADONE UR QL: NEGATIVE
MONOCYTES # BLD: 0.8 K/UL (ref 0.05–1.2)
MONOCYTES NFR BLD AUTO: 10 % (ref 3–10)
NEUTS SEG # BLD: 4 K/UL (ref 1.8–8)
NEUTS SEG NFR BLD AUTO: 55 % (ref 40–73)
OPIATES UR QL: POSITIVE
PCP UR QL: NEGATIVE
PLATELET # BLD AUTO: 377 K/UL (ref 135–420)
PMV BLD AUTO: 8.9 FL (ref 9.2–11.8)
PROTHROMBIN TIME: 19.7 SEC (ref 11.5–15.2)
PROTHROMBIN TIME: 20 SEC (ref 11.5–15.2)
RBC # BLD AUTO: 3.15 M/UL (ref 4.2–5.3)
WBC # BLD AUTO: 7.4 K/UL (ref 4.6–13.2)

## 2017-04-15 PROCEDURE — 65270000029 HC RM PRIVATE

## 2017-04-15 PROCEDURE — 74011250637 HC RX REV CODE- 250/637: Performed by: HOSPITALIST

## 2017-04-15 PROCEDURE — 74011636637 HC RX REV CODE- 636/637: Performed by: INTERNAL MEDICINE

## 2017-04-15 PROCEDURE — 85730 THROMBOPLASTIN TIME PARTIAL: CPT | Performed by: SURGERY

## 2017-04-15 PROCEDURE — 74011250636 HC RX REV CODE- 250/636: Performed by: FAMILY MEDICINE

## 2017-04-15 PROCEDURE — 36415 COLL VENOUS BLD VENIPUNCTURE: CPT | Performed by: SURGERY

## 2017-04-15 PROCEDURE — 80307 DRUG TEST PRSMV CHEM ANLYZR: CPT | Performed by: INTERNAL MEDICINE

## 2017-04-15 PROCEDURE — 85610 PROTHROMBIN TIME: CPT | Performed by: SURGERY

## 2017-04-15 PROCEDURE — 74011250637 HC RX REV CODE- 250/637: Performed by: SURGERY

## 2017-04-15 PROCEDURE — 74011250637 HC RX REV CODE- 250/637: Performed by: INTERNAL MEDICINE

## 2017-04-15 PROCEDURE — 74011250636 HC RX REV CODE- 250/636: Performed by: ANESTHESIOLOGY

## 2017-04-15 PROCEDURE — 97116 GAIT TRAINING THERAPY: CPT

## 2017-04-15 PROCEDURE — 83735 ASSAY OF MAGNESIUM: CPT | Performed by: SURGERY

## 2017-04-15 PROCEDURE — 36591 DRAW BLOOD OFF VENOUS DEVICE: CPT

## 2017-04-15 PROCEDURE — 85025 COMPLETE CBC W/AUTO DIFF WBC: CPT | Performed by: SURGERY

## 2017-04-15 PROCEDURE — 74011250637 HC RX REV CODE- 250/637: Performed by: FAMILY MEDICINE

## 2017-04-15 RX ORDER — WARFARIN 7.5 MG/1
7.5 TABLET ORAL ONCE
Status: COMPLETED | OUTPATIENT
Start: 2017-04-15 | End: 2017-04-15

## 2017-04-15 RX ADMIN — GABAPENTIN 400 MG: 100 CAPSULE ORAL at 09:11

## 2017-04-15 RX ADMIN — PREDNISONE 30 MG: 20 TABLET ORAL at 09:11

## 2017-04-15 RX ADMIN — HYDROMORPHONE HYDROCHLORIDE 2 MG: 2 TABLET ORAL at 09:11

## 2017-04-15 RX ADMIN — MULTIPLE VITAMINS W/ MINERALS TAB 1 TABLET: TAB at 09:11

## 2017-04-15 RX ADMIN — Medication 400 MG: at 23:08

## 2017-04-15 RX ADMIN — MESALAMINE 1000 MG: 250 CAPSULE ORAL at 13:54

## 2017-04-15 RX ADMIN — HYDROMORPHONE HYDROCHLORIDE 2 MG: 2 TABLET ORAL at 19:44

## 2017-04-15 RX ADMIN — MESALAMINE 1000 MG: 250 CAPSULE ORAL at 18:08

## 2017-04-15 RX ADMIN — PROMETHAZINE HYDROCHLORIDE 25 MG: 25 TABLET ORAL at 23:24

## 2017-04-15 RX ADMIN — GABAPENTIN 400 MG: 100 CAPSULE ORAL at 23:08

## 2017-04-15 RX ADMIN — MESALAMINE 1000 MG: 250 CAPSULE ORAL at 09:10

## 2017-04-15 RX ADMIN — ONDANSETRON 4 MG: 2 INJECTION INTRAMUSCULAR; INTRAVENOUS at 19:44

## 2017-04-15 RX ADMIN — ATORVASTATIN CALCIUM 80 MG: 20 TABLET, FILM COATED ORAL at 23:08

## 2017-04-15 RX ADMIN — HYDROMORPHONE HYDROCHLORIDE 2 MG: 2 TABLET ORAL at 05:40

## 2017-04-15 RX ADMIN — Medication 400 MG: at 09:11

## 2017-04-15 RX ADMIN — DOCUSATE SODIUM 100 MG: 100 CAPSULE, LIQUID FILLED ORAL at 09:10

## 2017-04-15 RX ADMIN — WARFARIN SODIUM 7.5 MG: 7.5 TABLET ORAL at 18:08

## 2017-04-15 RX ADMIN — SODIUM CHLORIDE, SODIUM LACTATE, POTASSIUM CHLORIDE, AND CALCIUM CHLORIDE 1000 ML: 600; 310; 30; 20 INJECTION, SOLUTION INTRAVENOUS at 02:32

## 2017-04-15 RX ADMIN — PROMETHAZINE HYDROCHLORIDE 25 MG: 25 TABLET ORAL at 09:11

## 2017-04-15 RX ADMIN — DOCUSATE SODIUM 100 MG: 100 CAPSULE, LIQUID FILLED ORAL at 23:08

## 2017-04-15 RX ADMIN — MESALAMINE 1000 MG: 250 CAPSULE ORAL at 23:08

## 2017-04-15 RX ADMIN — SODIUM CHLORIDE, SODIUM LACTATE, POTASSIUM CHLORIDE, AND CALCIUM CHLORIDE 1000 ML: 600; 310; 30; 20 INJECTION, SOLUTION INTRAVENOUS at 19:47

## 2017-04-15 RX ADMIN — GABAPENTIN 400 MG: 100 CAPSULE ORAL at 16:26

## 2017-04-15 RX ADMIN — HYDROMORPHONE HYDROCHLORIDE 2 MG: 2 TABLET ORAL at 13:54

## 2017-04-15 RX ADMIN — ASPIRIN 81 MG: 81 TABLET, CHEWABLE ORAL at 09:10

## 2017-04-15 RX ADMIN — CARVEDILOL 3.12 MG: 3.12 TABLET, FILM COATED ORAL at 09:11

## 2017-04-15 RX ADMIN — HYDROMORPHONE HYDROCHLORIDE 2 MG: 2 TABLET ORAL at 01:25

## 2017-04-15 NOTE — PROGRESS NOTES
2035- Pt back on unit from Vascular. 5812- Pt stated that she feels pain in both of her legs now, when before it was just in her right. Pt A/Ox4, sometimes drowsy. Went down to Vascular lab for test. Heparin drip infusing. Right inner knee incision with dermabond, no drainage noted. Reported right foot, leg pain Dilaudid given. Non pitting edema 1+ BLE. Assisted to bathroom, voided. Gait unsteady. Pt concerned about being on heparin while having crohn's as she had 2 loose bloody BMs yesterday.

## 2017-04-15 NOTE — PROCEDURES
[facility]  *** FINAL REPORT ***    Name: Valentine Mar  MRN: [id number]    Inpatient  : [address] 1970  HIS Order #: [id number]  [id number] [facility] Visit #: [id number]  Date: 2017    TYPE OF TEST: Peripheral Venous Testing    REASON FOR TEST  Pain in limb, Limb swelling, Swelling    Right Leg:-  Deep venous thrombosis:           No  Superficial venous thrombosis:    Not examined  Deep venous insufficiency:        Not examined  Superficial venous insufficiency: Not examined    Left Leg:-  Deep venous thrombosis:           No  Superficial venous thrombosis:    Not examined  Deep venous insufficiency:        Not examined  Superficial venous insufficiency: Not examined      INTERPRETATION/FINDINGS  Duplex images were obtained using 2-D gray scale, color flow, and  spectral Doppler analysis. Right leg :  1. Deep vein(s) visualized include the common femoral, deep femoral,  proximal femoral, mid femoral, distal femoral, popliteal(above knee),  popliteal(fossa), popliteal(below knee), posterior tibial and peroneal   veins. 2. No evidence of deep venous thrombosis detected in the veins  visualized. Left leg :  1. Deep vein(s) visualized include the common femoral, deep femoral,  proximal femoral, mid femoral, distal femoral, popliteal(above knee),  popliteal(fossa), popliteal(below knee), posterior tibial and peroneal   veins. 2. No evidence of deep venous thrombosis detected in the veins  visualized. ADDITIONAL COMMENTS  Nonvascular heterogenous structure visualized in the right medial  popliteal space near the suture site. 8.6 x 4.3cm SAG; 6.7 x 3.6cm  TRV. Question hematoma vs. Baker's cyst.  Probable Baker's cyst in the left medial popliteal space. 4.1 x 1.1cm  SAG; 4.1 x 1.0cm TRV    I have personally reviewed the data relevant to the interpretation of  this  study. TECHNOLOGIST: Annel NASSAR, RVT  Signed: 2017 10:08 PM    PHYSICIAN: Jesu Nix MD  Signed: 2017 01:30 PM

## 2017-04-15 NOTE — ROUTINE PROCESS
Bedside and Verbal shift change report given to APPLE Barillas (oncoming nurse) by Maurilio Hidalgo RN (offgoing nurse). Report included the following information SBAR, Kardex, Procedure Summary, Intake/Output, MAR, Accordion and Recent Results.

## 2017-04-15 NOTE — PROGRESS NOTES
Problem: Mobility Impaired (Adult and Pediatric)  Goal: *Acute Goals and Plan of Care (Insert Text)  Physical Therapy ST/LT Goals  Initiated 4/8/2017 and to be accomplished within 7 day(s)  1. Patient will move from supine to sit and sit to supine in bed with supervision/set-up. 2. Patient will transfer from bed to chair and chair to bed with supervision/set-up using the least restrictive device. 3. Patient will perform sit to stand with supervision/set-up. 4. Patient will ambulate with supervision/set-up for >150 feet with the least restrictive device. 5. Patient will ascend/descend >2 stairs with U handrail(s) with supervision/set-up. Goals revised 4/15/2017  Physical Therapy ST/LT Goals, to be accomplished within 7 days    1. Pt with be mod I ambulating with SEC X 200  2. Pt will be S with SEC up.down 1 single step/curb and < 4 steps with single HR. Outcome: Progressing Towards Goal  PHYSICAL THERAPY RE-EVALUATION AND TREATMENT     Patient: Liol Russo (67 y.o. female)  Date: 4/15/2017  Diagnosis: Rectal bleed, crohns, anemia, dizziness. GI bleed  RIGHT POPLITEAL OCCLUSION  Vascular abnormality GI bleed  Procedure(s) (LRB):  RIGHT LEG THROMBECTOMY WITH REPAIR POPLITEAL ARTERY WITH C-ARM (Right) 9 Days Post-Op  Precautions: Fall      ASSESSMENT:  Patient has met all initial goals except stair climbing goal and goals have been revised. Pt up amb in room independently without AD upon therapist entry. Pt states she has carpet at home and does not use RW, ambulates without AD. Gait without AD antalgic due to painful RLE. Pt instructed in 2 point gait with SEC X 100' with CGA. Pt ambulates with narrow FRANCOIS, slow gait pattern. Instructed up/down 1 step with SEC. Pt has 2  steps to enter home. Recommend HHPT and SEC at discharge. Will continue to instruct gait with SEC.   Patient's progression toward goals since last assessment: Pt has met all initial goals except for S with stair climbing, new goals progress pt from  to [facility]       PLAN:  Goals have been updated based on progression since last assessment. Patient continues to benefit from skilled intervention to address the above impairments. Continue to follow the patient daily to address goals. Planned Interventions:  [ ]     Bed Mobility Training          [ ]     Neuromuscular Re-Education  [ ]     Transfer Training                [ ]    Orthotic/Prosthetic Training  [X]     Gait Training                       [ ]     Modalities  [ ]     Therapeutic Exercises       [ ]     Edema Management/Control  [ ]     Therapeutic Activities         [ ]     Patient and Family Training/Education  [ ]     Other (comment):  Discharge Recommendations: Home Health  Further Equipment Recommendations for Discharge: straight cane       SUBJECTIVE:   Patient stated The girl I had yesterday told me I would not.       OBJECTIVE DATA SUMMARY:   Critical Behavior:  Neurologic State: Alert, Appropriate for age  Orientation Level: Oriented X4  Cognition: Appropriate decision making, Appropriate for age attention/concentration, Appropriate safety awareness, Follows commands  Safety/Judgement: Awareness of environment, Fall prevention  Functional Mobility Training:  Bed Mobility:  Supine to Sit: Modified independent  Sit to Supine: Modified independent  Scooting: Modified independent  Transfers:  Sit to Stand: Modified independent  Stand to Sit: Modified independent  Balance:  Sitting: Intact  Standing: Intact; With support  Ambulation/Gait Training:  Distance (ft): 150 Feet (ft)  Assistive Device: Gait belt;Walker, rolling;Cane, straight  Ambulation - Level of Assistance: Supervision (S with RW, CGA and vc with SEC)  Gait Abnormalities: Antalgic  Right Side Weight Bearing: As tolerated  Base of Support: Narrowed  Stance: Right decreased  Speed/Janice: Slow     Stairs:  Number of Stairs Trained: 1  Stairs - Level of Assistance: Contact guard assistance (with [facility]) Pain:  Pain Scale 1: Numeric (0 - 10)  Pain Intensity 1: 10  Pain Location 1: Foot;Leg  Pain Orientation 1: Anterior  Pain Description 1: Aching; Sharp  Pain Intervention(s) 1: Medication (see MAR)  Activity Tolerance:   good  Please refer to the flowsheet for vital signs taken during this treatment.   After treatment:   [ ]  Patient left in no apparent distress sitting up in chair  [X]  Patient left in no apparent distress in bed  [X]  Call bell left within reach  [ ]  Nursing notified  [ ]  Caregiver present  [ ]  Bed alarm activated     Delores Elam PT   Time Calculation: 35 mins

## 2017-04-15 NOTE — PROGRESS NOTES
Pharmacy Dosing Services: Warfarin    Consult for Warfarin Dosing by Pharmacy by Dr. Nasreen Joy provided for this 40 y.o.  female , for indication of Venous Thrombosis. Day of Therapy 09  Dose to achieve an INR goal of 2-3    Order entered for  Warfarin  7.5 mg to be given today at 18:00. Once therapeutic would recommend discharge dose of approximately 5 mg per day with strict monitoring of INR. Significant drug interactions: None    LABS    PT/INR Lab Results   Component Value Date/Time    INR 1.8 04/15/2017 12:40 PM      Platelets Lab Results   Component Value Date/Time    PLATELET 377 60/85/6049 02:30 AM      H/H     Lab Results   Component Value Date/Time    HGB 8.7 04/15/2017 02:30 AM        Warfarin Administrations (last 168 hours)       Date/Time Action Medication Dose      04/14/17 1749 Given    warfarin (COUMADIN) tablet 10 mg 10 mg    04/13/17 1734 Given    warfarin (COUMADIN) tablet 10 mg 10 mg    04/12/17 1834 Given    warfarin (COUMADIN) tablet 7.5 mg 7.5 mg    04/11/17 1942 Given    warfarin (COUMADIN) tablet 6 mg 6 mg    04/10/17 1829 Given    warfarin (COUMADIN) tablet 6 mg 6 mg    04/09/17 1723 Given    warfarin (COUMADIN) tablet 5 mg 5 mg    04/08/17 1727 Given    warfarin (COUMADIN) tablet 5 mg 5 mg          Pharmacy to follow daily and will provide subsequent Warfarin dosing based on clinical status. Aleah Acevedo PharmBryan D.   Clinical Pharmacist  [phone]

## 2017-04-15 NOTE — PROGRESS NOTES
Hospitalist Progress Note    Patient: Kaitlyn Snow MRN: [id number]  [facility]: [id number]    YOB: 1970  Age: 40 y.o. Sex: female    DOA: 3/27/2017 LOS:  LOS: 19 days                Assessment/Plan     Patient Active Problem List   Diagnosis Code    Chronic pain syndrome G89.4    Crohn disease ([facility] K50.90    Sickle cell trait ([facility] D57.3    Hypertension I10    Hx of cocaine abuse Z49.831    Embolic stroke ([facility] X44.1    Acute blood loss anemia D62    Neuropathic pain M79.2    DVT (deep venous thrombosis) ([facility]) I82.409    GI bleed K92.2    Elevated WBC count D72.829    PAD (peripheral artery disease) ([facility]) I73.9    Vascular abnormality I99.9            41 yo female admitted for GI bleed,  Recent admission for embolic CVA and recent DVT.     GI bleed - GI following, now bleeding has stopped.       Anemia - acute blood loss, s/p blood transfusion, H/H improved.       Recent DVT/CVA - anticoagulation restarted. On heparin drip. Bridge to coumadin, pharmacy dosing.      Right leg pain - right leg arterial duplex >75%stenosis of anterior tibial artery. Vascular following, CTA with right popliteal occlusion, s/p LEG THROMBECTOMY W/REPAIR POPLITEAL ARTERY W/C-ARM,ANGIOGRAM On 4/6/2017. Per vascular will need indefinite anticoagulation as tolerated.      Crohn's disease - management per GI. On steroids, mesalamine and protonix. Seen by colorectal surgery, bleeding stopped. Given dose of methotrexate. GI prescribed entyvio and pharmacy is trying to get this medication. Discussed with Dr. Bianca Still and Dr. Dot Conley. Patient is having small blood in her stools, her H/H is stable. Dr. Dot Conley wants patient to have full treatment for her crohns before considering colectomy. If patient has significant bleeding then colectomy can be considered. Discussed this with patient.      HTN - controlled.      PT/OT      45 total min's spent on patient care including >50% on counseling/coordinating care. Discussed the above assessments. also discussed labs, medications and hospital course.             Review of systems  General: No fevers or chills. Cardiovascular: No chest pain or pressure. No palpitations. Pulmonary: No shortness of breath. Gastrointestinal: No nausea, vomiting.           Physical Exam:  General: Awake, cooperative, no acute distress    HEENT: NC, Atraumatic. PERRLA, anicteric sclerae. Lungs: CTA Bilaterally. No Wheezing/Rhonchi/Rales. Heart: Regular rhythm,  No murmur, No Rubs, No Gallops  Abdomen: Soft, Non distended, Non tender.  +Bowel sounds,   Extremities: right distal pulse now palpable. Psych:   Not anxious or agitated. Neurologic:  No acute neurological deficit.              Vital signs/Intake and Output:  Visit Vitals    BP 96/62 (BP 1 Location: Left arm, BP Patient Position: At rest)    Pulse 93    Temp 98.4 °F (36.9 °C)    Resp 18    Ht 5' 6\" (1.676 m)    Wt 92.2 kg (203 lb 4.2 oz)    SpO2 96%    Breastfeeding No    BMI 32.81 kg/m2     Current Shift:  04/15 0701 - 04/15 1900  In: 120 [P.O.:120]  Out: 850 [Urine:850]  Last three shifts:  04/13 1901 - 04/15 0700  In: 2637 [P.O.:480; I.V.:2157]  Out: 3700 [Urine:3700]            Labs: Results:       Chemistry No results for input(s): GLU, NA, K, CL, CO2, BUN, CREA, CA, AGAP, BUCR, TBIL, GPT, AP, TP, ALB, GLOB, AGRAT in the last 72 hours. CBC w/Diff Recent Labs      04/15/17   0230  04/14/17   0500  04/13/17   0315   WBC  7.4  6.9  8.1   RBC  3.15*  3.19*  3.19*   HGB  8.7*  9.0*  8.8*   HCT  27.8*  28.4*  28.4*   PLT  377  367  319   GRANS  55  56  49   LYMPH  30  29  34   EOS  5  6*  5      Cardiac Enzymes No results for input(s): CPK, CKND1, CHANDA in the last 72 hours.     No lab exists for component: CKRMB, TROIP   Coagulation Recent Labs      04/15/17   1240  04/15/17   0230   PTP  20.0*  19.7*   INR  1.8*  1.8*   APTT  46.3*  48.1*       Lipid Panel Lab Results   Component Value Date/Time    Cholesterol, total 145 03/12/2017 06:15 AM    HDL Cholesterol 37 03/12/2017 06:15 AM    LDL, calculated 91.6 03/12/2017 06:15 AM    VLDL, calculated 16.4 03/12/2017 06:15 AM    Triglyceride 82 03/12/2017 06:15 AM    CHOL/HDL Ratio 3.9 03/12/2017 06:15 AM      BNP No results for input(s): BNPP in the last 72 hours. Liver Enzymes No results for input(s): TP, ALB, TBIL, AP, SGOT, GPT in the last 72 hours.     No lab exists for component: DBIL   Thyroid Studies Lab Results   Component Value Date/Time    TSH 1.51 12/12/2009 09:48 AM        Procedures/imaging: see electronic medical records for all procedures/Xrays and details which were not copied into this note but were reviewed prior to creation of Plan

## 2017-04-15 NOTE — PROGRESS NOTES
Pt Abril Damon, 39 yo female. Pt in bed during bedside report. Pt states she is still having edema and pain in bilateral lower extremities. Pt states she is not satisfied with her current pain medication. Physicians aware, current plan will continue without increasing pain medication. Pt states she is having nausea, she finished lunch tray, no vomiting. Pt medicated for nausea and pain, documented in MAR. Dr. Callie Diop assessed pt, educated pt on available treatment plan. Pt has increased rectal bleeding, pt requires life long anticoagulant therapy. There is a possibility for pt to require colostomy with removal of her colon. Pt does not like the idea of having \"a bag\". 20 mins after pt completed consultation with Dr. Callie Diop pt appears very drowsy. Pt eyes closing while talking with nurse. Urinalysis ordered by Dr. Ty Jama. Dr Ty Jama stated he was in pt room attempting to interview pt, she was sleeping and would not respond to verbal commands. I went to assess pt and she is laying in bed watching TV. She was states she knew Dr. Ty Jama was in her room, she had no reason for not responding to him. She stated \"I must have been sleeping\"  1810 Pt movements very slow. Pt taking a long time to take each pill. Pt ambulated to bathroom with assistance, pt a little unsteady on her feet. Pt returned to bed, resting comfortably. Urine specimen obtained for drug screen urinalysis. Pt request pain medication and medication for nausea. Pt eating well, she finished her dinner tray and asked for ramiro crackers. Pt received Zofran for nausea and dilaudid for pain, documented on MAR. Pt in bed during bedside report.

## 2017-04-15 NOTE — ROUTINE PROCESS
Bedside and Verbal shift change report given to JOVON Rizzo (oncoming nurse) by Julia Plaza RN (offgoing nurse). Report included the following information SBAR, Kardex, Procedure Summary, Intake/Output, MAR, Accordion, Recent Results and Med Rec Status.

## 2017-04-16 LAB
APTT PPP: 45.1 SEC (ref 23–36.4)
APTT PPP: 51.6 SEC (ref 23–36.4)
INR PPP: 2 (ref 0.8–1.2)
PROTHROMBIN TIME: 21.6 SEC (ref 11.5–15.2)

## 2017-04-16 PROCEDURE — 74011636637 HC RX REV CODE- 636/637: Performed by: INTERNAL MEDICINE

## 2017-04-16 PROCEDURE — 74011250636 HC RX REV CODE- 250/636: Performed by: ANESTHESIOLOGY

## 2017-04-16 PROCEDURE — 74011250637 HC RX REV CODE- 250/637: Performed by: FAMILY MEDICINE

## 2017-04-16 PROCEDURE — 74011250637 HC RX REV CODE- 250/637: Performed by: HOSPITALIST

## 2017-04-16 PROCEDURE — 74011250637 HC RX REV CODE- 250/637: Performed by: INTERNAL MEDICINE

## 2017-04-16 PROCEDURE — 85730 THROMBOPLASTIN TIME PARTIAL: CPT | Performed by: SURGERY

## 2017-04-16 PROCEDURE — 85610 PROTHROMBIN TIME: CPT | Performed by: SURGERY

## 2017-04-16 PROCEDURE — 36591 DRAW BLOOD OFF VENOUS DEVICE: CPT

## 2017-04-16 PROCEDURE — 74011250637 HC RX REV CODE- 250/637: Performed by: SURGERY

## 2017-04-16 PROCEDURE — 36592 COLLECT BLOOD FROM PICC: CPT

## 2017-04-16 PROCEDURE — 65270000029 HC RM PRIVATE

## 2017-04-16 PROCEDURE — 74011250636 HC RX REV CODE- 250/636: Performed by: SURGERY

## 2017-04-16 RX ORDER — WARFARIN 7.5 MG/1
7.5 TABLET ORAL ONCE
Status: COMPLETED | OUTPATIENT
Start: 2017-04-16 | End: 2017-04-16

## 2017-04-16 RX ADMIN — MESALAMINE 1000 MG: 250 CAPSULE ORAL at 14:35

## 2017-04-16 RX ADMIN — GABAPENTIN 400 MG: 100 CAPSULE ORAL at 09:16

## 2017-04-16 RX ADMIN — HYDROMORPHONE HYDROCHLORIDE 2 MG: 2 TABLET ORAL at 00:56

## 2017-04-16 RX ADMIN — SODIUM CHLORIDE, SODIUM LACTATE, POTASSIUM CHLORIDE, AND CALCIUM CHLORIDE 1000 ML: 600; 310; 30; 20 INJECTION, SOLUTION INTRAVENOUS at 20:24

## 2017-04-16 RX ADMIN — MESALAMINE 1000 MG: 250 CAPSULE ORAL at 09:16

## 2017-04-16 RX ADMIN — WARFARIN SODIUM 7.5 MG: 7.5 TABLET ORAL at 17:43

## 2017-04-16 RX ADMIN — ASPIRIN 81 MG: 81 TABLET, CHEWABLE ORAL at 09:15

## 2017-04-16 RX ADMIN — PREDNISONE 30 MG: 20 TABLET ORAL at 07:25

## 2017-04-16 RX ADMIN — MULTIPLE VITAMINS W/ MINERALS TAB 1 TABLET: TAB at 09:16

## 2017-04-16 RX ADMIN — PROMETHAZINE HYDROCHLORIDE 25 MG: 25 TABLET ORAL at 17:51

## 2017-04-16 RX ADMIN — ATORVASTATIN CALCIUM 80 MG: 20 TABLET, FILM COATED ORAL at 22:38

## 2017-04-16 RX ADMIN — Medication 400 MG: at 20:16

## 2017-04-16 RX ADMIN — HEPARIN SODIUM AND DEXTROSE 800 UNITS/HR: 10000; 5 INJECTION INTRAVENOUS at 01:31

## 2017-04-16 RX ADMIN — CARVEDILOL 3.12 MG: 3.12 TABLET, FILM COATED ORAL at 17:43

## 2017-04-16 RX ADMIN — HYDROMORPHONE HYDROCHLORIDE 2 MG: 2 TABLET ORAL at 11:38

## 2017-04-16 RX ADMIN — HYDROMORPHONE HYDROCHLORIDE 2 MG: 2 TABLET ORAL at 15:57

## 2017-04-16 RX ADMIN — HYDROMORPHONE HYDROCHLORIDE 2 MG: 2 TABLET ORAL at 07:25

## 2017-04-16 RX ADMIN — HYDROMORPHONE HYDROCHLORIDE 2 MG: 2 TABLET ORAL at 20:16

## 2017-04-16 RX ADMIN — GABAPENTIN 400 MG: 100 CAPSULE ORAL at 22:39

## 2017-04-16 RX ADMIN — DOCUSATE SODIUM 100 MG: 100 CAPSULE, LIQUID FILLED ORAL at 09:15

## 2017-04-16 RX ADMIN — CARVEDILOL 3.12 MG: 3.12 TABLET, FILM COATED ORAL at 07:25

## 2017-04-16 RX ADMIN — Medication 400 MG: at 09:15

## 2017-04-16 RX ADMIN — GABAPENTIN 400 MG: 100 CAPSULE ORAL at 15:57

## 2017-04-16 RX ADMIN — DOCUSATE SODIUM 100 MG: 100 CAPSULE, LIQUID FILLED ORAL at 20:15

## 2017-04-16 RX ADMIN — MESALAMINE 1000 MG: 250 CAPSULE ORAL at 22:38

## 2017-04-16 RX ADMIN — MESALAMINE 1000 MG: 250 CAPSULE ORAL at 17:43

## 2017-04-16 NOTE — PROGRESS NOTES
Pt Eladio Talisha, 41 yo female. Pt in bed during bedside report. Pt not reporting pain, N/V at this time. Pt states that although her INR level is at therapeutic level as of today she is not expecting to be discharged. Dr. Noemí Tillman rounding with pt this morning. Informs pt that she will not be discharge today. Plan is for pt to receive Entivio infusion tomorrow in Outpatient infusion clinic ([facility])tomorrow and then discharge from hospital.  Message left at [facility] requesting time for pt to be transported to clinic for treatment. Request call to [facility] unit tomorrow morning. Another call will be placed tomorrow to [facility] if no scheduled time has been given. Pt up ad hanane to bathroom to preform personal hygiene without assistance. Pt provided with linens and clean gown. Pt states she is having nausea do to her crohn's disease. Pt medicated with promethazine. Pt sitting up in bed, talking on phone during bedside report.

## 2017-04-16 NOTE — PROGRESS NOTES
Pharmacy Dosing Services: Warfarin    Consult for Warfarin Dosing by Pharmacy by Dr. Samir Palacio provided for this 40 y.o.  female , for indication of Venous Thrombosis. Day of Therapy 10  Dose to achieve an INR goal of 2-3    Order entered for  Warfarin  7.5 mg to be given today at 18:00. Significant drug interactions: None    LABS    PT/INR Lab Results   Component Value Date/Time    INR 2.0 04/16/2017 01:00 AM      Platelets Lab Results   Component Value Date/Time    PLATELET 573 94/53/7114 02:30 AM      H/H     Lab Results   Component Value Date/Time    HGB 8.7 04/15/2017 02:30 AM        Warfarin Administrations (last 168 hours)       Date/Time Action Medication Dose      04/15/17 1808 Given    warfarin (COUMADIN) tablet 7.5 mg 7.5 mg    04/14/17 1749 Given    warfarin (COUMADIN) tablet 10 mg 10 mg    04/13/17 1734 Given    warfarin (COUMADIN) tablet 10 mg 10 mg    04/12/17 1834 Given    warfarin (COUMADIN) tablet 7.5 mg 7.5 mg    04/11/17 1942 Given    warfarin (COUMADIN) tablet 6 mg 6 mg    04/10/17 1829 Given    warfarin (COUMADIN) tablet 6 mg 6 mg    04/09/17 1723 Given    warfarin (COUMADIN) tablet 5 mg 5 mg          Pharmacy to follow daily and will provide subsequent Warfarin dosing based on clinical status. Roro Dowd, Pharm. D.   Clinical Pharmacist  [phone]

## 2017-04-16 NOTE — PROGRESS NOTES
Problem: Mobility Impaired (Adult and Pediatric)  Goal: *Acute Goals and Plan of Care (Insert Text)  Physical Therapy ST/LT Goals  Initiated 4/8/2017 and to be accomplished within 7 day(s)  1. Patient will move from supine to sit and sit to supine in bed with supervision/set-up. 2. Patient will transfer from bed to chair and chair to bed with supervision/set-up using the least restrictive device. 3. Patient will perform sit to stand with supervision/set-up. 4. Patient will ambulate with supervision/set-up for >150 feet with the least restrictive device. 5. Patient will ascend/descend >2 stairs with U handrail(s) with supervision/set-up. Goals revised 4/15/2017  Physical Therapy ST/LT Goals, to be accomplished within 7 days    1. Pt with be mod I ambulating with SEC X 200  2. Pt will be S with SEC up.down 1 single step/curb and < 4 steps with single HR. Second attempt for PT treatment session. Patient declined to participate with PT today. Patient appeared to be slightly annoyed stating, \"I'm fine. I'm going home tomorrow. I get around okay. \" Unable to convince the patient to participate. Per chart the patient has been doing well with her mobility. Will follow up tomorrow as patient's schedule permits.      Yady Johnston PT

## 2017-04-16 NOTE — PROGRESS NOTES
Hospitalist Progress Note    Patient: Korey Solorzano MRN: [id number]  CSN: [id number]    YOB: 1970  Age: 40 y.o. Sex: female    DOA: 3/27/2017 LOS:  LOS: 20 days                Assessment/Plan     Patient Active Problem List   Diagnosis Code    Chronic pain syndrome G89.4    Crohn disease ([facility] K50.90    Sickle cell trait ([facility] D57.3    Hypertension I10    Hx of cocaine abuse O45.289    Embolic stroke ([facility] W05.6    Acute blood loss anemia D62    Neuropathic pain M79.2    DVT (deep venous thrombosis) ([facility]) I82.409    GI bleed K92.2    Elevated WBC count D72.829    PAD (peripheral artery disease) ([facility]) I73.9    Vascular abnormality I99.9            39 yo female admitted for GI bleed,  Recent admission for embolic CVA and recent DVT.     GI bleed - GI following, now bleeding has stopped.       Anemia - acute blood loss, s/p blood transfusion, H/H improved.       Recent DVT/CVA - anticoagulation restarted. On heparin drip. Bridge to coumadin, pharmacy dosing.      Right leg pain - right leg arterial duplex >75%stenosis of anterior tibial artery. Vascular following, CTA with right popliteal occlusion, s/p LEG THROMBECTOMY W/REPAIR POPLITEAL ARTERY W/C-ARM,ANGIOGRAM On 4/6/2017. Per vascular will need indefinite anticoagulation as tolerated.      Crohn's disease - management per GI. On steroids, mesalamine and protonix. Seen by colorectal surgery. Given dose of methotrexate. GI prescribed entyvio and pharmacy is trying to get this medication. Discussed with Dr. Shadi Tracy and Dr. Nichelle Paz. Patient is having small blood in her stools, her H/H is stable. Dr. Nichelle Paz wants patient to have full treatment for her crohns before considering colectomy. If patient has significant bleeding then colectomy can be considered. Discussed this with patient.      HTN - controlled.      PT/OT                   Review of systems  General: No fevers or chills.   Cardiovascular: No chest pain or pressure. No palpitations. Pulmonary: No shortness of breath. Gastrointestinal: No nausea, vomiting.           Physical Exam:  General: Awake, cooperative, no acute distress    HEENT: NC, Atraumatic. PERRLA, anicteric sclerae. Lungs: CTA Bilaterally. No Wheezing/Rhonchi/Rales. Heart: Regular rhythm,  No murmur, No Rubs, No Gallops  Abdomen: Soft, Non distended, Non tender.  +Bowel sounds,   Extremities: right distal pulse now palpable. Psych:   Not anxious or agitated. Neurologic:  No acute neurological deficit. Vital signs/Intake and Output:  Visit Vitals    /86 (BP 1 Location: Left arm, BP Patient Position: At rest)    Pulse 94    Temp 98.2 °F (36.8 °C)    Resp 18    Ht 5' 6\" (1.676 m)    Wt 93.2 kg (205 lb 6.4 oz)    SpO2 100%    Breastfeeding No    BMI 33.15 kg/m2     Current Shift:  04/16 0701 - 04/16 1900  In: -   Out: 1050 [Urine:1050]  Last three shifts:  04/14 1901 - 04/16 0700  In: 6693.9 [P.O.:740; I.V.:5953.9]  Out: 1800 [Urine:1800]            Labs: Results:       Chemistry No results for input(s): GLU, NA, K, CL, CO2, BUN, CREA, CA, AGAP, BUCR, TBIL, GPT, AP, TP, ALB, GLOB, AGRAT in the last 72 hours. CBC w/Diff Recent Labs      04/15/17   0230  04/14/17   0500   WBC  7.4  6.9   RBC  3.15*  3.19*   HGB  8.7*  9.0*   HCT  27.8*  28.4*   PLT  377  367   GRANS  55  56   LYMPH  30  29   EOS  5  6*      Cardiac Enzymes No results for input(s): CPK, CKND1, CHANDA in the last 72 hours.     No lab exists for component: CKRMB, TROIP   Coagulation Recent Labs      04/16/17   0100  04/15/17   1240   PTP  21.6*  20.0*   INR  2.0*  1.8*   APTT  45.1*  46.3*       Lipid Panel Lab Results   Component Value Date/Time    Cholesterol, total 145 03/12/2017 06:15 AM    HDL Cholesterol 37 03/12/2017 06:15 AM    LDL, calculated 91.6 03/12/2017 06:15 AM    VLDL, calculated 16.4 03/12/2017 06:15 AM    Triglyceride 82 03/12/2017 06:15 AM    CHOL/HDL Ratio 3.9 03/12/2017 06:15 AM      BNP No results for input(s): BNPP in the last 72 hours. Liver Enzymes No results for input(s): TP, ALB, TBIL, AP, SGOT, GPT in the last 72 hours.     No lab exists for component: DBIL   Thyroid Studies Lab Results   Component Value Date/Time    TSH 1.51 12/12/2009 09:48 AM        Procedures/imaging: see electronic medical records for all procedures/Xrays and details which were not copied into this note but were reviewed prior to creation of Plan

## 2017-04-16 NOTE — ROUTINE PROCESS
Bedside and Verbal shift change report given to FAYE Root RN (oncoming nurse) by Viv Aguilar RN  (offgoing nurse). Report included the following information SBAR, Kardex, Intake/Output and MAR.

## 2017-04-16 NOTE — PROGRESS NOTES
Pleased with post op doppler  Still has some post neuropathy / edema  No signs of foot drop  Wound clean  revasc intact  Continue coumadin as tolerates  Discussed with her, if only isolated area of colon continues to bleed, may have to consider hemicolectomy  Agree with maximizing med therapy for autoimmune dosorder

## 2017-04-16 NOTE — PROGRESS NOTES
2308 Pt c/o \"tongue burning. \" No apparent inflammation. Pt states that it may be a side effect from methotrexate shot (2 days ago). SHIFT SUMMARY: No events. Pt states pain 10/10, but has some difficulty describing pain. Voiding. BM, which pt reports was \"normal\" and brown.

## 2017-04-16 NOTE — PROGRESS NOTES
Problem: Mobility Impaired (Adult and Pediatric)  Goal: *Acute Goals and Plan of Care (Insert Text)  Physical Therapy ST/LT Goals  Initiated 4/8/2017 and to be accomplished within 7 day(s)  1. Patient will move from supine to sit and sit to supine in bed with supervision/set-up. 2. Patient will transfer from bed to chair and chair to bed with supervision/set-up using the least restrictive device. 3. Patient will perform sit to stand with supervision/set-up. 4. Patient will ambulate with supervision/set-up for >150 feet with the least restrictive device. 5. Patient will ascend/descend >2 stairs with U handrail(s) with supervision/set-up. Goals revised 4/15/2017  Physical Therapy ST/LT Goals, to be accomplished within 7 days    1. Pt with be mod I ambulating with SEC X 200  2. Pt will be S with SEC up.down 1 single step/curb and < 4 steps with single HR. Attempted PT treatment session. Patient is eating lunch and requests PT returns later. Will follow up later as patient's schedule permits.      Joseph Brochure PT

## 2017-04-17 LAB
APTT PPP: 55.4 SEC (ref 23–36.4)
HCT VFR BLD AUTO: 29 % (ref 35–45)
HGB BLD-MCNC: 9.1 G/DL (ref 12–16)
INR PPP: 1.9 (ref 0.8–1.2)
PROTHROMBIN TIME: 20.7 SEC (ref 11.5–15.2)

## 2017-04-17 PROCEDURE — 74011250637 HC RX REV CODE- 250/637: Performed by: INTERNAL MEDICINE

## 2017-04-17 PROCEDURE — 74011250636 HC RX REV CODE- 250/636: Performed by: SURGERY

## 2017-04-17 PROCEDURE — 85610 PROTHROMBIN TIME: CPT | Performed by: SURGERY

## 2017-04-17 PROCEDURE — 85730 THROMBOPLASTIN TIME PARTIAL: CPT | Performed by: SURGERY

## 2017-04-17 PROCEDURE — 36592 COLLECT BLOOD FROM PICC: CPT

## 2017-04-17 PROCEDURE — 74011636637 HC RX REV CODE- 636/637: Performed by: INTERNAL MEDICINE

## 2017-04-17 PROCEDURE — 97530 THERAPEUTIC ACTIVITIES: CPT

## 2017-04-17 PROCEDURE — 74011250636 HC RX REV CODE- 250/636: Performed by: ANESTHESIOLOGY

## 2017-04-17 PROCEDURE — 97116 GAIT TRAINING THERAPY: CPT

## 2017-04-17 PROCEDURE — 74011250637 HC RX REV CODE- 250/637: Performed by: FAMILY MEDICINE

## 2017-04-17 PROCEDURE — 74011250636 HC RX REV CODE- 250/636: Performed by: FAMILY MEDICINE

## 2017-04-17 PROCEDURE — 74011250637 HC RX REV CODE- 250/637: Performed by: HOSPITALIST

## 2017-04-17 PROCEDURE — 85018 HEMOGLOBIN: CPT | Performed by: SURGERY

## 2017-04-17 PROCEDURE — 65270000029 HC RM PRIVATE

## 2017-04-17 PROCEDURE — 74011250637 HC RX REV CODE- 250/637: Performed by: SURGERY

## 2017-04-17 RX ORDER — WARFARIN 6 MG/1
12 TABLET ORAL ONCE
Status: COMPLETED | OUTPATIENT
Start: 2017-04-17 | End: 2017-04-17

## 2017-04-17 RX ADMIN — GABAPENTIN 400 MG: 100 CAPSULE ORAL at 17:33

## 2017-04-17 RX ADMIN — DOCUSATE SODIUM 100 MG: 100 CAPSULE, LIQUID FILLED ORAL at 21:36

## 2017-04-17 RX ADMIN — HYDROMORPHONE HYDROCHLORIDE 2 MG: 2 TABLET ORAL at 04:48

## 2017-04-17 RX ADMIN — DIPHENHYDRAMINE HYDROCHLORIDE 12.5 MG: 50 INJECTION, SOLUTION INTRAMUSCULAR; INTRAVENOUS at 18:27

## 2017-04-17 RX ADMIN — SODIUM CHLORIDE, SODIUM LACTATE, POTASSIUM CHLORIDE, AND CALCIUM CHLORIDE 1000 ML: 600; 310; 30; 20 INJECTION, SOLUTION INTRAVENOUS at 04:48

## 2017-04-17 RX ADMIN — MESALAMINE 1000 MG: 250 CAPSULE ORAL at 12:45

## 2017-04-17 RX ADMIN — PREDNISONE 30 MG: 20 TABLET ORAL at 08:45

## 2017-04-17 RX ADMIN — CARVEDILOL 3.12 MG: 3.12 TABLET, FILM COATED ORAL at 17:34

## 2017-04-17 RX ADMIN — SODIUM CHLORIDE, SODIUM LACTATE, POTASSIUM CHLORIDE, AND CALCIUM CHLORIDE 1000 ML: 600; 310; 30; 20 INJECTION, SOLUTION INTRAVENOUS at 21:40

## 2017-04-17 RX ADMIN — ATORVASTATIN CALCIUM 80 MG: 20 TABLET, FILM COATED ORAL at 21:36

## 2017-04-17 RX ADMIN — Medication 400 MG: at 21:36

## 2017-04-17 RX ADMIN — HYDROMORPHONE HYDROCHLORIDE 2 MG: 2 TABLET ORAL at 00:15

## 2017-04-17 RX ADMIN — HEPARIN SODIUM AND DEXTROSE 800 UNITS/HR: 10000; 5 INJECTION INTRAVENOUS at 19:35

## 2017-04-17 RX ADMIN — HYDROMORPHONE HYDROCHLORIDE 2 MG: 2 TABLET ORAL at 12:45

## 2017-04-17 RX ADMIN — DIPHENHYDRAMINE HYDROCHLORIDE 12.5 MG: 50 INJECTION, SOLUTION INTRAMUSCULAR; INTRAVENOUS at 23:09

## 2017-04-17 RX ADMIN — DOCUSATE SODIUM 100 MG: 100 CAPSULE, LIQUID FILLED ORAL at 08:45

## 2017-04-17 RX ADMIN — MULTIPLE VITAMINS W/ MINERALS TAB 1 TABLET: TAB at 08:45

## 2017-04-17 RX ADMIN — PROMETHAZINE HYDROCHLORIDE 25 MG: 25 TABLET ORAL at 03:05

## 2017-04-17 RX ADMIN — GABAPENTIN 400 MG: 100 CAPSULE ORAL at 08:44

## 2017-04-17 RX ADMIN — HYDROMORPHONE HYDROCHLORIDE 2 MG: 2 TABLET ORAL at 08:44

## 2017-04-17 RX ADMIN — MESALAMINE 1000 MG: 250 CAPSULE ORAL at 21:36

## 2017-04-17 RX ADMIN — MESALAMINE 1000 MG: 250 CAPSULE ORAL at 18:26

## 2017-04-17 RX ADMIN — GABAPENTIN 400 MG: 100 CAPSULE ORAL at 21:36

## 2017-04-17 RX ADMIN — HEPARIN SODIUM AND DEXTROSE 800 UNITS/HR: 10000; 5 INJECTION INTRAVENOUS at 07:36

## 2017-04-17 RX ADMIN — SODIUM CHLORIDE, SODIUM LACTATE, POTASSIUM CHLORIDE, AND CALCIUM CHLORIDE 1000 ML: 600; 310; 30; 20 INJECTION, SOLUTION INTRAVENOUS at 12:46

## 2017-04-17 RX ADMIN — Medication 400 MG: at 08:44

## 2017-04-17 RX ADMIN — HYDROMORPHONE HYDROCHLORIDE 2 MG: 2 TABLET ORAL at 17:34

## 2017-04-17 RX ADMIN — CARVEDILOL 3.12 MG: 3.12 TABLET, FILM COATED ORAL at 08:45

## 2017-04-17 RX ADMIN — WARFARIN SODIUM 12 MG: 6 TABLET ORAL at 17:34

## 2017-04-17 RX ADMIN — HYDROMORPHONE HYDROCHLORIDE 2 MG: 2 TABLET ORAL at 21:36

## 2017-04-17 RX ADMIN — MESALAMINE 1000 MG: 250 CAPSULE ORAL at 08:45

## 2017-04-17 RX ADMIN — ASPIRIN 81 MG: 81 TABLET, CHEWABLE ORAL at 08:45

## 2017-04-17 NOTE — PROGRESS NOTES
Pharmacy Dosing Services:  Warfarin    Consult for Warfarin Dosing by Pharmacy by Dr. Robby Simmons provided for this 40 y.o.  female , for indication of Venous Thrombosis. Dose to achieve an INR goal of 2-3    Order entered for Warfarin 12 mg to be given today at 18:00. Higher dose (at least 10 mg)  for today, requested by MD today, per RN and during rounding. Pt continues on heparin drip at continuous rate: 800 units/hr and aspirin 81 mg    Significant drug interactions: none  LABS    PT/INR Lab Results   Component Value Date/Time    INR 1.9 04/17/2017 02:35 AM      Platelets Lab Results   Component Value Date/Time    PLATELET 274 94/70/5338 02:30 AM      H/H     Lab Results   Component Value Date/Time    HGB 9.1 04/17/2017 02:35 AM        Warfarin Administrations (last 168 hours)       Date/Time Action Medication Dose      04/16/17 1743 Given    warfarin (COUMADIN) tablet 7.5 mg 7.5 mg    04/15/17 1808 Given    warfarin (COUMADIN) tablet 7.5 mg 7.5 mg    04/14/17 1749 Given    warfarin (COUMADIN) tablet 10 mg 10 mg    04/13/17 1734 Given    warfarin (COUMADIN) tablet 10 mg 10 mg    04/12/17 1834 Given    warfarin (COUMADIN) tablet 7.5 mg 7.5 mg    04/11/17 1942 Given    warfarin (COUMADIN) tablet 6 mg 6 mg    04/10/17 1829 Given    warfarin (COUMADIN) tablet 6 mg 6 mg          Pharmacy to follow daily and will provide subsequent Warfarin dosing based on clinical status.   ANNE Salazar  Contact information [phone]

## 2017-04-17 NOTE — ROUTINE PROCESS
Bedside and Verbal shift change report given to SANYA Neves RN (oncoming nurse) by Chavez Martin RN (offgoing nurse). Report included the following information SBAR, Kardex, Procedure Summary, Intake/Output, MAR, Accordion, Recent Results and Med Rec Status.

## 2017-04-17 NOTE — PROGRESS NOTES
7072 Incontinent episode. Pt states that she felt urge to void and then found herself wet. Explained to pt that IV fluids will cause bladder to fill quickly. SHIFT SUMMARY: No other events. Swelling on BLE appears to be decreasing. Pulses intact. Requests for pain medication while she is awake. Rates pain in leg 10/10 but is able to rub and slap her own leg.

## 2017-04-17 NOTE — PROGRESS NOTES
Problem: Mobility Impaired (Adult and Pediatric)  Goal: *Acute Goals and Plan of Care (Insert Text)  Physical Therapy ST/LT Goals  Initiated 4/8/2017 and to be accomplished within 7 day(s)  1. Patient will move from supine to sit and sit to supine in bed with supervision/set-up. 2. Patient will transfer from bed to chair and chair to bed with supervision/set-up using the least restrictive device. 3. Patient will perform sit to stand with supervision/set-up. 4. Patient will ambulate with supervision/set-up for >150 feet with the least restrictive device. 5. Patient will ascend/descend >2 stairs with U handrail(s) with supervision/set-up. Goals revised 4/15/2017  Physical Therapy ST/LT Goals, to be accomplished within 7 days    1. Pt with be mod I ambulating with SEC X 200  2. Pt will be S with SEC up.down 1 single step/curb and < 4 steps with single HR. Outcome: Resolved/Met Date Met:  04/17/17  PHYSICAL THERAPY TREATMENT/DISCHARGE     Patient: Jl Tiwari (89 y.o. female)  Date: 4/17/2017  Diagnosis: Rectal bleed, crohns, anemia, dizziness. GI bleed  RIGHT POPLITEAL OCCLUSION  Vascular abnormality GI bleed  Procedure(s) (LRB):  RIGHT LEG THROMBECTOMY WITH REPAIR POPLITEAL ARTERY WITH C-ARM (Right) 11 Days Post-Op  Precautions: Fall  Chart, physical therapy assessment, plan of care and goals were reviewed. ASSESSMENT:  Pt seen at the EOB w/ IV attached. PT reported tightness in the R LE and appeared swollen at the site of the stitching, nurse was notified. Pt was able to ambulate w/o any difficulty w/ RW/GB. Pt was able to perform step negotiation for curbs w/o any difficulty w/ RW. PT attempted to have pt use SPC however pt appeared unstable and unsafe to use independently. Pt reported she felt more comfortable w/ RW. Pt was transferred back to EOB w/ call bell and tray at [facility]. Nurse notified upon exiting. No further services needed at this time. DC from Acute PT at this time. Progression toward goals:  [X]      Goals met  [ ]      Improving appropriately and progressing toward goals  [ ]      Improving slowly and progressing toward goals  [ ]      Not making progress toward goals and plan of care will be adjusted       PLAN:  Patient will be discharged from physical therapy at this time. Rationale for discharge:  [X] Goals Achieved  [ ] Nina Rehman  [ ] Patient not participating in therapy  [ ] Other:  Discharge Recommendations:  Home Health  Further Equipment Recommendations for Discharge:  rolling walker and N/A       SUBJECTIVE:   Patient stated I am ready to go home.       OBJECTIVE DATA SUMMARY:   Critical Behavior:  Neurologic State: Alert, Appropriate for age  Orientation Level: Oriented X4  Cognition: Appropriate decision making, Appropriate for age attention/concentration, Appropriate safety awareness, Follows commands  Safety/Judgement: Awareness of environment, Fall prevention  Functional Mobility Training:  Bed Mobility:  Supine to Sit: Modified independent;Supervision  Sit to Supine: Modified independent;Supervision  Transfers:  Sit to Stand: Modified independent;Supervision  Stand to Sit: Modified independent;Supervision  Ambulation/Gait Training:  Distance (ft): 200 Feet (ft)  Assistive Device: Gait belt;Walker, rolling  Ambulation - Level of Assistance: Modified independent;Supervision (w/ RW)  Gait Abnormalities: Decreased step clearance  Base of Support: Widened  Stance: Right decreased  Speed/Janice: Slow  Step Length: Left shortened;Right shortened     Stairs:  Number of Stairs Trained: 4  Stairs - Level of Assistance: Modified independent;Supervision (w/ RW)  Pain:  Pain Scale 1: Numeric (0 - 10)  Pain Intensity 1: 3  Activity Tolerance:   Good  Please refer to the flowsheet for vital signs taken during this treatment.   After treatment:   [ ] Patient left in no apparent distress sitting up in chair  [X] Patient left in no apparent distress in bed  [X] Call phillips left within reach  [X] Nursing notified  [ ] Caregiver present  [ ] Bed alarm activated  Shaheen Guzman, PT   Time Calculation: 25 mins

## 2017-04-17 NOTE — ROUTINE PROCESS
Bedside and Verbal shift change report given to FAYE Linda (oncoming nurse) by Viv Aguilar RN  (offgoing nurse). Report included the following information SBAR, Kardex, Intake/Output and MAR.

## 2017-04-17 NOTE — PROGRESS NOTES
PT treatment attempted. Pt declined because she was watching T.V and reported that she will be d/c home anyway. Will talk w/ her case management today. Will attempt PT treatment when schedule allows.

## 2017-04-17 NOTE — PROGRESS NOTES
Met with pt at bedside and attended IDR at bedside. Anticipating discharge tomorrow, pt has home health set up with [facility], and [facility] scheduled [facility] appt for tomorrow Tuesday 4/18 for Entyvio infusion. Pt and RN aware of time of [facility] appointment, so pt can hopefully discharge early. No other discharge needs at this time. Care Management Interventions  PCP Verified by CM: Yes  Mode of Transport at Discharge:  Other (see comment)  Transition of Care Consult (CM Consult): [address]: Yes  Physical Therapy Consult: Yes  Occupational Therapy Consult: Yes  Current Support Network: Family Lives Nearby  Confirm Follow Up Transport: Family  Plan discussed with Pt/Family/Caregiver: Yes  Freedom of Choice Offered: Yes  Discharge Location  Discharge Placement: Home with home health

## 2017-04-17 NOTE — PROGRESS NOTES
Hospitalist Progress Note    Patient: Travis Scruggs MRN: [id number]  CSN: [id number]    YOB: 1970  Age: 40 y.o. Sex: female    DOA: 3/27/2017 LOS:  LOS: 21 days                Assessment/Plan     Patient Active Problem List   Diagnosis Code    Chronic pain syndrome G89.4    Crohn disease ([facility] K50.90    Sickle cell trait ([facility] D57.3    Hypertension I10    Hx of cocaine abuse I40.713    Embolic stroke ([facility] S87.7    Acute blood loss anemia D62    Neuropathic pain M79.2    DVT (deep venous thrombosis) ([facility]) I82.409    GI bleed K92.2    Elevated WBC count D72.829    PAD (peripheral artery disease) ([facility]) I73.9    Vascular abnormality I99.9               39 yo female admitted for GI bleed,  Recent admission for embolic CVA and recent DVT.     GI bleed - GI following, now bleeding has stopped.       Anemia - acute blood loss, s/p blood transfusion, H/H improved.       Recent DVT/CVA - anticoagulation restarted. On heparin drip. Bridge to coumadin, pharmacy dosing.      Right leg pain - right leg arterial duplex >75%stenosis of anterior tibial artery. Vascular following, CTA with right popliteal occlusion, s/p LEG THROMBECTOMY W/REPAIR POPLITEAL ARTERY W/C-ARM,ANGIOGRAM On 4/6/2017. Per vascular will need indefinite anticoagulation as tolerated.      Crohn's disease - management per GI. On steroids, mesalamine and protonix. Seen by colorectal surgery. Given dose of methotrexate. GI prescribed entyvio and pharmacy is trying to get this medication. Discussed with Dr. Alvarado Lacy and Dr. Hawa Chao. Patient is having small blood in her stools, her H/H is stable. Dr. Hawa Chao wants patient to have full treatment for her crohns before considering colectomy. If patient has significant bleeding then colectomy can be considered.   Discussed this with patient.      HTN - controlled.      PT/OT    Awaiting INR therapeutic to discharge patient.                     Review of systems  General: No fevers or chills. Cardiovascular: No chest pain or pressure. No palpitations. Pulmonary: No shortness of breath. Gastrointestinal: No nausea, vomiting.           Physical Exam:  General: Awake, cooperative, no acute distress    HEENT: NC, Atraumatic. PERRLA, anicteric sclerae. Lungs: CTA Bilaterally. No Wheezing/Rhonchi/Rales. Heart: Regular rhythm,  No murmur, No Rubs, No Gallops  Abdomen: Soft, Non distended, Non tender.  +Bowel sounds,   Extremities: right distal pulse now palpable. Psych:   Not anxious or agitated. Neurologic:  No acute neurological deficit.          Vital signs/Intake and Output:  Visit Vitals    /71    Pulse 100    Temp 98.6 °F (37 °C)    Resp 18    Ht 5' 6\" (1.676 m)    Wt 96.3 kg (212 lb 3.2 oz)    SpO2 100%    Breastfeeding No    BMI 34.25 kg/m2     Current Shift:  04/17 0701 - 04/17 1900  In: 0810 [P.O.:600; I.V.:887]  Out: 1000 [Urine:1000]  Last three shifts:  04/15 1901 - 04/17 0700  In: 8136.8 [P.O.:1320; I.V.:6816.8]  Out: 2300 [Urine:2300]            Labs: Results:       Chemistry No results for input(s): GLU, NA, K, CL, CO2, BUN, CREA, CA, AGAP, BUCR, TBIL, GPT, AP, TP, ALB, GLOB, AGRAT in the last 72 hours. CBC w/Diff Recent Labs      04/17/17   0235  04/15/17   0230   WBC   --   7.4   RBC   --   3.15*   HGB  9.1*  8.7*   HCT  29.0*  27.8*   PLT   --   377   GRANS   --   55   LYMPH   --   30   EOS   --   5      Cardiac Enzymes No results for input(s): CPK, CKND1, CHANDA in the last 72 hours.     No lab exists for component: CKRMB, TROIP   Coagulation Recent Labs      04/17/17   0235  04/16/17   1430  04/16/17   0100   PTP  20.7*   --   21.6*   INR  1.9*   --   2.0*   APTT  55.4*  51.6*  45.1*       Lipid Panel Lab Results   Component Value Date/Time    Cholesterol, total 145 03/12/2017 06:15 AM    HDL Cholesterol 37 03/12/2017 06:15 AM    LDL, calculated 91.6 03/12/2017 06:15 AM    VLDL, calculated 16.4 03/12/2017 06:15 AM    Triglyceride 82 03/12/2017 06:15 AM    CHOL/HDL Ratio 3.9 03/12/2017 06:15 AM      BNP No results for input(s): BNPP in the last 72 hours. Liver Enzymes No results for input(s): TP, ALB, TBIL, AP, SGOT, GPT in the last 72 hours.     No lab exists for component: DBIL   Thyroid Studies Lab Results   Component Value Date/Time    TSH 1.51 12/12/2009 09:48 AM        Procedures/imaging: see electronic medical records for all procedures/Xrays and details which were not copied into this note but were reviewed prior to creation of Plan

## 2017-04-17 NOTE — PROGRESS NOTES
Pt Vi Clarke, 41 yo female. Pt sitting up in bed during bedside report. Pt asking for her pain medication, but she is not due for medication at this time. Pt asking if it was \"time for her medication\", and the answer is no, she may not have more pain medication until 0845. Out patient infusion clinic returned call for Entivio infusion. Pt is not eligible for infusion at [facility] as inpatient at the hospital. [facility] pharmacy called to find out if we can infuse medication on [address] [place] [address] unit while pt is here. Eboni Stinson, pharmacist is contacting her director for classification, she will contact Dr. Evangelina Rider with direct answers about medication. Dr. Mane Jones aware of current situation with mediation/infusion. Pt aware of current situation with medication/infusion. IDR: Pt ambulating in room, collecting her belongings in anticipation for discharge. Pt informed she may be discharged tomorrow after INR is at therapeutic level. Pt verbalized understanding. Mian Spicer in [facility] pharmacy consulted on warfarin dosing. Pt will have increased dose of 12 mg tonight with anticipated discharge tomorrow. Pt previously had 10 mg dose but dose was decreased to 7.5 with last 2 doses. Mian Oar recommends 10 mg daily dose at discharge with continued monitoring. Pt has new edema along incision line, pt states it hurts. No redness, no drainage from incision site, no significant temp difference in surrounding skin. Dr. Mane Jones paged. Dr. Mane Jones aware of pt current status. Bilateral pedal pulses present, identified with Doppler. Pt sitting up in bed during bedside report.

## 2017-04-17 NOTE — ROUTINE PROCESS
Bedside and Verbal shift change report given to APPLE Tolentino (oncoming nurse) by Troy Riley RN (offgoing nurse). Report included the following information SBAR, Kardex, Procedure Summary, Intake/Output, MAR, Accordion, Recent Results and Med Rec Status.

## 2017-04-18 ENCOUNTER — HOSPITAL ENCOUNTER (OUTPATIENT)
Dept: INFUSION THERAPY | Age: 47
End: 2017-04-18

## 2017-04-18 VITALS
HEIGHT: 66 IN | TEMPERATURE: 99.1 F | HEART RATE: 97 BPM | OXYGEN SATURATION: 100 % | BODY MASS INDEX: 34.1 KG/M2 | RESPIRATION RATE: 18 BRPM | WEIGHT: 212.2 LBS | DIASTOLIC BLOOD PRESSURE: 76 MMHG | SYSTOLIC BLOOD PRESSURE: 128 MMHG

## 2017-04-18 PROBLEM — D62 ACUTE BLOOD LOSS ANEMIA: Status: RESOLVED | Noted: 2017-03-15 | Resolved: 2017-04-18

## 2017-04-18 PROBLEM — D72.829 ELEVATED WBC COUNT: Status: RESOLVED | Noted: 2017-03-28 | Resolved: 2017-04-18

## 2017-04-18 LAB
APTT PPP: 59.3 SEC (ref 23–36.4)
HCT VFR BLD AUTO: 26.8 % (ref 35–45)
HGB BLD-MCNC: 8.3 G/DL (ref 12–16)
INR PPP: 2.4 (ref 0.8–1.2)
PLATELET # BLD AUTO: 380 K/UL (ref 135–420)
PROTHROMBIN TIME: 24.9 SEC (ref 11.5–15.2)

## 2017-04-18 PROCEDURE — 74011250636 HC RX REV CODE- 250/636: Performed by: FAMILY MEDICINE

## 2017-04-18 PROCEDURE — 85610 PROTHROMBIN TIME: CPT | Performed by: SURGERY

## 2017-04-18 PROCEDURE — 36591 DRAW BLOOD OFF VENOUS DEVICE: CPT

## 2017-04-18 PROCEDURE — 85730 THROMBOPLASTIN TIME PARTIAL: CPT | Performed by: SURGERY

## 2017-04-18 PROCEDURE — 74011250637 HC RX REV CODE- 250/637: Performed by: INTERNAL MEDICINE

## 2017-04-18 PROCEDURE — 74011250637 HC RX REV CODE- 250/637: Performed by: HOSPITALIST

## 2017-04-18 PROCEDURE — 74011250636 HC RX REV CODE- 250/636: Performed by: ANESTHESIOLOGY

## 2017-04-18 PROCEDURE — 85049 AUTOMATED PLATELET COUNT: CPT | Performed by: SURGERY

## 2017-04-18 PROCEDURE — 74011636637 HC RX REV CODE- 636/637: Performed by: INTERNAL MEDICINE

## 2017-04-18 PROCEDURE — A6209 FOAM DRSG <=16 SQ IN W/O BDR: HCPCS

## 2017-04-18 PROCEDURE — 74011250636 HC RX REV CODE- 250/636: Performed by: HOSPITALIST

## 2017-04-18 PROCEDURE — 74011250637 HC RX REV CODE- 250/637: Performed by: FAMILY MEDICINE

## 2017-04-18 PROCEDURE — 85018 HEMOGLOBIN: CPT | Performed by: SURGERY

## 2017-04-18 PROCEDURE — 74011250637 HC RX REV CODE- 250/637: Performed by: SURGERY

## 2017-04-18 RX ORDER — WARFARIN 6 MG/1
6 TABLET ORAL ONCE
Status: DISCONTINUED | OUTPATIENT
Start: 2017-04-18 | End: 2017-04-18 | Stop reason: HOSPADM

## 2017-04-18 RX ORDER — METHOTREXATE 2.5 MG/1
10 TABLET ORAL
Qty: 16 TAB | Refills: 0 | Status: SHIPPED | OUTPATIENT
Start: 2017-04-24 | End: 2017-05-06

## 2017-04-18 RX ORDER — OXYCODONE AND ACETAMINOPHEN 5; 325 MG/1; MG/1
1 TABLET ORAL
Qty: 15 TAB | Refills: 0 | Status: SHIPPED | OUTPATIENT
Start: 2017-04-18 | End: 2017-05-06

## 2017-04-18 RX ORDER — CARVEDILOL 3.12 MG/1
3.12 TABLET ORAL 2 TIMES DAILY WITH MEALS
Qty: 60 TAB | Refills: 0 | Status: SHIPPED | OUTPATIENT
Start: 2017-04-18

## 2017-04-18 RX ORDER — WARFARIN 7.5 MG/1
7.5 TABLET ORAL DAILY
Qty: 10 TAB | Refills: 0 | Status: SHIPPED | OUTPATIENT
Start: 2017-04-18 | End: 2018-01-15

## 2017-04-18 RX ADMIN — HYDROMORPHONE HYDROCHLORIDE 2 MG: 2 TABLET ORAL at 07:15

## 2017-04-18 RX ADMIN — SODIUM CHLORIDE, SODIUM LACTATE, POTASSIUM CHLORIDE, AND CALCIUM CHLORIDE 1000 ML: 600; 310; 30; 20 INJECTION, SOLUTION INTRAVENOUS at 07:14

## 2017-04-18 RX ADMIN — PROMETHAZINE HYDROCHLORIDE 25 MG: 25 TABLET ORAL at 01:45

## 2017-04-18 RX ADMIN — DIPHENHYDRAMINE HYDROCHLORIDE 12.5 MG: 50 INJECTION, SOLUTION INTRAMUSCULAR; INTRAVENOUS at 07:15

## 2017-04-18 RX ADMIN — MULTIPLE VITAMINS W/ MINERALS TAB 1 TABLET: TAB at 09:32

## 2017-04-18 RX ADMIN — HYDROMORPHONE HYDROCHLORIDE 2 MG: 2 TABLET ORAL at 01:43

## 2017-04-18 RX ADMIN — PREDNISONE 30 MG: 20 TABLET ORAL at 09:32

## 2017-04-18 RX ADMIN — GABAPENTIN 400 MG: 100 CAPSULE ORAL at 09:32

## 2017-04-18 RX ADMIN — CARVEDILOL 3.12 MG: 3.12 TABLET, FILM COATED ORAL at 09:32

## 2017-04-18 RX ADMIN — MESALAMINE 1000 MG: 250 CAPSULE ORAL at 09:32

## 2017-04-18 RX ADMIN — VEDOLIZUMAB 300 MG: 300 INJECTION, POWDER, LYOPHILIZED, FOR SOLUTION INTRAVENOUS at 12:36

## 2017-04-18 RX ADMIN — DIPHENHYDRAMINE HYDROCHLORIDE 12.5 MG: 50 INJECTION, SOLUTION INTRAMUSCULAR; INTRAVENOUS at 03:26

## 2017-04-18 RX ADMIN — HYDROMORPHONE HYDROCHLORIDE 2 MG: 2 TABLET ORAL at 12:03

## 2017-04-18 RX ADMIN — MESALAMINE 1000 MG: 250 CAPSULE ORAL at 13:44

## 2017-04-18 RX ADMIN — DIPHENHYDRAMINE HYDROCHLORIDE 12.5 MG: 50 INJECTION, SOLUTION INTRAMUSCULAR; INTRAVENOUS at 13:44

## 2017-04-18 RX ADMIN — Medication 400 MG: at 09:32

## 2017-04-18 RX ADMIN — DOCUSATE SODIUM 100 MG: 100 CAPSULE, LIQUID FILLED ORAL at 09:32

## 2017-04-18 RX ADMIN — ASPIRIN 81 MG: 81 TABLET, CHEWABLE ORAL at 09:32

## 2017-04-18 NOTE — PROGRESS NOTES
Pharmacy Dosing Services: Warfarin    Consult for Warfarin Dosing by Pharmacy by Dr. Sher Phoenix provided for this 40 y.o.  female , for indication of Venous Thrombosis. Dose to achieve an INR goal of 2-3    INR = 2.4  (increased from 1.9 on 4/17/17), ordered reduced dose for today. Order entered for Warfarin 6 mg to be given today at 18:00. Significant drug interactions: aspirin 81 mg  LABS    PT/INR Lab Results   Component Value Date/Time    INR 2.4 04/18/2017 03:30 AM      Platelets Lab Results   Component Value Date/Time    PLATELET 070 58/48/8011 03:30 AM      H/H     Lab Results   Component Value Date/Time    HGB 8.3 04/18/2017 03:30 AM        Warfarin Administrations (last 168 hours)       Date/Time Action Medication Dose      04/17/17 1734 Given    warfarin (COUMADIN) tablet 12 mg 12 mg    04/16/17 1743 Given    warfarin (COUMADIN) tablet 7.5 mg 7.5 mg    04/15/17 1808 Given    warfarin (COUMADIN) tablet 7.5 mg 7.5 mg    04/14/17 1749 Given    warfarin (COUMADIN) tablet 10 mg 10 mg    04/13/17 1734 Given    warfarin (COUMADIN) tablet 10 mg 10 mg    04/12/17 1834 Given    warfarin (COUMADIN) tablet 7.5 mg 7.5 mg    04/11/17 1942 Given    warfarin (COUMADIN) tablet 6 mg 6 mg          Pharmacy to follow daily and will provide subsequent Warfarin dosing based on clinical status.   ANNE Cheng  Contact information [phone]

## 2017-04-18 NOTE — PROGRESS NOTES
Pt Soniya Em, 41 yo female. Pt in bed during bedside report. Pt has potential discharge today. Pt surgical incision on right medial leg/lower leg is swollen and draining serosanguinous fluid. Dressing applied over night. 0900 dressing changed, site continue to drain. 1100 Dr. Nicho Su aware of pt status with incision draining. Dr. Nicho Su request Dr. Estelle Uribe to consult and assess pt. Dr. Estelle Uribe paged, he will assess pt today. Dr. Estelle Uribe assessed pt. Verbal orders for BLE compression, wound care nurse aware. Loc received from THE [facility] pharmacy. Administered, documented in MAR. Dressing changed on pt right chest venous access port. Pt provided with gauze to change dressing on her leg at home. Reviewed discharge instructions and prescriptions with pt. Pt refused to leave without prescriptions for percocet. Dr. Nicho Su page. Dr. Nicho Su gave prescription for percocet. Pt provided transportation to front entrances.

## 2017-04-18 NOTE — DISCHARGE INSTRUCTIONS
Crohn's Disease: Care Instructions  Your Care Instructions    Crohn's disease is a lifelong inflammatory bowel disease (IBD). Parts of the digestive tract get swollen and irritated and may develop deep sores called ulcers. Crohn's disease usually occurs in the last part of the small intestine and the first part of the large intestine. But it can develop anywhere from the mouth to the anus. The main symptoms of Crohn's disease are belly pain, diarrhea, fever, and weight loss. Some people may have constipation. Crohn's disease also sometimes causes problems with the joints, eyes, or skin. Your symptoms may be mild at some times and severe at others. The disease can also go into remission, which means that it is not active and you have no symptoms. Bad attacks of Crohn's disease often have to be treated in the hospital so that you can get medicines and liquids through a tube in your vein, called an IV. This gives your digestive system time to rest and recover. Talk with your doctor about the best treatments for you. You may need medicines that help prevent or treat flare-ups of the disease. You may need surgery to remove part of your bowel if you have an abnormal opening in the bowel (fistula), an abscess, or a bowel obstruction. In some cases, surgery is needed if medicines do not work. But symptoms often return to other areas of the intestines after surgery. Learning good self-care can help you reduce your symptoms and manage Crohn's disease. Follow-up care is a key part of your treatment and safety. Be sure to make and go to all appointments, and call your doctor if you are having problems. Its also a good idea to know your test results and keep a list of the medicines you take. How can you care for yourself at home? · Take your medicines exactly as prescribed. Call your doctor if you think you are having a problem with your medicine.  You will get more details on the specific medicines your doctor prescribes. · Do not take anti-inflammatory medicines, such as aspirin, ibuprofen (Advil, Motrin), or naproxen (Aleve). They may make your symptoms worse. Do not take any other medicines or herbal products without talking to your doctor first.  · Avoid foods that make your symptoms worse. These might include milk, alcohol, high-fiber foods, or spicy foods. · Eat a healthy diet. Make sure to get enough iron. Rectal bleeding may make you lose iron. Good sources of iron include beef, lentils, spinach, raisins, and iron-enriched breads and cereals. · Drink liquid meal replacements if your doctor recommends them. These are high in calories and contain vitamins and minerals. Severe symptoms may make it hard for your body to absorb vitamins and minerals. · Do not smoke. Smoking makes Crohn's disease worse. If you need help quitting, talk to your doctor about stop-smoking programs and medicines. These can increase your chances of quitting for good. · Seek support from friends and family to help cope with Crohn's disease. The illness can affect all parts of your life. Get counseling if you need it. When should you call for help? Call 911 anytime you think you may need emergency care. For example, call if:  · You have severe belly pain. · You passed out (lost consciousness). Call your doctor now or seek immediate medical care if:  · You have signs of needing more fluids. You have sunken eyes and a dry mouth, and you pass only a little dark urine. · You have pain and swelling in the anal area, or you have pus draining from the anal area. · You have a fever or shaking chills. · Your belly is bloated. Watch closely for changes in your health, and be sure to contact your doctor if:  · Your symptoms get worse. · You have diarrhea for more than 2 weeks. · Your pain is not steadily getting better. · You have unexplained weight loss. Where can you learn more?   Go to http://pam.info/. Enter 21  in the search box to learn more about \"Crohn's Disease: Care Instructions. \"  Current as of: August 9, 2016  Content Version: 11.2  © 3274-4283 [employer]. Care instructions adapted under license by [employer] (which disclaims liability or warranty for this information). If you have questions about a medical condition or this instruction, always ask your healthcare professional. [address] any warranty or liability for your use of this information. Deep Vein Thrombosis: Care Instructions  Your Care Instructions    A deep vein thrombosis (DVT) is a blood clot in certain veins of the legs, pelvis, or arms. Blood clots in these veins need to be treated because they can get bigger, break loose, and travel through the bloodstream to the lungs. A blood clot in a lung can be life-threatening. The doctor may have given you a blood thinner (anticoagulant). A blood thinner can stop the blood clot from growing larger and prevent new clots from forming. You will need to take a blood thinner for 3 to 6 months or longer. The doctor has checked you carefully, but problems can develop later. If you notice any problems or new symptoms, get medical treatment right away. Follow-up care is a key part of your treatment and safety. Be sure to make and go to all appointments, and call your doctor if you are having problems. It's also a good idea to know your test results and keep a list of the medicines you take. How can you care for yourself at home? · Take your medicines exactly as prescribed. Call your doctor if you think you are having a problem with your medicine. · If you are taking a blood thinner, be sure you get instructions about how to take your medicine safely. Blood thinners can cause serious bleeding problems. · Wear compression stockings if your doctor recommends them.  These stockings are tighter at the feet than on the legs. They may reduce pain and swelling in your legs. You can get them with a prescription at a medical supply store or at some drugstores. · When you sit, use a pillow to raise the arm or leg that has the blood clot. Try to keep it above the level of your heart. When should you call for help? Call 911 anytime you think you may need emergency care. For example, call if:  · You passed out (lost consciousness). · You have symptoms of a blood clot in your lung (called a pulmonary embolism). These include:  ¨ Sudden chest pain. ¨ Trouble breathing. ¨ Coughing up blood. Call your doctor now or seek immediate medical care if:  · You have new or worse trouble breathing. · You are dizzy or lightheaded, or you feel like you may faint. · You have symptoms of a blood clot in your arm or leg. These may include:  ¨ Pain in the arm, calf, back of the knee, thigh, or groin. ¨ Redness and swelling in the arm, leg, or groin. Watch closely for changes in your health, and be sure to contact your doctor if:  · You do not get better as expected. Where can you learn more? Go to http://johan-maria del carmen.info/. Enter O648 in the search box to learn more about \"Deep Vein Thrombosis: Care Instructions. \"  Current as of: October 13, 2016  Content Version: 11.2  © 6921-8360 [employer]. Care instructions adapted under license by [employer] (which disclaims liability or warranty for this information). If you have questions about a medical condition or this instruction, always ask your healthcare professional. [place] [address] any warranty or liability for your use of this information. Learning About Femoral-Tibial Bypass Surgery for Peripheral Arterial Disease  What is a femoral-tibial bypass? Femoral-tibial bypass is surgery to bypass diseased blood vessels in the lower leg or foot.  The surgery is most often done to help with severe pain or help heal foot sores caused by bad blood circulation. Your doctor uses a graft to bypass the blocked area of the blood vessel. The graft is most often a vein taken from another place in your leg. Sometimes the graft is a man-made blood vessel. The graft will carry blood from the femoral artery in your groin to the tibial artery in your lower leg or foot. This surgery is also known as infra-popliteal reconstruction. How is the surgery done? You will be asleep during the surgery, or you will be given medicine to numb your lower body and prevent pain. The doctor will make cuts (incisions) in your skin above and below the area where the blockage occurs. If the doctor is using one of your veins for a graft, he or she will make another cut in your leg to remove the vein. The doctor then connects one end of the graft to the femoral artery above the blocked area. He or she then connects the other end of the graft to the tibial artery in your lower leg or foot, below the blocked area. After the graft is in place and blood is flowing through the graft, the doctor will close the cuts in your skin with stitches or staples. What can you expect after the surgery? You may need to stay in the hospital for 3 to 5 days. You will have some pain from the cuts (incisions) the doctor made. This usually gets better after about 1 week. You can expect your leg to be swollen at first. This is a normal part of recovery. It may last 2 or 3 months. You will need to take it easy for at least 2 to 6 weeks at home. It may take 6 to 12 weeks to fully recover. You will probably need to take at least 2 to 6 weeks off from work. It depends on the type of work you do and how you feel. Follow-up care is a key part of your treatment and safety. Be sure to make and go to all appointments, and call your doctor if you are having problems. It's also a good idea to know your test results and keep a list of the medicines you take.   Where can you learn more?  Go to http://johan-maria del carmen.info/. Lia Verdugo in the search box to learn more about \"Learning About Femoral-Tibial Bypass Surgery for Peripheral Arterial Disease. \"  Current as of: June 4, 2016  Content Version: 11.2  © 8584-4186 [employer]. Care instructions adapted under license by [employer] (which disclaims liability or warranty for this information). If you have questions about a medical condition or this instruction, always ask your healthcare professional. [place] [address] any warranty or liability for your use of this information. High INR Test Result: Care Instructions  Your Care Instructions  You had a blood test to check how long it takes your blood to clot. This test is called a PT or prothrombin time test. The result of the test is called the INR level. A high INR level can happen when you take warfarin (Coumadin). Warfarin helps prevent blood clots. To do this, it slows the amount of time it takes for your blood to clot. This raises your INR level. The INR goal for people who take warfarin is usually from 2 to 3.5. A value higher than 3.5 increases the risk of bleeding problems. Many things can affect the way warfarin works. Some natural health products and other medicines can make warfarin work too well. That can raise the risk of bleeding. If you drink a lot of alcohol, that may raise your INR. And severe diarrhea or vomiting can also raise your INR. The best way to lower your INR will depend on several things. In some cases, the doctor may have you stop taking warfarin for a few days. You may also be given other medicines to take. You will need to be tested often to make sure your INR level is going down. You will also need to watch for signs of bleeding. The doctor has checked you carefully, but problems can develop later. If you notice any problems or new symptoms, get medical treatment right away.   Follow-up care is a key part of your treatment and safety. Be sure to make and go to all appointments, and call your doctor if you are having problems. It's also a good idea to know your test results and keep a list of the medicines you take. How can you care for yourself at home? Be careful with medicines and foods  · Don't start or stop taking any medicines, vitamins, or natural remedies unless you first talk to your doctor. · Keep the amount of vitamin K in your diet about the same from day to day. Do not suddenly eat a lot more or a lot less food that is rich in vitamin K than you usually do. Vitamin K affects how warfarin works and how your blood clots. · Avoid cranberry juice and other cranberry products. They can increase the effects of warfarin. · Limit your use of alcohol. Avoid bleeding by preventing falls  · Wear slippers or shoes with nonskid soles. · Remove throw rugs and clutter. · Rearrange furniture and electrical cords to keep them out of walking paths. · Keep stairways, porches, and outside walkways well lit. Use night-lights in hallways and bathrooms. · Be extra careful when you work with sharp tools or knives. When should you call for help? Call 911 anytime you think you may need emergency care. For example, call if:  · You have a sudden, severe headache that is different from past headaches. Call your doctor now or seek immediate medical care if:  · You have any abnormal bleeding, such as:  ¨ Nosebleeds. ¨ Vaginal bleeding that is different (heavier, more frequent, at a different time of the month) than what you are used to. ¨ Bloody or black stools, or rectal bleeding. ¨ Bloody or pink urine. Watch closely for changes in your health, and be sure to contact your doctor if you have any problems. Where can you learn more? Go to Eigenta.be  Enter C178 in the search box to learn more about \"High INR Test Result: Care Instructions. \"   © 2013-8099 [employer]. Care instructions adapted under license by Delfina Toscano (which disclaims liability or warranty for this information). This care instruction is for use with your licensed healthcare professional. If you have questions about a medical condition or this instruction, always ask your healthcare professional. [address] any warranty or liability for your use of this information. Content Version: 07.6.651506; Current as of: November 20, 2015             Gastrointestinal Bleeding: Care Instructions  Your Care Instructions    The digestive or gastrointestinal tract goes from the mouth to the anus. It is often called the GI tract. Bleeding can happen anywhere in the GI tract. It may be caused by an ulcer, an infection, or cancer. It may also be caused by medicines such as aspirin or ibuprofen. Light bleeding may not cause any symptoms at first. But if you continue to bleed for a while, you may feel very weak or tired. Sudden, heavy bleeding means you need to see a doctor right away. This kind of bleeding can be very dangerous. But it can usually be cured or controlled. The doctor may do some tests to find the cause of your bleeding. Follow-up care is a key part of your treatment and safety. Be sure to make and go to all appointments, and call your doctor if you are having problems. It's also a good idea to know your test results and keep a list of the medicines you take. How can you care for yourself at home? · Be safe with medicines. Take your medicines exactly as prescribed. Call your doctor if you think you are having a problem with your medicine. You will get more details on the specific medicines your doctor prescribes. · Do not take aspirin or other anti-inflammatory medicines, such as naproxen (Aleve) or ibuprofen (Advil, Motrin), without talking to your doctor first. Ask your doctor if it is okay to use acetaminophen (Tylenol). · Do not drink alcohol.   · The bleeding may make you lose iron. So it's important to eat foods that have a lot of iron. These include red meat, shellfish, poultry, and eggs. They also include beans, raisins, whole-grain breads, and leafy green vegetables. If you want help planning meals, you can make an appointment with a dietitian. When should you call for help? Call 911 anytime you think you may need emergency care. For example, call if:  · You have sudden, severe belly pain. · You vomit blood or what looks like coffee grounds. · You passed out (lost consciousness). · Your stools are maroon or very bloody. Call your doctor now or seek immediate medical care if:  · You are dizzy or lightheaded, or you feel like you may faint. · Your stools are black and look like tar, or they have streaks of blood. · You have belly pain. · You vomit or have nausea. · You have trouble swallowing, or it hurts when you swallow. Watch closely for changes in your health, and be sure to contact your doctor if:  · You do not get better as expected. Where can you learn more? Go to http://johan-maria del carmen.info/. Enter Y782 in the search box to learn more about \"Gastrointestinal Bleeding: Care Instructions. \"  Current as of: May 27, 2016  Content Version: 11.2  © 2087-9853 [employer]. Care instructions adapted under license by [employer] (which disclaims liability or warranty for this information). If you have questions about a medical condition or this instruction, always ask your healthcare professional. [place] [address] any warranty or liability for your use of this information.   Patient armband removed and shredded    DISCHARGE SUMMARY from Nurse    The following personal items are in your possession at time of discharge:    Dental Appliances: None  Visual Aid: None        Jewelry: With patient, Necklace  Clothing: Pants, Footwear, Undergarments, Shirt  Other Valuables: Ryne Guzman, Cell Phone, Purse             PATIENT INSTRUCTIONS:    After general anesthesia or intravenous sedation, for 24 hours or while taking prescription Narcotics:  · Limit your activities  · Do not drive and operate hazardous machinery  · Do not make important personal or business decisions  · Do  not drink alcoholic beverages  · If you have not urinated within 8 hours after discharge, please contact your surgeon on call. Report the following to your surgeon:  · Excessive pain, swelling, redness or odor of or around the surgical area  · Temperature over 100.5  · Nausea and vomiting lasting longer than 4 hours or if unable to take medications  · Any signs of decreased circulation or nerve impairment to extremity: change in color, persistent  numbness, tingling, coldness or increase pain  · Any questions        What to do at Home:  Recommended activity: Activity as tolerated. If you experience any of the following symptoms increased rectal bleeding, other spontaneous bleeding, please follow up with MD/ED. *  Please give a list of your current medications to your Primary Care Provider. *  Please update this list whenever your medications are discontinued, doses are      changed, or new medications (including over-the-counter products) are added. *  Please carry medication information at all times in case of emergency situations. These are general instructions for a healthy lifestyle:    No smoking/ No tobacco products/ Avoid exposure to second hand smoke    Surgeon General's Warning:  Quitting smoking now greatly reduces serious risk to your health.     Obesity, smoking, and sedentary lifestyle greatly increases your risk for illness    A healthy diet, regular physical exercise & weight monitoring are important for maintaining a healthy lifestyle    You may be retaining fluid if you have a history of heart failure or if you experience any of the following symptoms:  Weight gain of 3 pounds or more overnight or 5 pounds in a week, increased swelling in our hands or feet or shortness of breath while lying flat in bed. Please call your doctor as soon as you notice any of these symptoms; do not wait until your next office visit. Recognize signs and symptoms of STROKE:    F-face looks uneven    A-arms unable to move or move unevenly    S-speech slurred or non-existent    T-time-call 911 as soon as signs and symptoms begin-DO NOT go       Back to bed or wait to see if you get better-TIME IS BRAIN. Warning Signs of HEART ATTACK     Call 911 if you have these symptoms:   Chest discomfort. Most heart attacks involve discomfort in the center of the chest that lasts more than a few minutes, or that goes away and comes back. It can feel like uncomfortable pressure, squeezing, fullness, or pain.  Discomfort in other areas of the upper body. Symptoms can include pain or discomfort in one or both arms, the back, neck, jaw, or stomach.  Shortness of breath with or without chest discomfort.  Other signs may include breaking out in a cold sweat, nausea, or lightheadedness. Don't wait more than five minutes to call 911 - MINUTES MATTER! Fast action can save your life. Calling 911 is almost always the fastest way to get lifesaving treatment. Emergency Medical Services staff can begin treatment when they arrive -- up to an hour sooner than if someone gets to the hospital by car. The discharge information has been reviewed with the patient. The patient verbalized understanding. Discharge medications reviewed with the patient and appropriate educational materials and side effects teaching were provided.

## 2017-04-18 NOTE — DISCHARGE SUMMARY
Discharge Summary    Patient: Terry Reyez MRN: [id number]  CSN: [id number]    YOB: 1970  Age: 40 y.o. Sex: female    DOA: 3/27/2017 LOS:  LOS: 22 days   Discharge Date:      Primary Care Provider:  Samir Kahn MD    Admission Diagnoses: Rectal bleed, crohns, anemia, dizziness.   GI bleed  RIGHT POPLITEAL OCCLUSION  Vascular abnormality    Discharge Diagnoses:    Problem List as of 4/18/2017  Date Reviewed: 4/6/2017          Codes Class Noted - Resolved    Vascular abnormality ICD-10-CM: I99.9  ICD-9-CM: 459.9  4/6/2017 - Present        PAD (peripheral artery disease) ([facility] ICD-10-CM: I73.9  ICD-9-CM: 443.9  4/4/2017 - Present        * (Principal)GI bleed ICD-10-CM: K92.2  ICD-9-CM: 578.9  3/28/2017 - Present        DVT (deep venous thrombosis) ([facility] ICD-10-CM: I82.409  ICD-9-CM: 453.40  3/16/2017 - Present        Neuropathic pain ICD-10-CM: M79.2  ICD-9-CM: 729.2  3/15/2017 - Present        Sickle cell trait ([facility] ICD-10-CM: D57.3  ICD-9-CM: 282.5  Unknown - Present        Hypertension ICD-10-CM: I10  ICD-9-CM: 401.9  Unknown - Present        Hx of cocaine abuse ICD-10-CM: Z87.898  ICD-9-CM: 305.63  Unknown - Present        Embolic stroke ([facility] RDR-74-MW: I63.9  ICD-9-CM: 434.11  3/10/2017 - Present        Chronic pain syndrome ICD-10-CM: G89.4  ICD-9-CM: 338.4  2/27/2012 - Present        Crohn disease ([facility] ICD-10-CM: K50.90  ICD-9-CM: 555.9  2/27/2012 - Present        RESOLVED: Elevated WBC count ICD-10-CM: S19.282  ICD-9-CM: 288.60  3/28/2017 - 4/18/2017        RESOLVED: Acute blood loss anemia ICD-10-CM: D62  ICD-9-CM: 285.1  3/15/2017 - 4/18/2017        RESOLVED: Colitis ICD-10-CM: K52.9  ICD-9-CM: 558.9  8/8/2015 - 3/15/2017        RESOLVED: Crohn's disease with complication ([facility] SUNDAY-38-PA: V72.287  ICD-9-CM: 555.9  8/8/2015 - 3/15/2017        RESOLVED: Dehydration ICD-10-CM: E86.0  ICD-9-CM: 276.51  8/8/2015 - 3/15/2017        RESOLVED: Hypokalemia ICD-10-CM: E87.6  ICD-9-CM: 276.8 8/8/2015 - 3/23/2017        RESOLVED: Abdominal pain ICD-10-CM: R10.9  ICD-9-CM: 789.00  2/27/2012 - 3/15/2017        RESOLVED: Obesity ICD-10-CM: E66.9  ICD-9-CM: 278.00  2/27/2012 - 3/15/2017              Discharge Medications:     Current Discharge Medication List      START taking these medications    Details   carvedilol (COREG) 3.125 mg tablet Take 1 Tab by mouth two (2) times daily (with meals). Qty: 60 Tab, Refills: 0      methotrexate (RHEUMATREX) 2.5 mg tablet Take 4 Tabs by mouth every Monday for 30 days. Qty: 16 Tab, Refills: 0         CONTINUE these medications which have CHANGED    Details   warfarin (COUMADIN) 7.5 mg tablet Take 1 Tab by mouth daily. Qty: 10 Tab, Refills: 0         CONTINUE these medications which have NOT CHANGED    Details   atorvastatin (LIPITOR) 80 mg tablet Take 1 Tab by mouth nightly. Qty: 30 Tab, Refills: 2      gabapentin (NEURONTIN) 400 mg capsule Take 1 Cap by mouth three (3) times daily. Qty: 90 Cap, Refills: 2      mesalamine (PENTASA) 500 mg CR capsule Take 2 Caps by mouth four (4) times daily. Indications: CROHN'S DISEASE  Qty: 120 Cap, Refills: 0      predniSONE (DELTASONE) 20 mg tablet Take 2 Tabs by mouth daily (with breakfast). Indications: CROHN'S DISEASE  Qty: 20 Tab, Refills: 0      oxyCODONE-acetaminophen (PERCOCET) 5-325 mg per tablet Take 1 Tab by mouth every four (4) hours as needed for Pain. Max Daily Amount: 6 Tabs.   Qty: 20 Tab, Refills: 0         STOP taking these medications       enoxaparin (LOVENOX) 80 mg/0.8 mL injection Comments:   Reason for Stopping:               Discharge Condition: Good    Procedures : right leg THROMBECTOMY W/REPAIR POPLITEAL ARTERY W/C-ARM,ANGIOGRAM     Consults: Gastroenterology, General Surgery, Psychiatry and Vascular Surgery      PHYSICAL EXAM   Visit Vitals    /76    Pulse 97    Temp 99.1 °F (37.3 °C)    Resp 18    Ht 5' 6\" (1.676 m)    Wt 96.3 kg (212 lb 3.2 oz)    SpO2 100%    Breastfeeding No    BMI 34.25 kg/m2     General: Awake, cooperative, no acute distress    HEENT: NC, Atraumatic. PERRLA, EOMI. Anicteric sclerae. Lungs:  CTA Bilaterally. No Wheezing/Rhonchi/Rales. Heart:  Regular  rhythm,  No murmur, No Rubs, No Gallops  Abdomen: Soft, Non distended, Non tender. +Bowel sounds,   Extremities: Edema LE bilaterally, Right leg incision good, some serous drainage. Psych:   Not anxious or agitated. Neurologic:  No acute neurological deficits. Admission HPI : per Dr. Poncho Mejia. Espinoza Norman is a 40 y.o. female with a hx of Crohns disease, ulcerative colitis, and CVA (1 week ago) and left leg DVT, presents to the emergency department c/o low hemoglobin today at her PCP's office. Patient was told to come to the ED for evaluation and possible blood trasfusion. Associated symptoms include nausea, SOB, lightheadedness, blood in stool, and chills. Patient recently started on lovenox and has been on coumadin for about 5 days. States that her stool is maroon in color for the last 2 days. PMHx of colonoscopy (last week with Nayana Branham MD for blood in stool and polyps were found). Pt denies any other symptoms or complaints. On arrival to the ED her H/H was 6.5/21.0. HR and BP stable. INR of 2.0. Heme-occult is + for blood. Anticoagulation held, protonix given and GI (Fam Perez) consulted who recommended blood transfusion, solumedrol and will evaluate the patient in am.     Hospital Course :   GI bleed - secondary to cronh's disease, seen and following by GI now bleeding has stopped.       Anemia - acute blood loss, s/p blood transfusion, H/H improved.       Recent DVT/CVA - anticoagulation restarted which was held due to GI bleed. Started on heparin drip. Bridge to coumadin, her INR therapeutic.      Right leg pain - right leg arterial duplex >75%stenosis of anterior tibial artery.  Seen and followed by Vascular, CTA with right popliteal occlusion, s/p LEG THROMBECTOMY W/REPAIR POPLITEAL ARTERY W/C-ARM,ANGIOGRAM On 4/6/2017. Per vascular will need indefinite anticoagulation as tolerated. Today she had some drainage from the incision on her right leg, some dehiscence, ok from vascular to discharge.        Crohn's disease - started On steroids, mesalamine and protonix. Seen by colorectal surgery. Given dose of methotrexate. GI prescribed entyvio and she received one dose in hospital.   Discussed with Dr. Paige Hagen (general surgery) and Dr. Jaylene John (GI). Patient is having small blood in her stools, her H/H is stable. Dr. Jaylene John wants patient to have full treatment for her crohns before considering colectomy. If patient has significant bleeding then colectomy can be considered. Patient has complicated medical issues, she has PAD, DVT. Per vascular she will need life long anticoagulation. On the other hand she has GI bleed secondary to crohn's disease, GI will optimize crohn's treatment. Very high risk for readmission. Activity: Activity as tolerated    Diet: Regular Diet    Follow-up: PCP, GI, vascular    Disposition: home with home health    Minutes spent on discharge: 55       Labs: Results:       Chemistry No results for input(s): GLU, NA, K, CL, CO2, BUN, CREA, CA, AGAP, BUCR, TBIL, GPT, AP, TP, ALB, GLOB, AGRAT in the last 72 hours. CBC w/Diff Recent Labs      04/18/17   0330 04/17/17   0235   HGB  8.3*  9.1*   HCT  26.8*  29.0*   PLT  380   --       Cardiac Enzymes No results for input(s): CPK, CKND1, CHANDA in the last 72 hours.     No lab exists for component: CKRMB, TROIP   Coagulation Recent Labs      04/18/17 0330 04/17/17   0235   PTP  24.9*  20.7*   INR  2.4*  1.9*   APTT  59.3*  55.4*       Lipid Panel Lab Results   Component Value Date/Time    Cholesterol, total 145 03/12/2017 06:15 AM    HDL Cholesterol 37 03/12/2017 06:15 AM    LDL, calculated 91.6 03/12/2017 06:15 AM    VLDL, calculated 16.4 03/12/2017 06:15 AM    Triglyceride 82 03/12/2017 06:15 AM    CHOL/HDL Ratio 3.9 03/12/2017 06:15 AM      BNP No results for input(s): BNPP in the last 72 hours. Liver Enzymes No results for input(s): TP, ALB, TBIL, AP, SGOT, GPT in the last 72 hours. No lab exists for component: DBIL   Thyroid Studies Lab Results   Component Value Date/Time    TSH 1.51 12/12/2009 09:48 AM            Significant Diagnostic Studies: Nm Acute Gi Bleed Scan    Result Date: 3/28/2017  EXAM: Nuclear medicine GI bleeding scan INDICATION: Acute GI bleed COMPARISON: None. _______________ FINDINGS: Following the administration of 33 mCi of technetium 99m RBCs, abdominal imaging was performed. There is a focus of increased radiotracer activity at the level of the descending colon and sigmoid colon. This area of radiotracer activity increases with time and travels cephalad and caudad along the course of the rectosigmoid colon. These findings are suggestive of intraluminal hemorrhage at the level of the descending and sigmoid colon. _______________     IMPRESSION: There appears to be intraluminal hemorrhage at the level of the descending and sigmoid colon. I Informed Peg Carroll the nurse caring for this patient at 6:45 PM March 28, 2017. Xr Foot Rt Min 3 V    Result Date: 4/3/2017  EXAM: Right foot series, 3 views INDICATION: Pain in right forefoot since March 10 with cold toes and purplish discoloration under the great toe. COMPARISON: None. _______________ FINDINGS: No fracture or joint deformity. No erosions, periosteal reactions or soft tissue gas. _______________     IMPRESSION: Negative right foot series     Ct Head Wo Cont    Result Date: 3/28/2017  EXAM: CT head INDICATION: 52-year-old patient with hypertension, altered mental status. COMPARISON: Several prior examinations, most recently brain MRI dated 3/10/2017.  TECHNIQUE: Axial CT imaging of the head was performed without intravenous contrast. One or more dose reduction techniques were used on this CT: automated exposure control, adjustment of the mAs and/or kVp according to patient's size, and iterative reconstruction techniques. The specific techniques utilized on this CT exam have been documented in the patient's electronic medical record. _______________ FINDINGS: Detail is mildly limited by motion artifact. BRAIN AND POSTERIOR FOSSA: As previously, there is redemonstration of abnormal clear with matter differentiation involving the right centrum semiovale, corona radiata, right-sided insular cortex, and right parietal/temporal lobes, spanning the right sylvian fissure. There is mild associated mass effect upon the right lateral ventricle, the appearance of which is similar to prior exam. No evidence of midline shift. The basilar cisterns remain patent. The known multiple cerebellar infarcts are characterized to better advantage on comparison brain MRI. Mild increased density of the infarcted cortical surfaces is noted. No intra-axial fluid collection. EXTRA-AXIAL SPACES AND MENINGES: There are no abnormal extra-axial fluid collections. CALVARIUM: Intact. SINUSES: Paranasal sinuses and mastoid air cells are clear. OTHER: None. _______________     IMPRESSION: 1. Redemonstration of sequela of multifocal, likely embolic infarcts without acute intra-axial or extra-axial fluid collection. 2. Mild persistent edema and mass effect upon the right lateral ventricle, without evidence of midline shift. Cta Abd Art W Runoff W Wo Cont    Result Date: 4/4/2017  PROCEDURE: CT angiogram of the abdomen, pelvis and bilateral lower extremities. CLINICAL HISTORY:  Diminished pulses, leg, embolism, aorta, branches. Pain in right foot. COMPARISON: No prior CTA examination TECHNIQUE: Contrast-enhanced CT angiogram of the abdomen, pelvis and both lower extremities was performed after the uneventful administration of 125 mL of Isovue 370. Axial images acquired from the domes of the diaphragms through both feet.  Coronal and sagittal reformatted images were generated from the axial data. MIP and curved reformatted images were generated on a separate workstation. --- CTA FINDINGS --- Visualized descending thoracic aorta is unremarkable. The abdominal aorta has a normal caliber normally tapering vessel without aneurysm, dissection or stenosis. No aortic thrombus is seen. The celiac axis, superior mesenteric artery, single bilateral renal arteries and inferior mesenteric arteries are normal caliber widely patent vessels. The aortic bifurcation is patent. The common iliac arteries are patent. The iliac bifurcations are patent, the internal and external iliac arteries bilaterally are patent. Patent common femoral arteries. Patent profunda femoris arteries. There is some patient motion artifact which partially obscures the proximal superficial femoral arteries bilaterally, they are suspected to be widely patent through this region of motion artifact although suboptimally evaluated over this short segment. The remainder of the bilateral superficial femoral arteries appear widely patent. The above-knee popliteal arteries are patent bilaterally. The left below knee popliteal artery is widely patent. There is a short segment occlusion of the below knee right popliteal artery. The occlusion extends into the origin of the right anterior tibial artery and tibioperoneal trunk. The right anterior tibial artery and peroneal artery reconstitute via collaterals. The right posterior tibial artery is occluded along its length. The left tibial runoff appears overall intact. Dominant runoff vessel is the anterior tibial artery. The tibioperoneal trunk and peroneal artery are widely patent. The posterior tibial artery is diminutive proximally and is largely fed by a peroneal collateral distally. --- CT FINDINGS --- Mild dependent atelectasis lung bases. Visualized heart and pericardium are unremarkable. Contracted gallbladder.  Small right hepatic cyst and focal fatty infiltration along the margins for fissure for ligamentum teres left hepatic lobe. No suspicious hepatic abnormality. Unremarkable spleen, pancreas, adrenal glands and kidneys. No retroperitoneal masses or adenopathy. The abdominal bowel is unremarkable. Within the pelvis the appendix is noted to be normal. There is no pericolonic inflammatory stranding. No significant free fluid. Short segment of the sigmoid colon is underdistended, results in a slightly thick walled appearance. Uterus and ovaries are present. Heterogeneity in coarse calcification at the uterine fundus suggests underlying fibroids. No pelvic masses or adenopathy. There are regions of nonspecific stranding within the subcutaneous fat of the body wall and involving the lower extremities, left greater than right, could be related to overall volume status and/or the presence of cellulitis. Note of a small likely Baker's cyst at the left knee. Foci simultaneous gas within the anterior abdominal wall are presumably related to injections. Bones appear normal for age. IMPRESSION: 1. No aortic or inflow lesions. 2.  There is a short segment occlusion of the below knee right popliteal artery. The occlusion extends into the origin of the right anterior tibial artery and tibioperoneal trunk. The right anterior tibial artery and peroneal artery reconstitute via collaterals. The right posterior tibial artery is occluded along its length. 3.  The left tibial runoff appears overall intact. Dominant runoff vessel is the anterior tibial artery. The tibioperoneal trunk and peroneal artery are widely patent. The posterior tibial artery is diminutive proximally and is largely fed by a peroneal collateral distally. Xr Chest Port    Result Date: 3/28/2017  Chest, single view Indication: Line placement Comparison: Several prior exams, most recently 2/26/2016 Findings:  Portable upright AP view of the chest was obtained.  There is a right subclavian approach central venous catheter with tip projecting over the superior cavoatrial junction. The lungs are mildly underexpanded but clear. No focal pneumonic opacity, pneumothorax, or pleural effusion. The cardiac size and mediastinal contours are within normal limits. Pulmonary vascularity normal. No acute osseous abnormality. Impression: 1. Right subclavian approach central venous catheter in position as above. 2. Mild pulmonary hypoinflation without superimposed acute radiographic abnormality. Ir Fluoro Guide Picc Insertion    Result Date: 3/20/2017  PROCEDURE: RIGHT ARM PICC LINE PLACEMENT INDICATION: Poor peripheral venous access. Requires venous access for frequent blood draws. Radiation dose (reference air kerma): 5 mGy Timeout: 9070 Start time: 2646 Stop Time: 4974 Technique: Ultrasound evaluation of potential access sites was performed. The right brachial vein is patent. A permanent recording was created for the patient record. Maximal sterile barrier technique (catheter, mass, sterile gown, sterile gloves, large sterile sheet, hand hygiene, and cutaneous antisepsis with 2% chlorhexidine) was followed. Sterile ultrasound technique was used with sterile gel and sterile probe cover. The patient's right upper arm was prepped and draped in sterile fashion and bicarbonate buffered lidocaine was used for local anesthesia. Under documented fluoroscopy and ultrasound guidance with a 21-gauge needle, the right brachial vein was accessed. The tip of the needle was identified within the lumen of the patent vein under ultrasound. A permanent ultrasound recording was obtained. A guide wire was advanced under fluoroscopic control to the superior vena cava. The distance between the superior vena cava and skin puncture site was measured and a 5 Western Debbie double lumen Bard Power PICC was cut to the appropriate length. Overall catheter length was 34 cm. The PICC was advanced to the SVC under fluoroscopic control.   The line was flushed with heparinized saline and adhered to the skin with a Statlock device. An AP view of the chest was obtained to document catheter, fluoroscopy image was stored. The patient tolerated the procedure well and there were no immediate complications. Findings: Final AP view of the chest shows good position of the PICC with its tip in the SVC. Both ports flushed easily and there was good blood return. IMPRESSION: 1. Successful right upper extremity PICC line placement as described above. Both ports flushed easily and there is good blood return. Felicitas Prince Technologist Service    Result Date: 4/10/2017  See impression. Impression:  Fluoroscopy was provided for this procedure under the supervision and/or direction of the attending provider. ? For further information regarding this procedure, see patient medical record. No results found for this or any previous visit.         CC: Jovanna Rueda MD

## 2017-04-18 NOTE — ROUTINE PROCESS
Bedside and Verbal shift change report given to María Canales RN (oncoming nurse) by Baltazar Lazar RN (offgoing nurse). Report included the following information SBAR, Kardex, ED Summary, Procedure Summary, Intake/Output, MAR, Recent Results and Med Rec Status.

## 2017-04-18 NOTE — ROUTINE PROCESS
Dual AVS reviewed with AKANKSHA Saldivar RN. All medications reviewed individually with patient. Opportunities for questions and concerns provided. Patient discharged via (mode of transport ie. Car, ambulance or air transport) car  Patient's arm band appropriately discarded.

## 2017-04-18 NOTE — PROGRESS NOTES
Pt seen and examined. Called by nursing staff due to fluid draining from right leg wound. Wound examined, there is a slight dehiscence of the distal aspect of the popliteal incision with serous drainage. I was able to express approximately 20cc of pressurized serous fluid. No evidence of infection at this time. Rod Lien for d/c to home with home health with dry dressings to collect the fluid. Will also ask wound care to place both of her legs in tubigrip compression. Will also see patient in my office next week.

## 2017-04-18 NOTE — WOUND CARE
Wound care in for application of tubigrip. Dressing was not intact due to up to bathroom. Applied 4x4, abd pad, mepilex border and tubigrip size E. Tubigrip size D also given to patient for swelling until she can get compression stockings. Patient to follow up with Dr Joyce Dietrich.

## 2017-04-19 ENCOUNTER — HOME CARE VISIT (OUTPATIENT)
Dept: HOME HEALTH SERVICES | Facility: HOME HEALTH | Age: 47
End: 2017-04-19
Payer: MEDICAID

## 2017-04-20 ENCOUNTER — HOME CARE VISIT (OUTPATIENT)
Dept: SCHEDULING | Facility: HOME HEALTH | Age: 47
End: 2017-04-20
Payer: MEDICAID

## 2017-04-20 ENCOUNTER — HOSPITAL ENCOUNTER (OUTPATIENT)
Dept: INFUSION THERAPY | Age: 47
End: 2017-04-20

## 2017-04-20 ENCOUNTER — HOME CARE VISIT (OUTPATIENT)
Dept: HOME HEALTH SERVICES | Facility: HOME HEALTH | Age: 47
End: 2017-04-20
Payer: MEDICAID

## 2017-04-20 VITALS
BODY MASS INDEX: 30.05 KG/M2 | OXYGEN SATURATION: 98 % | HEART RATE: 92 BPM | RESPIRATION RATE: 16 BRPM | DIASTOLIC BLOOD PRESSURE: 72 MMHG | SYSTOLIC BLOOD PRESSURE: 109 MMHG | HEIGHT: 66 IN | WEIGHT: 187 LBS | TEMPERATURE: 97.5 F

## 2017-04-20 LAB
INR BLD: 2.3 (ref 0.9–1.1)
PT POC: 28.1 SEC

## 2017-04-20 PROCEDURE — G0299 HHS/HOSPICE OF RN EA 15 MIN: HCPCS

## 2017-04-21 ENCOUNTER — HOME CARE VISIT (OUTPATIENT)
Dept: SCHEDULING | Facility: HOME HEALTH | Age: 47
End: 2017-04-21
Payer: MEDICAID

## 2017-04-21 ENCOUNTER — HOME CARE VISIT (OUTPATIENT)
Dept: HOME HEALTH SERVICES | Facility: HOME HEALTH | Age: 47
End: 2017-04-21
Payer: MEDICAID

## 2017-04-21 VITALS — SYSTOLIC BLOOD PRESSURE: 150 MMHG | TEMPERATURE: 98 F | RESPIRATION RATE: 17 BRPM | DIASTOLIC BLOOD PRESSURE: 80 MMHG

## 2017-04-21 PROCEDURE — G0151 HHCP-SERV OF PT,EA 15 MIN: HCPCS

## 2017-04-22 ENCOUNTER — HOSPITAL ENCOUNTER (EMERGENCY)
Age: 47
Discharge: HOME | End: 2017-04-22
Attending: FAMILY MEDICINE
Payer: MEDICAID

## 2017-04-22 ENCOUNTER — APPOINTMENT (OUTPATIENT)
Dept: GENERAL RADIOLOGY | Age: 47
End: 2017-04-22
Attending: FAMILY MEDICINE
Payer: MEDICAID

## 2017-04-22 VITALS
RESPIRATION RATE: 18 BRPM | SYSTOLIC BLOOD PRESSURE: 150 MMHG | TEMPERATURE: 98.2 F | OXYGEN SATURATION: 100 % | BODY MASS INDEX: 32.14 KG/M2 | WEIGHT: 200 LBS | DIASTOLIC BLOOD PRESSURE: 88 MMHG | HEART RATE: 95 BPM | HEIGHT: 66 IN

## 2017-04-22 DIAGNOSIS — M79.621 PAIN OF RIGHT UPPER ARM: Primary | ICD-10-CM

## 2017-04-22 LAB
APTT PPP: 44.6 SEC (ref 23–36.4)
BASOPHILS # BLD AUTO: 0 K/UL (ref 0–0.06)
BASOPHILS # BLD: 0 % (ref 0–2)
DIFFERENTIAL METHOD BLD: ABNORMAL
EOSINOPHIL # BLD: 0.1 K/UL (ref 0–0.4)
EOSINOPHIL NFR BLD: 2 % (ref 0–5)
ERYTHROCYTE [DISTWIDTH] IN BLOOD BY AUTOMATED COUNT: 15.5 % (ref 11.6–14.5)
HCT VFR BLD AUTO: 29.1 % (ref 35–45)
HGB BLD-MCNC: 9.3 G/DL (ref 12–16)
INR PPP: 2 (ref 0.8–1.2)
LYMPHOCYTES # BLD AUTO: 29 % (ref 21–52)
LYMPHOCYTES # BLD: 2.7 K/UL (ref 0.9–3.6)
MCH RBC QN AUTO: 27.2 PG (ref 24–34)
MCHC RBC AUTO-ENTMCNC: 32 G/DL (ref 31–37)
MCV RBC AUTO: 85.1 FL (ref 74–97)
MONOCYTES # BLD: 1.2 K/UL (ref 0.05–1.2)
MONOCYTES NFR BLD AUTO: 13 % (ref 3–10)
NEUTS SEG # BLD: 5.2 K/UL (ref 1.8–8)
NEUTS SEG NFR BLD AUTO: 56 % (ref 40–73)
PLATELET # BLD AUTO: 412 K/UL (ref 135–420)
PMV BLD AUTO: 8.6 FL (ref 9.2–11.8)
PROTHROMBIN TIME: 21.4 SEC (ref 11.5–15.2)
RBC # BLD AUTO: 3.42 M/UL (ref 4.2–5.3)
WBC # BLD AUTO: 9.2 K/UL (ref 4.6–13.2)

## 2017-04-22 PROCEDURE — 85610 PROTHROMBIN TIME: CPT | Performed by: FAMILY MEDICINE

## 2017-04-22 PROCEDURE — 85025 COMPLETE CBC W/AUTO DIFF WBC: CPT | Performed by: FAMILY MEDICINE

## 2017-04-22 PROCEDURE — 93971 EXTREMITY STUDY: CPT

## 2017-04-22 PROCEDURE — 71010 XR CHEST PORT: CPT

## 2017-04-22 PROCEDURE — 85730 THROMBOPLASTIN TIME PARTIAL: CPT | Performed by: FAMILY MEDICINE

## 2017-04-22 PROCEDURE — 99282 EMERGENCY DEPT VISIT SF MDM: CPT

## 2017-04-22 PROCEDURE — 96374 THER/PROPH/DIAG INJ IV PUSH: CPT

## 2017-04-22 PROCEDURE — 74011250636 HC RX REV CODE- 250/636: Performed by: FAMILY MEDICINE

## 2017-04-22 RX ORDER — SODIUM CHLORIDE 0.9 % (FLUSH) 0.9 %
5-10 SYRINGE (ML) INJECTION EVERY 8 HOURS
Status: DISCONTINUED | OUTPATIENT
Start: 2017-04-22 | End: 2017-04-22 | Stop reason: HOSPADM

## 2017-04-22 RX ORDER — HYDROMORPHONE HYDROCHLORIDE 2 MG/ML
2 INJECTION, SOLUTION INTRAMUSCULAR; INTRAVENOUS; SUBCUTANEOUS ONCE
Status: COMPLETED | OUTPATIENT
Start: 2017-04-22 | End: 2017-04-22

## 2017-04-22 RX ORDER — SODIUM CHLORIDE 0.9 % (FLUSH) 0.9 %
5-10 SYRINGE (ML) INJECTION AS NEEDED
Status: DISCONTINUED | OUTPATIENT
Start: 2017-04-22 | End: 2017-04-22 | Stop reason: HOSPADM

## 2017-04-22 RX ORDER — HEPARIN 100 UNIT/ML
300 SYRINGE INTRAVENOUS AS NEEDED
Status: DISCONTINUED | OUTPATIENT
Start: 2017-04-22 | End: 2017-04-22 | Stop reason: HOSPADM

## 2017-04-22 RX ADMIN — Medication 300 UNITS: at 12:27

## 2017-04-22 RX ADMIN — HYDROMORPHONE HYDROCHLORIDE 2 MG: 2 INJECTION INTRAMUSCULAR; INTRAVENOUS; SUBCUTANEOUS at 12:08

## 2017-04-22 NOTE — DISCHARGE INSTRUCTIONS
Arm Pain: Care Instructions  Your Care Instructions  You can hurt your arm by using it too much or by injuring it. Biking, wrestling, and home repair projects are examples of activities that can lead to arm pain. Everyday wear and tear, especially as you get older, can cause arm pain. Your forearms, wrists, hands, and fingers are the parts of your arm that are most likely to become painful. A minor arm injury usually will heal on its own with home treatment to relieve swelling and pain. If you have a more serious injury, you may need tests and treatment. Follow-up care is a key part of your treatment and safety. Be sure to make and go to all appointments, and call your doctor if you are having problems. Its also a good idea to know your test results and keep a list of the medicines you take. How can you care for yourself at home? · Take pain medicines exactly as directed. ¨ If the doctor gave you a prescription medicine for pain, take it as prescribed. ¨ If you are not taking a prescription pain medicine, ask your doctor if you can take an over-the-counter medicine. · Rest and protect your arm. Take a break from any activity that may cause pain. · Put ice or a cold pack on your arm for 10 to 20 minutes at a time. Put a thin cloth between the ice and your skin. · Prop up the sore arm on a pillow when you ice it or anytime you sit or lie down during the next 3 days. Try to keep it above the level of your heart. This will help reduce swelling. · If your doctor recommends a sling to support your arm, wear it as directed. When should you call for help? Call 911 anytime you think you may need emergency care. For example, call if:  · Your arm or hand is cool or pale or changes color. Call your doctor now or seek immediate medical care if:  · You cannot use your arm. · You have signs of infection, such as:  ¨ Increased pain, swelling, warmth, or redness. ¨ Red streaks running up or down your arm.   ¨ Pus draining from an area of your arm. ¨ A fever. · You have tingling, weakness, or numbness in your arm. Watch closely for changes in your health, and be sure to contact your doctor if:  · You do not get better as expected. Where can you learn more? Go to http://johan-maria del carmen.info/. Enter B641 in the search box to learn more about \"Arm Pain: Care Instructions. \"  Current as of: May 27, 2016  Content Version: 11.2  © 5766-5930 [employer]. Care instructions adapted under license by [employer] (which disclaims liability or warranty for this information). If you have questions about a medical condition or this instruction, always ask your healthcare professional. [address] any warranty or liability for your use of this information.

## 2017-04-22 NOTE — ED TRIAGE NOTES
Pt c/o pain around right subclavian central line site and would like to be evaluated to be taken out today. She states receives regular infusions for Crohn's disease but the line \"is just so painful\". Denies fevers/chills. Sepsis Screening completed    (  )Patient meets SIRS criteria. ( x)Patient does not meet SIRS criteria.       SIRS Criteria is achieved when two or more of the following are present   Temperature < 96.8°F (36°C) or > 100.9°F (38.3°C)   Heart Rate > 90 beats per minute   Respiratory Rate > 20 breaths per minute   WBC count > 12,000 or <4,000 or > 10% bands

## 2017-04-22 NOTE — ED NOTES
Patient states her son will be here in a few minutes but would go to vascular for her and when her son gets here she will take the pain medications.

## 2017-04-22 NOTE — ED NOTES
Pt hourly rounding competed. Pt resting in bed quietly with tv on and on cell phone. She does not have any requests or complaints at this time except for pain medicine. RN reinforced with pt that her son must be here at bedside to take her home before she can have a sedating medication for pain. She expressed verbal understanding. Call bell within reach. Safety   Pt (x) resting on stretcher with side rails up and call bell in reach. () in chair    () in parents arms. Toileting   Pt offered ()Bedpan     (x)Assistance to Restroom     ()Urinal  Ongoing Updates  Updated on plan of care and status of test results.   Pain Management  Inquired as to comfort and offered comfort measures:    (x) warm blankets   (x) dimmed lights

## 2017-04-22 NOTE — PROCEDURES
[facility]  *** FINAL REPORT ***    Name: Contreras Nava  MRN: [id number]    Outpatient  : [address] 1970  HIS Order #: [id number]  [id number] [facility] Visit #: [id number]  Date: 2017    TYPE OF TEST: Peripheral Venous Testing    REASON FOR TEST  Pain in limb    Right Arm:-  Deep venous thrombosis:           No  Superficial venous thrombosis:    Not examined      INTERPRETATION/FINDINGS  Duplex images were obtained using 2-D gray scale, color flow and  spectral doppler analysis. Right arm :  1. Deep vein(s) visualized include the internal jugular, subclavian,  axillary, brachial, radial and ulnar veins. 2. No evidence of deep venous thrombosis detected in the veins  visualized. 3. The central line was visualized inside the lumen of the right IJV,  which appeared patent via colorflow and spectral Doppler analysis. There was no evidence of deep venous thrombosis involving the  contralateral left IJV at the present time. ADDITIONAL COMMENTS    I have personally reviewed the data relevant to the interpretation of  this  study. TECHNOLOGIST: Shahrzad Carroll  Signed: 2017 10:14 AM    PHYSICIAN: Jesu Barger MD  Signed: 2017 01:47 PM

## 2017-04-22 NOTE — ED NOTES
I have reviewed discharge instructions with the patient. Reinforced to FU with vascular surgeon ASAP to evaluate her leg and central line removal.The patient verbalized understanding. She is leaving ambulatory with her son as transportation home. Armband removed and shredded.

## 2017-04-22 NOTE — ED PROVIDER NOTES
[address]  EMERGENCY DEPARTMENT HISTORY AND PHYSICAL EXAM       Date: 4/22/2017   Patient Name: Dmitry Porter   YOB: 1970  Medical Record Number: [id number]    History of Presenting Illness     Chief Complaint   Patient presents with    Vascular Access Problem        History Provided By:  patient    Additional History:   7:26 AM  Dmitry Porter is a 40 y.o. female who with hx of Crohn's disease, anemia, HTN, CVA, and DVT presents to the emergency department C/O right subclavian pain and burning (rated 10/10) near her central line onset several days ago. Other sx include low grade fever and nausea. Pt receives infusions for Crohn's disease ever few months since she is on Coumadin from previous CVA. Pt reports she would like her central line taken out as it is causing her increased discomfort. Pt also recently had a DVT removed from her right lower leg on 4/06/17. Denies chills, vomiting, and any other sx or complaints. Primary Care Provider: Melchor Dan MD   Specialist:    Past History     Past Medical History:   Past Medical History:   Diagnosis Date    Abdominal pain     Anemia NEC     sickle cell trait    C. difficile colitis     Crohn's disease ([address]     Gastrointestinal disorder     Crohns    Herpes zoster     Hx of cocaine abuse     Hyperkalemia     Hypertension     Iron deficiency anemia     MRSA (methicillin resistant Staphylococcus aureus)     Obesity     Pain management     Sickle cell trait ([address]     St[facility]     + cva 3/17        Past Surgical History:   Past Surgical History:   Procedure Laterality Date    COLONOSCOPY N/A 3/17/2017    COLONOSCOPY; BIOPSY; performed by Shadi Dunbar MD at [facility] ENDOSCOPY    HX GYN      tubal ligation    HX TUBAL LIGATION          Family History:   History reviewed. No pertinent family history.      Social History:   Social History   Substance Use Topics    Smoking status: Former Smoker    Smokeless tobacco: None    Alcohol use No        Allergies: Allergies   Allergen Reactions    Humira [Adalimumab] Other (comments)    Remicade [Infliximab] Palpitations        Review of Systems   Review of Systems   Constitutional: Positive for fever. Negative for chills. Gastrointestinal: Positive for nausea. Negative for vomiting. Musculoskeletal: Positive for myalgias (right subclavian ). All other systems reviewed and are negative. Physical Exam  Vitals:    04/22/17 1115 04/22/17 1130 04/22/17 1145 04/22/17 1220   BP: 134/50 131/83 135/87 150/88   Pulse:    95   Resp:    18   Temp:    98.2 °F (36.8 °C)   SpO2:  100%  100%   Weight:       Height:           Physical Exam   Nursing note and vitals reviewed. Vital signs and nursing notes reviewed    CONSTITUTIONAL: Alert, in no apparent distress; well-developed; well-nourished. Middle aged. HEAD:  Normocephalic, atraumatic  EYES: PERRL; EOM's intact. ENTM: Nose: no rhinorrhea; Throat: no erythema or exudate, mucous membranes moist  Neck:  No JVD, supple without lymphadenopathy  RESP: Chest clear, equal breath sounds. CV: S1 and S2 WNL; No murmurs, gallops or rubs. GI: Normal bowel sounds, abdomen soft and non-tender. No masses or organomegaly. : No costo-vertebral angle tenderness. BACK:  Non-tender  UPPER EXT:   She has a right subclavian triple lumen catheter. Swelling in the right supraclavicular region and some tenderness. LOWER EXT: No edema, no calf tenderness. Distal pulses intact. NEURO: CN intact, reflexes 2/4 and sym, strength 5/5 and sym, sensation intact. Mild left sided hemiparesis that is residual from recent stroke  SKIN: No rashes; Normal for age and stage. Healing surgical scar on right lower extremity  PSYCH:  Alert and oriented, normal affect.     Diagnostic Study Results     Labs -      Recent Results (from the past 12 hour(s))   PROTHROMBIN TIME + INR    Collection Time: 04/22/17  8:45 AM   Result Value Ref Range Prothrombin time 21.4 (H) 11.5 - 15.2 sec    INR 2.0 (H) 0.8 - 1.2     PTT    Collection Time: 04/22/17  8:45 AM   Result Value Ref Range    aPTT 44.6 (H) 23.0 - 36.4 SEC   CBC WITH AUTOMATED DIFF    Collection Time: 04/22/17  8:45 AM   Result Value Ref Range    WBC 9.2 4.6 - 13.2 K/uL    RBC 3.42 (L) 4.20 - 5.30 M/uL    HGB 9.3 (L) 12.0 - 16.0 g/dL    HCT 29.1 (L) 35.0 - 45.0 %    MCV 85.1 74.0 - 97.0 FL    MCH 27.2 24.0 - 34.0 PG    MCHC 32.0 31.0 - 37.0 g/dL    RDW 15.5 (H) 11.6 - 14.5 %    PLATELET 332 872 - 395 K/uL    MPV 8.6 (L) 9.2 - 11.8 FL    NEUTROPHILS 56 40 - 73 %    LYMPHOCYTES 29 21 - 52 %    MONOCYTES 13 (H) 3 - 10 %    EOSINOPHILS 2 0 - 5 %    BASOPHILS 0 0 - 2 %    ABS. NEUTROPHILS 5.2 1.8 - 8.0 K/UL    ABS. LYMPHOCYTES 2.7 0.9 - 3.6 K/UL    ABS. MONOCYTES 1.2 0.05 - 1.2 K/UL    ABS. EOSINOPHILS 0.1 0.0 - 0.4 K/UL    ABS. BASOPHILS 0.0 0.0 - 0.06 K/UL    DF AUTOMATED         Radiologic Studies -  The following have been ordered and reviewed:  XR CHEST PORT   Final Result    1. Right subclavian approach central venous catheter in position as above. 2. Mild pulmonary hypoinflation, without superimposed acute radiographic  Abnormality. As read by the radiologist.      Flor Reynolds EXT VENOUS RIGHT   INTERPRETATION/FINDINGS  Duplex images were obtained using 2-D gray scale, color flow and  spectral doppler analysis.     Right arm :  1. Deep vein(s) visualized include the internal jugular, subclavian,  axillary, brachial, radial and ulnar veins. 2. No evidence of deep venous thrombosis detected in the veins  visualized. 3. The central line was visualized inside the lumen of the right IJV,  which appeared patent via colorflow and spectral Doppler analysis.     There was no evidence of deep venous thrombosis involving the  contralateral left IJV at the present time. As read by the [address] technologist.            Medical Decision Making   I am the first provider for this patient.      I reviewed the vital signs, available nursing notes, past medical history, past surgical history, family history and social history. Vital Signs-Reviewed the patient's vital signs. Patient Vitals for the past 12 hrs:   Temp Pulse Resp BP SpO2   04/22/17 1220 98.2 °F (36.8 °C) 95 18 150/88 100 %   04/22/17 1145 - - - 135/87 -   04/22/17 1130 - - - 131/83 100 %   04/22/17 1115 - - - 134/50 -   04/22/17 0900 - - - 126/84 100 %   04/22/17 0800 - - - 115/86 100 %   04/22/17 0745 - - - 107/76 99 %   04/22/17 0740 - - - - 100 %   04/22/17 0730 - - - (!) 135/106 100 %   04/22/17 0725 98.8 °F (37.1 °C) 95 18 111/73 100 %       Pulse Oximetry Analysis - Normal 100% on room air     Old Medical Records: Old medical records. Nursing notes. Provider Notes:   INITIAL CLINICAL IMPRESSION and PLANS:  The patient presents with the primary complaint(s) of: vascular access problem. The presentation, to include historical aspects and clinical findings are consistent with the DX of DVT of subclavian vein. However, other possible DX's to consider as primary, associated with, or exacerbated by include:    1. Line infectoin    Considering the above, my initial management plan to evaluate and therapeutic interventions include the following and as noted in the orders:    1. Labs: CBC, PTT, INR  2. Imaging: Duplex upper ext venous right     Procedures:   Procedures    ED Course:  7:26 AM  Initial assessment performed. The patients presenting problems have been discussed, and they are in agreement with the care plan formulated and outlined with them. I have encouraged them to ask questions as they arise throughout their visit. 10:40 AM  Pt feeling better after pain meds. Vitals remain stable. Good pulse in distal arm.     Medications Given in the ED:  Medications   sodium chloride (NS) flush 5-10 mL (not administered)   sodium chloride (NS) flush 5-10 mL (not administered)   heparin (porcine) pf 300 Units (300 Units InterCATHeter Given 4/22/17 1227)   HYDROmorphone (DILAUDID) injection 2 mg (2 mg IntraVENous Given 4/22/17 1208)       Discharge Note:  10:44 AM  Pt has been reexamined. Patient has no new complaints, changes, or physical findings. Care plan outlined and precautions discussed. Results were reviewed with the patient. All medications were reviewed with the patient; will d/c home. All of pt's questions and concerns were addressed. Patient was instructed and agrees to follow up with PCP, as well as to return to the ED upon further deterioration. Patient is ready to go home. Diagnosis   Clinical Impression:   1. Pain of right upper arm         Discussion:  Pt presented with pain in the right supraclavicular region. She is concerned about her indwelling central line which is being used as a port for her infusion therapy. Her labs were unremarkable, her INR was therapeutic, and duplex scan of upper extremity was negative for DVT. She is to follow up with her primary for further evaluation. Follow-up Information     Follow up With Details Comments Contact Info    Clarisse Caballero MD Call in 1 day to make an appointment for PCP follow up [address] [facility] EMERGENCY DEPT  As needed, If symptoms worsen [address] [place] [id number]  [phone]          Discharge Medication List as of 4/22/2017 10:44 AM          _______________________________   Attestations: This note is prepared by Bisi Miner, acting as a Scribe for Jillian Hadley MD on 7:21 AM on 4/22/2017. Jillian Hadley MD: The scribe's documentation has been prepared under my direction and personally reviewed by me in its entirety.   _______________________________

## 2017-04-22 NOTE — ED NOTES
Pt hourly rounding competed. Resting quietly in bed with tv on. No complaints or requests at this time, awaiting son's arrival for pain medication administration. Call bell within reach. Safety   Pt (x) resting on stretcher with side rails up and call bell in reach. () in chair    () in parents arms. Toileting   Pt offered ()Bedpan     (x)Assistance to Restroom     ()Urinal  Ongoing Updates  Updated on plan of care and status of test results.   Pain Management  Inquired as to comfort and offered comfort measures:    -no requests at this time

## 2017-04-24 ENCOUNTER — HOME CARE VISIT (OUTPATIENT)
Dept: SCHEDULING | Facility: HOME HEALTH | Age: 47
End: 2017-04-24
Payer: MEDICAID

## 2017-04-24 ENCOUNTER — HOME CARE VISIT (OUTPATIENT)
Dept: HOME HEALTH SERVICES | Facility: HOME HEALTH | Age: 47
End: 2017-04-24
Payer: MEDICAID

## 2017-04-24 VITALS
HEART RATE: 92 BPM | TEMPERATURE: 98 F | OXYGEN SATURATION: 98 % | DIASTOLIC BLOOD PRESSURE: 71 MMHG | SYSTOLIC BLOOD PRESSURE: 115 MMHG | RESPIRATION RATE: 16 BRPM

## 2017-04-24 PROCEDURE — G0152 HHCP-SERV OF OT,EA 15 MIN: HCPCS

## 2017-04-24 PROCEDURE — G0495 RN CARE TRAIN/EDU IN HH: HCPCS

## 2017-04-25 ENCOUNTER — HOME CARE VISIT (OUTPATIENT)
Dept: SCHEDULING | Facility: HOME HEALTH | Age: 47
End: 2017-04-25
Payer: MEDICAID

## 2017-04-25 ENCOUNTER — OFFICE VISIT (OUTPATIENT)
Dept: VASCULAR SURGERY | Age: 47
End: 2017-04-25

## 2017-04-25 VITALS
HEIGHT: 66 IN | DIASTOLIC BLOOD PRESSURE: 82 MMHG | HEART RATE: 88 BPM | BODY MASS INDEX: 32.14 KG/M2 | SYSTOLIC BLOOD PRESSURE: 158 MMHG | WEIGHT: 200 LBS

## 2017-04-25 VITALS — SYSTOLIC BLOOD PRESSURE: 117 MMHG | DIASTOLIC BLOOD PRESSURE: 71 MMHG

## 2017-04-25 DIAGNOSIS — M79.89 LEG SWELLING: ICD-10-CM

## 2017-04-25 DIAGNOSIS — I73.9 PAD (PERIPHERAL ARTERY DISEASE) (HCC): Primary | ICD-10-CM

## 2017-04-25 RX ORDER — HYDROCODONE BITARTRATE AND ACETAMINOPHEN 7.5; 325 MG/1; MG/1
1 TABLET ORAL
Qty: 50 TAB | Refills: 0 | Status: ON HOLD | OUTPATIENT
Start: 2017-04-25 | End: 2017-05-06

## 2017-04-25 NOTE — MR AVS SNAPSHOT
Visit Information Date & Time Provider Department Dept. Phone Encounter #  
 4/25/2017  3:00 PM Dieog Zhao MD [facility] [address] [id number] Your Appointments 5/9/2017  2:15 PM  
Follow Up with Diego Zhao MD  
[facility] Appt Note: 2 week follow up  
 [facility] [address] [facility] [address] [address] Upcoming Health Maintenance Date Due DTaP/Tdap/Td series (1 - Tdap) 6/10/1992 PAP AKA CERVICAL CYTOLOGY 6/10/1992 Allergies as of 4/25/2017  Review Complete On: 4/25/2017 By: Elodia Morales RN Severity Noted Reaction Type Reactions Humira [Adalimumab] High 03/24/2017   Intolerance Other (comments) Remicade [Infliximab] High 03/24/2017   Intolerance Palpitations Current Immunizations  Reviewed on 4/17/2017 Name Date Influenza Vaccine 11/1/2016 Pneumococcal Vaccine (Unspecified Type) 1/1/2010 Not reviewed this visit You Were Diagnosed With   
  
 Codes Comments PAD (peripheral artery disease) (HCC)    -  Primary ICD-10-CM: I73.9 ICD-9-CM: 443. 9 Vitals BP Pulse Height(growth percentile) Weight(growth percentile) LMP BMI  
 158/82 88 5' 6\" (1.676 m) 200 lb (90.7 kg) 04/06/2017 (Exact Date) 32.28 kg/m2 OB Status Smoking Status Having regular periods Former Smoker Vitals History BMI and BSA Data Body Mass Index Body Surface Area  
 32.28 kg/m 2 2.06 m 2 Preferred Pharmacy Pharmacy Name Phone [phone] [address] [facility] [phone] Your Updated Medication List  
  
   
This list is accurate as of: 4/25/17 11:59 PM.  Always use your most recent med list.  
  
  
  
  
 atorvastatin 80 mg tablet Commonly known as:  LIPITOR Take 1 Tab by mouth nightly. carvedilol 3.125 mg tablet Commonly known as:  Ladona Guevara Take 1 Tab by mouth two (2) times daily (with meals). gabapentin 400 mg capsule Commonly known as:  NEURONTIN Take 1 Cap by mouth three (3) times daily. HYDROcodone-acetaminophen 7.5-325 mg per tablet Commonly known as:  Rivera Fryer Take 1 Tab by mouth every six (6) hours as needed for Pain. Max Daily Amount: 4 Tabs. mesalamine 500 mg CR capsule Commonly known as:  PENTASA Take 2 Caps by mouth four (4) times daily. Indications: CROHN'S DISEASE  
  
 methotrexate 2.5 mg tablet Commonly known as:  Bailey Iron Take 4 Tabs by mouth every Monday for 30 days. oxyCODONE-acetaminophen 5-325 mg per tablet Commonly known as:  PERCOCET Take 1 Tab by mouth every four (4) hours as needed for Pain. Max Daily Amount: 6 Tabs. predniSONE 20 mg tablet Commonly known as:  Pablo Hebert Take 2 Tabs by mouth daily (with breakfast). Indications: CROHN'S DISEASE  
  
 warfarin 7.5 mg tablet Commonly known as:  COUMADIN Take 1 Tab by mouth daily. Prescriptions Printed Refills HYDROcodone-acetaminophen (NORCO) 7.5-325 mg per tablet 0 Sig: Take 1 Tab by mouth every six (6) hours as needed for Pain. Max Daily Amount: 4 Tabs. Class: Print Route: Oral  
  
To-Do List   
 04/27/2017 To Be Determined Appointment with Cecilia Dawn RN at [address] [place] [address] REG MED CTR  
  
 04/27/2017 9:30 AM  
  Appointment with Nayely Hill PTA at [address] [place] [address] SCHEDULING/INTAKE  
  
 05/01/2017 To Be Determined Appointment with Cecilia Dawn RN at [address] [place] [address] REG MED CTR  
  
 05/02/2017 To Be Determined Appointment with Nayely Hill PTA at [address] [place] [address]  
  
 05/02/2017 11:00 AM  
  Appointment with [address] [facility] 1 at [address] [facility] ([phone]) 05/04/2017 To Be Determined Appointment with Carlin Pop PTA at [facility] SCHEDULING/INTAKE  
  
 05/04/2017 To Be Determined Appointment with Lily Chicas RN at [facility] CTR  
  
 05/09/2017 To Be Determined Appointment with Carlin Pop PTA at [address] [place] [address] SCHEDULING/INTAKE  
  
 06/30/2017 1:00 PM  
  Appointment with [id number] [facility] 1 at [address] THE [facility] ([phone]) Introducing [facility] HEALTH SERVICES! Dorothy Weiss introduces [employer] patient portal. Now you can access parts of your medical record, email your doctor's office, and request medication refills online. 1. In your internet browser, go to [email or web address] 2. Click on the First Time User? Click Here link in the Sign In box. You will see the New Member Sign Up page. 3. Enter your [employer] Access Code exactly as it appears below. You will not need to use this code after youve completed the sign-up process. If you do not sign up before the expiration date, you must request a new code. · [employer] Access Code: [email or web address] Expires: 4/28/2017 12:08 AM 
 
4. Enter the last four digits of your Social Security Number (xxxx) and Date of Birth (mm/dd/yyyy) as indicated and click Submit. You will be taken to the next sign-up page. 5. Create a [employer] ID. This will be your [employer] login ID and cannot be changed, so think of one that is secure and easy to remember. 6. Create a [employer] password. You can change your password at any time. 7. Enter your Password Reset Question and Answer. This can be used at a later time if you forget your password. 8. Enter your e-mail address. You will receive e-mail notification when new information is available in [address] 9. Click Sign Up. You can now view and download portions of your medical record. 10. Click the Download Summary menu link to download a portable copy of your medical information. If you have questions, please visit the Frequently Asked Questions section of the [employer] website. Remember, [employer] is NOT to be used for urgent needs. For medical emergencies, dial 911. Now available from your iPhone and Android! Please provide this summary of care documentation to your next provider. Your primary care clinician is listed as Kyle Cardona. If you have any questions after today's visit, please call [phone].

## 2017-04-26 ENCOUNTER — HOME CARE VISIT (OUTPATIENT)
Dept: HOME HEALTH SERVICES | Facility: HOME HEALTH | Age: 47
End: 2017-04-26
Payer: MEDICAID

## 2017-04-26 NOTE — PROGRESS NOTES
Lennox Fernández    Chief Complaint   Patient presents with    Surgical Follow-up    Wound Check       History and Physical    Ms. Reyes Ann is a 40year old female returning to our office after undergoing a right popliteal thrombectomy while an inpatient in [facility]. She states she continues to have drainage from her leg. She states the home health has not been coming regularly. She states her leg pain has improved but she still has tingling in her foot. Past Medical History:   Diagnosis Date    Abdominal pain     Anemia NEC     sickle cell trait    C. difficile colitis     Crohn's disease ([facility])     Gastrointestinal disorder     Crohns    Herpes zoster     Hx of cocaine abuse     Hyperkalemia     Hypertension     Iron deficiency anemia     MRSA (methicillin resistant Staphylococcus aureus)     Obesity     Pain management     Sickle cell trait ([facility])     Stroke ([facility] [address]     + cva 3/17    Thromboembolus [facility])      Patient Active Problem List   Diagnosis Code    Chronic pain syndrome G89.4    Crohn disease ([facility] [address] K50.90    Sickle cell trait ([facility] [address] D57.3    Hypertension I10    Hx of cocaine abuse K03.544    Embolic stroke ([facility] [address] U97.8    Neuropathic pain M79.2    DVT (deep venous thrombosis) ([facility]) I82.409    GI bleed K92.2    PAD (peripheral artery disease) ([facility]) I73.9    Vascular abnormality I99.9     Past Surgical History:   Procedure Laterality Date    COLONOSCOPY N/A 3/17/2017    COLONOSCOPY; BIOPSY; performed by Steph Jameson MD at [facility] ENDOSCOPY    HX GYN      tubal ligation    HX TUBAL LIGATION      VASCULAR SURGERY PROCEDURE UNLIST       Current Outpatient Prescriptions   Medication Sig Dispense Refill    HYDROcodone-acetaminophen ([facility]) 7.5-325 mg per tablet Take 1 Tab by mouth every six (6) hours as needed for Pain. Max Daily Amount: 4 Tabs. 50 Tab 0    warfarin (COUMADIN) 7.5 mg tablet Take 1 Tab by mouth daily.  10 Tab 0    carvedilol (COREG) 3.125 mg tablet Take 1 Tab by mouth two (2) times daily (with meals). 60 Tab 0    methotrexate (RHEUMATREX) 2.5 mg tablet Take 4 Tabs by mouth every Monday for 30 days. 16 Tab 0    atorvastatin (LIPITOR) 80 mg tablet Take 1 Tab by mouth nightly. 30 Tab 2    gabapentin (NEURONTIN) 400 mg capsule Take 1 Cap by mouth three (3) times daily. 90 Cap 2    mesalamine (PENTASA) 500 mg CR capsule Take 2 Caps by mouth four (4) times daily. Indications: CROHN'S DISEASE 120 Cap 0    predniSONE (DELTASONE) 20 mg tablet Take 2 Tabs by mouth daily (with breakfast). Indications: CROHN'S DISEASE 20 Tab 0    oxyCODONE-acetaminophen (PERCOCET) 5-325 mg per tablet Take 1 Tab by mouth every four (4) hours as needed for Pain. Max Daily Amount: 6 Tabs. 15 Tab 0     Allergies   Allergen Reactions    Humira [Adalimumab] Other (comments)    Remicade [Infliximab] Palpitations     Social History     Social History    Marital status:      Spouse name: N/A    Number of children: N/A    Years of education: N/A     Occupational History    Not on file. Social History Main Topics    Smoking status: Former Smoker    Smokeless tobacco: Never Used    Alcohol use No    Drug use: No    Sexual activity: Yes     Partners: Male     Birth control/ protection: Surgical     Other Topics Concern    Not on file     Social History Narrative      History reviewed. No pertinent family history. Review of Systems    Review of Systems   Constitutional: Negative for chills, diaphoresis, fever, malaise/fatigue and weight loss. HENT: Negative for hearing loss and sore throat. Eyes: Negative for blurred vision, photophobia and redness. Respiratory: Negative for cough, hemoptysis, shortness of breath and wheezing. Cardiovascular: Positive for leg swelling. Negative for chest pain, palpitations and orthopnea. Gastrointestinal: Negative for abdominal pain, blood in stool, constipation, diarrhea, heartburn, nausea and vomiting.    Genitourinary: Negative for dysuria, frequency, hematuria and urgency. Musculoskeletal: Negative for back pain and myalgias. Skin: Negative for itching and rash. Neurological: Positive for tingling. Negative for dizziness, speech change, focal weakness, weakness and headaches. Endo/Heme/Allergies: Does not bruise/bleed easily. Psychiatric/Behavioral: Negative for depression and suicidal ideas. Physical Exam:    Visit Vitals    /82    Pulse 88    Ht 5' 6\" (1.676 m)    Wt 200 lb (90.7 kg)    LMP 04/06/2017 (Exact Date)    BMI 32.28 kg/m2      Physical Examination: General appearance - alert, well appearing, and in no distress  Mental status - alert, oriented to person, place, and time  Eyes - sclera anicteric, left eye normal, right eye normal  Ears - right ear normal, left ear normal  Nose - normal and patent, no erythema, discharge or polyps  Mouth - mucous membranes moist, pharynx normal without lesions  Extremities - Right foot embolic injuries healing. 1+ DP pulse. Leg incision with copious amount of serous fluid drainage. No evidence of infection. Wound pack with 1inch packing      Impression and Plan:    ICD-10-CM ICD-9-CM    1. PAD (peripheral artery disease) ([facility]) I73.9 443.9    2. Leg swelling M79.89 729.81      Orders Placed This Encounter    HYDROcodone-acetaminophen (NORCO) 7.5-325 mg per tablet     Our office will call her home health and clarify her orders to make sure she is getting the appropriate home health. We will see her in 2 weeks for a repeat wound check. Follow-up Disposition:  Return in about 2 weeks (around 5/9/2017) for Wound check. The treatment plan was reviewed with the patient in detail. The patient voiced understanding of this plan and all questions and concerns were addressed. The patient agrees with this plan. We discussed the signs and symptoms that would require earlier attention or intervention.      The patient was given educational material related to his/her visit and the patient has voiced understanding of the material.     I appreciate the opportunity to participate in the care of your patient. I will be sure to keep you informed of any subsequent changes in the treatment plan. If you have any questions or concerns, please feel free to contact me. Daryl Kay MD    PLEASE NOTE:  This document has been produced using voice recognition software. Unrecognized errors in transcription may be present.

## 2017-04-27 ENCOUNTER — HOME CARE VISIT (OUTPATIENT)
Dept: HOME HEALTH SERVICES | Facility: HOME HEALTH | Age: 47
End: 2017-04-27
Payer: MEDICAID

## 2017-04-28 ENCOUNTER — HOME CARE VISIT (OUTPATIENT)
Dept: SCHEDULING | Facility: HOME HEALTH | Age: 47
End: 2017-04-28
Payer: MEDICAID

## 2017-04-28 ENCOUNTER — HOME CARE VISIT (OUTPATIENT)
Dept: HOME HEALTH SERVICES | Facility: HOME HEALTH | Age: 47
End: 2017-04-28
Payer: MEDICAID

## 2017-04-28 ENCOUNTER — HOSPITAL ENCOUNTER (INPATIENT)
Age: 47
LOS: 8 days | Discharge: HOME HEALTH | DRG: 721 | End: 2017-05-06
Attending: INTERNAL MEDICINE | Admitting: HOSPITALIST
Payer: MEDICAID

## 2017-04-28 DIAGNOSIS — L08.9 WOUND INFECTION: ICD-10-CM

## 2017-04-28 DIAGNOSIS — T14.8XXA WOUND INFECTION: ICD-10-CM

## 2017-04-28 DIAGNOSIS — L02.415 ABSCESS OF RIGHT LEG: Primary | ICD-10-CM

## 2017-04-28 PROBLEM — I99.9 VASCULAR ABNORMALITY: Status: RESOLVED | Noted: 2017-04-06 | Resolved: 2017-04-28

## 2017-04-28 PROBLEM — I74.3 POPLITEAL ARTERY THROMBOSIS, RIGHT (HCC): Status: ACTIVE | Noted: 2017-04-28

## 2017-04-28 LAB
ALBUMIN SERPL BCP-MCNC: 3.5 G/DL (ref 3.4–5)
ALBUMIN/GLOB SERPL: 0.9 {RATIO} (ref 0.8–1.7)
ALP SERPL-CCNC: 56 U/L (ref 45–117)
ALT SERPL-CCNC: 32 U/L (ref 13–56)
ANION GAP BLD CALC-SCNC: 8 MMOL/L (ref 3–18)
APTT PPP: 38 SEC (ref 23–36.4)
AST SERPL W P-5'-P-CCNC: 14 U/L (ref 15–37)
BASOPHILS # BLD AUTO: 0 K/UL (ref 0–0.06)
BASOPHILS # BLD: 0 % (ref 0–2)
BILIRUB SERPL-MCNC: 0.3 MG/DL (ref 0.2–1)
BUN SERPL-MCNC: 21 MG/DL (ref 7–18)
BUN/CREAT SERPL: 26 (ref 12–20)
CALCIUM SERPL-MCNC: 9 MG/DL (ref 8.5–10.1)
CHLORIDE SERPL-SCNC: 101 MMOL/L (ref 100–108)
CO2 SERPL-SCNC: 29 MMOL/L (ref 21–32)
CREAT SERPL-MCNC: 0.82 MG/DL (ref 0.6–1.3)
DIFFERENTIAL METHOD BLD: ABNORMAL
EOSINOPHIL # BLD: 0 K/UL (ref 0–0.4)
EOSINOPHIL NFR BLD: 0 % (ref 0–5)
ERYTHROCYTE [DISTWIDTH] IN BLOOD BY AUTOMATED COUNT: 15.1 % (ref 11.6–14.5)
ERYTHROCYTE [SEDIMENTATION RATE] IN BLOOD: 50 MM/HR (ref 0–20)
GLOBULIN SER CALC-MCNC: 3.9 G/DL (ref 2–4)
GLUCOSE SERPL-MCNC: 102 MG/DL (ref 74–99)
HCT VFR BLD AUTO: 31 % (ref 35–45)
HGB BLD-MCNC: 9.9 G/DL (ref 12–16)
INR PPP: 2.7 (ref 0.8–1.2)
LACTATE SERPL-SCNC: 1 MMOL/L (ref 0.4–2)
LYMPHOCYTES # BLD AUTO: 17 % (ref 21–52)
LYMPHOCYTES # BLD: 2.6 K/UL (ref 0.9–3.6)
MCH RBC QN AUTO: 26.8 PG (ref 24–34)
MCHC RBC AUTO-ENTMCNC: 31.9 G/DL (ref 31–37)
MCV RBC AUTO: 84 FL (ref 74–97)
MONOCYTES # BLD: 1.1 K/UL (ref 0.05–1.2)
MONOCYTES NFR BLD AUTO: 8 % (ref 3–10)
NEUTS SEG # BLD: 11.4 K/UL (ref 1.8–8)
NEUTS SEG NFR BLD AUTO: 75 % (ref 40–73)
PLATELET # BLD AUTO: 394 K/UL (ref 135–420)
PMV BLD AUTO: 9 FL (ref 9.2–11.8)
POTASSIUM SERPL-SCNC: 3.8 MMOL/L (ref 3.5–5.5)
PROT SERPL-MCNC: 7.4 G/DL (ref 6.4–8.2)
PROTHROMBIN TIME: 27.7 SEC (ref 11.5–15.2)
RBC # BLD AUTO: 3.69 M/UL (ref 4.2–5.3)
SODIUM SERPL-SCNC: 138 MMOL/L (ref 136–145)
WBC # BLD AUTO: 15.2 K/UL (ref 4.6–13.2)

## 2017-04-28 PROCEDURE — 74011250636 HC RX REV CODE- 250/636: Performed by: HOSPITALIST

## 2017-04-28 PROCEDURE — 87040 BLOOD CULTURE FOR BACTERIA: CPT | Performed by: INTERNAL MEDICINE

## 2017-04-28 PROCEDURE — A4450 NON-WATERPROOF TAPE: HCPCS

## 2017-04-28 PROCEDURE — 74011000258 HC RX REV CODE- 258: Performed by: HOSPITALIST

## 2017-04-28 PROCEDURE — A6266 IMPREG GAUZE NO H20/SAL/YARD: HCPCS

## 2017-04-28 PROCEDURE — A4927 NON-STERILE GLOVES: HCPCS

## 2017-04-28 PROCEDURE — 85610 PROTHROMBIN TIME: CPT | Performed by: INTERNAL MEDICINE

## 2017-04-28 PROCEDURE — 96374 THER/PROPH/DIAG INJ IV PUSH: CPT

## 2017-04-28 PROCEDURE — 83605 ASSAY OF LACTIC ACID: CPT | Performed by: INTERNAL MEDICINE

## 2017-04-28 PROCEDURE — 85652 RBC SED RATE AUTOMATED: CPT | Performed by: INTERNAL MEDICINE

## 2017-04-28 PROCEDURE — 87070 CULTURE OTHR SPECIMN AEROBIC: CPT | Performed by: INTERNAL MEDICINE

## 2017-04-28 PROCEDURE — 87186 SC STD MICRODIL/AGAR DIL: CPT | Performed by: INTERNAL MEDICINE

## 2017-04-28 PROCEDURE — 87077 CULTURE AEROBIC IDENTIFY: CPT | Performed by: INTERNAL MEDICINE

## 2017-04-28 PROCEDURE — A4649 SURGICAL SUPPLIES: HCPCS

## 2017-04-28 PROCEDURE — A6446 CONFORM BAND S W>=3" <5"/YD: HCPCS

## 2017-04-28 PROCEDURE — 74011250636 HC RX REV CODE- 250/636: Performed by: FAMILY MEDICINE

## 2017-04-28 PROCEDURE — 85025 COMPLETE CBC W/AUTO DIFF WBC: CPT | Performed by: INTERNAL MEDICINE

## 2017-04-28 PROCEDURE — 74011250637 HC RX REV CODE- 250/637: Performed by: HOSPITALIST

## 2017-04-28 PROCEDURE — A6216 NON-STERILE GAUZE<=16 SQ IN: HCPCS

## 2017-04-28 PROCEDURE — 85730 THROMBOPLASTIN TIME PARTIAL: CPT | Performed by: INTERNAL MEDICINE

## 2017-04-28 PROCEDURE — 74011250637 HC RX REV CODE- 250/637: Performed by: INTERNAL MEDICINE

## 2017-04-28 PROCEDURE — A6260 WOUND CLEANSER ANY TYPE/SIZE: HCPCS

## 2017-04-28 PROCEDURE — 74011250636 HC RX REV CODE- 250/636: Performed by: INTERNAL MEDICINE

## 2017-04-28 PROCEDURE — 65270000029 HC RM PRIVATE

## 2017-04-28 PROCEDURE — 99284 EMERGENCY DEPT VISIT MOD MDM: CPT

## 2017-04-28 PROCEDURE — 80053 COMPREHEN METABOLIC PANEL: CPT | Performed by: INTERNAL MEDICINE

## 2017-04-28 RX ORDER — CARVEDILOL 3.12 MG/1
3.12 TABLET ORAL 2 TIMES DAILY WITH MEALS
Status: DISCONTINUED | OUTPATIENT
Start: 2017-04-28 | End: 2017-05-06

## 2017-04-28 RX ORDER — GABAPENTIN 400 MG/1
400 CAPSULE ORAL 3 TIMES DAILY
Status: DISCONTINUED | OUTPATIENT
Start: 2017-04-28 | End: 2017-05-06 | Stop reason: HOSPADM

## 2017-04-28 RX ORDER — MORPHINE SULFATE 2 MG/ML
2 INJECTION, SOLUTION INTRAMUSCULAR; INTRAVENOUS
Status: DISCONTINUED | OUTPATIENT
Start: 2017-04-28 | End: 2017-04-29

## 2017-04-28 RX ORDER — ATORVASTATIN CALCIUM 20 MG/1
80 TABLET, FILM COATED ORAL
Status: DISCONTINUED | OUTPATIENT
Start: 2017-04-28 | End: 2017-05-06 | Stop reason: HOSPADM

## 2017-04-28 RX ORDER — SODIUM CHLORIDE 9 MG/ML
100 INJECTION, SOLUTION INTRAVENOUS CONTINUOUS
Status: DISCONTINUED | OUTPATIENT
Start: 2017-04-28 | End: 2017-05-01

## 2017-04-28 RX ORDER — HYDROCODONE BITARTRATE AND ACETAMINOPHEN 7.5; 325 MG/1; MG/1
1 TABLET ORAL
Status: COMPLETED | OUTPATIENT
Start: 2017-04-28 | End: 2017-04-28

## 2017-04-28 RX ORDER — ACETAMINOPHEN 325 MG/1
650 TABLET ORAL
Status: DISCONTINUED | OUTPATIENT
Start: 2017-04-28 | End: 2017-05-06 | Stop reason: HOSPADM

## 2017-04-28 RX ORDER — HYDROCODONE BITARTRATE AND ACETAMINOPHEN 7.5; 325 MG/1; MG/1
1 TABLET ORAL
Status: DISCONTINUED | OUTPATIENT
Start: 2017-04-28 | End: 2017-04-29

## 2017-04-28 RX ORDER — PREDNISONE 20 MG/1
40 TABLET ORAL
Status: DISCONTINUED | OUTPATIENT
Start: 2017-04-29 | End: 2017-05-06 | Stop reason: HOSPADM

## 2017-04-28 RX ADMIN — Medication 2 MG: at 20:43

## 2017-04-28 RX ADMIN — CARVEDILOL 3.12 MG: 3.12 TABLET, FILM COATED ORAL at 19:08

## 2017-04-28 RX ADMIN — PIPERACILLIN SODIUM,TAZOBACTAM SODIUM 3.38 G: 3; .375 INJECTION, POWDER, FOR SOLUTION INTRAVENOUS at 18:04

## 2017-04-28 RX ADMIN — PIPERACILLIN SODIUM,TAZOBACTAM SODIUM 3.38 G: 3; .375 INJECTION, POWDER, FOR SOLUTION INTRAVENOUS at 22:13

## 2017-04-28 RX ADMIN — SODIUM CHLORIDE 100 ML/HR: 900 INJECTION, SOLUTION INTRAVENOUS at 19:32

## 2017-04-28 RX ADMIN — MESALAMINE 1000 MG: 250 CAPSULE ORAL at 19:09

## 2017-04-28 RX ADMIN — ATORVASTATIN CALCIUM 80 MG: 20 TABLET, FILM COATED ORAL at 22:13

## 2017-04-28 RX ADMIN — SODIUM CHLORIDE 1000 ML: 900 INJECTION, SOLUTION INTRAVENOUS at 16:32

## 2017-04-28 RX ADMIN — GABAPENTIN 400 MG: 400 CAPSULE ORAL at 22:13

## 2017-04-28 RX ADMIN — SODIUM CHLORIDE 1000 MG: 900 INJECTION, SOLUTION INTRAVENOUS at 16:14

## 2017-04-28 RX ADMIN — HYDROCODONE BITARTRATE AND ACETAMINOPHEN 1 TABLET: 7.5; 325 TABLET ORAL at 14:39

## 2017-04-28 RX ADMIN — MESALAMINE 1000 MG: 250 CAPSULE ORAL at 22:13

## 2017-04-28 NOTE — ED NOTES
Vancomycin held until access can be obtained to draw a second set of blood cultures. MD ziegler aware.

## 2017-04-28 NOTE — ED PROVIDER NOTES
HPI Comments:   12:21 PM   Sushil Merrill is a 40 y.o. female presenting to the ED for a right lower leg wound check. Pt reports she had a thrombectomy/repair of her popliteal artery 4/6/2017 (~3 weeks ago) with Dr. Stephanie Coyle and is supposed to be having a wound care nurse come to her home to change her dressings. She states, however, that the nurse \"never comes out\" and she is worried her wound is becoming infected. Her last visit/dressing change was 3 days ago with Dr. Elli Ortiz (Vascular Surgery). + chills and fever of 101F at home last night, per pt (absent on presentation, temperature in ED 97.3F). No other sxs or complaints. Patient is a 40 y.o. female presenting with skin problem. The history is provided by the patient. No  was used. Skin Problem    This is a new problem. The current episode started more than 2 days ago. The problem has been gradually worsening. There has been a fever of 101 - 101.9 F. Past Medical History:   Diagnosis Date    Abdominal pain     Anemia NEC     sickle cell trait    C. difficile colitis     Crohn's disease (HCC)     Gastrointestinal disorder     Crohns    Herpes zoster     Hx of cocaine abuse     Hyperkalemia     Hypertension     Iron deficiency anemia     MRSA (methicillin resistant Staphylococcus aureus)     Obesity     Pain management     Sickle cell trait (HCC)     Stroke ([facility] [address]     + cva 3/17    Thromboembolus [facility])        Past Surgical History:   Procedure Laterality Date    COLONOSCOPY N/A 3/17/2017    COLONOSCOPY; BIOPSY; performed by Cooper Moore MD at [facility] ENDOSCOPY    HX GYN      tubal ligation    HX TUBAL LIGATION      VASCULAR SURGERY PROCEDURE UNLIST           History reviewed. No pertinent family history. Social History     Social History    Marital status:      Spouse name: N/A    Number of children: N/A    Years of education: N/A     Occupational History    Not on file. Social History Main Topics    Smoking status: Former Smoker    Smokeless tobacco: Never Used    Alcohol use No    Drug use: No    Sexual activity: Yes     Partners: Male     Birth control/ protection: Surgical     Other Topics Concern    Not on file     Social History Narrative         ALLERGIES: Humira [adalimumab] and Remicade [infliximab]    Review of Systems   Constitutional: Positive for chills and fever (101F last night, absent on presentation). Skin: Positive for color change (right lower leg) and wound (right lower leg). All other systems reviewed and are negative. Vitals:    04/28/17 1330 04/28/17 1400 04/28/17 1500 04/28/17 1542   BP: (!) 145/97 (!) 129/91 132/83 145/89   Pulse: 91 86 86 90   Resp: 13 15 10 21   Temp:       SpO2: 97% 97%     Weight:       Height:                Physical Exam   Constitutional: She is oriented to person, place, and time. She appears well-developed and well-nourished. HENT:   Head: Normocephalic and atraumatic. Right Ear: External ear normal.   Left Ear: External ear normal.   Nose: Nose normal.   Mouth/Throat: Oropharynx is clear and moist.   Eyes: Conjunctivae and EOM are normal. Pupils are equal, round, and reactive to light. Right eye exhibits no discharge. Left eye exhibits no discharge. No scleral icterus. Neck: Normal range of motion. Neck supple. No JVD present. No tracheal deviation present. Cardiovascular: Normal rate, regular rhythm, normal heart sounds and intact distal pulses. Pulmonary/Chest: Effort normal and breath sounds normal.   Abdominal: Soft. Bowel sounds are normal. She exhibits no distension. There is no tenderness. No HSM   Musculoskeletal: Normal range of motion. She exhibits tenderness. Neurological: She is alert and oriented to person, place, and time. She has normal reflexes. No cranial nerve deficit. She exhibits normal muscle tone. No focal motor weakness. Skin: Skin is warm and dry. No rash noted. There is erythema. To right leg, there is minimal erythema, moderate swelling with dehisced wound on the distal and mid area. The mid area has a little bit of clear to light yellow discharge. Psychiatric: She has a normal mood and affect. Her behavior is normal.   Nursing note and vitals reviewed. RESULTS:    PULSE OXIMETRY NOTE:  Pulse-ox is 100% on RA  Interpretation: Normal       IR FLUORO GUIDE PICC INSERTION    (Results Pending)        Labs Reviewed   CBC WITH AUTOMATED DIFF - Abnormal; Notable for the following:        Result Value    WBC 15.2 (*)     RBC 3.69 (*)     HGB 9.9 (*)     HCT 31.0 (*)     RDW 15.1 (*)     MPV 9.0 (*)     NEUTROPHILS 75 (*)     LYMPHOCYTES 17 (*)     ABS.  NEUTROPHILS 11.4 (*)     All other components within normal limits   METABOLIC PANEL, COMPREHENSIVE - Abnormal; Notable for the following:     Glucose 102 (*)     BUN 21 (*)     BUN/Creatinine ratio 26 (*)     AST (SGOT) 14 (*)     All other components within normal limits   SED RATE (ESR) - Abnormal; Notable for the following:     Sed rate, automated 50 (*)     All other components within normal limits   PROTHROMBIN TIME + INR - Abnormal; Notable for the following:     Prothrombin time 27.7 (*)     INR 2.7 (*)     All other components within normal limits   PTT - Abnormal; Notable for the following:     aPTT 38.0 (*)     All other components within normal limits   CULTURE, WOUND W GRAM STAIN   CULTURE, BLOOD   CULTURE, BLOOD   LACTIC ACID, PLASMA       Recent Results (from the past 12 hour(s))   CBC WITH AUTOMATED DIFF    Collection Time: 04/28/17  1:00 PM   Result Value Ref Range    WBC 15.2 (H) 4.6 - 13.2 K/uL    RBC 3.69 (L) 4.20 - 5.30 M/uL    HGB 9.9 (L) 12.0 - 16.0 g/dL    HCT 31.0 (L) 35.0 - 45.0 %    MCV 84.0 74.0 - 97.0 FL    MCH 26.8 24.0 - 34.0 PG    MCHC 31.9 31.0 - 37.0 g/dL    RDW 15.1 (H) 11.6 - 14.5 %    PLATELET 547 057 - 495 K/uL    MPV 9.0 (L) 9.2 - 11.8 FL    NEUTROPHILS 75 (H) 40 - 73 %    LYMPHOCYTES 17 (L) 21 - 52 %    MONOCYTES 8 3 - 10 %    EOSINOPHILS 0 0 - 5 %    BASOPHILS 0 0 - 2 %    ABS. NEUTROPHILS 11.4 (H) 1.8 - 8.0 K/UL    ABS. LYMPHOCYTES 2.6 0.9 - 3.6 K/UL    ABS. MONOCYTES 1.1 0.05 - 1.2 K/UL    ABS. EOSINOPHILS 0.0 0.0 - 0.4 K/UL    ABS. BASOPHILS 0.0 0.0 - 0.06 K/UL    DF AUTOMATED     METABOLIC PANEL, COMPREHENSIVE    Collection Time: 04/28/17  1:00 PM   Result Value Ref Range    Sodium 138 136 - 145 mmol/L    Potassium 3.8 3.5 - 5.5 mmol/L    Chloride 101 100 - 108 mmol/L    CO2 29 21 - 32 mmol/L    Anion gap 8 3.0 - 18 mmol/L    Glucose 102 (H) 74 - 99 mg/dL    BUN 21 (H) 7.0 - 18 MG/DL    Creatinine 0.82 0.6 - 1.3 MG/DL    BUN/Creatinine ratio 26 (H) 12 - 20      GFR est AA >60 >60 ml/min/1.73m2    GFR est non-AA >60 >60 ml/min/1.73m2    Calcium 9.0 8.5 - 10.1 MG/DL    Bilirubin, total 0.3 0.2 - 1.0 MG/DL    ALT (SGPT) 32 13 - 56 U/L    AST (SGOT) 14 (L) 15 - 37 U/L    Alk. phosphatase 56 45 - 117 U/L    Protein, total 7.4 6.4 - 8.2 g/dL    Albumin 3.5 3.4 - 5.0 g/dL    Globulin 3.9 2.0 - 4.0 g/dL    A-G Ratio 0.9 0.8 - 1.7     SED RATE (ESR)    Collection Time: 04/28/17  1:00 PM   Result Value Ref Range    Sed rate, automated 50 (H) 0 - 20 mm/hr   PROTHROMBIN TIME + INR    Collection Time: 04/28/17  1:00 PM   Result Value Ref Range    Prothrombin time 27.7 (H) 11.5 - 15.2 sec    INR 2.7 (H) 0.8 - 1.2     PTT    Collection Time: 04/28/17  1:00 PM   Result Value Ref Range    aPTT 38.0 (H) 23.0 - 36.4 SEC        MDM  Number of Diagnoses or Management Options  Abscess of right leg:   Wound infection:   Diagnosis management comments: Ddx: cellulitis, abscess, wound infection, doubt rhabomyolysis.         Amount and/or Complexity of Data Reviewed  Clinical lab tests: ordered and reviewed  Discuss the patient with other providers: yes (Dr. Shahab Schmidt (Vascular Surgery))      ED Course     MEDICATIONS GIVEN:  Medications   vancomycin (VANCOCIN) 1,000 mg in 0.9% sodium chloride (MBP/ADV) 250 mL adv (0 mg IntraVENous Held 4/28/17 1440)   sodium chloride 0.9 % bolus infusion 1,000 mL (not administered)   piperacillin-tazobactam (ZOSYN) 3.375 g in 0.9% sodium chloride (MBP/ADV) 100 mL MBP (not administered)   HYDROcodone-acetaminophen (NORCO) 7.5-325 mg per tablet 1 Tab (1 Tab Oral Given 4/28/17 1439)        Procedures      PROGRESS NOTE:   12:21 PM  Initial assessment performed. Written by Dayo Peterson ED Scribe, as dictated by Kendrick Rivas MD     CONSULT NOTE:   3:59 PM  Destiny Sheffield MD spoke with Dr. Ros Proctor via telephone consult  Specialty: Vascular Surgery   Discussed pt's hx, disposition, available diagnostic, and imaging results. Reviewed care plans. Consulting physician agrees with plans as outlined. He agrees with admission to medical. He recommends removing the PICC line/central line and agrees with IV Vancomycin. Written by RUTH Bradshawibe, as dictated by Kendrick Rivas MD        ADMISSION NOTE:  4:10 PM   Patient is being admitted to the hospital. The results of their tests and reasons for their admission have been discussed with them and/or available family. They convey agreement and understanding for the need to be admitted and for their admission diagnosis. Written by RUTH Bradshawibariane, as dictated by Kendrick Rivas MD     PROGRESS NOTE:   4:20 PM  Per RN, difficulty with accessing line. Written by RUTH Bradshawibariane, as dictated by Kendrick Rivas MD     CONDITIONS ON ADMISSION:   Wound infection is present at the time of admission. CLINICAL IMPRESSION    1. Abscess of right leg    2. Wound infection    2. S/p thrombectomy rle    PLAN: ADMIT TO: Medical     Attestations: This note is prepared by Dayo Peterson and Elen Cantu, acting as Scribe for MD Kendrick Lau MD: The scribe's documentation has been prepared under my direction and personally reviewed by me in its entirety.  I confirm that the note above accurately reflects all work, treatment, procedures, and medical decision making performed by me.

## 2017-04-28 NOTE — CONSULTS
Surgery Consult      Patient: Evelina Quiroz MRN: [id number]  CSN: [id number]      YOB: 1970    Age: 40 y.o. Sex: female      DOA: 4/28/2017       HPI:     Evelina Quiroz is a 40 y.o. female who presents to [facility] with subjective fevers and foul smelling drainage from her right leg wound. Ms. Blanca Phillips is a complicated patient with a recent history of a CVA related to arterial thrombosis as well a right popliteal artery thrombosis. She also has a history of Crohn's disease complicated by GIB. Ms. Blanca Phillips was recently discharged after undergoing a right popliteal thrombectomy for acute on chronic ischemia. Post operatively she developed significant edema and serous drainage from her wound and eventually dehiscence of the inferior aspect of the wound. Home health was arranged and she was seen in the office on Wednesday of this week. Since that time home health and Ms. Blanca Phillips were unable to coordinate a visit from dressing changes and now she presents with foul smelling drainage from her wound. She admits to fevers at home. She denies any evidence of GIB, chest pain or abdominal pain. Past Medical History:   Diagnosis Date    Abdominal pain     Anemia NEC     sickle cell trait    C. difficile colitis     Crohn's disease (HCC)     Gastrointestinal disorder     Crohns    Herpes zoster     Hx of cocaine abuse     Hyperkalemia     Hypertension     Iron deficiency anemia     MRSA (methicillin resistant Staphylococcus aureus)     Obesity     Pain management     Sickle cell trait (HCC)     Stroke ([facility] [address]     + cva 3/17    Thromboembolus [facility])        Past Surgical History:   Procedure Laterality Date    COLONOSCOPY N/A 3/17/2017    COLONOSCOPY; BIOPSY; performed by Sarah Glover MD at [facility] ENDOSCOPY    HX GYN      tubal ligation    HX TUBAL LIGATION      VASCULAR SURGERY PROCEDURE UNLIST         History reviewed. No pertinent family history.     Social History     Social History    Marital status:      Spouse name: N/A    Number of children: N/A    Years of education: N/A     Social History Main Topics    Smoking status: Former Smoker    Smokeless tobacco: Never Used    Alcohol use No    Drug use: No    Sexual activity: Yes     Partners: Male     Birth control/ protection: Surgical     Other Topics Concern    None     Social History Narrative       Prior to Admission medications    Medication Sig Start Date End Date Taking? Authorizing Provider   HYDROcodone-acetaminophen (NORCO) 7.5-325 mg per tablet Take 1 Tab by mouth every six (6) hours as needed for Pain. Max Daily Amount: 4 Tabs. 4/25/17  Yes Xochitl Conklin MD   warfarin (COUMADIN) 7.5 mg tablet Take 1 Tab by mouth daily. 4/18/17  Yes Luz Naranjo MD   carvedilol (COREG) 3.125 mg tablet Take 1 Tab by mouth two (2) times daily (with meals). 4/18/17  Yes Luz Naranjo MD   gabapentin (NEURONTIN) 400 mg capsule Take 1 Cap by mouth three (3) times daily. 3/23/17  Yes Luz Naranjo MD   predniSONE (DELTASONE) 20 mg tablet Take 2 Tabs by mouth daily (with breakfast). Indications: CROHN'S DISEASE 3/23/17  Yes Luz Naranjo MD   methotrexate (RHEUMATREX) 2.5 mg tablet Take 4 Tabs by mouth every Monday for 30 days. 4/24/17 5/24/17  Luz Naranjo MD   oxyCODONE-acetaminophen (PERCOCET) 5-325 mg per tablet Take 1 Tab by mouth every four (4) hours as needed for Pain. Max Daily Amount: 6 Tabs. 4/18/17   Luz Naranjo MD   atorvastatin (LIPITOR) 80 mg tablet Take 1 Tab by mouth nightly. 3/23/17   Luz Naranjo MD   mesalamine (PENTASA) 500 mg CR capsule Take 2 Caps by mouth four (4) times daily.  Indications: CROHN'S DISEASE 3/23/17   Luz Naranjo MD       Allergies   Allergen Reactions    Humira [Adalimumab] Other (comments)    Remicade [Infliximab] Palpitations       Physical Exam:      Visit Vitals    /89    Pulse 90    Temp 97.3 °F (36.3 °C)    Resp 21    Ht 5' 6\" (1.676 m)    Wt 196 lb (88.9 kg)    SpO2 97%    BMI 31.64 kg/m2       GENERAL: alert, cooperative, no distress, appears stated age, EYE: negative findings: anicteric sclera, LYMPHATIC: Cervical, supraclavicular, and axillary nodes normal. , THROAT & NECK: normal, LUNG: clear to auscultation bilaterally, HEART: regular rate and rhythm, ABDOMEN: soft, non-tender. Bowel sounds normal. No masses,  no organomegaly, EXTREMITIES:  2+ pedal pulse on the right. Incision now with erythema and warmth to touch, greenish tinge to fluid drainage. No logan pus. No bleeding noted. ROS:  Constitutional: positive for fevers, chills and fatigue  Eyes: negative  Ears, nose, mouth, throat, and face: negative  Respiratory: negative  Cardiovascular: negative  Gastrointestinal: negative  Unless otherwise mentioned in the HPI. Data Review:    CBC:   Lab Results   Component Value Date/Time    WBC 15.2 04/28/2017 01:00 PM    RBC 3.69 04/28/2017 01:00 PM    HGB 9.9 04/28/2017 01:00 PM    HCT 31.0 04/28/2017 01:00 PM    PLATELET 203 69/34/2334 01:00 PM      BMP:   Lab Results   Component Value Date/Time    Glucose 102 04/28/2017 01:00 PM    Sodium 138 04/28/2017 01:00 PM    Potassium 3.8 04/28/2017 01:00 PM    Chloride 101 04/28/2017 01:00 PM    CO2 29 04/28/2017 01:00 PM    BUN 21 04/28/2017 01:00 PM    Creatinine 0.82 04/28/2017 01:00 PM    Calcium 9.0 04/28/2017 01:00 PM     Coagulation:   Lab Results   Component Value Date/Time    Prothrombin time 27.7 04/28/2017 01:00 PM    INR 2.7 04/28/2017 01:00 PM    aPTT 38.0 04/28/2017 01:00 PM         Assessment/Plan     40 F s/p right popliteal artery thrombectomy now with possible wound infection, elevated WBC and subjective fevers  1) Would recommend admission for aggressive wound care, IV antibiotics and PICC line placement  2) Will need BID dressing changes with 1 inch iodoform packing. 3) Follow up cultures  4) Will follow    Active Problems:    * No active hospital problems.  Bari [address], MD  April 28, 2017

## 2017-04-28 NOTE — ED NOTES
After consulting with Dr. Genia Ryan who is concerned this about patient having a central line or not. Patient had informed RN on 4/22/2017 that home health was visiting patient the next day to remove her central line. Patient informed RN that she is suppose to have home health care and no one has shown up. Patient was seen by Dr. Alia Romero on Tuesday and not shown up. Telephone call from Rosie Sabillon RN at Dr Susie Chambers office to inform [facility] ED patient is non compliant and was seen on Tuesday by Dr Alia Romero. He did see the wound and ordered home health. However, [facility] attempted to make a visit to patients home on Wednesday and she was not there she had a flat tire. [facility] did not visit her for that reason of patient not home. Rosie Sabillon RN from Dr. Alia Romero office informed her that she could come to his office and have the dressing changed. There was no phone call from her and today [facility] was there to see patient and again she was not there. Patient was suppose to have pt INR.  Dr. Alia Romero will come to ed to evaluate the central line

## 2017-04-28 NOTE — ED NOTES
Report received from Rea Pruitt RN. SBAR, ED summary, MAR and recent results reviewed. Pt updated on plan of care. Opportunity for questions provided. Pt resting in stretcher, call bell within reach, in NAD. Care of pt assumed. RN to bedside to place IV.

## 2017-04-28 NOTE — ED TRIAGE NOTES
Patient reports that she is suppose to have wound care nurse come out to change her dressing on her leg and evaluate the wound and flush and change dressing on her right subclavian triple lumen dressing.

## 2017-04-28 NOTE — ED NOTES
Pt in room laughing with visitors. Ice pack provided to the pt. Pt requesting pain medication. MD aware.

## 2017-04-28 NOTE — ROUTINE PROCESS
TRANSFER - OUT REPORT:    Verbal report given to Eunice Boudreaux RN (name) on Neeta Quigley  being transferred to Medical (unit) for routine progression of care       Report consisted of patients Situation, Background, Assessment and   Recommendations(SBAR). Information from the following report(s) SBAR, ED Summary, [facility] and Recent Results was reviewed with the receiving nurse. Lines:   Triple Lumen 03/28/17 Right Subclavian (Active)       Peripheral IV 97/86/46 Left Basilic (Active)   Site Assessment Clean, dry, & intact 4/28/2017  4:13 PM   Phlebitis Assessment 0 4/28/2017  4:13 PM   Infiltration Assessment 0 4/28/2017  4:13 PM   Dressing Status Clean, dry, & intact 4/28/2017  4:13 PM   Dressing Type Transparent 4/28/2017  4:13 PM        Opportunity for questions and clarification was provided.       Patient transported with:   Monitor  Registered Nurse  Tech

## 2017-04-28 NOTE — H&P
[address] [place] [address]  ROUTINE HISTORY AND PHYSICAL    Name:  Estuardo Cheatham  MR#:  [id number]  :  1970  Account #:  [de-identified]  Date of Adm:  2017      ADMITTING DIAGNOSES  1. Abscess, right lower extremity. 2. Status post recent thrombectomy and peripheral vascular disease  surgery of her right lower extremity. She is status post a right popliteal  artery thrombectomy and artery repair  3. Crohn disease complicated by gastrointestinal bleeding. 4. Coumadin therapy for recent embolic stroke. 5. Embolic stroke. 6. Chronic pain syndrome. 7. Sickle cell trait. HISTORY OF PRESENT ILLNESS: This is a 50-year-old [facility]  American female with a complicated history. She has been in the  hospital a number of times recently, most recent of which was, she  was discharged from this place on 2017. During that last  hospitalization, she had a thrombectomy with repair of the popliteal  artery in the right leg done by Vascular Surgery and she was seen also  by Gastroenterology for GI bleeding. She had been on anticoagulation  for recent embolic stroke and she was started on a heparin drip for a  DVT that was discovered around the same time of her stroke and  bridged to Coumadin. Because of right leg pain, she had further studies  on her right leg, which clarifies a thrombus that required surgical  intervention. She was stabilized clinically and discharged to home. She  had actually been doing okay at home she states. She had a central  catheter in her right upper chest that was there for her to receive  Entyvio, one dose of which was done in the hospital which is for her  Crohn disease, but she developed a fever last night and purulent  drainage from her right leg and came into the emergency room today. She was seen by Vascular in the emergency room, recommended to  be admitted for further antibiotics and treatment.     PAST MEDICAL HISTORY: As noted, embolic stroke, thrombectomy  of the right lower extremity, severe vascular disease, deep venous  thrombosis of the right leg, on anticoagulation with Coumadin, Crohn  disease, chronic pain syndrome, sickle cell trait. ALLERGIES  1. HUMIRA. 2. REMICADE. MEDICATIONS AT HOME INCLUDE  1. Coumadin. 2. Gabapentin. 3. Coreg. 4. Lipitor. 5. Norco.  6. Pentasa. 7. Rheumatrex. 8. Prednisone. PAST SURGICAL HISTORY: Includes the recent thrombectomy on her  lower extremity, central line insertion. She has had a tubal ligation  before. SOCIAL HISTORY: She has a history of using drugs, none recently. She is . She is a former smoker. No regular alcohol use. Her  PCP is [employer]. REVIEW OF SYSTEMS  GENERAL: She states she had a fever last night, she did not measure  it, but none today. She denies any chills or myalgias. RESPIRATORY: No shortness of breath, cough, or wheezing. CARDIOVASCULAR: No chest pain, palpitations, or leg swelling more  than what she has experienced recently. GASTROINTESTINAL: No bleeding. No hematemesis. No diarrhea. No abdominal pain. GENITOURINARY: No difficulty urinating. No hematuria, or dysuria. VASCULAR: Right lower extremity pain and purulent, foul-smelling  discharge from the surgical site of the right lower extremity in the last  24 hours with some redness surrounding the area also. NEUROLOGIC: No headache, dizziness, lightheadedness, or syncope  symptoms. HEMATOLOGIC: No bleeding noted. PHYSICAL EXAMINATION  GENERAL: The patient is a 27-year-old [facility] American female. She  is oriented x3, pleasant and cooperative, resting comfortably in the  hospital stretcher. VITAL SIGNS: Her temperature is 97.3, pulse 90, blood pressure  145/89, respiratory rate 21, SaO2 97% on room air. HEENT: Oropharynx appears dry. No lesions. Sclerae anicteric. Conjunctivae pink. NECK: Supple. No thyromegaly or lymphadenopathy. LUNGS: Clear bilaterally.  No rales, rhonchi, or wheezes. CARDIAC: Regular rate and rhythm. No murmur, rub, or gallop. ABDOMEN: Soft, nontender. No distention. Normal bowel sounds. LOWER EXTREMITIES: Right lower extremity is currently dressed, but  I saw it earlier when the nurse asked me to check her leg and there  was erythema, shiny, hairless skin of the lower extremity with an  incision that has packing in it with some purulent drainage noted. Erythema surrounding this incision and 2+ edema of the right lower  extremity. None noted at the left lower extremity. LABORATORY DATA: White count 15.2, hemoglobin and hematocrit  9.9 and 31.0, platelet count of 045, no bandemia. INR is 2.7, BUN and  creatinine 21 and 0.82. Potassium, sodium, and chloride are within  normal limits as are her LFTs. Her lactic acid is 1. Blood cultures have  been drawn and are pending. Wound culture was sent also from the  incision and she recently had a duplex of her upper extremities on the  24th. It looks like there was no DVT noted in the right arm. ASSESSMENT  1. Abscess of the right lower extremity, status post thrombectomy and  popliteal artery repair. 2. History of deep venous thrombosis in the right lower extremity. 3. Embolic stroke, recent. 4. Crohn disease. 5. History of recent gastrointestinal bleeding. 6. Anticoagulation with Coumadin. 7. Chronic pain syndrome. PLAN: Give her broad-spectrum antibiotics with Zosyn and  vancomycin. We will ask Pharmacy to dose the vancomycin and  followup a metabolic profile and CBC with differential in the morning. She is anticoagulated currently. We want to hold that because we need  to place a PICC line and hopefully that can be done this weekend. She  does have a peripheral IV right now, so if not this weekend, then by  Monday if the peripheral IV can hold, but she does have a history of  being a very hard venous access patient. She can be on a regular diet  for now. Will follow her daily INR.  I reconciled her home medications to  include her  1. Lipitor. 2. Coreg. 3. Neurontin. 4. Norco for pain. 5. Pentasa. 6. Prednisone. She is to hold the Coumadin for now. She is not a diabetic and her  blood sugar was 852 on her metabolic profile. She is not septic. Deep  venous thrombosis prophylaxis is contraindicated because she is  already anticoagulated on Coumadin. She needs to elevate the right  leg and have assistance to the bathroom and will go ahead and get her  on fluid hydration for the night. Will give her 100 mL of fluid for 5 hours. She has Tylenol as needed for a fever and her Norco as needed for  severe pain. Dr. Adriano Sweeney has already consulted from the  emergency room and has visited with her and repacked her right leg  and we anticipate a central catheter in her right chest to be removed  now that she has a peripheral. We want to ensure that she does not  have an infection secondary to that as well. Anticipated length of stay in the hospital is 3-5 days.         Maryann Arcos MD    RI / TB  D:  04/28/2017   16:55  T:  04/28/2017   17:42  Job #:  [id number]

## 2017-04-28 NOTE — IP AVS SNAPSHOT
[address] [address] 
[phone] Patient: Robert Dowd MRN: [id number] GINO:[birth year] You are allergic to the following Allergen Reactions Humira (Adalimumab) Other (comments) Remicade (Infliximab) Palpitations Recent Documentation Height Weight BMI OB Status Smoking Status 1.676 m 91.7 kg 32.62 kg/m2 Having regular periods Former Smoker Emergency Contacts Name Discharge Info Relation Home Work Mobile Sherry Green DISCHARGE CAREGIVER [3] Parent [1] [phone] About your hospitalization You were admitted on:  April 28, 2017 You last received care in the:  [address] [place] [address] You were discharged on:  May 6, 2017 Unit phone number:  [phone] Why you were hospitalized Your primary diagnosis was: Wound Infection Your diagnoses also included:  Abscess Of Right Leg, Sickle Cell Trait (Hcc), Pad (Peripheral Artery Disease) (Hcc), Neuropathic Pain, Hypertension, Hx Of Cocaine Abuse, Gi Bleed, Embolic Stroke (Hcc), Dvt (Deep Venous Thrombosis) (Hcc), Crohn Disease (Hcc), Popliteal Artery Thrombosis, Right (Hcc), Pure Hypercholesterolemia Providers Seen During Your Hospitalizations Provider Role Specialty Primary office phone Diana Mohr MD Attending Provider Emergency Medicine [phone] Harsh Haley MD Attending Provider [facility] [phone] Your Primary Care Physician (PCP) Primary Care Physician Office Phone Office Fax Peter Gera [phone] Follow-up Information Follow up With Details Comments Contact Info Gurjit Mckeon MD   [address] [place] [address] 
[phone] [facility]  Call on monday to get appointment. [address] [facility] [id number] [phone] Your Appointments Tuesday May 09, 2017  2:15 PM EDT Follow Up with Jimy Perdue MD  
[facility]  
 [address] [facility]  
[phone] Current Discharge Medication List  
  
START taking these medications Dose & Instructions Dispensing Information Comments Morning Noon Evening Bedtime  
 clindamycin 300 mg capsule Commonly known as:  CLEOCIN Your last dose was: Your next dose is:    
   
   
 Dose:  600 mg Take 2 Caps by mouth three (3) times daily. Quantity:  42 Cap Refills:  0  
     
   
   
   
  
 diphenhydrAMINE 25 mg capsule Commonly known as:  BENADRYL Your last dose was: Your next dose is:    
   
   
 Dose:  25 mg Take 1 Cap by mouth every six (6) hours as needed for Itching for up to 10 days. Quantity:  30 Cap Refills:  0 HYDROmorphone 2 mg tablet Commonly known as:  DILAUDID Your last dose was: Your next dose is:    
   
   
 Dose:  2-4 mg Take 1-2 Tabs by mouth every four (4) hours as needed. Max Daily Amount: 24 mg. Quantity:  40 Tab Refills:  0 CONTINUE these medications which have NOT CHANGED Dose & Instructions Dispensing Information Comments Morning Noon Evening Bedtime  
 atorvastatin 80 mg tablet Commonly known as:  LIPITOR Your last dose was: Your next dose is:    
   
   
 Dose:  80 mg Take 1 Tab by mouth nightly. Quantity:  30 Tab Refills:  2  
     
   
   
   
  
 carvedilol 3.125 mg tablet Commonly known as:  Harjit  Your last dose was: Your next dose is:    
   
   
 Dose:  3.125 mg Take 1 Tab by mouth two (2) times daily (with meals). Quantity:  60 Tab Refills:  0  
     
   
   
   
  
 gabapentin 400 mg capsule Commonly known as:  NEURONTIN Your last dose was:     
   
Your next dose is:    
   
   
 Dose:  400 mg  
 Take 1 Cap by mouth three (3) times daily. Quantity:  90 Cap Refills:  2  
     
   
   
   
  
 mesalamine 500 mg CR capsule Commonly known as:  PENTASA Your last dose was: Your next dose is:    
   
   
 Dose:  1000 mg Take 2 Caps by mouth four (4) times daily. Indications: CROHN'S DISEASE Quantity:  120 Cap Refills:  0  
     
   
   
   
  
 predniSONE 20 mg tablet Commonly known as:  Lowella Clunes Your last dose was: Your next dose is:    
   
   
 Dose:  40 mg Take 2 Tabs by mouth daily (with breakfast). Indications: CROHN'S DISEASE Quantity:  20 Tab Refills:  0  
     
   
   
   
  
 warfarin 7.5 mg tablet Commonly known as:  COUMADIN Your last dose was: Your next dose is:    
   
   
 Dose:  7.5 mg Take 1 Tab by mouth daily. Quantity:  10 Tab Refills:  0 STOP taking these medications HYDROcodone-acetaminophen 7.5-325 mg per tablet Commonly known as:  NORCO  
   
  
 methotrexate 2.5 mg tablet Commonly known as:  RHEUMATREX  
   
  
 oxyCODONE-acetaminophen 5-325 mg per tablet Commonly known as:  PERCOCET Where to Get Your Medications Information on where to get these meds will be given to you by the nurse or doctor. ! Ask your nurse or doctor about these medications  
  clindamycin 300 mg capsule  
 diphenhydrAMINE 25 mg capsule HYDROmorphone 2 mg tablet Discharge Instructions Infection After Surgery: Care Instructions Your Care Instructions After surgery, an infection is always possible. It doesn't mean that the surgery didn't go well. Because an infection can be serious, your doctor has taken steps to manage it. Your doctor checked the infection and cleaned it if necessary. He or she may have made an opening in the area so that the pus can drain out.  You may have gauze in the cut so that the area will stay open and keep draining. You may need antibiotics. You will need to follow up with your doctor to make sure the infection has gone away. Follow-up care is a key part of your treatment and safety. Be sure to make and go to all appointments, and call your doctor if you are having problems. It's also a good idea to know your test results and keep a list of the medicines you take. How can you care for yourself at home? · Make sure your surgeon knows that you saw a doctor about the infection. · If your doctor prescribed antibiotics, take them as directed. Do not stop taking them just because you feel better. You need to take the full course of antibiotics. · Ask your doctor if you can take an over-the-counter pain medicine, such as acetaminophen (Tylenol), ibuprofen (Advil, Motrin), or naproxen (Aleve). Be safe with medicines. Read and follow all instructions on the label. · Do not take two or more pain medicines at the same time unless the doctor told you to. Many pain medicines have acetaminophen, which is Tylenol. Too much acetaminophen (Tylenol) can be harmful. · Prop up the area on a pillow anytime you sit or lie down during the next 3 days. Try to keep it above the level of your heart. This will help reduce swelling. · Keep the skin clean and dry. · If you have a bandage, keep it clean and dry. · You may have a dressing over the cut (incision). A dressing helps the incision heal and protects it. Your doctor will tell you how to take care of this. You can expect drainage from the wound. · If your doctor told you how to care for your incision, follow your doctor's instructions. If you did not get instructions, follow this general advice: ¨ Wash around the incision with clean water 2 times a day. Don't use hydrogen peroxide or alcohol, which can slow healing. When should you call for help? Call your doctor now or seek immediate medical care if: 
· You have signs that your infection is getting worse, such as: ¨ Increased pain, swelling, warmth, or redness in the area. ¨ Red streaks leading from the area. ¨ Pus draining from the wound. ¨ A new or higher fever. Watch closely for changes in your health, and be sure to contact your doctor if you have any problems. Where can you learn more? Go to http://johan-maria del carmen.info/. Enter C340 in the search box to learn more about \"Infection After Surgery: Care Instructions. \" Current as of: May 27, 2016 Content Version: 11.2 © 4415-1208 [employer]. Care instructions adapted under license by [employer] (which disclaims liability or warranty for this information). If you have questions about a medical condition or this instruction, always ask your healthcare professional. [place] [address] any warranty or liability for your use of this information. DISCHARGE SUMMARY from Nurse The following personal items are in your possession at time of discharge: 
 
  
Visual Aid: None Clothing: Pants, Shirt, Footwear, Undergarments Other Valuables: Cell Phone, Alfonso-Rubio PATIENT INSTRUCTIONS: 
 
After general anesthesia or intravenous sedation, for 24 hours or while taking prescription Narcotics: · Limit your activities · Do not drive and operate hazardous machinery · Do not make important personal or business decisions · Do  not drink alcoholic beverages · If you have not urinated within 8 hours after discharge, please contact your surgeon on call. Report the following to your surgeon: 
· Excessive pain, swelling, redness or odor of or around the surgical area · Temperature over 100.5 · Nausea and vomiting lasting longer than 4 hours or if unable to take medications · Any signs of decreased circulation or nerve impairment to extremity: change in color, persistent  numbness, tingling, coldness or increase pain · Any questions What to do at Home: Recommended activity: Activity as tolerated. If you experience any of the following symptoms Fever that is greater than 100.5, pain that is not are resolved with pain medication, increase in redness and drainage from the incision site,   please follow up with MD or . 
 
 
*  Please give a list of your current medications to your Primary Care Provider. *  Please update this list whenever your medications are discontinued, doses are 
    changed, or new medications (including over-the-counter products) are added. *  Please carry medication information at all times in case of emergency situations. These are general instructions for a healthy lifestyle: No smoking/ No tobacco products/ Avoid exposure to second hand smoke Surgeon General's Warning:  Quitting smoking now greatly reduces serious risk to your health. Obesity, smoking, and sedentary lifestyle greatly increases your risk for illness A healthy diet, regular physical exercise & weight monitoring are important for maintaining a healthy lifestyle You may be retaining fluid if you have a history of heart failure or if you experience any of the following symptoms:  Weight gain of 3 pounds or more overnight or 5 pounds in a week, increased swelling in our hands or feet or shortness of breath while lying flat in bed. Please call your doctor as soon as you notice any of these symptoms; do not wait until your next office visit. Recognize signs and symptoms of STROKE: 
 
F-face looks uneven A-arms unable to move or move unevenly S-speech slurred or non-existent T-time-call 911 as soon as signs and symptoms begin-DO NOT go Back to bed or wait to see if you get better-TIME IS BRAIN. Warning Signs of HEART ATTACK Call 911 if you have these symptoms: 
? Chest discomfort.  Most heart attacks involve discomfort in the center of the chest that lasts more than a few minutes, or that goes away and comes back. It can feel like uncomfortable pressure, squeezing, fullness, or pain. ? Discomfort in other areas of the upper body. Symptoms can include pain or discomfort in one or both arms, the back, neck, jaw, or stomach. ? Shortness of breath with or without chest discomfort. ? Other signs may include breaking out in a cold sweat, nausea, or lightheadedness. Don't wait more than five minutes to call [address]! Fast action can save your life. Calling 911 is almost always the fastest way to get lifesaving treatment. Emergency Medical Services staff can begin treatment when they arrive  up to an hour sooner than if someone gets to the hospital by car. The discharge information has been reviewed with the patient. The patient verbalized understanding. Discharge medications reviewed with the patient and appropriate educational materials and side effects teaching were provided. Discharge Orders None Introducing [facility] SERVICES! Axel Gregg introduces Book[employer] patient portal. Now you can access parts of your medical record, email your doctor's office, and request medication refills online. 1. In your internet browser, go to [email or web address] 2. Click on the First Time User? Click Here link in the Sign In box. You will see the New Member Sign Up page. 3. Enter your Book[employer] Access Code exactly as it appears below. You will not need to use this code after youve completed the sign-up process. If you do not sign up before the expiration date, you must request a new code. · Book[employer] Access Code: [email or web address] Expires: 7/30/2017 10:10 AM 
 
4. Enter the last four digits of your Social Security Number (xxxx) and Date of Birth (mm/dd/yyyy) as indicated and click Submit. You will be taken to the next sign-up page. 5. Create a ReturnHaulert ID. This will be your BookBub login ID and cannot be changed, so think of one that is secure and easy to remember. 6. Create a RepRegen password. You can change your password at any time. 7. Enter your Password Reset Question and Answer. This can be used at a later time if you forget your password. 8. Enter your e-mail address. You will receive e-mail notification when new information is available in [address] 9. Click Sign Up. You can now view and download portions of your medical record. 10. Click the Download Summary menu link to download a portable copy of your medical information. If you have questions, please visit the Frequently Asked Questions section of the Rep[employer] website. Remember, RepRegen is NOT to be used for urgent needs. For medical emergencies, dial 911. Now available from your iPhone and Android! General Information Please provide this summary of care documentation to your next provider. Patient Signature:  ____________________________________________________________ Date:  ____________________________________________________________  
  
Armida Jenkins Provider Signature:  ____________________________________________________________ Date:  ____________________________________________________________

## 2017-04-28 NOTE — PROGRESS NOTES
Pharmacy Dosing Services: Vancomycin    Consult for Vancomycin Dosing by Pharmacy by Dr. Narciso Smith provided for this 40y.o. year old female , for indication of SSTI. Day of Therapy 01    Ht Readings from Last 1 Encounters:   04/28/17 167.6 cm (66\")        Wt Readings from Last 1 Encounters:   04/28/17 88.9 kg (196 lb)        Previous Regimen NA   Last Level NA   Other Current Antibiotics Zosyn 3.375 gm IV q6   Significant Cultures Pending   Serum Creatinine Lab Results   Component Value Date/Time    Creatinine 0.82 04/28/2017 01:00 PM    Creatinine (POC) 0.7 03/18/2015 05:21 PM      Creatinine Clearance Estimated Creatinine Clearance: 97.2 mL/min (based on Cr of 0.82). BUN Lab Results   Component Value Date/Time    BUN 21 04/28/2017 01:00 PM    BUN (POC) 8 03/18/2015 05:21 PM      WBC Lab Results   Component Value Date/Time    WBC 15.2 04/28/2017 01:00 PM      H/H Lab Results   Component Value Date/Time    HGB 9.9 04/28/2017 01:00 PM      Platelets Lab Results   Component Value Date/Time    PLATELET 079 50/40/9815 01:00 PM      Temp 97.3 °F (36.3 °C)     Start Vancomycin therapy, with loading dose of 1750 mg (1 gm + 750 mg doses in ED) @ 17:00 [id number]. Follow with maintenance dose of Vancomycin 1250 mg IV q12h, beginning @ 06:00 [id number]. Dose calculated to approximate a therapeutic trough of 10-15 mcg/mL. Pharmacy to follow daily and will make changes to dose and/or frequency based on clinical status. Lebron Damon Pharm. D.   Clinical Pharmacist  [phone]

## 2017-04-28 NOTE — ED NOTES
Pt alert and oriented c/o possible infection to her R leg. Pt very poor historian. Pt had a blood clot removed from R leg approx \"a few weeks ago\". Pt was seen last week as she reports that she was told her leg was okay. Pt has some clear drainage from leg incision. Pt reports she was seen by the surgeon 3 days ago and he opened part of her leg incision with some packing in place. Pt has redness around the site. Pt doesn't appear to be in any acute distress and denies any fevers. Pt reports that home health hasn't been coming to see her. Big concern that the pt has a central line in place in R chest. Pt has many diff answers and reports that \" The nurse said just leave it in because I get infusions for my Chrohns disease. \" Pt has a triple lumen in place and is able to tell this RN which one gives blood best and which one pushes medications best. Pt reports \"i have it because Im so hard to get a line on. \" When asked about home health visits after looking in her chart and seeing notes from home health, RN attempts to clarify with the pt. Pt has many different answers and states things like \"I dont know her, I think shes on vacation. \" \"yeah, she said she came but I didn't see her. \" MD is aware. Pt reports +PMS in all extremities and \"twisted mouth from my stroke a month ago. \" Pt has some residual R sided facial droop at mouth it appears.

## 2017-04-28 NOTE — ED NOTES
Patient constantly placing fingers on wound encouraged not to. Wound has watery drainage oozing out packing removed due to most of it laying on the bed.  States she wants something stronger than norco

## 2017-04-29 PROCEDURE — 74011250636 HC RX REV CODE- 250/636: Performed by: FAMILY MEDICINE

## 2017-04-29 PROCEDURE — 74011250637 HC RX REV CODE- 250/637: Performed by: INTERNAL MEDICINE

## 2017-04-29 PROCEDURE — 65270000029 HC RM PRIVATE

## 2017-04-29 PROCEDURE — 74011250637 HC RX REV CODE- 250/637: Performed by: FAMILY MEDICINE

## 2017-04-29 PROCEDURE — 74011636637 HC RX REV CODE- 636/637: Performed by: HOSPITALIST

## 2017-04-29 PROCEDURE — 74011250637 HC RX REV CODE- 250/637: Performed by: HOSPITALIST

## 2017-04-29 PROCEDURE — 74011250636 HC RX REV CODE- 250/636: Performed by: HOSPITALIST

## 2017-04-29 PROCEDURE — 74011000258 HC RX REV CODE- 258: Performed by: HOSPITALIST

## 2017-04-29 RX ORDER — DIPHENHYDRAMINE HCL 25 MG
25 CAPSULE ORAL
Status: DISCONTINUED | OUTPATIENT
Start: 2017-04-29 | End: 2017-05-06 | Stop reason: HOSPADM

## 2017-04-29 RX ORDER — HYDROCODONE BITARTRATE AND ACETAMINOPHEN 7.5; 325 MG/1; MG/1
2 TABLET ORAL
Status: DISCONTINUED | OUTPATIENT
Start: 2017-04-29 | End: 2017-05-03

## 2017-04-29 RX ORDER — MORPHINE SULFATE 2 MG/ML
4 INJECTION, SOLUTION INTRAMUSCULAR; INTRAVENOUS
Status: DISCONTINUED | OUTPATIENT
Start: 2017-04-29 | End: 2017-05-03

## 2017-04-29 RX ADMIN — GABAPENTIN 400 MG: 400 CAPSULE ORAL at 21:15

## 2017-04-29 RX ADMIN — MESALAMINE 1000 MG: 250 CAPSULE ORAL at 21:15

## 2017-04-29 RX ADMIN — PIPERACILLIN SODIUM,TAZOBACTAM SODIUM 3.38 G: 3; .375 INJECTION, POWDER, FOR SOLUTION INTRAVENOUS at 04:44

## 2017-04-29 RX ADMIN — DIPHENHYDRAMINE HYDROCHLORIDE 25 MG: 25 CAPSULE ORAL at 01:45

## 2017-04-29 RX ADMIN — MESALAMINE 1000 MG: 250 CAPSULE ORAL at 17:17

## 2017-04-29 RX ADMIN — GABAPENTIN 400 MG: 400 CAPSULE ORAL at 09:17

## 2017-04-29 RX ADMIN — GABAPENTIN 400 MG: 400 CAPSULE ORAL at 17:17

## 2017-04-29 RX ADMIN — MESALAMINE 1000 MG: 250 CAPSULE ORAL at 13:17

## 2017-04-29 RX ADMIN — VANCOMYCIN HYDROCHLORIDE 1250 MG: 10 INJECTION, POWDER, LYOPHILIZED, FOR SOLUTION INTRAVENOUS at 06:05

## 2017-04-29 RX ADMIN — Medication 2 MG: at 00:50

## 2017-04-29 RX ADMIN — Medication 2 MG: at 09:18

## 2017-04-29 RX ADMIN — HYDROCODONE BITARTRATE AND ACETAMINOPHEN 2 TABLET: 7.5; 325 TABLET ORAL at 21:39

## 2017-04-29 RX ADMIN — Medication 2 MG: at 05:02

## 2017-04-29 RX ADMIN — PREDNISONE 40 MG: 20 TABLET ORAL at 09:18

## 2017-04-29 RX ADMIN — MESALAMINE 1000 MG: 250 CAPSULE ORAL at 09:17

## 2017-04-29 RX ADMIN — DIPHENHYDRAMINE HYDROCHLORIDE 25 MG: 25 CAPSULE ORAL at 09:32

## 2017-04-29 RX ADMIN — CARVEDILOL 3.12 MG: 3.12 TABLET, FILM COATED ORAL at 09:17

## 2017-04-29 RX ADMIN — ATORVASTATIN CALCIUM 80 MG: 20 TABLET, FILM COATED ORAL at 21:15

## 2017-04-29 RX ADMIN — CARVEDILOL 3.12 MG: 3.12 TABLET, FILM COATED ORAL at 17:17

## 2017-04-29 NOTE — PROGRESS NOTES
1840- Pt transferred from ED. Report received from [address] included SBAR, Kardex, and MAR.    1900- Pt refusing PO pain meds, not answering admit questions as she states she will go AMA if not given IV pain medications. Bedside and Verbal shift change report given to ISH Zhang RN (oncoming nurse) by Patience Manriquez RN (offgoing nurse). Report included the following information SBAR, Kardex and MAR.

## 2017-04-29 NOTE — PROGRESS NOTES
met with patient, completed the initial Spiritual Assessment of the patient, and offered Pastoral Care, see flow sheets for interventions. Patient indicated she is doing all right at this time. She is [place]. She did not look at  much during visit and answered slowly when spoken to. She said her healing is slow . Pastoral support provided. Patient does not have any Yazidism/cultural needs that will affect patients preferences in health care. Chart reviewed. Chaplains will continue to follow and will provide pastoral care on an as needed/as requested basis. Alexandre Oliva MDiv.   Board Certified [employer] [phone]

## 2017-04-29 NOTE — ROUTINE PROCESS
Bedside and Verbal shift change report given to A Idania Johnson RN by Cande Aggarwal. Report included the following information SBAR, Kardex, OR Summary, Intake/Output and MAR.

## 2017-04-29 NOTE — PROGRESS NOTES
1308 Pt IV infiltrated, Notified , new orders for STAT PICC  1430 Pt INR 2.7, unable to receive PICC, Dr. Chucky Hay is aware of no IV access, new orders for PO pain medication  1305 Pt refuses PO pain medication, \"States it does not help\"  1345 Notified from aid that pt is drowsy and at times lethargic, notified charge Rochelle Hampton and Supervisor Calista that pt may be self medicating. 1600 ED tech attempted IV without success.

## 2017-04-29 NOTE — ROUTINE PROCESS
Bedside and Verbal shift change report given to IVAN Powell RN  (oncoming nurse) by  FAYE Vernon RN  (offgoing nurse). Report included the following information SBAR, Kardex, Recent Results and Med Rec Status.

## 2017-04-29 NOTE — PROGRESS NOTES
Shift Summary:  Alert and oriented with complaints of pain in right leg that is helped with morphine. Itching helped by benadryl which she wants IV. Outer dressing changed because of drainage to outside. TLC removed after 2 stitches removed without problems.

## 2017-04-29 NOTE — PROGRESS NOTES
Hospitalist Progress Note    Patient: Marly Andrews MRN: [id number]  CSN: [id number]    YOB: 1970  Age: 40 y.o. Sex: female    DOA: 4/28/2017 LOS:  LOS: 1 day          Chief Complaint:  Surgical site infection          Assessment/Plan       Patient Active Problem List   Diagnosis Code    Crohn disease ([facility] K50.90    Sickle cell trait ([facility] D57.3    Hypertension I10    Hx of cocaine abuse J23.533    Embolic stroke ([facility] D36.2    Neuropathic pain M79.2    DVT (deep venous thrombosis) ([facility]) I82.409    GI bleed K92.2    PAD (peripheral artery disease) ([facility]) I73.9    Abscess of right leg L02.415    Wound infection T14.8, L08.9    Popliteal artery thrombosis, 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 Holding anticoagulation for PICC line placement. INR 2.7; likely will be happened today.   -Continue elevate the right leg   -Continue Norco as needed for severe pain.   -Dr. Dustin Stovall, Vascular surgery following  -Will increase prn morphine for uncontrolled pain    Subjective:    \"I'm in 10/10 pain. \"    Review of systems:    Constitutional: denies fevers, chills, myalgias  Respiratory: denies SOB, cough  Cardiovascular: denies chest pain, palpitations  Gastrointestinal: denies nausea, vomiting, diarrhea      Vital signs/Intake and Output:  Visit Vitals    /71 (BP 1 Location: Left arm, BP Patient Position: At rest)    Pulse 87    Temp 98.2 °F (36.8 °C)    Resp 14    Ht 5' 6\" (1.676 m)    Wt 90.7 kg (199 lb 15.3 oz)    SpO2 100%    BMI 32.27 kg/m2     Current Shift:  04/29 0701 - 04/29 1900  In: 240 [P.O.:240]  Out: -   Last three shifts:  04/27 1901 - 04/29 0700  In: 1253 [P.O.:100;  I.V.:1153]  Out: 300 [Urine:300]    Exam:    General: Well developed, alert, NAD, OX3  Head/Neck: NCAT, supple, No masses, No lymphadenopathy  CVS:Regular rate and rhythm, no M/R/G, S1/S2 heard, no thrill  Lungs:Clear to auscultation bilaterally, no wheezes, rhonchi, or rales  Abdomen: Soft, Nontender, No distention, Normal Bowel sounds, No hepatomegaly  Extremities: No C/C/E, pulses palpable 2+  Skin:RLE bandage C/D/I. Otherwise normal texture and turgor, no rashes, no lesions  Neuro:grossly normal , follows commands  Psych:appropriate                Labs: Results:       Chemistry Recent Labs      04/28/17   1300   GLU  102*   NA  138   K  3.8   CL  101   CO2  29   BUN  21*   CREA  0.82   CA  9.0   AGAP  8   BUCR  26*   AP  56   TP  7.4   ALB  3.5   GLOB  3.9   AGRAT  0.9      CBC w/Diff Recent Labs      04/28/17   1300   WBC  15.2*   RBC  3.69*   HGB  9.9*   HCT  31.0*   PLT  394   GRANS  75*   LYMPH  17*   EOS  0      Cardiac Enzymes No results for input(s): CPK, CKND1, CHANDA in the last 72 hours. No lab exists for component: CKRMB, TROIP   Coagulation Recent Labs      04/28/17   1300   PTP  27.7*   INR  2.7*   APTT  38.0*       Lipid Panel Lab Results   Component Value Date/Time    Cholesterol, total 145 03/12/2017 06:15 AM    HDL Cholesterol 37 03/12/2017 06:15 AM    LDL, calculated 91.6 03/12/2017 06:15 AM    VLDL, calculated 16.4 03/12/2017 06:15 AM    Triglyceride 82 03/12/2017 06:15 AM    CHOL/HDL Ratio 3.9 03/12/2017 06:15 AM      BNP No results for input(s): BNPP in the last 72 hours.    Liver Enzymes Recent Labs      04/28/17   1300   TP  7.4   ALB  3.5   AP  56   SGOT  14*      Thyroid Studies Lab Results   Component Value Date/Time    TSH 1.51 12/12/2009 09:48 AM        Procedures/imaging: see electronic medical records for all procedures/Xrays and details which were not copied into this note but were reviewed prior to creation of Rachell Park MD

## 2017-04-29 NOTE — PROGRESS NOTES
1935 - Bedside report received from [facility]. Patient in bed. Pain 10/10. Per report, pt is threatening to leave AMA as she is not getting IV pain meds. L basilic IV infiltrated. Pt a hard stick, insisting on a restart, will look for someone to do restart. 2005 - Patient in bed at this time. IV to L basilic removed, new one restarted by Gustavo Lackey CNA,  intact and patent. Dressing to R LE with scant amnts of yellow drge. [place] Basket + CMS. Pt A & O x 4. LS clear, on RA. Abdomen soft, NT and ND. + BS to all 4 quadrants. Denies nausea. Pain 10/10. Call light within reach. Pt refusing pain pills, wants MD called for IV pain meds order. Will call hospitalist.    2008-Hospitalist paged. Awaiting call back. 2020-MD talked with the outgoing RN. See new orders. 2045-Morphine given. Potential side effects explained to patient, patient verbalizes understanding, opportunities for questions provided. Patient stable, No apparent distress at this time, bed in locked position, call bell and phone within reach.

## 2017-04-29 NOTE — PROGRESS NOTES
Outer dressing right leg changed due to drainage and complaints of itching at site. Drainage small to moderate faint yellow color. Packing maintained in place.

## 2017-04-30 PROCEDURE — 74011250637 HC RX REV CODE- 250/637: Performed by: FAMILY MEDICINE

## 2017-04-30 PROCEDURE — 74011250636 HC RX REV CODE- 250/636: Performed by: INTERNAL MEDICINE

## 2017-04-30 PROCEDURE — 74011000258 HC RX REV CODE- 258: Performed by: HOSPITALIST

## 2017-04-30 PROCEDURE — 74011250636 HC RX REV CODE- 250/636: Performed by: HOSPITALIST

## 2017-04-30 PROCEDURE — 74011636637 HC RX REV CODE- 636/637: Performed by: HOSPITALIST

## 2017-04-30 PROCEDURE — 65270000029 HC RM PRIVATE

## 2017-04-30 PROCEDURE — 74011250637 HC RX REV CODE- 250/637: Performed by: HOSPITALIST

## 2017-04-30 RX ADMIN — DIPHENHYDRAMINE HYDROCHLORIDE 25 MG: 25 CAPSULE ORAL at 12:00

## 2017-04-30 RX ADMIN — VANCOMYCIN HYDROCHLORIDE 1250 MG: 10 INJECTION, POWDER, LYOPHILIZED, FOR SOLUTION INTRAVENOUS at 05:47

## 2017-04-30 RX ADMIN — GABAPENTIN 400 MG: 400 CAPSULE ORAL at 21:09

## 2017-04-30 RX ADMIN — PIPERACILLIN SODIUM,TAZOBACTAM SODIUM 3.38 G: 3; .375 INJECTION, POWDER, FOR SOLUTION INTRAVENOUS at 10:06

## 2017-04-30 RX ADMIN — VANCOMYCIN HYDROCHLORIDE 1250 MG: 10 INJECTION, POWDER, LYOPHILIZED, FOR SOLUTION INTRAVENOUS at 18:44

## 2017-04-30 RX ADMIN — MESALAMINE 1000 MG: 250 CAPSULE ORAL at 09:53

## 2017-04-30 RX ADMIN — Medication 4 MG: at 21:09

## 2017-04-30 RX ADMIN — Medication 4 MG: at 16:36

## 2017-04-30 RX ADMIN — GABAPENTIN 400 MG: 400 CAPSULE ORAL at 16:25

## 2017-04-30 RX ADMIN — CARVEDILOL 3.12 MG: 3.12 TABLET, FILM COATED ORAL at 16:25

## 2017-04-30 RX ADMIN — CARVEDILOL 3.12 MG: 3.12 TABLET, FILM COATED ORAL at 09:54

## 2017-04-30 RX ADMIN — PIPERACILLIN SODIUM,TAZOBACTAM SODIUM 3.38 G: 3; .375 INJECTION, POWDER, FOR SOLUTION INTRAVENOUS at 16:24

## 2017-04-30 RX ADMIN — DIPHENHYDRAMINE HYDROCHLORIDE 25 MG: 25 CAPSULE ORAL at 22:21

## 2017-04-30 RX ADMIN — ATORVASTATIN CALCIUM 80 MG: 20 TABLET, FILM COATED ORAL at 21:09

## 2017-04-30 RX ADMIN — MESALAMINE 1000 MG: 250 CAPSULE ORAL at 13:54

## 2017-04-30 RX ADMIN — GABAPENTIN 400 MG: 400 CAPSULE ORAL at 09:54

## 2017-04-30 RX ADMIN — Medication 4 MG: at 05:59

## 2017-04-30 RX ADMIN — PREDNISONE 40 MG: 20 TABLET ORAL at 09:53

## 2017-04-30 RX ADMIN — PIPERACILLIN SODIUM,TAZOBACTAM SODIUM 3.38 G: 3; .375 INJECTION, POWDER, FOR SOLUTION INTRAVENOUS at 22:21

## 2017-04-30 RX ADMIN — Medication 4 MG: at 01:23

## 2017-04-30 RX ADMIN — Medication 4 MG: at 10:05

## 2017-04-30 RX ADMIN — PIPERACILLIN SODIUM,TAZOBACTAM SODIUM 3.38 G: 3; .375 INJECTION, POWDER, FOR SOLUTION INTRAVENOUS at 01:23

## 2017-04-30 RX ADMIN — MESALAMINE 1000 MG: 250 CAPSULE ORAL at 20:15

## 2017-04-30 NOTE — ROUTINE PROCESS
Bedside and Verbal shift change report given to FRANCIS Howell (oncoming nurse) by Joni Caballero   (offgoing nurse). Report included the following information SBAR, Procedure Summary, Intake/Output and MAR.     Patient Vitals for the past 12 hrs:   Temp Pulse Resp BP SpO2   04/29/17 1924 98.9 °F (37.2 °C) 83 16 119/79 99 %   04/29/17 1550 98.3 °F (36.8 °C) 82 14 116/80 99 %   04/29/17 1144 98.2 °F (36.8 °C) 87 14 106/71 100 %

## 2017-04-30 NOTE — ROUTINE PROCESS
Bedside and Verbal shift change report given to [facility], RN (oncoming nurse) by Brittani Shah RN   (offgoing nurse). Report included the following information SBAR, Kardex, Intake/Output, MAR, Recent Results and Med Rec Status.

## 2017-04-30 NOTE — ROUTINE PROCESS
2346 - Bedside and Verbal shift change report given to IVAN Ji RN (oncoming nurse) by SHEYLA Edwards RN (offgoing nurse). Report included the following information SBAR, Kardex, Intake/Output, MAR and Recent Results.

## 2017-04-30 NOTE — PROGRESS NOTES
Will check labs in AM  Also keep npo in AM  If persistent drainage, may need wash out  Looks well, excellent pulses, no fevers

## 2017-04-30 NOTE — PROGRESS NOTES
Hospitalist Progress Note    Patient: Filiberto Daughters MRN: [id number]  CSN: [id number]    YOB: 1970  Age: 40 y.o. Sex: female    DOA: 4/28/2017 LOS:  LOS: 2 days          Chief Complaint:  Surgical site infection          Assessment/Plan       Patient Active Problem List   Diagnosis Code    Crohn disease ([facility] K50.90    Sickle cell trait ([facility] D57.3    Hypertension I10    Hx of cocaine abuse Q70.075    Embolic stroke ([facility] D62.8    Neuropathic pain M79.2    DVT (deep venous thrombosis) ([facility]) I82.409    GI bleed K92.2    PAD (peripheral artery disease) ([facility]) I73.9    Abscess of right leg L02.415    Wound infection T14.8, L08.9    Popliteal artery thrombosis, 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 Holding anticoagulation for PICC line placement. INR 2.7.   -Continue elevate the right leg   -Continue Norco as needed for severe pain.   -Dr. Ros Proctor, Vascular surgery following  -Prn morphine for uncontrolled breakthrough pain    Subjective: \"Oh, why didn't she tell me that you already increase my pain medication. \"    Review of systems:    Constitutional: denies fevers, chills, myalgias  Respiratory: denies SOB, cough  Cardiovascular: denies chest pain, palpitations  Gastrointestinal: denies nausea, vomiting, diarrhea      Vital signs/Intake and Output:  Visit Vitals    /81 (BP 1 Location: Left arm, BP Patient Position: At rest)    Pulse 79    Temp 97.8 °F (36.6 °C)    Resp 18    Ht 5' 6\" (1.676 m)    Wt 90.4 kg (199 lb 4.8 oz)    SpO2 100%    BMI 32.17 kg/m2     Current Shift:     Last three shifts:  04/28 1901 - 04/30 0700  In: 3571.3 [P.O.:1060; I.V.:2511.3]  Out: 300 [Urine:300]    Exam:    General: Well developed, alert, NAD, OX3  Head/Neck: NCAT, supple, No masses, No lymphadenopathy  CVS:Regular rate and rhythm, no M/R/G, S1/S2 heard, no thrill  Lungs:Clear to auscultation bilaterally, no wheezes, rhonchi, or rales  Abdomen: Soft, Nontender, No distention, Normal Bowel sounds, No hepatomegaly  Extremities: No C/C/E, pulses palpable 2+  Skin:RLE bandage C/D/I. Otherwise normal texture and turgor, no rashes, no lesions  Neuro:grossly normal , follows commands  Psych:appropriate                Labs: Results:       Chemistry Recent Labs      04/28/17   1300   GLU  102*   NA  138   K  3.8   CL  101   CO2  29   BUN  21*   CREA  0.82   CA  9.0   AGAP  8   BUCR  26*   AP  56   TP  7.4   ALB  3.5   GLOB  3.9   AGRAT  0.9      CBC w/Diff Recent Labs      04/28/17   1300   WBC  15.2*   RBC  3.69*   HGB  9.9*   HCT  31.0*   PLT  394   GRANS  75*   LYMPH  17*   EOS  0      Cardiac Enzymes No results for input(s): CPK, CKND1, CHANDA in the last 72 hours. No lab exists for component: CKRMB, TROIP   Coagulation Recent Labs      04/28/17   1300   PTP  27.7*   INR  2.7*   APTT  38.0*       Lipid Panel Lab Results   Component Value Date/Time    Cholesterol, total 145 03/12/2017 06:15 AM    HDL Cholesterol 37 03/12/2017 06:15 AM    LDL, calculated 91.6 03/12/2017 06:15 AM    VLDL, calculated 16.4 03/12/2017 06:15 AM    Triglyceride 82 03/12/2017 06:15 AM    CHOL/HDL Ratio 3.9 03/12/2017 06:15 AM      BNP No results for input(s): BNPP in the last 72 hours.    Liver Enzymes Recent Labs      04/28/17   1300   TP  7.4   ALB  3.5   AP  56   SGOT  14*      Thyroid Studies Lab Results   Component Value Date/Time    TSH 1.51 12/12/2009 09:48 AM        Procedures/imaging: see electronic medical records for all procedures/Xrays and details which were not copied into this note but were reviewed prior to creation of Marisa Rao MD

## 2017-04-30 NOTE — PROGRESS NOTES
2025 - Assumed care of pt at this time. Pt in bed with no signs of distress. Pt left with call light within reach and encouraged to call for assistance. 2140- Pt state pain as 10/10. Pt received medication per MAR and ice. Pt informed of the side effects of the medication and the next time medication can be given. Pt encouraged to call for assistance and to manage pain. Pt encouraged to use incentive spirometer. Pt left with call light within reach, bed in low position and wheels locked. Will continue to monitor. Shift summary - Pt has not had IV access through the shift. Pt did receive po medication for pain. Pt informed that IV access would be needed for antibiotics and the IV access is necessary. Pt left in bed with no signs of distress.

## 2017-04-30 NOTE — PROGRESS NOTES
Dressing change done and elevated R leg on pillow. Wound on inner aspect of R leg with serosang drainage noted. Pt with no IV access at shift change, PCT from ED able to insert a 22 g cath to [facility] area later and Zosyn retimed and Pharmacist notified. Pt requested IV Morphine stating that [address] [facility] does not work. Unable to have PICC line at this time due to INR of 2.7. Pt is up ad hanane to bathroom, voiding in toilet and wash self without assist. Reminded to keep leg elevated when in bed. Lab work still needed for today - passed on in report. Shift uneventful.   Patient Vitals for the past 8 hrs:   Temp Pulse Resp BP SpO2   04/30/17 0736 97.8 °F (36.6 °C) 79 18 115/81 100 %   04/30/17 0336 98 °F (36.7 °C) 79 18 114/64 100 %

## 2017-05-01 ENCOUNTER — APPOINTMENT (OUTPATIENT)
Dept: INTERVENTIONAL RADIOLOGY/VASCULAR | Age: 47
DRG: 721 | End: 2017-05-01
Attending: INTERNAL MEDICINE
Payer: MEDICAID

## 2017-05-01 ENCOUNTER — HOME CARE VISIT (OUTPATIENT)
Dept: HOME HEALTH SERVICES | Facility: HOME HEALTH | Age: 47
End: 2017-05-01
Payer: MEDICAID

## 2017-05-01 LAB
ANION GAP BLD CALC-SCNC: 9 MMOL/L (ref 3–18)
BASOPHILS # BLD AUTO: 0 K/UL (ref 0–0.06)
BASOPHILS # BLD: 0 % (ref 0–2)
BUN SERPL-MCNC: 14 MG/DL (ref 7–18)
BUN/CREAT SERPL: 21 (ref 12–20)
CALCIUM SERPL-MCNC: 8.5 MG/DL (ref 8.5–10.1)
CHLORIDE SERPL-SCNC: 107 MMOL/L (ref 100–108)
CO2 SERPL-SCNC: 27 MMOL/L (ref 21–32)
CREAT SERPL-MCNC: 0.68 MG/DL (ref 0.6–1.3)
DIFFERENTIAL METHOD BLD: ABNORMAL
EOSINOPHIL # BLD: 0.2 K/UL (ref 0–0.4)
EOSINOPHIL NFR BLD: 2 % (ref 0–5)
ERYTHROCYTE [DISTWIDTH] IN BLOOD BY AUTOMATED COUNT: 15.1 % (ref 11.6–14.5)
GLUCOSE SERPL-MCNC: 99 MG/DL (ref 74–99)
HCT VFR BLD AUTO: 31.6 % (ref 35–45)
HGB BLD-MCNC: 9.7 G/DL (ref 12–16)
INR PPP: 1.4 (ref 0.8–1.2)
LYMPHOCYTES # BLD AUTO: 28 % (ref 21–52)
LYMPHOCYTES # BLD: 2.9 K/UL (ref 0.9–3.6)
MCH RBC QN AUTO: 26.4 PG (ref 24–34)
MCHC RBC AUTO-ENTMCNC: 30.7 G/DL (ref 31–37)
MCV RBC AUTO: 85.9 FL (ref 74–97)
MONOCYTES # BLD: 0.8 K/UL (ref 0.05–1.2)
MONOCYTES NFR BLD AUTO: 8 % (ref 3–10)
NEUTS SEG # BLD: 6.2 K/UL (ref 1.8–8)
NEUTS SEG NFR BLD AUTO: 62 % (ref 40–73)
PLATELET # BLD AUTO: 327 K/UL (ref 135–420)
PMV BLD AUTO: 8.5 FL (ref 9.2–11.8)
POTASSIUM SERPL-SCNC: 3.5 MMOL/L (ref 3.5–5.5)
PROTHROMBIN TIME: 16.3 SEC (ref 11.5–15.2)
RBC # BLD AUTO: 3.68 M/UL (ref 4.2–5.3)
SODIUM SERPL-SCNC: 143 MMOL/L (ref 136–145)
WBC # BLD AUTO: 10.1 K/UL (ref 4.6–13.2)

## 2017-05-01 PROCEDURE — 74011000250 HC RX REV CODE- 250: Performed by: RADIOLOGY

## 2017-05-01 PROCEDURE — 74011250636 HC RX REV CODE- 250/636: Performed by: RADIOLOGY

## 2017-05-01 PROCEDURE — 74011250636 HC RX REV CODE- 250/636: Performed by: FAMILY MEDICINE

## 2017-05-01 PROCEDURE — 74011250637 HC RX REV CODE- 250/637: Performed by: HOSPITALIST

## 2017-05-01 PROCEDURE — 74011250636 HC RX REV CODE- 250/636: Performed by: HOSPITALIST

## 2017-05-01 PROCEDURE — 65270000029 HC RM PRIVATE

## 2017-05-01 PROCEDURE — 02HV33Z INSERTION OF INFUSION DEVICE INTO SUPERIOR VENA CAVA, PERCUTANEOUS APPROACH: ICD-10-PCS | Performed by: INTERNAL MEDICINE

## 2017-05-01 PROCEDURE — 85610 PROTHROMBIN TIME: CPT | Performed by: HOSPITALIST

## 2017-05-01 PROCEDURE — 74011250636 HC RX REV CODE- 250/636: Performed by: INTERNAL MEDICINE

## 2017-05-01 PROCEDURE — 85025 COMPLETE CBC W/AUTO DIFF WBC: CPT | Performed by: HOSPITALIST

## 2017-05-01 PROCEDURE — 74011000258 HC RX REV CODE- 258: Performed by: HOSPITALIST

## 2017-05-01 PROCEDURE — 74011636637 HC RX REV CODE- 636/637: Performed by: HOSPITALIST

## 2017-05-01 PROCEDURE — 80048 BASIC METABOLIC PNL TOTAL CA: CPT | Performed by: HOSPITALIST

## 2017-05-01 PROCEDURE — C1751 CATH, INF, PER/CENT/MIDLINE: HCPCS

## 2017-05-01 PROCEDURE — 36415 COLL VENOUS BLD VENIPUNCTURE: CPT | Performed by: HOSPITALIST

## 2017-05-01 PROCEDURE — 74011250637 HC RX REV CODE- 250/637: Performed by: FAMILY MEDICINE

## 2017-05-01 RX ORDER — DIPHENHYDRAMINE HYDROCHLORIDE 50 MG/ML
12.5 INJECTION, SOLUTION INTRAMUSCULAR; INTRAVENOUS ONCE
Status: COMPLETED | OUTPATIENT
Start: 2017-05-01 | End: 2017-05-01

## 2017-05-01 RX ORDER — HYDROMORPHONE HYDROCHLORIDE 1 MG/ML
0.5 INJECTION, SOLUTION INTRAMUSCULAR; INTRAVENOUS; SUBCUTANEOUS
Status: DISCONTINUED | OUTPATIENT
Start: 2017-05-01 | End: 2017-05-02

## 2017-05-01 RX ORDER — HEPARIN SODIUM (PORCINE) LOCK FLUSH IV SOLN 100 UNIT/ML 100 UNIT/ML
500 SOLUTION INTRAVENOUS
Status: COMPLETED | OUTPATIENT
Start: 2017-05-01 | End: 2017-05-01

## 2017-05-01 RX ORDER — LIDOCAINE HYDROCHLORIDE 10 MG/ML
1-20 INJECTION INFILTRATION; PERINEURAL
Status: COMPLETED | OUTPATIENT
Start: 2017-05-01 | End: 2017-05-01

## 2017-05-01 RX ORDER — WARFARIN 7.5 MG/1
7.5 TABLET ORAL ONCE
Status: COMPLETED | OUTPATIENT
Start: 2017-05-01 | End: 2017-05-01

## 2017-05-01 RX ORDER — ENOXAPARIN SODIUM 100 MG/ML
1 INJECTION SUBCUTANEOUS EVERY 12 HOURS
Status: DISCONTINUED | OUTPATIENT
Start: 2017-05-01 | End: 2017-05-06 | Stop reason: HOSPADM

## 2017-05-01 RX ORDER — HEPARIN SODIUM 200 [USP'U]/100ML
500 INJECTION, SOLUTION INTRAVENOUS
Status: COMPLETED | OUTPATIENT
Start: 2017-05-01 | End: 2017-05-01

## 2017-05-01 RX ORDER — POLYETHYLENE GLYCOL 3350 17 G/17G
17 POWDER, FOR SOLUTION ORAL DAILY
Status: DISCONTINUED | OUTPATIENT
Start: 2017-05-01 | End: 2017-05-06 | Stop reason: HOSPADM

## 2017-05-01 RX ORDER — DIPHENHYDRAMINE HYDROCHLORIDE 50 MG/ML
15 INJECTION, SOLUTION INTRAMUSCULAR; INTRAVENOUS
Status: DISCONTINUED | OUTPATIENT
Start: 2017-05-01 | End: 2017-05-06

## 2017-05-01 RX ADMIN — PREDNISONE 40 MG: 20 TABLET ORAL at 09:56

## 2017-05-01 RX ADMIN — MESALAMINE 1000 MG: 250 CAPSULE ORAL at 22:44

## 2017-05-01 RX ADMIN — DIPHENHYDRAMINE HYDROCHLORIDE 25 MG: 25 CAPSULE ORAL at 11:33

## 2017-05-01 RX ADMIN — ATORVASTATIN CALCIUM 80 MG: 20 TABLET, FILM COATED ORAL at 22:19

## 2017-05-01 RX ADMIN — MESALAMINE 1000 MG: 250 CAPSULE ORAL at 09:55

## 2017-05-01 RX ADMIN — HEPARIN SODIUM (PORCINE) LOCK FLUSH IV SOLN 100 UNIT/ML 400 UNITS: 100 SOLUTION at 08:56

## 2017-05-01 RX ADMIN — PIPERACILLIN SODIUM,TAZOBACTAM SODIUM 3.38 G: 3; .375 INJECTION, POWDER, FOR SOLUTION INTRAVENOUS at 02:19

## 2017-05-01 RX ADMIN — ENOXAPARIN SODIUM 90 MG: 100 INJECTION SUBCUTANEOUS at 22:20

## 2017-05-01 RX ADMIN — CARVEDILOL 3.12 MG: 3.12 TABLET, FILM COATED ORAL at 09:55

## 2017-05-01 RX ADMIN — HYDROMORPHONE HYDROCHLORIDE 0.5 MG: 1 INJECTION, SOLUTION INTRAMUSCULAR; INTRAVENOUS; SUBCUTANEOUS at 22:21

## 2017-05-01 RX ADMIN — GABAPENTIN 400 MG: 400 CAPSULE ORAL at 09:55

## 2017-05-01 RX ADMIN — GABAPENTIN 400 MG: 400 CAPSULE ORAL at 22:20

## 2017-05-01 RX ADMIN — LIDOCAINE HYDROCHLORIDE 1 ML: 10 INJECTION, SOLUTION INFILTRATION; PERINEURAL at 08:51

## 2017-05-01 RX ADMIN — HEPARIN SODIUM 500 UNITS: 200 INJECTION, SOLUTION INTRAVENOUS at 08:51

## 2017-05-01 RX ADMIN — Medication 4 MG: at 01:12

## 2017-05-01 RX ADMIN — ENOXAPARIN SODIUM 90 MG: 100 INJECTION SUBCUTANEOUS at 11:34

## 2017-05-01 RX ADMIN — MESALAMINE 1000 MG: 250 CAPSULE ORAL at 14:16

## 2017-05-01 RX ADMIN — MESALAMINE 1000 MG: 250 CAPSULE ORAL at 01:12

## 2017-05-01 RX ADMIN — PIPERACILLIN SODIUM,TAZOBACTAM SODIUM 3.38 G: 3; .375 INJECTION, POWDER, FOR SOLUTION INTRAVENOUS at 10:01

## 2017-05-01 RX ADMIN — WARFARIN SODIUM 7.5 MG: 7.5 TABLET ORAL at 17:15

## 2017-05-01 RX ADMIN — MESALAMINE 1000 MG: 250 CAPSULE ORAL at 17:15

## 2017-05-01 RX ADMIN — Medication 4 MG: at 10:18

## 2017-05-01 RX ADMIN — Medication 4 MG: at 14:17

## 2017-05-01 RX ADMIN — VANCOMYCIN HYDROCHLORIDE 1250 MG: 10 INJECTION, POWDER, LYOPHILIZED, FOR SOLUTION INTRAVENOUS at 11:46

## 2017-05-01 RX ADMIN — GABAPENTIN 400 MG: 400 CAPSULE ORAL at 17:15

## 2017-05-01 RX ADMIN — HYDROMORPHONE HYDROCHLORIDE 0.5 MG: 1 INJECTION, SOLUTION INTRAMUSCULAR; INTRAVENOUS; SUBCUTANEOUS at 18:18

## 2017-05-01 RX ADMIN — DIPHENHYDRAMINE HYDROCHLORIDE 12.5 MG: 50 INJECTION, SOLUTION INTRAMUSCULAR; INTRAVENOUS at 02:19

## 2017-05-01 RX ADMIN — DIPHENHYDRAMINE HYDROCHLORIDE 15 MG: 50 INJECTION, SOLUTION INTRAMUSCULAR; INTRAVENOUS at 20:46

## 2017-05-01 RX ADMIN — CARVEDILOL 3.12 MG: 3.12 TABLET, FILM COATED ORAL at 17:15

## 2017-05-01 NOTE — PROGRESS NOTES
Pt seen and examined. No events overnight. Wondering about surgery this PM.  Visit Vitals    /73    Pulse 73    Temp 98.4 °F (36.9 °C)    Resp 18    Ht 5' 6\" (1.676 m)    Wt 208 lb 3.2 oz (94.4 kg)    LMP 04/06/2017 (Exact Date)    SpO2 100%    BMI 33.6 kg/m2      NAD  RRR  CTAB  RLE wound without erythema. No drainage at this time. No abscess or collection    A/P: 40 F s/p popliteal thrombectomy with post operative wound dehiscence and MRSA + cultures  1) Continue vanco per ID  2) Continue local wound care with BID dressing changes and 1 inch iodoform packing  3) No indication for surgery at this time.   Will assess daily

## 2017-05-01 NOTE — PROGRESS NOTES
Readmission Risk Assessment: Low Risk and MSSP/Good Help ACO patients    RRAT Score: 1 - 12    Initial Assessment: Chart reviewed and noted Pt admitted for Rt lower abscess. CM following for needs. Anticipate may require IV abx at time of dc. Pt already seen at [facility] every 2 weeks for medication. Pt was active with [facility] for PT/INR and therapy. CM remains available. Emergency Contact: see facesheet    Pertinent Medical Hx: see H&P    PCP/Specialists: [facility]    Community Services: [facility]    DME: walker    Low Risk Care Transition Plan:  1. Evaluate for [facility] or [address] coordination of resources  2. Involve patient/caregiver in assessment, planning, education and implement of intervention. 3. CM daily patient care huddles/interdisciplinary rounds. 4. PCP/Specialist appointment within 7 - 10 days made prior to discharge. 5. Facilitate transportation and logistics for follow-up appointments.   6. Handoff to [address] Nurse Navigator or PCP practice

## 2017-05-01 NOTE — PROGRESS NOTES
Pt has new order POC PT/INR, unable to obtain due to acces/policy, notified Dr. April Black, order to d/c POC PT/INR and for lab to retry in am

## 2017-05-01 NOTE — PROGRESS NOTES
Infectious Disease Consult  Patient seen and examined. Full consult dictated. #[id number]    CC: Wound drainage and infection  Assessment:  -  R leg, upper posterior calf post operative wound infection with MRSA         S/p R ant popliteal thrombectomy and repair 4/6/17                     Had home health ordered but failed to have wound care         Admitted with fever, increased pain, erythema and drainage of wound         Blood cx are negative    -  Mult med issues:  Crohn's disease on pentasa, chronic prednisone, immune modulators                                   Recent CVA with residual L hand numbness                                   PVD, prior cig use, chronic pain, sickle cell trait    Recommendations:  -  Agree with IV vancomycin presently. Pt does have a picc line           Duration of abx will depend on response of wound to therapy but would expect about 14 days. Could potentially change to oral Bactrim but defer this as we get closer to d/c           My only concern with outpatient IV antibiotics is that she had been scheduled to have home health for wound care before she came in and apparently, was never seen by [facility]. If it is due to pt noncompliance then I would be reluctant to have her go home on IV vancomycin  -  Pharmacy is assist with dosing and maintain trough levels of 15-20 mcg/mL  -  Continue wound care - wound appears to have ongoing drainage and remains very painful, await further input from vascular surgery on response to therapy. Case d/w aury and hospitalist    Thank-you for allowing us to participate in the care of this patient. Will follow with you.

## 2017-05-01 NOTE — PROGRESS NOTES
406- Received report from DILLON Noriega RN included SBAR, Kardex and MAR.    0800- Assessed pt, resting quietly in bed, Awaiting PICC placement. Lungs clear, bowel sounds active, no distress. 5444- Taken to radiology for PICC placement. 0900- Returned to floor    1400- Reassessed pt. C/o pain in rt leg medicated with IV meds. Lungs clear, active bowel sounds. 1800- Shift Summary no change in pt status. Bedside and Verbal shift change report given to Jareth Castro (oncoming nurse) by Alta Moreno RN (offgoing nurse). Report included the following information SBAR, Kardex and MAR.

## 2017-05-01 NOTE — PROGRESS NOTES
Physical Therapy Screening:  Services are not indicated at this time. [facility] based on results obtained during the nursing admission assessment. The patients chart was reviewed and the patient is not appropriate for a skilled therapy evaluation at this time. Please consult physical therapy if any therapy needs arise. Thank you.     Francisco Boyd PT, DPT

## 2017-05-01 NOTE — PROGRESS NOTES
Pharmacy Dosing Services: Warfarin     Consult for Warfarin Dosing by Pharmacy by Dr. Jorge L Carvajal provided for this 40 y.o.  female , for indication of DVT / recent embolic CVA . Dose to achieve an INR goal of 2-3    PICC line placed today. Order entered for Warfarin 7.5 mg to be given today at 18:00. Lovenox 90 mg sc q12h  PT/INR Lab Results   Component Value Date/Time    INR 1.4 05/01/2017 02:50 AM      Platelets Lab Results   Component Value Date/Time    PLATELET 491 34/55/2161 02:50 AM      H/H     Lab Results   Component Value Date/Time    HGB 9.7 05/01/2017 02:50 AM        Warfarin Administrations (last 168 hours)       None          Pharmacy to follow daily and will provide subsequent Warfarin dosing based on clinical status.   Eli Dubon [facility])  Contact information [phone]

## 2017-05-01 NOTE — ROUTINE PROCESS
Bedside and Verbal shift change report given to Paul Esparza RN (oncoming nurse) by Susana Guerin   (offgoing nurse). Report included the following information SBAR, ED Summary, Procedure Summary, Intake/Output and MAR.   Pt had uneventful shift and tolerated medications well  Changed dressing to Right leg, pt tolerated well

## 2017-05-01 NOTE — CONSULTS
[address]    Name:  Juan Carlos Aguirre  MR#:  [id number]  :  1970  Account #:  [de-identified]  Date of Adm:  2017  Date of Consultation:  2017      INFECTIOUS DISEASE CONSULTATION    REFERRING PHYSICIAN: Dr. Vásquez Cue: Right leg wound infection. ASSESSMENT  1. Right leg upper posterior calf postoperative wound infection with  methicillin-resistant Staphylococcus aureus, status post right anterior  popliteal thrombectomy and repair on 2017. Had home health  ordered, but failed to have wound care. Admitted with fever, increased  pain, erythema and drainage of the wound. Blood cultures are  negative. 2. Multiple medical issues including Crohn disease, on chronic  prednisone and immunomodulators; recent cerebrovascular accident  with residual left hand numbness and some slight aphasia; peripheral  vascular disease; prior cigarette use; chronic pain, and sickle cell trait. RECOMMENDATIONS  1. Agree with IV vancomycin presently. The patient does have a PICC  line in place. Duration of antibiotics will depend on response with  wound therapy, but would expect about 14 days of therapy. Could  potentially change to oral Bactrim to complete course of therapy, but  defer this until we get closer to discharge. My only concern with  outpatient IV antibiotics is that she had been scheduled to have home  health for wound care before she came into the hospital and  apparently was never seen by home health nursing. If this was due to  the patient's noncompliance with home health, then I would be  reluctant to have her go home on intravenous vancomycin. 2. Continue wound care. The wound appears to have ongoing  drainage and remains very painful, await further input from Vascular  Surgery on response to treatment. Case discussed with the patient and  the hospitalist.    Thank you for allowing us to participate in her care.  We will continue to  follow the patient with you. HISTORY OF PRESENT ILLNESS: The patient is a 81-year-old  female who reports that in April, she had issues with her right leg and  on 04/06/2017, underwent thrombectomy of her anterior popliteal  artery. She tolerated the procedure well, but had some ongoing wound  dehiscence and was discharged home to get home health. She reports  that home health did not show up, maybe 1 time and that was it. According to Dr. Reina Marie note, it appears that there was an inability of  home health and the patient to coordinate an appropriate time. The  details of this are unclear to me at present. The patient reports that  home health just did not show up. The patient presented to the  emergency room because of increasing fevers at home, chills and  drainage from the wound, which she described as purulent. The  changed the wound the best that she could, but did not pack it. She  was admitted and empirically started on broad-spectrum antibiotics  including Zosyn and vancomycin and was seen by Vascular Surgery,  who felt that the wound was doing okay. There is mention made of an  abscess, it is unclear if there is an ongoing abscess, there does not  appear that any procedures have been performed. The patient's main  complaint is of continued pain in the right leg wound. She has been  afebrile since her admission, however. She denies any headaches or vision changes. Denies any coughing,  shortness of breath or chest pain. No nausea, vomiting, or diarrhea. No  problems with eating. She denies any abdominal pain. She denies any  groin pain. Main CMP is right leg pain which is mainly located at the  right medial to posterior calf area, but when the pain comes hard then  it feels like her entire leg is hurting.  She does report a history of MRSA  about 5 years ago with multiple boils and furuncles, but these have  since resolved, and she has had no issues with anything like this for at  least the past few years. She denies any unusual exposures. She did  have a central line placed in her right chest area and this was removed  at the time of admission due to high fevers and malaise. She has since  had a PICC line placed. PAST MEDICAL HISTORY: Significant for peripheral vascular disease,  Crohn's disease, history of GI bleed. CVA, embolic, with some residual  left hand numbness and some aphasia. Sickle cell trait, chronic pain,  DVT in the right lower extremity. She was QuantiFERON gold negative  on 03/20/2017. She is a past cigarette smoker, but has quit. She is  status post a bilateral tubal ligation. ALLERGIES  SHE IS ALLERGIC TO:  1. HUMIRA. 2. REMICADE. CURRENT ANTIMICROBIALS  Include:  1. Vancomycin started on 04/28/2017.  2. She was previously on Zosyn from 04/28/2017 to 05/01/2017. CURRENT MEDICATIONS  The remainder of her medication list prior to hospitalization and during  hospitalization were reviewed and include:  1. Prednisone. 2. Coumadin. 3. She also was on Rheumatrex at home. 4. Pentasa at home. 5. Gabapentin at home. 6. Lipitor at home as well. SOCIAL HISTORY: She denies the use of cigarettes, alcohol or drugs. FAMILY HISTORY: Hypertension. REVIEW OF SYSTEMS: A 12-point review of system was performed  and except as stated in HPI was negative. PHYSICAL EXAMINATION  GENERAL: The patient is a well-developed female. She is in no acute  distress. VITAL SIGNS: She has been afebrile since the time of her admission. Her most recent vital signs included a temperature of 98.4, pulse 73,  blood pressure 105/73, respiratory rate 18, saturating 100% on room  air. HEENT: Her sclerae are anicteric. Oropharynx is clear. Visualized  oropharynx with no thrush or lesions noted. NECK: Supple, without adenopathy. HEART: Regular. No murmurs were identified. LUNGS: Clear to auscultation bilaterally. ABDOMEN: Obese, but soft and nontender.   EXTREMITIES: She has a wound in her left upper medial lower leg  calf area; there is no surrounding induration; there is some slight  hyperpigmentation, but no real cellulitis; it is tender to palpation. The  dressing was quite adherent to the wound, there appears to be a lot of  drainage coming out, this was not removed, it is very tender to  palpation. Her peripheral pulses were intact. She has a right upper  extremity PICC line, the site of which is clear. LABORATORY STUDIES: Include a white count on admission of 15.2,  currently at 10.1. Hemoglobin 9.7, platelet count of 659. Creatinine 0.68. LFTs on admission were within normal limits. Urine drug screen on  04/15/2017 was positive for opiates. Blood cultures x2 on 04/28/2017  are no growth. Wound culture with rare white blood cells and few  gram-positive cocci, growth of MRSA that was sensitive to Bactrim and  vancomycin. Thank you for allowing me to participate in her care. We will continue  to follow the patient with you.         MD JOSE LUIS Cassidy / KAREY  D:  05/01/2017   16:26  T:  05/01/2017   17:08  Job #:  [id number]

## 2017-05-01 NOTE — ROUTINE PROCESS
Bedside and Verbal shift change report given to Shadi Tristan RN (oncoming nurse) by Elpidio Zafar   (offgoing nurse). Report included the following information SBAR, Kardex and MAR.

## 2017-05-01 NOTE — PROGRESS NOTES
Shift summary: Pt. Up at hanane with minimal assistance. Pain managed per MAR. Pt. NPO at midnight for possible PICC and wound washout (per MD notes). Pt. Lost IV early a.m. , extremely hard stick. Several tries to regain IV, unsuccessful.      Patient Vitals for the past 12 hrs:   Temp Pulse Resp BP SpO2   05/01/17 0322 97.3 °F (36.3 °C) 79 18 113/52 100 %   05/01/17 0000 98.2 °F (36.8 °C) 78 18 109/70 100 %   04/30/17 2005 97.8 °F (36.6 °C) 92 18 (!) 134/93 100 %

## 2017-05-01 NOTE — INTERDISCIPLINARY ROUNDS
Interdisciplinary Round Note   Patient Information: Lewis Yu                                      025/21 Reason for Admission: Wound infection  Quality Measure: SCIP            Attending Provider:   Lillian Woo MD  Primary Care Physician:       Los Swift MD       [phone]  Consulting:  IP CONSULT TO VASCULAR SURGERY  IP CONSULT TO HOSPITALIST  IDR Rounding Physician:  Past Medical History:   Past Medical History:   Diagnosis Date    Abdominal pain     Anemia NEC     sickle cell trait    C. difficile colitis     [facility] disease ([facility] [facility]     Gastrointestinal disorder     Crohns    Herpes zoster     Hx of cocaine abuse     Hyperkalemia     Hypertension     Iron deficiency anemia     MRSA (methicillin resistant Staphylococcus aureus)     Obesity     Pain management     Sickle cell trait ([facility] [facility]     [facility]     + cva 3/17    Thromboembolus [facility])         Hospital day: 3                          - - -  Estimated discharge date:   RRAT Score: Low Risk            10       Total Score        4 More than 1 Admission in calendar year    4 Patient Insurance is Medicare, Medicaid or Self Pay    2 Charlson Comorbidity Score        Criteria that do not apply:    Relationship with PCP    Patient Living Status    Patient Length of Stay > 5         Primary Goals for Today: monitor INR/possible PICC and wound wash out    Secondary Goals for Today: Pain management     Overnight Events: lost IV    Gaming: no    Central Line: no    Isolation: no       IV Antibiotics?  yes       When started: 4/28/17  Current Medication List:          Current Facility-Administered Medications   Medication Dose Route Frequency    diphenhydrAMINE (BENADRYL) capsule 25 mg  25 mg Oral Q6H PRN    morphine injection 4 mg  4 mg IntraVENous Q4H PRN    HYDROcodone-acetaminophen (NORCO) 7.5-325 mg per tablet 2 Tab  2 Tab Oral Q6H PRN    atorvastatin (LIPITOR) tablet 80 mg  80 mg Oral QHS    carvedilol (COREG) tablet 3.125 mg  3.125 mg Oral BID WITH MEALS    gabapentin (NEURONTIN) capsule 400 mg  400 mg Oral TID    mesalamine (PENTASA) CR capsule 1,000 mg  1,000 mg Oral QID    predniSONE (DELTASONE) tablet 40 mg  40 mg Oral DAILY WITH BREAKFAST    piperacillin-tazobactam (ZOSYN) 3.375 g in 0.9% sodium chloride (MBP/ADV) 100 mL MBP  3.375 g IntraVENous Q6H    0.9% sodium chloride infusion  100 mL/hr IntraVENous CONTINUOUS    acetaminophen (TYLENOL) tablet 650 mg  650 mg Oral Q6H PRN    vancomycin (VANCOCIN) 1,250 mg in 0.9% sodium chloride 250 mL IVPB  1,250 mg IntraVENous Q12H    Vancomycin- Rx to Dose & Monitor  1 Each Other Rx Dosing/Monitoring      VTE Prophylaxis                                 Lines, Drains, & Airways  [REMOVED] Peripheral IV 56/11/65 Left Basilic-Site Assessment: Clean, dry, & intact  [REMOVED] Peripheral IV 04/28/17 Left;Upper Arm-Site Assessment: Clean, dry, & intact  [REMOVED] Triple Lumen  Other(comment)-Site Assessment: Clean, dry, & intact  Peripheral IV 04/30/17 Right Antecubital-Site Assessment: Clean, dry, & intact     Vital signs:    Visit Vitals    /52 (BP 1 Location: Left arm, BP Patient Position: At rest)    Pulse 79    Temp 97.3 °F (36.3 °C)    Resp 18    Ht 5' 6\" (1.676 m)    Wt 94.4 kg (208 lb 3.2 oz)    SpO2 100%    BMI 33.6 kg/m2        Intake and Output:   04/29 0701 - 04/30 1900  In: 2558.3 [P.O.:1200; I.V.:1358.3]  Out: -      Last Bowel Movement Date: 04/30/17                 Current Diet: DIET NPO          Nutrition  Chewing/Swallowing Problems: Yes (comment) (R/t stroke x 1 month)  Difficulty with Secretions: No  Speech Slurred/Thick/Garbled: No    NGT: no    Feeding Tube: no   Recent Glucose Results:   Lab Results   Component Value Date/Time    GLU 99 05/01/2017 02:50 AM    GI Prophylaxis: yes        Type: Crohn disease      Activity Level:   Activity Level: Up ad hanane    Needs assistance with ADLs: no  PT Consult Status: no  Equipment: IV  Surgical/Ortho Notes: post recent thrombectomy and peripheral vascular disease   Current Immunizations:  Immunization History   Administered Date(s) Administered    Influenza Vaccine 11/01/2016    Pneumococcal Vaccine (Unspecified Type) 01/01/2010     RRAT Score:     Readmit Risk Tool  Support Systems: Parent, Family member(s)  Relationship with Primary Physician Group: Seen at least one time within past 12 months   Recommendations:       Discharge Disposition: Unable to determine    Needs for Discharge: wound care           Recommendations from IDR team:     Other Notes: Hard IV stick, pain management,

## 2017-05-01 NOTE — PROGRESS NOTES
Hospitalist Progress Note    Patient: Radha Hudson MRN: [id number]  CSN: [id number]    YOB: 1970  Age: 40 y.o.   Sex: female    DOA: 4/28/2017 LOS:  LOS: 3 days          Chief Complaint:    Abscess right leg      Assessment/Plan     Abscess/wound infection right leg-MRSA confirmed on cultures-stop zosyn and continue vanco already in place-ID consult for duration of therapy, now has a picc line-blood cx are negative    PVD severe s/p recent popliteal artery thrombectomy  DVT right leg-resume warfarin, consult pharmacy for dosing, coumadin was held so that a picc could be placed  crohns disease  Recent embolic CVA  Sickle cell trait  Chronic pain, narcotic dependence  Neuropathic pain    Percocet for pain  Add stool softener  Resume coumadin-add lovenox until coumadin therapeutic again  High risk for clotting-has had GI bleeding but currently stable with no recent episode  ID consult    Patient Active Problem List   Diagnosis Code    Crohn disease ([facility] [address] [facility] K50.90    [facility] D57.3    Hypertension I10    Hx of cocaine abuse W21.868    Embolic stroke ([facility] L62.6    Neuropathic pain M79.2    DVT (deep venous thrombosis) ([facility]) I82.409    GI bleed K92.2    PAD (peripheral artery disease) ([facility]) I73.9    Abscess of right leg L02.415    Wound infection T14.8, L08.9    Popliteal artery thrombosis, right ([facility]) I74.3       Subjective:  Pain in right leg is moderate      Review of systems:    Constitutional: denies fevers, chills, myalgias  Respiratory: denies SOB, cough  Cardiovascular: denies chest pain, palpitations  Gastrointestinal: denies nausea, vomiting, diarrhea      Vital signs/Intake and Output:  Visit Vitals    /73    Pulse 73    Temp 98.4 °F (36.9 °C)    Resp 18    Ht 5' 6\" (1.676 m)    Wt 94.4 kg (208 lb 3.2 oz)    SpO2 100%    BMI 33.6 kg/m2     Current Shift:     Last three shifts:  04/29 1901 - 05/01 0700  In: 2460 [P.O.:960; I.V.:1500]  Out: - Exam:    General: Well developed, alert, NAD, OX3  Head/Neck: NCAT, supple, No masses, No lymphadenopathy  CVS:Regular rate and rhythm, no M/R/G, S1/S2 heard, no thrill  Lungs:Clear to auscultation bilaterally, no wheezes, rhonchi, or rales  Abdomen: Soft, Nontender, No distention, Normal Bowel sounds, No hepatomegaly  Extremities: dressing right leg, resolved eryhtema, wound open, moderate d/c on bandage, small odor noted  Skin:normal texture and turgor, no rashes, no lesions  Neuro:grossly normal , follows commands  Psych:appropriate                Labs: Results:       Chemistry Recent Labs      05/01/17 0250 04/28/17   1300   GLU  99  102*   NA  143  138   K  3.5  3.8   CL  107  101   CO2  27  29   BUN  14  21*   CREA  0.68  0.82   CA  8.5  9.0   AGAP  9  8   BUCR  21*  26*   AP   --   56   TP   --   7.4   ALB   --   3.5   GLOB   --   3.9   AGRAT   --   0.9      CBC w/Diff Recent Labs      05/01/17 0250 04/28/17   1300   WBC  10.1  15.2*   RBC  3.68*  3.69*   HGB  9.7*  9.9*   HCT  31.6*  31.0*   PLT  327  394   GRANS  62  75*   LYMPH  28  17*   EOS  2  0      Cardiac Enzymes No results for input(s): CPK, CKND1, CHNADA in the last 72 hours. No lab exists for component: CKRMB, TROIP   Coagulation Recent Labs      05/01/17   0250 04/28/17   1300   PTP  16.3*  27.7*   INR  1.4*  2.7*   APTT   --   38.0*       Lipid Panel Lab Results   Component Value Date/Time    Cholesterol, total 145 03/12/2017 06:15 AM    HDL Cholesterol 37 03/12/2017 06:15 AM    LDL, calculated 91.6 03/12/2017 06:15 AM    VLDL, calculated 16.4 03/12/2017 06:15 AM    Triglyceride 82 03/12/2017 06:15 AM    CHOL/HDL Ratio 3.9 03/12/2017 06:15 AM      BNP No results for input(s): BNPP in the last 72 hours.    Liver Enzymes Recent Labs      04/28/17   1300   TP  7.4   ALB  3.5   AP  56   SGOT  14*      Thyroid Studies Lab Results   Component Value Date/Time    TSH 1.51 12/12/2009 09:48 AM        Procedures/imaging: see electronic medical records for all procedures/Xrays and details which were not copied into this note but were reviewed prior to creation of Diamond Muniz MD

## 2017-05-02 LAB
ANION GAP BLD CALC-SCNC: 10 MMOL/L (ref 3–18)
BUN SERPL-MCNC: 17 MG/DL (ref 7–18)
BUN/CREAT SERPL: 20 (ref 12–20)
CALCIUM SERPL-MCNC: 8.6 MG/DL (ref 8.5–10.1)
CHLORIDE SERPL-SCNC: 107 MMOL/L (ref 100–108)
CO2 SERPL-SCNC: 28 MMOL/L (ref 21–32)
CREAT SERPL-MCNC: 0.83 MG/DL (ref 0.6–1.3)
GLUCOSE SERPL-MCNC: 96 MG/DL (ref 74–99)
INR PPP: 1.2 (ref 0.8–1.2)
POTASSIUM SERPL-SCNC: 3.5 MMOL/L (ref 3.5–5.5)
PROTHROMBIN TIME: 14.3 SEC (ref 11.5–15.2)
SODIUM SERPL-SCNC: 145 MMOL/L (ref 136–145)

## 2017-05-02 PROCEDURE — 85610 PROTHROMBIN TIME: CPT | Performed by: HOSPITALIST

## 2017-05-02 PROCEDURE — 74011250637 HC RX REV CODE- 250/637: Performed by: HOSPITALIST

## 2017-05-02 PROCEDURE — 80048 BASIC METABOLIC PNL TOTAL CA: CPT | Performed by: HOSPITALIST

## 2017-05-02 PROCEDURE — 74011636637 HC RX REV CODE- 636/637: Performed by: HOSPITALIST

## 2017-05-02 PROCEDURE — 97116 GAIT TRAINING THERAPY: CPT

## 2017-05-02 PROCEDURE — 74011250636 HC RX REV CODE- 250/636: Performed by: INTERNAL MEDICINE

## 2017-05-02 PROCEDURE — 65270000029 HC RM PRIVATE

## 2017-05-02 PROCEDURE — 74011250636 HC RX REV CODE- 250/636: Performed by: HOSPITALIST

## 2017-05-02 PROCEDURE — 74011250636 HC RX REV CODE- 250/636: Performed by: FAMILY MEDICINE

## 2017-05-02 PROCEDURE — 97161 PT EVAL LOW COMPLEX 20 MIN: CPT

## 2017-05-02 RX ORDER — WARFARIN 7.5 MG/1
7.5 TABLET ORAL ONCE
Status: COMPLETED | OUTPATIENT
Start: 2017-05-02 | End: 2017-05-02

## 2017-05-02 RX ORDER — HYDROMORPHONE HYDROCHLORIDE 1 MG/ML
1 INJECTION, SOLUTION INTRAMUSCULAR; INTRAVENOUS; SUBCUTANEOUS
Status: DISCONTINUED | OUTPATIENT
Start: 2017-05-02 | End: 2017-05-06

## 2017-05-02 RX ADMIN — MESALAMINE 1000 MG: 250 CAPSULE ORAL at 22:13

## 2017-05-02 RX ADMIN — VANCOMYCIN HYDROCHLORIDE 1250 MG: 10 INJECTION, POWDER, LYOPHILIZED, FOR SOLUTION INTRAVENOUS at 00:19

## 2017-05-02 RX ADMIN — Medication 4 MG: at 02:31

## 2017-05-02 RX ADMIN — MESALAMINE 1000 MG: 250 CAPSULE ORAL at 16:44

## 2017-05-02 RX ADMIN — ENOXAPARIN SODIUM 90 MG: 100 INJECTION SUBCUTANEOUS at 10:43

## 2017-05-02 RX ADMIN — PREDNISONE 40 MG: 20 TABLET ORAL at 09:27

## 2017-05-02 RX ADMIN — HYDROMORPHONE HYDROCHLORIDE 1 MG: 1 INJECTION, SOLUTION INTRAMUSCULAR; INTRAVENOUS; SUBCUTANEOUS at 18:36

## 2017-05-02 RX ADMIN — GABAPENTIN 400 MG: 400 CAPSULE ORAL at 16:44

## 2017-05-02 RX ADMIN — VANCOMYCIN HYDROCHLORIDE 1250 MG: 10 INJECTION, POWDER, LYOPHILIZED, FOR SOLUTION INTRAVENOUS at 13:51

## 2017-05-02 RX ADMIN — Medication 4 MG: at 11:45

## 2017-05-02 RX ADMIN — Medication 4 MG: at 16:44

## 2017-05-02 RX ADMIN — HYDROMORPHONE HYDROCHLORIDE 1 MG: 1 INJECTION, SOLUTION INTRAMUSCULAR; INTRAVENOUS; SUBCUTANEOUS at 15:22

## 2017-05-02 RX ADMIN — Medication 4 MG: at 21:29

## 2017-05-02 RX ADMIN — GABAPENTIN 400 MG: 400 CAPSULE ORAL at 09:27

## 2017-05-02 RX ADMIN — GABAPENTIN 400 MG: 400 CAPSULE ORAL at 21:29

## 2017-05-02 RX ADMIN — HYDROMORPHONE HYDROCHLORIDE 1 MG: 1 INJECTION, SOLUTION INTRAMUSCULAR; INTRAVENOUS; SUBCUTANEOUS at 10:43

## 2017-05-02 RX ADMIN — HYDROMORPHONE HYDROCHLORIDE 0.5 MG: 1 INJECTION, SOLUTION INTRAMUSCULAR; INTRAVENOUS; SUBCUTANEOUS at 06:34

## 2017-05-02 RX ADMIN — DIPHENHYDRAMINE HYDROCHLORIDE 15 MG: 50 INJECTION, SOLUTION INTRAMUSCULAR; INTRAVENOUS at 09:27

## 2017-05-02 RX ADMIN — CARVEDILOL 3.12 MG: 3.12 TABLET, FILM COATED ORAL at 16:44

## 2017-05-02 RX ADMIN — MESALAMINE 1000 MG: 250 CAPSULE ORAL at 11:45

## 2017-05-02 RX ADMIN — HYDROMORPHONE HYDROCHLORIDE 1 MG: 1 INJECTION, SOLUTION INTRAMUSCULAR; INTRAVENOUS; SUBCUTANEOUS at 22:41

## 2017-05-02 RX ADMIN — POLYETHYLENE GLYCOL 3350 17 G: 17 POWDER, FOR SOLUTION ORAL at 09:27

## 2017-05-02 RX ADMIN — WARFARIN SODIUM 7.5 MG: 7.5 TABLET ORAL at 17:20

## 2017-05-02 RX ADMIN — ATORVASTATIN CALCIUM 80 MG: 20 TABLET, FILM COATED ORAL at 21:29

## 2017-05-02 RX ADMIN — CARVEDILOL 3.12 MG: 3.12 TABLET, FILM COATED ORAL at 09:27

## 2017-05-02 RX ADMIN — DIPHENHYDRAMINE HYDROCHLORIDE 15 MG: 50 INJECTION, SOLUTION INTRAMUSCULAR; INTRAVENOUS at 02:58

## 2017-05-02 RX ADMIN — DIPHENHYDRAMINE HYDROCHLORIDE 15 MG: 50 INJECTION, SOLUTION INTRAMUSCULAR; INTRAVENOUS at 19:19

## 2017-05-02 RX ADMIN — ENOXAPARIN SODIUM 90 MG: 100 INJECTION SUBCUTANEOUS at 22:12

## 2017-05-02 NOTE — PROGRESS NOTES
Pt seen and examined. Dressings changed. No abscess or increased tunneling. No erythema. No significant drainage. Continue with BID dressing changes and antibiotics per ID.

## 2017-05-02 NOTE — PROGRESS NOTES
Infectious Disease Progress Note    Antibiotics:  IV vancomycin    CC: wound drainage and infection    Subj:47 yo female s/p R ant popliteal thrombectomy 4/6/17 admitted with poor wound healing and post operative infection. It appears there was non compliance with home health for wound care following this surgery. No new c/o  Continues to have pain at R popliteal area. Reports vascular surgery just saw wound and repacked it  Still some drainage per pt reoport. She denies f/c/ns  No cough sob  No odynaphagia or dysphagia  No n/v/d or abd pain  No rash  No issues with picc line    Physical Exam:  Vital signs reviewed  TmaxAF  General:  NAD  Lines:  R picc with some blood around insertion site - no active bleeding  HEENT:  PERRL, OP clear, sclera anicteric  Neck:  Supple  CHest:  CTA B  CV:  RRR no murmur noted  Abd:  obese  Ext:  R leg with dressing in place. No surrounding erythema. Dressing not taken down           Seen 5/1/17 with minimal surrounding hyperpigmentation and no induration but drainage  Skin:  No rash    Labs, cultures and radiology reviewed  crt 0.83    4/28 wound cx;  MRSA  4/28 blood cx x 2:  NGTD    Assessment:  - R leg, upper posterior calf post operative wound infection with MRSA  S/p R ant popliteal thrombectomy and repair 4/6/17  Had home health ordered but failed to have wound care  Admitted with fever, increased pain, erythema and drainage of wound  Blood cx are negative     - Mult med issues: Crohn's disease on pentasa, chronic prednisone, immune modulators  Recent CVA with residual L hand numbness  PVD, prior cig use, chronic pain, sickle cell trait    -  Previously poor compliance with home health wound care     Recommendations:  - Continue IV vancomycin presently.  Pt does have a picc line  Duration of abx will depend on response of wound to therapy but would expect about 10- 14 days total.   Could potentially change to oral Bactrim but defer this as we get closer to d/c  My concern with outpt IV ABX is her non compliance with recent home health. If IV abx are required then would prefer once daily dosing - will need to check with micro lab if Daptomycin would be an option. Otherwise, if wound is doing well, could change to oral abx to finish course. Continued wound care directed by vascular surgery  - Pharmacy is assist with dosing and maintain trough levels of 15-20 mcg/mL    Attempted to call micro lab, but at this time of day there is no one that can answer questions.   Case d/w patient

## 2017-05-02 NOTE — PROGRESS NOTES
2015: Received report, pt requested for benadryl iv instead of po for itching. On call hospitalitis called with orders to give iv benadryl. 2045: Assumed care, pt resting in bed watching TV. Alert and reoriented, right leg dressing dry and intact and elevated on pillow. Complains of pain right 10/10, will medicate as soon as possible. Right arm PICC x 2 port dry and intact. Medicated with benadryl at this time for itching. Contact isolation maintained. Call light and telephone in reach. 2225: Night time medications given along with pain medicine dilaudid 0.5mg iv given for pain 10/10. Right leg dressing change done per orders. Will continue to monitor. 0030: Ambulated to bathroom and back in bed resting. Pain reassessment done and charted, see flow sheet. 0300: Pt still awake and resting in bed watching TV, mediated with morphine 4 mg and benadryl iv 15 mg for pain and itching. Will continue to monitor. 0630: Pt still awake and siting up in bed watching TV, ambulated to bathroom to void with no problems. Medicated with dilaudid 0.5 mg iv for leg pain. Assessment unchanged, plan of care teaching done, pt needs more reinforcement. Call light in reach. Shift Summary: Assessment unchanged, pt had uneventful night.

## 2017-05-02 NOTE — PROGRESS NOTES
Hospitalist Progress Note    Patient: Clarisse Eller MRN: [id number]  CSN: [id number]    YOB: 1970  Age: 40 y.o.   Sex: female    DOA: 4/28/2017 LOS:  LOS: 4 days          Chief Complaint:    Right leg infection      Assessment/Plan     dehiscence and infection right leg-MRSA confirmed on cultures-stopped zosyn and continue vanco already in place-ID consulted for duration of therapy, now has a picc line-blood cx are negative-anticipating 14 days of IV antibiotics     PVD severe s/p recent popliteal artery thrombectomy  DVT right leg-resume warfarin, consult pharmacy for dosing, coumadin was held so that a picc could be placed-BID lovenox as bridge as high risk for thromboembolism  crohns disease with hx GI bleeding  Recent embolic CVA  Sickle cell trait  Chronic pain, narcotic dependence  Neuropathic pain    Due to her noncompliance with wound care and home health preference is to stay in hospital at least 48 hrs more or so to ensure imrpoved wound status and tolerance of IV abx-she started vanco 5 days ago-then possibly home with IV vanco one more week with home health  Cellulitis around wound is resolved  Daily INR  Wound care      Patient Active Problem List   Diagnosis Code    Crohn disease ([facility] K50.90    Sickle cell trait ([facility] D57.3    Hypertension I10    Hx of cocaine abuse R43.191    Embolic stroke ([facility] G23.3    Neuropathic pain M79.2    DVT (deep venous thrombosis) ([facility]) I82.409    GI bleed K92.2    PAD (peripheral artery disease) ([facility] I73.9    Abscess of right leg L02.415    Wound infection T14.8, L08.9    Popliteal artery thrombosis, right ([facility]) I74.3       Subjective:    Denies GI issues  No N/V/D/bleeding  A lot of pain in leg, but feels dilaudid helps it the most  No fevers/chills    Review of systems:    Constitutional: denies fevers, chills, myalgias  Respiratory: denies SOB, cough  Cardiovascular: denies chest pain, palpitations  Gastrointestinal: denies nausea, vomiting, diarrhea      Vital signs/Intake and Output:  Visit Vitals    /79    Pulse 80    Temp 97.5 °F (36.4 °C)    Resp 18    Ht 5' 6\" (1.676 m)    Wt 95.3 kg (210 lb 3.2 oz)    SpO2 100%    BMI 33.93 kg/m2     Current Shift:  05/02 0701 - 05/02 1900  In: 750 [P.O.:500; I.V.:250]  Out: -   Last three shifts:  04/30 1901 - 05/02 0700  In: 1240 [P.O.:440; I.V.:800]  Out: -     Exam:    General: Well developed, alert, NAD, OX3  Head/Neck: NCAT, supple, No masses, No lymphadenopathy  CVS:Regular rate and rhythm, no M/R/G, S1/S2 heard, no thrill  Lungs:Clear to auscultation bilaterally, no wheezes, rhonchi, or rales  Abdomen: Soft, Nontender, No distention, Normal Bowel sounds, No hepatomegaly  Extremities: No C/C/E, pulses palpable 2+  Skin:right leg wound packed, redness resolved, no odor, small yellow drainage  Neuro:grossly normal , follows commands  Psych:appropriate                Labs: Results:       Chemistry Recent Labs      05/02/17   0240 05/01/17   0250   GLU  96  99   NA  145  143   K  3.5  3.5   CL  107  107   CO2  28  27   BUN  17  14   CREA  0.83  0.68   CA  8.6  8.5   AGAP  10  9   BUCR  20  21*      CBC w/Diff Recent Labs      05/01/17   0250   WBC  10.1   RBC  3.68*   HGB  9.7*   HCT  31.6*   PLT  327   GRANS  62   LYMPH  28   EOS  2      Cardiac Enzymes No results for input(s): CPK, CKND1, CHANDA in the last 72 hours. No lab exists for component: CKRMB, TROIP   Coagulation Recent Labs      05/02/17   0240  05/01/17   0250   PTP  14.3  16.3*   INR  1.2  1.4*       Lipid Panel Lab Results   Component Value Date/Time    Cholesterol, total 145 03/12/2017 06:15 AM    HDL Cholesterol 37 03/12/2017 06:15 AM    LDL, calculated 91.6 03/12/2017 06:15 AM    VLDL, calculated 16.4 03/12/2017 06:15 AM    Triglyceride 82 03/12/2017 06:15 AM    CHOL/HDL Ratio 3.9 03/12/2017 06:15 AM      BNP No results for input(s): BNPP in the last 72 hours.    Liver Enzymes No results for input(s): TP, ALB, TBIL, AP, SGOT, GPT in the last 72 hours.     No lab exists for component: DBIL   Thyroid Studies Lab Results   Component Value Date/Time    TSH 1.51 12/12/2009 09:48 AM        Procedures/imaging: see electronic medical records for all procedures/Xrays and details which were not copied into this note but were reviewed prior to creation of Norberto Watkins MD

## 2017-05-02 NOTE — ROUTINE PROCESS
Bedside and Verbal shift change report given to Loly Marrero RN (oncoming nurse) by Joe Gipson RN (offgoing nurse). Report included the following information SBAR, Kardex, Intake/Output, MAR and Recent Results.

## 2017-05-02 NOTE — ROUTINE PROCESS
Bedside shift change report given to [facility], RN (oncoming nurse) by Jacy Villareal RN (offgoing nurse). Report included the following information SBAR, Kardex, Intake/Output and MAR.

## 2017-05-02 NOTE — PROGRESS NOTES
Problem: Mobility Impaired (Adult and Pediatric)  Goal: *Acute Goals and Plan of Care (Insert Text)  Physical Therapy Goals  Initiated 5/2/2017 and to be accomplished within 2-8 day(s)  1. Patient will move from supine to sit and sit to supine in bed with supervision/set-up. 2. Patient will transfer from bed to chair and chair to bed with supervision/set-up using the least restrictive device. 3. Patient will perform sit to stand with supervision/set-up. 4. Patient will ambulate with supervision/set-up for 75 feet with the least restrictive device. 5. Patient will ascend/descend 3-5 stairs with use of handrail(s) with minimal assistance/contact guard assist for home entry. Outcome: Progressing Towards Goal  PHYSICAL THERAPY EVALUATION     Patient: Jameel Humphrey (31 y.o. female)  Date: 5/2/2017  Primary Diagnosis: Wound infection        Precautions:   Fall      ASSESSMENT :  Based on the objective data described below, the patient presents with decreased functional mobility with regards to bed mobility, transfers, gait, balance, and tolerance to activity. The patient has a history of a stroke that affected the left side, leaving mild sensation and strength impairments, primarily with left upper extremity. Patient has a wound on RLE, which causes the patient pain when ambulating. Patient reports that she has been ambulating independently to the bathroom and back. Patient demonstrated ambulatory mobility, ambulating about 40 feet with and without a walker. Patient's gait is slow and antalgic with right step-to pattern. Gait pattern did not improve much when a walker was recommended to reduce pain. However the patient is stable with no signs of loss of balance. Patient was seated in a chair at close of session. Will continue PT for additional gait and stair training to ensure safety and independence. Recommend home health services at discharge.      Patient will benefit from skilled intervention to address the above impairments. Patients rehabilitation potential is considered to be Good  Factors which may influence rehabilitation potential include:   [ ]         None noted  [ ]         Mental ability/status  [ ]         Medical condition  [ ]         Home/family situation and support systems  [ ]         Safety awareness  [X]         Pain tolerance/management  [ ]         Other:        PLAN :  Recommendations and Planned Interventions:  [X]           Bed Mobility Training             [ ]    Neuromuscular Re-Education  [X]           Transfer Training                   [ ]    Orthotic/Prosthetic Training  [X]           Gait Training                          [ ]    Modalities  [X]           Therapeutic Exercises          [ ]    Edema Management/Control  [X]           Therapeutic Activities            [X]    Patient and Family Training/Education  [ ]           Other (comment):     Frequency/Duration: Patient will be followed by physical therapy daily to address goals. Discharge Recommendations: Home Health  Further Equipment Recommendations for Discharge: N/A       SUBJECTIVE:   Patient stated I can't get around that well with this leg hurting.       OBJECTIVE DATA SUMMARY:       Past Medical History:   Diagnosis Date    Abdominal pain      Anemia NEC       sickle cell trait    C. difficile colitis      Crohn's disease ([facility]      Gastrointestinal disorder       Crohns    Herpes zoster      Hx of cocaine abuse      Hyperkalemia      Hypertension      Iron deficiency anemia      MRSA (methicillin resistant Staphylococcus aureus)      Obesity      Pain management      Sickle cell trait (H[facility])      Stroke ([facility]       + cva 3/17    Thromboembolus [facility])       Past Surgical History:   Procedure Laterality Date    COLONOSCOPY N/A 3/17/2017     COLONOSCOPY; BIOPSY; performed by Troy Rider MD at [facility] ENDOSCOPY    HX GYN         tubal ligation    HX TUBAL LIGATION        VASCULAR SURGERY PROCEDURE UNLIST Barriers to Learning/Limitations: None  Compensate with: N/A  Prior Level of Function/Home Situation: Patient reports living at home with her children, whom are 32and 16years old. Home Situation  Home Environment: Private residence  # Steps to Enter: 2  One/Two Story Residence: Two story  # of Interior Steps: 9  Height of Each Step (in): 8 inches  Interior Rails: Right  Lift Chair Available: No  Living Alone: No  Support Systems: Child(margaret), Family member(s)  Patient Expects to be Discharged to[de-identified] Private residence  Current DME Used/Available at Home: Walker, rolling  Critical Behavior:  Neurologic State: Alert; Appropriate for age  Orientation Level: Oriented X4  Cognition: Appropriate decision making; Appropriate for age attention/concentration; Appropriate safety awareness; Follows commands  Safety/Judgement: Awareness of environment; Fall prevention;Good awareness of safety precautions  Psychosocial  Patient Behaviors: Calm; Cooperative  Purposeful Interaction: Yes  Pt Identified Daily Priority: Clinical issues (comment)  Caritas Process: Nurture loving kindness;Enable carson/hope;Establish trust;Teaching/learning; Attend basic human needs;Create healing environment  Caring Interventions: Reassure; Therapeutic modalities  Reassure: Therapeutic listening; Informing;Caring rounds  Therapeutic Modalities: Humor; Intentional therapeutic touch  Skin Condition/Temp: Dry;Warm  Skin Integrity: Wound (add Wound LDA) (right leg)  Skin Integumentary  Skin Color: Appropriate for ethnicity  Skin Condition/Temp: Dry;Warm  Skin Integrity: Wound (add Wound LDA) (right leg)  Turgor: Non-tenting  Hair Growth: Present  Varicosities: Absent  Strength:    Strength: Generally decreased, functional  Tone & Sensation:   Tone: Normal  Sensation: Impaired (Left hand)     Range Of Motion:  AROM: Generally decreased, functional  PROM: Generally decreased, functional  Functional Mobility:  Bed Mobility:  Rolling: Supervision  Supine to Sit: Supervision  Transfers:  Sit to Stand: Supervision  Stand to Sit: Supervision  Balance:   Sitting: Intact  Standing: Intact; Without support  Standing - Static: Good  Standing - Dynamic : Fair  Ambulation/Gait Training:  Distance (ft): 40 Feet (ft)  Assistive Device: Gait belt  Ambulation - Level of Assistance: Supervision;Contact guard assistance  Gait Description (WDL): Exceptions to WDL  Gait Abnormalities: Decreased step clearance; Step to gait  Base of Support: Shift to left  Stance: Right decreased  Speed/Janice: Slow  Step Length: Right shortened;Left shortened  Swing Pattern: Right asymmetrical     Pain:  Pain Scale 1: Numeric (0 - 10)  Pain Intensity 1: 10  Pain Location 1: Leg  Pain Orientation 1: Right  Pain Description 1: Aching; Gardiner Messier; Stabbing  Pain Intervention(s) 1: Medication (see MAR)  Activity Tolerance:   Fair  Please refer to the flowsheet for vital signs taken during this treatment. After treatment:   [X]         Patient left in no apparent distress sitting up in chair  [ ]         Patient left in no apparent distress in bed  [X]         Call bell left within reach  [X]         Nursing notified  [ ]         Caregiver present  [ ]         Bed alarm activated      COMMUNICATION/EDUCATION:   [X]         Fall prevention education was provided and the patient/caregiver indicated understanding. [X]         Patient/family have participated as able in goal setting and plan of care. [X]         Patient/family agree to work toward stated goals and plan of care. [ ]         Patient understands intent and goals of therapy, but is neutral about his/her participation. [ ]         Patient is unable to participate in goal setting and plan of care. Thank you for this referral.  Lea Plaza   Time Calculation: 25 mins      Mobility  Current  CI= 1-19%   Goal  CI= 1-19%. The severity rating is based on the Level of Assistance required for Functional Mobility and ADLs.      Eval Complexity: History: MEDIUM  Complexity : 1-2 comorbidities / personal factors will impact the outcome/ POC Exam:MEDIUM Complexity : 3 Standardized tests and measures addressing body structure, function, activity limitation and / or participation in recreation  Presentation: LOW Complexity : Stable, uncomplicated  Clinical Decision Making:Low Complexity   Overall Complexity:LOW

## 2017-05-02 NOTE — PROGRESS NOTES
3645- Received pt from BONILLA Gallardo, RN. Pt in room, resting in bed. Pt requesting Benadryl, educates that it is not due until 900 am. Pt verbalized understanding. Call light and phone within reach. Room clutter free. Nurse educated pt about plan of care today. 3929- Provided pt fresh gown, socks towels and washcloths as requested so pt can clean herself up.    2083- Physical assessment and morning meds passed at this time    1145- Wound dressing completed at this time. 1522- Pt seen by Dr. Vinnie Elkins. Second dressing to wound done. Shift summary- No acute changes over shift. Continue IV abx, wound dressings and pain control.

## 2017-05-02 NOTE — PROGRESS NOTES
Chart reviewed and noted Pt remains on medical unit. Per [facility] they have resigned from her case due to non-compliance. At this time we are pending IV abx needs and based on the IV abx will either arrange for [facility] if once a day or Home IV abx through [facility] if more than once a day. Pt still visits [facility] as ordered for her GI medication. CM following for additional needs at time of dc. Pending ID visit today. Care Management Interventions  PCP Verified by CM: Yes  Mode of Transport at Discharge: Other (see comment)  Transition of Care Consult (CM Consult): Discharge Planning, [address] Maintenance Reviewed: Yes  Current Support Network: Family Lives Nearby  Confirm Follow Up Transport: Family  Plan discussed with Pt/Family/Caregiver:  Yes

## 2017-05-02 NOTE — PROGRESS NOTES
Pharmacy Dosing Services: Warfarin     Consult for Warfarin Dosing by Pharmacy by Dr. Sahara Stockton provided for this 40 y.o.  female , for indication of DVT / recent embolic CVA    Dose to achieve an INR goal of 2-3  Note: per MD progress note, pt is at high risk for thromboembolism but also has hx of GI bleeding     Order entered for  Warfarin  7.5 mg to be given today at 18:00. Lovenox 90 mg sc q12h    PT/INR Lab Results   Component Value Date/Time    INR 1.2 05/02/2017 02:40 AM      Platelets Lab Results   Component Value Date/Time    PLATELET 422 73/83/6881 02:50 AM      H/H     Lab Results   Component Value Date/Time    HGB 9.7 05/01/2017 02:50 AM        Warfarin Administrations (last 168 hours)       Date/Time Action Medication Dose      05/01/17 1715 Given    warfarin (COUMADIN) tablet 7.5 mg 7.5 mg          Pharmacy to follow daily and will provide subsequent Warfarin dosing based on clinical status.   Babar Mckinley, [facility])  Contact information [phone]

## 2017-05-03 PROBLEM — L02.415 ABSCESS OF RIGHT LEG: Status: RESOLVED | Noted: 2017-04-28 | Resolved: 2017-05-03

## 2017-05-03 LAB
ANION GAP BLD CALC-SCNC: 5 MMOL/L (ref 3–18)
BUN SERPL-MCNC: 15 MG/DL (ref 7–18)
BUN/CREAT SERPL: 22 (ref 12–20)
CALCIUM SERPL-MCNC: 8.6 MG/DL (ref 8.5–10.1)
CHLORIDE SERPL-SCNC: 106 MMOL/L (ref 100–108)
CO2 SERPL-SCNC: 32 MMOL/L (ref 21–32)
CREAT SERPL-MCNC: 0.67 MG/DL (ref 0.6–1.3)
DATE LAST DOSE: ABNORMAL
GLUCOSE SERPL-MCNC: 106 MG/DL (ref 74–99)
INR PPP: 1.3 (ref 0.8–1.2)
POTASSIUM SERPL-SCNC: 3.9 MMOL/L (ref 3.5–5.5)
PROTHROMBIN TIME: 15.4 SEC (ref 11.5–15.2)
REPORTED DOSE,DOSE: ABNORMAL UNITS
REPORTED DOSE/TIME,TMG: 1400
SODIUM SERPL-SCNC: 143 MMOL/L (ref 136–145)
VANCOMYCIN TROUGH SERPL-MCNC: 5.9 UG/ML (ref 10–20)

## 2017-05-03 PROCEDURE — 80048 BASIC METABOLIC PNL TOTAL CA: CPT | Performed by: HOSPITALIST

## 2017-05-03 PROCEDURE — 97530 THERAPEUTIC ACTIVITIES: CPT

## 2017-05-03 PROCEDURE — 74011250636 HC RX REV CODE- 250/636: Performed by: INTERNAL MEDICINE

## 2017-05-03 PROCEDURE — 74011636637 HC RX REV CODE- 636/637: Performed by: HOSPITALIST

## 2017-05-03 PROCEDURE — 74011250636 HC RX REV CODE- 250/636: Performed by: HOSPITALIST

## 2017-05-03 PROCEDURE — 36415 COLL VENOUS BLD VENIPUNCTURE: CPT | Performed by: HOSPITALIST

## 2017-05-03 PROCEDURE — 74011250636 HC RX REV CODE- 250/636: Performed by: FAMILY MEDICINE

## 2017-05-03 PROCEDURE — 65270000029 HC RM PRIVATE

## 2017-05-03 PROCEDURE — 97116 GAIT TRAINING THERAPY: CPT

## 2017-05-03 PROCEDURE — 36592 COLLECT BLOOD FROM PICC: CPT

## 2017-05-03 PROCEDURE — 74011250637 HC RX REV CODE- 250/637: Performed by: HOSPITALIST

## 2017-05-03 PROCEDURE — 85610 PROTHROMBIN TIME: CPT | Performed by: HOSPITALIST

## 2017-05-03 PROCEDURE — 80202 ASSAY OF VANCOMYCIN: CPT | Performed by: HOSPITALIST

## 2017-05-03 RX ORDER — HYDROCODONE BITARTRATE AND ACETAMINOPHEN 7.5; 325 MG/1; MG/1
2 TABLET ORAL
Status: DISCONTINUED | OUTPATIENT
Start: 2017-05-03 | End: 2017-05-06

## 2017-05-03 RX ORDER — WARFARIN 7.5 MG/1
7.5 TABLET ORAL ONCE
Status: COMPLETED | OUTPATIENT
Start: 2017-05-03 | End: 2017-05-03

## 2017-05-03 RX ADMIN — DIPHENHYDRAMINE HYDROCHLORIDE 15 MG: 50 INJECTION, SOLUTION INTRAMUSCULAR; INTRAVENOUS at 03:36

## 2017-05-03 RX ADMIN — GABAPENTIN 400 MG: 400 CAPSULE ORAL at 21:51

## 2017-05-03 RX ADMIN — MESALAMINE 1000 MG: 250 CAPSULE ORAL at 13:04

## 2017-05-03 RX ADMIN — CARVEDILOL 3.12 MG: 3.12 TABLET, FILM COATED ORAL at 09:38

## 2017-05-03 RX ADMIN — CARVEDILOL 3.12 MG: 3.12 TABLET, FILM COATED ORAL at 16:06

## 2017-05-03 RX ADMIN — WARFARIN SODIUM 7.5 MG: 7.5 TABLET ORAL at 17:04

## 2017-05-03 RX ADMIN — GABAPENTIN 400 MG: 400 CAPSULE ORAL at 09:38

## 2017-05-03 RX ADMIN — DIPHENHYDRAMINE HYDROCHLORIDE 15 MG: 50 INJECTION, SOLUTION INTRAMUSCULAR; INTRAVENOUS at 16:06

## 2017-05-03 RX ADMIN — PREDNISONE 40 MG: 20 TABLET ORAL at 09:38

## 2017-05-03 RX ADMIN — Medication 4 MG: at 05:47

## 2017-05-03 RX ADMIN — HYDROMORPHONE HYDROCHLORIDE 1 MG: 1 INJECTION, SOLUTION INTRAMUSCULAR; INTRAVENOUS; SUBCUTANEOUS at 19:33

## 2017-05-03 RX ADMIN — Medication 4 MG: at 01:39

## 2017-05-03 RX ADMIN — HYDROCODONE BITARTRATE AND ACETAMINOPHEN 2 TABLET: 7.5; 325 TABLET ORAL at 17:49

## 2017-05-03 RX ADMIN — ATORVASTATIN CALCIUM 80 MG: 20 TABLET, FILM COATED ORAL at 21:51

## 2017-05-03 RX ADMIN — MESALAMINE 1000 MG: 250 CAPSULE ORAL at 15:33

## 2017-05-03 RX ADMIN — GABAPENTIN 400 MG: 400 CAPSULE ORAL at 15:33

## 2017-05-03 RX ADMIN — MESALAMINE 1000 MG: 250 CAPSULE ORAL at 09:47

## 2017-05-03 RX ADMIN — HYDROMORPHONE HYDROCHLORIDE 1 MG: 1 INJECTION, SOLUTION INTRAMUSCULAR; INTRAVENOUS; SUBCUTANEOUS at 02:48

## 2017-05-03 RX ADMIN — HYDROMORPHONE HYDROCHLORIDE 1 MG: 1 INJECTION, SOLUTION INTRAMUSCULAR; INTRAVENOUS; SUBCUTANEOUS at 11:00

## 2017-05-03 RX ADMIN — VANCOMYCIN HYDROCHLORIDE 1250 MG: 10 INJECTION, POWDER, LYOPHILIZED, FOR SOLUTION INTRAVENOUS at 21:51

## 2017-05-03 RX ADMIN — MESALAMINE 1000 MG: 250 CAPSULE ORAL at 21:51

## 2017-05-03 RX ADMIN — ENOXAPARIN SODIUM 90 MG: 100 INJECTION SUBCUTANEOUS at 11:00

## 2017-05-03 RX ADMIN — HYDROMORPHONE HYDROCHLORIDE 1 MG: 1 INJECTION, SOLUTION INTRAMUSCULAR; INTRAVENOUS; SUBCUTANEOUS at 15:33

## 2017-05-03 RX ADMIN — VANCOMYCIN HYDROCHLORIDE 1250 MG: 10 INJECTION, POWDER, LYOPHILIZED, FOR SOLUTION INTRAVENOUS at 03:37

## 2017-05-03 RX ADMIN — VANCOMYCIN HYDROCHLORIDE 1250 MG: 10 INJECTION, POWDER, LYOPHILIZED, FOR SOLUTION INTRAVENOUS at 13:04

## 2017-05-03 RX ADMIN — HYDROMORPHONE HYDROCHLORIDE 1 MG: 1 INJECTION, SOLUTION INTRAMUSCULAR; INTRAVENOUS; SUBCUTANEOUS at 06:52

## 2017-05-03 NOTE — ROUTINE PROCESS
Bedside and Verbal shift change report given to [employer] (oncoming nurse) by Priscila Lees RN   (offgoing nurse). Report included the following information SBAR, Kardex, MAR and Recent Results.

## 2017-05-03 NOTE — PROGRESS NOTES
1100--Patient medicated with 1mg IVP Dilaudid for pain in RLE 8/10.     1130--PAIN REASSESSMENT: 7/10.     1315--PICC dressing changed. 1428--Bedside and Verbal shift change report given to Chip Catherine (oncoming nurse) by Lexii Freitas RN (offgoing nurse). Report included the following information SBAR, Kardex, Procedure Summary, Intake/Output, MAR, Recent Results and Med Rec Status.

## 2017-05-03 NOTE — ROUTINE PROCESS
Bedside and Verbal shift change report given to Etelvina Cao RN (oncoming nurse) by Isael Mcclure   (offgoing nurse). Report included the following information SBAR, Kardex, Intake/Output, MAR, Recent Results and Med Rec Status.

## 2017-05-03 NOTE — PROGRESS NOTES
Shift Progress Note:  Assumed care of patient in bed awake and alert. Requesting pain medication as often as possible dressing dry & intact ca;ll bell within reach uneventful night vancomycin trough low and pharmacy adjusted dose.   Patient Vitals for the past 12 hrs:   Temp Pulse Resp BP SpO2   05/03/17 0428 97.3 °F (36.3 °C) 69 16 133/68 100 %   05/02/17 2238 97.5 °F (36.4 °C) 69 16 128/87 100 %   05/02/17 1943 98.1 °F (36.7 °C) 80 16 116/52 98 %

## 2017-05-03 NOTE — PROGRESS NOTES
Problem: Mobility Impaired (Adult and Pediatric)  Goal: *Acute Goals and Plan of Care (Insert Text)  Physical Therapy Goals  Initiated 5/2/2017 and to be accomplished within 2-8 day(s)  1. Patient will move from supine to sit and sit to supine in bed with supervision/set-up. 2. Patient will transfer from bed to chair and chair to bed with supervision/set-up using the least restrictive device. 3. Patient will perform sit to stand with supervision/set-up. 4. Patient will ambulate with supervision/set-up for 75 feet with the least restrictive device. 5. Patient will ascend/descend 3-5 stairs with use of handrail(s) with minimal assistance/contact guard assist for home entry. Outcome: Progressing Towards Goal  PHYSICAL THERAPY TREATMENT/DISCHARGE     Patient: Vel Porter (30 y.o. female)  Date: 5/3/2017  Diagnosis: Wound infection Abscess of right leg       Precautions: Fall  Chart, physical therapy assessment, plan of care and goals were reviewed. ASSESSMENT:  The patient demonstrates ability to safely ambulate and negotiate stairs this session. Patient ambulated within her room with supervision without need for an assistive device. Patient then participated in stair training, negotiating 5 box steps using the rolling walker for support. Patient was then returned to her bed. Educated patient on need to attend to her left side, as the patient appears to still neglect her left side at times. Also educated patient on need to utilize left upper extremity with all ADLs. Patient verbalizes understanding. Patient has met all acute PT goals. Patient will benefit from home health and outpatient PT services to ensure independence and for advanced balance training.   Progression toward goals:  [X]      Goals met  [ ]      Improving appropriately and progressing toward goals  [ ]      Improving slowly and progressing toward goals  [ ]      Not making progress toward goals and plan of care will be adjusted PLAN:  Patient will be discharged from physical therapy at this time. Rationale for discharge:  [X] Goals Achieved  [ ] John Tinsley  [ ] Patient not participating in therapy  [ ] Other:  Discharge Recommendations:  Home Health and Outpatient  Further Equipment Recommendations for Discharge:  N/A       SUBJECTIVE:   Patient stated Sometimes when I text, I text double words and I don't realize it.       OBJECTIVE DATA SUMMARY:   Critical Behavior:  Neurologic State: Alert  Orientation Level: Oriented X4  Cognition: Appropriate decision making, Appropriate for age attention/concentration, Appropriate safety awareness, No command following  Safety/Judgement: Awareness of environment, Fall prevention, Good awareness of safety precautions  Functional Mobility Training:  Bed Mobility:  Rolling: Supervision  Supine to Sit: Supervision  Sit to Supine: Supervision  Scooting: Supervision  Transfers:  Sit to Stand: Supervision  Stand to Sit: Supervision  Balance:  Sitting: Intact  Standing: Intact; Without support  Standing - Static: Good  Standing - Dynamic : Fair  Ambulation/Gait Training:  Distance (ft): 80 Feet (ft)  Assistive Device: Gait belt  Ambulation - Level of Assistance: Supervision  Gait Abnormalities: Decreased step clearance; Step to gait  Base of Support: Shift to left  Stance: Right decreased  Speed/Janice: Slow  Step Length: Right shortened;Left shortened  Swing Pattern: Right asymmetrical  Stairs:  Number of Stairs Trained: 5  Stairs - Level of Assistance: Contact guard assistance              Rail Use:  (RW)     Pain:  Pain Scale 1: Numeric (0 - 10)  Pain Intensity 1: 7  Pain Location 1: Leg  Pain Orientation 1: Right  Pain Description 1: Constant; Sharp  Pain Intervention(s) 1: Medication (see MAR)  Activity Tolerance:   Good  Please refer to the flowsheet for vital signs taken during this treatment.   After treatment:   [ ] Patient left in no apparent distress sitting up in chair  [X] Patient left in no apparent distress in bed  [X] Call bell left within reach  [X] Nursing notified  [ ] Caregiver present  [ ] Bed alarm activated  Robert Coil   Time Calculation: 40 mins

## 2017-05-03 NOTE — ROUTINE PROCESS
Bedside and Verbal shift change report given to Armin Barnes RN (oncoming nurse) by Patito Castelan RN (offgoing nurse). Report included the following information SBAR, Kardex, Intake/Output, MAR, Recent Results, Med Rec Status and Cardiac Rhythm NON-MONITORED.

## 2017-05-03 NOTE — PROGRESS NOTES
Hospitalist Progress Note    Patient: Dmitry Porter MRN: [id number]  CSN: [id number]    YOB: 1970  Age: 40 y.o.   Sex: female    DOA: 4/28/2017 LOS:  LOS: 5 days          Chief Complaint:    Infected right leg      Assessment/Plan     dehiscence and infection right leg-MRSA confirmed on cultures-stopped zosyn and continue vanco already in place-ID consulted for duration of therapy, now has a picc line-blood cx are negative-anticipating 14 days of IV antibiotics      PVD severe s/p recent popliteal artery thrombectomy  DVT right leg-resume warfarin, consult pharmacy for dosing, coumadin was held so that a picc could be placed-BID lovenox as bridge as high risk for thromboembolism  crohns disease with hx GI bleeding  Recent embolic CVA  Sickle cell trait  Chronic pain, narcotic dependence  Neuropathic pain     Due to her noncompliance with wound care and home health preference is to stay in hospital at least 48 hrs more or so to ensure imrpoved wound status and tolerance of IV abx-she started vanco 5 days ago-then possibly home with IV abx one more week BUT coming to [facility] rather than home health  Cellulitis around wound is resolved  Daily INR-stop lovenox when INR 2 and above  Wound care    Patient Active Problem List   Diagnosis Code    Crohn disease ([facility] K50.90    Sickle cell trait ([facility] D57.3    Hypertension I10    Hx of cocaine abuse X48.189    Embolic stroke ([facility] W48.9    Neuropathic pain M79.2    DVT (deep venous thrombosis) ([facility]) I82.409    GI bleed K92.2    [facility] I73.9    Abscess of right leg L02.415    Wound infection T14.8, L08.9    Popliteal artery thrombosis, right ([facility]) I74.3       Subjective:    No new issues    Review of systems:    Constitutional: denies fevers, chills, myalgias  Respiratory: denies SOB, cough  Cardiovascular: denies chest pain, palpitations  Gastrointestinal: denies nausea, vomiting, diarrhea      Vital signs/Intake and Output:  Visit Vitals    BP (!) 126/97    Pulse 93    Temp 98 °F (36.7 °C)    Resp 18    Ht 5' 6\" (1.676 m)    Wt 95.3 kg (210 lb 3.2 oz)    SpO2 100%    BMI 33.93 kg/m2     Current Shift:     Last three shifts:  05/01 1901 - 05/03 0700  In: 1440 [P.O.:940; I.V.:500]  Out: 250 [Urine:250]    Exam:    General: Well developed, alert, NAD, OX3  Head/Neck: NCAT, supple, No masses, No lymphadenopathy  CVS:Regular rate and rhythm, no M/R/G, S1/S2 heard, no thrill  Lungs:Clear to auscultation bilaterally, no wheezes, rhonchi, or rales  Abdomen: Soft, Nontender, No distention, Normal Bowel sounds, No hepatomegaly  Extremities: right lower leg wound dressed, no redness or rash  Skin:normal texture and turgor, no rashes, no lesions  Neuro:grossly normal , follows commands  Psych:appropriate                Labs: Results:       Chemistry Recent Labs      05/03/17 0130 05/02/17 0240  05/01/17   0250   GLU  106*  96  99   NA  143  145  143   K  3.9  3.5  3.5   CL  106  107  107   CO2  32  28  27   BUN  15  17  14   CREA  0.67  0.83  0.68   CA  8.6  8.6  8.5   AGAP  5  10  9   BUCR  22*  20  21*      CBC w/Diff Recent Labs      05/01/17   0250   WBC  10.1   RBC  3.68*   HGB  9.7*   HCT  31.6*   PLT  327   GRANS  62   LYMPH  28   EOS  2      Cardiac Enzymes No results for input(s): CPK, CKND1, CHANDA in the last 72 hours. No lab exists for component: CKRMB, TROIP   Coagulation Recent Labs      05/03/17 0130 05/02/17   0240   PTP  15.4*  14.3   INR  1.3*  1.2       Lipid Panel Lab Results   Component Value Date/Time    Cholesterol, total 145 03/12/2017 06:15 AM    HDL Cholesterol 37 03/12/2017 06:15 AM    LDL, calculated 91.6 03/12/2017 06:15 AM    VLDL, calculated 16.4 03/12/2017 06:15 AM    Triglyceride 82 03/12/2017 06:15 AM    CHOL/HDL Ratio 3.9 03/12/2017 06:15 AM      BNP No results for input(s): BNPP in the last 72 hours.    Liver Enzymes No results for input(s): TP, ALB, TBIL, AP, SGOT, GPT in the last 72 hours.    No lab exists for component: DBIL   Thyroid Studies Lab Results   Component Value Date/Time    TSH 1.51 12/12/2009 09:48 AM        Procedures/imaging: see electronic medical records for all procedures/Xrays and details which were not copied into this note but were reviewed prior to creation of Isrrael Moon MD

## 2017-05-03 NOTE — PROGRESS NOTES
Pharmacy Dosing Services: Vancomycin    Consult for Vancomycin Dosing by Pharmacy by Dr. Roshni Ivey provided for this 40y.o. year old female , for indication of skin and soft tissue infection. Day of Therapy 6    Ht Readings from Last 1 Encounters:   04/28/17 167.6 cm (66\")        Wt Readings from Last 1 Encounters:   05/02/17 95.3 kg (210 lb 3.2 oz)        Previous Regimen Vancomycin 1250 mg q12h   Last Level 5.9 mcg/ml   Other Current Antibiotics None   Significant Cultures Wound - MRSA   Serum Creatinine Lab Results   Component Value Date/Time    Creatinine 0.67 05/03/2017 01:30 AM    Creatinine (POC) 0.7 03/18/2015 05:21 PM      Creatinine Clearance Estimated Creatinine Clearance: 123.4 mL/min (based on Cr of 0.67). BUN Lab Results   Component Value Date/Time    BUN 15 05/03/2017 01:30 AM    BUN (POC) 8 03/18/2015 05:21 PM      WBC Lab Results   Component Value Date/Time    WBC 10.1 05/01/2017 02:50 AM      H/H Lab Results   Component Value Date/Time    HGB 9.7 05/01/2017 02:50 AM      Platelets Lab Results   Component Value Date/Time    PLATELET 439 74/16/5569 02:50 AM      Temp 97.5 °F (36.4 °C)     Vancomycin trough level of 5.9 mcg/ml is below desired range of 10- 15 mcg/ml. Will increase vancomycin to 1250 mg IVPB every 8 hrs - dose started at 0337 - 5/3 - will schedule vancomycin q8h at 0400 - 1200 - 2000. Dose calculated to approximate a therapeutic trough of  10 - 15 mcg/mL. Pharmacy to follow daily and will make changes to dose and/or frequency based on clinical status. Vandana Oconnell PharmBryan D.   [phone]

## 2017-05-03 NOTE — PROGRESS NOTES
Chart reviewed and attended IDR at this time discharge plan for pending for [facility] verses Home health pending daily frequency,patient does have concern with transportation if [facility] needed,pt has medicaid and can use [employer] taxi.

## 2017-05-03 NOTE — PROGRESS NOTES
Infectious Disease Progress Note     Antibiotics:  IV vancomycin #6     CC: wound drainage and infection     Subj:45 yo female s/p R ant popliteal thrombectomy 4/6/17 admitted with poor wound healing and post operative infection. It appears there was non compliance with home health for wound care following this surgery.      Patient is doing okay this afternoon. She is angry that she hasn't received her pain medication yet. She also has her dressing around her ankle, not covering the wound. States that it falls down. She denies any fevers but states she has felt chills and malaise overnight. Also complains of nausea without emesis overnight. She complains of pain at the R popliteal area. She thinks there are bubbles coming out of the wound but I did not appreciate this. Physical Exam:  Vital signs reviewed  Tmax: 98.1  General: NAD  Lines: R picc c/d/i  HEENT: PERRL, OP clear, sclera anicteric  Neck: Supple  CHest: breathing nonlabored  Abd: obese  Ext: R leg with approx 4 cm surgical incision, packing in place, serous drainage on dressing No surrounding erythema. No warmth, no surrounding induration  Skin: No rash     Labs, cultures and radiology reviewed  Cr=0.67    4/28 wound cx; MRSA, few CNS  4/28 blood cx x 2: NGTD     Assessment:  - R leg, upper posterior calf post operative wound infection with MRSA  S/p R ant popliteal thrombectomy and repair 4/6/17  Had home health ordered but failed to have wound care  Admitted with fever, increased pain, erythema and drainage of wound  Blood cx are negative      - Mult med issues: Crohn's disease on pentasa, chronic prednisone, immune modulators  Recent CVA with residual L hand numbness  PVD, prior cig use, chronic pain, sickle cell trait     - Previously poor compliance with home health wound care      Recommendations:  - Continue IV vancomycin presently.  Pt does have a picc line  Duration of abx will depend on response of wound to therapy but would expect about 10- 14 days total.   Could potentially change to oral Bactrim but defer this as we get closer to d/c  My concern with outpt IV ABX is her non compliance with recent home health. If IV abx are required then would prefer once daily dosing - micro lab is currently running dapto susceptibilities. Otherwise, if wound continues to do well, could change to oral abx to finish course.   Continued wound care directed by vascular surgery  - Pharmacy is assist with dosing and maintain trough levels of 15-20 mcg/mL

## 2017-05-03 NOTE — PROGRESS NOTES
Problem: Mobility Impaired (Adult and Pediatric)  Goal: *Acute Goals and Plan of Care (Insert Text)  Physical Therapy Goals  Initiated 5/2/2017 and to be accomplished within 2-8 day(s)  1. Patient will move from supine to sit and sit to supine in bed with supervision/set-up. 2. Patient will transfer from bed to chair and chair to bed with supervision/set-up using the least restrictive device. 3. Patient will perform sit to stand with supervision/set-up. 4. Patient will ambulate with supervision/set-up for 75 feet with the least restrictive device. 5. Patient will ascend/descend 3-5 stairs with use of handrail(s) with minimal assistance/contact guard assist for home entry. Attempted PT treatment session. Patient refused at this time. The patient is agitated and requests to speak with Allan Alex the patient advocate. Noted patient's wound dressing appears to have been pulled down. Patient states that the dressing is falling off. Nursing informed about the patient's frustrations. Will follow up later as patient's schedule permits.      Savanna Duffy PT

## 2017-05-03 NOTE — PROGRESS NOTES
Pharmacy Dosing Services: Warfarin    Consult for Warfarin Dosing by Pharmacy by Dr. Mateo Mensah provided for this 40 y.o.  female , for indication of DVT/recent embolic CVA. Dose to achieve an INR goal of 2-3    Order entered for Warfarin 7.5mg ordered to be given today at 18:00. Lovenox 90mg SQ q12h    PT/INR Lab Results   Component Value Date/Time    INR 1.3 05/03/2017 01:30 AM      Platelets Lab Results   Component Value Date/Time    PLATELET 714 79/98/6306 02:50 AM      H/H     Lab Results   Component Value Date/Time    HGB 9.7 05/01/2017 02:50 AM        Warfarin Administrations (last 168 hours)       Date/Time Action Medication Dose      05/02/17 1720 Given    warfarin (COUMADIN) tablet 7.5 mg 7.5 mg    05/01/17 1715 Given    warfarin (COUMADIN) tablet 7.5 mg 7.5 mg              Pharmacy to follow daily and will provide subsequent Warfarin dosing based on clinical status.   Moraima [phone]

## 2017-05-04 LAB
ANION GAP BLD CALC-SCNC: 9 MMOL/L (ref 3–18)
BACTERIA SPEC CULT: ABNORMAL
BACTERIA SPEC CULT: NORMAL
BACTERIA SPEC CULT: NORMAL
BUN SERPL-MCNC: 14 MG/DL (ref 7–18)
BUN/CREAT SERPL: 16 (ref 12–20)
CALCIUM SERPL-MCNC: 8.8 MG/DL (ref 8.5–10.1)
CHLORIDE SERPL-SCNC: 106 MMOL/L (ref 100–108)
CO2 SERPL-SCNC: 31 MMOL/L (ref 21–32)
CREAT SERPL-MCNC: 0.85 MG/DL (ref 0.6–1.3)
ERYTHROCYTE [DISTWIDTH] IN BLOOD BY AUTOMATED COUNT: 15.1 % (ref 11.6–14.5)
GLUCOSE SERPL-MCNC: 86 MG/DL (ref 74–99)
GRAM STN SPEC: ABNORMAL
GRAM STN SPEC: ABNORMAL
HCT VFR BLD AUTO: 29.4 % (ref 35–45)
HGB BLD-MCNC: 9.1 G/DL (ref 12–16)
INR PPP: 1.4 (ref 0.8–1.2)
MCH RBC QN AUTO: 26.3 PG (ref 24–34)
MCHC RBC AUTO-ENTMCNC: 31 G/DL (ref 31–37)
MCV RBC AUTO: 85 FL (ref 74–97)
PLATELET # BLD AUTO: 324 K/UL (ref 135–420)
PMV BLD AUTO: 8.9 FL (ref 9.2–11.8)
POTASSIUM SERPL-SCNC: 3.7 MMOL/L (ref 3.5–5.5)
PROTHROMBIN TIME: 16.8 SEC (ref 11.5–15.2)
RBC # BLD AUTO: 3.46 M/UL (ref 4.2–5.3)
SERVICE CMNT-IMP: ABNORMAL
SERVICE CMNT-IMP: NORMAL
SERVICE CMNT-IMP: NORMAL
SODIUM SERPL-SCNC: 146 MMOL/L (ref 136–145)
VANCOMYCIN TROUGH SERPL-MCNC: 15.3 UG/ML (ref 10–20)
WBC # BLD AUTO: 12.9 K/UL (ref 4.6–13.2)

## 2017-05-04 PROCEDURE — 74011250636 HC RX REV CODE- 250/636: Performed by: HOSPITALIST

## 2017-05-04 PROCEDURE — 74011636637 HC RX REV CODE- 636/637: Performed by: HOSPITALIST

## 2017-05-04 PROCEDURE — 80202 ASSAY OF VANCOMYCIN: CPT | Performed by: HOSPITALIST

## 2017-05-04 PROCEDURE — 74011250637 HC RX REV CODE- 250/637: Performed by: HOSPITALIST

## 2017-05-04 PROCEDURE — 85027 COMPLETE CBC AUTOMATED: CPT | Performed by: HOSPITALIST

## 2017-05-04 PROCEDURE — 65270000029 HC RM PRIVATE

## 2017-05-04 PROCEDURE — 80048 BASIC METABOLIC PNL TOTAL CA: CPT | Performed by: HOSPITALIST

## 2017-05-04 PROCEDURE — 74011250636 HC RX REV CODE- 250/636: Performed by: FAMILY MEDICINE

## 2017-05-04 PROCEDURE — 85610 PROTHROMBIN TIME: CPT | Performed by: HOSPITALIST

## 2017-05-04 PROCEDURE — 36592 COLLECT BLOOD FROM PICC: CPT

## 2017-05-04 RX ORDER — WARFARIN 7.5 MG/1
7.5 TABLET ORAL ONCE
Status: COMPLETED | OUTPATIENT
Start: 2017-05-04 | End: 2017-05-04

## 2017-05-04 RX ADMIN — HYDROCODONE BITARTRATE AND ACETAMINOPHEN 2 TABLET: 7.5; 325 TABLET ORAL at 18:28

## 2017-05-04 RX ADMIN — ENOXAPARIN SODIUM 90 MG: 100 INJECTION SUBCUTANEOUS at 00:34

## 2017-05-04 RX ADMIN — DIPHENHYDRAMINE HYDROCHLORIDE 15 MG: 50 INJECTION, SOLUTION INTRAMUSCULAR; INTRAVENOUS at 23:23

## 2017-05-04 RX ADMIN — HYDROMORPHONE HYDROCHLORIDE 1 MG: 1 INJECTION, SOLUTION INTRAMUSCULAR; INTRAVENOUS; SUBCUTANEOUS at 00:34

## 2017-05-04 RX ADMIN — HYDROMORPHONE HYDROCHLORIDE 1 MG: 1 INJECTION, SOLUTION INTRAMUSCULAR; INTRAVENOUS; SUBCUTANEOUS at 09:03

## 2017-05-04 RX ADMIN — ENOXAPARIN SODIUM 90 MG: 100 INJECTION SUBCUTANEOUS at 23:23

## 2017-05-04 RX ADMIN — GABAPENTIN 400 MG: 400 CAPSULE ORAL at 09:02

## 2017-05-04 RX ADMIN — ENOXAPARIN SODIUM 90 MG: 100 INJECTION SUBCUTANEOUS at 10:46

## 2017-05-04 RX ADMIN — HYDROMORPHONE HYDROCHLORIDE 1 MG: 1 INJECTION, SOLUTION INTRAMUSCULAR; INTRAVENOUS; SUBCUTANEOUS at 04:59

## 2017-05-04 RX ADMIN — VANCOMYCIN HYDROCHLORIDE 1250 MG: 10 INJECTION, POWDER, LYOPHILIZED, FOR SOLUTION INTRAVENOUS at 21:54

## 2017-05-04 RX ADMIN — PREDNISONE 40 MG: 20 TABLET ORAL at 09:02

## 2017-05-04 RX ADMIN — HYDROCODONE BITARTRATE AND ACETAMINOPHEN 2 TABLET: 7.5; 325 TABLET ORAL at 01:55

## 2017-05-04 RX ADMIN — MESALAMINE 1000 MG: 250 CAPSULE ORAL at 21:54

## 2017-05-04 RX ADMIN — GABAPENTIN 400 MG: 400 CAPSULE ORAL at 16:42

## 2017-05-04 RX ADMIN — VANCOMYCIN HYDROCHLORIDE 1250 MG: 10 INJECTION, POWDER, LYOPHILIZED, FOR SOLUTION INTRAVENOUS at 05:01

## 2017-05-04 RX ADMIN — CARVEDILOL 3.12 MG: 3.12 TABLET, FILM COATED ORAL at 09:02

## 2017-05-04 RX ADMIN — ATORVASTATIN CALCIUM 80 MG: 20 TABLET, FILM COATED ORAL at 21:38

## 2017-05-04 RX ADMIN — DIPHENHYDRAMINE HYDROCHLORIDE 15 MG: 50 INJECTION, SOLUTION INTRAMUSCULAR; INTRAVENOUS at 14:17

## 2017-05-04 RX ADMIN — VANCOMYCIN HYDROCHLORIDE 1250 MG: 10 INJECTION, POWDER, LYOPHILIZED, FOR SOLUTION INTRAVENOUS at 12:39

## 2017-05-04 RX ADMIN — HYDROCODONE BITARTRATE AND ACETAMINOPHEN 2 TABLET: 7.5; 325 TABLET ORAL at 10:46

## 2017-05-04 RX ADMIN — HYDROMORPHONE HYDROCHLORIDE 1 MG: 1 INJECTION, SOLUTION INTRAMUSCULAR; INTRAVENOUS; SUBCUTANEOUS at 21:38

## 2017-05-04 RX ADMIN — MESALAMINE 1000 MG: 250 CAPSULE ORAL at 12:39

## 2017-05-04 RX ADMIN — HYDROMORPHONE HYDROCHLORIDE 1 MG: 1 INJECTION, SOLUTION INTRAMUSCULAR; INTRAVENOUS; SUBCUTANEOUS at 12:39

## 2017-05-04 RX ADMIN — HYDROMORPHONE HYDROCHLORIDE 1 MG: 1 INJECTION, SOLUTION INTRAMUSCULAR; INTRAVENOUS; SUBCUTANEOUS at 16:42

## 2017-05-04 RX ADMIN — WARFARIN SODIUM 7.5 MG: 7.5 TABLET ORAL at 18:28

## 2017-05-04 RX ADMIN — MESALAMINE 1000 MG: 250 CAPSULE ORAL at 16:42

## 2017-05-04 RX ADMIN — MESALAMINE 1000 MG: 250 CAPSULE ORAL at 09:02

## 2017-05-04 RX ADMIN — GABAPENTIN 400 MG: 400 CAPSULE ORAL at 21:38

## 2017-05-04 RX ADMIN — CARVEDILOL 3.12 MG: 3.12 TABLET, FILM COATED ORAL at 16:42

## 2017-05-04 NOTE — ROUTINE PROCESS
Bedside and Verbal shift change report given to Erendira (oncoming nurse) by Daryn Barger RN   (offgoing nurse). Report included the following information SBAR, Kardex, MAR and Recent Results.

## 2017-05-04 NOTE — PROGRESS NOTES
Pharmacy Dosing Services: Warfarin    Consult for Warfarin Dosing by Pharmacy by Dr. Jorge L Carvajal provided for this 40 y.o.  female , for indication of DVT/recent embolic CVA. Dose to achieve an INR goal of 2-3  per MD progress note, pt is at high risk for thromboembolism but also has hx of GI bleeding     Order entered for Warfarin 7.5mg ordered to be given today at 18:00. Lovenox 90mg SQ q12h      PT/INR Lab Results   Component Value Date/Time    INR 1.4 05/04/2017 05:00 AM      Platelets Lab Results   Component Value Date/Time    PLATELET 346 38/61/1610 10:09 AM      H/H     Lab Results   Component Value Date/Time    HGB 9.1 05/04/2017 10:09 AM        Warfarin Administrations (last 168 hours)       Date/Time Action Medication Dose      05/03/17 1704 Given    warfarin (COUMADIN) tablet 7.5 mg 7.5 mg    05/02/17 1720 Given    warfarin (COUMADIN) tablet 7.5 mg 7.5 mg    05/01/17 1715 Given    warfarin (COUMADIN) tablet 7.5 mg 7.5 mg              Pharmacy to follow daily and will provide subsequent Warfarin dosing based on clinical status.   Meryle Shirk, [phone]

## 2017-05-04 NOTE — PROGRESS NOTES
Shift Progress Note:  Assumed care of patient in bed, Remains in isolation and requesting dilaudid and norco around the clock while awake. Dressing change x1 completed. Lab work pending. Call bell within reach.   Patient Vitals for the past 12 hrs:   Temp Pulse Resp BP SpO2   05/04/17 0400 98.4 °F (36.9 °C) 84 16 108/85 100 %   05/03/17 2253 97.8 °F (36.6 °C) 76 18 101/78 100 %   05/03/17 2005 98 °F (36.7 °C) 74 18 121/82 100 %

## 2017-05-04 NOTE — PROGRESS NOTES
Per rounds pt could potentially be medically stable for discharge tomorrow (5/5/17), pending ID determination regarding ABX. Noted consideration for po ABX upon discharge given pt's history of non compliance. CM to continue to follow and await finial determination regarding ABX.

## 2017-05-04 NOTE — PROGRESS NOTES
Hospitalist Progress Note    Patient: Jameel Humphrey MRN: [id number]  CSN: [id number]    YOB: 1970  Age: 40 y.o.   Sex: female    DOA: 4/28/2017 LOS:  LOS: 6 days          Chief Complaint:    mrsa infection of leg      Assessment/Plan     dehiscence and infection right leg-MRSA confirmed on cultures-on vanco -ID consulted for duration of therapy, now has a picc line-blood cx are negative-anticipating 14 days of IV antibiotics-she has vanco TID currently, if she goes to every day dosing with dapto then she may be able to finish therapy at [facility], or maybe even change to PO antibiotics if directed by ID      PVD severe s/p recent popliteal artery thrombectomy  DVT right leg-resume warfarin, consult pharmacy for dosing, coumadin was held so that a picc could be placed-BID lovenox as bridge as high risk for thromboembolism  crohns disease with hx GI bleeding  Recent embolic CVA  Sickle cell trait  Chronic pain, narcotic dependence  Neuropathic pain    She needs continued wound care and IV antibiotics  Hx medical noncompliance      Patient Active Problem List   Diagnosis Code    [facility] K50.90    Sickle cell trait ([facility] D57.3    Hypertension I10    Hx of cocaine abuse P49.597    [facility] stroke ([facility] W83.7    Neuropathic pain M79.2    DVT (deep venous thrombosis) ([facility]) I82.409    GI bleed K92.2    PAD (peripheral artery disease) ([facility] I73.9    Wound infection T14.8, L08.9    Popliteal artery thrombosis, right ([facility]) I74.3       Subjective:    Denies new issue  No N/V/D    Review of systems:    Constitutional: denies fevers, chills, myalgias  Respiratory: denies SOB, cough  Cardiovascular: denies chest pain, palpitations  Gastrointestinal: denies nausea, vomiting, diarrhea      Vital signs/Intake and Output:  Visit Vitals    /60    Pulse 80    Temp 97.7 °F (36.5 °C)    Resp 16    Ht 5' 6\" (1.676 m)    Wt 95.3 kg (210 lb 3.2 oz)    SpO2 100%    BMI 33.93 kg/m2 Current Shift:  05/04 0701 - 05/04 1900  In: 240 [P.O.:240]  Out: -   Last three shifts:  05/02 1901 - 05/04 0700  In: 56 [P.O.:240; I.V.:250]  Out: 250 [Urine:250]    Exam:    General: Well developed, alert, NAD, OX3  CVS:Regular rate and rhythm, no M/R/G, S1/S2 heard, no thrill  Lungs:Clear to auscultation bilaterally, no wheezes, rhonchi, or rales  Abdomen: Soft, Nontender, No distention, Normal Bowel sounds, No hepatomegaly  Extremities: No C/C/E, pulses palpable 2+  Skin:right leg wound dressed, no surrounding swelling , rash, or redness  Neuro:grossly normal , follows commands  Psych:appropriate                Labs: Results:       Chemistry Recent Labs      05/04/17   0500  05/03/17   0130  05/02/17   0240   GLU  86  106*  96   NA  146*  143  145   K  3.7  3.9  3.5   CL  106  106  107   CO2  31  32  28   BUN  14  15  17   CREA  0.85  0.67  0.83   CA  8.8  8.6  8.6   AGAP  9  5  10   BUCR  16  22*  20      CBC w/Diff No results for input(s): WBC, RBC, HGB, HCT, PLT, GRANS, LYMPH, EOS, HGBEXT, HCTEXT, PLTEXT in the last 72 hours. Cardiac Enzymes No results for input(s): CPK, CKND1, CHANDA in the last 72 hours. No lab exists for component: CKRMB, TROIP   Coagulation Recent Labs      05/04/17   0500  05/03/17   0130   PTP  16.8*  15.4*   INR  1.4*  1.3*       Lipid Panel Lab Results   Component Value Date/Time    Cholesterol, total 145 03/12/2017 06:15 AM    HDL Cholesterol 37 03/12/2017 06:15 AM    LDL, calculated 91.6 03/12/2017 06:15 AM    VLDL, calculated 16.4 03/12/2017 06:15 AM    Triglyceride 82 03/12/2017 06:15 AM    CHOL/HDL Ratio 3.9 03/12/2017 06:15 AM      BNP No results for input(s): BNPP in the last 72 hours. Liver Enzymes No results for input(s): TP, ALB, TBIL, AP, SGOT, GPT in the last 72 hours.     No lab exists for component: DBIL   Thyroid Studies Lab Results   Component Value Date/Time    TSH 1.51 12/12/2009 09:48 AM        Procedures/imaging: see electronic medical records for all procedures/Xrays and details which were not copied into this note but were reviewed prior to creation of Nickie Brink MD

## 2017-05-04 NOTE — ROUTINE PROCESS
Bedside and Verbal shift change report given to FRITZ Weaver RN (oncoming nurse) by Eugene Fried RN (offgoing nurse). Report included the following information SBAR, Kardex, Intake/Output, MAR and Recent Results.

## 2017-05-04 NOTE — PROGRESS NOTES
Infectious Disease Progress Note      Antibiotics:  IV vancomycin #7      CC: wound drainage and infection      Subj:47 yo female s/p R ant popliteal thrombectomy 4/6/17 admitted with poor wound healing and post operative infection. It appears there was non compliance with home health for wound care following this surgery.       Patient is doing okay this afternoon. She states she has felt hot and cold overnight. She complains of pain at the R popliteal area and again thinks there are bubbles coming out of the wound. Per nursing, the wound continues to look good. She denies any n/v/d. No skin rashes.      Physical Exam:  Vital signs reviewed  Tmax: 98.4  General: NAD  Lines: R picc c/d/i  HEENT: PERRL, OP clear, sclera anicteric  Neck: Supple  CHest: breathing nonlabored  Abd: obese  Ext: R leg bandaged - viewed yesterday which shoed approx 4 cm surgical incision, packing in place, serous drainage on dressing No surrounding erythema. No warmth, no surrounding induration  Skin: No rash      Labs, cultures and radiology reviewed  Cr=0.85     4/28 wound cx; MRSA, few CNS  4/28 blood cx x 2: NGTD      Assessment:  - R leg, upper posterior calf post operative wound infection with MRSA  S/p R ant popliteal thrombectomy and repair 4/6/17  Had home health ordered but failed to have wound care  Admitted with fever, increased pain, erythema and drainage of wound  Blood cx are negative      - Mult med issues: Crohn's disease on pentasa, chronic prednisone, immune modulators  Recent CVA with residual L hand numbness  PVD, prior cig use, chronic pain, sickle cell trait      - Previously poor compliance with home health wound care      Recommendations:  - Continue IV vancomycin presently.  Pt does have a picc line  Duration of abx will depend on response of wound to therapy but would expect about 10- 14 days total.   Anticipate change to oral Bactrim on discharge to complete course  Continued wound care directed by vascular surgery  - Pharmacy is assist with dosing and maintain trough levels of 15-20 mcg/mL

## 2017-05-05 ENCOUNTER — APPOINTMENT (OUTPATIENT)
Dept: INFUSION THERAPY | Age: 47
End: 2017-05-05

## 2017-05-05 PROBLEM — E78.00 PURE HYPERCHOLESTEROLEMIA: Chronic | Status: ACTIVE | Noted: 2017-05-05

## 2017-05-05 LAB
ANION GAP BLD CALC-SCNC: 11 MMOL/L (ref 3–18)
BUN SERPL-MCNC: 16 MG/DL (ref 7–18)
BUN/CREAT SERPL: 20 (ref 12–20)
CALCIUM SERPL-MCNC: 8.5 MG/DL (ref 8.5–10.1)
CHLORIDE SERPL-SCNC: 105 MMOL/L (ref 100–108)
CO2 SERPL-SCNC: 29 MMOL/L (ref 21–32)
CREAT SERPL-MCNC: 0.8 MG/DL (ref 0.6–1.3)
GLUCOSE SERPL-MCNC: 110 MG/DL (ref 74–99)
INR PPP: 1.8 (ref 0.8–1.2)
POTASSIUM SERPL-SCNC: 3.4 MMOL/L (ref 3.5–5.5)
PROTHROMBIN TIME: 20.4 SEC (ref 11.5–15.2)
SODIUM SERPL-SCNC: 145 MMOL/L (ref 136–145)

## 2017-05-05 PROCEDURE — 65270000029 HC RM PRIVATE

## 2017-05-05 PROCEDURE — 74011250637 HC RX REV CODE- 250/637: Performed by: HOSPITALIST

## 2017-05-05 PROCEDURE — 80048 BASIC METABOLIC PNL TOTAL CA: CPT | Performed by: HOSPITALIST

## 2017-05-05 PROCEDURE — 74011636637 HC RX REV CODE- 636/637: Performed by: HOSPITALIST

## 2017-05-05 PROCEDURE — 85610 PROTHROMBIN TIME: CPT | Performed by: HOSPITALIST

## 2017-05-05 PROCEDURE — 74011250637 HC RX REV CODE- 250/637: Performed by: FAMILY MEDICINE

## 2017-05-05 PROCEDURE — 74011250636 HC RX REV CODE- 250/636: Performed by: FAMILY MEDICINE

## 2017-05-05 PROCEDURE — 74011250636 HC RX REV CODE- 250/636: Performed by: HOSPITALIST

## 2017-05-05 PROCEDURE — 74011250637 HC RX REV CODE- 250/637: Performed by: INTERNAL MEDICINE

## 2017-05-05 RX ORDER — LISINOPRIL 5 MG/1
2.5 TABLET ORAL DAILY
Status: DISCONTINUED | OUTPATIENT
Start: 2017-05-05 | End: 2017-05-06 | Stop reason: HOSPADM

## 2017-05-05 RX ORDER — WARFARIN 7.5 MG/1
7.5 TABLET ORAL ONCE
Status: COMPLETED | OUTPATIENT
Start: 2017-05-05 | End: 2017-05-05

## 2017-05-05 RX ORDER — CLINDAMYCIN HYDROCHLORIDE 150 MG/1
600 CAPSULE ORAL 3 TIMES DAILY
Status: DISCONTINUED | OUTPATIENT
Start: 2017-05-05 | End: 2017-05-06 | Stop reason: HOSPADM

## 2017-05-05 RX ORDER — SULFAMETHOXAZOLE AND TRIMETHOPRIM 800; 160 MG/1; MG/1
1 TABLET ORAL EVERY 12 HOURS
Status: DISCONTINUED | OUTPATIENT
Start: 2017-05-05 | End: 2017-05-05

## 2017-05-05 RX ADMIN — CARVEDILOL 3.12 MG: 3.12 TABLET, FILM COATED ORAL at 17:17

## 2017-05-05 RX ADMIN — ATORVASTATIN CALCIUM 80 MG: 20 TABLET, FILM COATED ORAL at 21:55

## 2017-05-05 RX ADMIN — DIPHENHYDRAMINE HYDROCHLORIDE 15 MG: 50 INJECTION, SOLUTION INTRAMUSCULAR; INTRAVENOUS at 20:18

## 2017-05-05 RX ADMIN — POLYETHYLENE GLYCOL 3350 17 G: 17 POWDER, FOR SOLUTION ORAL at 08:01

## 2017-05-05 RX ADMIN — HYDROMORPHONE HYDROCHLORIDE 1 MG: 1 INJECTION, SOLUTION INTRAMUSCULAR; INTRAVENOUS; SUBCUTANEOUS at 21:55

## 2017-05-05 RX ADMIN — HYDROMORPHONE HYDROCHLORIDE 1 MG: 1 INJECTION, SOLUTION INTRAMUSCULAR; INTRAVENOUS; SUBCUTANEOUS at 07:56

## 2017-05-05 RX ADMIN — HYDROCODONE BITARTRATE AND ACETAMINOPHEN 2 TABLET: 7.5; 325 TABLET ORAL at 15:22

## 2017-05-05 RX ADMIN — MESALAMINE 1000 MG: 250 CAPSULE ORAL at 15:21

## 2017-05-05 RX ADMIN — HYDROCODONE BITARTRATE AND ACETAMINOPHEN 2 TABLET: 7.5; 325 TABLET ORAL at 10:57

## 2017-05-05 RX ADMIN — MESALAMINE 1000 MG: 250 CAPSULE ORAL at 17:17

## 2017-05-05 RX ADMIN — MESALAMINE 1000 MG: 250 CAPSULE ORAL at 08:01

## 2017-05-05 RX ADMIN — CARVEDILOL 3.12 MG: 3.12 TABLET, FILM COATED ORAL at 08:00

## 2017-05-05 RX ADMIN — DIPHENHYDRAMINE HYDROCHLORIDE 15 MG: 50 INJECTION, SOLUTION INTRAMUSCULAR; INTRAVENOUS at 09:38

## 2017-05-05 RX ADMIN — MESALAMINE 1000 MG: 250 CAPSULE ORAL at 20:18

## 2017-05-05 RX ADMIN — HYDROMORPHONE HYDROCHLORIDE 1 MG: 1 INJECTION, SOLUTION INTRAMUSCULAR; INTRAVENOUS; SUBCUTANEOUS at 17:17

## 2017-05-05 RX ADMIN — ENOXAPARIN SODIUM 90 MG: 100 INJECTION SUBCUTANEOUS at 10:56

## 2017-05-05 RX ADMIN — WARFARIN SODIUM 7.5 MG: 7.5 TABLET ORAL at 17:17

## 2017-05-05 RX ADMIN — LISINOPRIL 2.5 MG: 5 TABLET ORAL at 12:37

## 2017-05-05 RX ADMIN — VANCOMYCIN HYDROCHLORIDE 1250 MG: 10 INJECTION, POWDER, LYOPHILIZED, FOR SOLUTION INTRAVENOUS at 06:21

## 2017-05-05 RX ADMIN — GABAPENTIN 400 MG: 400 CAPSULE ORAL at 21:55

## 2017-05-05 RX ADMIN — CLINDAMYCIN HYDROCHLORIDE 600 MG: 150 CAPSULE ORAL at 17:17

## 2017-05-05 RX ADMIN — DIPHENHYDRAMINE HYDROCHLORIDE 15 MG: 50 INJECTION, SOLUTION INTRAMUSCULAR; INTRAVENOUS at 15:22

## 2017-05-05 RX ADMIN — GABAPENTIN 400 MG: 400 CAPSULE ORAL at 15:22

## 2017-05-05 RX ADMIN — CLINDAMYCIN HYDROCHLORIDE 600 MG: 150 CAPSULE ORAL at 21:55

## 2017-05-05 RX ADMIN — PREDNISONE 40 MG: 20 TABLET ORAL at 08:00

## 2017-05-05 RX ADMIN — HYDROCODONE BITARTRATE AND ACETAMINOPHEN 2 TABLET: 7.5; 325 TABLET ORAL at 06:21

## 2017-05-05 RX ADMIN — GABAPENTIN 400 MG: 400 CAPSULE ORAL at 08:00

## 2017-05-05 RX ADMIN — HYDROMORPHONE HYDROCHLORIDE 1 MG: 1 INJECTION, SOLUTION INTRAMUSCULAR; INTRAVENOUS; SUBCUTANEOUS at 12:38

## 2017-05-05 NOTE — ROUTINE PROCESS
Bedside and Verbal shift change report given to FRITZ Pichardo RN (oncoming nurse) by Vin Abreu   (offgoing nurse). Report included the following information SBAR, Kardex, MAR, Recent Results and Med Rec Status.

## 2017-05-05 NOTE — PROGRESS NOTES
1535  Pt. Refused bactrim. paged Dr. Vesta Alba about the refusal. Pt. States that the last time she took it it made her Kathren Guerrier she did not even finish the medication. She said she already told the Dr. She does not know why she prescribed it. 65  Dr. Vesta Alba called back orders changed. Shift Summary  Pain managed by medication. Denies any nausea. Antibiotics changed to PO. possible discharge tomorrow.

## 2017-05-05 NOTE — PROGRESS NOTES
Noted pt is being changed to po ABX today and will anticipate discharge tomorrow with follow up at the wound clinic. Continuing to follow and assist as needed.

## 2017-05-05 NOTE — PROGRESS NOTES
Pharmacy Dosing Services:  Warfarin    Consult for Warfarin Dosing by Pharmacy by Dr. Marcell Vences provided for this 40 y.o.  female , for indication of DVT. Dose to achieve an INR goal of 2-3    Order entered for  Warfarin  7.5 (mg) ordered to be given today at 18:00. Patient also on Lovenox 90 mg sq q12hrs. LABS    PT/INR Lab Results   Component Value Date/Time    INR 1.8 05/05/2017 09:50 AM      Platelets Lab Results   Component Value Date/Time    PLATELET 486 29/28/5767 10:09 AM      H/H     Lab Results   Component Value Date/Time    HGB 9.1 05/04/2017 10:09 AM        Warfarin Administrations (last 168 hours)     Date/Time Action Medication Dose    05/04/17 1828 Given    warfarin (COUMADIN) tablet 7.5 mg 7.5 mg    05/03/17 1704 Given    warfarin (COUMADIN) tablet 7.5 mg 7.5 mg    05/02/17 1720 Given    warfarin (COUMADIN) tablet 7.5 mg 7.5 mg    05/01/17 1715 Given    warfarin (COUMADIN) tablet 7.5 mg 7.5 mg          Pharmacy to follow daily and will provide subsequent Warfarin dosing based on clinical status.   Nitin Hernandez, [facility]      Contact information   [phone]

## 2017-05-05 NOTE — PROGRESS NOTES
Infectious Disease Progress Note      Antibiotics:  IV vancomycin #8-->TMP/SMX      CC: wound drainage and infection      Subj:47 yo female s/p R ant popliteal thrombectomy 4/6/17 admitted with poor wound healing and post operative infection. It appears there was non compliance with home health for wound care following this surgery.       Patient is doing okay this afternoon. She continues to complain of pain in her leg - describes it as throbbing. Dressing hasn't been changed yet today. She denies any fevers or chills. She denies any n/v/d. No skin rashes.       Physical Exam:  Vital signs reviewed  Tmax: 98.5  General: NAD  Lines: R picc c/d/i  HEENT: PERRL, OP clear, sclera anicteric  Neck: Supple  CHest: breathing nonlabored  Abd: obese  Ext: approx 4 cm surgical incision, packing in place, serous drainage on dressing No surrounding erythema.  No warmth, no surrounding induration + tender to palpation  Skin: No rash      Labs, cultures and radiology reviewed  Cr=0.85      4/28 wound cx; MRSA, few CNS  4/28 blood cx x 2: NGTD      Assessment:  - R leg, upper posterior calf post operative wound infection with MRSA  S/p R ant popliteal thrombectomy and repair 4/6/17  Had home health ordered but failed to have wound care  Admitted with fever, increased pain, erythema and drainage of wound  Blood cx are negative      - Mult med issues: Crohn's disease on pentasa, chronic prednisone, immune modulators  Recent CVA with residual L hand numbness  PVD, prior cig use, chronic pain, sickle cell trait      - Previously poor compliance with home health wound care      Recommendations:  - will change vancomycin to bactrim in anticipation of discharge, anticipate completing a 14 day course of therapy  -will need to ensure INR is monitored while on bactrim and coumadin  -Continued wound care directed by vascular surgery

## 2017-05-05 NOTE — PROGRESS NOTES
Shift summary  Patient had a fair night with right leg pain constantly a 7 to 8 except when she's asleep,no acute changes in status,vital signs stable. Patient Vitals for the past 12 hrs:   Temp Pulse Resp BP SpO2   05/05/17 0415 98.3 °F (36.8 °C) 87 20 99/65 100 %   05/04/17 2241 98.5 °F (36.9 °C) 88 18 (!) 134/95 99 %   05/04/17 1953 98.2 °F (36.8 °C) 80 18 129/86 99 %     Bedside and Verbal shift change report given to [email or web address] (oncoming nurse) by Chandler Zepeda RN   (offgoing nurse). Report included the following information SBAR, Kardex and MAR.

## 2017-05-05 NOTE — PROGRESS NOTES
Hospitalist Progress Note    Patient: Clarisse Eller MRN: [id number]  CSN: [id number]    YOB: 1970  Age: 40 y.o.   Sex: female    DOA: 4/28/2017 LOS:  LOS: 7 days          Chief Complaint:    Right lower leg wound    Assessment/Plan     Hospital Problems  Date Reviewed: 4/28/2017          Codes Class Noted POA    * (Principal)Wound infection ICD-10-CM: T14.8, L08.9  ICD-9-CM: 958.3  4/28/2017 Yes        Popliteal artery thrombosis, right ([facility] ICD-10-CM: I74.3  ICD-9-CM: 444.22  4/28/2017 Yes        PAD (peripheral artery disease) ([facility] ICD-10-CM: I73.9  ICD-9-CM: 443.9  4/4/2017 Yes        GI bleed ICD-10-CM: K92.2  ICD-9-CM: 578.9  3/28/2017 Yes        DVT (deep venous thrombosis) ([facility] ICD-10-CM: I82.409  ICD-9-CM: 453.40  3/16/2017 Yes        Neuropathic pain ICD-10-CM: M79.2  ICD-9-CM: 729.2  3/15/2017 Yes        Sickle cell trait (HCC) ICD-10-CM: D57.3  ICD-9-CM: 282.5  Unknown Yes        Hypertension ICD-10-CM: I10  ICD-9-CM: 401.9  Unknown Yes        Hx of cocaine abuse ICD-10-CM: Z87.898  ICD-9-CM: 305.63  Unknown Yes        Embolic stroke ([facility] [id number]: I63.9  ICD-9-CM: 434.11  3/10/2017 Yes        Crohn disease ([facility] ICD-10-CM: K50.90  ICD-9-CM: 555.9  2/27/2012 Yes              R leg wound, s/p R anterior popliteal thrombectomy and repair 4/6/17  - MRSA growing in wound culture from 4/28/17  - BCx negative x 2 from 4/28/17  - non-compliant with home health/wound care  - IV abx: Vancomycin (w/ pharmacy dosing) dose 1: 4/28/17 1600  - Total abx duration 10-14 days depending on response  - ID following  - possible change to PO abx on discharge  - Wound care/vascular surgery following as well  - pain control w/ bowel regimen    H/O DVT and embolic stroke  - coumadin restarted, pharmacy dosing consult  - bridging w/ lovenox: first dose 5/1/17 1100  - First dose coumadin: 5/1/17  - INR remains sub-therapeutic    HTN  - coreg 3.125mg BID  - BP goal <140/90  - a few values outside of target BP range   - start lisinopril 2.5mg daily as this can also help with PVD    HLD  - lipitor 80mg qHS    Crohn  - mesalamine 1000mg PO QID  - prednisone 40mg PO daily    Neuropathic pain  - Gabapentin 400mg PO TID    PT/OT     CM for DC planning    FENGI: saline lock; coumadin + lovenox as above; regular diet; no GI prophylaxis indicated    Dispo: pending final Abx plan and therapeutic INR to DC lovenox bridge; home w/ HH and wound care; would like to observe 24h post abx changes especially in the setting of repeated admission      Subjective:    No new complaints. Interval summary: No acute events overnight; AFVSS    Review of systems:    Constitutional: denies fevers, chills, myalgias  Respiratory: denies SOB, cough  Cardiovascular: denies chest pain, palpitations  Gastrointestinal: denies nausea, vomiting, diarrhea      Vital signs/Intake and Output:  Visit Vitals    BP (!) 156/108 (BP 1 Location: Left arm, BP Patient Position: Sitting)    Pulse 82    Temp 98.4 °F (36.9 °C)    Resp 20    Ht 5' 6\" (1.676 m)    Wt 89.8 kg (198 lb)    SpO2 100%    BMI 31.96 kg/m2     Current Shift:     Last three shifts:  05/03 1901 - 05/05 0700  In: 850 [P.O.:600;  I.V.:250]  Out: -     Exam:    General: Well developed, alert, NAD, OX3  Head/Neck: NCAT, supple, No masses, No lymphadenopathy  CVS:Regular rate and rhythm, no M/R/G, S1/S2 heard, no thrill  Lungs:Clear to auscultation bilaterally, no wheezes, rhonchi, or rales  Abdomen: Soft, Nontender, No distention, Normal Bowel sounds, No hepatomegaly  Extremities: No C/C/E, pulses palpable 2+  Skin:normal texture and turgor, no rashes, R leg wound dressed, clean, dry  Neuro:grossly normal , follows commands  Psych:appropriate                Labs: Results:       Chemistry Recent Labs      05/04/17   0500  05/03/17   0130   GLU  86  106*   NA  146*  143   K  3.7  3.9   CL  106  106   CO2  31  32   BUN  14  15   CREA  0.85  0.67   CA  8.8  8.6   AGAP  9  5   BUCR  16  22* CBC w/Diff Recent Labs      05/04/17   1009   WBC  12.9   RBC  3.46*   HGB  9.1*   HCT  29.4*   PLT  324      Cardiac Enzymes No results for input(s): CPK, CKND1, CHANDA in the last 72 hours. No lab exists for component: CKRMB, TROIP   Coagulation Recent Labs      05/04/17   0500  05/03/17   0130   PTP  16.8*  15.4*   INR  1.4*  1.3*       Lipid Panel Lab Results   Component Value Date/Time    Cholesterol, total 145 03/12/2017 06:15 AM    HDL Cholesterol 37 03/12/2017 06:15 AM    LDL, calculated 91.6 03/12/2017 06:15 AM    VLDL, calculated 16.4 03/12/2017 06:15 AM    Triglyceride 82 03/12/2017 06:15 AM    CHOL/HDL Ratio 3.9 03/12/2017 06:15 AM      BNP No results for input(s): BNPP in the last 72 hours. Liver Enzymes No results for input(s): TP, ALB, TBIL, AP, SGOT, GPT in the last 72 hours.     No lab exists for component: DBIL   Thyroid Studies Lab Results   Component Value Date/Time    TSH 1.51 12/12/2009 09:48 AM        Procedures/imaging: see electronic medical records for all procedures/Xrays and details which were not copied into this note but were reviewed prior to creation of [facility], DO

## 2017-05-06 VITALS
WEIGHT: 202.1 LBS | TEMPERATURE: 97.9 F | RESPIRATION RATE: 18 BRPM | DIASTOLIC BLOOD PRESSURE: 105 MMHG | BODY MASS INDEX: 32.48 KG/M2 | HEIGHT: 66 IN | HEART RATE: 92 BPM | SYSTOLIC BLOOD PRESSURE: 137 MMHG | OXYGEN SATURATION: 100 %

## 2017-05-06 LAB
ANION GAP BLD CALC-SCNC: 7 MMOL/L (ref 3–18)
BUN SERPL-MCNC: 19 MG/DL (ref 7–18)
BUN/CREAT SERPL: 27 (ref 12–20)
CALCIUM SERPL-MCNC: 8.8 MG/DL (ref 8.5–10.1)
CHLORIDE SERPL-SCNC: 106 MMOL/L (ref 100–108)
CO2 SERPL-SCNC: 30 MMOL/L (ref 21–32)
CREAT SERPL-MCNC: 0.71 MG/DL (ref 0.6–1.3)
ERYTHROCYTE [DISTWIDTH] IN BLOOD BY AUTOMATED COUNT: 14.9 % (ref 11.6–14.5)
GLUCOSE SERPL-MCNC: 91 MG/DL (ref 74–99)
HCT VFR BLD AUTO: 28.7 % (ref 35–45)
HGB BLD-MCNC: 8.8 G/DL (ref 12–16)
INR PPP: 1.8 (ref 0.8–1.2)
MCH RBC QN AUTO: 26 PG (ref 24–34)
MCHC RBC AUTO-ENTMCNC: 30.7 G/DL (ref 31–37)
MCV RBC AUTO: 84.7 FL (ref 74–97)
PLATELET # BLD AUTO: 293 K/UL (ref 135–420)
PMV BLD AUTO: 9.4 FL (ref 9.2–11.8)
POTASSIUM SERPL-SCNC: 3.6 MMOL/L (ref 3.5–5.5)
PROTHROMBIN TIME: 20.3 SEC (ref 11.5–15.2)
RBC # BLD AUTO: 3.39 M/UL (ref 4.2–5.3)
SODIUM SERPL-SCNC: 143 MMOL/L (ref 136–145)
WBC # BLD AUTO: 12.3 K/UL (ref 4.6–13.2)

## 2017-05-06 PROCEDURE — 36592 COLLECT BLOOD FROM PICC: CPT

## 2017-05-06 PROCEDURE — 74011250637 HC RX REV CODE- 250/637: Performed by: HOSPITALIST

## 2017-05-06 PROCEDURE — 74011250637 HC RX REV CODE- 250/637: Performed by: FAMILY MEDICINE

## 2017-05-06 PROCEDURE — 74011250636 HC RX REV CODE- 250/636: Performed by: HOSPITALIST

## 2017-05-06 PROCEDURE — 74011636637 HC RX REV CODE- 636/637: Performed by: HOSPITALIST

## 2017-05-06 PROCEDURE — 85610 PROTHROMBIN TIME: CPT | Performed by: HOSPITALIST

## 2017-05-06 PROCEDURE — 85027 COMPLETE CBC AUTOMATED: CPT | Performed by: HOSPITALIST

## 2017-05-06 PROCEDURE — 80048 BASIC METABOLIC PNL TOTAL CA: CPT | Performed by: HOSPITALIST

## 2017-05-06 PROCEDURE — 74011250637 HC RX REV CODE- 250/637: Performed by: INTERNAL MEDICINE

## 2017-05-06 PROCEDURE — 74011250636 HC RX REV CODE- 250/636: Performed by: FAMILY MEDICINE

## 2017-05-06 RX ORDER — DIPHENHYDRAMINE HCL 25 MG
25 CAPSULE ORAL
Qty: 30 CAP | Refills: 0 | Status: SHIPPED | OUTPATIENT
Start: 2017-05-06 | End: 2017-05-16

## 2017-05-06 RX ORDER — HYDROCODONE BITARTRATE AND ACETAMINOPHEN 7.5; 325 MG/1; MG/1
1 TABLET ORAL
Qty: 30 TAB | Refills: 0 | Status: SHIPPED | OUTPATIENT
Start: 2017-05-06 | End: 2017-05-06

## 2017-05-06 RX ORDER — WARFARIN 4 MG/1
8 TABLET ORAL ONCE
Status: DISCONTINUED | OUTPATIENT
Start: 2017-05-06 | End: 2017-05-06 | Stop reason: HOSPADM

## 2017-05-06 RX ORDER — CARVEDILOL 3.12 MG/1
6.25 TABLET ORAL 2 TIMES DAILY WITH MEALS
Status: DISCONTINUED | OUTPATIENT
Start: 2017-05-06 | End: 2017-05-06 | Stop reason: HOSPADM

## 2017-05-06 RX ORDER — HYDROMORPHONE HYDROCHLORIDE 2 MG/1
2-4 TABLET ORAL
Qty: 40 TAB | Refills: 0 | Status: SHIPPED | OUTPATIENT
Start: 2017-05-06 | End: 2017-06-20

## 2017-05-06 RX ORDER — CLINDAMYCIN HYDROCHLORIDE 300 MG/1
600 CAPSULE ORAL 3 TIMES DAILY
Qty: 42 CAP | Refills: 0 | Status: SHIPPED | OUTPATIENT
Start: 2017-05-06 | End: 2017-06-20

## 2017-05-06 RX ORDER — HYDROMORPHONE HYDROCHLORIDE 2 MG/1
2-4 TABLET ORAL
Status: DISCONTINUED | OUTPATIENT
Start: 2017-05-06 | End: 2017-05-06 | Stop reason: HOSPADM

## 2017-05-06 RX ADMIN — HYDROMORPHONE HYDROCHLORIDE 1 MG: 1 INJECTION, SOLUTION INTRAMUSCULAR; INTRAVENOUS; SUBCUTANEOUS at 01:59

## 2017-05-06 RX ADMIN — MESALAMINE 1000 MG: 250 CAPSULE ORAL at 09:00

## 2017-05-06 RX ADMIN — CLINDAMYCIN HYDROCHLORIDE 600 MG: 150 CAPSULE ORAL at 08:59

## 2017-05-06 RX ADMIN — ENOXAPARIN SODIUM 90 MG: 100 INJECTION SUBCUTANEOUS at 11:19

## 2017-05-06 RX ADMIN — HYDROMORPHONE HYDROCHLORIDE 1 MG: 1 INJECTION, SOLUTION INTRAMUSCULAR; INTRAVENOUS; SUBCUTANEOUS at 06:13

## 2017-05-06 RX ADMIN — PREDNISONE 40 MG: 20 TABLET ORAL at 08:59

## 2017-05-06 RX ADMIN — MESALAMINE 1000 MG: 250 CAPSULE ORAL at 14:20

## 2017-05-06 RX ADMIN — ENOXAPARIN SODIUM 90 MG: 100 INJECTION SUBCUTANEOUS at 00:06

## 2017-05-06 RX ADMIN — GABAPENTIN 400 MG: 400 CAPSULE ORAL at 08:59

## 2017-05-06 RX ADMIN — HYDROMORPHONE HYDROCHLORIDE 4 MG: 2 TABLET ORAL at 11:13

## 2017-05-06 RX ADMIN — CARVEDILOL 3.12 MG: 3.12 TABLET, FILM COATED ORAL at 08:59

## 2017-05-06 RX ADMIN — LISINOPRIL 2.5 MG: 5 TABLET ORAL at 08:59

## 2017-05-06 RX ADMIN — DIPHENHYDRAMINE HYDROCHLORIDE 15 MG: 50 INJECTION, SOLUTION INTRAMUSCULAR; INTRAVENOUS at 02:31

## 2017-05-06 RX ADMIN — HYDROMORPHONE HYDROCHLORIDE 4 MG: 2 TABLET ORAL at 14:20

## 2017-05-06 RX ADMIN — DIPHENHYDRAMINE HYDROCHLORIDE 15 MG: 50 INJECTION, SOLUTION INTRAMUSCULAR; INTRAVENOUS at 09:13

## 2017-05-06 NOTE — ROUTINE PROCESS
Bedside and Verbal shift change report given to EMANUEL Ty RN  (oncoming nurse) by  FAYE Vernon RN  (offgoing nurse). Report included the following information SBAR, Kardex, Recent Results and Med Rec Status.

## 2017-05-06 NOTE — PROGRESS NOTES
Shift Summary:  Slept little with requests for pain,itching medications when available. Outer dressing changed to right calf area with moderate drainage to inside dressing. PICC line flushes but tight. Flushed well. Otherwise a usual shift.

## 2017-05-06 NOTE — DISCHARGE INSTRUCTIONS
Infection After Surgery: Care Instructions  Your Care Instructions  After surgery, an infection is always possible. It doesn't mean that the surgery didn't go well. Because an infection can be serious, your doctor has taken steps to manage it. Your doctor checked the infection and cleaned it if necessary. He or she may have made an opening in the area so that the pus can drain out. You may have gauze in the cut so that the area will stay open and keep draining. You may need antibiotics. You will need to follow up with your doctor to make sure the infection has gone away. Follow-up care is a key part of your treatment and safety. Be sure to make and go to all appointments, and call your doctor if you are having problems. It's also a good idea to know your test results and keep a list of the medicines you take. How can you care for yourself at home? · Make sure your surgeon knows that you saw a doctor about the infection. · If your doctor prescribed antibiotics, take them as directed. Do not stop taking them just because you feel better. You need to take the full course of antibiotics. · Ask your doctor if you can take an over-the-counter pain medicine, such as acetaminophen (Tylenol), ibuprofen (Advil, Motrin), or naproxen (Aleve). Be safe with medicines. Read and follow all instructions on the label. · Do not take two or more pain medicines at the same time unless the doctor told you to. Many pain medicines have acetaminophen, which is Tylenol. Too much acetaminophen (Tylenol) can be harmful. · Prop up the area on a pillow anytime you sit or lie down during the next 3 days. Try to keep it above the level of your heart. This will help reduce swelling. · Keep the skin clean and dry. · If you have a bandage, keep it clean and dry. · You may have a dressing over the cut (incision). A dressing helps the incision heal and protects it. Your doctor will tell you how to take care of this.  You can expect drainage from the wound. · If your doctor told you how to care for your incision, follow your doctor's instructions. If you did not get instructions, follow this general advice:  ¨ Wash around the incision with clean water 2 times a day. Don't use hydrogen peroxide or alcohol, which can slow healing. When should you call for help? Call your doctor now or seek immediate medical care if:  · You have signs that your infection is getting worse, such as:  ¨ Increased pain, swelling, warmth, or redness in the area. ¨ Red streaks leading from the area. ¨ Pus draining from the wound. ¨ A new or higher fever. Watch closely for changes in your health, and be sure to contact your doctor if you have any problems. Where can you learn more? Go to http://johan-maria del carmen.info/. Enter C340 in the search box to learn more about \"Infection After Surgery: Care Instructions. \"  Current as of: May 27, 2016  Content Version: 11.2  © 9099-2858 [employer]. Care instructions adapted under license by [employer] (which disclaims liability or warranty for this information). If you have questions about a medical condition or this instruction, always ask your healthcare professional. [place] [address] any warranty or liability for your use of this information.    DISCHARGE SUMMARY from Nurse    The following personal items are in your possession at time of discharge:       Visual Aid: None           Clothing: Pants, Shirt, Footwear, Undergarments  Other Valuables: Cell Phone, Purse             PATIENT INSTRUCTIONS:    After general anesthesia or intravenous sedation, for 24 hours or while taking prescription Narcotics:  · Limit your activities  · Do not drive and operate hazardous machinery  · Do not make important personal or business decisions  · Do  not drink alcoholic beverages  · If you have not urinated within 8 hours after discharge, please contact your surgeon on call.    Report the following to your surgeon:  · Excessive pain, swelling, redness or odor of or around the surgical area  · Temperature over 100.5  · Nausea and vomiting lasting longer than 4 hours or if unable to take medications  · Any signs of decreased circulation or nerve impairment to extremity: change in color, persistent  numbness, tingling, coldness or increase pain  · Any questions        What to do at Home:  Recommended activity: Activity as tolerated. If you experience any of the following symptoms Fever that is greater than 100.5, pain that is not are resolved with pain medication, increase in redness and drainage from the incision site,   please follow up with MD or .      *  Please give a list of your current medications to your Primary Care Provider. *  Please update this list whenever your medications are discontinued, doses are      changed, or new medications (including over-the-counter products) are added. *  Please carry medication information at all times in case of emergency situations. These are general instructions for a healthy lifestyle:    No smoking/ No tobacco products/ Avoid exposure to second hand smoke    Surgeon General's Warning:  Quitting smoking now greatly reduces serious risk to your health. Obesity, smoking, and sedentary lifestyle greatly increases your risk for illness    A healthy diet, regular physical exercise & weight monitoring are important for maintaining a healthy lifestyle    You may be retaining fluid if you have a history of heart failure or if you experience any of the following symptoms:  Weight gain of 3 pounds or more overnight or 5 pounds in a week, increased swelling in our hands or feet or shortness of breath while lying flat in bed. Please call your doctor as soon as you notice any of these symptoms; do not wait until your next office visit.     Recognize signs and symptoms of STROKE:    F-face looks uneven    A-arms unable to move or move unevenly    S-speech slurred or non-existent    T-time-call 911 as soon as signs and symptoms begin-DO NOT go       Back to bed or wait to see if you get better-TIME IS BRAIN. Warning Signs of HEART ATTACK     Call 911 if you have these symptoms:   Chest discomfort. Most heart attacks involve discomfort in the center of the chest that lasts more than a few minutes, or that goes away and comes back. It can feel like uncomfortable pressure, squeezing, fullness, or pain.  Discomfort in other areas of the upper body. Symptoms can include pain or discomfort in one or both arms, the back, neck, jaw, or stomach.  Shortness of breath with or without chest discomfort.  Other signs may include breaking out in a cold sweat, nausea, or lightheadedness. Don't wait more than five minutes to call 911 - MINUTES MATTER! Fast action can save your life. Calling 911 is almost always the fastest way to get lifesaving treatment. Emergency Medical Services staff can begin treatment when they arrive -- up to an hour sooner than if someone gets to the hospital by car. The discharge information has been reviewed with the patient. The patient verbalized understanding. Discharge medications reviewed with the patient and appropriate educational materials and side effects teaching were provided.

## 2017-05-06 NOTE — PROGRESS NOTES
Have reached out multiple times to [facility] who would be outsourcing this case because of insurance to [employer] but have not heard back. Patient is ready and anxious for discharge; is now accepting to go to the [facility] wound care clinic - information regarding free transportation via [address] sent to unit for patient to arrange for transport to wound clinic and I will send patient's info on for coordinating care through [facility] wound care clinic.

## 2017-05-06 NOTE — PROGRESS NOTES
Received report from FAYE Vernon RN.      8123 patient assessment was completed at this time. Patient dressing was changed at this time along with the packing. Patient states that she is having a burning feeling where the packing was replaced. Patient unable to get pain medication at this time due to it being to early. 1600 contacted  patient states that she doesn't want to go with home care and that she will do the wound care clinic and the free cab. Patients PICC line was removed at this time. Tip was intact. Length was 38 cm's. Dual AVS reviewed with Dea Cullen RN. All medications reviewed individually with patient. Opportunities for questions and concerns provided. Patient discharged via (mode of transport ie. Car, ambulance or air transport) Car  Patient's arm band appropriately discarded.

## 2017-05-06 NOTE — PROGRESS NOTES
Hospitalist Progress Note    Patient: Ruddy Leong MRN: [id number]  CSN: [id number]    YOB: 1970  Age: 40 y.o. Sex: female    DOA: 4/28/2017 LOS:  LOS: 8 days          Chief Complaint: wound      Assessment/Plan       Would appear to be poised for dc. On oral abx. Tolerating po. Ambulating. Plan:   Discharge pending ID approval.  Anticipate dc in 24-48 hrs. Patient Active Problem List   Diagnosis Code    Crohn disease ([facility] K50.90    Sickle cell trait ([facility] D57.3    Hypertension I10    Hx of cocaine abuse B53.114    Embolic stroke ([facility] G34.1    Neuropathic pain M79.2    DVT (deep venous thrombosis) ([facility]) I82.409    GI bleed K92.2    PAD (peripheral artery disease) ([facility]) I73.9    Wound infection T14.8, L08.9    Popliteal artery thrombosis, right ([facility]) I74.3    Pure hypercholesterolemia E78.00       Subjective:    Seen and examined. Feeling well. Offers no complaints. Review of systems:    Constitutional: denies fevers, chills, myalgias  Respiratory: denies SOB, cough  Cardiovascular: denies chest pain, palpitations  Gastrointestinal: denies nausea, vomiting, diarrhea      Vital signs/Intake and Output:  Visit Vitals    BP (!) 137/105    Pulse 92    Temp 97.9 °F (36.6 °C)    Resp 18    Ht 5' 6\" (1.676 m)    Wt 91.7 kg (202 lb 1.6 oz)    SpO2 100%    BMI 32.62 kg/m2     Current Shift:     Last three shifts:  05/04 1901 - 05/06 0700  In: 480 [P.O.:480]  Out: -     Exam:    General: nad  Head/Neck: mmm  CVS:s1s2  Lungs:CTA  Extremities: No edema. Dressing over wound.                 Labs: Results:       Chemistry Recent Labs      05/06/17   0455  05/05/17   0950  05/04/17   0500   GLU  91  110*  86   NA  143  145  146*   K  3.6  3.4*  3.7   CL  106  105  106   CO2  30  29  31   BUN  19*  16  14   CREA  0.71  0.80  0.85   CA  8.8  8.5  8.8   AGAP  7  11  9   BUCR  27*  20  16      CBC w/Diff Recent Labs      05/06/17   0455  05/04/17   1009   WBC  12.3 12.9   RBC  3.39*  3.46*   HGB  8.8*  9.1*   HCT  28.7*  29.4*   PLT  293  324      Cardiac Enzymes No results for input(s): CPK, CKND1, CHANDA in the last 72 hours. No lab exists for component: CKRMB, TROIP   Coagulation Recent Labs      05/06/17   0455  05/05/17   0950   PTP  20.3*  20.4*   INR  1.8*  1.8*       Lipid Panel Lab Results   Component Value Date/Time    Cholesterol, total 145 03/12/2017 06:15 AM    HDL Cholesterol 37 03/12/2017 06:15 AM    LDL, calculated 91.6 03/12/2017 06:15 AM    VLDL, calculated 16.4 03/12/2017 06:15 AM    Triglyceride 82 03/12/2017 06:15 AM    CHOL/HDL Ratio 3.9 03/12/2017 06:15 AM      BNP No results for input(s): BNPP in the last 72 hours. Liver Enzymes No results for input(s): TP, ALB, TBIL, AP, SGOT, GPT in the last 72 hours.     No lab exists for component: DBIL   Thyroid Studies Lab Results   Component Value Date/Time    TSH 1.51 12/12/2009 09:48 AM        Procedures/imaging: see electronic medical records for all procedures/Xrays and details which were not copied into this note but were reviewed prior to creation of Rubia Saleem MD

## 2017-05-06 NOTE — PROGRESS NOTES
Received request to set up [facility] for wound care because patient is unable to secure transportation to wound care clinic - called patient in room who said that she was told she could go to wound care clinic with bus transportation but there is no clear plan for this; offerred FOC to patient who said that [facility] has been her provider in past but that she does not wish to have them  - was accepting of sending her info to both [facility] and [facility] to see if they will accept her case.   Will send info into edischarge and update patient on request.

## 2017-05-06 NOTE — PROGRESS NOTES
Shift summary  Patient had a fair evening still complaining of severe pain and itching to the  right leg wound site prn dilaudid and benadryl given for the two respectively. No acute changes noted,patient continues to tolerate po antibiotics. Bedside and Verbal shift change report given to FAYE Vernon rn  (oncoming nurse) by Tessie Sorensen RN   (offgoing nurse). Report included the following information SBAR, Kardex and MAR.

## 2017-05-06 NOTE — PROGRESS NOTES
Pharmacy Dosing Services:  Warfarin    Consult for Warfarin Dosing by Pharmacy by Dr. Marcell Vences provided for this 40 y.o.  female , for indication of DVT. Dose to achieve an INR goal of 2-3    Order entered for  Warfarin  8 (mg) ordered to be given today at 18:00. Patient also on Lovenox 90 mg sq q12hrs. LABS    PT/INR Lab Results   Component Value Date/Time    INR 1.8 05/06/2017 04:55 AM      Platelets Lab Results   Component Value Date/Time    PLATELET 088 19/62/4220 04:55 AM      H/H     Lab Results   Component Value Date/Time    HGB 8.8 05/06/2017 04:55 AM        Warfarin Administrations (last 168 hours)     Date/Time Action Medication Dose    05/05/17 1717 Given    warfarin (COUMADIN) tablet 7.5 mg 7.5 mg    05/04/17 1828 Given    warfarin (COUMADIN) tablet 7.5 mg 7.5 mg    05/03/17 1704 Given    warfarin (COUMADIN) tablet 7.5 mg 7.5 mg    05/02/17 1720 Given    warfarin (COUMADIN) tablet 7.5 mg 7.5 mg    05/01/17 1715 Given    warfarin (COUMADIN) tablet 7.5 mg 7.5 mg          Pharmacy to follow daily and will provide subsequent Warfarin dosing based on clinical status.   Nitin Hernandez, [facility]     Contact information     [phone]

## 2017-05-07 NOTE — DISCHARGE SUMMARY
Darshan Padgettila 57 SUMMARY    Name:  Nalini Frausto  MR#:  [id number]  :  1970  Account #:  [de-identified]  Date of Adm:  2017  Date of Discharge:  2017      DISCHARGE DIAGNOSES:  1. Wound infection. 2. Popliteal artery thrombosis, right leg. 3. Dyslipidemia. 4. History of deep venous thrombosis. 5. Neuropathic pain. 6. History of stroke. 7. History of illicit drug abuse. 8. Hypertension. 9. Crohn disease. 10. Sickle cell trait. HOSPITAL COURSE: The patient is a 50-year-old [facility] [place]  female with a complicated medical history. She has been in the  hospital a number of times recently, most recently she was here at  [facility] in 2017. She had a thrombectomy with repair  of the popliteal artery in the right leg done by Vascular Surgery. She  had some GI bleeding. She was seen by Gastroenterology for that. She had been on anticoagulation for a recent embolic stroke. She had  right leg pain. She was found to have a thrombus. That was taken care  of by Vascular Surgery. She was stabilized clinically and discharged  home. She developed a fever. She had some purulent drainage from  the surgical wound on her right leg. She came into the emergency  room. She was seen by Vascular Surgery in the ER and the  recommendation was to admit for antibiotics and further treatment. She  was admitted by Dr. Thalia Mohs. She was started on Zosyn and  vancomycin. Her pain was controlled with Norco.  Dr. Ally Castro saw her in  consultation. He indicated aggressive wound care and IV antibiotics. PICC line placement was recommended. Twice daily dressing changes  and packing were recommended. The patient was seen by Dr. Thaddeus Huerta  of Infectious Disease. She agreed with the intravenous vancomycin. Wound care was recommended to be continued while in the hospital.    During the patient's stay in the hospital, she had a lot of pain issues.   She has a history of pain medication usage. She also has a history of  some illicit drug use. After several days in the hospital, she was  stabilized and improved. Yesterday on 05/05/2017 it was commented  to her that she will probably be going home over the weekend. She  was seen by Infectious Disease, who initially recommended changing  her from IV antibiotics to a 2-week course of Bactrim. Ultimately, this  decision was changed again and she was to be switched to  clindamycin 300 mg 3 times a day. This was the recommendation of  Dr. Donna Carter of Infectious Disease. I saw the patient today on rounds. She reported that she was doing well with the wound. She showed me  some photographs. I did not inspect the wound itself, as it was just  freshly wrapped up by wound care. She has been followed by Vascular  Surgery. Infectious Disease has been following her every day. Infectious Disease has reviewed the situation and has cleared her for  discharge from the hospital. At the present time, she is receiving her  antibiotic by mouth. I am happy it is clindamycin instead of Bactrim as  Bactrim would create some headaches with her warfarin. The patient's  plan on discharge is to follow up with the wound care clinic for wound  care that will probably take several weeks. She should follow up with  her primary care physician and also with Dr. Tiago Arrieta of Vascular Surgery  as well. At time of discharge, vital signs were present. Her physical  examination was normal. She was reluctant to transition off of  intravenous Benadryl and Dilaudid. I myself and the nurse convinced  her that we needed to get her off of the IV medications as she was  tolerating everything by mouth and also so we could craft a discharge  plan using oral medications and test that plan before leaving the  hospital. She displayed understanding and seemed to agree reluctantly  with this plan. DISCHARGE MEDICATIONS:  1. Lipitor 80 mg every night. 2. Carvedilol 3.125 mg twice a day.   3. Clindamycin 300 mg 2 caps 3 times a day for 14 days. 4. Benadryl 25 mg every 6 hours as needed for itching. 5. Gabapentin 400 mg capsule 3 times daily. 6. Hydromorphone 2 mg tablet 1-2 tablets every 4 hours as needed. 7. Pentasa 500 mg controlled release capsule. 2 caps by mouth 4 times  daily. 8. Prednisone  40 mg daily. 9. Warfarin 7.5 mg tablet daily. Check INR 3 times a week until  stabilized. INR and warfarin management per primary care physician. DISCHARGE INSTRUCTIONS:  1. Discharge home. 2. Complete antibiotics as recommended. 3. Follow up with Vascular Surgery. 4. Follow up in the [address] [place] [address]. 5. Take all medications as recommended.         Dexter Alba M.D.    Luiza Nettles / Wiley  D:  05/06/2017   14:18  T:  05/07/2017   06:12  Job #:  [id number]    Dr. Armen Thompson (PCP)

## 2017-05-09 ENCOUNTER — OFFICE VISIT (OUTPATIENT)
Dept: VASCULAR SURGERY | Age: 47
End: 2017-05-09

## 2017-05-09 VITALS
RESPIRATION RATE: 20 BRPM | HEART RATE: 90 BPM | HEIGHT: 66 IN | SYSTOLIC BLOOD PRESSURE: 146 MMHG | DIASTOLIC BLOOD PRESSURE: 98 MMHG | BODY MASS INDEX: 32.47 KG/M2 | WEIGHT: 202 LBS

## 2017-05-09 DIAGNOSIS — I73.9 PAD (PERIPHERAL ARTERY DISEASE) (HCC): Primary | ICD-10-CM

## 2017-05-09 DIAGNOSIS — M79.89 LEG SWELLING: ICD-10-CM

## 2017-05-09 DIAGNOSIS — T81.31XS POSTOPERATIVE WOUND DEHISCENCE, SEQUELA: ICD-10-CM

## 2017-05-09 NOTE — MR AVS SNAPSHOT
Visit Information Date & Time Provider Department Dept. Phone Encounter #  
 5/9/2017  1:00 PM Minnie Lundy MD [facility] [address] [id number] Your Appointments 5/17/2017  2:30 PM  
Follow Up with Minnie Lundy MD  
[facility] Appt Note: 1 week follow up  
 [address]  
  
   
 [facility]  Upcoming Health Maintenance Date Due DTaP/Tdap/Td series (1 - Tdap) 6/10/1992 PAP AKA CERVICAL CYTOLOGY 6/10/1992 INFLUENZA AGE 9 TO ADULT 8/1/2017 Allergies as of 5/9/2017  Review Complete On: 5/9/2017 By: Osmel Tavraes RN Severity Noted Reaction Type Reactions Humira [Adalimumab] High 03/24/2017   Intolerance Other (comments) Remicade [Infliximab] High 03/24/2017   Intolerance Palpitations Current Immunizations  Reviewed on 4/17/2017 Name Date Influenza Vaccine 11/1/2016 Pneumococcal Vaccine (Unspecified Type) 1/1/2010 Not reviewed this visit Vitals BP Pulse Resp Height(growth percentile) Weight(growth percentile) LMP  
 (!) 146/98 90 20 5' 6\" (1.676 m) 202 lb (91.6 kg) 04/06/2017 (Exact Date) BMI OB Status Smoking Status 32.6 kg/m2 Having regular periods Former Smoker Vitals History BMI and BSA Data Body Mass Index Body Surface Area  
 32.6 kg/m 2 2.07 m 2 Preferred Pharmacy Pharmacy Name Phone [id number] [facility] [id number] [facility] [phone] Your Updated Medication List  
  
   
This list is accurate as of: 5/9/17  3:25 PM.  Always use your most recent med list.  
  
  
  
  
 atorvastatin 80 mg tablet Commonly known as:  LIPITOR Take 1 Tab by mouth nightly. carvedilol 3.125 mg tablet Commonly known as:  Real Bracket Take 1 Tab by mouth two (2) times daily (with meals). clindamycin 300 mg capsule Commonly known as:  CLEOCIN Take 2 Caps by mouth three (3) times daily. diphenhydrAMINE 25 mg capsule Commonly known as:  BENADRYL Take 1 Cap by mouth every six (6) hours as needed for Itching for up to 10 days. gabapentin 400 mg capsule Commonly known as:  NEURONTIN Take 1 Cap by mouth three (3) times daily. HYDROmorphone 2 mg tablet Commonly known as:  DILAUDID Take 1-2 Tabs by mouth every four (4) hours as needed. Max Daily Amount: 24 mg.  
  
 mesalamine 500 mg CR capsule Commonly known as:  PENTASA Take 2 Caps by mouth four (4) times daily. Indications: CROHN'S DISEASE  
  
 predniSONE 20 mg tablet Commonly known as:  Alena Oyster Take 2 Tabs by mouth daily (with breakfast). Indications: CROHN'S DISEASE  
  
 warfarin 7.5 mg tablet Commonly known as:  COUMADIN Take 1 Tab by mouth daily. To-Do List   
 06/30/2017 1:00 PM  
  Appointment with [address] [place] [address] at [facility] ([phone]) Introducing [facility] [place] [facility]! Yessenia Collins introduces MedTest DX patient portal. Now you can access parts of your medical record, email your doctor's office, and request medication refills online. 1. In your internet browser, go to [email or web address] 2. Click on the First Time User? Click Here link in the Sign In box. You will see the New Member Sign Up page. 3. Enter your MedTest DX Access Code exactly as it appears below. You will not need to use this code after youve completed the sign-up process. If you do not sign up before the expiration date, you must request a new code. · MedTest DX Access Code: [email or web address] Expires: 7/30/2017 10:10 AM 
 
4. Enter the last four digits of your Social Security Number (xxxx) and Date of Birth (mm/dd/yyyy) as indicated and click Submit. You will be taken to the next sign-up page. 5. Create a [employer] ID.  This will be your [employer] login ID and cannot be changed, so think of one that is secure and easy to remember. 6. Create a [employer] password. You can change your password at any time. 7. Enter your Password Reset Question and Answer. This can be used at a later time if you forget your password. 8. Enter your e-mail address. You will receive e-mail notification when new information is available in [address] 9. Click Sign Up. You can now view and download portions of your medical record. 10. Click the Download Summary menu link to download a portable copy of your medical information. If you have questions, please visit the Frequently Asked Questions section of the [employer] website. Remember, [employer] is NOT to be used for urgent needs. For medical emergencies, dial 911. Now available from your iPhone and Android! Please provide this summary of care documentation to your next provider. Your primary care clinician is listed as Kyle Cardona. If you have any questions after today's visit, please call [phone].

## 2017-05-09 NOTE — PROGRESS NOTES
Nereyda Hough    Chief Complaint   Patient presents with    Wound Check       History and Physical    Ms. Raulito Velazquez returns to our office for continued management of her right leg wound after undergoing a right popliteal thrombectomy. She developed swelling in her leg after her procedure and subsequently her incision dehisced. Wound care was arranged at that time but the patient had difficulty coordinating her home health visits and then developed a wound infection. She has now recently been discharged home. She still has not been set up with home health. She has no new complaints. Past Medical History:   Diagnosis Date    Abdominal pain     Anemia NEC     sickle cell trait    C. difficile colitis     Crohn's disease ([facility])     Gastrointestinal disorder     Crohns    Herpes zoster     Hx of cocaine abuse     Hyperkalemia     Hypertension     Iron deficiency anemia     MRSA (methicillin resistant Staphylococcus aureus)     Obesity     Pain management     Sickle cell trait ([facility])     Stroke ([facility]     + cva 3/17    Thromboembolus [facility])      Patient Active Problem List   Diagnosis Code    Crohn disease ([facility] K50.90    [facility] D57.3    Hypertension I10    Hx of cocaine abuse A21.261    Embolic stroke ([facility] S72.2    Neuropathic pain M79.2    DVT (deep venous thrombosis) ([facility]) I82.409    GI bleed K92.2    PAD (peripheral artery disease) ([facility]) I73.9    Wound infection T14.8, L08.9    Popliteal artery thrombosis, right ([facility]) I74.3    Pure hypercholesterolemia E78.00     Past Surgical History:   Procedure Laterality Date    COLONOSCOPY N/A 3/17/2017    COLONOSCOPY; BIOPSY; performed by Yahir Donnelly MD at [facility] ENDOSCOPY    HX GYN      tubal ligation    HX TUBAL LIGATION      VASCULAR SURGERY PROCEDURE UNLIST       Current Outpatient Prescriptions   Medication Sig Dispense Refill    clindamycin (CLEOCIN) 300 mg capsule Take 2 Caps by mouth three (3) times daily. 42 Cap 0    diphenhydrAMINE (BENADRYL) 25 mg capsule Take 1 Cap by mouth every six (6) hours as needed for Itching for up to 10 days. 30 Cap 0    HYDROmorphone (DILAUDID) 2 mg tablet Take 1-2 Tabs by mouth every four (4) hours as needed. Max Daily Amount: 24 mg. 40 Tab 0    warfarin (COUMADIN) 7.5 mg tablet Take 1 Tab by mouth daily. 10 Tab 0    carvedilol (COREG) 3.125 mg tablet Take 1 Tab by mouth two (2) times daily (with meals). 60 Tab 0    atorvastatin (LIPITOR) 80 mg tablet Take 1 Tab by mouth nightly. 30 Tab 2    gabapentin (NEURONTIN) 400 mg capsule Take 1 Cap by mouth three (3) times daily. 90 Cap 2    mesalamine (PENTASA) 500 mg CR capsule Take 2 Caps by mouth four (4) times daily. Indications: CROHN'S DISEASE 120 Cap 0    predniSONE (DELTASONE) 20 mg tablet Take 2 Tabs by mouth daily (with breakfast). Indications: CROHN'S DISEASE 20 Tab 0     Allergies   Allergen Reactions    Humira [Adalimumab] Other (comments)    Remicade [Infliximab] Palpitations     Social History     Social History    Marital status:      Spouse name: N/A    Number of children: N/A    Years of education: N/A     Occupational History    Not on file. Social History Main Topics    Smoking status: Former Smoker    Smokeless tobacco: Never Used    Alcohol use No    Drug use: No    Sexual activity: Yes     Partners: Male     Birth control/ protection: Surgical     Other Topics Concern    Not on file     Social History Narrative      History reviewed. No pertinent family history. Review of Systems    Review of Systems   Constitutional: Negative for chills, diaphoresis, fever, malaise/fatigue and weight loss. HENT: Negative for hearing loss and sore throat. Eyes: Negative for blurred vision, photophobia and redness. Respiratory: Negative for cough, hemoptysis, shortness of breath and wheezing. Cardiovascular: Positive for leg swelling. Negative for chest pain, palpitations and orthopnea. Gastrointestinal: Negative for abdominal pain, blood in stool, constipation, diarrhea, heartburn, nausea and vomiting. Genitourinary: Negative for dysuria, frequency, hematuria and urgency. Musculoskeletal: Positive for myalgias. Negative for back pain. Skin: Negative for itching and rash. Neurological: Negative for dizziness, speech change, focal weakness, weakness and headaches. Endo/Heme/Allergies: Does not bruise/bleed easily. Psychiatric/Behavioral: Negative for depression and suicidal ideas. Physical Exam:    Visit Vitals    BP (!) 146/98    Pulse 90    Resp 20    Ht 5' 6\" (1.676 m)    Wt 202 lb (91.6 kg)    LMP 04/06/2017 (Exact Date)    BMI 32.6 kg/m2      Physical Examination: General appearance - alert, well appearing, and in no distress  Mental status - alert, oriented to person, place, and time  Eyes - sclera anicteric, left eye normal, right eye normal  Ears - right ear normal, left ear normal  Nose - normal and patent, no erythema, discharge or polyps  Mouth - mucous membranes moist, pharynx normal without lesions  Musculoskeletal - Right leg wound without drainage. Decreased size. No erythema. +DP pulse. Impression and Plan:    ICD-10-CM ICD-9-CM    1. PAD (peripheral artery disease) ([facility]) I73.9 443.9    2. Leg swelling M79.89 729.81    3. Postoperative wound dehiscence, sequela T81.31XS 909.3      I placed aquacell in her right leg wound. I will have her return for a nursing visit on Friday and Monday for dressing changes. She will then see me again on Wednesday. If the wound appears to be progressing as expected, I will then instruct her on how to pack her wound and keep her on weekly follow ups until we are satisfied with how she is doing with her wound. Follow-up Disposition:  Return in about 1 week (around 5/16/2017) for Wound check. The treatment plan was reviewed with the patient in detail.   The patient voiced understanding of this plan and all questions and concerns were addressed. The patient agrees with this plan. We discussed the signs and symptoms that would require earlier attention or intervention. The patient was given educational material related to his/her visit and the patient has voiced understanding of the material.     I appreciate the opportunity to participate in the care of your patient. I will be sure to keep you informed of any subsequent changes in the treatment plan. If you have any questions or concerns, please feel free to contact me. Ariel Upton MD    PLEASE NOTE:  This document has been produced using voice recognition software. Unrecognized errors in transcription may be present.

## 2017-05-23 ENCOUNTER — APPOINTMENT (OUTPATIENT)
Dept: CT IMAGING | Age: 47
End: 2017-05-23
Attending: PHYSICIAN ASSISTANT
Payer: MEDICAID

## 2017-05-23 ENCOUNTER — APPOINTMENT (OUTPATIENT)
Dept: MRI IMAGING | Age: 47
End: 2017-05-23
Attending: PHYSICIAN ASSISTANT
Payer: MEDICAID

## 2017-05-23 ENCOUNTER — HOSPITAL ENCOUNTER (EMERGENCY)
Age: 47
Discharge: HOME | End: 2017-05-23
Attending: EMERGENCY MEDICINE | Admitting: EMERGENCY MEDICINE
Payer: MEDICAID

## 2017-05-23 VITALS
TEMPERATURE: 98.8 F | RESPIRATION RATE: 11 BRPM | OXYGEN SATURATION: 100 % | DIASTOLIC BLOOD PRESSURE: 88 MMHG | SYSTOLIC BLOOD PRESSURE: 137 MMHG | HEIGHT: 66 IN | BODY MASS INDEX: 28.12 KG/M2 | WEIGHT: 175 LBS | HEART RATE: 71 BPM

## 2017-05-23 DIAGNOSIS — R20.2 PARESTHESIA: Primary | ICD-10-CM

## 2017-05-23 DIAGNOSIS — R79.1 SUBTHERAPEUTIC INTERNATIONAL NORMALIZED RATIO (INR): ICD-10-CM

## 2017-05-23 LAB
ANION GAP BLD CALC-SCNC: 5 MMOL/L (ref 3–18)
APTT PPP: 32.6 SEC (ref 23–36.4)
ATRIAL RATE: 69 BPM
BASOPHILS # BLD AUTO: 0 K/UL (ref 0–0.06)
BASOPHILS # BLD: 0 % (ref 0–2)
BUN SERPL-MCNC: 11 MG/DL (ref 7–18)
BUN/CREAT SERPL: 16 (ref 12–20)
CALCIUM SERPL-MCNC: 8.6 MG/DL (ref 8.5–10.1)
CALCULATED P AXIS, ECG09: 23 DEGREES
CALCULATED R AXIS, ECG10: 13 DEGREES
CALCULATED T AXIS, ECG11: 22 DEGREES
CHLORIDE SERPL-SCNC: 104 MMOL/L (ref 100–108)
CO2 SERPL-SCNC: 31 MMOL/L (ref 21–32)
CREAT SERPL-MCNC: 0.67 MG/DL (ref 0.6–1.3)
DIAGNOSIS, 93000: NORMAL
DIFFERENTIAL METHOD BLD: ABNORMAL
EOSINOPHIL # BLD: 0.3 K/UL (ref 0–0.4)
EOSINOPHIL NFR BLD: 4 % (ref 0–5)
ERYTHROCYTE [DISTWIDTH] IN BLOOD BY AUTOMATED COUNT: 14.2 % (ref 11.6–14.5)
GLUCOSE BLD STRIP.AUTO-MCNC: 91 MG/DL (ref 70–110)
GLUCOSE SERPL-MCNC: 91 MG/DL (ref 74–99)
HCT VFR BLD AUTO: 30.9 % (ref 35–45)
HGB BLD-MCNC: 9.7 G/DL (ref 12–16)
INR PPP: 1.4 (ref 0.8–1.2)
LYMPHOCYTES # BLD AUTO: 24 % (ref 21–52)
LYMPHOCYTES # BLD: 1.7 K/UL (ref 0.9–3.6)
MCH RBC QN AUTO: 25.1 PG (ref 24–34)
MCHC RBC AUTO-ENTMCNC: 31.4 G/DL (ref 31–37)
MCV RBC AUTO: 80.1 FL (ref 74–97)
MONOCYTES # BLD: 0.6 K/UL (ref 0.05–1.2)
MONOCYTES NFR BLD AUTO: 9 % (ref 3–10)
NEUTS SEG # BLD: 4.4 K/UL (ref 1.8–8)
NEUTS SEG NFR BLD AUTO: 63 % (ref 40–73)
P-R INTERVAL, ECG05: 168 MS
PLATELET # BLD AUTO: 339 K/UL (ref 135–420)
PMV BLD AUTO: 9.3 FL (ref 9.2–11.8)
POTASSIUM SERPL-SCNC: 3.7 MMOL/L (ref 3.5–5.5)
PROTHROMBIN TIME: 16.4 SEC (ref 11.5–15.2)
Q-T INTERVAL, ECG07: 420 MS
QRS DURATION, ECG06: 90 MS
QTC CALCULATION (BEZET), ECG08: 450 MS
RBC # BLD AUTO: 3.86 M/UL (ref 4.2–5.3)
SODIUM SERPL-SCNC: 140 MMOL/L (ref 136–145)
VENTRICULAR RATE, ECG03: 69 BPM
WBC # BLD AUTO: 7 K/UL (ref 4.6–13.2)

## 2017-05-23 PROCEDURE — 80048 BASIC METABOLIC PNL TOTAL CA: CPT | Performed by: PHYSICIAN ASSISTANT

## 2017-05-23 PROCEDURE — 85610 PROTHROMBIN TIME: CPT | Performed by: PHYSICIAN ASSISTANT

## 2017-05-23 PROCEDURE — 93005 ELECTROCARDIOGRAM TRACING: CPT

## 2017-05-23 PROCEDURE — 70450 CT HEAD/BRAIN W/O DYE: CPT

## 2017-05-23 PROCEDURE — 85730 THROMBOPLASTIN TIME PARTIAL: CPT | Performed by: PHYSICIAN ASSISTANT

## 2017-05-23 PROCEDURE — 70551 MRI BRAIN STEM W/O DYE: CPT

## 2017-05-23 PROCEDURE — 85025 COMPLETE CBC W/AUTO DIFF WBC: CPT | Performed by: PHYSICIAN ASSISTANT

## 2017-05-23 PROCEDURE — 99285 EMERGENCY DEPT VISIT HI MDM: CPT

## 2017-05-23 PROCEDURE — 82962 GLUCOSE BLOOD TEST: CPT

## 2017-05-23 NOTE — ED TRIAGE NOTES
Pt reports onset of left hand numbness and right leg numbness around midnight. Pt reports that when she woke up this morning, \"I was drooling in my mouth. \" Pt w/ hx of CVA in March 2017. Pt also reports nausea, headache (8/10, aching), and some blurred vision. Pt discharged approximately 2 weeks ago from [facility] for leg infection. Dr. Braeden Pearson and Deborah Harris [place] remains at beside. Sepsis Screening completed    (  )Patient meets SIRS criteria. ( X )Patient does not meet SIRS criteria.       SIRS Criteria is achieved when two or more of the following are present   Temperature < 96.8°F (36°C) or > 100.9°F (38.3°C)   Heart Rate > 90 beats per minute   Respiratory Rate > 20 breaths per minute   WBC count > 12,000 or <4,000 or > 10% bands

## 2017-05-23 NOTE — ED NOTES
Pt resting in stretcher in NAD. Warm blankets provided and lights dimmed for pt comfort. Side rails raised x 2 and call light within reach.

## 2017-05-23 NOTE — ED NOTES
Discharge instructions reviewed with the patient with opportunity for questions given. The patient verbalized understanding. Patient armband removed and shredded. Patient in stable condition.

## 2017-05-23 NOTE — ED NOTES
Lab to CT to attempt to draw labs. Pt reports hx of poor vascular access, states that she has had a PICC and central line in the past. Greater than 3 attempts by this RN and lab to obtain blood and vascular access. Will attempt again post Teleneurology consult.

## 2017-05-23 NOTE — ED PROVIDER NOTES
[address]  EMERGENCY DEPARTMENT HISTORY AND PHYSICAL EXAM       Date: 5/23/2017   Patient Name: Dmitry Porter   YOB: 1970  Medical Record Number: [id number]    History of Presenting Illness     Chief Complaint   Patient presents with    Extremity Weakness    Numbness        History Provided By:  patient    Additional History:   9:01 AM  Dmitry Porter is a 40 y.o. female with PMHX Crohn's disease (controlled with Entyvio) and stroke (1.5 months ago, left with residual bilateral facial weakness and left hand weakness, but no numbness) presenting to the ED C/O constant left facial droop that caused pt to drool on the left side, onset 1 hour ago. Associated sxs include HA and constant RLE and LUE numbness x3 hours. Pt states her sxs today are similar to sxs she had during her stroke 1.5 months ago. Pt is on Coumadin. Pt denies PMHX A-fib, and any other symptoms or complaints. Primary Care Provider: Melchor Dan MD   Specialist:    Past History     Past Medical History:   Past Medical History:   Diagnosis Date    Abdominal pain     Anemia NEC     sickle cell trait    C. difficile colitis     [facility] [address]     Gastrointestinal disorder     Crohns    Herpes zoster     Hx of cocaine abuse     Hyperkalemia     Hypertension     Iron deficiency anemia     MRSA (methicillin resistant Staphylococcus aureus)     Obesity     Pain management     Sick[facility] cell trait ([facility] [address]     Stroke ([facility] [address]     + cva 3/17    Thromboembolus [facility])         Past Surgical History:   Past Surgical History:   Procedure Laterality Date    COLONOSCOPY N/A 3/17/2017    COLONOSCOPY; BIOPSY; [facility] Shadi Dunbar [facility]    HX GYN      tubal ligation    HX TUBAL LIGATION      VASCULAR SURGERY PROCEDURE UNLIST          Family History:   History reviewed. No pertinent family history.      Social History:   Social History   Substance Use Topics    Smoking status: Former Smoker    Smokeless tobacco: Never Used    Alcohol use No        Allergies: Allergies   Allergen Reactions    Humira [Adalimumab] Other (comments)    Remicade [Infliximab] Palpitations        Review of Systems   Review of Systems   Constitutional: Negative for fever. Neurological: Positive for facial asymmetry (drooling on the left side), weakness, numbness (left hand, right LE) and headaches. All other systems reviewed and are negative. Physical Exam  Vitals:    05/23/17 1100 05/23/17 1130 05/23/17 1200 05/23/17 1428   BP: (!) 148/95 (!) 129/98 (!) 138/91 137/88   Pulse: 71 69 70 71   Resp: 11 15 12 11   Temp:       SpO2: 100% 100% 100% 100%   Weight:       Height:           Physical Exam   Nursing note and vitals reviewed. Vital signs and nursing notes reviewed. CONSTITUTIONAL: Alert. Well-appearing; well-nourished; in no apparent distress. HEAD: Normocephalic; atraumatic. EYES: PERRL; EOM's intact. No nystagmus. Conjunctiva clear. ENT: Normal nose; no rhinorrhea. Normal pharynx. Moist mucus membranes. NECK: Supple; FROM without difficulty, non-tender; no cervical lymphadenopathy. CV: Normal S1, S2; no murmurs, rubs, or gallops. No chest wall tenderness. RESPIRATORY: Normal chest excursion with respiration; breath sounds clear and equal bilaterally; no wheezes, rhonchi, or rales. EXT: Normal ROM in all four extremities; non-tender to palpation. SKIN: Normal for age and race; warm; dry; good turgor; no apparent lesions or exudate. NEURO: A & O x3. Cranial nerves 2-12 intact, except for slight left corner of mouth facial droop. Motor 5/5 bilaterally. Sensation intact. Normal finger to nose. PSYCH:  Mood and affect appropriate. Diagnostic Study Results     Labs -      No results found for this or any previous visit (from the past 12 hour(s)).     Radiologic Studies -  CT HEAD WO CONT   Final Result   Increasing prominence of the low density changes throughout the right MCA  territory mostly in the parietal lobe compared to the prior study of 3/28/2017. These represented acute infarcts on the prior MRI of 3/10/2017. Acute extension  of previous infarcts not excluded. There is no hemorrhage. Multifocal lacunar infarcts are seen in the cerebellar hemispheres also seen on  previous MRI. CODE S report given to Trenton JEFFERSON at the time of the report. As read by the radiologist.       MRI BRAIN WO CONT   Final Result   1. No evidence of acute infarct. Expected evolution of now chronic multifocal  infarcts in the right cerebral hemisphere in the bilateral cerebellar  hemispheres which were acute on 3/10/2017 MRI. No evidence of associated  hemorrhage or mass effect.     2. Stable chronic right orbital floor fracture with inferior herniation of  orbital fat. Stable associated mild right-sided enophthalmos. As read by the radiologist.      Medical Decision Making   I am the first provider for this patient. I reviewed the vital signs, available nursing notes, past medical history, past surgical history, family history and social history. Vital Signs-Reviewed the patient's vital signs. No data found. Pulse Oximetry Analysis - Normal 100% on RA. No intervention needed. Cardiac Monitor:   Rate: 71 bpm  Rhythm: Normal Sinus Rhythm     EKG interpretation: (Preliminary)  10:57 AM  69 bpm, NSR, septal infarct, age undetermined. No change from 3/27/17  EKG read by Trenton Flynn PA-C     Old Medical Records: Old medical records. Review of hospitalization on 3/2017. Showed she presented to ED with left sided weakness and facial droop approximately 10 hours PTA. She had and MRI of the brain which showed multifocal acute infarcts, the largest in the right MCA territory, bilateral left greater than right cerebellar hemisphere infarcts likely embolic with a central origin.  MRI of the head and neck showed occlusion of the inferior division right M2 segment MCA correlating with acute infarct. LAURIE normal. ECHO showed EF of 60-65%. Her post-hospitalization course has been complicated by a GI bleed/Crohns flare. Anti-coagulation was held and received blood transfusion. She was also noted to have right leg pain and a right leg arterial duplex showed a >75% stenosis of the anterior right tibial artery and right popliteal occlusions requiring thrombectomy on 4/6/17. For her Crohns disease, GI gave dose of methotrexate, started her on steroids Mesalmine and Protonix. She was also admitted once more a month ago for post-op wound infection of her right leg. Received IV abx and she was discharged home on Clindamycin. Procedures:   Bedside US  Date/Time: 5/23/2017 10:05 AM  Consent: Verbal consent obtained. Consent given by: patient  Procedure Type: Ultrasound guided procedure  Indication: peripheral IV access and lab draw. Comments: Bedside US used in transverse plane in the left just proximal to the left AC. 20 Gauge angio cath placed, labs obtained. Successfully flushed with 10 ccs saline. No swelling or pain. Skin cleaned with Chloroprep. Pt tolerated well. Sterile cap placed and secured with tegaderm. ED Course:     PROGRESS NOTE:   9:01 AM  Initial assessment performed. Code S called. 9:01 AM FACE TO FACE  ED ATTENDING NOTE: i was involved in the evaluation and management decisions from the onset. I have reviewed the documentation provided by the [facility], discussed their findings, clinical impression, and the proposed management plans with regards to this patient's encounter. I have personally evaluated the patient in the emergency department to verify the history and confirm physical findings. I have also reviewed the pertinent lab and/or radiology studies up to this point.   Based on this evaluation my clinical impression is that of a patient presenting with h/o awaking with left hand numbness and right leg numbness with left sided drooling. Although the sx;s do not follow any specific CNS pattern her h/o having embolic strokes suggest that these findings may be c/w resolution of the prior strokes. That said, new involvement should be considered. Recommend obtaining a MRI and if there are no evidence of acute strokes, can discharge the patient. Isai Patel MD      CONSULT NOTE:   9:06 AM  Jaleesa Flaherty PA-C spoke with Iveth Ortega MD   Specialty: Teleneurology  Discussed pt's hx, disposition, and available diagnostic and imaging results over the telephone. Reviewed care plans. Consulting physician agrees with plans as outlined. Agrees to consult on pt. CONSULT NOTE:   9:32 AM  Jaleesa Flaherty PA-C spoke with Dr. Sandra Aguilera   Specialty: Radiology  Discussed pt's hx, disposition, and available diagnostic and imaging results over the telephone. Reviewed care plans. Consulting physician agrees with plans as outlined. States he reviewed CT. He does see evidence of old strokes. He sees there is a scattered hypodensities in the right MCA territory, which looks a little worse than her previous CT, but cannot tell if this is new or evolution/extension of her previous stroke. There's no edema, mass effect, or bleeding. CONSULT NOTE:   9:35 AM  Jaleesa Flaherty PA-C spoke with Iveth Ortega MD   Specialty: Teleneurology  Discussed pt's hx, disposition, and available diagnostic and imaging results over the telephone. Reviewed care plans. Consulting physician agrees with plans as outlined. Evaluated pt. He states that this does not appear to be a new stroke. It could be sequela of previous stroke, doubt seizure, possible BP effect as she is hypertensive. Recommends labs, check INR, address BP if needed, reassure pt, but expects to be able to d/c home. Does not recommend further stroke work up.  Will cancel Code S.     CONSULT NOTE:   9:40 AM  Jaleesa Flaherty PA-C spoke with Violetta Vázquez MD   Specialty: Emergency Medicine  Discussed pt's hx, disposition, and available diagnostic and imaging results. Reviewed care plans. Consulting physician agrees with plans as outlined. Aware of teleneurologist's recommendations, but given that CT shows slight expansion of area of old infarct, would still like MRI to r/o acute stroke vs expected evolution of old stroke. If negative, d/c home. PROGRESS NOTE:   10:05 AM  Multiple attempts by RN and lab. I will attempt to obtain IV access through [address] guidance. PROGRESS NOTE:   10:46 AM  IV access obtained. Blood work drawn. Medications Given in the ED:  Medications - No data to display     Discharge Note:  2:19 PM   Patients results have been reviewed with them. Patient and/or family have verbally conveyed their understanding and agreement of the patient's signs, symptoms, diagnosis, treatment and prognosis and additionally agree to follow up as recommended or return to the Emergency Room should their condition change prior to their follow-up appointment. Patient verbally agrees with the care-plan and verbally conveys that all of their questions have been answered. Discharge instructions have also been provided to the patient with some educational information regarding their diagnosis as well a list of reasons why they would want to return to the ER prior to their follow-up appointment should their condition change. She also states she took her last Coumadin pill today, but has an appt with her PCP tomorrow, at which time she will request refill and need for increased dose to get to therapeutic levels. Diagnosis   Clinical Impression:   1. Paresthesia    2.  Subtherapeutic international normalized ratio (INR)         Follow-up Information     Follow up With Details Comments Contact Info    Wilfred Mendoza MD Schedule an appointment as soon as possible for a visit in 2 days  [address] [facility] EMERGENCY DEPT  As needed, If symptoms worsen [address] [place] [address]  [phone]          Discharge Medication List as of 5/23/2017  2:16 PM          _______________________________   Attestations:     SCRIBE ATTESTATION:  This note is prepared by Mu Ritchie, acting as Scribe for Yakelin Connelly PA-C.     PROVIDER ATTESTATION:  Yakelin Connelly PA-C: The scribe's documentation has been prepared under my direction and personally reviewed by me in its entirety.  I confirm that the note above accurately reflects all work, treatment, procedures, and medical decision making performed by me.   _______________________________

## 2017-05-23 NOTE — ED NOTES
Pt requesting pain medication. Pt states, \"my head is killing me and my leg hurts, I just need something. \" Provider aware. No orders to be placed at this time. Lights dimmed for pt comfort. Pt remains on CCM. Pt in NAD on cell phone at this time.

## 2017-05-23 NOTE — DISCHARGE INSTRUCTIONS
Numbness and Tingling: Care Instructions  Your Care Instructions  Many things can cause numbness or tingling. Swelling may put pressure on a nerve. This could cause you to lose feeling or have a pins-and-needles sensation on part of your body. Nerves may be damaged from trauma, toxins, or diseases, such as diabetes or multiple sclerosis (MS). Sometimes, though, the cause is not clear. If there is no clear reason for your symptoms, and you are not having any other symptoms, your doctor may suggest watching and waiting for a while to see if the numbness or tingling goes away on its own. Your doctor may want you to have blood or nerve tests to find the cause of your symptoms. Follow-up care is a key part of your treatment and safety. Be sure to make and go to all appointments, and call your doctor if you are having problems. It's also a good idea to know your test results and keep a list of the medicines you take. How can you care for yourself at home? · If your doctor prescribes medicine, take it exactly as directed. Call your doctor if you think you are having a problem with your medicine. · If you have any swelling, put ice or a cold pack on the area for 10 to 20 minutes at a time. Put a thin cloth between the ice and your skin. When should you call for help? Call 911 anytime you think you may need emergency care. For example, call if:  · You have weakness, numbness, or tingling in both legs. · You lose bowel or bladder control. · You have symptoms of a stroke. These may include:  ¨ Sudden numbness, tingling, weakness, or loss of movement in your face, arm, or leg, especially on only one side of your body. ¨ Sudden vision changes. ¨ Sudden trouble speaking. ¨ Sudden confusion or trouble understanding simple statements. ¨ Sudden problems with walking or balance. ¨ A sudden, severe headache that is different from past headaches.   Watch closely for changes in your health, and be sure to contact your doctor if you have any problems, or if:  · You do not get better as expected. Where can you learn more? Go to http://johan-maria del carmen.info/. Enter K771 in the search box to learn more about \"Numbness and Tingling: Care Instructions. \"  Current as of: October 14, 2016  Content Version: 11.2  © 1922-4059 [employer]. Care instructions adapted under license by [employer] (which disclaims liability or warranty for this information). If you have questions about a medical condition or this instruction, always ask your healthcare professional. [place] [address] any warranty or liability for your use of this information.

## 2017-06-20 ENCOUNTER — HOSPITAL ENCOUNTER (EMERGENCY)
Age: 47
Discharge: HOME | End: 2017-06-20
Attending: EMERGENCY MEDICINE
Payer: MEDICAID

## 2017-06-20 VITALS
DIASTOLIC BLOOD PRESSURE: 93 MMHG | TEMPERATURE: 97.2 F | HEART RATE: 89 BPM | RESPIRATION RATE: 16 BRPM | SYSTOLIC BLOOD PRESSURE: 161 MMHG | HEIGHT: 66 IN | BODY MASS INDEX: 32.47 KG/M2 | OXYGEN SATURATION: 99 % | WEIGHT: 202 LBS

## 2017-06-20 DIAGNOSIS — N39.0 UTI (URINARY TRACT INFECTION), UNCOMPLICATED: ICD-10-CM

## 2017-06-20 DIAGNOSIS — K50.90 CROHN'S DISEASE WITHOUT COMPLICATION, UNSPECIFIED GASTROINTESTINAL TRACT LOCATION (HCC): ICD-10-CM

## 2017-06-20 DIAGNOSIS — R19.7 DIARRHEA, UNSPECIFIED TYPE: ICD-10-CM

## 2017-06-20 DIAGNOSIS — B37.2 YEAST DERMATITIS: Primary | ICD-10-CM

## 2017-06-20 LAB
ALBUMIN SERPL BCP-MCNC: 3.5 G/DL (ref 3.4–5)
ALBUMIN/GLOB SERPL: 0.7 {RATIO} (ref 0.8–1.7)
ALP SERPL-CCNC: 144 U/L (ref 45–117)
ALT SERPL-CCNC: 38 U/L (ref 13–56)
ANION GAP BLD CALC-SCNC: 9 MMOL/L (ref 3–18)
APPEARANCE UR: ABNORMAL
AST SERPL W P-5'-P-CCNC: 34 U/L (ref 15–37)
BACTERIA URNS QL MICRO: ABNORMAL /HPF
BASOPHILS # BLD AUTO: 0 K/UL (ref 0–0.06)
BASOPHILS # BLD: 0 % (ref 0–2)
BILIRUB SERPL-MCNC: 0.3 MG/DL (ref 0.2–1)
BILIRUB UR QL: NEGATIVE
BUN SERPL-MCNC: 17 MG/DL (ref 7–18)
BUN/CREAT SERPL: 22 (ref 12–20)
CALCIUM SERPL-MCNC: 9.2 MG/DL (ref 8.5–10.1)
CHLORIDE SERPL-SCNC: 103 MMOL/L (ref 100–108)
CO2 SERPL-SCNC: 28 MMOL/L (ref 21–32)
COLOR UR: YELLOW
CREAT SERPL-MCNC: 0.79 MG/DL (ref 0.6–1.3)
DIFFERENTIAL METHOD BLD: ABNORMAL
EOSINOPHIL # BLD: 1.4 K/UL (ref 0–0.4)
EOSINOPHIL NFR BLD: 13 % (ref 0–5)
EPITH CASTS URNS QL MICRO: ABNORMAL /LPF (ref 0–5)
ERYTHROCYTE [DISTWIDTH] IN BLOOD BY AUTOMATED COUNT: 15.3 % (ref 11.6–14.5)
GLOBULIN SER CALC-MCNC: 4.7 G/DL (ref 2–4)
GLUCOSE SERPL-MCNC: 87 MG/DL (ref 74–99)
GLUCOSE UR STRIP.AUTO-MCNC: NEGATIVE MG/DL
HCT VFR BLD AUTO: 35.6 % (ref 35–45)
HGB BLD-MCNC: 10.9 G/DL (ref 12–16)
HGB UR QL STRIP: ABNORMAL
INR PPP: 1.3 (ref 0.8–1.2)
KETONES UR QL STRIP.AUTO: NEGATIVE MG/DL
LEUKOCYTE ESTERASE UR QL STRIP.AUTO: ABNORMAL
LYMPHOCYTES # BLD AUTO: 26 % (ref 21–52)
LYMPHOCYTES # BLD: 2.8 K/UL (ref 0.9–3.6)
MCH RBC QN AUTO: 23.9 PG (ref 24–34)
MCHC RBC AUTO-ENTMCNC: 30.6 G/DL (ref 31–37)
MCV RBC AUTO: 78.1 FL (ref 74–97)
MONOCYTES # BLD: 0.8 K/UL (ref 0.05–1.2)
MONOCYTES NFR BLD AUTO: 7 % (ref 3–10)
NEUTS SEG # BLD: 5.8 K/UL (ref 1.8–8)
NEUTS SEG NFR BLD AUTO: 54 % (ref 40–73)
NITRITE UR QL STRIP.AUTO: NEGATIVE
PH UR STRIP: 5.5 [PH] (ref 5–8)
PLATELET # BLD AUTO: 468 K/UL (ref 135–420)
PMV BLD AUTO: 9.3 FL (ref 9.2–11.8)
POTASSIUM SERPL-SCNC: 3.5 MMOL/L (ref 3.5–5.5)
PROT SERPL-MCNC: 8.2 G/DL (ref 6.4–8.2)
PROT UR STRIP-MCNC: NEGATIVE MG/DL
PROTHROMBIN TIME: 15.8 SEC (ref 11.5–15.2)
RBC # BLD AUTO: 4.56 M/UL (ref 4.2–5.3)
RBC #/AREA URNS HPF: ABNORMAL /HPF (ref 0–5)
SODIUM SERPL-SCNC: 140 MMOL/L (ref 136–145)
SP GR UR REFRACTOMETRY: 1.02 (ref 1–1.03)
UROBILINOGEN UR QL STRIP.AUTO: 0.2 EU/DL (ref 0.2–1)
WBC # BLD AUTO: 10.7 K/UL (ref 4.6–13.2)
WBC URNS QL MICRO: ABNORMAL /HPF (ref 0–5)

## 2017-06-20 PROCEDURE — 80053 COMPREHEN METABOLIC PANEL: CPT | Performed by: PHYSICIAN ASSISTANT

## 2017-06-20 PROCEDURE — 81001 URINALYSIS AUTO W/SCOPE: CPT | Performed by: PHYSICIAN ASSISTANT

## 2017-06-20 PROCEDURE — 85610 PROTHROMBIN TIME: CPT | Performed by: PHYSICIAN ASSISTANT

## 2017-06-20 PROCEDURE — 85025 COMPLETE CBC W/AUTO DIFF WBC: CPT | Performed by: PHYSICIAN ASSISTANT

## 2017-06-20 PROCEDURE — 74011250636 HC RX REV CODE- 250/636: Performed by: PHYSICIAN ASSISTANT

## 2017-06-20 PROCEDURE — 96374 THER/PROPH/DIAG INJ IV PUSH: CPT

## 2017-06-20 PROCEDURE — 99282 EMERGENCY DEPT VISIT SF MDM: CPT

## 2017-06-20 PROCEDURE — 96361 HYDRATE IV INFUSION ADD-ON: CPT

## 2017-06-20 PROCEDURE — 74011250637 HC RX REV CODE- 250/637: Performed by: PHYSICIAN ASSISTANT

## 2017-06-20 RX ORDER — FLUCONAZOLE 100 MG/1
200 TABLET ORAL
Status: COMPLETED | OUTPATIENT
Start: 2017-06-20 | End: 2017-06-20

## 2017-06-20 RX ORDER — ONDANSETRON 2 MG/ML
4 INJECTION INTRAMUSCULAR; INTRAVENOUS
Status: COMPLETED | OUTPATIENT
Start: 2017-06-20 | End: 2017-06-20

## 2017-06-20 RX ORDER — HYDROCODONE BITARTRATE AND ACETAMINOPHEN 5; 325 MG/1; MG/1
1 TABLET ORAL
Qty: 10 TAB | Refills: 0 | Status: SHIPPED | OUTPATIENT
Start: 2017-06-20 | End: 2017-07-15 | Stop reason: ALTCHOICE

## 2017-06-20 RX ORDER — NYSTATIN 100000 U/G
CREAM TOPICAL 2 TIMES DAILY
Qty: 15 G | Refills: 0 | Status: SHIPPED | OUTPATIENT
Start: 2017-06-20 | End: 2017-07-31

## 2017-06-20 RX ORDER — CEPHALEXIN 500 MG/1
500 CAPSULE ORAL 4 TIMES DAILY
COMMUNITY
End: 2017-07-15

## 2017-06-20 RX ORDER — HYDROCODONE BITARTRATE AND ACETAMINOPHEN 5; 325 MG/1; MG/1
2 TABLET ORAL
Status: COMPLETED | OUTPATIENT
Start: 2017-06-20 | End: 2017-06-20

## 2017-06-20 RX ORDER — LISINOPRIL 10 MG/1
10 TABLET ORAL DAILY
COMMUNITY

## 2017-06-20 RX ORDER — NITROFURANTOIN 25; 75 MG/1; MG/1
100 CAPSULE ORAL 2 TIMES DAILY
Qty: 14 CAP | Refills: 0 | Status: SHIPPED | OUTPATIENT
Start: 2017-06-20 | End: 2017-08-23

## 2017-06-20 RX ADMIN — HYDROCODONE BITARTRATE AND ACETAMINOPHEN 2 TABLET: 5; 325 TABLET ORAL at 14:48

## 2017-06-20 RX ADMIN — FLUCONAZOLE 200 MG: 100 TABLET ORAL at 13:55

## 2017-06-20 RX ADMIN — ONDANSETRON 4 MG: 2 INJECTION INTRAMUSCULAR; INTRAVENOUS at 13:55

## 2017-06-20 RX ADMIN — SODIUM CHLORIDE 1000 ML: 900 INJECTION, SOLUTION INTRAVENOUS at 13:54

## 2017-06-20 NOTE — ED TRIAGE NOTES
Pt complaining of chron's fare up with abd pain and diarrhea, also reports white vaginal discharge, rash under breasts, and rash to right lower leg on incision.

## 2017-06-20 NOTE — ED PROVIDER NOTES
[address]  EMERGENCY DEPARTMENT HISTORY AND PHYSICAL EXAM       Date: 6/20/2017   Patient Name: Evelyne Reed   YOB: 1970  Medical Record Number: [id number]    History of Presenting Illness     Chief Complaint   Patient presents with    Abdominal Pain    Diarrhea    Vaginal Discharge    Skin Problem        History Provided By:  patient    Additional History: 1:20 PM  Evelyen Reed is a 56 y.o. female with crohn's who presents to the emergency department C/O nausea and diarrhea for the past week. Pt also mentions vaginal itching and burning, rash around breast and pain at surgical site in left leg. PSHx includes left leg surgery for infected abscess. Pt has been on antibiotics (keflex) due to surgery which is why she has not been taking her steroids for crohn's. Primary Care Provider: Colleen Glover MD   Specialist:    Past History     Past Medical History:   Past Medical History:   Diagnosis Date    Abdominal pain     Anemia NEC     sickle cell trait    C. difficile colitis     [facility] [address]     Gastrointestinal disorder     Crohns    Herpes zoster     Hx of cocaine abuse     Hyperkalemia     Hypertension     Iron deficiency anemia     MRSA (methicillin resistant Staphylococcus aureus)     Obesity     Pain management     Sick[facility] cell trait ([facility] [address]     Stroke ([facility] [address]     + cva 3/17    Thromboembolus [facility])         Past Surgical History:   Past Surgical History:   Procedure Laterality Date    COLONOSCOPY N/A 3/17/2017    COLONOSCOPY; BIOPSY; performed by Jessica Camarena MD at [facility] ENDOSCOPY    HX GYN      tubal ligation    HX TUBAL LIGATION      VASCULAR SURGERY PROCEDURE UNLIST          Family History:   History reviewed. No pertinent family history. Social History:   Social History   Substance Use Topics    Smoking status: Former Smoker    Smokeless tobacco: Never Used    Alcohol use No        Allergies:    Allergies   Allergen Reactions    Humira [Adalimumab] Other (comments)    Remicade [Infliximab] Palpitations        Review of Systems   Review of Systems   Gastrointestinal: Positive for diarrhea and nausea. Musculoskeletal: Positive for myalgias. Skin: Positive for rash and wound. All other systems reviewed and are negative. Physical Exam  Vitals:    06/20/17 1204 06/20/17 1400 06/20/17 1415 06/20/17 1430   BP: (!) 164/104 (!) 128/91 (!) 142/99 (!) 161/93   Pulse: 89      Resp: 16      Temp: 97.2 °F (36.2 °C)      SpO2: 99% 100% 100% 99%   Weight: 91.6 kg (202 lb)      Height: 5' 6\" (1.676 m)          Physical Exam   Constitutional: She is oriented to person, place, and time. She appears well-developed and well-nourished. No distress. Overweight, in NAD   HENT:   Head: Normocephalic and atraumatic. Eyes: Conjunctivae and EOM are normal. Pupils are equal, round, and reactive to light. Neck: Normal range of motion. Neck supple. Cardiovascular: Normal rate and regular rhythm. Pulmonary/Chest: Effort normal and breath sounds normal.   Abdominal: Soft. Bowel sounds are normal. She exhibits no distension. There is no tenderness. There is no rebound and no guarding. Musculoskeletal: Normal range of motion. Neurological: She is alert and oriented to person, place, and time. Skin: Skin is warm and dry. Rash noted. +Pink moistened skin underside of both breasts, no open wounds abscess or lesions noted  +open wound noted RLE with packing in place appears to be healing WNL no exudate erythema streaking or expressible drainage    Psychiatric: She has a normal mood and affect. Her behavior is normal.   Nursing note and vitals reviewed.         Diagnostic Study Results     Labs -      Recent Results (from the past 12 hour(s))   URINALYSIS W/ RFLX MICROSCOPIC    Collection Time: 06/20/17 12:35 PM   Result Value Ref Range    Color YELLOW      Appearance CLOUDY      Specific gravity 1.023 1.005 - 1.030      pH (UA) 5.5 5.0 - 8.0      Protein NEGATIVE  NEG mg/dL    Glucose NEGATIVE  NEG mg/dL    Ketone NEGATIVE  NEG mg/dL    Bilirubin NEGATIVE  NEG      Blood TRACE (A) NEG      Urobilinogen 0.2 0.2 - 1.0 EU/dL    Nitrites NEGATIVE  NEG      Leukocyte Esterase MODERATE (A) NEG     URINE MICROSCOPIC ONLY    Collection Time: 06/20/17 12:35 PM   Result Value Ref Range    WBC 5 to 10 0 - 5 /hpf    RBC 0 to 2 0 - 5 /hpf    Epithelial cells 1+ 0 - 5 /lpf    Bacteria FEW (A) NEG /hpf   CBC WITH AUTOMATED DIFF    Collection Time: 06/20/17  1:20 PM   Result Value Ref Range    WBC 10.7 4.6 - 13.2 K/uL    RBC 4.56 4.20 - 5.30 M/uL    HGB 10.9 (L) 12.0 - 16.0 g/dL    HCT 35.6 35.0 - 45.0 %    MCV 78.1 74.0 - 97.0 FL    MCH 23.9 (L) 24.0 - 34.0 PG    MCHC 30.6 (L) 31.0 - 37.0 g/dL    RDW 15.3 (H) 11.6 - 14.5 %    PLATELET 416 (H) 290 - 420 K/uL    MPV 9.3 9.2 - 11.8 FL    NEUTROPHILS 54 40 - 73 %    LYMPHOCYTES 26 21 - 52 %    MONOCYTES 7 3 - 10 %    EOSINOPHILS 13 (H) 0 - 5 %    BASOPHILS 0 0 - 2 %    ABS. NEUTROPHILS 5.8 1.8 - 8.0 K/UL    ABS. LYMPHOCYTES 2.8 0.9 - 3.6 K/UL    ABS. MONOCYTES 0.8 0.05 - 1.2 K/UL    ABS. EOSINOPHILS 1.4 (H) 0.0 - 0.4 K/UL    ABS. BASOPHILS 0.0 0.0 - 0.06 K/UL    DF AUTOMATED     METABOLIC PANEL, COMPREHENSIVE    Collection Time: 06/20/17  1:20 PM   Result Value Ref Range    Sodium 140 136 - 145 mmol/L    Potassium 3.5 3.5 - 5.5 mmol/L    Chloride 103 100 - 108 mmol/L    CO2 28 21 - 32 mmol/L    Anion gap 9 3.0 - 18 mmol/L    Glucose 87 74 - 99 mg/dL    BUN 17 7.0 - 18 MG/DL    Creatinine 0.79 0.6 - 1.3 MG/DL    BUN/Creatinine ratio 22 (H) 12 - 20      GFR est AA >60 >60 ml/min/1.73m2    GFR est non-AA >60 >60 ml/min/1.73m2    Calcium 9.2 8.5 - 10.1 MG/DL    Bilirubin, total 0.3 0.2 - 1.0 MG/DL    ALT (SGPT) 38 13 - 56 U/L    AST (SGOT) 34 15 - 37 U/L    Alk.  phosphatase 144 (H) 45 - 117 U/L    Protein, total 8.2 6.4 - 8.2 g/dL    Albumin 3.5 3.4 - 5.0 g/dL    Globulin 4.7 (H) 2.0 - 4.0 g/dL    A-G Ratio 0.7 (L) 0.8 - 1.7     PROTHROMBIN TIME + INR    Collection Time: 06/20/17  1:20 PM   Result Value Ref Range    Prothrombin time 15.8 (H) 11.5 - 15.2 sec    INR 1.3 (H) 0.8 - 1.2         Radiologic Studies -  The following have been ordered and reviewed:  No orders to display           Medical Decision Making   I am the first provider for this patient. I reviewed the vital signs, available nursing notes, past medical history, past surgical history, family history and social history. Vital Signs-Reviewed the patient's vital signs. Patient Vitals for the past 12 hrs:   Temp Pulse Resp BP SpO2   06/20/17 1430 - - - (!) 161/93 99 %   06/20/17 1415 - - - (!) 142/99 100 %   06/20/17 1400 - - - (!) 128/91 100 %   06/20/17 1204 97.2 °F (36.2 °C) 89 16 (!) 164/104 99 %     Procedures:   Procedures    ED Course:  1:20 PM  Initial assessment performed. The patients presenting problems have been discussed, and they are in agreement with the care plan formulated and outlined with them. I have encouraged them to ask questions as they arise throughout their visit. 2:37 PM  Pt is feeling better, asking for something for pain and tolerated PO. Her abd is nontender. Wound on leg looks well managed. I discussed importance of follow up with Le Diamond MD. Pt needs to get back on her Crohn's injections. She is stable for discharge. Medications Given in the ED:  Medications   sodium chloride 0.9 % bolus infusion 1,000 mL (1,000 mL IntraVENous New Bag 6/20/17 4720)   HYDROcodone-acetaminophen (NORCO) 5-325 mg per tablet 2 Tab (not administered)   ondansetron (ZOFRAN) injection 4 mg (4 mg IntraVENous Given 6/20/17 1355)   fluconazole (DIFLUCAN) tablet 200 mg (200 mg Oral Given 6/20/17 1355)       Discharge Note:  2:38 PM  Pt has been reexamined. Patient has no new complaints, changes, or physical findings. Care plan outlined and precautions discussed. Results were reviewed with the patient.  All medications were reviewed with the patient; will d/c home. All of pt's questions and concerns were addressed. Patient was instructed and agrees to follow up with PCP, as well as to return to the ED upon further deterioration. Patient is ready to go home. Diagnosis   Clinical Impression:   1. Yeast dermatitis    2. UTI (urinary tract infection), uncomplicated    3. Diarrhea, unspecified type    4. Crohn's disease without complication, unspecified gastrointestinal tract location [facility])         Discussion:    Follow-up Information     Follow up With Details Comments Contact Info    Troy Rider MD Schedule an appointment as soon as possible for a visit in 2 days  [address]       Salbador Mancera MD Schedule an appointment as soon as possible for a visit in 2 days  [address] [facility] EMERGENCY DEPT  As needed, If symptoms worsen [address] [place] [id number] [phone]          Current Discharge Medication List      START taking these medications    Details   nystatin (MYCOSTATIN) topical cream Apply  to affected area two (2) times a day. Qty: 15 g, Refills: 0      nitrofurantoin, macrocrystal-monohydrate, (MACROBID) 100 mg capsule Take 1 Cap by mouth two (2) times a day. Qty: 14 Cap, Refills: 0      HYDROcodone-acetaminophen (NORCO) 5-325 mg per tablet Take 1 Tab by mouth every four (4) hours as needed for Pain. Max Daily Amount: 6 Tabs. Qty: 10 Tab, Refills: 0             _______________________________   Attestations: This note is prepared by Mayo Champion , acting as a Scribe for Yobani De Jesus PA-C  on 1:20 PM on 6/20/2017 . Yobani De Jesus PA-C: The scribe's documentation has been prepared under my direction and personally reviewed by me in its entirety.   _______________________________

## 2017-06-20 NOTE — DISCHARGE INSTRUCTIONS
Candidiasis: Care Instructions  Your Care Instructions  Candidiasis (say \"qnv-ylb-CJ-uh-prasanna\") is a yeast infection. Yeast normally lives in your body. But it can cause problems if your body's defenses don't work as they should. Some medicines can increase your chance of getting a yeast infection. These include antibiotics, steroids, and cancer drugs. And some diseases like AIDS and diabetes can make you more likely to get yeast infections. There are different types of yeast infections. Arvshivani Scrape is a yeast infection in the mouth. It usually occurs in people with weak immune systems. It causes white patches inside the mouth and throat. Yeast infections of the skin usually occur in skin folds where the skin stays moist. They cause red, oozing patches on your skin. Babies can get these infections under the diaper. People who often wear gloves can get them on their hands. Many women get vaginal yeast infections. They are most common when women take antibiotics. These infections can cause the vagina to itch and burn. They also cause white discharge that looks like cottage cheese. In rare cases, yeast infects the blood. This can cause serious disease. This kind of infection is treated with medicine given through a needle into a vein (IV). After you start treatment, a yeast infection usually goes away quickly. But if your immune system is weak, the infection may come back. Tell your doctor if you get yeast infections often. Follow-up care is a key part of your treatment and safety. Be sure to make and go to all appointments, and call your doctor if you are having problems. It's also a good idea to know your test results and keep a list of the medicines you take. How can you care for yourself at home? · Take your medicines exactly as prescribed. Call your doctor if you think you are having a problem with your medicine. · Use antibiotics only as directed by your doctor. · Eat yogurt with live cultures.  It has bacteria called lactobacillus. It may help prevent some types of yeast infections. · Keep your skin clean and dry. Put powder on moist places. · If you are using a cream or suppository to treat a vaginal yeast infection, don't use condoms or a diaphragm. Use a different type of birth control. · Eat a healthy diet and get regular exercise. This will help keep your immune system strong. When should you call for help? Call your doctor now or seek immediate medical care if:  · You have a fever. · You are pregnant and have signs of a vaginal or urinary tract infection such as:  ¨ Severe itching in your vagina. ¨ Pain during sex or when you urinate. ¨ Unusual discharge from your vagina. ¨ A frequent urge to urinate. ¨ Urine that is cloudy or smells bad. Watch closely for changes in your health, and be sure to contact your doctor if:  · You do not get better as expected. Where can you learn more? Go to http://johanmeinKaufmaria del carmen.info/. Enter N007 in the search box to learn more about \"Candidiasis: Care Instructions. \"  Current as of: October 13, 2016  Content Version: 11.3  © 7822-3037 [employer]. Care instructions adapted under license by [employer] (which disclaims liability or warranty for this information). If you have questions about a medical condition or this instruction, always ask your healthcare professional. [place] [address] any warranty or liability for your use of this information. Urinary Tract Infection in Women: Care Instructions  Your Care Instructions    A urinary tract infection, or UTI, is a general term for an infection anywhere between the kidneys and the urethra (where urine comes out). Most UTIs are bladder infections. They often cause pain or burning when you urinate. UTIs are caused by bacteria and can be cured with antibiotics. Be sure to complete your treatment so that the infection goes away.   Follow-up care is a key part of your treatment and safety. Be sure to make and go to all appointments, and call your doctor if you are having problems. It's also a good idea to know your test results and keep a list of the medicines you take. How can you care for yourself at home? · Take your antibiotics as directed. Do not stop taking them just because you feel better. You need to take the full course of antibiotics. · Drink extra water and other fluids for the next day or two. This may help wash out the bacteria that are causing the infection. (If you have kidney, heart, or liver disease and have to limit fluids, talk with your doctor before you increase your fluid intake.)  · Avoid drinks that are carbonated or have caffeine. They can irritate the bladder. · Urinate often. Try to empty your bladder each time. · To relieve pain, take a hot bath or lay a heating pad set on low over your lower belly or genital area. Never go to sleep with a heating pad in place. To prevent UTIs  · Drink plenty of water each day. This helps you urinate often, which clears bacteria from your system. (If you have kidney, heart, or liver disease and have to limit fluids, talk with your doctor before you increase your fluid intake.)  · Urinate when you need to. · Urinate right after you have sex. · Change sanitary pads often. · Avoid douches, bubble baths, feminine hygiene sprays, and other feminine hygiene products that have deodorants. · After going to the bathroom, wipe from front to back. When should you call for help? Call your doctor now or seek immediate medical care if:  · Symptoms such as fever, chills, nausea, or vomiting get worse or appear for the first time. · You have new pain in your back just below your rib cage. This is called flank pain. · There is new blood or pus in your urine. · You have any problems with your antibiotic medicine.   Watch closely for changes in your health, and be sure to contact your doctor if:  · You are not getting better after taking an antibiotic for 2 days. · Your symptoms go away but then come back. Where can you learn more? Go to http://johan-maria del carmen.info/. Enter J333 in the search box to learn more about \"Urinary Tract Infection in Women: Care Instructions. \"  Current as of: November 28, 2016  Content Version: 11.3  © 3699-2073 [employer].. Care instructions adapted under license by [employer] (which disclaims liability or warranty for this information). If you have questions about a medical condition or this instruction, always ask your healthcare professional. [place] [address] any warranty or liability for your use of this information. Diarrhea: Care Instructions  Your Care Instructions    Diarrhea is loose, watery stools (bowel movements). The exact cause is often hard to find. Sometimes diarrhea is your body's way of getting rid of what caused an upset stomach. Viruses, food poisoning, and many medicines can cause diarrhea. Some people get diarrhea in response to emotional stress, anxiety, or certain foods. Almost everyone has diarrhea now and then. It usually isn't serious, and your stools will return to normal soon. The important thing to do is replace the fluids you have lost, so you can prevent dehydration. The doctor has checked you carefully, but problems can develop later. If you notice any problems or new symptoms, get medical treatment right away. Follow-up care is a key part of your treatment and safety. Be sure to make and go to all appointments, and call your doctor if you are having problems. It's also a good idea to know your test results and keep a list of the medicines you take. How can you care for yourself at home? · Watch for signs of dehydration, which means your body has lost too much water. Dehydration is a serious condition and should be treated right away.  Signs of dehydration are:  ¨ Increasing thirst and dry eyes and mouth. ¨ Feeling faint or lightheaded. ¨ Darker urine, and a smaller amount of urine than normal.  · To prevent dehydration, drink plenty of fluids, enough so that your urine is light yellow or clear like water. Choose water and other caffeine-free clear liquids until you feel better. If you have kidney, heart, or liver disease and have to limit fluids, talk with your doctor before you increase the amount of fluids you drink. · Begin eating small amounts of mild foods the next day, if you feel like it. ¨ Try yogurt that has live cultures of Lactobacillus. (Check the label.)  ¨ Avoid spicy foods, fruits, alcohol, and caffeine until 48 hours after all symptoms are gone. ¨ Avoid chewing gum that contains sorbitol. ¨ Avoid dairy products (except for yogurt with Lactobacillus) while you have diarrhea and for 3 days after symptoms are gone. · The doctor may recommend that you take over-the-counter medicine, such as loperamide (Imodium), if you still have diarrhea after 6 hours. Read and follow all instructions on the label. Do not use this medicine if you have bloody diarrhea, a high fever, or other signs of serious illness. Call your doctor if you think you are having a problem with your medicine. When should you call for help? Call 911 anytime you think you may need emergency care. For example, call if:  · You passed out (lost consciousness). · Your stools are maroon or very bloody. Call your doctor now or seek immediate medical care if:  · You are dizzy or lightheaded, or you feel like you may faint. · Your stools are black and look like tar, or they have streaks of blood. · You have new or worse belly pain. · You have symptoms of dehydration, such as:  ¨ Dry eyes and a dry mouth. ¨ Passing only a little dark urine. ¨ Feeling thirstier than usual.  · You have a new or higher fever.   Watch closely for changes in your health, and be sure to contact your doctor if:  · Your diarrhea is getting worse.  · You see pus in the diarrhea. · You are not getting better after 2 days (48 hours). Where can you learn more? Go to http://johan-maria del carmen.info/. Enter L260 in the search box to learn more about \"Diarrhea: Care Instructions. \"  Current as of: March 20, 2017  Content Version: 11.3  © 8365-1216 [employer]. Care instructions adapted under license by [employer] (which disclaims liability or warranty for this information). If you have questions about a medical condition or this instruction, always ask your healthcare professional. [place] [address] any warranty or liability for your use of this information.

## 2017-07-15 ENCOUNTER — HOSPITAL ENCOUNTER (EMERGENCY)
Age: 47
Discharge: HOME | End: 2017-07-15
Attending: EMERGENCY MEDICINE
Payer: MEDICAID

## 2017-07-15 VITALS
BODY MASS INDEX: 29.73 KG/M2 | HEART RATE: 90 BPM | SYSTOLIC BLOOD PRESSURE: 157 MMHG | HEIGHT: 66 IN | TEMPERATURE: 97.8 F | WEIGHT: 185 LBS | DIASTOLIC BLOOD PRESSURE: 101 MMHG | OXYGEN SATURATION: 100 % | RESPIRATION RATE: 20 BRPM

## 2017-07-15 DIAGNOSIS — K50.111 CROHN'S COLITIS, WITH RECTAL BLEEDING (HCC): Primary | ICD-10-CM

## 2017-07-15 LAB
ALBUMIN SERPL BCP-MCNC: 3.2 G/DL (ref 3.4–5)
ALBUMIN/GLOB SERPL: 0.9 {RATIO} (ref 0.8–1.7)
ALP SERPL-CCNC: 65 U/L (ref 45–117)
ALT SERPL-CCNC: 22 U/L (ref 13–56)
ANION GAP BLD CALC-SCNC: 9 MMOL/L (ref 3–18)
APPEARANCE UR: CLEAR
AST SERPL W P-5'-P-CCNC: 13 U/L (ref 15–37)
BASOPHILS # BLD AUTO: 0 K/UL (ref 0–0.1)
BASOPHILS # BLD: 0 % (ref 0–3)
BILIRUB SERPL-MCNC: 0.2 MG/DL (ref 0.2–1)
BILIRUB UR QL: NEGATIVE
BUN SERPL-MCNC: 17 MG/DL (ref 7–18)
BUN/CREAT SERPL: 24 (ref 12–20)
CALCIUM SERPL-MCNC: 8.3 MG/DL (ref 8.5–10.1)
CHLORIDE SERPL-SCNC: 103 MMOL/L (ref 100–108)
CO2 SERPL-SCNC: 27 MMOL/L (ref 21–32)
COLOR UR: YELLOW
CREAT SERPL-MCNC: 0.72 MG/DL (ref 0.6–1.3)
DIFFERENTIAL METHOD BLD: ABNORMAL
EOSINOPHIL # BLD: 0.8 K/UL (ref 0–0.4)
EOSINOPHIL NFR BLD: 8 % (ref 0–5)
ERYTHROCYTE [DISTWIDTH] IN BLOOD BY AUTOMATED COUNT: 16.1 % (ref 11.6–14.5)
GLOBULIN SER CALC-MCNC: 3.5 G/DL (ref 2–4)
GLUCOSE SERPL-MCNC: 95 MG/DL (ref 74–99)
GLUCOSE UR STRIP.AUTO-MCNC: NEGATIVE MG/DL
HCT VFR BLD AUTO: 35.7 % (ref 35–45)
HGB BLD-MCNC: 11 G/DL (ref 12–16)
HGB UR QL STRIP: NEGATIVE
INR PPP: 1.4 (ref 0.8–1.2)
KETONES UR QL STRIP.AUTO: NEGATIVE MG/DL
LEUKOCYTE ESTERASE UR QL STRIP.AUTO: NEGATIVE
LYMPHOCYTES # BLD AUTO: 32 % (ref 20–51)
LYMPHOCYTES # BLD: 3.2 K/UL (ref 0.8–3.5)
MCH RBC QN AUTO: 23.8 PG (ref 24–34)
MCHC RBC AUTO-ENTMCNC: 30.8 G/DL (ref 31–37)
MCV RBC AUTO: 77.1 FL (ref 74–97)
MONOCYTES # BLD: 0.7 K/UL (ref 0–1)
MONOCYTES NFR BLD AUTO: 7 % (ref 2–9)
NEUTS SEG # BLD: 5.2 K/UL (ref 1.8–8)
NEUTS SEG NFR BLD AUTO: 53 % (ref 42–75)
NITRITE UR QL STRIP.AUTO: NEGATIVE
PH UR STRIP: 5 [PH] (ref 5–8)
PLATELET # BLD AUTO: 320 K/UL (ref 135–420)
PMV BLD AUTO: 9.1 FL (ref 9.2–11.8)
POTASSIUM SERPL-SCNC: 3.6 MMOL/L (ref 3.5–5.5)
PROT SERPL-MCNC: 6.7 G/DL (ref 6.4–8.2)
PROT UR STRIP-MCNC: NEGATIVE MG/DL
PROTHROMBIN TIME: 16.5 SEC (ref 11.5–15.2)
RBC # BLD AUTO: 4.63 M/UL (ref 4.2–5.3)
RBC MORPH BLD: ABNORMAL
SODIUM SERPL-SCNC: 139 MMOL/L (ref 136–145)
SP GR UR REFRACTOMETRY: 1.02 (ref 1–1.03)
UROBILINOGEN UR QL STRIP.AUTO: 0.2 EU/DL (ref 0.2–1)
WBC # BLD AUTO: 9.9 K/UL (ref 4.6–13.2)

## 2017-07-15 PROCEDURE — 85610 PROTHROMBIN TIME: CPT | Performed by: PHYSICIAN ASSISTANT

## 2017-07-15 PROCEDURE — 80053 COMPREHEN METABOLIC PANEL: CPT | Performed by: PHYSICIAN ASSISTANT

## 2017-07-15 PROCEDURE — 36415 COLL VENOUS BLD VENIPUNCTURE: CPT | Performed by: EMERGENCY MEDICINE

## 2017-07-15 PROCEDURE — 99284 EMERGENCY DEPT VISIT MOD MDM: CPT

## 2017-07-15 PROCEDURE — 81003 URINALYSIS AUTO W/O SCOPE: CPT | Performed by: PHYSICIAN ASSISTANT

## 2017-07-15 PROCEDURE — 74011250636 HC RX REV CODE- 250/636: Performed by: PHYSICIAN ASSISTANT

## 2017-07-15 PROCEDURE — 85025 COMPLETE CBC W/AUTO DIFF WBC: CPT | Performed by: EMERGENCY MEDICINE

## 2017-07-15 RX ORDER — MESALAMINE 500 MG/1
1000 CAPSULE, EXTENDED RELEASE ORAL 4 TIMES DAILY
Qty: 80 CAP | Refills: 0 | Status: SHIPPED | OUTPATIENT
Start: 2017-07-15 | End: 2017-07-25

## 2017-07-15 RX ORDER — MORPHINE SULFATE 2 MG/ML
2 INJECTION, SOLUTION INTRAMUSCULAR; INTRAVENOUS
Status: DISCONTINUED | OUTPATIENT
Start: 2017-07-15 | End: 2017-07-15 | Stop reason: HOSPADM

## 2017-07-15 RX ORDER — ONDANSETRON 4 MG/1
4 TABLET, FILM COATED ORAL
Qty: 20 TAB | Refills: 0 | Status: SHIPPED | OUTPATIENT
Start: 2017-07-15 | End: 2017-09-07

## 2017-07-15 RX ORDER — PANTOPRAZOLE SODIUM 40 MG/10ML
40 INJECTION, POWDER, LYOPHILIZED, FOR SOLUTION INTRAVENOUS
Status: DISCONTINUED | OUTPATIENT
Start: 2017-07-15 | End: 2017-07-15

## 2017-07-15 RX ORDER — ACETAMINOPHEN AND CODEINE PHOSPHATE 300; 30 MG/1; MG/1
1 TABLET ORAL
Qty: 10 TAB | Refills: 0 | Status: SHIPPED | OUTPATIENT
Start: 2017-07-15 | End: 2017-07-31

## 2017-07-15 RX ORDER — ONDANSETRON 2 MG/ML
4 INJECTION INTRAMUSCULAR; INTRAVENOUS
Status: DISCONTINUED | OUTPATIENT
Start: 2017-07-15 | End: 2017-07-15 | Stop reason: HOSPADM

## 2017-07-15 RX ORDER — PREDNISONE 10 MG/1
TABLET ORAL
Qty: 48 TAB | Refills: 0 | Status: SHIPPED | OUTPATIENT
Start: 2017-07-15 | End: 2017-10-30

## 2017-07-15 NOTE — ED NOTES
Assumed care of patient. Patient complaining of generalized abdominal pain and rectal bleeding \"for about a week. \" Patient states \"I'm having a Chron's flare up. \" Patient reports rectal bleeding is typical for her Chron's flare-up but states she recently had her Coumadin dosage increased. Patient also endorses nausea and diarrhea. Patient denies any fevers. Wound-vac in place to patient's RLE. Patient in no distress at this time. Patient made aware of need for urine sample and verbalized understanding.

## 2017-07-15 NOTE — ED TRIAGE NOTES
Patient with history of Crohn's disease. Patient reports 4-5 days of rectal bleeding. Patient with complaints of abdominal pain. Sepsis Screening completed    (  )Patient meets SIRS criteria. ( x )Patient does not meet SIRS criteria.       SIRS Criteria is achieved when two or more of the following are present   Temperature < 96.8°F (36°C) or > 100.9°F (38.3°C)   Heart Rate > 90 beats per minute   Respiratory Rate > 20 breaths per minute   WBC count > 12,000 or <4,000 or > 10% bands

## 2017-07-15 NOTE — ED NOTES
Verbal bedside shift change report completed with Rosa Elena Caldwell, RN at this time; pt in nad; waiting for her son to arrive, will medicate for pain at that time; oriented to call bell, given tv remote, no further needs at this time;

## 2017-07-15 NOTE — ED NOTES
Patient with no ride at bedside. Patient made aware she is unable to receive pain medication until ride is present and patient verbalized understanding. Patient offered zofran per orders. Patient refusing, stating \"I'll just wait and get phenergan when my ride gets here. Zofran doesn't work anyway. \" Will make PA aware. Urine sample obtained per orders.

## 2017-07-15 NOTE — DISCHARGE INSTRUCTIONS
Colitis: Care Instructions  Your Care Instructions  Colitis is the medical term for swelling (inflammation) of the intestine. It can be caused by different things, such as an infection or loss of blood flow in the intestine. Other causes are problems like Crohn's disease or ulcerative colitis. Symptoms may include fever, diarrhea that may be bloody, or belly pain. Sometimes symptoms go away without treatment. But you may need treatment or more tests, such as blood tests or a stool test. Or you may need imaging tests like a CT scan or a colonoscopy. In some cases, the doctor may want to test a sample of tissue from the intestine. This test is called a biopsy. The doctor has checked you carefully, but problems can develop later. If you notice any problems or new symptoms, get medical treatment right away. Follow-up care is a key part of your treatment and safety. Be sure to make and go to all appointments, and call your doctor if you are having problems. It's also a good idea to know your test results and keep a list of the medicines you take. How can you care for yourself at home? · Rest until you feel better. · Your doctor may recommend that you eat bland foods. These include rice, dry toast or crackers, bananas, and applesauce. · To prevent dehydration, drink plenty of fluids. Choose water and other caffeine-free clear liquids until you feel better. If you have kidney, heart, or liver disease and have to limit fluids, talk with your doctor before you increase the amount of fluids you drink. · Be safe with medicines. Take your medicines exactly as prescribed. Call your doctor if you think you are having a problem with your medicine. You will get more details on the specific medicines your doctor prescribes. When should you call for help? Call 911 anytime you think you may need emergency care. For example, call if:  · You passed out (lost consciousness).   · You vomit blood or what looks like coffee grounds. · Your stools are maroon or very bloody. Call your doctor now or seek immediate medical care if:  · You have new or worse pain. · You have a new or higher fever. · You have new or worse symptoms. · You cannot keep fluids or medicines down. Watch closely for changes in your health, and be sure to contact your doctor if:  · You do not get better as expected. Where can you learn more? Go to http://johan-maria del carmen.info/. [employer] in the search box to learn more about \"Colitis: Care Instructions. \"  Current as of: August 9, 2016  Content Version: 11.3  © 8413-2301 [employer]. Care instructions adapted under license by [employer] (which disclaims liability or warranty for this information). If you have questions about a medical condition or this instruction, always ask your healthcare professional. [address] any warranty or liability for your use of this information.

## 2017-07-15 NOTE — ED PROVIDER NOTES
HPI Comments: 12:12 PM  Lori Bar is a 56 y.o. female with PMHx of Chron's disease presenting to the ED C/O blood in stool onset 4-5 days ago. Pt states she has had bright red as well as black and tarry stool. Associated sxs include generalized abdominal pain worse on the left side, nausea and diarrhea. She states rectal bleeding is typical for her during a Chron's flare. Pt is followed by Ghazal Pedraza MD. She receives Entyvio injections monthly for Chron's. Last Chron's flare was 1 month ago. PMHx includes CVA 4 months ago, sickle cell trait, HTN, anemia, DVT to RLE for which she had surgical procedure and has a wound vac in place. Pt is followed by home health for the surgical wound. Daily medications include Coumadin. INR was last checked last week, and it was 2.7. They subsequently increased her Coumadin dose. Pt denies vomiting, EtOH or NSAID use, and any other symptoms or complaints at this time. Patient is a 56 y.o. female presenting with anal bleeding. The history is provided by the patient. No  was used. Rectal Bleeding    This is a new problem. The current episode started more than 2 days ago (4-5 days ago). Associated symptoms include abdominal pain, nausea and diarrhea. Pertinent negatives include no vomiting.         Past Medical History:   Diagnosis Date    Abdominal pain     Anemia NEC     sickle cell trait    C. difficile colitis     Crohn's disease (HCC)     Gastrointestinal disorder     Crohns    Herpes zoster     Hx of cocaine abuse     Hyperkalemia     Hypertension     Iron deficiency anemia     MRSA (methicillin resistant Staphylococcus aureus)     Obesity     Pain management     Sickle cell trait (HCC)     Stroke ([facility] [address]     + cva 3/17    Thromboembolus [facility])        Past Surgical History:   Procedure Laterality Date    COLONOSCOPY N/A 3/17/2017    COLONOSCOPY; BIOPSY; [facility]    HX TUBAL LIGATION      VASCULAR SURGERY PROCEDURE UNLIST           History reviewed. No pertinent family history. Social History     Social History    Marital status:      Spouse name: N/A    Number of children: N/A    Years of education: N/A     Occupational History    Not on file. Social History Main Topics    Smoking status: Former Smoker    Smokeless tobacco: Never Used    Alcohol use No    Drug use: No    Sexual activity: Yes     Partners: Male     Birth control/ protection: Surgical     Other Topics Concern    Not on file     Social History Narrative         ALLERGIES: Humira [adalimumab] and Remicade [infliximab]    Review of Systems   Gastrointestinal: Positive for abdominal pain, anal bleeding, blood in stool, diarrhea and nausea. Negative for vomiting. All other systems reviewed and are negative. Vitals:    07/15/17 1204   BP: (!) 157/101   Pulse: 90   Resp: 20   Temp: 97.8 °F (36.6 °C)   SpO2: 100%   Weight: 83.9 kg (185 lb)   Height: 5' 6\" (1.676 m)            Physical Exam   Constitutional: She is oriented to person, place, and time. She appears well-developed and well-nourished. Pt appears well sitting up in bed in no distress, speaking in full sentences without evidence of dyspnea, increased work of breathing or any obvious pain at this time     HENT:   Head: Normocephalic and atraumatic. Neck: Normal range of motion. Neck supple. Cardiovascular: Normal rate, regular rhythm, normal heart sounds and intact distal pulses. No murmur heard. Pulmonary/Chest: Effort normal and breath sounds normal. No respiratory distress. She has no wheezes. She has no rales. Abdominal: Soft. Bowel sounds are normal. She exhibits no distension. There is generalized tenderness. There is no rigidity, no rebound, no guarding and no CVA tenderness.    Generalized TTP over abd   L>R    Genitourinary:   Genitourinary Comments: + external hemorrhoids, brown stool, heme +    Musculoskeletal:   Wound vac in place over small 2 cm circular wound to the right medial calf, no erythema or swelling of the surrounding skin    Neurological: She is alert and oriented to person, place, and time. Skin: Skin is warm and dry. See [facility]   Psychiatric: She has a normal mood and affect. Judgment normal.   Nursing note and vitals reviewed. RESULTS:    PULSE OXIMETRY NOTE:  Pulse-ox is 100% on room air  Interpretation: normal       No orders to display        Labs Reviewed   METABOLIC PANEL, COMPREHENSIVE - Abnormal; Notable for the following:        Result Value    BUN/Creatinine ratio 24 (*)     Calcium 8.3 (*)     AST (SGOT) 13 (*)     Albumin 3.2 (*)     All other components within normal limits   PROTHROMBIN TIME + INR - Abnormal; Notable for the following:     Prothrombin time 16.5 (*)     INR 1.4 (*)     All other components within normal limits   CBC WITH AUTOMATED DIFF - Abnormal; Notable for the following:     HGB 11.0 (*)     MCH 23.8 (*)     MCHC 30.8 (*)     RDW 16.1 (*)     MPV 9.1 (*)     EOSINOPHILS 8 (*)     ABS. EOSINOPHILS 0.8 (*)     All other components within normal limits   URINALYSIS W/ RFLX MICROSCOPIC   TYPE & SCREEN       Recent Results (from the past 12 hour(s))   METABOLIC PANEL, COMPREHENSIVE    Collection Time: 07/15/17  2:00 PM   Result Value Ref Range    Sodium 139 136 - 145 mmol/L    Potassium 3.6 3.5 - 5.5 mmol/L    Chloride 103 100 - 108 mmol/L    CO2 27 21 - 32 mmol/L    Anion gap 9 3.0 - 18 mmol/L    Glucose 95 74 - 99 mg/dL    BUN 17 7.0 - 18 MG/DL    Creatinine 0.72 0.6 - 1.3 MG/DL    BUN/Creatinine ratio 24 (H) 12 - 20      GFR est AA >60 >60 ml/min/1.73m2    GFR est non-AA >60 >60 ml/min/1.73m2    Calcium 8.3 (L) 8.5 - 10.1 MG/DL    Bilirubin, total 0.2 0.2 - 1.0 MG/DL    ALT (SGPT) 22 13 - 56 U/L    AST (SGOT) 13 (L) 15 - 37 U/L    Alk.  phosphatase 65 45 - 117 U/L    Protein, total 6.7 6.4 - 8.2 g/dL    Albumin 3.2 (L) 3.4 - 5.0 g/dL Globulin 3.5 2.0 - 4.0 g/dL    A-G Ratio 0.9 0.8 - 1.7     PROTHROMBIN TIME + INR    Collection Time: 07/15/17  2:00 PM   Result Value Ref Range    Prothrombin time 16.5 (H) 11.5 - 15.2 sec    INR 1.4 (H) 0.8 - 1.2     CBC WITH AUTOMATED DIFF    Collection Time: 07/15/17  2:40 PM   Result Value Ref Range    WBC 9.9 4.6 - 13.2 K/uL    RBC 4.63 4.20 - 5.30 M/uL    HGB 11.0 (L) 12.0 - 16.0 g/dL    HCT 35.7 35.0 - 45.0 %    MCV 77.1 74.0 - 97.0 FL    MCH 23.8 (L) 24.0 - 34.0 PG    MCHC 30.8 (L) 31.0 - 37.0 g/dL    RDW 16.1 (H) 11.6 - 14.5 %    PLATELET 147 284 - 503 K/uL    MPV 9.1 (L) 9.2 - 11.8 FL    NEUTROPHILS 53 42 - 75 %    LYMPHOCYTES 32 20 - 51 %    MONOCYTES 7 2 - 9 %    EOSINOPHILS 8 (H) 0 - 5 %    BASOPHILS 0 0 - 3 %    ABS. NEUTROPHILS 5.2 1.8 - 8.0 K/UL    ABS. LYMPHOCYTES 3.2 0.8 - 3.5 K/UL    ABS. MONOCYTES 0.7 0 - 1.0 K/UL    ABS. EOSINOPHILS 0.8 (H) 0.0 - 0.4 K/UL    ABS.  BASOPHILS 0.0 0.0 - 0.1 K/UL    RBC COMMENTS ANISOCYTOSIS  1+        RBC COMMENTS MICROCYTOSIS  1+        RBC COMMENTS HYPOCHROMIA  1+        RBC COMMENTS POIKILOCYTOSIS  2+        RBC COMMENTS TEARDROP CELLS  1+        RBC COMMENTS OVALOCYTES  1+        DF MANUAL     URINALYSIS W/ RFLX MICROSCOPIC    Collection Time: 07/15/17  2:56 PM   Result Value Ref Range    Color YELLOW      Appearance CLEAR      Specific gravity 1.016 1.005 - 1.030      pH (UA) 5.0 5.0 - 8.0      Protein NEGATIVE  NEG mg/dL    Glucose NEGATIVE  NEG mg/dL    Ketone NEGATIVE  NEG mg/dL    Bilirubin NEGATIVE  NEG      Blood NEGATIVE  NEG      Urobilinogen 0.2 0.2 - 1.0 EU/dL    Nitrites NEGATIVE  NEG      Leukocyte Esterase NEGATIVE  NEG         MDM  Number of Diagnoses or Management Options     Amount and/or Complexity of Data Reviewed  Clinical lab tests: ordered and reviewed  Review and summarize past medical records: yes      ED Course     MEDICATIONS GIVEN:  Medications   morphine injection 2 mg (not administered)   ondansetron (ZOFRAN) injection 4 mg (4 mg IntraMUSCular Refused 7/15/17 5793)       Procedures    PROGRESS NOTE:  12:12 PM  Initial assessment performed. Recorded by Silvia Funk ED Scribe, as dictated by Mary Gutiérrez PA-C. PROCEDURE NOTE - RECTAL EXAM:   12:50 PM  Performed by: Mary Gutiérrez PA-C  Rectal exam performed. Brown stool was collected. Stool was Hemoccult tested, and found to be heme Positive. The procedure took 1-15 minutes, and pt tolerated well. Written by Hilda Vela ED Scribe, as dictated by Mary Gutiérrez PA-C. PROGRESS NOTE:   1:47 PM  Several unsuccessful attempts to obtain IV access. Discussed option of performing an EJ and patient agrees. PROGRESS NOTE:  2:19 PM  Attempted a femoral stick. Able to draw enough for 2 vials, however, blood clotted, and another draw is required. 2:31 PM FACE TO FACE  ED ATTENDING NOTE:    I have reviewed the documentation provided by the [facility], discussed their findings, clinical impression, and the proposed management plans with regards to this patient's encounter. I have personally evaluated the patient in the emergency department to verify the history and confirm physical findings. I have also reviewed the pertinent lab and/or radiology studies up to this point. Based on this evaluation my clinical impression is that of a patient presenting with rectal bleeding with known past h/o GI bleeding. No significant system sx's. Suspect possible exacerbation of recurrent GI bleed. Based on the lab tests - there does not appear to be a significant upper GI bleed. Recommendations:  Specific input or recommendations to the management of the patient are as follows: complete planned evaluation and if results are normal, can discharge with symptomatic treatment . Franck Pepper MD      3:28 PM  Phlebotomist was able to obtain needed blood.     CONSULT NOTE:   3:36 PM  Mary Gutiérrez PA-C spoke with Tigre Long MD   Specialty: GI  Discussed pt's hx, disposition, and available diagnostic and imaging results over the telephone. Reviewed care plans. Consulting physician recommends Prednisone taper. He states she needs to be back on her Pentasa medication. She should make an appointment to follow up in his office. Discharge Note:  3:52 PM   Leon Curry's results have been reviewed with her and/or her family. She has been counseled regarding her diagnosis, treatment, and plan. She verbally conveys understanding and agreement of the signs, symptoms, diagnosis, treatment and prognosis and additionally agrees to follow up as discussed. She also agrees with the care-plan and conveys that all of her questions have been answered. I have also provided discharge instructions for her that include: educational information regarding the diagnosis and treatment, and a list of reasons why She would want to return to the ED prior to her follow-up appointment, should her condition change. Proper ED utilization discussed with the patient. CLINICAL IMPRESSION    1. Crohn's colitis, with rectal bleeding ([address]        After visit plan:  D/c home    Discharge Medication List as of 7/15/2017  3:52 PM      START taking these medications    Details   predniSONE (STERAPRED DS) 10 mg dose pack Take as directed, Normal, Disp-48 Tab, R-0      acetaminophen-codeine (TYLENOL-CODEINE #3) 300-30 mg per tablet Take 1 Tab by mouth every four (4) hours as needed for Pain. Max Daily Amount: 6 Tabs., Print, Disp-10 Tab, R-0      ondansetron hcl (ZOFRAN, AS HYDROCHLORIDE,) 4 mg tablet Take 1 Tab by mouth every eight (8) hours as needed for Nausea., Normal, Disp-20 Tab, R-0         CONTINUE these medications which have CHANGED    Details   mesalamine (PENTASA) 500 mg CR capsule Take 2 Caps by mouth four (4) times daily for 10 days. , Normal, Disp-80 Cap, R-0         CONTINUE these medications which have NOT CHANGED    Details   lisinopril (PRINIVIL, ZESTRIL) 10 mg tablet Take 10 mg by mouth daily. , Historical Med      nystatin (MYCOSTATIN) topical cream Apply  to affected area two (2) times a day., Normal, Disp-15 g, R-0      nitrofurantoin, macrocrystal-monohydrate, (MACROBID) 100 mg capsule Take 1 Cap by mouth two (2) times a day., Normal, Disp-14 Cap, R-0      warfarin (COUMADIN) 7.5 mg tablet Take 1 Tab by mouth daily. , Print, Disp-10 Tab, R-0      carvedilol (COREG) 3.125 mg tablet Take 1 Tab by mouth two (2) times daily (with meals). , Print, Disp-60 Tab, R-0      atorvastatin (LIPITOR) 80 mg tablet Take 1 Tab by mouth nightly. , Print, Disp-30 Tab, R-2      gabapentin (NEURONTIN) 400 mg capsule Take 1 Cap by mouth three (3) times daily. , Print, Disp-90 Cap, R-2         STOP taking these medications       HYDROcodone-acetaminophen (NORCO) 5-325 mg per tablet Comments:   Reason for Stopping:         predniSONE (DELTASONE) 20 mg tablet Comments:   Reason for Stopping: Follow-up Information     Follow up With Details Comments Contact Info    Cleone Homans, MD Schedule an appointment as soon as possible for a visit  [address] [place] [address] EMERGENCY DEPT  As needed, If symptoms worsen [address] [address]  [phone]          This note is prepared by Jaclyn Dodson, acting as Scribe for Angelica Maldonado PA-C. Angelica Maldonado PA-C: The scribe's documentation has been prepared under my direction and personally reviewed by me in its entirety. I confirm that the note above accurately reflects all work, treatment, procedures, and medical decision making performed by me.

## 2017-07-15 NOTE — ED NOTES
Patient armband removed and shreddedI have reviewed discharge instructions with the patient. The patient verbalized understanding.  rx x 1 given; rx x 3 sent; pt's son has not shown up as of discharge time; pt states understanding;

## 2017-07-31 ENCOUNTER — HOSPITAL ENCOUNTER (EMERGENCY)
Age: 47
Discharge: HOME | End: 2017-08-01
Attending: EMERGENCY MEDICINE
Payer: MEDICAID

## 2017-07-31 VITALS
WEIGHT: 182 LBS | TEMPERATURE: 99 F | OXYGEN SATURATION: 98 % | HEIGHT: 66 IN | RESPIRATION RATE: 18 BRPM | HEART RATE: 84 BPM | DIASTOLIC BLOOD PRESSURE: 85 MMHG | BODY MASS INDEX: 29.25 KG/M2 | SYSTOLIC BLOOD PRESSURE: 126 MMHG

## 2017-07-31 DIAGNOSIS — K92.1 HEMATOCHEZIA: Primary | ICD-10-CM

## 2017-07-31 DIAGNOSIS — G89.4 CHRONIC PAIN SYNDROME: ICD-10-CM

## 2017-07-31 LAB — GLUCOSE BLD STRIP.AUTO-MCNC: 93 MG/DL (ref 70–110)

## 2017-07-31 PROCEDURE — 99285 EMERGENCY DEPT VISIT HI MDM: CPT

## 2017-07-31 PROCEDURE — 93005 ELECTROCARDIOGRAM TRACING: CPT

## 2017-07-31 PROCEDURE — 82962 GLUCOSE BLOOD TEST: CPT

## 2017-07-31 RX ORDER — SODIUM CHLORIDE 0.9 % (FLUSH) 0.9 %
5-10 SYRINGE (ML) INJECTION AS NEEDED
Status: DISCONTINUED | OUTPATIENT
Start: 2017-07-31 | End: 2017-08-01 | Stop reason: HOSPADM

## 2017-07-31 RX ORDER — SODIUM CHLORIDE 0.9 % (FLUSH) 0.9 %
5-10 SYRINGE (ML) INJECTION EVERY 8 HOURS
Status: DISCONTINUED | OUTPATIENT
Start: 2017-07-31 | End: 2017-08-01 | Stop reason: HOSPADM

## 2017-08-01 ENCOUNTER — APPOINTMENT (OUTPATIENT)
Dept: CT IMAGING | Age: 47
End: 2017-08-01
Attending: EMERGENCY MEDICINE
Payer: MEDICAID

## 2017-08-01 LAB
ALBUMIN SERPL BCP-MCNC: 3.5 G/DL (ref 3.4–5)
ALBUMIN/GLOB SERPL: 1 {RATIO} (ref 0.8–1.7)
ALP SERPL-CCNC: 63 U/L (ref 45–117)
ALT SERPL-CCNC: 21 U/L (ref 13–56)
AMPHET UR QL SCN: NEGATIVE
ANION GAP BLD CALC-SCNC: 10 MMOL/L (ref 3–18)
APPEARANCE UR: CLEAR
APTT PPP: 32 SEC (ref 23–36.4)
AST SERPL W P-5'-P-CCNC: 14 U/L (ref 15–37)
BARBITURATES UR QL SCN: NEGATIVE
BASOPHILS # BLD AUTO: 0 K/UL (ref 0–0.06)
BASOPHILS # BLD: 0 % (ref 0–2)
BENZODIAZ UR QL: NEGATIVE
BILIRUB SERPL-MCNC: 0.2 MG/DL (ref 0.2–1)
BILIRUB UR QL: ABNORMAL
BUN SERPL-MCNC: 15 MG/DL (ref 7–18)
BUN/CREAT SERPL: 19 (ref 12–20)
CALCIUM SERPL-MCNC: 8 MG/DL (ref 8.5–10.1)
CANNABINOIDS UR QL SCN: NEGATIVE
CHLORIDE SERPL-SCNC: 105 MMOL/L (ref 100–108)
CK MB CFR SERPL CALC: NORMAL % (ref 0–4)
CK MB SERPL-MCNC: <1 NG/ML (ref 5–25)
CK SERPL-CCNC: 53 U/L (ref 26–192)
CO2 SERPL-SCNC: 25 MMOL/L (ref 21–32)
COCAINE UR QL SCN: NEGATIVE
COLOR UR: ABNORMAL
CREAT SERPL-MCNC: 0.8 MG/DL (ref 0.6–1.3)
DIFFERENTIAL METHOD BLD: ABNORMAL
EOSINOPHIL # BLD: 0.2 K/UL (ref 0–0.4)
EOSINOPHIL NFR BLD: 2 % (ref 0–5)
ERYTHROCYTE [DISTWIDTH] IN BLOOD BY AUTOMATED COUNT: 15.7 % (ref 11.6–14.5)
GLOBULIN SER CALC-MCNC: 3.4 G/DL (ref 2–4)
GLUCOSE SERPL-MCNC: 93 MG/DL (ref 74–99)
GLUCOSE UR STRIP.AUTO-MCNC: NEGATIVE MG/DL
HCG UR QL: NEGATIVE
HCT VFR BLD AUTO: 32.3 % (ref 35–45)
HDSCOM,HDSCOM: ABNORMAL
HGB BLD-MCNC: 10.1 G/DL (ref 12–16)
HGB UR QL STRIP: NEGATIVE
INR PPP: 1.5 (ref 0.8–1.2)
KETONES UR QL STRIP.AUTO: NEGATIVE MG/DL
LEUKOCYTE ESTERASE UR QL STRIP.AUTO: NEGATIVE
LIPASE SERPL-CCNC: 119 U/L (ref 73–393)
LYMPHOCYTES # BLD AUTO: 23 % (ref 21–52)
LYMPHOCYTES # BLD: 2 K/UL (ref 0.9–3.6)
MCH RBC QN AUTO: 23.4 PG (ref 24–34)
MCHC RBC AUTO-ENTMCNC: 31.3 G/DL (ref 31–37)
MCV RBC AUTO: 74.8 FL (ref 74–97)
METHADONE UR QL: NEGATIVE
MONOCYTES # BLD: 0.6 K/UL (ref 0.05–1.2)
MONOCYTES NFR BLD AUTO: 6 % (ref 3–10)
NEUTS SEG # BLD: 6.1 K/UL (ref 1.8–8)
NEUTS SEG NFR BLD AUTO: 69 % (ref 40–73)
NITRITE UR QL STRIP.AUTO: NEGATIVE
OPIATES UR QL: POSITIVE
PCP UR QL: NEGATIVE
PH UR STRIP: 7 [PH] (ref 5–8)
PLATELET # BLD AUTO: 343 K/UL (ref 135–420)
PMV BLD AUTO: 9.3 FL (ref 9.2–11.8)
POTASSIUM SERPL-SCNC: 3.7 MMOL/L (ref 3.5–5.5)
PROT SERPL-MCNC: 6.9 G/DL (ref 6.4–8.2)
PROT UR STRIP-MCNC: NEGATIVE MG/DL
PROTHROMBIN TIME: 17.3 SEC (ref 11.5–15.2)
RBC # BLD AUTO: 4.32 M/UL (ref 4.2–5.3)
SODIUM SERPL-SCNC: 140 MMOL/L (ref 136–145)
SP GR UR REFRACTOMETRY: 1.03 (ref 1–1.03)
TROPONIN I SERPL-MCNC: <0.02 NG/ML (ref 0–0.06)
UROBILINOGEN UR QL STRIP.AUTO: 1 EU/DL (ref 0.2–1)
WBC # BLD AUTO: 8.9 K/UL (ref 4.6–13.2)

## 2017-08-01 PROCEDURE — 82550 ASSAY OF CK (CPK): CPT | Performed by: EMERGENCY MEDICINE

## 2017-08-01 PROCEDURE — 70450 CT HEAD/BRAIN W/O DYE: CPT

## 2017-08-01 PROCEDURE — 81025 URINE PREGNANCY TEST: CPT | Performed by: EMERGENCY MEDICINE

## 2017-08-01 PROCEDURE — 85025 COMPLETE CBC W/AUTO DIFF WBC: CPT | Performed by: EMERGENCY MEDICINE

## 2017-08-01 PROCEDURE — 85610 PROTHROMBIN TIME: CPT | Performed by: EMERGENCY MEDICINE

## 2017-08-01 PROCEDURE — 80053 COMPREHEN METABOLIC PANEL: CPT | Performed by: EMERGENCY MEDICINE

## 2017-08-01 PROCEDURE — 74176 CT ABD & PELVIS W/O CONTRAST: CPT

## 2017-08-01 PROCEDURE — 81003 URINALYSIS AUTO W/O SCOPE: CPT | Performed by: EMERGENCY MEDICINE

## 2017-08-01 PROCEDURE — 83690 ASSAY OF LIPASE: CPT | Performed by: EMERGENCY MEDICINE

## 2017-08-01 PROCEDURE — 85730 THROMBOPLASTIN TIME PARTIAL: CPT | Performed by: EMERGENCY MEDICINE

## 2017-08-01 PROCEDURE — 80307 DRUG TEST PRSMV CHEM ANLYZR: CPT | Performed by: EMERGENCY MEDICINE

## 2017-08-01 RX ORDER — ONDANSETRON 2 MG/ML
4 INJECTION INTRAMUSCULAR; INTRAVENOUS
Status: DISCONTINUED | OUTPATIENT
Start: 2017-08-01 | End: 2017-08-01 | Stop reason: HOSPADM

## 2017-08-01 RX ORDER — ACETAMINOPHEN AND CODEINE PHOSPHATE 300; 30 MG/1; MG/1
1 TABLET ORAL
Qty: 6 TAB | Refills: 0 | Status: SHIPPED | OUTPATIENT
Start: 2017-08-01 | End: 2017-09-25

## 2017-08-01 NOTE — ED NOTES
Entered patients room with MD to obtain IV assess, pt noted to be sleeping comfortably on stretcher without any obvious signs of distress noted. Pt was aroused by verbal stimuli.  MD at bedside discussing with patient about placement of IV access, during conversation patient is noted to be falling asleep while speaking with MD.

## 2017-08-01 NOTE — ED PROVIDER NOTES
HPI Comments: 11:05 PM    Brain Santos is a 56 y.o. female with pertinent PMHx of Chron's disease, anemia, HTN, C. Diff, sickle cell trait, MRSA, hyperkalemia, tubal ligation, stroke and cocaine abuse presenting ambulatory to the ED c/o vision changes and speech difficulty x just PTA in the ED. Pt states that \"I'm feeling funny and seeing things that aren't there (\"black stuff on the walls\")\". Pt also notes recent rectal bleeding and abdominal pain, which is similar to her hx of Chron's flares. Pt is currently on Coumadin, but states that she missed one dose yesterday. Pt specifically denies any fever/chills. PCP: Jovanna Rueda MD  Social Hx: - tobacco use, - alcohol use, - illicit drug use    There are no other complaints, changes, or physical findings at this time. The history is provided by the patient. No  was used. Past Medical History:   Diagnosis Date    Abdominal pain     Anemia NEC     sickle cell trait    C. difficile colitis     Crohn's disease (HCC)     Gastrointestinal disorder     Crohns    Herpes zoster     Hx of cocaine abuse     Hyperkalemia     Hypertension     Iron deficiency anemia     MRSA (methicillin resistant Staphylococcus aureus)     Obesity     Pain management     Sickle cell trait (HCC)     Stroke ([facility] [address]     + cva 3/17    Thromboembolus [facility])        Past Surgical History:   Procedure Laterality Date    COLONOSCOPY N/A 3/17/2017    COLONOSCOPY; BIOPSY; performed by Oliver Jackson MD at [facility] ENDOSCOPY    HX GYN      tubal ligation    HX TUBAL LIGATION      VASCULAR SURGERY PROCEDURE UNLIST           History reviewed. No pertinent family history. Social History     Social History    Marital status:      Spouse name: N/A    Number of children: N/A    Years of education: N/A     Occupational History    Not on file.      Social History Main Topics    Smoking status: Former Smoker    Smokeless tobacco: Never Used  Alcohol use No    Drug use: No    Sexual activity: Yes     Partners: Male     Birth control/ protection: Surgical     Other Topics Concern    Not on file     Social History Narrative         ALLERGIES: Humira [adalimumab] and Remicade [infliximab]    Review of Systems   Constitutional: Negative for chills and fever. Eyes: Positive for visual disturbance. Gastrointestinal: Positive for abdominal pain and anal bleeding. Neurological: Positive for speech difficulty. All other systems reviewed and are negative. Vitals:    07/31/17 2256 07/31/17 2314   BP: 126/85    Pulse: 84    Resp: 18    Temp: 99 °F (37.2 °C)    SpO2: 98% 98%   Weight: 82.6 kg (182 lb)    Height: 5' 6\" (1.676 m)             Physical Exam   Constitutional: She is oriented to person, place, and time. She appears well-developed and well-nourished. No distress. HENT:   Head: Normocephalic and atraumatic. Eyes: EOM are normal. Pupils are equal, round, and reactive to light. Neck: Normal range of motion. Neck supple. Cardiovascular: Normal rate, normal heart sounds and intact distal pulses. Pulmonary/Chest: Effort normal and breath sounds normal. No respiratory distress. She has no wheezes. She has no rales. Abdominal: Soft. Bowel sounds are normal. She exhibits no distension. There is no tenderness. Musculoskeletal: Normal range of motion. She exhibits no edema or tenderness. Neurological: She is alert and oriented to person, place, and time. Talking strange and unusually talkative  States that speech is slurred, but I can understand her well. Speech sounds rushed as opposed to slurred. No obvious facial droop  Symmetrical strength   Skin: Skin is warm and dry. No rash noted. Psychiatric: She has a normal mood and affect. Her behavior is normal.   Nursing note and vitals reviewed.      RESULTS:    CARDIAC MONITOR NOTE:  Cardiac Rhythm: NSR  Rate: 84 bpm     PULSE OXIMETRY NOTE:  Pulse-ox is 98% on RA  Interpretation: NML       EKG interpretation: (Preliminary) at 2311  Rhythm: normal sinus rhythm; and regular . Rate (approx.): 84 bpm; no acute ST changes. Written by RUTH Sánchez, as dictated by Dinesh Gomez MD.      CT ABD PELV WO CONT   Final Result      IMPRESSION:     No acute findings identified. No free air, free fluid or bowel obstruction.      CT HEAD WO CONT   Final Result      IMPRESSION:     Chronic findings related to previous right MCA distribution infarction. . No  evidence of hemorrhage, mass effect or recent stroke of a major vascular  distribution.  If there are focal neurologic findings which persist, consider  MRI.           Labs Reviewed   CBC WITH AUTOMATED DIFF - Abnormal; Notable for the following:        Result Value    HGB 10.1 (*)     HCT 32.3 (*)     MCH 23.4 (*)     RDW 15.7 (*)     All other components within normal limits   DRUG SCREEN, URINE - Abnormal; Notable for the following:     OPIATES POSITIVE (*)     All other components within normal limits   URINALYSIS W/ RFLX MICROSCOPIC - Abnormal; Notable for the following:     Bilirubin LARGE (*)     All other components within normal limits   PROTHROMBIN TIME + INR - Abnormal; Notable for the following:     Prothrombin time 17.3 (*)     INR 1.5 (*)     All other components within normal limits   METABOLIC PANEL, COMPREHENSIVE - Abnormal; Notable for the following:     Calcium 8.0 (*)     AST (SGOT) 14 (*)     All other components within normal limits   PTT   LIPASE   CARDIAC PANEL,(CK, CKMB & TROPONIN)   HCG URINE, QL   METABOLIC PANEL, COMPREHENSIVE   GLUCOSE, POC   POC GLUCOSE       Recent Results (from the past 12 hour(s))   GLUCOSE, POC    Collection Time: 07/31/17 11:07 PM   Result Value Ref Range    Glucose (POC) 93 70 - 110 mg/dL   EKG, 12 LEAD, INITIAL    Collection Time: 07/31/17 11:11 PM   Result Value Ref Range    Ventricular Rate 84 BPM    Atrial Rate 84 BPM    P-R Interval 156 ms    QRS Duration 84 ms    Q-T Interval 404 ms    QTC Calculation (Bezet) 477 ms    Calculated P Axis 37 degrees    Calculated R Axis 1 degrees    Calculated T Axis 25 degrees    Diagnosis       Normal sinus rhythm  Possible Left atrial enlargement  Septal infarct (cited on or before 27-MAR-2017)  Abnormal ECG  When compared with ECG of 23-MAY-2017 10:57,  No significant change was found     CBC WITH AUTOMATED DIFF    Collection Time: 08/01/17  1:10 AM   Result Value Ref Range    WBC 8.9 4.6 - 13.2 K/uL    RBC 4.32 4.20 - 5.30 M/uL    HGB 10.1 (L) 12.0 - 16.0 g/dL    HCT 32.3 (L) 35.0 - 45.0 %    MCV 74.8 74.0 - 97.0 FL    MCH 23.4 (L) 24.0 - 34.0 PG    MCHC 31.3 31.0 - 37.0 g/dL    RDW 15.7 (H) 11.6 - 14.5 %    PLATELET 025 910 - 203 K/uL    MPV 9.3 9.2 - 11.8 FL    NEUTROPHILS 69 40 - 73 %    LYMPHOCYTES 23 21 - 52 %    MONOCYTES 6 3 - 10 %    EOSINOPHILS 2 0 - 5 %    BASOPHILS 0 0 - 2 %    ABS. NEUTROPHILS 6.1 1.8 - 8.0 K/UL    ABS. LYMPHOCYTES 2.0 0.9 - 3.6 K/UL    ABS. MONOCYTES 0.6 0.05 - 1.2 K/UL    ABS. EOSINOPHILS 0.2 0.0 - 0.4 K/UL    ABS.  BASOPHILS 0.0 0.0 - 0.06 K/UL    DF AUTOMATED     PROTHROMBIN TIME + INR    Collection Time: 08/01/17  1:10 AM   Result Value Ref Range    Prothrombin time 17.3 (H) 11.5 - 15.2 sec    INR 1.5 (H) 0.8 - 1.2     PTT    Collection Time: 08/01/17  1:10 AM   Result Value Ref Range    aPTT 32.0 23.0 - 36.4 SEC   LIPASE    Collection Time: 08/01/17  1:10 AM   Result Value Ref Range    Lipase 119 73 - 393 U/L   CARDIAC PANEL,(CK, CKMB & TROPONIN)    Collection Time: 08/01/17  1:10 AM   Result Value Ref Range    CK 53 26 - 192 U/L    CK - MB <1.0 <3.6 ng/ml    CK-MB Index  0.0 - 4.0 %     CALCULATION NOT PERFORMED WHEN RESULT IS BELOW LINEAR LIMIT    Troponin-I, Qt. <0.02 0.00 - 9.09 NG/ML   METABOLIC PANEL, COMPREHENSIVE    Collection Time: 08/01/17  1:10 AM   Result Value Ref Range    Sodium 140 136 - 145 mmol/L    Potassium 3.7 3.5 - 5.5 mmol/L    Chloride 105 100 - 108 mmol/L    CO2 25 21 - 32 mmol/L    Anion gap 10 3.0 - 18 mmol/L    Glucose 93 74 - 99 mg/dL    BUN 15 7.0 - 18 MG/DL    Creatinine 0.80 0.6 - 1.3 MG/DL    BUN/Creatinine ratio 19 12 - 20      GFR est AA >60 >60 ml/min/1.73m2    GFR est non-AA >60 >60 ml/min/1.73m2    Calcium 8.0 (L) 8.5 - 10.1 MG/DL    Bilirubin, total 0.2 0.2 - 1.0 MG/DL    ALT (SGPT) 21 13 - 56 U/L    AST (SGOT) 14 (L) 15 - 37 U/L    Alk. phosphatase 63 45 - 117 U/L    Protein, total 6.9 6.4 - 8.2 g/dL    Albumin 3.5 3.4 - 5.0 g/dL    Globulin 3.4 2.0 - 4.0 g/dL    A-G Ratio 1.0 0.8 - 1.7     DRUG SCREEN, URINE    Collection Time: 08/01/17  2:55 AM   Result Value Ref Range    BENZODIAZEPINE NEGATIVE  NEG      BARBITURATES NEGATIVE  NEG      THC (TH-CANNABINOL) NEGATIVE  NEG      OPIATES POSITIVE (A) NEG      PCP(PHENCYCLIDINE) NEGATIVE  NEG      COCAINE NEGATIVE  NEG      AMPHETAMINES NEGATIVE  NEG      METHADONE NEGATIVE  NEG      HDSCOM (NOTE)    URINALYSIS W/ RFLX MICROSCOPIC    Collection Time: 08/01/17  2:55 AM   Result Value Ref Range    Color DARK YELLOW      Appearance CLEAR      Specific gravity 1.028 1.005 - 1.030      pH (UA) 7.0 5.0 - 8.0      Protein NEGATIVE  NEG mg/dL    Glucose NEGATIVE  NEG mg/dL    Ketone NEGATIVE  NEG mg/dL    Bilirubin LARGE (A) NEG      Blood NEGATIVE  NEG      Urobilinogen 1.0 0.2 - 1.0 EU/dL    Nitrites NEGATIVE  NEG      Leukocyte Esterase NEGATIVE  NEG     HCG URINE, QL    Collection Time: 08/01/17  2:55 AM   Result Value Ref Range    HCG urine, Ql. NEGATIVE  NEG           MDM  Number of Diagnoses or Management Options  Chronic pain syndrome:   Hematochezia:   Diagnosis management comments: INITIAL CLINICAL IMPRESSION and PLANS:  The patient presents with the primary complaint(s) of: abdominal pain, blood in stool, difficulty in speech and vision changes. The presentation, to include historical aspects and clinical findings are consistent with the DX of head bleed.  However, other possible DX's to consider as primary, associated with, or exacerbated by include:    1.  GI bleed  2. Chron's exacerbation  3. Drug abuse  4. TIA  5. Electrolyte abnormalities  6. Severe anemia         Amount and/or Complexity of Data Reviewed  Clinical lab tests: ordered and reviewed  Tests in the radiology section of CPT®: ordered and reviewed (CT head  CT abd/pelvis)  Tests in the medicine section of CPT®: ordered and reviewed (EKG)  Independent visualization of images, tracings, or specimens: yes (EKG  CT Head  CT Abd/Pel)      ED Course     Medications   sodium chloride (NS) flush 5-10 mL (not administered)   sodium chloride (NS) flush 5-10 mL (not administered)   ondansetron (ZOFRAN) injection 4 mg (4 mg IntraVENous Refused 8/1/17 0157)   promethazine (PHENERGAN) 12.5 mg in 0.9% sodium chloride 50 mL IVPB (0 mg IntraVENous Held 8/1/17 5451)       Procedures    PROGRESS NOTE:  11:05 PM  Initial assessment performed. Written by Nhung Adan, ED Scribe, as dictated by Tania Leo MD.    Procedure Note - EJ Placement:   1:15 AM  Performed by: Tania Leo MD  Area was cleansed with Alcohol. Prepped and draped in sterile fashion. Landmarks identified. 20 gauge needle was inserted into pt's External Jugular Vein without ultrasound guidance. Position: Flat on her back  Number of attempts: 1  Estimated blood loss: None  The procedure took 1-15 minutes, and pt tolerated well. Written by Nhung Adan ED Scribe, as dictated by Tania Leo MD.      4:56 AM  Prior to discharge, pt is demanding Dilaudid and Phenergan as she states that she is having a Chrons flare. Pt is well known to me after numerous ED visits. She was somewhat lethargic on arrival and I was suspicious she was on narcotics. Her  reveals 5 narcotic prescriptions in this month alone and she had opiates in her urine. Her CT showed no acute changes. HGB is stable. Most of the time I entered the room she was asleep and not in pain.  Discussed with her at length about pain control needing to be provided by PCP or pain management. 4:59 AM  Discharge patient. CLINICAL IMPRESSION:  1. Hematochezia    2. Chronic pain syndrome      DISCHARGE NOTE:  5:00 AM  Myla Curry's  results have been reviewed with her. She has been counseled regarding her diagnosis, treatment, and plan. She verbally conveys understanding and agreement of the signs, symptoms, diagnosis, treatment and prognosis and additionally agrees to follow up as discussed. She also agrees with the care-plan and conveys that all of her questions have been answered. I have also provided discharge instructions for her that include: educational information regarding their diagnosis and treatment, and list of reasons why they would want to return to the ED prior to their follow-up appointment, should her condition change. She has been provided with education for proper emergency department utilization. PLAN:  1. D/C Home    2. Current Discharge Medication List          3. Follow-up Information     Follow up With Details Comments Levi Ojeda MD Schedule an appointment as soon as possible for a visit for GI follow up, as needed [address] [place] [address] [id number] [facility] EMERGENCY DEPT  As needed, If symptoms worsen [address]  [phone]          SCRIBE ATTESTATION:  This note is prepared by Robe Thorne, acting as Scribe for [employer], MD.    PROVIDER ATTESTATION:  [employer], MD: The scribe's documentation has been prepared under my direction and personally reviewed by me in its entirety. I confirm that the note above accurately reflects all work, treatment, procedures, and medical decision making performed by me.

## 2017-08-01 NOTE — ED NOTES
Pt refusing to leave ER without receiving narcotic pain medication. MD and charge nurse are at bedside speaking with patient. MD will order patient PO medication as long as patient has a ride at bedside. Pt informed by charge nurse that patient can wait for 10 min for ride to be bedside to receive narcotic pain medication, after that if ride is not at bedside pt will continue with discharge from ER. Pt is in agreement with plan.

## 2017-08-01 NOTE — ED TRIAGE NOTES
Reports sluggish speech and blurred vision since noon and states right leg is numb. Also crohn's flare with diarrhea and blood in stool. Sepsis Screening completed    (  )Patient meets SIRS criteria. ( x )Patient does not meet SIRS criteria.       SIRS Criteria is achieved when two or more of the following are present   Temperature < 96.8°F (36°C) or > 100.9°F (38.3°C)   Heart Rate > 90 beats per minute   Respiratory Rate > 20 breaths per minute   WBC count > 12,000 or <4,000 or > 10% bands

## 2017-08-01 NOTE — ED NOTES
Pt does not have a ride at bedside, there for patient will not receive narcotic pain medication in the ER, alternate pain medication was offered to patient by MD to which patient declined. Pt is discharged home. MD and charge nurse are at bedside during discharge conversation.

## 2017-08-01 NOTE — ED NOTES
In patients room with MD to discuss discharge plan, pt continues to request dilaudid and phenergan. MD informed patient that he will order patient narcotic pain medication. Pt stated \"I don't want that. \" MD continues to educate patient on policy for treating chronic pain in the ER. Pt continues to argue with MD about \"needing pain medication. \" MD informed patient that narcotic pain medication will not be given to patient in the ER nor as a Rx. Charge nurse is bedside with primary nurse and MD during this conversation with patient.

## 2017-08-01 NOTE — ED NOTES
In patients room to medicate patient and inform patient of urine sample needed. Pt stated \"well I need pain medicine and phenergan, Zofran don't do shit. Send that Doctor back in here. \" MD aware.

## 2017-08-01 NOTE — DISCHARGE INSTRUCTIONS
Lower Gastrointestinal Bleeding: Care Instructions  Your Care Instructions    The digestive or gastrointestinal tract goes from the mouth to the anus. It is often called the GI tract. Bleeding in the lower GI tract can happen anywhere in your small or large intestine. It can also happen in your rectum or anus. In some cases, it is caused by an infection, cancer, or inflammatory bowel disease. Or it may be caused by hemorrhoids, diverticulitis, or clotting problems. Light bleeding may not cause any symptoms at first. But if you continue to bleed for a while, you may feel very weak or tired. Sudden, heavy bleeding means you need to see a doctor right away. This kind of bleeding can be very dangerous. But it can usually be cured or controlled. The doctor may do some tests to find the cause of your bleeding. Follow-up care is a key part of your treatment and safety. Be sure to make and go to all appointments, and call your doctor if you are having problems. It's also a good idea to know your test results and keep a list of the medicines you take. How can you care for yourself at home? · Be safe with medicines. Take your medicines exactly as prescribed. Call your doctor if you think you are having a problem with your medicine. You will get more details on the specific medicines your doctor prescribes. · Do not take aspirin or other anti-inflammatory medicines, such as naproxen (Aleve) or ibuprofen (Advil, Motrin), without talking to your doctor first. Ask your doctor if it is okay to use acetaminophen (Tylenol). · Do not drink alcohol. · The bleeding may make you lose iron. So it's important to eat foods that have a lot of iron. These include red meat, shellfish, poultry, and eggs. They also include beans, raisins, whole-grain breads, and leafy green vegetables. If you want help planning meals, you can meet with a dietitian. When should you call for help?   Call 911 anytime you think you may need emergency care. For example, call if:  · You have sudden, severe belly pain. · You vomit blood or what looks like coffee grounds. · You passed out (lost consciousness). · Your stools are maroon or very bloody. Call your doctor now or seek immediate medical care if:  · You are dizzy or lightheaded, or you feel like you may faint. · Your stools are black and look like tar, or they have streaks of blood. · You have belly pain. · You vomit or have nausea. Watch closely for changes in your health, and be sure to contact your doctor if you do not get better as expected. Where can you learn more? Go to http://johan-maria del carmen.info/. Enter B389 in the search box to learn more about \"Lower Gastrointestinal Bleeding: Care Instructions. \"  Current as of: March 20, 2017  Content Version: 11.3  © 2241-9069 [employer]. Care instructions adapted under license by [employer] (which disclaims liability or warranty for this information). If you have questions about a medical condition or this instruction, always ask your healthcare professional. [place] [address] any warranty or liability for your use of this information. Chronic Pain: Care Instructions  Your Care Instructions  Chronic pain is pain that lasts a long time (months or even years) and may or may not have a clear cause. It is different from acute pain, which usually does have a clear cause--like an injury or illness--and gets better over time. Chronic pain:  · Lasts over time but may vary from day to day. · Does not go away despite efforts to end it. · May disrupt your sleep and lead to fatigue. · May cause depression or anxiety. · May make your muscles tense, causing more pain. · Can disrupt your work, hobbies, home life, and relationships with friends and family. Chronic pain is a very real condition. It is not just in your head.  Treatment can help and usually includes several methods used together, such as medicines, physical therapy, exercise, and other treatments. Learning how to relax and changing negative thought patterns can also help you cope. Chronic pain is complex. Taking an active role in your treatment will help you better manage your pain. Tell your doctor if you have trouble dealing with your pain. You may have to try several things before you find what works best for you. Follow-up care is a key part of your treatment and safety. Be sure to make and go to all appointments, and call your doctor if you are having problems. Its also a good idea to know your test results and keep a list of the medicines you take. How can you care for yourself at home? · Pace yourself. Break up large jobs into smaller tasks. Save harder tasks for days when you have less pain, or go back and forth between hard tasks and easier ones. Take rest breaks. · Relax, and reduce stress. Relaxation techniques such as deep breathing or meditation can help. · Keep moving. Gentle, daily exercise can help reduce pain over the long run. Try low- or no-impact exercises such as walking, swimming, and stationary biking. Do stretches to stay flexible. · Try heat, cold packs, and massage. · Get enough sleep. Chronic pain can make you tired and drain your energy. Talk with your doctor if you have trouble sleeping because of pain. · Think positive. Your thoughts can affect your pain level. Do things that you enjoy to distract yourself when you have pain instead of focusing on the pain. See a movie, read a book, listen to music, or spend time with a friend. · If you think you are depressed, talk to your doctor about treatment. · Keep a daily pain diary. Record how your moods, thoughts, sleep patterns, activities, and medicine affect your pain. You may find that your pain is worse during or after certain activities or when you are feeling a certain emotion.  Having a record of your pain can help you and your doctor find the best ways to treat your pain. · Take pain medicines exactly as directed. ¨ If the doctor gave you a prescription medicine for pain, take it as prescribed. ¨ If you are not taking a prescription pain medicine, ask your doctor if you can take an over-the-counter medicine. Reducing constipation caused by pain medicine  · Include fruits, vegetables, beans, and whole grains in your diet each day. These foods are high in fiber. · Drink plenty of fluids, enough so that your urine is light yellow or clear like water. If you have kidney, heart, or liver disease and have to limit fluids, talk with your doctor before you increase the amount of fluids you drink. · If your doctor recommends it, get more exercise. Walking is a good choice. Bit by bit, increase the amount you walk every day. Try for at least 30 minutes on most days of the week. · Schedule time each day for a bowel movement. A daily routine may help. Take your time and do not strain when having a bowel movement. When should you call for help? Call your doctor now or seek immediate medical care if:  · Your pain gets worse or is out of control. · You feel down or blue, or you do not enjoy things like you once did. You may be depressed, which is common in people with chronic pain. Depression can be treated. · You have vomiting or cramps for more than 2 hours. Watch closely for changes in your health, and be sure to contact your doctor if:  · You cannot sleep because of pain. · You are very worried or anxious about your pain. · You have trouble taking your pain medicine. · You have any concerns about your pain medicine. · You have trouble with bowel movements, such as:  ¨ No bowel movement in 3 days. ¨ Blood in the anal area, in your stool, or on the toilet paper. ¨ Diarrhea for more than 24 hours. Where can you learn more? Go to http://johan-maria del carmen.info/.   Enter N004 in the search box to learn more about \"Chronic Pain: Care Instructions. \"  Current as of: October 14, 2016  Content Version: 11.3  © 4015-5630 [employer], [facility]. Care instructions adapted under license by [employer] (which disclaims liability or warranty for this information). If you have questions about a medical condition or this instruction, always ask your healthcare professional. [place] [address] any warranty or liability for your use of this information.

## 2017-08-01 NOTE — ED NOTES
This RN in patients room to perform EKG, during testing patient noted to be falling asleep mid sentence. Pt was easily aroused by verbal stimuli. When this RN was exiting the room patient stated \"Do you need to see my ride so I can get my pain medicine? \" This RN informed patient that I will have to see what medication the Doctor orders. Patient then stated \"Oh I will get my pain medicine, know that. \" This RN exited patients room and MD notified of patient interaction and statement.

## 2017-08-10 LAB
ATRIAL RATE: 84 BPM
CALCULATED P AXIS, ECG09: 37 DEGREES
CALCULATED R AXIS, ECG10: 1 DEGREES
CALCULATED T AXIS, ECG11: 25 DEGREES
DIAGNOSIS, 93000: NORMAL
P-R INTERVAL, ECG05: 156 MS
Q-T INTERVAL, ECG07: 404 MS
QRS DURATION, ECG06: 84 MS
QTC CALCULATION (BEZET), ECG08: 477 MS
VENTRICULAR RATE, ECG03: 84 BPM

## 2017-08-23 ENCOUNTER — HOSPITAL ENCOUNTER (EMERGENCY)
Age: 47
Discharge: HOME | End: 2017-08-23
Attending: EMERGENCY MEDICINE | Admitting: EMERGENCY MEDICINE
Payer: MEDICAID

## 2017-08-23 VITALS
HEIGHT: 66 IN | HEART RATE: 93 BPM | TEMPERATURE: 98.3 F | DIASTOLIC BLOOD PRESSURE: 80 MMHG | RESPIRATION RATE: 12 BRPM | SYSTOLIC BLOOD PRESSURE: 123 MMHG | BODY MASS INDEX: 31.82 KG/M2 | WEIGHT: 198 LBS | OXYGEN SATURATION: 100 %

## 2017-08-23 DIAGNOSIS — R19.7 BLOODY DIARRHEA: Primary | ICD-10-CM

## 2017-08-23 DIAGNOSIS — Z79.01 ANTICOAGULANT LONG-TERM USE: ICD-10-CM

## 2017-08-23 DIAGNOSIS — Z87.19 H/O CROHN'S DISEASE: ICD-10-CM

## 2017-08-23 LAB
ALBUMIN SERPL-MCNC: 2.8 G/DL (ref 3.4–5)
ALBUMIN/GLOB SERPL: 0.8 {RATIO} (ref 0.8–1.7)
ALP SERPL-CCNC: 56 U/L (ref 45–117)
ALT SERPL-CCNC: 15 U/L (ref 13–56)
ANION GAP SERPL CALC-SCNC: 10 MMOL/L (ref 3–18)
AST SERPL-CCNC: 7 U/L (ref 15–37)
BASOPHILS # BLD: 0 K/UL (ref 0–0.06)
BASOPHILS NFR BLD: 0 % (ref 0–2)
BILIRUB SERPL-MCNC: 0.2 MG/DL (ref 0.2–1)
BUN SERPL-MCNC: 17 MG/DL (ref 7–18)
BUN/CREAT SERPL: 20 (ref 12–20)
CALCIUM SERPL-MCNC: 7.9 MG/DL (ref 8.5–10.1)
CHLORIDE SERPL-SCNC: 106 MMOL/L (ref 100–108)
CO2 SERPL-SCNC: 22 MMOL/L (ref 21–32)
CREAT SERPL-MCNC: 0.85 MG/DL (ref 0.6–1.3)
DIFFERENTIAL METHOD BLD: ABNORMAL
EOSINOPHIL # BLD: 0.3 K/UL (ref 0–0.4)
EOSINOPHIL NFR BLD: 2 % (ref 0–5)
ERYTHROCYTE [DISTWIDTH] IN BLOOD BY AUTOMATED COUNT: 16.4 % (ref 11.6–14.5)
GLOBULIN SER CALC-MCNC: 3.4 G/DL (ref 2–4)
GLUCOSE SERPL-MCNC: 95 MG/DL (ref 74–99)
HCT VFR BLD AUTO: 27.1 % (ref 35–45)
HGB BLD-MCNC: 8.5 G/DL (ref 12–16)
LYMPHOCYTES # BLD: 2.3 K/UL (ref 0.9–3.6)
LYMPHOCYTES NFR BLD: 19 % (ref 21–52)
MCH RBC QN AUTO: 23.9 PG (ref 24–34)
MCHC RBC AUTO-ENTMCNC: 31.4 G/DL (ref 31–37)
MCV RBC AUTO: 76.3 FL (ref 74–97)
MONOCYTES # BLD: 1.1 K/UL (ref 0.05–1.2)
MONOCYTES NFR BLD: 9 % (ref 3–10)
NEUTS SEG # BLD: 8.3 K/UL (ref 1.8–8)
NEUTS SEG NFR BLD: 70 % (ref 40–73)
PLATELET # BLD AUTO: 298 K/UL (ref 135–420)
PMV BLD AUTO: 9.4 FL (ref 9.2–11.8)
POTASSIUM SERPL-SCNC: 3.6 MMOL/L (ref 3.5–5.5)
PROT SERPL-MCNC: 6.2 G/DL (ref 6.4–8.2)
RBC # BLD AUTO: 3.55 M/UL (ref 4.2–5.3)
SODIUM SERPL-SCNC: 138 MMOL/L (ref 136–145)
WBC # BLD AUTO: 12 K/UL (ref 4.6–13.2)

## 2017-08-23 PROCEDURE — 85025 COMPLETE CBC W/AUTO DIFF WBC: CPT | Performed by: EMERGENCY MEDICINE

## 2017-08-23 PROCEDURE — 80053 COMPREHEN METABOLIC PANEL: CPT | Performed by: EMERGENCY MEDICINE

## 2017-08-23 PROCEDURE — 74011250637 HC RX REV CODE- 250/637: Performed by: EMERGENCY MEDICINE

## 2017-08-23 PROCEDURE — 99283 EMERGENCY DEPT VISIT LOW MDM: CPT

## 2017-08-23 RX ORDER — DICYCLOMINE HYDROCHLORIDE 10 MG/1
20 CAPSULE ORAL
Status: COMPLETED | OUTPATIENT
Start: 2017-08-23 | End: 2017-08-23

## 2017-08-23 RX ORDER — DICYCLOMINE HYDROCHLORIDE 10 MG/1
20 CAPSULE ORAL
Qty: 40 CAP | Refills: 0 | Status: SHIPPED | OUTPATIENT
Start: 2017-08-23 | End: 2017-09-07

## 2017-08-23 RX ORDER — LEVOFLOXACIN 750 MG/1
750 TABLET ORAL DAILY
Qty: 10 TAB | Refills: 0 | Status: SHIPPED | OUTPATIENT
Start: 2017-08-23 | End: 2017-09-25

## 2017-08-23 RX ORDER — LEVOFLOXACIN 750 MG/1
750 TABLET ORAL
Status: COMPLETED | OUTPATIENT
Start: 2017-08-23 | End: 2017-08-23

## 2017-08-23 RX ORDER — METRONIDAZOLE 500 MG/1
500 TABLET ORAL 3 TIMES DAILY
Qty: 30 TAB | Refills: 0 | Status: SHIPPED | OUTPATIENT
Start: 2017-08-23 | End: 2017-10-30

## 2017-08-23 RX ORDER — ONDANSETRON 4 MG/1
4 TABLET, FILM COATED ORAL
Qty: 12 TAB | Refills: 0 | Status: SHIPPED | OUTPATIENT
Start: 2017-08-23 | End: 2017-09-07

## 2017-08-23 RX ORDER — METRONIDAZOLE 250 MG/1
500 TABLET ORAL
Status: COMPLETED | OUTPATIENT
Start: 2017-08-23 | End: 2017-08-23

## 2017-08-23 RX ADMIN — LEVOFLOXACIN 750 MG: 750 TABLET, FILM COATED ORAL at 06:43

## 2017-08-23 RX ADMIN — DICYCLOMINE HYDROCHLORIDE 20 MG: 10 CAPSULE ORAL at 06:43

## 2017-08-23 RX ADMIN — METRONIDAZOLE 500 MG: 250 TABLET ORAL at 06:43

## 2017-08-23 NOTE — ED NOTES
Patient again on call bell, requesting something for pain and nausea.   informed who already informed patient she cannot have narcotics without a ride present

## 2017-08-23 NOTE — ED NOTES
Patient reports to ED with bloody BMs from Crohns disease. Patient not in distress, sitting on bed comfortably. Patient provided with warm blanket and remote.

## 2017-08-23 NOTE — ED TRIAGE NOTES
Reports blood in stool for five days. States this is a crohn's flare. Also abd pain. Sepsis Screening completed    (  )Patient meets SIRS criteria. (  x)Patient does not meet SIRS criteria.       SIRS Criteria is achieved when two or more of the following are present   Temperature < 96.8°F (36°C) or > 100.9°F (38.3°C)   Heart Rate > 90 beats per minute   Respiratory Rate > 20 breaths per minute   WBC count > 12,000 or <4,000 or > 10% bands

## 2017-08-23 NOTE — ED NOTES
Patient on call bell in bathroom, nurse and tech entered room. Patient sitting on commode, asked for help back to bed. Patient ambulated with steady gait back to bed.

## 2017-08-23 NOTE — DISCHARGE INSTRUCTIONS
Diarrhea: Care Instructions  Your Care Instructions    Diarrhea is loose, watery stools (bowel movements). The exact cause is often hard to find. Sometimes diarrhea is your body's way of getting rid of what caused an upset stomach. Viruses, food poisoning, and many medicines can cause diarrhea. Some people get diarrhea in response to emotional stress, anxiety, or certain foods. Almost everyone has diarrhea now and then. It usually isn't serious, and your stools will return to normal soon. The important thing to do is replace the fluids you have lost, so you can prevent dehydration. The doctor has checked you carefully, but problems can develop later. If you notice any problems or new symptoms, get medical treatment right away. Follow-up care is a key part of your treatment and safety. Be sure to make and go to all appointments, and call your doctor if you are having problems. It's also a good idea to know your test results and keep a list of the medicines you take. How can you care for yourself at home? · Watch for signs of dehydration, which means your body has lost too much water. Dehydration is a serious condition and should be treated right away. Signs of dehydration are:  ¨ Increasing thirst and dry eyes and mouth. ¨ Feeling faint or lightheaded. ¨ Darker urine, and a smaller amount of urine than normal.  · To prevent dehydration, drink plenty of fluids, enough so that your urine is light yellow or clear like water. Choose water and other caffeine-free clear liquids until you feel better. If you have kidney, heart, or liver disease and have to limit fluids, talk with your doctor before you increase the amount of fluids you drink. · Begin eating small amounts of mild foods the next day, if you feel like it. ¨ Try yogurt that has live cultures of Lactobacillus. (Check the label.)  ¨ Avoid spicy foods, fruits, alcohol, and caffeine until 48 hours after all symptoms are gone.   ¨ Avoid chewing gum that contains sorbitol. ¨ Avoid dairy products (except for yogurt with Lactobacillus) while you have diarrhea and for 3 days after symptoms are gone. · The doctor may recommend that you take over-the-counter medicine, such as loperamide (Imodium), if you still have diarrhea after 6 hours. Read and follow all instructions on the label. Do not use this medicine if you have bloody diarrhea, a high fever, or other signs of serious illness. Call your doctor if you think you are having a problem with your medicine. When should you call for help? Call 911 anytime you think you may need emergency care. For example, call if:  · You passed out (lost consciousness). · Your stools are maroon or very bloody. Call your doctor now or seek immediate medical care if:  · You are dizzy or lightheaded, or you feel like you may faint. · Your stools are black and look like tar, or they have streaks of blood. · You have new or worse belly pain. · You have symptoms of dehydration, such as:  ¨ Dry eyes and a dry mouth. ¨ Passing only a little dark urine. ¨ Feeling thirstier than usual.  · You have a new or higher fever. Watch closely for changes in your health, and be sure to contact your doctor if:  · Your diarrhea is getting worse. · You see pus in the diarrhea. · You are not getting better after 2 days (48 hours). Where can you learn more? Go to http://johan-maria del carmen.info/. Enter A120 in the search box to learn more about \"Diarrhea: Care Instructions. \"  Current as of: March 20, 2017  Content Version: 11.3  © 5223-5386 [employer]. Care instructions adapted under license by [employer] (which disclaims liability or warranty for this information). If you have questions about a medical condition or this instruction, always ask your healthcare professional. [address] any warranty or liability for your use of this information.        Crohn's Disease: Care Instructions  Your Care Instructions    Crohn's disease is a lifelong inflammatory bowel disease (IBD). Parts of the digestive tract get swollen and irritated and may develop deep sores called ulcers. Crohn's disease usually occurs in the last part of the small intestine and the first part of the large intestine. But it can develop anywhere from the mouth to the anus. The main symptoms of Crohn's disease are belly pain, diarrhea, fever, and weight loss. Some people may have constipation. Crohn's disease also sometimes causes problems with the joints, eyes, or skin. Your symptoms may be mild at some times and severe at others. The disease can also go into remission, which means that it is not active and you have no symptoms. Bad attacks of Crohn's disease often have to be treated in the hospital so that you can get medicines and liquids through a tube in your vein, called an IV. This gives your digestive system time to rest and recover. Talk with your doctor about the best treatments for you. You may need medicines that help prevent or treat flare-ups of the disease. You may need surgery to remove part of your bowel if you have an abnormal opening in the bowel (fistula), an abscess, or a bowel obstruction. In some cases, surgery is needed if medicines do not work. But symptoms often return to other areas of the intestines after surgery. Learning good self-care can help you reduce your symptoms and manage Crohn's disease. Follow-up care is a key part of your treatment and safety. Be sure to make and go to all appointments, and call your doctor if you are having problems. Its also a good idea to know your test results and keep a list of the medicines you take. How can you care for yourself at home? · Take your medicines exactly as prescribed. Call your doctor if you think you are having a problem with your medicine. You will get more details on the specific medicines your doctor prescribes.   · Do not take anti-inflammatory medicines, such as aspirin, ibuprofen (Advil, Motrin), or naproxen (Aleve). They may make your symptoms worse. Do not take any other medicines or herbal products without talking to your doctor first.  · Avoid foods that make your symptoms worse. These might include milk, alcohol, high-fiber foods, or spicy foods. · Eat a healthy diet. Make sure to get enough iron. Rectal bleeding may make you lose iron. Good sources of iron include beef, lentils, spinach, raisins, and iron-enriched breads and cereals. · Drink liquid meal replacements if your doctor recommends them. These are high in calories and contain vitamins and minerals. Severe symptoms may make it hard for your body to absorb vitamins and minerals. · Do not smoke. Smoking makes Crohn's disease worse. If you need help quitting, talk to your doctor about stop-smoking programs and medicines. These can increase your chances of quitting for good. · Seek support from friends and family to help cope with Crohn's disease. The illness can affect all parts of your life. Get counseling if you need it. When should you call for help? Call 911 anytime you think you may need emergency care. For example, call if:  · You have severe belly pain. · You passed out (lost consciousness). Call your doctor now or seek immediate medical care if:  · You have signs of needing more fluids. You have sunken eyes and a dry mouth, and you pass only a little dark urine. · You have pain and swelling in the anal area, or you have pus draining from the anal area. · You have a fever or shaking chills. · Your belly is bloated. Watch closely for changes in your health, and be sure to contact your doctor if:  · Your symptoms get worse. · You have diarrhea for more than 2 weeks. · Your pain is not steadily getting better. · You have unexplained weight loss. Where can you learn more? Go to http://johan-maria del carmen.info/.   Enter 21 [phone] in the search box to learn more about \"Crohn's Disease: Care Instructions. \"  Current as of: August 9, 2016  Content Version: 11.3  © 7379-3090 [employer]. Care instructions adapted under license by "[employer]," (which disclaims liability or warranty for this information). If you have questions about a medical condition or this instruction, always ask your healthcare professional. [facility] [address] any warranty or liability for your use of this information. Ã¯Â»Â¿  Anticoagulants: After Your Visit  Your Care Instructions  Your doctor prescribed an anticoagulant medicine. Anticoagulants, often called blood thinners, prevent new blood clots from forming and keep existing clots from getting larger. They do not actually thin the blood, but they make the blood take longer to clot. This lowers the risk of a blood clot moving to the lungs (pulmonary embolism) or moving to the brain and causing a stroke. Blood thinners come in two forms. Heparin is given by shot, either under your skin or through a needle in your vein, and starts working right away. Warfarin (Coumadin) comes in pill form and takes longer to work. Your doctor may have you begin taking both forms at the same time. As soon as the pills start to work, you will stop the shots but continue to take the pills. If you have a blood clot in your leg, you may need to take warfarin for several months. People who have heart conditions such as atrial fibrillation often need to take it for the rest of their lives. The right dose of this medicine is different for each person. You will need regular blood tests to see if your dose is correct. Follow-up care is a key part of your treatment and safety. Be sure to make and go to all appointments, and call your doctor if you are having problems. Itâs also a good idea to know your test results and keep a list of the medicines you take. How do you take blood thinners?   · Take your medicines exactly as prescribed. Call your doctor if you think you are having a problem with your medicine. · Call your doctor if you are not sure what to do if you missed a dose of blood thinner. ¨ Your doctor can tell you exactly what to do so you do not take too much or too little blood thinner. Then you will be as safe as possible. · Some general rules for what to do if you miss a dose:  ¨ If you remember it in the same day, take the missed dose. Then go back to your regular schedule. ¨ If it is the next day, or almost time to take the next dose, do not take the missed dose. Do not double the dose to make up for the missed one. At your next regularly scheduled time, take your normal dose. ¨ If you miss your dose for 2 or more days, call your doctor. · To help you stay on schedule, use a calendar to remind you when to take your blood thinner. When you take the medicine, note it on the calendar. · If you are going to give yourself shots, your doctor will give you instructions for how to safely inject the medicine. Follow the directions carefully. · Do not take any vitamins, over-the-counter medicines, or herbal products without talking to your doctor first.  · Avoid contact sports and other activities that could lead to injury. Make your home safe and take other measures to reduce your risk of falling. Always wear a seat belt while in a car. · Do not suddenly change your intake of vitamin Kârich foods, such as broccoli, cabbage, asparagus, lettuce, and spinach. This will help blood thinners work evenly from day to day. · Limit alcohol to 2 drinks a day for men and 1 drink a day for women. Alcohol may interfere with blood thinners. It also increases your risk of falls, which can cause bruising and bleeding. · Tell your dentist, pharmacist, and other health professionals that you take blood thinners. Wear medical alert jewelry that says you take blood thinners. You can buy this at most [employer].   When should you call for help?  Call 911 anytime you think you may need emergency care. For example, call if:  · You cough up blood. · You vomit blood or what looks like coffee grounds. · You pass maroon or very bloody stools. Call your doctor now or seek immediate medical care if:  · You have new bruises or blood spots under your skin. · You have a nosebleed. · Your gums bleed when you brush your teeth. · You have blood in your urine. · Your stools are black and tarlike or have streaks of blood. · You have vaginal bleeding when you are not having your period, or heavy period bleeding. Watch closely for changes in your health, and be sure to contact your doctor if:  · You have questions about your medicine. Where can you learn more? Go to [employer].be  Enter E000 in the search box to learn more about \"Anticoagulants: After Your Visit. \"   Â© 5659-4381 [employer]. Care instructions adapted under license by [employer] (which disclaims liability or warranty for this information). This care instruction is for use with your licensed healthcare professional. If you have questions about a medical condition or this instruction, always ask your healthcare professional. [place] [address] any warranty or liability for your use of this information.   Content Version: 4.6.81540; Last Revised: July 1, 2009

## 2017-08-23 NOTE — ED NOTES
I have reviewed discharge instructions with the patient. The patient verbalized understanding. Patient armband removed and shredded. patient given 4 prescriptions. Discussed side effects with patient. patient verbalized understanding. Patient ambulated to lobby with steady gait. Patient discharged in stable condition to care of self.

## 2017-08-23 NOTE — ED PROVIDER NOTES
HPI Comments: 3:43 AM   Mukund Suggs is a 56 y.o. female with PMHx of Crohn's disease presents to the ED c/o blood in stool onset 5 days ago. Associated sxs include dizziness, lightheadedness and 8/10 abd pain. Pt is on Warfarin. Pt see GI, Kristine Lennox, MD. Pt is currently on steroids. Pt states occasional speech difficulty is baseline due to CVA. Pt states sometimes she gets black tarry stools are well but she has not experienced these sxs recently. PMHx includes HTN, anemia, CVA (03/17), MRSA, C diff and sickle cell trait. FHx of GERD. Pt denies syncope, nosebleed, hematuria, SHx of tobacco, drug and EtOH use and any other symptoms or complaints. Written by RUTH Tran, as dictated by Leslie Summers MD     Patient is a 56 y.o. female presenting with abdominal pain. The history is provided by the patient. No  was used. Abdominal Pain    This is a recurrent problem. The current episode started more than 2 days ago. Pertinent negatives include no hematuria. Past Medical History:   Diagnosis Date    Abdominal pain     Anemia NEC     sickle cell trait    C. difficile colitis     Crohn's disease (HCC)     Gastrointestinal disorder     Crohns    Herpes zoster     Hx of cocaine abuse     Hyperkalemia     Hypertension     Iron deficiency anemia     MRSA (methicillin resistant Staphylococcus aureus)     Obesity     Pain management     Sickle cell trait (HCC)     Stroke ([facility] [address]     + cva 3/17    Thromboembolus [facility])        Past Surgical History:   Procedure Laterality Date    COLONOSCOPY N/A 3/17/2017    COLONOSCOPY; BIOPSY; performed by Oswaldo Bernal MD at [facility] ENDOSCOPY    HX GYN      tubal ligation    HX TUBAL LIGATION      VASCULAR SURGERY PROCEDURE UNLIST           History reviewed. No pertinent family history.     Social History     Social History    Marital status:      Spouse name: N/A    Number of children: N/A    Years of education: N/A     Occupational History    Not on file. Social History Main Topics    Smoking status: Former Smoker    Smokeless tobacco: Never Used    Alcohol use No    Drug use: No    Sexual activity: Yes     Partners: Male     Birth control/ protection: Surgical     Other Topics Concern    Not on file     Social History Narrative         ALLERGIES: Humira [adalimumab] and Remicade [infliximab]    Review of Systems   HENT: Negative for nosebleeds. Gastrointestinal: Positive for abdominal pain (8/10) and blood in stool. Genitourinary: Negative for hematuria. Neurological: Positive for dizziness and light-headedness. Negative for syncope. All other systems reviewed and are negative. Vitals:    08/23/17 0225   BP: 123/80   Pulse: 93   Resp: 12   Temp: 98.3 °F (36.8 °C)   SpO2: 100%   Weight: 89.8 kg (198 lb)   Height: 5' 6\" (1.676 m)            Physical Exam   Constitutional: She is oriented to person, place, and time. She appears well-developed and well-nourished. Non-toxic appearance. No distress. obese    Uncomfortable appearing    HENT:   Head: Normocephalic and atraumatic. Right Ear: External ear normal.   Left Ear: External ear normal.   Mouth/Throat: Oropharynx is clear and moist. Mucous membranes are not dry. No oropharyngeal exudate. Eyes: Conjunctivae and EOM are normal. Pupils are equal, round, and reactive to light. No scleral icterus. No pallor  No icterus    Neck: Normal range of motion. Neck supple. No JVD present. No tracheal deviation present. No thyromegaly present. Cardiovascular: Normal rate, regular rhythm and normal heart sounds. Pulmonary/Chest: Effort normal and breath sounds normal. No stridor. No respiratory distress. Abdominal: Soft. Bowel sounds are normal. She exhibits no distension. There is tenderness (mildly). There is no rebound and no guarding.    Genitourinary:   Genitourinary Comments: Small external hemorrhoids nontender,   No masses Musculoskeletal: Normal range of motion. She exhibits no edema or tenderness. No soft tissue injuries   Lymphadenopathy:     She has no cervical adenopathy. Neurological: She is alert and oriented to person, place, and time. She has normal reflexes. No cranial nerve deficit. Coordination normal.   Skin: Skin is warm and dry. She is not diaphoretic. Scattered bruising to bilateral arms and wrists    Psychiatric: Her behavior is normal. Judgment and thought content normal. Her mood appears anxious. Nursing note and vitals reviewed. MDM  Number of Diagnoses or Management Options  Anticoagulant long-term use:   Bloody diarrhea:   H/O Crohn's disease:   Diagnosis management comments: crohn's and diarrhea while on anticoagulants. Multiple visits for same sxs. Will check labs and watch for hypotension, dehydration, changes in chronic anemia. Repeat blood draws, unable to send coagulants. Amount and/or Complexity of Data Reviewed  Clinical lab tests: ordered and reviewed      ED Course     MEDICATIONS GIVEN:  Medications   dicyclomine (BENTYL) capsule 20 mg (not administered)   levoFLOXacin (LEVAQUIN) tablet 750 mg (not administered)   metroNIDAZOLE (FLAGYL) tablet 500 mg (not administered)         Procedures     PROCEDURE NOTE - RECTAL EXAM:   3:51 AM  Performed by: Samara Alves MD   Rectal exam performed. Mild streaks of bloody stool was collected. Stool was Hemoccult tested, and found to be heme Positive. The procedure took 1-15 minutes, and pt tolerated well. Written by Kristin Villa ED Scribe, as dictated by Hari Ibarra. Michelle Alves MD .       PROGRESS NOTE:  3:43 AM   Initial assessment performed. DISCHARGE NOTE:  6:19 AM   Krystal Aguilarnis's  results have been reviewed with her. She has been counseled regarding her diagnosis, treatment, and plan.   She verbally conveys understanding and agreement of the signs, symptoms, diagnosis, treatment and prognosis and additionally agrees to follow up as discussed. She also agrees with the care-plan and conveys that all of her questions have been answered. I have also provided discharge instructions for her that include: educational information regarding their diagnosis and treatment, and list of reasons why they would want to return to the ED prior to their follow-up appointment, should her condition change. The patient has been provided with education for proper Emergency Department utilization. CLINICAL IMPRESSION:    1. Bloody diarrhea    2. H/O Crohn's disease    3. Anticoagulant long-term use        PLAN: DISCHARGE HOME    Follow-up Information     Follow up With Details Comments Contact Info    Joseph Siddiqui MD Schedule an appointment as soon as possible for a visit in 2 days for GI follow up  [address] [place] [address] [address] [facility] EMERGENCY DEPT Go to As needed, If symptoms worsen [address] [place] [id number]  [phone]          Current Discharge Medication List      START taking these medications    Details   dicyclomine (BENTYL) 10 mg capsule Take 2 Caps by mouth three (3) times daily as needed. Qty: 40 Cap, Refills: 0      levoFLOXacin (LEVAQUIN) 750 mg tablet Take 1 Tab by mouth daily. Qty: 10 Tab, Refills: 0      metroNIDAZOLE (FLAGYL) 500 mg tablet Take 1 Tab by mouth three (3) times daily. Qty: 30 Tab, Refills: 0      !! ondansetron hcl (ZOFRAN, AS HYDROCHLORIDE,) 4 mg tablet Take 1 Tab by mouth every eight (8) hours as needed for Nausea. Qty: 12 Tab, Refills: 0       !! - Potential duplicate medications found. Please discuss with provider. CONTINUE these medications which have NOT CHANGED    Details   ELETRIPTAN HBR (RELPAX PO) Take  by mouth.      predniSONE (STERAPRED DS) 10 mg dose pack Take as directed  Qty: 48 Tab, Refills: 0      lisinopril (PRINIVIL, ZESTRIL) 10 mg tablet Take 10 mg by mouth daily.       warfarin (COUMADIN) 7.5 mg tablet Take 1 Tab by mouth daily.  Qty: 10 Tab, Refills: 0      carvedilol (COREG) 3.125 mg tablet Take 1 Tab by mouth two (2) times daily (with meals). Qty: 60 Tab, Refills: 0      atorvastatin (LIPITOR) 80 mg tablet Take 1 Tab by mouth nightly. Qty: 30 Tab, Refills: 2      gabapentin (NEURONTIN) 400 mg capsule Take 1 Cap by mouth three (3) times daily. Qty: 90 Cap, Refills: 2      acetaminophen-codeine (TYLENOL-CODEINE #3) 300-30 mg per tablet Take 1 Tab by mouth every six (6) hours as needed for Pain. Max Daily Amount: 4 Tabs. Qty: 6 Tab, Refills: 0      !! ondansetron hcl (ZOFRAN, AS HYDROCHLORIDE,) 4 mg tablet Take 1 Tab by mouth every eight (8) hours as needed for Nausea. Qty: 20 Tab, Refills: 0       !! - Potential duplicate medications found. Please discuss with provider. SCRIBE ATTESTATION STATEMENT  Documented by: cody Ardon for and in the presence of Doralee Dubin. Zandra King MD      PROVIDER ATTESTATION STATEMENT  Doralee Dubin. Zandra King MD  : I personally performed the services described in the documentation, reviewed the documentation, as recorded by the scribe in my presence, and it accurately and completely records my words and actions.

## 2017-09-07 ENCOUNTER — HOSPITAL ENCOUNTER (EMERGENCY)
Age: 47
Discharge: HOME | End: 2017-09-08
Attending: FAMILY MEDICINE
Payer: MEDICAID

## 2017-09-07 VITALS
OXYGEN SATURATION: 100 % | BODY MASS INDEX: 28.93 KG/M2 | TEMPERATURE: 98.4 F | HEIGHT: 66 IN | SYSTOLIC BLOOD PRESSURE: 158 MMHG | HEART RATE: 85 BPM | RESPIRATION RATE: 20 BRPM | DIASTOLIC BLOOD PRESSURE: 104 MMHG | WEIGHT: 180 LBS

## 2017-09-07 DIAGNOSIS — K50.911: Primary | ICD-10-CM

## 2017-09-07 LAB
ABO + RH BLD: NORMAL
ALBUMIN SERPL-MCNC: 3.1 G/DL (ref 3.4–5)
ALBUMIN/GLOB SERPL: 0.9 {RATIO} (ref 0.8–1.7)
ALP SERPL-CCNC: 50 U/L (ref 45–117)
ALT SERPL-CCNC: 15 U/L (ref 13–56)
ANION GAP SERPL CALC-SCNC: 10 MMOL/L (ref 3–18)
APPEARANCE UR: CLEAR
APTT PPP: 33 SEC (ref 23–36.4)
AST SERPL-CCNC: 17 U/L (ref 15–37)
BASOPHILS # BLD: 0 K/UL (ref 0–0.06)
BASOPHILS NFR BLD: 0 % (ref 0–2)
BILIRUB SERPL-MCNC: 0.4 MG/DL (ref 0.2–1)
BILIRUB UR QL: NEGATIVE
BLOOD GROUP ANTIBODIES SERPL: NORMAL
BUN SERPL-MCNC: 13 MG/DL (ref 7–18)
BUN/CREAT SERPL: 24 (ref 12–20)
CALCIUM SERPL-MCNC: 8.7 MG/DL (ref 8.5–10.1)
CHLORIDE SERPL-SCNC: 107 MMOL/L (ref 100–108)
CO2 SERPL-SCNC: 27 MMOL/L (ref 21–32)
COLOR UR: YELLOW
CREAT SERPL-MCNC: 0.55 MG/DL (ref 0.6–1.3)
DIFFERENTIAL METHOD BLD: ABNORMAL
EOSINOPHIL # BLD: 0 K/UL (ref 0–0.4)
EOSINOPHIL NFR BLD: 0 % (ref 0–5)
ERYTHROCYTE [DISTWIDTH] IN BLOOD BY AUTOMATED COUNT: 17.8 % (ref 11.6–14.5)
GLOBULIN SER CALC-MCNC: 3.4 G/DL (ref 2–4)
GLUCOSE SERPL-MCNC: 106 MG/DL (ref 74–99)
GLUCOSE UR STRIP.AUTO-MCNC: NEGATIVE MG/DL
HCG UR QL: NEGATIVE
HCT VFR BLD AUTO: 27.8 % (ref 35–45)
HGB BLD-MCNC: 8.7 G/DL (ref 12–16)
HGB UR QL STRIP: NEGATIVE
INR PPP: 1.5 (ref 0.8–1.2)
KETONES UR QL STRIP.AUTO: NEGATIVE MG/DL
LEUKOCYTE ESTERASE UR QL STRIP.AUTO: NEGATIVE
LYMPHOCYTES # BLD: 1.1 K/UL (ref 0.9–3.6)
LYMPHOCYTES NFR BLD: 8 % (ref 21–52)
MCH RBC QN AUTO: 24.7 PG (ref 24–34)
MCHC RBC AUTO-ENTMCNC: 31.3 G/DL (ref 31–37)
MCV RBC AUTO: 79 FL (ref 74–97)
MONOCYTES # BLD: 0.7 K/UL (ref 0.05–1.2)
MONOCYTES NFR BLD: 5 % (ref 3–10)
NEUTS SEG # BLD: 12.6 K/UL (ref 1.8–8)
NEUTS SEG NFR BLD: 87 % (ref 40–73)
NITRITE UR QL STRIP.AUTO: NEGATIVE
PH UR STRIP: 6.5 [PH] (ref 5–8)
PLATELET # BLD AUTO: 565 K/UL (ref 135–420)
PMV BLD AUTO: 8.8 FL (ref 9.2–11.8)
POTASSIUM SERPL-SCNC: 4.8 MMOL/L (ref 3.5–5.5)
PROT SERPL-MCNC: 6.5 G/DL (ref 6.4–8.2)
PROT UR STRIP-MCNC: NEGATIVE MG/DL
PROTHROMBIN TIME: 17.9 SEC (ref 11.5–15.2)
RBC # BLD AUTO: 3.52 M/UL (ref 4.2–5.3)
SODIUM SERPL-SCNC: 144 MMOL/L (ref 136–145)
SP GR UR REFRACTOMETRY: >1.03 (ref 1–1.03)
SPECIMEN EXP DATE BLD: NORMAL
UROBILINOGEN UR QL STRIP.AUTO: 1 EU/DL (ref 0.2–1)
WBC # BLD AUTO: 14.5 K/UL (ref 4.6–13.2)

## 2017-09-07 PROCEDURE — 81003 URINALYSIS AUTO W/O SCOPE: CPT | Performed by: PHYSICIAN ASSISTANT

## 2017-09-07 PROCEDURE — 99284 EMERGENCY DEPT VISIT MOD MDM: CPT

## 2017-09-07 PROCEDURE — 80053 COMPREHEN METABOLIC PANEL: CPT | Performed by: PHYSICIAN ASSISTANT

## 2017-09-07 PROCEDURE — 81025 URINE PREGNANCY TEST: CPT | Performed by: PHYSICIAN ASSISTANT

## 2017-09-07 PROCEDURE — 74011250636 HC RX REV CODE- 250/636: Performed by: PHYSICIAN ASSISTANT

## 2017-09-07 PROCEDURE — 85730 THROMBOPLASTIN TIME PARTIAL: CPT | Performed by: PHYSICIAN ASSISTANT

## 2017-09-07 PROCEDURE — 85610 PROTHROMBIN TIME: CPT | Performed by: PHYSICIAN ASSISTANT

## 2017-09-07 PROCEDURE — 96361 HYDRATE IV INFUSION ADD-ON: CPT

## 2017-09-07 PROCEDURE — 85025 COMPLETE CBC W/AUTO DIFF WBC: CPT | Performed by: PHYSICIAN ASSISTANT

## 2017-09-07 PROCEDURE — 86900 BLOOD TYPING SEROLOGIC ABO: CPT | Performed by: PHYSICIAN ASSISTANT

## 2017-09-07 PROCEDURE — 96374 THER/PROPH/DIAG INJ IV PUSH: CPT

## 2017-09-07 PROCEDURE — 96372 THER/PROPH/DIAG INJ SC/IM: CPT

## 2017-09-07 PROCEDURE — C9113 INJ PANTOPRAZOLE SODIUM, VIA: HCPCS | Performed by: PHYSICIAN ASSISTANT

## 2017-09-07 RX ORDER — ONDANSETRON 4 MG/1
4 TABLET, FILM COATED ORAL
Qty: 15 TAB | Refills: 0 | Status: SHIPPED | OUTPATIENT
Start: 2017-09-07 | End: 2017-10-30

## 2017-09-07 RX ORDER — DICYCLOMINE HYDROCHLORIDE 10 MG/ML
20 INJECTION INTRAMUSCULAR
Status: COMPLETED | OUTPATIENT
Start: 2017-09-07 | End: 2017-09-07

## 2017-09-07 RX ORDER — PANTOPRAZOLE SODIUM 40 MG/10ML
40 INJECTION, POWDER, LYOPHILIZED, FOR SOLUTION INTRAVENOUS
Status: COMPLETED | OUTPATIENT
Start: 2017-09-07 | End: 2017-09-07

## 2017-09-07 RX ORDER — DICYCLOMINE HYDROCHLORIDE 10 MG/ML
20 INJECTION INTRAMUSCULAR
Status: DISCONTINUED | OUTPATIENT
Start: 2017-09-07 | End: 2017-09-07

## 2017-09-07 RX ORDER — DICYCLOMINE HYDROCHLORIDE 20 MG/1
20 TABLET ORAL EVERY 6 HOURS
Qty: 20 TAB | Refills: 0 | Status: SHIPPED | OUTPATIENT
Start: 2017-09-07 | End: 2017-09-12

## 2017-09-07 RX ORDER — ONDANSETRON 2 MG/ML
4 INJECTION INTRAMUSCULAR; INTRAVENOUS
Status: DISCONTINUED | OUTPATIENT
Start: 2017-09-07 | End: 2017-09-08 | Stop reason: HOSPADM

## 2017-09-07 RX ORDER — PROMETHAZINE HYDROCHLORIDE 25 MG/1
25 TABLET ORAL
Qty: 10 TAB | Refills: 0 | Status: SHIPPED | OUTPATIENT
Start: 2017-09-07 | End: 2017-12-11

## 2017-09-07 RX ADMIN — SODIUM CHLORIDE 1000 ML: 900 INJECTION, SOLUTION INTRAVENOUS at 22:30

## 2017-09-07 RX ADMIN — DICYCLOMINE HYDROCHLORIDE 20 MG: 20 INJECTION, SOLUTION INTRAMUSCULAR at 23:58

## 2017-09-07 RX ADMIN — PANTOPRAZOLE SODIUM 40 MG: 40 INJECTION, POWDER, FOR SOLUTION INTRAVENOUS at 22:30

## 2017-09-07 NOTE — ED TRIAGE NOTES
Patient states that she is having blood in her stool. Patient with complaints of abdominal pain and pain from her crohn's. Patient states that she was recently discharged from [facility] and received a blood transfusion. Sepsis Screening completed    (  )Patient meets SIRS criteria. (x  )Patient does not meet SIRS criteria.       SIRS Criteria is achieved when two or more of the following are present   Temperature < 96.8°F (36°C) or > 100.9°F (38.3°C)   Heart Rate > 90 beats per minute   Respiratory Rate > 20 breaths per minute   WBC count > 12,000 or <4,000 or > 10% bands

## 2017-09-07 NOTE — ED PROVIDER NOTES
HPI Comments: 8:00 PM  Korey Solorzano is a 56 y.o. female with a PMHx of iron-deficiency anemia, CVA and Crohn's disease who presents to the ED c/o blood in the stool which started last week. Associated sxs include nausea, abdominal pain. Pt was admitted at [facility] and given a blood transfusion. She was told to start back on her Warfarin (5mg) after being released 8/30/2017, unless she started bleeding. Patient denies vomiting, fever, chills, and any other sxs or complaints. Patient is a 56 y.o. female presenting with anal bleeding. The history is provided by the patient. No  was used. Rectal Bleeding    This is a new problem. The current episode started more than 1 week ago. Associated symptoms include abdominal pain and nausea. Pertinent negatives include no chills, no fever and no vomiting. Risk factors include a change in medication usage/dosage. Past Medical History:   Diagnosis Date    Abdominal pain     Anemia NEC     sickle cell trait    C. difficile colitis     Crohn's disease (HCC)     Gastrointestinal disorder     Crohns    Herpes zoster     Hx of cocaine abuse     Hyperkalemia     Hypertension     Iron deficiency anemia     MRSA (methicillin resistant Staphylococcus aureus)     Obesity     Pain management     Sickle cell trait (HCC)     Stroke ([facility] [address]     + cva 3/17    Thromboembolus [facility])        Past Surgical History:   Procedure Laterality Date    COLONOSCOPY N/A 3/17/2017    COLONOSCOPY; BIOPSY; performed by Aguilar Christensen MD at [facility]    HX GYN      tubal ligation    HX TUBAL LIGATION      VASCULAR SURGERY PROCEDURE UNLIST           History reviewed. No pertinent family history. Social History     Social History    Marital status:      Spouse name: N/A    Number of children: N/A    Years of education: N/A     Occupational History    Not on file.      Social History Main Topics    Smoking status: Former Smoker    Smokeless tobacco: Never Used    Alcohol use No    Drug use: No    Sexual activity: Yes     Partners: Male     Birth control/ protection: Surgical     Other Topics Concern    Not on file     Social History Narrative         ALLERGIES: Humira [adalimumab] and Remicade [infliximab]    Review of Systems   Constitutional: Negative for chills and fever. Gastrointestinal: Positive for abdominal pain, anal bleeding, blood in stool and nausea. Negative for vomiting. All other systems reviewed and are negative. Vitals:    09/07/17 1825   BP: (!) 158/104   Pulse: 85   Resp: 20   Temp: 98.4 °F (36.9 °C)   SpO2: 100%   Weight: 81.6 kg (180 lb)   Height: 5' 6\" (1.676 m)            Physical Exam   Constitutional: She is oriented to person, place, and time. She appears well-developed and well-nourished. No distress. Sitting up on stretcher, able to ambulate in room, non toxic appearance, appears comfortable, NAD    HENT:   Head: Normocephalic and atraumatic. Neck: Normal range of motion. Neck supple. Cardiovascular: Normal rate, regular rhythm, normal heart sounds and intact distal pulses. No murmur heard. Pulmonary/Chest: Effort normal and breath sounds normal. No respiratory distress. She has no wheezes. She has no rales. Abdominal: Soft. Bowel sounds are normal. She exhibits no distension. There is no hepatosplenomegaly. There is generalized tenderness (no point tenderness ). There is no rigidity, no rebound, no guarding and no CVA tenderness. Genitourinary: Rectal exam shows external hemorrhoid (chronic ) and guaiac positive stool. Rectal exam shows no fissure. Genitourinary Comments: Rectal exam, non tender, + old hemorrhoids, brown stool with bright red blood on glove     Neurological: She is alert and oriented to person, place, and time. Psychiatric: She has a normal mood and affect. Judgment normal.   Nursing note and vitals reviewed.        RESULTS:    PULSE OXIMETRY NOTE:  Pulse-ox is 100% on RA  Interpretation: Normal       No orders to display        Labs Reviewed   CBC WITH AUTOMATED DIFF - Abnormal; Notable for the following:        Result Value    WBC 14.5 (*)     RBC 3.52 (*)     HGB 8.7 (*)     HCT 27.8 (*)     RDW 17.8 (*)     PLATELET 348 (*)     MPV 8.8 (*)     NEUTROPHILS 87 (*)     LYMPHOCYTES 8 (*)     ABS. NEUTROPHILS 12.6 (*)     All other components within normal limits   METABOLIC PANEL, COMPREHENSIVE - Abnormal; Notable for the following:     Glucose 106 (*)     Creatinine 0.55 (*)     BUN/Creatinine ratio 24 (*)     Albumin 3.1 (*)     All other components within normal limits   URINALYSIS W/ RFLX MICROSCOPIC - Abnormal; Notable for the following:     Specific gravity >1.030 (*)     All other components within normal limits   PROTHROMBIN TIME + INR - Abnormal; Notable for the following:     Prothrombin time 17.9 (*)     INR 1.5 (*)     All other components within normal limits   PTT   HCG URINE, QL   TYPE & SCREEN       Recent Results (from the past 12 hour(s))   CBC WITH AUTOMATED DIFF    Collection Time: 09/07/17  9:40 PM   Result Value Ref Range    WBC 14.5 (H) 4.6 - 13.2 K/uL    RBC 3.52 (L) 4.20 - 5.30 M/uL    HGB 8.7 (L) 12.0 - 16.0 g/dL    HCT 27.8 (L) 35.0 - 45.0 %    MCV 79.0 74.0 - 97.0 FL    MCH 24.7 24.0 - 34.0 PG    MCHC 31.3 31.0 - 37.0 g/dL    RDW 17.8 (H) 11.6 - 14.5 %    PLATELET 117 (H) 820 - 420 K/uL    MPV 8.8 (L) 9.2 - 11.8 FL    NEUTROPHILS 87 (H) 40 - 73 %    LYMPHOCYTES 8 (L) 21 - 52 %    MONOCYTES 5 3 - 10 %    EOSINOPHILS 0 0 - 5 %    BASOPHILS 0 0 - 2 %    ABS. NEUTROPHILS 12.6 (H) 1.8 - 8.0 K/UL    ABS. LYMPHOCYTES 1.1 0.9 - 3.6 K/UL    ABS. MONOCYTES 0.7 0.05 - 1.2 K/UL    ABS. EOSINOPHILS 0.0 0.0 - 0.4 K/UL    ABS.  BASOPHILS 0.0 0.0 - 0.06 K/UL    DF AUTOMATED     TYPE & SCREEN    Collection Time: 09/07/17  9:40 PM   Result Value Ref Range    Crossmatch Expiration 09/10/2017     ABO/Rh(D) A POSITIVE     Antibody screen NEG    METABOLIC PANEL, COMPREHENSIVE    Collection Time: 09/07/17  9:40 PM   Result Value Ref Range    Sodium 144 136 - 145 mmol/L    Potassium 4.8 3.5 - 5.5 mmol/L    Chloride 107 100 - 108 mmol/L    CO2 27 21 - 32 mmol/L    Anion gap 10 3.0 - 18 mmol/L    Glucose 106 (H) 74 - 99 mg/dL    BUN 13 7.0 - 18 MG/DL    Creatinine 0.55 (L) 0.6 - 1.3 MG/DL    BUN/Creatinine ratio 24 (H) 12 - 20      GFR est AA >60 >60 ml/min/1.73m2    GFR est non-AA >60 >60 ml/min/1.73m2    Calcium 8.7 8.5 - 10.1 MG/DL    Bilirubin, total 0.4 0.2 - 1.0 MG/DL    ALT (SGPT) 15 13 - 56 U/L    AST (SGOT) 17 15 - 37 U/L    Alk.  phosphatase 50 45 - 117 U/L    Protein, total 6.5 6.4 - 8.2 g/dL    Albumin 3.1 (L) 3.4 - 5.0 g/dL    Globulin 3.4 2.0 - 4.0 g/dL    A-G Ratio 0.9 0.8 - 1.7     URINALYSIS W/ RFLX MICROSCOPIC    Collection Time: 09/07/17  9:40 PM   Result Value Ref Range    Color YELLOW      Appearance CLEAR      Specific gravity >1.030 (H) 1.005 - 1.030    pH (UA) 6.5 5.0 - 8.0      Protein NEGATIVE  NEG mg/dL    Glucose NEGATIVE  NEG mg/dL    Ketone NEGATIVE  NEG mg/dL    Bilirubin NEGATIVE  NEG      Blood NEGATIVE  NEG      Urobilinogen 1.0 0.2 - 1.0 EU/dL    Nitrites NEGATIVE  NEG      Leukocyte Esterase NEGATIVE  NEG     PROTHROMBIN TIME + INR    Collection Time: 09/07/17  9:40 PM   Result Value Ref Range    Prothrombin time 17.9 (H) 11.5 - 15.2 sec    INR 1.5 (H) 0.8 - 1.2     PTT    Collection Time: 09/07/17  9:40 PM   Result Value Ref Range    aPTT 33.0 23.0 - 36.4 SEC   HCG URINE, QL    Collection Time: 09/07/17  9:40 PM   Result Value Ref Range    HCG urine, Ql. NEGATIVE  NEG           MDM  Number of Diagnoses or Management Options  Exacerbation of Crohn's disease, with rectal bleeding [facility]):   Diagnosis management comments: crohns flare, intrabd infection, PUD, gastritis, AVM, colonic polyps, inflammatory bowel disease, anal fissure,  internal/external hemorrhoid, Ischemic colitis/mesenteric ischemia, diverticulosis, Colonic/rectal varices, Small bowel ulceration        Amount and/or Complexity of Data Reviewed  Clinical lab tests: ordered and reviewed  Review and summarize past medical records: yes      ED Course     MEDICATIONS GIVEN:  Medications   ondansetron (ZOFRAN) injection 4 mg (4 mg IntraVENous Refused 9/7/17 2230)   pantoprazole (PROTONIX) injection 40 mg (40 mg IntraVENous Given 9/7/17 2230)   sodium chloride 0.9 % bolus infusion 1,000 mL (1,000 mL IntraVENous New Bag 9/7/17 2230)   dicyclomine (BENTYL) 10 mg/mL injection 20 mg (20 mg IntraMUSCular Given 9/7/17 2358)       Procedures     PROCEDURE NOTE - RECTAL EXAM:   8:36 PM  Performed by: Lorane Gottron, PA-C  Rectal exam performed. Brown stool was collected. Stool was Hemoccult tested, and found to be heme Positive, with bright red blood. The procedure took 1-15 minutes, and pt tolerated well. Written by Gina Bueno ED Scribe, as dictated by Lorane Gottron, PA-C. PROGRESS NOTE:   7:59 PM  Initial assesment performed. Written by Gina Bueno ED Scribe, as dictated by Lorane Gottron, PA-C.    CONSULT NOTE:   10:23 PM  Lorane Gottron, PA-C spoke with Margarita Espinosa MD   Specialty: GI  Discussed pt's hx, disposition, and available diagnostic and imaging results over the telephone. He is familiar with pt, she has been seen for similar complaints in several EDs and by several specialist. He performed a colonoscopy early this year and advised a colectomy, which pt refused. Upon refusal he recommended pt starting a biologic, which pt was unable to afford due to lack of insurance. Arrangement were made by her tx team to have pt f/u at [facility] for treat indigent care however pt did not follow up. Pt has had several abd CT, most recent being on 8/1/17 at this facility while experiencing similar sxs. Does not feel that repeat CT is warranted. Afebrile and non toxic. Suspects slight WBC elevated due to current Pednisone use vs intrabd infection.        PROGRESS NOTE:  11:00 PM  Discussed pt's hx and available diagnostic and imaging results with Monica Simmons. Waldo Eng MD, the pt's attending. Reviewed care plans and Monica Simmons. Waldo Eng MD is in agreement with the plan of care. Written by RUTH Butler, as dictated by Stefanie Sanchez PA-C.     DISCUSSION:  She has a hx of treatment non compliance and drug seeking behavior as noted by several providers at several different facilities and hx of opiate dependence. Pt requesting narcotic pain medication. However, given hx will not give narcotic. Pt was seen and admitted weeks ago at [facility] for GI bleed and had transfusion. Had pt stop Coumadin during hospital stay and instruct to hold coumadin for 2 weeks until bleed has stopped, she was also started on Prednisone at that time which she reports compliance with. Pt felt \"funny\" the day after discharge from the hospital and resumed Coumadin use then began having rectal bleeding again. Advised pt hold Coumadin again as previously instructed and follow up with PCP and GI. Reviewed care plans. Consulting physician agrees with plans as outlined. DISCHARGE NOTE:  11:31 PM  All [facility]  results have been reviewed with her. She has been counseled regarding her diagnosis, treatment, and plan. She verbally conveys understanding and agreement of the signs, symptoms, diagnosis, treatment and prognosis and additionally agrees to follow up as discussed. She also agrees with the care-plan and conveys that all of her questions have been answered. I have also provided discharge instructions for her that include: educational information regarding their diagnosis and treatment, and list of reasons why they would want to return to the ED prior to their follow-up appointment, should her condition change. CLINICAL IMPRESSION:    1. Exacerbation of Crohn's disease, with rectal bleeding ([facility] [address]        PLAN:  1. D/C Home  2.    Discharge Medication List as of 9/7/2017 11:32 PM      START taking these medications    Details   dicyclomine (BENTYL) 20 mg tablet Take 1 Tab by mouth every six (6) hours for 20 doses. , Normal, Disp-20 Tab, R-0         CONTINUE these medications which have CHANGED    Details   ondansetron hcl (ZOFRAN, AS HYDROCHLORIDE,) 4 mg tablet Take 1 Tab by mouth every eight (8) hours as needed for Nausea., Normal, Disp-15 Tab, R-0         CONTINUE these medications which have NOT CHANGED    Details   levoFLOXacin (LEVAQUIN) 750 mg tablet Take 1 Tab by mouth daily. , Normal, Disp-10 Tab, R-0      metroNIDAZOLE (FLAGYL) 500 mg tablet Take 1 Tab by mouth three (3) times daily. , Normal, Disp-30 Tab, R-0      acetaminophen-codeine (TYLENOL-CODEINE #3) 300-30 mg per tablet Take 1 Tab by mouth every six (6) hours as needed for Pain. Max Daily Amount: 4 Tabs., Print, Disp-6 Tab, R-0      ELETRIPTAN HBR (RELPAX PO) Take  by mouth., Historical Med      predniSONE (STERAPRED DS) 10 mg dose pack Take as directed, Normal, Disp-48 Tab, R-0      lisinopril (PRINIVIL, ZESTRIL) 10 mg tablet Take 10 mg by mouth daily. , Historical Med      warfarin (COUMADIN) 7.5 mg tablet Take 1 Tab by mouth daily. , Print, Disp-10 Tab, R-0      carvedilol (COREG) 3.125 mg tablet Take 1 Tab by mouth two (2) times daily (with meals). , Print, Disp-60 Tab, R-0      atorvastatin (LIPITOR) 80 mg tablet Take 1 Tab by mouth nightly. , Print, Disp-30 Tab, R-2      gabapentin (NEURONTIN) 400 mg capsule Take 1 Cap by mouth three (3) times daily. , Print, Disp-90 Cap, R-2         STOP taking these medications       dicyclomine (BENTYL) 10 mg capsule Comments:   Reason for Stopping:             3.   Follow-up Information     Follow up With Details Comments Contact Info    Suzanne Major MD Schedule an appointment as soon as possible for a visit in 2 days For PCP follow up [address] Cleone Homans, MD Schedule an appointment as soon as possible for a visit in 2 days For GI follow up [address] [place] [address] [address] [facility] EMERGENCY DEPT  As needed, If symptoms worsen [address] [place] [id number]  [phone]            ATTESTATION:  This note is prepared by Andreea Seirra, acting as a Scribe for Marcela Song PA-C on 7:59 PM on 9/7/2017 . Marcela Song PA-C: The scribe's documentation has been prepared under my direction and personally reviewed by me in its entirety.

## 2017-09-08 NOTE — ED NOTES
Presented to ED to be evaluated for reported rectal bleeding with reported worsening x 2 days. Patient also reports abdominal pain at this time. Patient reports pain as documented. Patient states hx of crohn's and reports on coumadin as well. Patient also reports headache and dizziness with no reported injury. Patient is noted to be resting to position of comfort at this time with no s/s distress noted.

## 2017-09-08 NOTE — ED NOTES
Medication not administered at this time. Patient is refusing all medications prescribed by ER Provider. Patient is requesting \"pain medications\".  ER Provider informed of patient refusal.

## 2017-09-08 NOTE — DISCHARGE INSTRUCTIONS
Crohn's Disease: Care Instructions  Your Care Instructions    Crohn's disease is a lifelong inflammatory bowel disease (IBD). Parts of the digestive tract get swollen and irritated and may develop deep sores called ulcers. Crohn's disease usually occurs in the last part of the small intestine and the first part of the large intestine. But it can develop anywhere from the mouth to the anus. The main symptoms of Crohn's disease are belly pain, diarrhea, fever, and weight loss. Some people may have constipation. Crohn's disease also sometimes causes problems with the joints, eyes, or skin. Your symptoms may be mild at some times and severe at others. The disease can also go into remission, which means that it is not active and you have no symptoms. Bad attacks of Crohn's disease often have to be treated in the hospital so that you can get medicines and liquids through a tube in your vein, called an IV. This gives your digestive system time to rest and recover. Talk with your doctor about the best treatments for you. You may need medicines that help prevent or treat flare-ups of the disease. You may need surgery to remove part of your bowel if you have an abnormal opening in the bowel (fistula), an abscess, or a bowel obstruction. In some cases, surgery is needed if medicines do not work. But symptoms often return to other areas of the intestines after surgery. Learning good self-care can help you reduce your symptoms and manage Crohn's disease. Follow-up care is a key part of your treatment and safety. Be sure to make and go to all appointments, and call your doctor if you are having problems. Its also a good idea to know your test results and keep a list of the medicines you take. How can you care for yourself at home? · Take your medicines exactly as prescribed. Call your doctor if you think you are having a problem with your medicine.  You will get more details on the specific medicines your doctor prescribes. · Do not take anti-inflammatory medicines, such as aspirin, ibuprofen (Advil, Motrin), or naproxen (Aleve). They may make your symptoms worse. Do not take any other medicines or herbal products without talking to your doctor first.  · Avoid foods that make your symptoms worse. These might include milk, alcohol, high-fiber foods, or spicy foods. · Eat a healthy diet. Make sure to get enough iron. Rectal bleeding may make you lose iron. Good sources of iron include beef, lentils, spinach, raisins, and iron-enriched breads and cereals. · Drink liquid meal replacements if your doctor recommends them. These are high in calories and contain vitamins and minerals. Severe symptoms may make it hard for your body to absorb vitamins and minerals. · Do not smoke. Smoking makes Crohn's disease worse. If you need help quitting, talk to your doctor about stop-smoking programs and medicines. These can increase your chances of quitting for good. · Seek support from friends and family to help cope with Crohn's disease. The illness can affect all parts of your life. Get counseling if you need it. When should you call for help? Call 911 anytime you think you may need emergency care. For example, call if:  · You have severe belly pain. · You passed out (lost consciousness). Call your doctor now or seek immediate medical care if:  · You have signs of needing more fluids. You have sunken eyes and a dry mouth, and you pass only a little dark urine. · You have pain and swelling in the anal area, or you have pus draining from the anal area. · You have a fever or shaking chills. · Your belly is bloated. Watch closely for changes in your health, and be sure to contact your doctor if:  · Your symptoms get worse. · You have diarrhea for more than 2 weeks. · Your pain is not steadily getting better. · You have unexplained weight loss. Where can you learn more?   Go to http://pam.info/. Enter 21  in the search box to learn more about \"Crohn's Disease: Care Instructions. \"  Current as of: August 9, 2016  Content Version: 11.3  © 2809-2878 [employer]. Care instructions adapted under license by [employer] (which disclaims liability or warranty for this information). If you have questions about a medical condition or this instruction, always ask your healthcare professional. [place] [address] any warranty or liability for your use of this information.

## 2017-09-08 NOTE — ED NOTES
Patient has refused additional medication ordered at this time. ER Provider in room with patient and is aware of patient refusal at this time.

## 2017-09-25 ENCOUNTER — HOSPITAL ENCOUNTER (EMERGENCY)
Age: 47
Discharge: HOME | End: 2017-09-26
Attending: EMERGENCY MEDICINE
Payer: MEDICAID

## 2017-09-25 DIAGNOSIS — K50.919 CROHN'S DISEASE WITH COMPLICATION, UNSPECIFIED GASTROINTESTINAL TRACT LOCATION (HCC): Primary | ICD-10-CM

## 2017-09-25 PROCEDURE — 99281 EMR DPT VST MAYX REQ PHY/QHP: CPT

## 2017-09-26 VITALS
RESPIRATION RATE: 18 BRPM | DIASTOLIC BLOOD PRESSURE: 99 MMHG | WEIGHT: 185 LBS | TEMPERATURE: 97.9 F | HEIGHT: 66 IN | OXYGEN SATURATION: 100 % | BODY MASS INDEX: 29.73 KG/M2 | SYSTOLIC BLOOD PRESSURE: 135 MMHG | HEART RATE: 94 BPM

## 2017-09-26 NOTE — ED PROVIDER NOTES
HPI Comments: 12:18 AM    Dorie Lugo is a 56 y.o. female with pertinent PMHx of Crohn's disease, anemia, HTN, C. Diff, sickle cell trait, tubal ligation, and hyperkalemia presenting ambulatory to the ED c/o 8/10 abdominal pain, which she attributes to a Chron's flare, x 2-3 days. Pt notes associated symptoms of N/V/D (watery diarrhea), subjective fevers, and streaks of blood in her stool. Pt denies taking any medications for her sxs today, excluding steroids. Per chart review, pt was seen at [facility] less than 24 hours ago. Pt was given Cipro, Bentyl, and Phenergan, but states that they have given her no relief. Pt states that they took blood during this visit, but she left before getting results. Pt denies any history of abdominal surgery due to her Chron's disease. Pt states that her PCP is currently following her for her sxs, as she did not like her previous GI physician. Pt states that she saw her PCP today and was referred to another GI physician. Pt is on blood thinners, but notes that she has not been taking it over the last week due to her sx of blood in her stool. Pt specifically requests a CT scan or other imaging, stating \"I'm not going to lay at home like this\". PCP: Dmitry Dukes MD  Social Hx: - tobacco use, - alcohol use, - illicit drug use    There are no other complaints, changes, or physical findings at this time. The history is provided by the patient. No  was used.         Past Medical History:   Diagnosis Date    Abdominal pain     Anemia NEC     sickle cell trait    C. difficile colitis     Crohn's disease (HCC)     Gastrointestinal disorder     Crohns    Herpes zoster     Hx of cocaine abuse     Hyperkalemia     Hypertension     Iron deficiency anemia     MRSA (methicillin resistant Staphylococcus aureus)     Obesity     Pain management     Sickle cell trait (HCC)     Stroke ([facility] [address]     + cva 3/17    Thromboembolus [facility])        Past Surgical History:   Procedure Laterality Date    COLONOSCOPY N/A 3/17/2017    COLONOSCOPY; BIOPSY; performed by Hansa Tanner MD at [facility] ENDOSCOPY    HX GYN      tubal ligation    HX TUBAL LIGATION      VASCULAR SURGERY PROCEDURE UNLIST           History reviewed. No pertinent family history. Social History     Social History    Marital status:      Spouse name: N/A    Number of children: N/A    Years of education: N/A     Occupational History    Not on file. Social History Main Topics    Smoking status: Former Smoker    Smokeless tobacco: Never Used    Alcohol use No    Drug use: No    Sexual activity: Yes     Partners: Male     Birth control/ protection: Surgical     Other Topics Concern    Not on file     Social History Narrative         ALLERGIES: Humira [adalimumab] and Remicade [infliximab]    Review of Systems   Constitutional: Positive for fever. Respiratory: Negative for shortness of breath. Cardiovascular: Negative for chest pain. Gastrointestinal: Positive for abdominal pain, blood in stool, diarrhea, nausea and vomiting. Skin: Negative for rash. All other systems reviewed and are negative. Vitals:    09/26/17 0000   BP: (!) 135/99   Pulse: 94   Resp: 18   Temp: 97.9 °F (36.6 °C)   SpO2: 100%   Weight: 83.9 kg (185 lb)   Height: 5' 6\" (1.676 m)            Physical Exam   Constitutional: She is oriented to person, place, and time. She appears well-developed and well-nourished. No distress. Morbidly obese  Comfortable appearing  Nontoxic   HENT:   Head: Normocephalic and atraumatic. Eyes: EOM are normal. Pupils are equal, round, and reactive to light. Neck: Normal range of motion. Neck supple. Cardiovascular: Normal rate, normal heart sounds and intact distal pulses. Pulmonary/Chest: Effort normal and breath sounds normal. No respiratory distress. Abdominal: Soft. Bowel sounds are normal. She exhibits no distension. There is tenderness (diffuse). Musculoskeletal: Normal range of motion. She exhibits no edema or tenderness. Neurological: She is alert and oriented to person, place, and time. Skin: Skin is warm and dry. No rash noted. Psychiatric: She has a normal mood and affect. Her behavior is normal.   Nursing note and vitals reviewed. RESULTS:  PULSE OXIMETRY NOTE:  Pulse-ox is 100% on RA  Interpretation: NML       No orders to display        Labs Reviewed - No data to display    No results found for this or any previous visit (from the past 12 hour(s)). MDM  Number of Diagnoses or Management Options  Crohn's disease with complication, unspecified gastrointestinal tract location [facility]):   Diagnosis management comments: Likely having Chron's flare, highly doubt that this represents obstruction, intraabdominal abscess or fistula. Previous visits to the ER, incl most recent to [facility] and PCP were discussed but not originally acknowledged by patient until we read their notes to her out loud. She then acknowledged the visits and medications given, but stated those meds given do nothing for her pain. Once I told her that we would not add pain medications for her chronic issue, she stated, \"Well, I will just go to [facility] then\". Amount and/or Complexity of Data Reviewed  Decide to obtain previous medical records or to obtain history from someone other than the patient: yes  Obtain history from someone other than the patient: yes  Review and summarize past medical records: yes      ED Course     Medications - No data to display     Procedures    PROGRESS NOTE:  12:18 AM  Initial assessment performed. Written by Marbin Beatty ED Scribe, as dictated by Jolinda Peabody Aimee Kief, MD.    DISCHARGE NOTE:  12:37 AM  The patient is ready for discharge. The patient's signs, symptoms, diagnosis, and discharge instructions have been discussed and the patient and/or family has conveyed their understanding.  The patient and/or family is to follow up as recommended or return to the ER should their symptoms worsen. Plan has been discussed and the patient and/or family is in agreement. Written by David Barnes, ED Scribe, as dictated by Sherwin Kussmaul Estell Nettle, MD.     CLINICAL IMPRESSION:  1. Crohn's disease with complication, unspecified gastrointestinal tract location ([facility] [address]        PLAN:  1. D/C Home    2. Current Discharge Medication List      CONTINUE these medications which have NOT CHANGED    Details   metroNIDAZOLE (FLAGYL) 500 mg tablet Take 1 Tab by mouth three (3) times daily. Qty: 30 Tab, Refills: 0      predniSONE (STERAPRED DS) 10 mg dose pack Take as directed  Qty: 48 Tab, Refills: 0      lisinopril (PRINIVIL, ZESTRIL) 10 mg tablet Take 10 mg by mouth daily. carvedilol (COREG) 3.125 mg tablet Take 1 Tab by mouth two (2) times daily (with meals). Qty: 60 Tab, Refills: 0      gabapentin (NEURONTIN) 400 mg capsule Take 1 Cap by mouth three (3) times daily. Qty: 90 Cap, Refills: 2      ondansetron hcl (ZOFRAN, AS HYDROCHLORIDE,) 4 mg tablet Take 1 Tab by mouth every eight (8) hours as needed for Nausea. Qty: 15 Tab, Refills: 0      promethazine (PHENERGAN) 25 mg tablet Take 1 Tab by mouth every six (6) hours as needed. Qty: 10 Tab, Refills: 0      ELETRIPTAN HBR (RELPAX PO) Take  by mouth.      warfarin (COUMADIN) 7.5 mg tablet Take 1 Tab by mouth daily. Qty: 10 Tab, Refills: 0      atorvastatin (LIPITOR) 80 mg tablet Take 1 Tab by mouth nightly.   Qty: 30 Tab, Refills: 2             3.   Follow-up Information     Follow up With Details Comments Contact Info    Nicky Cuellar MD Schedule an appointment as soon as possible for a visit for primary care follow up, as discussed [address] [facility] EMERGENCY DEPT  As needed, If symptoms worsen [address]  [phone]          SCRIBE ATTESTATION:  This note is prepared by Jessica Gomez, acting as Scribe for Penelope Lundy. Diane Pittman MD.    PROVIDER ATTESTATION:  Penelope Lundy. Diane Pittman MD: The scribe's documentation has been prepared under my direction and personally reviewed by me in its entirety. I confirm that the note above accurately reflects all work, treatment, procedures, and medical decision making performed by me.

## 2017-09-26 NOTE — DISCHARGE INSTRUCTIONS

## 2017-09-26 NOTE — ED TRIAGE NOTES
Pt C/O nausea and vomiting with bloody diarrhea. I am having stomach pain and burning, running fevers. My Crohn's is acting up. \"

## 2017-10-30 ENCOUNTER — APPOINTMENT (OUTPATIENT)
Dept: MRI IMAGING | Age: 47
End: 2017-10-30
Attending: EMERGENCY MEDICINE
Payer: MEDICAID

## 2017-10-30 ENCOUNTER — HOSPITAL ENCOUNTER (EMERGENCY)
Age: 47
Discharge: HOME | End: 2017-10-30
Attending: EMERGENCY MEDICINE | Admitting: EMERGENCY MEDICINE
Payer: MEDICAID

## 2017-10-30 VITALS
RESPIRATION RATE: 16 BRPM | BODY MASS INDEX: 29.73 KG/M2 | DIASTOLIC BLOOD PRESSURE: 90 MMHG | TEMPERATURE: 98.8 F | SYSTOLIC BLOOD PRESSURE: 133 MMHG | OXYGEN SATURATION: 100 % | WEIGHT: 185 LBS | HEIGHT: 66 IN | HEART RATE: 72 BPM

## 2017-10-30 DIAGNOSIS — Z91.14 NON COMPLIANCE W MEDICATION REGIMEN: ICD-10-CM

## 2017-10-30 DIAGNOSIS — N39.0 ACUTE UTI: ICD-10-CM

## 2017-10-30 DIAGNOSIS — R79.1 SUBTHERAPEUTIC INTERNATIONAL NORMALIZED RATIO (INR): ICD-10-CM

## 2017-10-30 DIAGNOSIS — Z86.73 H/O STROKE WITHIN LAST YEAR: ICD-10-CM

## 2017-10-30 DIAGNOSIS — G93.41 METABOLIC ENCEPHALOPATHY: Primary | ICD-10-CM

## 2017-10-30 LAB
ALBUMIN SERPL-MCNC: 3.2 G/DL (ref 3.4–5)
ALBUMIN/GLOB SERPL: 1 {RATIO} (ref 0.8–1.7)
ALP SERPL-CCNC: 44 U/L (ref 45–117)
ALT SERPL-CCNC: 23 U/L (ref 13–56)
ANION GAP SERPL CALC-SCNC: 9 MMOL/L (ref 3–18)
APPEARANCE UR: CLEAR
APTT PPP: 27.1 SEC (ref 23–36.4)
AST SERPL-CCNC: 15 U/L (ref 15–37)
BACTERIA URNS QL MICRO: ABNORMAL /HPF
BASOPHILS # BLD: 0 K/UL (ref 0–0.06)
BASOPHILS NFR BLD: 0 % (ref 0–2)
BILIRUB SERPL-MCNC: 0.3 MG/DL (ref 0.2–1)
BILIRUB UR QL: NEGATIVE
BUN SERPL-MCNC: 10 MG/DL (ref 7–18)
BUN/CREAT SERPL: 14 (ref 12–20)
CALCIUM SERPL-MCNC: 8.2 MG/DL (ref 8.5–10.1)
CHLORIDE SERPL-SCNC: 109 MMOL/L (ref 100–108)
CK MB CFR SERPL CALC: NORMAL % (ref 0–4)
CK MB SERPL-MCNC: <1 NG/ML (ref 5–25)
CK SERPL-CCNC: 55 U/L (ref 26–192)
CO2 SERPL-SCNC: 25 MMOL/L (ref 21–32)
COLOR UR: YELLOW
CREAT SERPL-MCNC: 0.7 MG/DL (ref 0.6–1.3)
DIFFERENTIAL METHOD BLD: ABNORMAL
EOSINOPHIL # BLD: 0.2 K/UL (ref 0–0.4)
EOSINOPHIL NFR BLD: 2 % (ref 0–5)
EPITH CASTS URNS QL MICRO: ABNORMAL /LPF (ref 0–5)
ERYTHROCYTE [DISTWIDTH] IN BLOOD BY AUTOMATED COUNT: 16.2 % (ref 11.6–14.5)
GLOBULIN SER CALC-MCNC: 3.2 G/DL (ref 2–4)
GLUCOSE BLD STRIP.AUTO-MCNC: 90 MG/DL (ref 70–110)
GLUCOSE SERPL-MCNC: 81 MG/DL (ref 74–99)
GLUCOSE UR STRIP.AUTO-MCNC: NEGATIVE MG/DL
HCT VFR BLD AUTO: 31.6 % (ref 35–45)
HGB BLD-MCNC: 9.6 G/DL (ref 12–16)
HGB UR QL STRIP: NEGATIVE
INR PPP: 1.2 (ref 0.8–1.2)
KETONES UR QL STRIP.AUTO: ABNORMAL MG/DL
LEUKOCYTE ESTERASE UR QL STRIP.AUTO: ABNORMAL
LIPASE SERPL-CCNC: 106 U/L (ref 73–393)
LYMPHOCYTES # BLD: 2 K/UL (ref 0.9–3.6)
LYMPHOCYTES NFR BLD: 28 % (ref 21–52)
MCH RBC QN AUTO: 23.4 PG (ref 24–34)
MCHC RBC AUTO-ENTMCNC: 30.4 G/DL (ref 31–37)
MCV RBC AUTO: 76.9 FL (ref 74–97)
MONOCYTES # BLD: 0.5 K/UL (ref 0.05–1.2)
MONOCYTES NFR BLD: 8 % (ref 3–10)
NEUTS SEG # BLD: 4.3 K/UL (ref 1.8–8)
NEUTS SEG NFR BLD: 62 % (ref 40–73)
NITRITE UR QL STRIP.AUTO: NEGATIVE
PH UR STRIP: 5 [PH] (ref 5–8)
PLATELET # BLD AUTO: 283 K/UL (ref 135–420)
PMV BLD AUTO: 8.9 FL (ref 9.2–11.8)
POTASSIUM SERPL-SCNC: 3.3 MMOL/L (ref 3.5–5.5)
PROT SERPL-MCNC: 6.4 G/DL (ref 6.4–8.2)
PROT UR STRIP-MCNC: NEGATIVE MG/DL
PROTHROMBIN TIME: 14.9 SEC (ref 11.5–15.2)
RBC # BLD AUTO: 4.11 M/UL (ref 4.2–5.3)
RBC #/AREA URNS HPF: ABNORMAL /HPF (ref 0–5)
SODIUM SERPL-SCNC: 143 MMOL/L (ref 136–145)
SP GR UR REFRACTOMETRY: 1.02 (ref 1–1.03)
TROPONIN I SERPL-MCNC: <0.02 NG/ML (ref 0–0.06)
UROBILINOGEN UR QL STRIP.AUTO: 1 EU/DL (ref 0.2–1)
WBC # BLD AUTO: 6.9 K/UL (ref 4.6–13.2)
WBC URNS QL MICRO: ABNORMAL /HPF (ref 0–5)

## 2017-10-30 PROCEDURE — 82550 ASSAY OF CK (CPK): CPT | Performed by: EMERGENCY MEDICINE

## 2017-10-30 PROCEDURE — 85610 PROTHROMBIN TIME: CPT | Performed by: EMERGENCY MEDICINE

## 2017-10-30 PROCEDURE — 70551 MRI BRAIN STEM W/O DYE: CPT

## 2017-10-30 PROCEDURE — 99285 EMERGENCY DEPT VISIT HI MDM: CPT

## 2017-10-30 PROCEDURE — 85730 THROMBOPLASTIN TIME PARTIAL: CPT | Performed by: EMERGENCY MEDICINE

## 2017-10-30 PROCEDURE — 82962 GLUCOSE BLOOD TEST: CPT

## 2017-10-30 PROCEDURE — 80053 COMPREHEN METABOLIC PANEL: CPT | Performed by: EMERGENCY MEDICINE

## 2017-10-30 PROCEDURE — 87086 URINE CULTURE/COLONY COUNT: CPT | Performed by: EMERGENCY MEDICINE

## 2017-10-30 PROCEDURE — 85025 COMPLETE CBC W/AUTO DIFF WBC: CPT | Performed by: EMERGENCY MEDICINE

## 2017-10-30 PROCEDURE — 83690 ASSAY OF LIPASE: CPT | Performed by: EMERGENCY MEDICINE

## 2017-10-30 PROCEDURE — 81001 URINALYSIS AUTO W/SCOPE: CPT | Performed by: EMERGENCY MEDICINE

## 2017-10-30 PROCEDURE — 74011250637 HC RX REV CODE- 250/637: Performed by: EMERGENCY MEDICINE

## 2017-10-30 RX ORDER — BUTALBITAL, ACETAMINOPHEN AND CAFFEINE 50; 325; 40 MG/1; MG/1; MG/1
2 TABLET ORAL
Status: COMPLETED | OUTPATIENT
Start: 2017-10-30 | End: 2017-10-30

## 2017-10-30 RX ORDER — BUTALBITAL, ACETAMINOPHEN AND CAFFEINE 300; 40; 50 MG/1; MG/1; MG/1
1 CAPSULE ORAL
Qty: 12 CAP | Refills: 0 | Status: SHIPPED | OUTPATIENT
Start: 2017-10-30 | End: 2018-04-13

## 2017-10-30 RX ORDER — PREDNISONE 10 MG/1
10 TABLET ORAL
COMMUNITY
End: 2017-12-11

## 2017-10-30 RX ORDER — SULFAMETHOXAZOLE AND TRIMETHOPRIM 800; 160 MG/1; MG/1
1 TABLET ORAL 2 TIMES DAILY
Qty: 14 TAB | Refills: 0 | Status: SHIPPED | OUTPATIENT
Start: 2017-10-30 | End: 2017-11-06

## 2017-10-30 RX ADMIN — BUTALBITAL, ACETAMINOPHEN AND CAFFEINE 2 TABLET: 50; 325; 40 TABLET ORAL at 12:03

## 2017-10-30 NOTE — DISCHARGE INSTRUCTIONS
Urinary Tract Infection in Women: Care Instructions  Your Care Instructions    A urinary tract infection, or UTI, is a general term for an infection anywhere between the kidneys and the urethra (where urine comes out). Most UTIs are bladder infections. They often cause pain or burning when you urinate. UTIs are caused by bacteria and can be cured with antibiotics. Be sure to complete your treatment so that the infection goes away. Follow-up care is a key part of your treatment and safety. Be sure to make and go to all appointments, and call your doctor if you are having problems. It's also a good idea to know your test results and keep a list of the medicines you take. How can you care for yourself at home? · Take your antibiotics as directed. Do not stop taking them just because you feel better. You need to take the full course of antibiotics. · Drink extra water and other fluids for the next day or two. This may help wash out the bacteria that are causing the infection. (If you have kidney, heart, or liver disease and have to limit fluids, talk with your doctor before you increase your fluid intake.)  · Avoid drinks that are carbonated or have caffeine. They can irritate the bladder. · Urinate often. Try to empty your bladder each time. · To relieve pain, take a hot bath or lay a heating pad set on low over your lower belly or genital area. Never go to sleep with a heating pad in place. To prevent UTIs  · Drink plenty of water each day. This helps you urinate often, which clears bacteria from your system. (If you have kidney, heart, or liver disease and have to limit fluids, talk with your doctor before you increase your fluid intake.)  · Urinate when you need to. · Urinate right after you have sex. · Change sanitary pads often. · Avoid douches, bubble baths, feminine hygiene sprays, and other feminine hygiene products that have deodorants.   · After going to the bathroom, wipe from front to back.  When should you call for help? Call your doctor now or seek immediate medical care if:  ? · Symptoms such as fever, chills, nausea, or vomiting get worse or appear for the first time. ? · You have new pain in your back just below your rib cage. This is called flank pain. ? · There is new blood or pus in your urine. ? · You have any problems with your antibiotic medicine. ? Watch closely for changes in your health, and be sure to contact your doctor if:  ? · You are not getting better after taking an antibiotic for 2 days. ? · Your symptoms go away but then come back. Where can you learn more? Go to http://johan-maria del carmen.info/. Enter Q689 in the search box to learn more about \"Urinary Tract Infection in Women: Care Instructions. \"  Current as of: May 12, 2017  Content Version: 11.4  © 5578-3985 [employer]. Care instructions adapted under license by [employer] (which disclaims liability or warranty for this information). If you have questions about a medical condition or this instruction, always ask your healthcare professional. [address] any warranty or liability for your use of this information. Learning About Metabolic Encephalopathy  What is metabolic encephalopathy? Metabolic encephalopathy is a problem in the brain. It is caused by a chemical imbalance in the blood. The imbalance is caused by an illness or organs that are not working as well as they should. It is not caused by a head injury. When the imbalance affects the brain, it can lead to personality changes. It can also make it harder to think clearly and remember things. The problems may only last a short time if you get treatment right away. But this depends on the cause. If the imbalance has been building up because you've been sick for a long time, the mental changes may be more severe. They may also last longer.   What happens when you have this problem? When things are working right, your body has many ways to keep the chemicals in your blood in balance. For example, your liver and kidneys remove waste from your blood. The kidneys also help keep fluids and sodium in balance. And your pancreas makes insulin. It is a hormone that helps control the amount of sugar in your blood. But the chemicals in your blood can get out of balance and damage parts of your body because of a medical problem. This may be kidney or liver failure. Or it could be diabetes that isn't controlled well. When the imbalance affects the brain, normal thinking and behavior can change. What are the symptoms? Symptoms may include:  · Confusion. · Problems with thinking and remembering. · Being grouchy and depressed. · Feeling drowsy. · Not being able to sleep. · Passing out (fainting) now and then. How is it treated? The doctor will try to find the illness that's causing the problem. He or she may ask questions about your past health. The doctor will also do tests to find what is causing the chemical imbalance and to see how severe it is. The doctor may treat the organ system that's causing the problem. For example, if it's a kidney problem, you may have treatment to help your kidneys work better. If you have an infection, you may need antibiotics. If the doctor can't treat the cause of the problem, he or she will treat the symptoms. The doctor will carefully watch your blood chemicals to make sure that your treatment is being done safely. Follow-up care is a key part of your treatment and safety. Be sure to make and go to all appointments, and call your doctor if you are having problems. It's also a good idea to know your test results and keep a list of the medicines you take. Where can you learn more? Go to http://johan-maria del carmen.info/. Enter D258 in the search box to learn more about \"Learning About Metabolic Encephalopathy. \"  Current as of:  May 12, 2017  Content Version: 11.4  © 6897-6062 [employer]. Care instructions adapted under license by [employer] (which disclaims liability or warranty for this information). If you have questions about a medical condition or this instruction, always ask your healthcare professional. [address] any warranty or liability for your use of this information.

## 2017-10-30 NOTE — ED TRIAGE NOTES
Pt has hx of CVA, pt reports lt arm weakness/numbness and headaches onset around 11 pm last night, pt has lt facial droop and weakness on lt extremities. Pt has no slurred speech. Pt brought to hospital by son. Dr. Jimbo Silver at bedside  Sepsis Screening completed    (  )Patient meets SIRS criteria. ( x )Patient does not meet SIRS criteria.       SIRS Criteria is achieved when two or more of the following are present   Temperature < 96.8°F (36°C) or > 100.9°F (38.3°C)   Heart Rate > 90 beats per minute   Respiratory Rate > 20 breaths per minute   WBC count > 12,000 or <4,000 or > 10% bands

## 2017-10-30 NOTE — ED PROVIDER NOTES
[address]  EMERGENCY DEPARTMENT HISTORY AND PHYSICAL EXAM       Date: 10/30/2017   Patient Name: Bryson Lantigua   YOB: 1970  Medical Record Number: [id number]    History of Presenting Illness     Chief Complaint   Patient presents with    Numbness        History Provided By:  patient    Additional History: 9:20 AM   Bryson Lantigua is a 56 y.o. female with pmhx of CVA (7 months ago), thromboembolus, and Crohn's disease who presents to the emergency department C/O episodes of \"minutes long\" left hand numbness starting 10 hours ago. Associated sxs include left arm weakness, and headache starting 2 days ago. Patient has taken Tylenol without relief of sxs. Reports she takes Coumadin due to her hx of thromboemboli, but is occasionally non-compliant due to Crohn's. Last dose of Coumadin last night. Reports sxs from prior CVA have resolved. Denies cigarette use, etoh use, and illicit drug use. Fhx includes HTN. Denies rectal bleeding, hematochezia, melena, and any other sxs or complaints.      Primary Care Provider: Nicky Cuellar MD   Specialist:    Past History     Past Medical History:   Past Medical History:   Diagnosis Date    Abdominal pain     Anemia NEC     sickle cell trait    C. difficile colitis     [facility] [facility]     Gastrointestinal disorder     Crohns    Herpes zoster     Hx of cocaine abuse     Hyperkalemia     Hypertension     Iron deficiency anemia     MRSA (methicillin resistant Staphylococcus aureus)     Obesity     Pain management     Sickle cell trait ([facility]     Stroke ([facility] [address] [facility]     + cva 3/17    Thromboembolus [facility])         Past Surgical History:   Past Surgical History:   Procedure Laterality Date    COLONOSCOPY N/A 3/17/2017    COLONOSCOPY; BIOPSY; performed by Josse Freedman MD at [facility] ENDOSCOPY    HX GYN      tubal ligation    HX TUBAL LIGATION      VASCULAR SURGERY PROCEDURE UNLIST          Family History:   History reviewed. No pertinent family history. Social History:   Social History   Substance Use Topics    Smoking status: Former Smoker    Smokeless tobacco: Never Used    Alcohol use No        Allergies: Allergies   Allergen Reactions    Humira [Adalimumab] Other (comments)    Remicade [Infliximab] Palpitations        Review of Systems   Review of Systems   Gastrointestinal: Negative for blood in stool. Neurological: Positive for weakness (left arm), numbness (left hand) and headaches. All other systems reviewed and are negative. Physical Exam  Vitals:    10/30/17 1215 10/30/17 1230 10/30/17 1236 10/30/17 1245   BP: 114/79 115/89 115/89 133/90   Pulse:   72    Resp:   16    Temp:       SpO2: 100% 100% 100%    Weight:       Height:           Physical Exam   Constitutional: She is oriented to person, place, and time. She appears well-developed and well-nourished. Non-toxic appearance. Overweight, well appearing   HENT:   Head: Normocephalic and atraumatic. Right Ear: External ear normal.   Left Ear: External ear normal.   Nose: Nose normal.   Mouth/Throat: Oropharynx is clear and moist.   Flattening of the left nasolabial fold. Eyes: Conjunctivae and EOM are normal. Pupils are equal, round, and reactive to light. Right eye exhibits no discharge. Left eye exhibits no discharge. No scleral icterus. Right eye exhibits no nystagmus. Left eye exhibits no nystagmus. No ocular deficits. Neck: Normal range of motion. Neck supple. No JVD present. No tracheal deviation present. Cardiovascular: Normal rate, regular rhythm, normal heart sounds and intact distal pulses. Pulmonary/Chest: Effort normal and breath sounds normal.   Abdominal: Soft. Bowel sounds are normal. She exhibits no distension. There is no tenderness. No HSM   Musculoskeletal: Normal range of motion. Neurological: She is alert and oriented to person, place, and time. She has normal reflexes.    Decreased coordination of the left upper extremity during pronator drift and finger-to-nose exercise. No noted leg weakness   Skin: Skin is warm and dry. No rash noted. Psychiatric: She has a normal mood and affect. Her behavior is normal.   Nursing note and vitals reviewed. Diagnostic Study Results     Labs -      No results found for this or any previous visit (from the past 12 hour(s)). Radiologic Studies -  The following have been ordered and reviewed:  MRI BRAIN WO CONT   Final Result   IMPRESSION:     1. No acute infarct, hemorrhage, mass effect, or herniation.     2.  Stable encephalomalacic changes most likely from chronic infarct involving  the right temporal and parietal lobes as well as right corona radiata. Stable  chronic bilateral cerebellar infarcts.     3. Stable chronic right orbital floor fracture with associated inferior  herniation of orbital fat. As read by the radiologist.            Medical Decision Making   I am the first provider for this patient. I reviewed the vital signs, available nursing notes, past medical history, past surgical history, family history and social history. Vital Signs-Reviewed the patient's vital signs. No data found. Pulse Oximetry Analysis - Normal 100% on room air     Cardiac Monitor:   Rate: 86 bpm  Rhythm: Normal Sinus Rhythm     Old Medical Records: Old medical records. Nursing notes. Provider Notes: More likely metabolic encephalopathy than new stroke. She is already on Coumadin and therefore not a tPA candidate. Case reviewed with our staff neurologist. Will look for sources of infection or metabolic problems as well as corrections. Maybe stable for discharge afterwards if MRI shows no new event. Procedures:   Procedures    ED Course:  9:20 AM  Initial assessment performed. The patients presenting problems have been discussed, and they are in agreement with the care plan formulated and outlined with them.   I have encouraged them to ask questions as they arise throughout their visit. 9:35 AM Discussed patient's history, exam, and available diagnostics results with Nikolas Somers MD, neurology, who states that the patient is not tPA candidate. She is recreating old symptoms. If MRI shows no new stroke, she is appropriate for discharge. 12:51 PM Spoke to Nikolas Somers MD who reviewed the patients MRI and states that there is no new stroke. Medications Given in the ED:  Medications   butalbital-acetaminophen-caffeine (FIORICET, ESGIC) -40 mg per tablet 2 Tab (2 Tabs Oral Given 10/30/17 1203)       Discharge Note:  1:42 PM   Pt has been reexamined. Patient has no new complaints, changes, or physical findings. Care plan outlined and precautions discussed. Results were reviewed with the patient. All medications were reviewed with the patient; will d/c home with Bactrim. All of pt's questions and concerns were addressed. Patient was instructed and agrees to follow up with neurology, as well as to return to the ED upon further deterioration. Patient is ready to go home. 10/31/17  Addendum:  We added a prescription for her headaches. Additionally, she knows that she should be taking her coumadin and re-iterates that she will f/u with her PCP. Diagnosis   Clinical Impression:   1. Metabolic encephalopathy    2. Acute UTI    3. H/O stroke within last year    4. Subtherapeutic international normalized ratio (INR)    5.  Non compliance w medication regimen         Follow-up Information     Follow up With Details Comments Contact Info    Nikolas Somers MD Schedule an appointment as soon as possible for a visit in 2 days For neurology follow up [address] [place] [address] [address] [place] [address] [address]      [facility] EMERGENCY DEPT  If symptoms worsen, As needed [address] [place] [address]  [phone]          Discharge Medication List as of 10/30/2017  1:51 PM      START taking these medications    Details   trimethoprim-sulfamethoxazole (BACTRIM DS) 160-800 mg per tablet Take 1 Tab by mouth two (2) times a day for 7 days. , Normal, Disp-14 Tab, R-0         CONTINUE these medications which have NOT CHANGED    Details   predniSONE (DELTASONE) 10 mg tablet Take 10 mg by mouth daily (with breakfast). , Historical Med      promethazine (PHENERGAN) 25 mg tablet Take 1 Tab by mouth every six (6) hours as needed., Normal, Disp-10 Tab, R-0      ELETRIPTAN HBR (RELPAX PO) Take  by mouth., Historical Med      lisinopril (PRINIVIL, ZESTRIL) 10 mg tablet Take 10 mg by mouth daily. , Historical Med      warfarin (COUMADIN) 7.5 mg tablet Take 1 Tab by mouth daily. , Print, Disp-10 Tab, R-0      carvedilol (COREG) 3.125 mg tablet Take 1 Tab by mouth two (2) times daily (with meals). , Print, Disp-60 Tab, R-0      atorvastatin (LIPITOR) 80 mg tablet Take 1 Tab by mouth nightly. , Print, Disp-30 Tab, R-2      gabapentin (NEURONTIN) 400 mg capsule Take 1 Cap by mouth three (3) times daily. , Print, Disp-90 Cap, R-2             _______________________________   Attestations: This note is prepared by Stefanie Pat, acting as a Scribe for Marcus. Laila Scruggs MD on 9:20 AM on 10/30/2017 . [employer]. Laila Scruggs MD: The scribe's documentation has been prepared under my direction and personally reviewed by me in its entirety. 10/31/17  Addendum:  We added a prescription for her headaches.   Additionally, she knows that she should be taking her coumadin and re-iterates that she will f/u with her PCP.  _______________________________

## 2017-11-01 LAB
BACTERIA SPEC CULT: ABNORMAL
SERVICE CMNT-IMP: ABNORMAL

## 2017-12-11 ENCOUNTER — APPOINTMENT (OUTPATIENT)
Dept: GENERAL RADIOLOGY | Age: 47
End: 2017-12-11
Attending: EMERGENCY MEDICINE
Payer: MEDICAID

## 2017-12-11 ENCOUNTER — HOSPITAL ENCOUNTER (EMERGENCY)
Age: 47
Discharge: HOME | End: 2017-12-11
Attending: EMERGENCY MEDICINE
Payer: MEDICAID

## 2017-12-11 VITALS
OXYGEN SATURATION: 99 % | TEMPERATURE: 97.9 F | SYSTOLIC BLOOD PRESSURE: 155 MMHG | WEIGHT: 189 LBS | HEART RATE: 98 BPM | BODY MASS INDEX: 30.37 KG/M2 | HEIGHT: 66 IN | DIASTOLIC BLOOD PRESSURE: 102 MMHG | RESPIRATION RATE: 17 BRPM

## 2017-12-11 DIAGNOSIS — D50.0 IRON DEFICIENCY ANEMIA DUE TO CHRONIC BLOOD LOSS: ICD-10-CM

## 2017-12-11 DIAGNOSIS — K50.111 CROHN'S DISEASE OF LARGE INTESTINE WITH RECTAL BLEEDING (HCC): Primary | ICD-10-CM

## 2017-12-11 LAB
ALBUMIN SERPL-MCNC: 3.2 G/DL (ref 3.4–5)
ALBUMIN/GLOB SERPL: 1 {RATIO} (ref 0.8–1.7)
ALP SERPL-CCNC: 76 U/L (ref 45–117)
ALT SERPL-CCNC: 26 U/L (ref 13–56)
ANION GAP SERPL CALC-SCNC: 11 MMOL/L (ref 3–18)
APPEARANCE UR: ABNORMAL
AST SERPL-CCNC: 19 U/L (ref 15–37)
BASOPHILS # BLD: 0 K/UL (ref 0–0.06)
BASOPHILS NFR BLD: 0 % (ref 0–2)
BILIRUB SERPL-MCNC: 0.1 MG/DL (ref 0.2–1)
BILIRUB UR QL: NEGATIVE
BUN SERPL-MCNC: 14 MG/DL (ref 7–18)
BUN/CREAT SERPL: 17 (ref 12–20)
CALCIUM SERPL-MCNC: 8.4 MG/DL (ref 8.5–10.1)
CHLORIDE SERPL-SCNC: 107 MMOL/L (ref 100–108)
CO2 SERPL-SCNC: 27 MMOL/L (ref 21–32)
COLOR UR: YELLOW
CREAT SERPL-MCNC: 0.81 MG/DL (ref 0.6–1.3)
DIFFERENTIAL METHOD BLD: ABNORMAL
EOSINOPHIL # BLD: 0.2 K/UL (ref 0–0.4)
EOSINOPHIL NFR BLD: 3 % (ref 0–5)
EPITH CASTS URNS QL MICRO: NORMAL /LPF (ref 0–5)
ERYTHROCYTE [DISTWIDTH] IN BLOOD BY AUTOMATED COUNT: 16 % (ref 11.6–14.5)
GLOBULIN SER CALC-MCNC: 3.3 G/DL (ref 2–4)
GLUCOSE SERPL-MCNC: 87 MG/DL (ref 74–99)
GLUCOSE UR STRIP.AUTO-MCNC: NEGATIVE MG/DL
HCG SERPL QL: NEGATIVE
HCT VFR BLD AUTO: 29.6 % (ref 35–45)
HGB BLD-MCNC: 9 G/DL (ref 12–16)
HGB UR QL STRIP: NEGATIVE
INR PPP: 1.1 (ref 0.8–1.2)
KETONES UR QL STRIP.AUTO: ABNORMAL MG/DL
LEUKOCYTE ESTERASE UR QL STRIP.AUTO: ABNORMAL
LIPASE SERPL-CCNC: 85 U/L (ref 73–393)
LYMPHOCYTES # BLD: 1.3 K/UL (ref 0.9–3.6)
LYMPHOCYTES NFR BLD: 19 % (ref 21–52)
MCH RBC QN AUTO: 23.2 PG (ref 24–34)
MCHC RBC AUTO-ENTMCNC: 30.4 G/DL (ref 31–37)
MCV RBC AUTO: 76.3 FL (ref 74–97)
MONOCYTES # BLD: 0.8 K/UL (ref 0.05–1.2)
MONOCYTES NFR BLD: 12 % (ref 3–10)
NEUTS SEG # BLD: 4.8 K/UL (ref 1.8–8)
NEUTS SEG NFR BLD: 66 % (ref 40–73)
NITRITE UR QL STRIP.AUTO: NEGATIVE
PH UR STRIP: 5 [PH] (ref 5–8)
PLATELET # BLD AUTO: 440 K/UL (ref 135–420)
PMV BLD AUTO: 9.1 FL (ref 9.2–11.8)
POTASSIUM SERPL-SCNC: 4 MMOL/L (ref 3.5–5.5)
PROT SERPL-MCNC: 6.5 G/DL (ref 6.4–8.2)
PROT UR STRIP-MCNC: NEGATIVE MG/DL
PROTHROMBIN TIME: 13.2 SEC (ref 11.5–15.2)
RBC # BLD AUTO: 3.88 M/UL (ref 4.2–5.3)
RBC #/AREA URNS HPF: NEGATIVE /HPF (ref 0–5)
SODIUM SERPL-SCNC: 145 MMOL/L (ref 136–145)
SP GR UR REFRACTOMETRY: 1.03 (ref 1–1.03)
UROBILINOGEN UR QL STRIP.AUTO: 0.2 EU/DL (ref 0.2–1)
WBC # BLD AUTO: 7.2 K/UL (ref 4.6–13.2)
WBC URNS QL MICRO: NORMAL /HPF (ref 0–5)

## 2017-12-11 PROCEDURE — 80053 COMPREHEN METABOLIC PANEL: CPT | Performed by: EMERGENCY MEDICINE

## 2017-12-11 PROCEDURE — 83690 ASSAY OF LIPASE: CPT | Performed by: EMERGENCY MEDICINE

## 2017-12-11 PROCEDURE — 84703 CHORIONIC GONADOTROPIN ASSAY: CPT | Performed by: EMERGENCY MEDICINE

## 2017-12-11 PROCEDURE — 74011636637 HC RX REV CODE- 636/637: Performed by: EMERGENCY MEDICINE

## 2017-12-11 PROCEDURE — 74011250637 HC RX REV CODE- 250/637: Performed by: EMERGENCY MEDICINE

## 2017-12-11 PROCEDURE — 81001 URINALYSIS AUTO W/SCOPE: CPT | Performed by: EMERGENCY MEDICINE

## 2017-12-11 PROCEDURE — 74011250636 HC RX REV CODE- 250/636: Performed by: EMERGENCY MEDICINE

## 2017-12-11 PROCEDURE — 96365 THER/PROPH/DIAG IV INF INIT: CPT

## 2017-12-11 PROCEDURE — 93005 ELECTROCARDIOGRAM TRACING: CPT

## 2017-12-11 PROCEDURE — 96361 HYDRATE IV INFUSION ADD-ON: CPT

## 2017-12-11 PROCEDURE — 96375 TX/PRO/DX INJ NEW DRUG ADDON: CPT

## 2017-12-11 PROCEDURE — 74011000258 HC RX REV CODE- 258: Performed by: EMERGENCY MEDICINE

## 2017-12-11 PROCEDURE — 99285 EMERGENCY DEPT VISIT HI MDM: CPT

## 2017-12-11 PROCEDURE — 85025 COMPLETE CBC W/AUTO DIFF WBC: CPT | Performed by: EMERGENCY MEDICINE

## 2017-12-11 PROCEDURE — 74022 RADEX COMPL AQT ABD SERIES: CPT

## 2017-12-11 PROCEDURE — 85610 PROTHROMBIN TIME: CPT | Performed by: EMERGENCY MEDICINE

## 2017-12-11 RX ORDER — PREDNISONE 20 MG/1
40 TABLET ORAL
Status: COMPLETED | OUTPATIENT
Start: 2017-12-11 | End: 2017-12-11

## 2017-12-11 RX ORDER — PREDNISONE 5 MG/1
TABLET ORAL
Qty: 75 TAB | Refills: 0 | Status: SHIPPED | OUTPATIENT
Start: 2017-12-11 | End: 2018-01-15

## 2017-12-11 RX ORDER — MESALAMINE 800 MG/1
1600 TABLET, DELAYED RELEASE ORAL 3 TIMES DAILY
Qty: 60 TAB | Refills: 0 | Status: SHIPPED | OUTPATIENT
Start: 2017-12-11 | End: 2017-12-21

## 2017-12-11 RX ORDER — NALBUPHINE HYDROCHLORIDE 10 MG/ML
5 INJECTION, SOLUTION INTRAMUSCULAR; INTRAVENOUS; SUBCUTANEOUS ONCE
Status: COMPLETED | OUTPATIENT
Start: 2017-12-11 | End: 2017-12-11

## 2017-12-11 RX ORDER — CIPROFLOXACIN 500 MG/1
500 TABLET ORAL 2 TIMES DAILY
Qty: 14 TAB | Refills: 0 | Status: SHIPPED | OUTPATIENT
Start: 2017-12-11 | End: 2017-12-18

## 2017-12-11 RX ORDER — PROMETHAZINE HYDROCHLORIDE 25 MG/1
25 TABLET ORAL
Qty: 12 TAB | Refills: 0 | Status: SHIPPED | OUTPATIENT
Start: 2017-12-11 | End: 2018-03-01 | Stop reason: ALTCHOICE

## 2017-12-11 RX ORDER — METRONIDAZOLE 500 MG/1
500 TABLET ORAL 3 TIMES DAILY
Qty: 21 TAB | Refills: 0 | Status: SHIPPED | OUTPATIENT
Start: 2017-12-11 | End: 2017-12-18

## 2017-12-11 RX ORDER — HYDROCODONE BITARTRATE AND ACETAMINOPHEN 5; 325 MG/1; MG/1
1 TABLET ORAL
Qty: 12 TAB | Refills: 0 | Status: SHIPPED | OUTPATIENT
Start: 2017-12-11 | End: 2017-12-14

## 2017-12-11 RX ORDER — CLONIDINE HYDROCHLORIDE 0.1 MG/1
0.1 TABLET ORAL
Status: COMPLETED | OUTPATIENT
Start: 2017-12-11 | End: 2017-12-11

## 2017-12-11 RX ADMIN — SODIUM CHLORIDE 1000 ML: 900 INJECTION, SOLUTION INTRAVENOUS at 18:30

## 2017-12-11 RX ADMIN — CLONIDINE HYDROCHLORIDE 0.1 MG: 0.1 TABLET ORAL at 20:43

## 2017-12-11 RX ADMIN — NALBUPHINE HYDROCHLORIDE 5 MG: 10 INJECTION, SOLUTION INTRAMUSCULAR; INTRAVENOUS; SUBCUTANEOUS at 20:45

## 2017-12-11 RX ADMIN — MESALAMINE 1000 MG: 250 CAPSULE ORAL at 22:13

## 2017-12-11 RX ADMIN — PROMETHAZINE HYDROCHLORIDE 12.5 MG: 25 INJECTION, SOLUTION INTRAMUSCULAR; INTRAVENOUS at 21:03

## 2017-12-11 RX ADMIN — PREDNISONE 40 MG: 20 TABLET ORAL at 20:30

## 2017-12-11 NOTE — ED PROVIDER NOTES
EMERGENCY DEPARTMENT HISTORY AND PHYSICAL EXAM    Date: 12/11/2017  Patient Name: Ana Cristina Francois    History of Presenting Illness     Chief Complaint   Patient presents with    Abdominal Pain    Melena         History Provided By: Patient    Chief Complaint: lower abd pain  Duration: 1 Weeks  Timing:  Gradual  Location: lower abd  Associated Symptoms:  nausea, low grade fever and bloody diarrhea. Additional History (Context):   5:11 PM   Ana Cristina Francois is a 56 y.o. female with PMHX of Crohn's disease who presents to the emergency department C/O lower abd pain onset 1 week ago. Associated sxs include nausea, low grade fever and bloody diarrhea. Pt mentions she has 8 BM daily. Pt denies vomiting and any other sxs or complaints. PCP: Brigitte Culp MD    Current Facility-Administered Medications   Medication Dose Route Frequency Provider Last Rate Last Dose    mesalamine (PENTASA) CR capsule 1,000 mg  1 g Oral ONCE Dayday Edwards MD         Current Outpatient Prescriptions   Medication Sig Dispense Refill    mesalamine DR (ASACOL HD) 800 mg DR tablet Take 2 Tabs by mouth three (3) times daily for 10 days. 60 Tab 0    predniSONE (STERAPRED) 5 mg dose pack Take 8 tabs for 5 days, then 4 tabs for 5 days, then 2 tabs for 5 days, then 1 tab for 5 days, then off 75 Tab 0    promethazine (PHENERGAN) 25 mg tablet Take 1 Tab by mouth every six (6) hours as needed. 12 Tab 0    HYDROcodone-acetaminophen (NORCO) 5-325 mg per tablet Take 1 Tab by mouth every six (6) hours as needed for Pain for up to 3 days. Max Daily Amount: 4 Tabs. 12 Tab 0    ciprofloxacin HCl (CIPRO) 500 mg tablet Take 1 Tab by mouth two (2) times a day for 7 days. 14 Tab 0    metroNIDAZOLE (FLAGYL) 500 mg tablet Take 1 Tab by mouth three (3) times daily for 7 days. 21 Tab 0    butalbital-acetaminophen-caff (FIORICET) -40 mg per capsule Take 1 Cap by mouth every six (6) hours as needed for Pain. Max Daily Amount: 4 Caps. Indications: TENSION-TYPE HEADACHE 12 Cap 0    ELETRIPTAN HBR (RELPAX PO) Take  by mouth.  lisinopril (PRINIVIL, ZESTRIL) 10 mg tablet Take 10 mg by mouth daily.  warfarin (COUMADIN) 7.5 mg tablet Take 1 Tab by mouth daily. (Patient taking differently: Take 5 mg by mouth daily. Except thurs and sat when pt takes 7.5 mg) 10 Tab 0    carvedilol (COREG) 3.125 mg tablet Take 1 Tab by mouth two (2) times daily (with meals). 60 Tab 0    atorvastatin (LIPITOR) 80 mg tablet Take 1 Tab by mouth nightly. 30 Tab 2    gabapentin (NEURONTIN) 400 mg capsule Take 1 Cap by mouth three (3) times daily. 80 Cap 2       Past History     Past Medical History:  Past Medical History:   Diagnosis Date    Abdominal pain     Anemia NEC     sickle cell trait    C. difficile colitis     Crohn's disease (HCC)     Gastrointestinal disorder     Crohns    Herpes zoster     Hx of cocaine abuse     Hyperkalemia     Hypertension     Iron deficiency anemia     MRSA (methicillin resistant Staphylococcus aureus)     Obesity     Pain management     Sickle cell trait (HCC)     Stroke ([facility] [address]     + cva 3/17    Thromboembolus [facility])        Past Surgical History:  Past Surgical History:   Procedure Laterality Date    COLONOSCOPY N/A 3/17/2017    COLONOSCOPY; BIOPSY; performed by Zander Maurice MD at [facility] END[facility]COPY    HX GYN      tubal ligation    HX TUBAL LIGATION      VASCULAR SURGERY PROCEDURE UNLIST         Family History:  History reviewed. No pertinent family history. Social History:  Social History   Substance Use Topics    Smoking status: Former Smoker    Smokeless tobacco: Never Used    Alcohol use No       Allergies: Allergies   Allergen Reactions    Humira [Adalimumab] Other (comments)    Remicade [Infliximab] Palpitations         Review of Systems   Review of Systems   Constitutional: Positive for fever. Gastrointestinal: Positive for abdominal distention, blood in stool, diarrhea and nausea.  Negative for vomiting. All other systems reviewed and are negative. Physical Exam     Vitals:    12/11/17 1533 12/11/17 2034 12/11/17 2043 12/11/17 2126   BP: 117/72 (!) 153/104 (!) 153/104 (!) 155/102   Pulse: 100 (!) 107 (!) 108 98   Resp: 16 16 17   Temp: 97.9 °F (36.6 °C)      SpO2: 99% 100%  99%   Weight: 85.7 kg (189 lb)      Height: 5' 6\" (1.676 m)        Physical Exam   Constitutional: She is oriented to person, place, and time. She appears well-developed and well-nourished. HENT:   Head: Normocephalic and atraumatic. Right Ear: External ear normal.   Left Ear: External ear normal.   Nose: Nose normal.   Mouth/Throat: Oropharynx is clear and moist.   Eyes: Conjunctivae and EOM are normal. Pupils are equal, round, and reactive to light. Neck: Normal range of motion. Neck supple. No JVD present. No tracheal deviation present. Cardiovascular: Normal rate, regular rhythm, normal heart sounds and intact distal pulses. Exam reveals no gallop and no friction rub. No murmur heard. Pulmonary/Chest: Effort normal and breath sounds normal. No respiratory distress. She has no wheezes. She has no rales. Abdominal: Soft. Bowel sounds are normal. She exhibits no distension and no mass. There is tenderness (diffuse) in the right lower quadrant and left lower quadrant. There is no rebound and no guarding. Genitourinary:   Genitourinary Comments: Rectal exam: bloody stool, grossly heme positive. Musculoskeletal: Normal range of motion. She exhibits no edema or tenderness. Neurological: She is alert and oriented to person, place, and time. She has normal reflexes. No cranial nerve deficit. Skin: Skin is warm and dry. No rash noted. Psychiatric: She has a normal mood and affect. Her behavior is normal.   Nursing note and vitals reviewed.         Diagnostic Study Results     Labs -     Recent Results (from the past 12 hour(s))   URINALYSIS W/ RFLX MICROSCOPIC    Collection Time: 12/11/17  3:36 PM   Result Value Ref Range    Color YELLOW      Appearance CLOUDY      Specific gravity 1.027 1.005 - 1.030      pH (UA) 5.0 5.0 - 8.0      Protein NEGATIVE  NEG mg/dL    Glucose NEGATIVE  NEG mg/dL    Ketone TRACE (A) NEG mg/dL    Bilirubin NEGATIVE  NEG      Blood NEGATIVE  NEG      Urobilinogen 0.2 0.2 - 1.0 EU/dL    Nitrites NEGATIVE  NEG      Leukocyte Esterase TRACE (A) NEG     URINE MICROSCOPIC ONLY    Collection Time: 12/11/17  3:36 PM   Result Value Ref Range    WBC 1 to 3 0 - 5 /hpf    RBC NEGATIVE  0 - 5 /hpf    Epithelial cells 1+ 0 - 5 /lpf   EKG, 12 LEAD, INITIAL    Collection Time: 12/11/17  6:00 PM   Result Value Ref Range    Ventricular Rate 98 BPM    Atrial Rate 98 BPM    P-R Interval 152 ms    QRS Duration 90 ms    Q-T Interval 378 ms    QTC Calculation (Bezet) 482 ms    Calculated P Axis 23 degrees    Calculated R Axis 6 degrees    Calculated T Axis 20 degrees    Diagnosis       Normal sinus rhythm  Anteroseptal infarct (cited on or before 27-MAR-2017)  Abnormal ECG  When compared with ECG of 31-JUL-2017 23:11,  No significant change was found     CBC WITH AUTOMATED DIFF    Collection Time: 12/11/17  6:20 PM   Result Value Ref Range    WBC 7.2 4.6 - 13.2 K/uL    RBC 3.88 (L) 4.20 - 5.30 M/uL    HGB 9.0 (L) 12.0 - 16.0 g/dL    HCT 29.6 (L) 35.0 - 45.0 %    MCV 76.3 74.0 - 97.0 FL    MCH 23.2 (L) 24.0 - 34.0 PG    MCHC 30.4 (L) 31.0 - 37.0 g/dL    RDW 16.0 (H) 11.6 - 14.5 %    PLATELET 991 (H) 498 - 420 K/uL    MPV 9.1 (L) 9.2 - 11.8 FL    NEUTROPHILS 66 40 - 73 %    LYMPHOCYTES 19 (L) 21 - 52 %    MONOCYTES 12 (H) 3 - 10 %    EOSINOPHILS 3 0 - 5 %    BASOPHILS 0 0 - 2 %    ABS. NEUTROPHILS 4.8 1.8 - 8.0 K/UL    ABS. LYMPHOCYTES 1.3 0.9 - 3.6 K/UL    ABS. MONOCYTES 0.8 0.05 - 1.2 K/UL    ABS. EOSINOPHILS 0.2 0.0 - 0.4 K/UL    ABS.  BASOPHILS 0.0 0.0 - 0.06 K/UL    DF AUTOMATED     METABOLIC PANEL, COMPREHENSIVE    Collection Time: 12/11/17  6:20 PM   Result Value Ref Range    Sodium 145 136 - 145 mmol/L Potassium 4.0 3.5 - 5.5 mmol/L    Chloride 107 100 - 108 mmol/L    CO2 27 21 - 32 mmol/L    Anion gap 11 3.0 - 18 mmol/L    Glucose 87 74 - 99 mg/dL    BUN 14 7.0 - 18 MG/DL    Creatinine 0.81 0.6 - 1.3 MG/DL    BUN/Creatinine ratio 17 12 - 20      GFR est AA >60 >60 ml/min/1.73m2    GFR est non-AA >60 >60 ml/min/1.73m2    Calcium 8.4 (L) 8.5 - 10.1 MG/DL    Bilirubin, total 0.1 (L) 0.2 - 1.0 MG/DL    ALT (SGPT) 26 13 - 56 U/L    AST (SGOT) 19 15 - 37 U/L    Alk.  phosphatase 76 45 - 117 U/L    Protein, total 6.5 6.4 - 8.2 g/dL    Albumin 3.2 (L) 3.4 - 5.0 g/dL    Globulin 3.3 2.0 - 4.0 g/dL    A-G Ratio 1.0 0.8 - 1.7     HCG QL SERUM    Collection Time: 12/11/17  6:20 PM   Result Value Ref Range    HCG, Ql. NEGATIVE  NEG     LIPASE    Collection Time: 12/11/17  6:20 PM   Result Value Ref Range    Lipase 85 73 - 393 U/L   PROTHROMBIN TIME + INR    Collection Time: 12/11/17  6:20 PM   Result Value Ref Range    Prothrombin time 13.2 11.5 - 15.2 sec    INR 1.1 0.8 - 1.2         Radiologic Studies -   9:50 PM  RADIOLOGY FINDINGS  abd X-ray shows prominent bowel and gas in colon c/w ileus, no obstruction  Pending review by Radiologist  Recorded by Abbey Toro, ED Scribe, as dictated by Francheska Hernandez MD   XR ABD ACUTE W 1 V CHEST    (Results Pending)     CT Results  (Last 48 hours)    None        CXR Results  (Last 48 hours)    None          Medications given in the ED-  Medications   mesalamine (PENTASA) CR capsule 1,000 mg (not administered)   sodium chloride 0.9 % bolus infusion 1,000 mL (0 mL IntraVENous IV Completed 12/11/17 2148)   predniSONE (DELTASONE) tablet 40 mg (40 mg Oral Given 12/11/17 2030)   nalbuphine (NUBAIN) injection 5 mg (5 mg IntraVENous Given 12/11/17 2045)   promethazine (PHENERGAN) 12.5 mg in 0.9% sodium chloride 50 mL IVPB (0 mg IntraVENous IV Completed 12/11/17 2126)   cloNIDine HCl (CATAPRES) tablet 0.1 mg (0.1 mg Oral Given 12/11/17 2043)         Medical Decision Making   I am the first provider for this patient. I reviewed the vital signs, available nursing notes, past medical history, past surgical history, family history and social history. Vital Signs-Reviewed the patient's vital signs. Pulse Oximetry Analysis - 99% on RA     Cardiac Monitor:  Rate: 98 bpm  Rhythm: NSR    EKG interpretation: (Preliminary)  5:11 PM   Rate 98 bpm. NSR. Anterolateral infarct  EKG read by Matheus Aguilar MD at 18:00     Provider Notes (Medical Decision Making): DDx: Crohn's disease exacerbation, colitis, lower GI bleed and infectious diarrhea    Procedures:  Procedures    ED Course:   5:11 PM Initial assessment performed. The patients presenting problems have been discussed, and they are in agreement with the care plan formulated and outlined with them. I have encouraged them to ask questions as they arise throughout their visit. 7:56 PM Discussed patient's history, exam, and available diagnostics results with SANYA Diallo MD, GI, who agree with seeing pt in office. He said to give her prednisone and Asacol. Diagnosis and Disposition     Discussion:  Patient was stable in the ED. Rectal heme positive. INR normal. Hb-9, stable compared to prior Hb. Patient was given Prednisone, Nubain, Pentasa and clonidine with improvement. Abdominal series x-rays showed ileus. ECG unremarkable. Case discussed with gastroenterology who recommends Asacol, prednisone and outpatient follow-up. Patient has history of non compliance. I spoke with the patient about her past non compliance and she is aware of this issue. The patient agrees to follow-up as planned and take her medications as prescribed. Patient advised to return to the ED if worse. Patient's blood pressure improved after taking clonidine. DISCHARGE NOTE:  9:50 PM  Brady Curry's  results have been reviewed with her. She has been counseled regarding her diagnosis, treatment, and plan.   She verbally conveys understanding and agreement of the signs, symptoms, diagnosis, treatment and prognosis and additionally agrees to follow up as discussed. She also agrees with the care-plan and conveys that all of her questions have been answered. I have also provided discharge instructions for her that include: educational information regarding their diagnosis and treatment, and list of reasons why they would want to return to the ED prior to their follow-up appointment, should her condition change. She has been provided with education for proper emergency department utilization. CLINICAL IMPRESSION:    1. Crohn's disease of large intestine with rectal bleeding ([address]    2. Iron deficiency anemia due to chronic blood loss        PLAN:  1. D/C Home  2. Current Discharge Medication List      START taking these medications    Details   mesalamine DR (ASACOL HD) 800 mg DR tablet Take 2 Tabs by mouth three (3) times daily for 10 days. Qty: 60 Tab, Refills: 0      predniSONE (STERAPRED) 5 mg dose pack Take 8 tabs for 5 days, then 4 tabs for 5 days, then 2 tabs for 5 days, then 1 tab for 5 days, then off  Qty: 75 Tab, Refills: 0      promethazine (PHENERGAN) 25 mg tablet Take 1 Tab by mouth every six (6) hours as needed. Qty: 12 Tab, Refills: 0      HYDROcodone-acetaminophen (NORCO) 5-325 mg per tablet Take 1 Tab by mouth every six (6) hours as needed for Pain for up to 3 days. Max Daily Amount: 4 Tabs. Qty: 12 Tab, Refills: 0      ciprofloxacin HCl (CIPRO) 500 mg tablet Take 1 Tab by mouth two (2) times a day for 7 days. Qty: 14 Tab, Refills: 0      metroNIDAZOLE (FLAGYL) 500 mg tablet Take 1 Tab by mouth three (3) times daily for 7 days. Qty: 21 Tab, Refills: 0           3.    Follow-up Information     Follow up With Details Comments Mac Rojas MD Schedule an appointment as soon as possible for a visit in 2 days  [address] [facility] EMERGENCY DEPT  As needed, If symptoms worsen [address] [address]  [phone]        _______________________________    Attestations: This note is prepared by Abhishek Asif , acting as Scribe for Julio Ponce MD.    Julio Ponce MD:  The scribe's documentation has been prepared under my direction and personally reviewed by me in its entirety.   I confirm that the note above accurately reflects all work, treatment, procedures, and medical decision making performed by me.  _______________________________

## 2017-12-11 NOTE — ED TRIAGE NOTES
Patient complaining of Crohn's flare-up x1 week. Patient reports abdominal pain, diarrhea, and bright red blood in stool. Sepsis Screening completed    (  )Patient meets SIRS criteria. ( x )Patient does not meet SIRS criteria.       SIRS Criteria is achieved when two or more of the following are present   Temperature < 96.8°F (36°C) or > 100.9°F (38.3°C)   Heart Rate > 90 beats per minute   Respiratory Rate > 20 breaths per minute   WBC count > 12,000 or <4,000 or > 10% bands

## 2017-12-12 ENCOUNTER — HOSPITAL ENCOUNTER (EMERGENCY)
Age: 47
Discharge: HOME | End: 2017-12-13
Attending: EMERGENCY MEDICINE
Payer: MEDICAID

## 2017-12-12 VITALS
DIASTOLIC BLOOD PRESSURE: 106 MMHG | BODY MASS INDEX: 30.37 KG/M2 | TEMPERATURE: 98.2 F | OXYGEN SATURATION: 99 % | WEIGHT: 189 LBS | RESPIRATION RATE: 16 BRPM | HEART RATE: 89 BPM | HEIGHT: 66 IN | SYSTOLIC BLOOD PRESSURE: 170 MMHG

## 2017-12-12 DIAGNOSIS — K50.911 CROHN'S DISEASE WITH RECTAL BLEEDING, UNSPECIFIED GASTROINTESTINAL TRACT LOCATION (HCC): ICD-10-CM

## 2017-12-12 DIAGNOSIS — R10.9 ABDOMINAL PAIN, UNSPECIFIED ABDOMINAL LOCATION: Primary | ICD-10-CM

## 2017-12-12 PROCEDURE — 99283 EMERGENCY DEPT VISIT LOW MDM: CPT

## 2017-12-12 NOTE — DISCHARGE INSTRUCTIONS
Iron Deficiency Anemia: Care Instructions  Your Care Instructions    Anemia means that you do not have enough red blood cells. Red blood cells carry oxygen around your body. When you have anemia, it can make you pale, weak, and tired. Many things can cause anemia. The most common cause is loss of blood. This can happen if you have heavy menstrual periods. It can also happen if you have bleeding in your stomach or bowel. You can also get anemia if you don't have enough iron in your diet or if it's hard for your body to absorb iron. In some cases, pregnancy causes anemia. That's because a pregnant woman needs more iron. Your doctor may do more tests to find the cause of your anemia. If a disease or other health problem is causing it, your doctor will treat that problem. It's important to follow up with your doctor to make sure that your iron level returns to normal.  Follow-up care is a key part of your treatment and safety. Be sure to make and go to all appointments, and call your doctor if you are having problems. It's also a good idea to know your test results and keep a list of the medicines you take. How can you care for yourself at home? · If your doctor recommended iron pills, take them as directed. ¨ Try to take the pills on an empty stomach. You can do this about 1 hour before or 2 hours after meals. But you may need to take iron with food to avoid an upset stomach. ¨ Do not take antacids or drink milk or anything with caffeine within 2 hours of when you take your iron. They can keep your body from absorbing the iron well. ¨ Vitamin C helps your body absorb iron. You may want to take iron pills with a glass of orange juice or some other food high in vitamin C.  ¨ Iron pills may cause stomach problems. These include heartburn, nausea, diarrhea, constipation, and cramps. It can help to drink plenty of fluids and include fruits, vegetables, and fiber in your diet.   ¨ It's normal for iron pills to make your stool a greenish or grayish black. But internal bleeding can also cause dark stool. So it's important to tell your doctor about any color changes. ¨ Call your doctor if you think you are having a problem with your iron pills. Even after you start to feel better, it will take several months for your body to build up its supply of iron. ¨ If you miss a pill, don't take a double dose. ¨ Keep iron pills out of the reach of small children. Too much iron can be very dangerous. · Eat foods with a lot of iron. These include red meat, shellfish, poultry, and eggs. They also include beans, raisins, whole-grain bread, and leafy green vegetables. · Steam your vegetables. This is the best way to prepare them if you want to get as much iron as possible. · Be safe with medicines. Do not take nonsteroidal anti-inflammatory pain relievers unless your doctor tells you to. These include aspirin, naproxen (Aleve), and ibuprofen (Advil, Motrin). · Liquid iron can stain your teeth. But you can mix it with water or juice and drink it with a straw. Then it won't get on your teeth. When should you call for help? Call 911 anytime you think you may need emergency care. For example, call if:  ? · You passed out (lost consciousness). ?Call your doctor now or seek immediate medical care if:  ? · You are short of breath. ? · You are dizzy or light-headed, or you feel like you may faint. ? · You have new or worse bleeding. ? Watch closely for changes in your health, and be sure to contact your doctor if:  ? · You feel weaker or more tired than usual.   ? · You do not get better as expected. Where can you learn more? Go to http://johan-maria del carmen.info/. Enter L685 in the search box to learn more about \"Iron Deficiency Anemia: Care Instructions. \"  Current as of: October 13, 2016  Content Version: 11.4  © 7590-2583 [employer], [employer].  Care instructions adapted under license by [employer] (which disclaims liability or warranty for this information). If you have questions about a medical condition or this instruction, always ask your healthcare professional. [address] any warranty or liability for your use of this information.

## 2017-12-13 PROCEDURE — 74011250637 HC RX REV CODE- 250/637: Performed by: EMERGENCY MEDICINE

## 2017-12-13 RX ORDER — ONDANSETRON 4 MG/1
TABLET, ORALLY DISINTEGRATING ORAL
Qty: 10 TAB | Refills: 0 | Status: SHIPPED | OUTPATIENT
Start: 2017-12-13 | End: 2018-01-15

## 2017-12-13 RX ORDER — DICYCLOMINE HYDROCHLORIDE 10 MG/1
CAPSULE ORAL
Status: DISCONTINUED
Start: 2017-12-13 | End: 2017-12-13 | Stop reason: HOSPADM

## 2017-12-13 RX ORDER — ONDANSETRON 4 MG/1
4 TABLET, ORALLY DISINTEGRATING ORAL
Status: COMPLETED | OUTPATIENT
Start: 2017-12-13 | End: 2017-12-13

## 2017-12-13 RX ORDER — DOCUSATE SODIUM 100 MG/1
100 CAPSULE, LIQUID FILLED ORAL 2 TIMES DAILY
Qty: 60 CAP | Refills: 2 | Status: SHIPPED | OUTPATIENT
Start: 2017-12-13 | End: 2018-03-13

## 2017-12-13 RX ORDER — ONDANSETRON 4 MG/1
TABLET, ORALLY DISINTEGRATING ORAL
Status: DISCONTINUED
Start: 2017-12-13 | End: 2017-12-13 | Stop reason: HOSPADM

## 2017-12-13 RX ORDER — DICYCLOMINE HYDROCHLORIDE 20 MG/1
20 TABLET ORAL EVERY 6 HOURS
Qty: 20 TAB | Refills: 0 | Status: SHIPPED | OUTPATIENT
Start: 2017-12-13 | End: 2017-12-19

## 2017-12-13 RX ORDER — DICYCLOMINE HYDROCHLORIDE 10 MG/1
20 CAPSULE ORAL
Status: COMPLETED | OUTPATIENT
Start: 2017-12-13 | End: 2017-12-13

## 2017-12-13 RX ORDER — ONDANSETRON 4 MG/1
TABLET, FILM COATED ORAL
Status: DISCONTINUED
Start: 2017-12-13 | End: 2017-12-13 | Stop reason: HOSPADM

## 2017-12-13 RX ADMIN — ONDANSETRON 4 MG: 4 TABLET, ORALLY DISINTEGRATING ORAL at 01:06

## 2017-12-13 RX ADMIN — DICYCLOMINE HYDROCHLORIDE 20 MG: 10 CAPSULE ORAL at 01:06

## 2017-12-13 NOTE — ED PROVIDER NOTES
EMERGENCY DEPARTMENT HISTORY AND PHYSICAL EXAM    Date: 2017  Patient Name: Tika Roca    History of Presenting Illness     Chief Complaint   Patient presents with    Abdominal Pain         History Provided By: Patient    Chief Complaint: Lower abdominal pain  Duration: 1 Weeks  Timing:  Waxing and Waning  Location: Lower abdomen  Quality: Cramping  Severity: 9 out of 10  Associated Symptoms: BRB rectal bleeding, nausea, vomiting, and loss of appetite    Additional History (Context):   12:26 AM  Tika Roca is a 56 y.o. female with PMHX Crohn's disease and PSHX  who presents ambulatory to the emergency department C/O cramping lower abdominal pain (rated 9/10), onset 1 week ago. Associated sxs include BRB rectal bleeding, nausea, vomiting, and loss of appetite. Pt was seen here yesterday for the same and d/c with GI follow up. Pt was d/c Asacol, prednisone, Cipro, Flagyl, and phenergan. Pt states that she is not followed by GI for Crohn's. Pt had a colonoscopy in 2017, remarkable for ulcers. Denies any hx of other abdominal or rectal surgery. Pt denies tobacco/EtOH/illicit drug use, and any other sxs or complaints. PCP: Marj Nunez MD    Current Outpatient Prescriptions   Medication Sig Dispense Refill    docusate sodium (COLACE) 100 mg capsule Take 1 Cap by mouth two (2) times a day for 90 days. 60 Cap 2    dicyclomine (BENTYL) 20 mg tablet Take 1 Tab by mouth every six (6) hours for 20 doses. 20 Tab 0    ondansetron (ZOFRAN ODT) 4 mg disintegrating tablet Take 1-2 tablets every 6-8 hours as needed for nausea and vomiting. 10 Tab 0    mesalamine DR (ASACOL HD) 800 mg DR tablet Take 2 Tabs by mouth three (3) times daily for 10 days.  60 Tab 0    predniSONE (STERAPRED) 5 mg dose pack Take 8 tabs for 5 days, then 4 tabs for 5 days, then 2 tabs for 5 days, then 1 tab for 5 days, then off 75 Tab 0    promethazine (PHENERGAN) 25 mg tablet Take 1 Tab by mouth every six (6) hours as needed. 12 Tab 0    HYDROcodone-acetaminophen (NORCO) 5-325 mg per tablet Take 1 Tab by mouth every six (6) hours as needed for Pain for up to 3 days. Max Daily Amount: 4 Tabs. 12 Tab 0    ciprofloxacin HCl (CIPRO) 500 mg tablet Take 1 Tab by mouth two (2) times a day for 7 days. 14 Tab 0    metroNIDAZOLE (FLAGYL) 500 mg tablet Take 1 Tab by mouth three (3) times daily for 7 days. 21 Tab 0    butalbital-acetaminophen-caff (FIORICET) -40 mg per capsule Take 1 Cap by mouth every six (6) hours as needed for Pain. Max Daily Amount: 4 Caps. Indications: TENSION-TYPE HEADACHE 12 Cap 0    ELETRIPTAN HBR (RELPAX PO) Take  by mouth.  lisinopril (PRINIVIL, ZESTRIL) 10 mg tablet Take 10 mg by mouth daily.  warfarin (COUMADIN) 7.5 mg tablet Take 1 Tab by mouth daily. (Patient taking differently: Take 5 mg by mouth daily. Except thurs and sat when pt takes 7.5 mg) 10 Tab 0    carvedilol (COREG) 3.125 mg tablet Take 1 Tab by mouth two (2) times daily (with meals). 60 Tab 0    atorvastatin (LIPITOR) 80 mg tablet Take 1 Tab by mouth nightly. 30 Tab 2    gabapentin (NEURONTIN) 400 mg capsule Take 1 Cap by mouth three (3) times daily.  80 Cap 2       Past History     Past Medical History:  Past Medical History:   Diagnosis Date    Abdominal pain     Anemia NEC     sickle cell trait    C. difficile colitis     Crohn's disease (HCC)     Gastrointestinal disorder     Crohns    Herpes zoster     Hx of cocaine abuse     Hyperkalemia     Hypertension     Iron deficiency anemia     MRSA (methicillin resistant Staphylococcus aureus)     Obesity     Pain management     Sickle cell trait (HCC)     Stroke ([facility] [address]     + cva 3/17    [facility]boembolus [facility])        Past Surgical History:  Past Surgical History:   Procedure Laterality Date    COLONOSCOPY N/A 3/17/2017    COLONOSCOPY; BIOPSY; performed by Hansa Tanner MD at [facility]    HX GYN      tubal ligation    HX TUBAL LIGATION      VASCULAR SURGERY PROCEDURE UNLIST         Family History:  History reviewed. No pertinent family history. Social History:  Social History   Substance Use Topics    Smoking status: Former Smoker    Smokeless tobacco: Never Used    Alcohol use No       Allergies: Allergies   Allergen Reactions    Humira [Adalimumab] Other (comments)    Remicade [Infliximab] Palpitations         Review of Systems   Review of Systems   Constitutional: Positive for appetite change (decreased). Negative for chills, diaphoresis, fever and unexpected weight change. HENT: Negative for congestion, drooling, ear pain, rhinorrhea, sore throat, tinnitus and trouble swallowing. Eyes: Negative for photophobia, pain, redness and visual disturbance. Respiratory: Negative for cough, choking, chest tightness, shortness of breath, wheezing and stridor. Cardiovascular: Negative for chest pain, palpitations and leg swelling. Gastrointestinal: Positive for abdominal pain, anal bleeding (BRB), nausea and vomiting. Negative for abdominal distention, blood in stool, constipation and diarrhea. Genitourinary: Negative for difficulty urinating, dysuria, flank pain, frequency, hematuria and urgency. Musculoskeletal: Negative for arthralgias, back pain and neck pain. Skin: Negative for color change, rash and wound. Neurological: Negative for dizziness, seizures, syncope, speech difficulty, light-headedness and headaches. Hematological: Does not bruise/bleed easily. Psychiatric/Behavioral: Negative for agitation, behavioral problems, hallucinations, self-injury and suicidal ideas. The patient is not hyperactive. Physical Exam     Vitals:    12/12/17 2235   BP: (!) 170/106   Pulse: 89   Resp: 16   Temp: 98.2 °F (36.8 °C)   SpO2: 99%   Weight: 85.7 kg (189 lb)   Height: 5' 6\" (1.676 m)     Physical Exam   Constitutional: She is oriented to person, place, and time. She appears well-developed and well-nourished. No distress.    Obese, uncomfortable appearing, nontoxic   HENT:   Head: Normocephalic and atraumatic. Right Ear: External ear normal.   Left Ear: External ear normal.   Mouth/Throat: Oropharynx is clear and moist. No oropharyngeal exudate. Eyes: Conjunctivae and EOM are normal. Pupils are equal, round, and reactive to light. No scleral icterus. No pallor. No icterus. Neck: Normal range of motion. Neck supple. No JVD present. No tracheal deviation present. No thyromegaly present. Cardiovascular: Normal rate, regular rhythm and normal heart sounds. Pulmonary/Chest: Effort normal and breath sounds normal. No stridor. No respiratory distress. Abdominal: Soft. Bowel sounds are normal. She exhibits no distension. There is tenderness (Faint) in the periumbilical area. There is no rebound, no guarding and negative Sierra's sign. No peritoneal signs. Musculoskeletal: Normal range of motion. She exhibits no edema or tenderness. No soft tissue injuries   Lymphadenopathy:     She has no cervical adenopathy. Neurological: She is alert and oriented to person, place, and time. She has normal reflexes. No cranial nerve deficit. Coordination normal.   Skin: Skin is warm and dry. No rash noted. She is not diaphoretic. No erythema. Psychiatric: She has a normal mood and affect. Her behavior is normal. Judgment and thought content normal.   Nursing note and vitals reviewed. Diagnostic Study Results     Labs -   No results found for this or any previous visit (from the past 12 hour(s)). Radiologic Studies -    No orders to display     CT Results  (Last 48 hours)    None        CXR Results  (Last 48 hours)               12/11/17 2020  XR ABD ACUTE W 1 V CHEST Final result    Impression:  IMPRESSION:       1. No acute pulmonary process identified. Interval removal right subclavian   central venous catheter.    2. Focal area of mucosal fold accentuation involving the large intestine   projecting over the left pelvis, suggestive of underlying infectious or   inflammatory colitis. Gaseous distention of the large intestine noted, likely   reactive ileus. No free intraperitoneal gas. Narrative:  PROCEDURE: PA chest x-ray with abdominal series       CLINICAL HISTORY: Crohn's disease, abdominal discomfort. COMPARISON: CT 8/1/2017       FINDINGS:       Frontal chest demonstrates clear lungs. Cardiac, hilar and mediastinal contours   are normal. No acute bony abnormality. Interval removal right subclavian central   venous catheter. Abdominal series (supine and upright radiographs) demonstrates no free   intraperitoneal gas. Gaseous distention of the large intestine is noted, with   additional accentuation of the mucosal fold pattern of the intestine projecting   over the left pelvic rim. No acute osseous abnormality. Medical Decision Making   I am the first provider for this patient. I reviewed the vital signs, available nursing notes, past medical history, past surgical history, family history and social history. Vital Signs-Reviewed the patient's vital signs. Pulse Oximetry Analysis - 99% on RA     Records Reviewed: Nursing Notes    Provider Notes (Medical Decision Making):   MDM: Visits ER for abdominal pain and Crohn's disease. Record reviewed from yesterday showed thorough work up. No changes in her chronic anemia and no indications for CT scan. She already has a follow up in the next week. Appropriate for symptomatic tx and outpatient discharge. Procedures:   Procedures    PROCEDURE NOTE - RECTAL EXAM:   5:49 AM  Performed by: Damien Banks. Porfirio Oliver MD  Rectal exam performed. No stool was collected. Stool was Hemoccult tested, and found to be grossly heme Positive. Grossly visible external hemorrhoid and fullness of left lateral hemorrhoidal column at 9 o'clock position. The procedure took 1-15 minutes, and pt tolerated well. Written by Andrew Burr ED Scribe, as dictated by Damien Banks.  Porfirio Oliver MD.    ED Course:   12:26 AM   Initial assessment performed. The patients presenting problems have been discussed, and they are in agreement with the care plan formulated and outlined with them. I have encouraged them to ask questions as they arise throughout their visit. Diagnosis and Disposition       DISCHARGE NOTE:  12:54 AM  Tk Curry's  results have been reviewed with her. She has been counseled regarding her diagnosis, treatment, and plan. She verbally conveys understanding and agreement of the signs, symptoms, diagnosis, treatment and prognosis and additionally agrees to follow up as discussed. She also agrees with the care-plan and conveys that all of her questions have been answered. I have also provided discharge instructions for her that include: educational information regarding their diagnosis and treatment, and list of reasons why they would want to return to the ED prior to their follow-up appointment, should her condition change. She has been provided with education for proper emergency department utilization. CLINICAL IMPRESSION:    1. Abdominal pain, unspecified abdominal location    2. Crohn's disease with rectal bleeding, unspecified gastrointestinal tract [facility] ([facility]        PLAN:  1. D/C Home  2. Discharge Medication List as of 12/13/2017 12:56 AM      START taking these medications    Details   docusate sodium (COLACE) 100 mg capsule Take 1 Cap by mouth two (2) times a day for 90 days. , Normal, Disp-60 Cap, R-2      dicyclomine (BENTYL) 20 mg tablet Take 1 Tab by mouth every six (6) hours for 20 doses. , Normal, Disp-20 Tab, R-0      ondansetron (ZOFRAN ODT) 4 mg disintegrating tablet Take 1-2 tablets every 6-8 hours as needed for nausea and vomiting., Normal, Disp-10 Tab, R-0         CONTINUE these medications which have NOT CHANGED    Details   mesalamine DR (ASACOL HD) 800 mg DR tablet Take 2 Tabs by mouth three (3) times daily for 10 days. , Print, Disp-60 Tab, R-0 predniSONE (STERAPRED) 5 mg dose pack Take 8 tabs for 5 days, then 4 tabs for 5 days, then 2 tabs for 5 days, then 1 tab for 5 days, then off, Print, Disp-75 Tab, R-0      promethazine (PHENERGAN) 25 mg tablet Take 1 Tab by mouth every six (6) hours as needed. , Print, Disp-12 Tab, R-0      HYDROcodone-acetaminophen (NORCO) 5-325 mg per tablet Take 1 Tab by mouth every six (6) hours as needed for Pain for up to 3 days. Max Daily Amount: 4 Tabs., Print, Disp-12 Tab, R-0      ciprofloxacin HCl (CIPRO) 500 mg tablet Take 1 Tab by mouth two (2) times a day for 7 days. , Print, Disp-14 Tab, R-0      metroNIDAZOLE (FLAGYL) 500 mg tablet Take 1 Tab by mouth three (3) times daily for 7 days. , Print, Disp-21 Tab, R-0      butalbital-acetaminophen-caff (FIORICET) -40 mg per capsule Take 1 Cap by mouth every six (6) hours as needed for Pain. Max Daily Amount: 4 Caps. Indications: TENSION-TYPE HEADACHE, Print, Disp-12 Cap, R-0      ELETRIPTAN HBR (RELPAX PO) Take  by mouth., Historical Med      lisinopril (PRINIVIL, ZESTRIL) 10 mg tablet Take 10 mg by mouth daily. , Historical Med      warfarin (COUMADIN) 7.5 mg tablet Take 1 Tab by mouth daily. , Print, Disp-10 Tab, R-0      carvedilol (COREG) 3.125 mg tablet Take 1 Tab by mouth two (2) times daily (with meals). , Print, Disp-60 Tab, R-0      atorvastatin (LIPITOR) 80 mg tablet Take 1 Tab by mouth nightly. , Print, Disp-30 Tab, R-2      gabapentin (NEURONTIN) 400 mg capsule Take 1 Cap by mouth three (3) times daily. , Print, Disp-90 Cap, R-2           3.    Follow-up Information     Follow up With Details Comments [address] Yulia Bernstein MD Schedule an appointment as soon as possible for a visit in 2 days for GI follow up [facility] Gastroenter[facility]  [address] [facility]      Ward Uribe MD Schedule an appointment as soon as possible for a visit in 2 days for GI follow up [address] [facility] EMERGENCY DEPT  As needed, If symptoms worsen [address] [place] [address] [phone]        _______________________________    Attestations: This note is prepared by Maryanne Brown, acting as Scribe for Chemo Baxter. Anum Pham MD.    Chemo Baxter. Anum Pham MD:  The scribe's documentation has been prepared under my direction and personally reviewed by me in its entirety.   I confirm that the note above accurately reflects all work, treatment, procedures, and medical decision making performed by me.  _______________________________

## 2017-12-13 NOTE — ED NOTES
Presented to ED to be evaluated for reported abdominal pain with rectal bleeding x 1 week. Patient reports pain as documented. Patient has known hx of crohn's. Patient seen on yesterday for same and discharge with f/u with GI. Patient reports, \"im not gonna see him anymore, so im just gonna call around\". Patient is noted to be resting to position of comfort at this time. Patient denies any additional complaints with no s/s distress noted.

## 2017-12-13 NOTE — DISCHARGE INSTRUCTIONS
Learning About Clear Liquid Diets  Introduction    Sometimes you have to stop eating solid food for a while. This may be because you're preparing for a medical procedure or recovering from one. Or solid food might irritate your digestive tract because of illness. But even when you can't eat solid food, your body still needs liquids and energy. A clear liquid diet gives your body basic nutrition until you can go back to eating solid food. Your body digests these foods easily, and these foods don't leave behind any solids as they pass through your system. The clear liquid diet helps reduce the nausea and diarrhea you may have while you're ill. A clear liquid diet isn't very satisfying. And it doesn't supply all the nutrients you need over the long term. But you will be on this diet for just a day or two. After that, you will probably start eating bland foods. Soon you'll be able to return to your regular diet. Examples  A clear liquid diet may include:  · Clear, fat-free broth and bouillon. · Water, ice chips, and flavored ice pops. · Strained fruit juices and flavored fruit drinks. · Fruit-flavored gelatin, like Jell-O.  · Soft drinks like lemon-lime soda and ginger ale. · Sweetened coffee or tea without cream.  Where can you learn more? Go to http://jhoan-maria del carmen.info/. Enter O571 in the search box to learn more about \"Learning About Clear Liquid Diets. \"  Current as of: May 12, 2017  Content Version: 11.4  © 2944-9586 [employer]. Care instructions adapted under license by [employer] (which disclaims liability or warranty for this information). If you have questions about a medical condition or this instruction, always ask your healthcare professional. [place] [address] any warranty or liability for your use of this information. Abdominal Pain: Care Instructions  Your Care Instructions    Abdominal pain has many possible causes.  Some aren't serious and get better on their own in a few days. Others need more testing and treatment. If your pain continues or gets worse, you need to be rechecked and may need more tests to find out what is wrong. You may need surgery to correct the problem. Don't ignore new symptoms, such as fever, nausea and vomiting, urination problems, pain that gets worse, and dizziness. These may be signs of a more serious problem. Your doctor may have recommended a follow-up visit in the next 8 to 12 hours. If you are not getting better, you may need more tests or treatment. The doctor has checked you carefully, but problems can develop later. If you notice any problems or new symptoms, get medical treatment right away. Follow-up care is a key part of your treatment and safety. Be sure to make and go to all appointments, and call your doctor if you are having problems. It's also a good idea to know your test results and keep a list of the medicines you take. How can you care for yourself at home? · Rest until you feel better. · To prevent dehydration, drink plenty of fluids, enough so that your urine is light yellow or clear like water. Choose water and other caffeine-free clear liquids until you feel better. If you have kidney, heart, or liver disease and have to limit fluids, talk with your doctor before you increase the amount of fluids you drink. · If your stomach is upset, eat mild foods, such as rice, dry toast or crackers, bananas, and applesauce. Try eating several small meals instead of two or three large ones. · Wait until 48 hours after all symptoms have gone away before you have spicy foods, alcohol, and drinks that contain caffeine. · Do not eat foods that are high in fat. · Avoid anti-inflammatory medicines such as aspirin, ibuprofen (Advil, Motrin), and naproxen (Aleve). These can cause stomach upset. Talk to your doctor if you take daily aspirin for another health problem.   When should you call for help?  Call 911 anytime you think you may need emergency care. For example, call if:  ? · You passed out (lost consciousness). ? · You pass maroon or very bloody stools. ? · You vomit blood or what looks like coffee grounds. ? · You have new, severe belly pain. ?Call your doctor now or seek immediate medical care if:  ? · Your pain gets worse, especially if it becomes focused in one area of your belly. ? · You have a new or higher fever. ? · Your stools are black and look like tar, or they have streaks of blood. ? · You have unexpected vaginal bleeding. ? · You have symptoms of a urinary tract infection. These may include:  ¨ Pain when you urinate. ¨ Urinating more often than usual.  ¨ Blood in your urine. ? · You are dizzy or lightheaded, or you feel like you may faint. ? Watch closely for changes in your health, and be sure to contact your doctor if:  ? · You are not getting better after 1 day (24 hours). Where can you learn more? Go to http://johan-maria del carmen.info/. Enter Z046 in the search box to learn more about \"Abdominal Pain: Care Instructions. \"  Current as of: March 20, 2017  Content Version: 11.4  © 7146-9007 [employer]. Care instructions adapted under license by [employer] (which disclaims liability or warranty for this information). If you have questions about a medical condition or this instruction, always ask your healthcare professional. [place] [address] any warranty or liability for your use of this information. Crohn's Disease: Care Instructions  Your Care Instructions    Crohn's disease is a lifelong inflammatory bowel disease (IBD). Parts of the digestive tract get swollen and irritated and may develop deep sores called ulcers. Crohn's disease usually occurs in the last part of the small intestine and the first part of the large intestine. But it can develop anywhere from the mouth to the anus.   The main symptoms of Crohn's disease are belly pain, diarrhea, fever, and weight loss. Some people may have constipation. Crohn's disease also sometimes causes problems with the joints, eyes, or skin. Your symptoms may be mild at some times and severe at others. The disease can also go into remission, which means that it is not active and you have no symptoms. Bad attacks of Crohn's disease often have to be treated in the hospital so that you can get medicines and liquids through a tube in your vein, called an IV. This gives your digestive system time to rest and recover. Talk with your doctor about the best treatments for you. You may need medicines that help prevent or treat flare-ups of the disease. You may need surgery to remove part of your bowel if you have an abnormal opening in the bowel (fistula), an abscess, or a bowel obstruction. In some cases, surgery is needed if medicines do not work. But symptoms often return to other areas of the intestines after surgery. Learning good self-care can help you reduce your symptoms and manage Crohn's disease. Follow-up care is a key part of your treatment and safety. Be sure to make and go to all appointments, and call your doctor if you are having problems. It's also a good idea to know your test results and keep a list of the medicines you take. How can you care for yourself at home? · Take your medicines exactly as prescribed. Call your doctor if you think you are having a problem with your medicine. You will get more details on the specific medicines your doctor prescribes. · Do not take anti-inflammatory medicines, such as aspirin, ibuprofen (Advil, Motrin), or naproxen (Aleve). They may make your symptoms worse. Do not take any other medicines or herbal products without talking to your doctor first.  · Avoid foods that make your symptoms worse. These might include milk, alcohol, high-fiber foods, or spicy foods. · Eat a healthy diet. Make sure to get enough iron. Rectal bleeding may make you lose iron. Good sources of iron include beef, lentils, spinach, raisins, and iron-enriched breads and cereals. · Drink liquid meal replacements if your doctor recommends them. These are high in calories and contain vitamins and minerals. Severe symptoms may make it hard for your body to absorb vitamins and minerals. · Do not smoke. Smoking makes Crohn's disease worse. If you need help quitting, talk to your doctor about stop-smoking programs and medicines. These can increase your chances of quitting for good. · Seek support from friends and family to help cope with Crohn's disease. The illness can affect all parts of your life. Get counseling if you need it. When should you call for help? Call 911 anytime you think you may need emergency care. For example, call if:  ? · Your stools are maroon or very bloody. ? · You passed out (lost consciousness). ?Call your doctor now or seek immediate medical care if:  ? · You are vomiting. ? · You have new or worse belly pain. ? · You have a fever. ? · You cannot pass stools or gas. ? · You have new or more blood in your stools. ? Watch closely for changes in your health, and be sure to contact your doctor if:  ? · You have new or worse symptoms. ? · You are losing weight. ? · You do not get better as expected. Where can you learn more? Go to http://johan-maria del carmen.info/. Enter [phone] in the search box to learn more about \"Crohn's Disease: Care Instructions. \"  Current as of: May 12, 2017  Content Version: 11.4  © 7072-8900 [employer]. Care instructions adapted under license by [employer] (which disclaims liability or warranty for this information). If you have questions about a medical condition or this instruction, always ask your healthcare professional. [address] any warranty or liability for your use of this information.

## 2017-12-13 NOTE — ED TRIAGE NOTES
\"I'm gushing out blood\" from rectum per pt. Sepsis Screening completed    (  )Patient meets SIRS criteria. (x  )Patient does not meet SIRS criteria.       SIRS Criteria is achieved when two or more of the following are present   Temperature < 96.8°F (36°C) or > 100.9°F (38.3°C)   Heart Rate > 90 beats per minute   Respiratory Rate > 20 breaths per minute   WBC count > 12,000 or <4,000 or > 10% bands

## 2017-12-17 LAB
ATRIAL RATE: 98 BPM
CALCULATED P AXIS, ECG09: 23 DEGREES
CALCULATED R AXIS, ECG10: 6 DEGREES
CALCULATED T AXIS, ECG11: 20 DEGREES
DIAGNOSIS, 93000: NORMAL
P-R INTERVAL, ECG05: 152 MS
Q-T INTERVAL, ECG07: 378 MS
QRS DURATION, ECG06: 90 MS
QTC CALCULATION (BEZET), ECG08: 482 MS
VENTRICULAR RATE, ECG03: 98 BPM

## 2018-01-07 ENCOUNTER — APPOINTMENT (OUTPATIENT)
Dept: CT IMAGING | Age: 48
DRG: 248 | End: 2018-01-07
Attending: EMERGENCY MEDICINE
Payer: MEDICAID

## 2018-01-07 ENCOUNTER — HOSPITAL ENCOUNTER (INPATIENT)
Age: 48
LOS: 7 days | Discharge: HOME | DRG: 248 | End: 2018-01-15
Attending: EMERGENCY MEDICINE | Admitting: INTERNAL MEDICINE
Payer: MEDICAID

## 2018-01-07 DIAGNOSIS — D68.32 WARFARIN-INDUCED COAGULOPATHY (HCC): ICD-10-CM

## 2018-01-07 DIAGNOSIS — E83.42 HYPOMAGNESEMIA: ICD-10-CM

## 2018-01-07 DIAGNOSIS — R19.5 HEME POSITIVE STOOL: ICD-10-CM

## 2018-01-07 DIAGNOSIS — D50.0 ANEMIA DUE TO BLOOD LOSS, CHRONIC: ICD-10-CM

## 2018-01-07 DIAGNOSIS — T45.515A WARFARIN-INDUCED COAGULOPATHY (HCC): ICD-10-CM

## 2018-01-07 DIAGNOSIS — E87.6 HYPOKALEMIA: ICD-10-CM

## 2018-01-07 DIAGNOSIS — R29.898 LUE WEAKNESS: Primary | ICD-10-CM

## 2018-01-07 DIAGNOSIS — K50.911 CROHN'S DISEASE WITH RECTAL BLEEDING, UNSPECIFIED GASTROINTESTINAL TRACT LOCATION (HCC): ICD-10-CM

## 2018-01-07 LAB — GLUCOSE BLD STRIP.AUTO-MCNC: 100 MG/DL (ref 70–110)

## 2018-01-07 PROCEDURE — 99285 EMERGENCY DEPT VISIT HI MDM: CPT

## 2018-01-07 PROCEDURE — 70450 CT HEAD/BRAIN W/O DYE: CPT

## 2018-01-07 PROCEDURE — 85025 COMPLETE CBC W/AUTO DIFF WBC: CPT | Performed by: EMERGENCY MEDICINE

## 2018-01-07 PROCEDURE — 82962 GLUCOSE BLOOD TEST: CPT

## 2018-01-07 PROCEDURE — 83735 ASSAY OF MAGNESIUM: CPT | Performed by: EMERGENCY MEDICINE

## 2018-01-07 PROCEDURE — 80048 BASIC METABOLIC PNL TOTAL CA: CPT | Performed by: EMERGENCY MEDICINE

## 2018-01-07 NOTE — IP AVS SNAPSHOT
[address] [address] 
[phone] Patient: Fortino Alvarez MRN: [id number] OOP:[birth year] A check gary indicates which time of day the medication should be taken. My Medications START taking these medications Instructions Each Dose to Equal  
 Morning Noon Evening Bedtime  
 aspirin 81 mg chewable tablet Your last dose was: Your next dose is: Take 1 Tab by mouth daily. 81 mg Lactobacillus Acidoph & Bulgar 1 million cell Tab tablet Commonly known as:  Philipa [place] Your last dose was: Your next dose is: Take 2 Tabs by mouth two (2) times a day. 2 Tab  
    
   
   
   
  
 metroNIDAZOLE 500 mg tablet Commonly known as:  FLAGYL Your last dose was: Your next dose is: Take 1 Tab by mouth every eight (8) hours. 500 mg  
    
   
   
   
  
 predniSONE 10 mg tablet Commonly known as:  Maria Del Rosario Fried Replaces:  predniSONE 5 mg dose pack Your last dose was: Your next dose is: Take 5 tablets by mouth daily for 7 days, then 4 tablets daily for 14 days then 3 tablets daily for 14 days then 2 tablets daily for 14 days then 1 tablet by mouth daily for 14 days CONTINUE taking these medications Instructions Each Dose to Equal  
 Morning Noon Evening Bedtime  
 atorvastatin 80 mg tablet Commonly known as:  LIPITOR Your last dose was: Your next dose is: Take 1 Tab by mouth nightly. 80 mg  
    
   
   
   
  
 butalbital-acetaminophen-caff -40 mg per capsule Commonly known as:  [employer] Your last dose was: Your next dose is: Take 1 Cap by mouth every six (6) hours as needed for Pain. Max Daily Amount: 4 Caps. Indications: TENSION-TYPE HEADACHE  
 1 Cap  
    
   
   
   
  
 carvedilol 3.125 mg tablet Commonly known as:  Denise Vela Your last dose was: Your next dose is: Take 1 Tab by mouth two (2) times daily (with meals). 3.125 mg  
    
   
   
   
  
 docusate sodium 100 mg capsule Commonly known as:  Minda Lundborg Your last dose was: Your next dose is: Take 1 Cap by mouth two (2) times a day for 90 days. 100 mg  
    
   
   
   
  
 gabapentin 400 mg capsule Commonly known as:  NEURONTIN Your last dose was: Your next dose is: Take 1 Cap by mouth three (3) times daily. 400 mg  
    
   
   
   
  
 lisinopril 10 mg tablet Commonly known as:  Maya Nick Your last dose was: Your next dose is: Take 10 mg by mouth daily. 10 mg  
    
   
   
   
  
 promethazine 25 mg tablet Commonly known as:  PHENERGAN Your last dose was: Your next dose is: Take 1 Tab by mouth every six (6) hours as needed. 25 mg  
    
   
   
   
  
 RELPAX PO Your last dose was: Your next dose is: Take  by mouth. STOP taking these medications   
 ondansetron 4 mg disintegrating tablet Commonly known as:  ZOFRAN ODT  
   
  
 predniSONE 5 mg dose pack Commonly known as:  STERAPRED Replaced by:  predniSONE 10 mg tablet  
   
  
 warfarin 7.5 mg tablet Commonly known as:  COUMADIN Where to Get Your Medications These medications were sent to [facility], [address] AT [address] [place] [address]  [address] [facility], [facility] [address] [address] [address] Hours:  24-hours Phone:  [phone]  
  aspirin 81 mg chewable tablet Lactobacillus Acidoph & Bulgar 1 million cell Tab tablet  
 metroNIDAZOLE 500 mg tablet Information on where to get these meds will be given to you by the nurse or doctor. ! Ask your nurse or doctor about these medications predniSONE 10 mg tablet

## 2018-01-07 NOTE — Clinical Note
Patient Class[de-identified] Observation [565] Type of Bed: Remote Telemetry [29] Reason for Observation: LUE weakness Admitting Diagnosis: LUE weakness [[id number]] Admitting Physician: Kacie Swanson [[id number]] Attending Physician: Kacie Swanson [[id number]]

## 2018-01-07 NOTE — IP AVS SNAPSHOT
[address] [address] 
[phone] Patient: Marlena Cowden MRN: [id number] LEP:[id number] About your hospitalization You were admitted on:  January 8, 2018 You last received care in the:  [facility] You were discharged on:  January 15, 2018 Why you were hospitalized Your primary diagnosis was:  Lue Weakness Your diagnoses also included:  Crohn Disease (Hcc), Dvt (Deep Venous Thrombosis) (Hcc), Stroke (Hcc), Clostridium Difficile Infection Follow-up Information Follow up With Details Comments Contact Info Armida Zepeda MD On 1/18/2018  Follow-up appointment @ 3:40 p.m. [address] [place] [address] 
[phone] Discharge Orders None A check gary indicates which time of day the medication should be taken. My Medications START taking these medications Instructions Each Dose to Equal  
 Morning Noon Evening Bedtime  
 aspirin 81 mg chewable tablet Your last dose was: Your next dose is: Take 1 Tab by mouth daily. 81 mg Lactobacillus Acidoph & Bulgar 1 million cell Tab tablet Commonly known as:  Jason Meadows Your last dose was: Your next dose is: Take 2 Tabs by mouth two (2) times a day. 2 Tab  
    
   
   
   
  
 metroNIDAZOLE 500 mg tablet Commonly known as:  FLAGYL Your last dose was: Your next dose is: Take 1 Tab by mouth every eight (8) hours. 500 mg  
    
   
   
   
  
 predniSONE 10 mg tablet Commonly known as:  Verlyn Domingo Replaces:  predniSONE 5 mg dose pack Your last dose was: Your next dose is: Take 5 tablets by mouth daily for 7 days, then 4 tablets daily for 14 days then 3 tablets daily for 14 days then 2 tablets daily for 14 days then 1 tablet by mouth daily for 14 days CONTINUE taking these medications Instructions Each Dose to Equal  
 Morning Noon Evening Bedtime  
 atorvastatin 80 mg tablet Commonly known as:  LIPITOR Your last dose was: Your next dose is: Take 1 Tab by mouth nightly. 80 mg  
    
   
   
   
  
 butalbital-acetaminophen-caff -40 mg per capsule Commonly known as:  [employer] Your last dose was: Your next dose is: Take 1 Cap by mouth every six (6) hours as needed for Pain. Max Daily Amount: 4 Caps. Indications: TENSION-TYPE HEADACHE  
 1 Cap  
    
   
   
   
  
 carvedilol 3.125 mg tablet Commonly known as:  Albert Deleon Your last dose was: Your next dose is: Take 1 Tab by mouth two (2) times daily (with meals). 3.125 mg  
    
   
   
   
  
 docusate sodium 100 mg capsule Commonly known as:  Jason Aguilera Your last dose was: Your next dose is: Take 1 Cap by mouth two (2) times a day for 90 days. 100 mg  
    
   
   
   
  
 gabapentin 400 mg capsule Commonly known as:  NEURONTIN Your last dose was: Your next dose is: Take 1 Cap by mouth three (3) times daily. 400 mg  
    
   
   
   
  
 lisinopril 10 mg tablet Commonly known as:  Laura Pruitt Your last dose was: Your next dose is: Take 10 mg by mouth daily. 10 mg  
    
   
   
   
  
 promethazine 25 mg tablet Commonly known as:  PHENERGAN Your last dose was: Your next dose is: Take 1 Tab by mouth every six (6) hours as needed. 25 mg  
    
   
   
   
  
 RELPAX PO Your last dose was: Your next dose is: Take  by mouth. STOP taking these medications   
 ondansetron 4 mg disintegrating tablet Commonly known as:  ZOFRAN ODT  
   
  
 predniSONE 5 mg dose pack Commonly known as:  STERAPRED  
 Replaced by:  predniSONE 10 mg tablet  
   
  
 warfarin 7.5 mg tablet Commonly known as:  COUMADIN Where to Get Your Medications These medications were sent to [facility] [address]  [facility] [place]  [address], [address] [address] Hours:  24-hours Phone:  [phone]  
  aspirin 81 mg chewable tablet Lactobacillus Acidoph & Bulgar 1 million cell Tab tablet  
 metroNIDAZOLE 500 mg tablet Information on where to get these meds will be given to you by the nurse or doctor. ! Ask your nurse or doctor about these medications  
  predniSONE 10 mg tablet Discharge Instructions DISCHARGE SUMMARY from Nurse PATIENT INSTRUCTIONS: 
 
Recommended activity: Activity as tolerated *  Please give a list of your current medications to your Primary Care Provider. *  Please update this list whenever your medications are discontinued, doses are 
    changed, or new medications (including over-the-counter products) are added. *  Please carry medication information at all times in case of emergency situations. These are general instructions for a healthy lifestyle: No smoking/ No tobacco products/ Avoid exposure to second hand smoke Surgeon General's Warning:  Quitting smoking now greatly reduces serious risk to your health. Obesity, smoking, and sedentary lifestyle greatly increases your risk for illness A healthy diet, regular physical exercise & weight monitoring are important for maintaining a healthy lifestyle You may be retaining fluid if you have a history of heart failure or if you experience any of the following symptoms:  Weight gain of 3 pounds or more overnight or 5 pounds in a week, increased swelling in our hands or feet or shortness of breath while lying flat in bed.   Please call your doctor as soon as you notice any of these symptoms; do not wait until your next office visit. Recognize signs and symptoms of STROKE: 
 
F-face looks uneven A-arms unable to move or move unevenly S-speech slurred or non-existent T-time-call 911 as soon as signs and symptoms begin-DO NOT go Back to bed or wait to see if you get better-TIME IS BRAIN. Warning Signs of HEART ATTACK Call 911 if you have these symptoms: 
? Chest discomfort. Most heart attacks involve discomfort in the center of the chest that lasts more than a few minutes, or that goes away and comes back. It can feel like uncomfortable pressure, squeezing, fullness, or pain. ? Discomfort in other areas of the upper body. Symptoms can include pain or discomfort in one or both arms, the back, neck, jaw, or stomach. ? Shortness of breath with or without chest discomfort. ? Other signs may include breaking out in a cold sweat, nausea, or lightheadedness. Don't wait more than five minutes to call [address]! Fast action can save your life. Calling 911 is almost always the fastest way to get lifesaving treatment. Emergency Medical Services staff can begin treatment when they arrive  up to an hour sooner than if someone gets to the hospital by car. The discharge information has been reviewed with the {PATIENT PARENT GUARDIAN:95727}. The {PATIENT PARENT GUARDIAN:80846} verbalized understanding. Discharge medications reviewed with the {Dishcarge meds reviewed KDPM:57513} and appropriate educational materials and side effects teaching were provided. ___________________________________________________________________________________________________________________________________ Anemia From Heavy Bleeding: Care Instructions Your Care Instructions Anemia means that your body does not have enough red blood cells. Red blood cells carry oxygen around the body. When you have anemia, you may feel dizzy, tired, and weak. You may also feel your heart pounding. For some people, it's hard to focus and think clearly. One common cause of anemia is bleeding. Bleeding from ulcers, hemorrhoids, cancer, or other problems can cause anemia. It may also be caused by heavy menstrual periods. Your treatment may include iron pills. Iron helps your body make hemoglobin. Hemoglobin is the part of the red blood cell that carries oxygen. If you have severe anemia, you may need a blood transfusion to give you red blood cells as quickly as possible. Sometimes it takes several months to get iron levels back to normal. 
Follow-up care is a key part of your treatment and safety. Be sure to make and go to all appointments, and call your doctor if you are having problems. It's also a good idea to know your test results and keep a list of the medicines you take. How can you care for yourself at home? · Be safe with medicines. Take your medicines exactly as prescribed. Call your doctor if you think you are having a problem with your medicine. · Follow your doctor's advice about eating foods that have a lot of iron in them. These include red meat, shellfish, poultry, and eggs. They also include beans, raisins, whole-grain bread, and leafy green vegetables. · Steam your vegetables. This is the best way to prepare them if you want to get as much iron as possible. · Iron pills can cause constipation. If you take them, there are things you can do to avoid constipation. Drink plenty of fluids, eat foods with a lot of fiber, and exercise every day. When should you call for help? Call 911 anytime you think you may need emergency care. For example, call if: 
? · You passed out (lost consciousness). ? · Your stools are maroon or very bloody. ?Call your doctor now or seek immediate medical care if: 
? · You are short of breath. ? · You have new or worse bleeding. ? · You are dizzy or light-headed, or you feel like you may faint. ? Watch closely for changes in your health, and be sure to contact your doctor if: 
? · You feel weaker or more tired than usual.  
? · You do not get better as expected. Where can you learn more? Go to http://johan-maria del carmen.info/. Enter B972 in the search box to learn more about \"Anemia From Heavy Bleeding: Care Instructions. \" Current as of: October 13, 2016 Content Version: 11.4 © 0538-2690 AroundWire. Care instructions adapted under license by [employer] (which disclaims liability or warranty for this information). If you have questions about a medical condition or this instruction, always ask your healthcare professional. [address] any warranty or liability for your use of this information. Taking Aspirin to Prevent Heart Attack and Stroke: Care Instructions Your Care Instructions Aspirin acts as a \"blood thinner. \" It prevents blood clots from forming. When taken during and after a heart attack, it can reduce your chance of dying. And it's used if you have a stent in your coronary artery. Also, aspirin helps certain people lower their risk of a heart attack or stroke. Be sure you know what dose of aspirin to take and how often to take it. Low-dose aspirin is typically 81 mg. But the dose for daily aspirin can range from 81 mg to 325 mg. Taking aspirin every day can cause bleeding. It may not be safe if you have stomach ulcers. And it may not be safe if you have high blood pressure that is not controlled. If you take aspirin pills every day, do not take ones that have other ingredients such as caffeine or sodium. Before you start to take aspirin, tell your doctor all the medicines, vitamins, herbal products, and supplements you take. Follow-up care is a key part of your treatment and safety.  Be sure to make and go to all appointments, and call your doctor if you are having problems. It's also a good idea to know your test results and keep a list of the medicines you take. How can you care for yourself at home? · Take aspirin with a full glass of water unless your doctor tells you not to. Do not lie down right after you take it. · If you have a stent in your coronary artery, take your aspirin as your heart doctor says to. If another doctor says to stop taking the aspirin for any reason, talk to your heart doctor before you stop. · Do not chew or crush the coated or sustained-release forms of aspirin. · Ask your doctor if you can drink alcohol while you take aspirin. And ask how much you can drink. Too much alcohol with aspirin can cause stomach bleeding. · Do not take aspirin if you are pregnant, unless your doctor says it is okay. · Keep all aspirin out of children's reach. · Throw aspirin away if it starts to smell like vinegar. · Do not take aspirin if you have gout or if you take prescription blood thinners, unless your doctor has told you to. · Do not take prescription or over-the-counter medicines, vitamins, herbal products, or supplements without talking to your doctor first. Kwasi Zacariasl the label before you take another over-the-counter medicine. Many contain aspirin. So they could cause you to take too much aspirin. · Talk with your doctor before you take a pain medicine. Ask which type of medicine you can take and how to take it safely with aspirin. · Tell your doctor or dentist before a surgery or procedure that you take aspirin. He or she will tell you if you should stop taking aspirin before your surgery or procedure. Make sure that you understand exactly what your doctor wants you to do. Where can you learn more? Go to http://johan-maria del carmen.info/. Enter K126 in the search box to learn more about \"Taking Aspirin to Prevent Heart Attack and Stroke: Care Instructions. \" 
 Current as of: September 21, 2016 Content Version: 11.4 © 5400-5518 Healionics. Care instructions adapted under license by [employer] (which disclaims liability or warranty for this information). If you have questions about a medical condition or this instruction, always ask your healthcare professional. [address] any warranty or liability for your use of this information. Crohn's Disease: Care Instructions Your Care Instructions Crohn's disease is a lifelong inflammatory bowel disease (IBD). Parts of the digestive tract get swollen and irritated and may develop deep sores called ulcers. Crohn's disease usually occurs in the last part of the small intestine and the first part of the large intestine. But it can develop anywhere from the mouth to the anus. The main symptoms of Crohn's disease are belly pain, diarrhea, fever, and weight loss. Some people may have constipation. Crohn's disease also sometimes causes problems with the joints, eyes, or skin. Your symptoms may be mild at some times and severe at others. The disease can also go into remission, which means that it is not active and you have no symptoms. Bad attacks of Crohn's disease often have to be treated in the hospital so that you can get medicines and liquids through a tube in your vein, called an IV. This gives your digestive system time to rest and recover. Talk with your doctor about the best treatments for you. You may need medicines that help prevent or treat flare-ups of the disease. You may need surgery to remove part of your bowel if you have an abnormal opening in the bowel (fistula), an abscess, or a bowel obstruction. In some cases, surgery is needed if medicines do not work. But symptoms often return to other areas of the intestines after surgery. Learning good self-care can help you reduce your symptoms and manage Crohn's disease. Follow-up care is a key part of your treatment and safety. Be sure to make and go to all appointments, and call your doctor if you are having problems. It's also a good idea to know your test results and keep a list of the medicines you take. How can you care for yourself at home? · Take your medicines exactly as prescribed. Call your doctor if you think you are having a problem with your medicine. You will get more details on the specific medicines your doctor prescribes. · Do not take anti-inflammatory medicines, such as aspirin, ibuprofen (Advil, Motrin), or naproxen (Aleve). They may make your symptoms worse. Do not take any other medicines or herbal products without talking to your doctor first. 
· Avoid foods that make your symptoms worse. These might include milk, alcohol, high-fiber foods, or spicy foods. · Eat a healthy diet. Make sure to get enough iron. Rectal bleeding may make you lose iron. Good sources of iron include beef, lentils, spinach, raisins, and iron-enriched breads and cereals. · Drink liquid meal replacements if your doctor recommends them. These are high in calories and contain vitamins and minerals. Severe symptoms may make it hard for your body to absorb vitamins and minerals. · Do not smoke. Smoking makes Crohn's disease worse. If you need help quitting, talk to your doctor about stop-smoking programs and medicines. These can increase your chances of quitting for good. · Seek support from friends and family to help cope with Crohn's disease. The illness can affect all parts of your life. Get counseling if you need it. When should you call for help? Call 911 anytime you think you may need emergency care. For example, call if: 
? · Your stools are maroon or very bloody. ? · You passed out (lost consciousness). ?Call your doctor now or seek immediate medical care if: 
? · You are vomiting. ? · You have new or worse belly pain. ? · You have a fever. ? · You cannot pass stools or gas. ? · You have new or more blood in your stools. ? Watch closely for changes in your health, and be sure to contact your doctor if: 
? · You have new or worse symptoms. ? · You are losing weight. ? · You do not get better as expected. Where can you learn more? Go to http://johan-maria del carmen.info/. Enter [phone] in the search box to learn more about \"Crohn's Disease: Care Instructions. \" Current as of: May 12, 2017 Content Version: 11.4 © 3204-1556 [employer]uk[employer]. Care instructions adapted under license by InflaRx (which disclaims liability or warranty for this information). If you have questions about a medical condition or this instruction, always ask your healthcare professional. [address] any warranty or liability for your use of this information. [facility] Announcement We are excited to announce that we are making your provider's discharge notes available to you in [facility]. You will see these notes when they are completed and signed by the physician that discharged you from your recent hospital stay. If you have any questions or concerns about any information you see in [facility], please call the Health Information Department where you were seen or reach out to your Primary Care Provider for more information about your plan of care. Introducing [facility]! Lorraine Whitfield introduces [facility] patient portal. Now you can access parts of your medical record, email your doctor's office, and request medication refills online. 1. In your internet browser, go to [email or web address] 2. Click on the First Time User? Click Here link in the Sign In box. You will see the New Member Sign Up page. 3. Enter your [facility] Access Code exactly as it appears below. You will not need to use this code after youve completed the sign-up process.  If you do not sign up before the expiration date, you must request a new code. · C[facility]an Access Code: [email or web address] Expires: 1/28/2018 12:42 PM 
 
4. Enter the last four digits of your Social Security Number (xxxx) and Date of Birth (mm/dd/yyyy) as indicated and click Submit. You will be taken to the next sign-up page. 5. Create a Guanxi.met ID. This will be your [facility] login ID and cannot be changed, so think of one that is secure and easy to remember. 6. Create a Cydan password. You can change your password at any time. 7. Enter your Password Reset Question and Answer. This can be used at a later time if you forget your password. 8. Enter your e-mail address. You will receive e-mail notification when new information is available in [address] 9. Click Sign Up. You can now view and download portions of your medical record. 10. Click the Download Summary menu link to download a portable copy of your medical information. If you have questions, please visit the Frequently Asked Questions section of the C[facility] website. Remember, [facility] is NOT to be used for urgent needs. For medical emergencies, dial 911. Now available from your iPhone and Android! Providers Seen During Your Hospitalization Provider Specialty Primary office phone Samia Wayne MD Emergency Medicine [phone] Jaja Jiang MD Internal Medicine [phone] Your Primary Care Physician (PCP) Primary Care Physician Office Phone Office Fax Kelley Thomason [phone] You are allergic to the following Allergen Reactions Humira (Adalimumab) Other (comments) Remicade (Infliximab) Palpitations Recent Documentation Height Weight Breastfeeding? BMI OB Status Smoking Status 1.676 m 92.5 kg No 32.91 kg/m2 Unknown Former Smoker Emergency Contacts Name Discharge Info Relation Home Work Mobile Sherry Green N/A  AT THIS TIME [6] Parent [1] [phone] Patient Belongings The following personal items are in your possession at time of discharge: 
     Visual Aid: None             Clothing: Pants, With patient, Undergarments, Shirt, Footwear Discharge Instructions Attachments/References C DIF COLITIS (ENGLISH) Patient Handouts Clostridium Difficile Colitis: Care Instructions Your Care Instructions Clostridium difficile (also called C. difficile) are bacteria that can cause swelling and irritation of the large intestine, or colon. This inflammation is also called colitis. It can cause diarrhea, fever, and belly cramps. You may get C. difficile colitis if you take antibiotics. The infection is most common in people who are taking antibiotics while in the hospital. It is also common in older people in hospitals and nursing homes. Severe disease could cause the colon to swell to many times its normal size (toxic megacolon). This can cause death and needs emergency treatment. You may have a swollen belly that is painful or tender, a rapid heartbeat, and a fever. Follow-up care is a key part of your treatment and safety. Be sure to make and go to all appointments, and call your doctor if you are having problems. It's also a good idea to know your test results and keep a list of the medicines you take. How can you care for yourself at home? · Your doctor may give you antibiotics to treat C. difficile colitis. If your doctor prescribes an antibiotic, he or she will give you a different antibiotic than the one that caused your infection. Take your antibiotics as directed. Do not stop taking them just because you feel better. You need to take the full course of antibiotics. · To prevent dehydration, drink plenty of fluids, enough so that your urine is light yellow or clear like water.  Choose water and other caffeine-free clear liquids until you feel better. If you have kidney, heart, or liver disease and have to limit fluids, talk with your doctor before you increase the amount of fluids you drink. · Begin eating small amounts of mild foods, if you feel like it. Try yogurt that has live cultures of lactobacillus (check the label). ¨ Avoid spicy foods, fruits, alcohol, and caffeine until 48 hours after all symptoms go away. ¨ Avoid chewing gum that contains sorbitol. ¨ Avoid dairy products (except for yogurt with lactobacillus) while you have diarrhea and for 3 days after symptoms go away. · To prevent the spread of C. difficile, practice good hygiene. Keep your hands clean by washing them well and often with soap and clean, running water. Alcohol-based hand sanitizers do not kill C. difficile. When should you call for help? Call 911 if: 
? · You passed out (lost consciousness). ?Call your doctor now or seek immediate medical care if: 
? · You have a fever over 101°F or shaking chills. ? · You feel lightheaded or have a fast heart rate. ? · You pass stools that are almost always bloody. ? · You have signs of needing more fluids. You have sunken eyes and a dry mouth, and you pass only a little dark urine. ? · You have severe belly pain with or without bloating. ? · You have severe vomiting and cannot keep down liquids. ? · You are not passing any stools or gas. ? Watch closely for changes in your health, and be sure to contact your doctor if: 
? · You do not get better as expected. Where can you learn more? Go to http://johan-maria del carmen.info/. Enter [phone] in the search box to learn more about \"Clostridium Difficile Colitis: Care Instructions. \" Current as of: March 3, 2017 Content Version: 11.4 © 2582-7675 [employer] Corporation.  Care instructions adapted under license by [employer] (which disclaims liability or warranty for this information). If you have questions about a medical condition or this instruction, always ask your healthcare professional. [address] any warranty or liability for your use of this information. Please provide this summary of care documentation to your next provider. Signatures-by signing, you are acknowledging that this After Visit Summary has been reviewed with you and you have received a copy. Patient Signature:  ____________________________________________________________ Date:  ____________________________________________________________  
  
Constanza Golden Provider Signature:  ____________________________________________________________ Date:  ____________________________________________________________

## 2018-01-08 ENCOUNTER — APPOINTMENT (OUTPATIENT)
Dept: GENERAL RADIOLOGY | Age: 48
DRG: 248 | End: 2018-01-08
Attending: EMERGENCY MEDICINE
Payer: MEDICAID

## 2018-01-08 ENCOUNTER — APPOINTMENT (OUTPATIENT)
Dept: MRI IMAGING | Age: 48
DRG: 248 | End: 2018-01-08
Attending: INTERNAL MEDICINE
Payer: MEDICAID

## 2018-01-08 PROBLEM — I63.9 STROKE (HCC): Status: ACTIVE | Noted: 2018-01-08

## 2018-01-08 PROBLEM — R29.898 LUE WEAKNESS: Status: ACTIVE | Noted: 2018-01-08

## 2018-01-08 LAB
AMPHET UR QL SCN: NEGATIVE
ANION GAP SERPL CALC-SCNC: 14 MMOL/L (ref 3–18)
APPEARANCE UR: CLEAR
BACTERIA SPEC CULT: ABNORMAL
BACTERIA SPEC CULT: ABNORMAL
BARBITURATES UR QL SCN: NEGATIVE
BASOPHILS # BLD: 0 K/UL (ref 0–0.06)
BASOPHILS NFR BLD: 0 % (ref 0–2)
BENZODIAZ UR QL: NEGATIVE
BILIRUB UR QL: NEGATIVE
BUN SERPL-MCNC: 5 MG/DL (ref 7–18)
BUN/CREAT SERPL: 7 (ref 12–20)
CALCIUM SERPL-MCNC: 8.9 MG/DL (ref 8.5–10.1)
CANNABINOIDS UR QL SCN: NEGATIVE
CHLORIDE SERPL-SCNC: 101 MMOL/L (ref 100–108)
CK MB CFR SERPL CALC: NORMAL % (ref 0–4)
CK MB SERPL-MCNC: <1 NG/ML (ref 5–25)
CK SERPL-CCNC: 30 U/L (ref 26–192)
CK SERPL-CCNC: 36 U/L (ref 26–192)
CK SERPL-CCNC: 50 U/L (ref 26–192)
CO2 SERPL-SCNC: 24 MMOL/L (ref 21–32)
COCAINE UR QL SCN: NEGATIVE
COLOR UR: YELLOW
CREAT SERPL-MCNC: 0.69 MG/DL (ref 0.6–1.3)
CRP SERPL-MCNC: 2.2 MG/DL (ref 0–0.3)
DIFFERENTIAL METHOD BLD: ABNORMAL
EOSINOPHIL # BLD: 0.1 K/UL (ref 0–0.4)
EOSINOPHIL NFR BLD: 2 % (ref 0–5)
ERYTHROCYTE [DISTWIDTH] IN BLOOD BY AUTOMATED COUNT: 15.5 % (ref 11.6–14.5)
GLUCOSE SERPL-MCNC: 98 MG/DL (ref 74–99)
GLUCOSE UR STRIP.AUTO-MCNC: NEGATIVE MG/DL
HCT VFR BLD AUTO: 28.6 % (ref 35–45)
HDSCOM,HDSCOM: NORMAL
HGB BLD-MCNC: 8.7 G/DL (ref 12–16)
HGB UR QL STRIP: NEGATIVE
INR PPP: 1.4 (ref 0.8–1.2)
INR PPP: 1.6 (ref 0.8–1.2)
IRON SATN MFR SERPL: 3 %
IRON SERPL-MCNC: 10 UG/DL (ref 50–175)
KETONES UR QL STRIP.AUTO: NEGATIVE MG/DL
LEUKOCYTE ESTERASE UR QL STRIP.AUTO: NEGATIVE
LYMPHOCYTES # BLD: 1.5 K/UL (ref 0.9–3.6)
LYMPHOCYTES NFR BLD: 33 % (ref 21–52)
MAGNESIUM SERPL-MCNC: 1.5 MG/DL (ref 1.6–2.6)
MAGNESIUM SERPL-MCNC: 2.2 MG/DL (ref 1.6–2.6)
MCH RBC QN AUTO: 22.6 PG (ref 24–34)
MCHC RBC AUTO-ENTMCNC: 30.4 G/DL (ref 31–37)
MCV RBC AUTO: 74.3 FL (ref 74–97)
METHADONE UR QL: NEGATIVE
MONOCYTES # BLD: 0.6 K/UL (ref 0.05–1.2)
MONOCYTES NFR BLD: 14 % (ref 3–10)
NEUTS SEG # BLD: 2.3 K/UL (ref 1.8–8)
NEUTS SEG NFR BLD: 51 % (ref 40–73)
NITRITE UR QL STRIP.AUTO: NEGATIVE
OPIATES UR QL: NEGATIVE
PCP UR QL: NEGATIVE
PH UR STRIP: 5 [PH] (ref 5–8)
PLATELET # BLD AUTO: 376 K/UL (ref 135–420)
PMV BLD AUTO: 9 FL (ref 9.2–11.8)
POTASSIUM SERPL-SCNC: 3.2 MMOL/L (ref 3.5–5.5)
PROT UR STRIP-MCNC: NEGATIVE MG/DL
PROTHROMBIN TIME: 16.6 SEC (ref 11.5–15.2)
PROTHROMBIN TIME: 18 SEC (ref 11.5–15.2)
RBC # BLD AUTO: 3.85 M/UL (ref 4.2–5.3)
SERVICE CMNT-IMP: ABNORMAL
SODIUM SERPL-SCNC: 139 MMOL/L (ref 136–145)
SP GR UR REFRACTOMETRY: <1.005 (ref 1–1.03)
TIBC SERPL-MCNC: 340 UG/DL (ref 250–450)
TROPONIN I SERPL-MCNC: <0.02 NG/ML (ref 0–0.06)
UROBILINOGEN UR QL STRIP.AUTO: 0.2 EU/DL (ref 0.2–1)
WBC # BLD AUTO: 4.5 K/UL (ref 4.6–13.2)

## 2018-01-08 PROCEDURE — 97167 OT EVAL HIGH COMPLEX 60 MIN: CPT

## 2018-01-08 PROCEDURE — 80307 DRUG TEST PRSMV CHEM ANLYZR: CPT | Performed by: EMERGENCY MEDICINE

## 2018-01-08 PROCEDURE — 83735 ASSAY OF MAGNESIUM: CPT | Performed by: INTERNAL MEDICINE

## 2018-01-08 PROCEDURE — 93306 TTE W/DOPPLER COMPLETE: CPT

## 2018-01-08 PROCEDURE — 86140 C-REACTIVE PROTEIN: CPT | Performed by: INTERNAL MEDICINE

## 2018-01-08 PROCEDURE — 85610 PROTHROMBIN TIME: CPT | Performed by: INTERNAL MEDICINE

## 2018-01-08 PROCEDURE — 96375 TX/PRO/DX INJ NEW DRUG ADDON: CPT

## 2018-01-08 PROCEDURE — 71045 X-RAY EXAM CHEST 1 VIEW: CPT

## 2018-01-08 PROCEDURE — 83540 ASSAY OF IRON: CPT | Performed by: INTERNAL MEDICINE

## 2018-01-08 PROCEDURE — 74011250636 HC RX REV CODE- 250/636: Performed by: EMERGENCY MEDICINE

## 2018-01-08 PROCEDURE — 96365 THER/PROPH/DIAG IV INF INIT: CPT

## 2018-01-08 PROCEDURE — 81003 URINALYSIS AUTO W/O SCOPE: CPT | Performed by: EMERGENCY MEDICINE

## 2018-01-08 PROCEDURE — 74011000250 HC RX REV CODE- 250: Performed by: HOSPITALIST

## 2018-01-08 PROCEDURE — 93005 ELECTROCARDIOGRAM TRACING: CPT

## 2018-01-08 PROCEDURE — 74011250636 HC RX REV CODE- 250/636: Performed by: INTERNAL MEDICINE

## 2018-01-08 PROCEDURE — 92610 EVALUATE SWALLOWING FUNCTION: CPT

## 2018-01-08 PROCEDURE — 65660000000 HC RM CCU STEPDOWN

## 2018-01-08 PROCEDURE — 77030033269 HC SLV COMPR SCD KNE2 CARD -B

## 2018-01-08 PROCEDURE — 74011250637 HC RX REV CODE- 250/637: Performed by: INTERNAL MEDICINE

## 2018-01-08 PROCEDURE — 74011250637 HC RX REV CODE- 250/637: Performed by: PHYSICIAN ASSISTANT

## 2018-01-08 PROCEDURE — 74011250637 HC RX REV CODE- 250/637: Performed by: EMERGENCY MEDICINE

## 2018-01-08 PROCEDURE — 70551 MRI BRAIN STEM W/O DYE: CPT

## 2018-01-08 PROCEDURE — 97535 SELF CARE MNGMENT TRAINING: CPT

## 2018-01-08 PROCEDURE — 87493 C DIFF AMPLIFIED PROBE: CPT | Performed by: INTERNAL MEDICINE

## 2018-01-08 PROCEDURE — 36415 COLL VENOUS BLD VENIPUNCTURE: CPT | Performed by: INTERNAL MEDICINE

## 2018-01-08 PROCEDURE — 82550 ASSAY OF CK (CPK): CPT | Performed by: INTERNAL MEDICINE

## 2018-01-08 RX ORDER — CARVEDILOL 3.12 MG/1
3.12 TABLET ORAL 2 TIMES DAILY WITH MEALS
Status: DISCONTINUED | OUTPATIENT
Start: 2018-01-08 | End: 2018-01-15 | Stop reason: HOSPADM

## 2018-01-08 RX ORDER — POTASSIUM CHLORIDE 20 MEQ/1
40 TABLET, EXTENDED RELEASE ORAL EVERY 4 HOURS
Status: COMPLETED | OUTPATIENT
Start: 2018-01-08 | End: 2018-01-08

## 2018-01-08 RX ORDER — GABAPENTIN 400 MG/1
400 CAPSULE ORAL 3 TIMES DAILY
Status: DISCONTINUED | OUTPATIENT
Start: 2018-01-08 | End: 2018-01-15 | Stop reason: HOSPADM

## 2018-01-08 RX ORDER — MAGNESIUM SULFATE HEPTAHYDRATE 40 MG/ML
2 INJECTION, SOLUTION INTRAVENOUS
Status: COMPLETED | OUTPATIENT
Start: 2018-01-08 | End: 2018-01-08

## 2018-01-08 RX ORDER — ATORVASTATIN CALCIUM 20 MG/1
80 TABLET, FILM COATED ORAL
Status: DISCONTINUED | OUTPATIENT
Start: 2018-01-08 | End: 2018-01-15 | Stop reason: HOSPADM

## 2018-01-08 RX ORDER — HYDROMORPHONE HYDROCHLORIDE 1 MG/ML
1 INJECTION, SOLUTION INTRAMUSCULAR; INTRAVENOUS; SUBCUTANEOUS
Status: DISCONTINUED | OUTPATIENT
Start: 2018-01-08 | End: 2018-01-10

## 2018-01-08 RX ORDER — LISINOPRIL 5 MG/1
10 TABLET ORAL DAILY
Status: DISCONTINUED | OUTPATIENT
Start: 2018-01-08 | End: 2018-01-15 | Stop reason: HOSPADM

## 2018-01-08 RX ORDER — SODIUM CHLORIDE 0.9 % (FLUSH) 0.9 %
5-10 SYRINGE (ML) INJECTION AS NEEDED
Status: DISCONTINUED | OUTPATIENT
Start: 2018-01-08 | End: 2018-01-15 | Stop reason: HOSPADM

## 2018-01-08 RX ORDER — GUAIFENESIN 100 MG/5ML
81 LIQUID (ML) ORAL DAILY
Status: DISCONTINUED | OUTPATIENT
Start: 2018-01-08 | End: 2018-01-08

## 2018-01-08 RX ORDER — GUAIFENESIN 100 MG/5ML
81 LIQUID (ML) ORAL DAILY
Status: DISCONTINUED | OUTPATIENT
Start: 2018-01-08 | End: 2018-01-15 | Stop reason: HOSPADM

## 2018-01-08 RX ORDER — WARFARIN SODIUM 5 MG/1
5 TABLET ORAL DAILY
Status: DISCONTINUED | OUTPATIENT
Start: 2018-01-08 | End: 2018-01-08

## 2018-01-08 RX ORDER — PROMETHAZINE HYDROCHLORIDE 25 MG/1
12.5 TABLET ORAL
Status: DISCONTINUED | OUTPATIENT
Start: 2018-01-08 | End: 2018-01-15 | Stop reason: HOSPADM

## 2018-01-08 RX ORDER — SODIUM CHLORIDE 0.9 % (FLUSH) 0.9 %
5-10 SYRINGE (ML) INJECTION EVERY 8 HOURS
Status: DISCONTINUED | OUTPATIENT
Start: 2018-01-08 | End: 2018-01-15 | Stop reason: HOSPADM

## 2018-01-08 RX ORDER — ONDANSETRON 4 MG/1
4 TABLET, ORALLY DISINTEGRATING ORAL
Status: DISCONTINUED | OUTPATIENT
Start: 2018-01-08 | End: 2018-01-08

## 2018-01-08 RX ORDER — METRONIDAZOLE 500 MG/100ML
500 INJECTION, SOLUTION INTRAVENOUS EVERY 8 HOURS
Status: DISCONTINUED | OUTPATIENT
Start: 2018-01-08 | End: 2018-01-09

## 2018-01-08 RX ORDER — DOCUSATE SODIUM 100 MG/1
100 CAPSULE, LIQUID FILLED ORAL 2 TIMES DAILY
Status: DISCONTINUED | OUTPATIENT
Start: 2018-01-08 | End: 2018-01-13

## 2018-01-08 RX ORDER — NALBUPHINE HYDROCHLORIDE 10 MG/ML
5 INJECTION, SOLUTION INTRAMUSCULAR; INTRAVENOUS; SUBCUTANEOUS ONCE
Status: COMPLETED | OUTPATIENT
Start: 2018-01-08 | End: 2018-01-08

## 2018-01-08 RX ADMIN — LISINOPRIL 10 MG: 5 TABLET ORAL at 10:04

## 2018-01-08 RX ADMIN — POTASSIUM BICARBONATE 50 MEQ: 25 TABLET, EFFERVESCENT ORAL at 01:41

## 2018-01-08 RX ADMIN — NALBUPHINE HYDROCHLORIDE 5 MG: 10 INJECTION, SOLUTION INTRAMUSCULAR; INTRAVENOUS; SUBCUTANEOUS at 02:30

## 2018-01-08 RX ADMIN — Medication 10 ML: at 03:09

## 2018-01-08 RX ADMIN — METRONIDAZOLE 500 MG: 500 INJECTION, SOLUTION INTRAVENOUS at 22:19

## 2018-01-08 RX ADMIN — GABAPENTIN 400 MG: 400 CAPSULE ORAL at 10:04

## 2018-01-08 RX ADMIN — HYDROMORPHONE HYDROCHLORIDE 1 MG: 1 INJECTION, SOLUTION INTRAMUSCULAR; INTRAVENOUS; SUBCUTANEOUS at 05:41

## 2018-01-08 RX ADMIN — METHYLPREDNISOLONE SODIUM SUCCINATE 60 MG: 40 INJECTION, POWDER, FOR SOLUTION INTRAMUSCULAR; INTRAVENOUS at 07:55

## 2018-01-08 RX ADMIN — ATORVASTATIN CALCIUM 80 MG: 20 TABLET, FILM COATED ORAL at 22:18

## 2018-01-08 RX ADMIN — GABAPENTIN 400 MG: 400 CAPSULE ORAL at 17:30

## 2018-01-08 RX ADMIN — METHYLPREDNISOLONE SODIUM SUCCINATE 35 MG: 40 INJECTION, POWDER, FOR SOLUTION INTRAMUSCULAR; INTRAVENOUS at 19:04

## 2018-01-08 RX ADMIN — POTASSIUM CHLORIDE 40 MEQ: 20 TABLET, EXTENDED RELEASE ORAL at 05:41

## 2018-01-08 RX ADMIN — Medication 10 ML: at 05:42

## 2018-01-08 RX ADMIN — MAGNESIUM SULFATE HEPTAHYDRATE 2 G: 40 INJECTION, SOLUTION INTRAVENOUS at 01:43

## 2018-01-08 RX ADMIN — POTASSIUM CHLORIDE 40 MEQ: 20 TABLET, EXTENDED RELEASE ORAL at 01:53

## 2018-01-08 RX ADMIN — Medication 10 ML: at 20:10

## 2018-01-08 RX ADMIN — CARVEDILOL 3.12 MG: 3.12 TABLET, FILM COATED ORAL at 10:04

## 2018-01-08 RX ADMIN — DOCUSATE SODIUM 100 MG: 100 CAPSULE, LIQUID FILLED ORAL at 10:05

## 2018-01-08 RX ADMIN — CARVEDILOL 3.12 MG: 3.12 TABLET, FILM COATED ORAL at 17:30

## 2018-01-08 RX ADMIN — PROMETHAZINE HYDROCHLORIDE 12.5 MG: 25 TABLET ORAL at 22:19

## 2018-01-08 RX ADMIN — Medication 10 ML: at 17:30

## 2018-01-08 RX ADMIN — HYDROMORPHONE HYDROCHLORIDE 1 MG: 1 INJECTION, SOLUTION INTRAMUSCULAR; INTRAVENOUS; SUBCUTANEOUS at 10:02

## 2018-01-08 RX ADMIN — ASPIRIN 81 MG 81 MG: 81 TABLET ORAL at 10:04

## 2018-01-08 RX ADMIN — GABAPENTIN 400 MG: 400 CAPSULE ORAL at 22:19

## 2018-01-08 RX ADMIN — HYDROMORPHONE HYDROCHLORIDE 1 MG: 1 INJECTION, SOLUTION INTRAMUSCULAR; INTRAVENOUS; SUBCUTANEOUS at 22:18

## 2018-01-08 RX ADMIN — HYDROMORPHONE HYDROCHLORIDE 1 MG: 1 INJECTION, SOLUTION INTRAMUSCULAR; INTRAVENOUS; SUBCUTANEOUS at 17:30

## 2018-01-08 NOTE — PROGRESS NOTES
SLP Clinical Bedside Swallow Evalution session held due to:     [] Nausea/vomiting  [] RN Communication/ suggestion  [] NPO for procedure   [] Extreme Pain  [x] OSVALDO for ECHO     Will f/u later as schedule allows. Thank you.   Melissa Coronado M.S.Arvin., CCC/SLP  Speech Language Pathologist

## 2018-01-08 NOTE — ED TRIAGE NOTES
Onset of Numbness in Left arm/face and Right hand, started at ~ 1100 this morning.  Previous Hx of CVA

## 2018-01-08 NOTE — ED NOTES
Attempted to call for report - will call ED back when able to take report. Charge RN and [employer] aware.

## 2018-01-08 NOTE — PROGRESS NOTES
0300: Pta rrived to the unit. Appears to be in stable condition. States still has a headache. Meds just given in ED. NIH a 0. Will monitor. 0530: Pt still with c/o pain. Notified Dr. Adri Vizcarra. Orders placed in chart. Will monitor. 0730: Bedside and Verbal shift change report given to Eliz BLANCO (oncoming nurse) by Janet Herrera RN   (offgoing nurse). Report included the following information SBAR, Kardex and MAR.

## 2018-01-08 NOTE — PROGRESS NOTES
Readmission Risk Assessment:     Moderate Risk and MSSP/Good Help ACO patients    RRAT Score:  13-20    Initial Assessment:  who presents ambulatory to the emergency department C/O numbness in left face/arm and right hand, onset 12.5 hours ago. Associated sxs include confusion, left arm weakness, and SOB. Patient is admitted for LUE weakness     Emergency Contact:  See face sheet  Pertinent Medical Hx:   HTN, CVA, Anemia, Crohns, thromboembolus      PCP/Specialists: [employer]:       DME:          Moderate Risk Care Transition Plan:  1. Evaluate for [place] or H2H, SNF, acute rehab, community care coordination of resources. 2. Involve patient/caregiver in assessment, planning, education and implement of intervention. 3. CM daily patient care huddles/interdisciplinary rounds. 4. PCP/Specialist appointment within 5 - 7 days made prior to discharge. 5. Facilitate transportation and logistics for follow-up appointments. 6. Medication reconciliation - Pharmacy  7. Formal handoff between hospital provider and post-acute provider to transition patient  Handoff to [facility] Nurse Navigator or PCP practice. Met with patient at bedside during rounds. Patient states she has family that lives with her. Does not think she will needs at time of d/c.  Cm will continue to follow

## 2018-01-08 NOTE — ED NOTES
Patient up to use bathroom. Reports daughter will be going home for the night. Denies any dizziness or lightheaded at this time.

## 2018-01-08 NOTE — PROGRESS NOTES
PT    Attempted PT evaluation for second time today. Pt sleeping soundly unable to maintain wakefulness to participate with PT.     Mack Boyd PT, DPT

## 2018-01-08 NOTE — ED PROVIDER NOTES
EMERGENCY DEPARTMENT HISTORY AND PHYSICAL EXAM    Date: 1/7/2018  Patient Name: Ethan Joe    History of Presenting Illness     Chief Complaint   Patient presents with    Numbness         History Provided By: Patient    Chief Complaint: Numbness  Duration: 12.5 hours  Timing:  Waxing and Waning  Location: Left face/arm and right hand  Associated Symptoms: confusion, left arm weakness, and SOB    Additional History (Context):   11:39 PM  Ethan Joe is a 56 y.o. female with PMHX HTN, CVA, anemia, Crohn's disease, and thromboembolus who presents ambulatory to the emergency department C/O numbness in left face/arm and right hand, onset 12.5 hours ago. Associated sxs include confusion, left arm weakness, and SOB. Pt has not been evaluated by GI. Pt denies fever, CP or any other sxs or complaints.      PCP: Rehan Mahan MD    Current Facility-Administered Medications   Medication Dose Route Frequency Provider Last Rate Last Dose    atorvastatin (LIPITOR) tablet 80 mg  80 mg Oral QHS Jovanny Tolentino MD   Stopped at 01/08/18 0221    carvedilol (COREG) tablet 3.125 mg  3.125 mg Oral BID WITH MEALS Jovanny Tolentino MD        docusate sodium (COLACE) capsule 100 mg  100 mg Oral BID Jovanny Tolentino MD        gabapentin (NEURONTIN) capsule 400 mg  400 mg Oral TID Jovanny Tolentino MD        lisinopril (PRINIVIL, ZESTRIL) tablet 10 mg  10 mg Oral DAILY Jovanny Tolentino MD        ondansetron (ZOFRAN ODT) tablet 4 mg  4 mg Oral Q6H PRN Jovanny Tolentino MD        sodium chloride (NS) flush 5-10 mL  5-10 mL IntraVENous Q8H Jovanny Tolentino MD   10 mL at 01/08/18 0542    sodium chloride (NS) flush 5-10 mL  5-10 mL IntraVENous PRN Jovanny Tolentino MD        HYDROmorphone (PF) (DILAUDID) injection 1 mg  1 mg IntraVENous Q4H PRN Jovanny Tolentino MD   1 mg at 01/08/18 0541    methylPREDNISolone (PF) (SOLU-MEDROL) injection 60 mg  60 mg IntraVENous MEME Bailey MD [place]        methylPREDNISolone (PF) (SOLU-MEDROL) injection 35 mg  35 mg IntraVENous Q12H Ernesto Pan MD        aspirin chewable tablet 81 mg  81 mg Oral DAILY Ernesto Pan MD           Past History     Past Medical History:  Past Medical History:   Diagnosis Date    Abdominal pain     Anemia NEC     sickle cell trait    C. difficile colitis     Crohn's disease ([facility] [address]     Gastrointestinal disorder     Crohns    Herpes zoster     Hx of cocaine abuse     Hyperkalemia     Hypertension     Iron deficiency anemia     MRSA (methicillin resistant Staphylococcus aureus)     Obesity     Pain management     Sickle cell trait (HCC)     Stroke ([facility]     + cva 3/17    Thromboembolus [facility])        Past Surgical History:  Past Surgical History:   Procedure Laterality Date    COLONOSCOPY N/A 3/17/2017    COLONOSCOPY; BIOPSY; performed by Berny Villareal MD at [facility] ENDOSCOPY    HX GYN      tubal ligation    HX TUBAL LIGATION      VASCULAR SURGERY PROCEDURE UNLIST         Family History:  History reviewed. No pertinent family history. Social History:  Social History   Substance Use Topics    Smoking status: Former Smoker    Smokeless tobacco: Never Used    Alcohol use No       Allergies: Allergies   Allergen Reactions    Humira [Adalimumab] Other (comments)    Remicade [Infliximab] Palpitations         Review of Systems   Review of Systems   Constitutional: Negative for activity change, appetite change, fever and unexpected weight change. HENT: Negative for congestion and sore throat. Eyes: Negative for pain and redness. Respiratory: Positive for shortness of breath. Negative for cough. Cardiovascular: Positive for chest pain. Negative for palpitations. Gastrointestinal: Negative for abdominal pain, diarrhea, nausea and vomiting. Endocrine: Negative for polydipsia and polyuria. Genitourinary: Negative for difficulty urinating and dysuria. Musculoskeletal: Negative for back pain and neck pain. Skin: Negative for pallor and rash. Neurological: Positive for weakness (left arm) and numbness (left face/arm and right hand). Negative for headaches. Psychiatric/Behavioral: Positive for confusion. All other systems reviewed and are negative. Physical Exam     Vitals:    01/08/18 0215 01/08/18 0220 01/08/18 0330 01/08/18 0529   BP: 120/76 127/87 132/88 133/88   Pulse: 91 92 83 80   Resp: 15 17 17 17   Temp:   97.2 °F (36.2 °C) 97.2 °F (36.2 °C)   SpO2:   100% 100%   Weight:       Height:         Physical Exam   Constitutional: She is oriented to person, place, and time. She appears well-developed and well-nourished. HENT:   Head: Normocephalic and atraumatic. Right Ear: External ear normal.   Left Ear: External ear normal.   Nose: Nose normal.   Mouth/Throat: Oropharynx is clear and moist.   Eyes: Conjunctivae and EOM are normal. Pupils are equal, round, and reactive to light. Neck: Normal range of motion. Neck supple. No JVD present. No tracheal deviation present. Cardiovascular: Normal rate, regular rhythm, normal heart sounds and intact distal pulses. Exam reveals no gallop and no friction rub. No murmur heard. Pulmonary/Chest: Effort normal and breath sounds normal. No respiratory distress. She has no wheezes. She has no rales. Abdominal: Soft. Bowel sounds are normal. She exhibits no distension and no mass. There is no tenderness. There is no rebound and no guarding. Musculoskeletal: Normal range of motion. She exhibits no edema or tenderness. Neurological: She is alert and oriented to person, place, and time. She has normal reflexes. No cranial nerve deficit. She exhibits normal muscle tone. Coordination normal.   CN II-XII intact, no facial droop or asymmetry;  No pronator drift; finger-nose-finger intact; good  and equal strength 5/5 bilateral upper and lower extremities; DTRs: 2+ upper and lower extremities, symmetric bilaterally; sensation is intact to light touch and position sense upper and lower extremities symmetric bilaterally;  Gait normal.  Subjective LUE and RLE numbness   Skin: Skin is warm and dry. No rash noted. Psychiatric: She has a normal mood and affect. Her behavior is normal.   Nursing note and vitals reviewed. Diagnostic Study Results     Labs -     Recent Results (from the past 12 hour(s))   GLUCOSE, POC    Collection Time: 01/07/18 11:38 PM   Result Value Ref Range    Glucose (POC) 100 70 - 110 mg/dL   CBC WITH AUTOMATED DIFF    Collection Time: 01/07/18 11:57 PM   Result Value Ref Range    WBC 4.5 (L) 4.6 - 13.2 K/uL    RBC 3.85 (L) 4.20 - 5.30 M/uL    HGB 8.7 (L) 12.0 - 16.0 g/dL    HCT 28.6 (L) 35.0 - 45.0 %    MCV 74.3 74.0 - 97.0 FL    MCH 22.6 (L) 24.0 - 34.0 PG    MCHC 30.4 (L) 31.0 - 37.0 g/dL    RDW 15.5 (H) 11.6 - 14.5 %    PLATELET 319 429 - 416 K/uL    MPV 9.0 (L) 9.2 - 11.8 FL    NEUTROPHILS 51 40 - 73 %    LYMPHOCYTES 33 21 - 52 %    MONOCYTES 14 (H) 3 - 10 %    EOSINOPHILS 2 0 - 5 %    BASOPHILS 0 0 - 2 %    ABS. NEUTROPHILS 2.3 1.8 - 8.0 K/UL    ABS. LYMPHOCYTES 1.5 0.9 - 3.6 K/UL    ABS. MONOCYTES 0.6 0.05 - 1.2 K/UL    ABS. EOSINOPHILS 0.1 0.0 - 0.4 K/UL    ABS.  BASOPHILS 0.0 0.0 - 0.06 K/UL    DF AUTOMATED     METABOLIC PANEL, BASIC    Collection Time: 01/07/18 11:57 PM   Result Value Ref Range    Sodium 139 136 - 145 mmol/L    Potassium 3.2 (L) 3.5 - 5.5 mmol/L    Chloride 101 100 - 108 mmol/L    CO2 24 21 - 32 mmol/L    Anion gap 14 3.0 - 18 mmol/L    Glucose 98 74 - 99 mg/dL    BUN 5 (L) 7.0 - 18 MG/DL    Creatinine 0.69 0.6 - 1.3 MG/DL    BUN/Creatinine ratio 7 (L) 12 - 20      GFR est AA >60 >60 ml/min/1.73m2    GFR est non-AA >60 >60 ml/min/1.73m2    Calcium 8.9 8.5 - 10.1 MG/DL   PROTHROMBIN TIME + INR    Collection Time: 01/07/18 11:57 PM   Result Value Ref Range    Prothrombin time 16.6 (H) 11.5 - 15.2 sec    INR 1.4 (H) 0.8 - 1.2     MAGNESIUM    Collection Time: 01/07/18 11:57 PM   Result Value Ref Range    Magnesium 1.5 (L) 1.6 - 2.6 mg/dL   TROPONIN I    Collection Time: 01/07/18 11:57 PM   Result Value Ref Range    Troponin-I, Qt. <0.02 0.00 - 0.06 NG/ML   EKG, 12 LEAD, INITIAL    Collection Time: 01/08/18 12:16 AM   Result Value Ref Range    Ventricular Rate 109 BPM    Atrial Rate 109 BPM    P-R Interval 162 ms    QRS Duration 148 ms    Q-T Interval 378 ms    QTC Calculation (Bezet) 509 ms    Calculated P Axis 43 degrees    Calculated R Axis 61 degrees    Calculated T Axis 56 degrees    Diagnosis       Sinus tachycardia  Left bundle branch block  Abnormal ECG  When compared with ECG of 11-DEC-2017 18:00,  Left bundle branch block is now present  Criteria for Anteroseptal infarct are no longer present     DRUG SCREEN, URINE    Collection Time: 01/08/18 12:54 AM   Result Value Ref Range    BENZODIAZEPINES NEGATIVE  NEG      BARBITURATES NEGATIVE  NEG      THC (TH-CANNABINOL) NEGATIVE  NEG      OPIATES NEGATIVE  NEG      PCP(PHENCYCLIDINE) NEGATIVE  NEG      COCAINE NEGATIVE  NEG      AMPHETAMINES NEGATIVE  NEG      METHADONE NEGATIVE  NEG      HDSCOM (NOTE)    URINALYSIS W/ RFLX MICROSCOPIC    Collection Time: 01/08/18 12:54 AM   Result Value Ref Range    Color YELLOW      Appearance CLEAR      Specific gravity <1.005 (L) 1.005 - 1.030    pH (UA) 5.0 5.0 - 8.0      Protein NEGATIVE  NEG mg/dL    Glucose NEGATIVE  NEG mg/dL    Ketone NEGATIVE  NEG mg/dL    Bilirubin NEGATIVE  NEG      Blood NEGATIVE  NEG      Urobilinogen 0.2 0.2 - 1.0 EU/dL    Nitrites NEGATIVE  NEG      Leukocyte Esterase NEGATIVE  NEG     PROTHROMBIN TIME + INR    Collection Time: 01/08/18  5:27 AM   Result Value Ref Range    Prothrombin time 18.0 (H) 11.5 - 15.2 sec    INR 1.6 (H) 0.8 - 1.2     MAGNESIUM    Collection Time: 01/08/18  5:27 AM   Result Value Ref Range    Magnesium 2.2 1.6 - 2.6 mg/dL   CARDIAC PANEL,(CK, CKMB & TROPONIN)    Collection Time: 01/08/18  5:27 AM   Result Value Ref Range CK 36 26 - 192 U/L    CK - MB <1.0 <3.6 ng/ml    CK-MB Index  0.0 - 4.0 %     CALCULATION NOT PERFORMED WHEN RESULT IS BELOW LINEAR LIMIT    Troponin-I, Qt. <0.02 0.00 - 0.06 NG/ML       Radiologic Studies -   CT Results  (Last 48 hours)               01/07/18 2351  CT HEAD WO CONT Final result    Impression:  IMPRESSION:       Old right middle cerebral artery distribution infarct. No acute intracranial abnormality identified. Findings were called to the emergency department just prior to signing report. As read by the radiologist.       Narrative:  EXAM: CT head       INDICATION: Neurologic deficit. COMPARISON: August 1, 2017. TECHNIQUE: Axial CT imaging of the head was performed without intravenous   contrast. Dose reduction techniques used: automated exposure control, adjustment   of the mAs and/or kVp according to patient size, and iterative reconstruction   techniques. _______________       FINDINGS:       BRAIN AND POSTERIOR FOSSA: A right middle cerebral artery distribution infarct   is again noted centered in the right parietal lobe similar to previous. The   lateral, third and fourth ventricles are normally outlined. The basal cisterns   are normally outlined is well. The cortical sulci are normally outlined. No   acute territorial defect is seen. There is no acute hemorrhage. EXTRA-AXIAL SPACES AND MENINGES: There are no abnormal extra-axial fluid   collections. CALVARIUM: Intact. SINUSES: Clear. OTHER: None.       _______________               CXR Results  (Last 48 hours)               01/08/18 0036  XR CHEST PORT Final result    Impression:  Impression:   --------------       No significant abnormalities.     As read by the radiologist.       Narrative:  ---------------------------------------------------------------------------   <<<<<<<<<           [facility]           >>>>>>>>> ---------------------------------------------------------------------------       CLINICAL HISTORY:  Weakness. COMPARISON EXAMINATIONS:  December 11, 2017.           ---  SINGLE FRONTAL VIEW OF THE CHEST  ---       The lungs and pleural spaces are clear. The cardiomediastinal silhouette is   stable. No significant osseous abnormalities are identified.             --------------               Medical Decision Making   I am the first provider for this patient. I reviewed the vital signs, available nursing notes, past medical history, past surgical history, family history and social history. Vital Signs-Reviewed the patient's vital signs. Pulse Oximetry Analysis - 98% on RA     Cardiac Monitor:  Rate: 107 bpm  Rhythm: Sinus tachycardia    EKG interpretation: (Preliminary)  12:16 AM  Sinus tachycardia at 109 bpm. LBBB. No acute changes. EKG read by Xena Wagner MD at 12:22 AM     Records Reviewed: Nursing Notes    Provider Notes (Medical Decision Making):   DDx: CVA, metabolic derangement, electrolyte abnormality, encephalopathy    Procedures:  Procedures    PROCEDURE NOTE - RECTAL EXAM:   1:49 AM  Performed by: Xena Wagner MD  Rectal exam performed. Brown stool was collected. Stool was Hemoccult tested, and found to be heme Positive. The procedure took 1-15 minutes, and pt tolerated well. Written by Lucy Bunch ED Scribe, as dictated by Xena Wagner MD.    ED Course:   11:39   Initial assessment performed. The patients presenting problems have been discussed, and they are in agreement with the care plan formulated and outlined with them. I have encouraged them to ask questions as they arise throughout their visit. 11:40 PM  Code S was called. CONSULT NOTE:   12:17 AM  Xena Wagner MD spoke with Brien Willard MD   Specialty: Teleneurology  Discussed pt's hx, disposition, and available diagnostic and imaging results. Reviewed care plans.  Consulting physician agrees with plans as outlined. States that pt is not tPA candidate and that it is more metabolic. Recommends to obtain MRI in the morning. Written by Guillermo Carter ED Scribe, as dictated by Sailaja Tristan MD.    12:19 AM  Code S cancelled    CONSULT NOTE:   12:53 Viviana Solis MD spoke with Isaura Rodriguez MD   Specialty: Hospitalist  Discussed pt's hx, disposition, and available diagnostic and imaging results. Reviewed care plans. Consulting physician agrees with plans as outlined. Agrees to admit to remote tele. Written by RUTH Weineribe, as dictated by Sailaja Tristan MD.    2:00 AM  Stool was grossly heme positive. Will hold ASA, however, pt is taking Warfarin. Diagnosis and Disposition       Critical Care Time: None    Core Measures:  For Hospitalized Patients:    1. Hospitalization Decision Time:  The decision to hospitalize the patient was made by Sailaja Tristan MD at 12:53 AM on 1/7/2018    2. Aspirin: Aspirin was not given because the patient is a bleeding risk    12:53 AM  Patient is being admitted to the hospital by Isaura Rodriguez MD. The results of their tests and reasons for their admission have been discussed with them and/or available family. They convey agreement and understanding for the need to be admitted and for their admission diagnosis. CONDITIONS ON ADMISSION:  Sepsis is not present at the time of admission. Deep Vein Thrombosis is not present at the time of admission. Thrombosis is not present at the time of admission. Urinary Tract Infection is not present at the time of admission. Pneumonia is not present at the time of admission. MRSA is not present at the time of admission. Wound infection is not present at the time of admission. Pressure Ulcer is not present at the time of admission. Discussion:  56 y.o. female presents with LUE weakness and numbness. Code S was called. CT head showed old CVA with no new pathology.  Pt evaluated by teleneurologist who felt pt was not a tPA candidate and that sxs were secondary to encephalopathy. Code S was cancelled. Labs remarkable for anemia. Pt was heme positive and not given ASA. ECG and troponin showed no evidence of ACS. Labs remarkable for anemia, hypokalemia, hypomagnesemia. CXR unremarkable. Case discussed with hospitalist who agreed with admission for further evaluation. CLINICAL IMPRESSION:    1. LUE weakness    2. Hypokalemia    3. Hypomagnesemia    4. Warfarin-induced coagulopathy ([address]    5. Heme Positive Stool  6. Anemia due to blood loss, chronic    PLAN: ADMIT TO REMOTE TELEMETRY  _______________________________    Attestations: This note is prepared by Frankie Allan, acting as Scribe for Ever Corrigan MD.    Ever Corrigan MD:  The scribe's documentation has been prepared under my direction and personally reviewed by me in its entirety.   I confirm that the note above accurately reflects all work, treatment, procedures, and medical decision making performed by me.  _______________________________

## 2018-01-08 NOTE — H&P
History & Physical    Patient: Lakhwinder Villarreal MRN: [id number]  CSN: [id number]    YOB: 1970  Age: 56 y.o. Sex: female      DOA: 1/7/2018    Chief Complaint:   Chief Complaint   Patient presents with    Numbness          HPI:     Lakhwinder Villarreal is a 56 y.o.  female hx of MCA stroke who started with numbness in left face  Dysarthria waxing and wannning since early this am around 11am;  Patient was forgetful about putting groceries in car and had trouble finding words  Has hx of right leg DVT and stopped coumadin due to bloody stools and abdominal cramping  Has been on Asacol and intermittent steroids     IN ER code S called not TPA  canidate due to duration of symptoms and active blood loss  recommending admission for further evaluation     Past Medical History:   Diagnosis Date    Abdominal pain     Anemia NEC     sickle cell trait    C. difficile colitis     [facility]'s disease ([facility] [address]     Gastrointestinal disorder     Crohns    Herpes zoster     Hx of cocaine abuse     Hyperkalemia     Hypertension     Iron deficiency anemia     MRSA (methicillin resistant Staphylococcus aureus)     Obesity     Pain management     Sickle [facility] trait ([facility] [address]     Stroke ([facility] [address]     + cva 3/17    [facility]embolus [facility])        Past Surgical History:   Procedure Laterality Date    COLONOSCOPY N/A 3/17/2017    COLONOSCOPY; BIOPSY; performed by Kathia Cheng MD at [facility] ENDOSCOPY    HX GYN      tubal ligation    HX TUBAL LIGATION      VASCULAR SURGERY PROCEDURE UNLIST         History reviewed. No pertinent family history.     Social History     Social History    Marital status:      Spouse name: N/A    Number of children: N/A    Years of education: N/A     Social History Main Topics    Smoking status: Former Smoker    Smokeless tobacco: Never Used    Alcohol use No    Drug use: No    Sexual activity: Yes     Partners: Male     Birth control/ protection: Surgical Other Topics Concern    None     Social History Narrative       Prior to Admission medications    Medication Sig Start Date End Date Taking? Authorizing Provider   docusate sodium (COLACE) 100 mg capsule Take 1 Cap by mouth two (2) times a day for 90 days. 12/13/17 3/13/18  Sherry Calvillo MD   ondansetron (ZOFRAN ODT) 4 mg disintegrating tablet Take 1-2 tablets every 6-8 hours as needed for nausea and vomiting. 12/13/17   Sherry Calvillo MD   predniSONE (STERAPRED) 5 mg dose pack Take 8 tabs for 5 days, then 4 tabs for 5 days, then 2 tabs for 5 days, then 1 tab for 5 days, then off 12/11/17   Dipesh Sequeira MD   promethazine (PHENERGAN) 25 mg tablet Take 1 Tab by mouth every six (6) hours as needed. 12/11/17   Dipesh Sequeira MD   butalbital-acetaminophen-caff (FIORICET) -40 mg per capsule Take 1 Cap by mouth every six (6) hours as needed for Pain. Max Daily Amount: 4 Caps. Indications: TENSION-TYPE HEADACHE 10/30/17   Sherry Calvillo MD   ELETRIPTAN HBR (RELPAX PO) Take  by mouth. Shannon Walker MD   lisinopril (PRINIVIL, ZESTRIL) 10 mg tablet Take 10 mg by mouth daily. Shannon Walker MD   warfarin (COUMADIN) 7.5 mg tablet Take 1 Tab by mouth daily. Patient taking differently: Take 5 mg by mouth daily. Except thurs and sat when pt takes 7.5 mg 4/18/17   Mariola Sarmiento MD   carvedilol (COREG) 3.125 mg tablet Take 1 Tab by mouth two (2) times daily (with meals). 4/18/17   Mariola Sarmiento MD   atorvastatin (LIPITOR) 80 mg tablet Take 1 Tab by mouth nightly. 3/23/17   Mariola Sarmiento MD   gabapentin (NEURONTIN) 400 mg capsule Take 1 Cap by mouth three (3) times daily. 3/23/17   Mariola Sarmiento MD       Allergies   Allergen Reactions    Humira [Adalimumab] Other (comments)    Remicade [Infliximab] Palpitations         Review of Systems  GENERAL: Patient alert, awake and oriented times 3, able to communicate full sentences and not in distress.    HEENT: No change in vision, no earache, tinnitus, sore throat or sinus congestion. NECK: No pain or stiffness. PULMONARY: No shortness of breath, cough or wheeze. Cardiovascular: no pnd or orthopnea, no CP  GASTROINTESTINAL: + abdominal pain, nausea, vomiting   +diarrhea, +melena +bright red blood per rectum. GENITOURINARY: No urinary frequency, urgency, hesitancy or dysuria. MUSCULOSKELETAL: No joint or muscle pain, no back pain, no recent trauma. DERMATOLOGIC: No rash, no itching, no lesions. ENDOCRINE: No polyuria, polydipsia, no heat or cold intolerance. No recent change in weight. HEMATOLOGICAL: +anemia or easy bruising or bleeding. Sickle trait   NEUROLOGIC: No headache, seizures, +numbness,+ tingling or weakness. Left arm       Physical Exam:     Physical Exam:  Visit Vitals    BP (!) 121/91    Pulse (!) 104    Temp 98.2 °F (36.8 °C)    Resp 22    Ht 5' 6\" (1.676 m)    Wt 79.4 kg (175 lb)    SpO2 100%    BMI 28.25 kg/m2      O2 Device: Room air    Temp (24hrs), Av.2 °F (36.8 °C), Min:98.2 °F (36.8 °C), Max:98.2 °F (36.8 °C)             General:  Alert, cooperative, no distress, appears stated age. Head: Normocephalic, without obvious abnormality, atraumatic. Eyes:  Conjunctivae/corneas clear. PERRL, EOMs intact. Nose: Nares normal. No drainage or sinus tenderness. Neck: Supple, symmetrical, trachea midline, no adenopathy, thyroid: no enlargement, no carotid bruit and no JVD. Lungs:   Clear to auscultation bilaterally. Heart:  Regular rate and rhythm, S1, S2 normal.     Abdomen: Soft, -tender. Bowel sounds normal.    Extremities: Extremities normal, atraumatic, no cyanosis or edema. Pulses: 2+ and symmetric all extremities. Skin:  No rashes or lesions early left eye stye   Neurologic: AAOx3, No focal motor diminished sensory  Left face deficit.        Labs Reviewed:  Recent Results (from the past 24 hour(s))   GLUCOSE, POC    Collection Time: 18 11:38 PM   Result Value Ref Range    Glucose (POC) 100 70 - 110 mg/dL   CBC WITH AUTOMATED DIFF    Collection Time: 01/07/18 11:57 PM   Result Value Ref Range    WBC 4.5 (L) 4.6 - 13.2 K/uL    RBC 3.85 (L) 4.20 - 5.30 M/uL    HGB 8.7 (L) 12.0 - 16.0 g/dL    HCT 28.6 (L) 35.0 - 45.0 %    MCV 74.3 74.0 - 97.0 FL    MCH 22.6 (L) 24.0 - 34.0 PG    MCHC 30.4 (L) 31.0 - 37.0 g/dL    RDW 15.5 (H) 11.6 - 14.5 %    PLATELET 403 815 - 337 K/uL    MPV 9.0 (L) 9.2 - 11.8 FL    NEUTROPHILS 51 40 - 73 %    LYMPHOCYTES 33 21 - 52 %    MONOCYTES 14 (H) 3 - 10 %    EOSINOPHILS 2 0 - 5 %    BASOPHILS 0 0 - 2 %    ABS. NEUTROPHILS 2.3 1.8 - 8.0 K/UL    ABS. LYMPHOCYTES 1.5 0.9 - 3.6 K/UL    ABS. MONOCYTES 0.6 0.05 - 1.2 K/UL    ABS. EOSINOPHILS 0.1 0.0 - 0.4 K/UL    ABS.  BASOPHILS 0.0 0.0 - 0.06 K/UL    DF AUTOMATED     METABOLIC PANEL, BASIC    Collection Time: 01/07/18 11:57 PM   Result Value Ref Range    Sodium 139 136 - 145 mmol/L    Potassium 3.2 (L) 3.5 - 5.5 mmol/L    Chloride 101 100 - 108 mmol/L    CO2 24 21 - 32 mmol/L    Anion gap 14 3.0 - 18 mmol/L    Glucose 98 74 - 99 mg/dL    BUN 5 (L) 7.0 - 18 MG/DL    Creatinine 0.69 0.6 - 1.3 MG/DL    BUN/Creatinine ratio 7 (L) 12 - 20      GFR est AA >60 >60 ml/min/1.73m2    GFR est non-AA >60 >60 ml/min/1.73m2    Calcium 8.9 8.5 - 10.1 MG/DL   PROTHROMBIN TIME + INR    Collection Time: 01/07/18 11:57 PM   Result Value Ref Range    Prothrombin time 16.6 (H) 11.5 - 15.2 sec    INR 1.4 (H) 0.8 - 1.2     MAGNESIUM    Collection Time: 01/07/18 11:57 PM   Result Value Ref Range    Magnesium 1.5 (L) 1.6 - 2.6 mg/dL   TROPONIN I    Collection Time: 01/07/18 11:57 PM   Result Value Ref Range    Troponin-I, Qt. <0.02 0.00 - 0.06 NG/ML   DRUG SCREEN, URINE    Collection Time: 01/08/18 12:54 AM   Result Value Ref Range    BENZODIAZEPINES NEGATIVE  NEG      BARBITURATES NEGATIVE  NEG      THC (TH-CANNABINOL) NEGATIVE  NEG      OPIATES NEGATIVE  NEG      PCP(PHENCYCLIDINE) NEGATIVE  NEG      COCAINE NEGATIVE  NEG      AMPHETAMINES NEGATIVE NEG      METHADONE NEGATIVE  NEG      HDSCOM (NOTE)    URINALYSIS W/ RFLX MICROSCOPIC    Collection Time: 01/08/18 12:54 AM   Result Value Ref Range    Color YELLOW      Appearance CLEAR      Specific gravity <1.005 (L) 1.005 - 1.030    pH (UA) 5.0 5.0 - 8.0      Protein NEGATIVE  NEG mg/dL    Glucose NEGATIVE  NEG mg/dL    Ketone NEGATIVE  NEG mg/dL    Bilirubin NEGATIVE  NEG      Blood NEGATIVE  NEG      Urobilinogen 0.2 0.2 - 1.0 EU/dL    Nitrites NEGATIVE  NEG      Leukocyte Esterase NEGATIVE  NEG         All lab results for the last 24 hours reviewed. Recent Results (from the past 24 hour(s))   GLUCOSE, POC    Collection Time: 01/07/18 11:38 PM   Result Value Ref Range    Glucose (POC) 100 70 - 110 mg/dL   CBC WITH AUTOMATED DIFF    Collection Time: 01/07/18 11:57 PM   Result Value Ref Range    WBC 4.5 (L) 4.6 - 13.2 K/uL    RBC 3.85 (L) 4.20 - 5.30 M/uL    HGB 8.7 (L) 12.0 - 16.0 g/dL    HCT 28.6 (L) 35.0 - 45.0 %    MCV 74.3 74.0 - 97.0 FL    MCH 22.6 (L) 24.0 - 34.0 PG    MCHC 30.4 (L) 31.0 - 37.0 g/dL    RDW 15.5 (H) 11.6 - 14.5 %    PLATELET 786 504 - 674 K/uL    MPV 9.0 (L) 9.2 - 11.8 FL    NEUTROPHILS 51 40 - 73 %    LYMPHOCYTES 33 21 - 52 %    MONOCYTES 14 (H) 3 - 10 %    EOSINOPHILS 2 0 - 5 %    BASOPHILS 0 0 - 2 %    ABS. NEUTROPHILS 2.3 1.8 - 8.0 K/UL    ABS. LYMPHOCYTES 1.5 0.9 - 3.6 K/UL    ABS. MONOCYTES 0.6 0.05 - 1.2 K/UL    ABS. EOSINOPHILS 0.1 0.0 - 0.4 K/UL    ABS.  BASOPHILS 0.0 0.0 - 0.06 K/UL    DF AUTOMATED     METABOLIC PANEL, BASIC    Collection Time: 01/07/18 11:57 PM   Result Value Ref Range    Sodium 139 136 - 145 mmol/L    Potassium 3.2 (L) 3.5 - 5.5 mmol/L    Chloride 101 100 - 108 mmol/L    CO2 24 21 - 32 mmol/L    Anion gap 14 3.0 - 18 mmol/L    Glucose 98 74 - 99 mg/dL    BUN 5 (L) 7.0 - 18 MG/DL    Creatinine 0.69 0.6 - 1.3 MG/DL    BUN/Creatinine ratio 7 (L) 12 - 20      GFR est AA >60 >60 ml/min/1.73m2    GFR est non-AA >60 >60 ml/min/1.73m2    Calcium 8.9 8.5 - 10.1 MG/DL PROTHROMBIN TIME + INR    Collection Time: 01/07/18 11:57 PM   Result Value Ref Range    Prothrombin time 16.6 (H) 11.5 - 15.2 sec    INR 1.4 (H) 0.8 - 1.2     MAGNESIUM    Collection Time: 01/07/18 11:57 PM   Result Value Ref Range    Magnesium 1.5 (L) 1.6 - 2.6 mg/dL   TROPONIN I    Collection Time: 01/07/18 11:57 PM   Result Value Ref Range    Troponin-I, Qt. <0.02 0.00 - 0.06 NG/ML   DRUG SCREEN, URINE    Collection Time: 01/08/18 12:54 AM   Result Value Ref Range    BENZODIAZEPINES NEGATIVE  NEG      BARBITURATES NEGATIVE  NEG      THC (TH-CANNABINOL) NEGATIVE  NEG      OPIATES NEGATIVE  NEG      PCP(PHENCYCLIDINE) NEGATIVE  NEG      COCAINE NEGATIVE  NEG      AMPHETAMINES NEGATIVE  NEG      METHADONE NEGATIVE  NEG      HDSCOM (NOTE)    URINALYSIS W/ RFLX MICROSCOPIC    Collection Time: 01/08/18 12:54 AM   Result Value Ref Range    Color YELLOW      Appearance CLEAR      Specific gravity <1.005 (L) 1.005 - 1.030    pH (UA) 5.0 5.0 - 8.0      Protein NEGATIVE  NEG mg/dL    Glucose NEGATIVE  NEG mg/dL    Ketone NEGATIVE  NEG mg/dL    Bilirubin NEGATIVE  NEG      Blood NEGATIVE  NEG      Urobilinogen 0.2 0.2 - 1.0 EU/dL    Nitrites NEGATIVE  NEG      Leukocyte Esterase NEGATIVE  NEG      and EKG    Procedures/imaging: see electronic medical records for all procedures/Xrays and details which were not copied into this note but were reviewed prior to creation of Plan      Assessment/Plan     Principal Problem:    LUE weakness (1/8/2018)   facial numbness     Active Problems:    Crohn disease ([facility] [address] (2/27/2012)      DVT (deep venous thrombosis) ([facility]) (3/16/2017)    Hypokalemia and hypomagesium        EMbolic Stroke hx ?   plan   Check PT INR now 1.4  With low hemoglobin and heme positive stool will hold aspirin and coumadin   Will have GI see for chron flare  Replace electrolytes accordingly  Stress dose steroids uses prn prednisone pack and asacol for chron's   Will obtain MRI and echo ? symptoms from old stroke and DVT/GI Prophylaxis: coumadin    Discussed with patient at bedside about hospital admission and my plan care, who understood and agree with my plan care.     Nato Shields MD  1/8/2018 1:41 AM

## 2018-01-08 NOTE — ED NOTES
TRANSFER - OUT REPORT:    Verbal report given to Paty RN(name) on Heather Diaz  being transferred to [facility] (unit) for routine progression of care       Report consisted of patients Situation, Background, Assessment and   Recommendations(SBAR). Information from the following report(s) SBAR, Kardex, ED Summary and MAR was reviewed with the receiving nurse. Lines:   Peripheral IV 01/08/18 Left Hand (Active)   Site Assessment Clean, dry, & intact 1/8/2018  1:44 AM   Phlebitis Assessment 0 1/8/2018  1:44 AM   Infiltration Assessment 0 1/8/2018  1:44 AM   Dressing Status Clean, dry, & intact 1/8/2018  1:44 AM        Opportunity for questions and clarification was provided.       Patient transported with:   [employer]

## 2018-01-08 NOTE — PROGRESS NOTES
Problem: Self Care Deficits Care Plan (Adult)  Goal: *Acute Goals and Plan of Care (Insert Text)  Initial OT STGs (1/8/2018) Within 7 days:    1. Patient will perform toilet transfer with modified Independent in preparation for bowel and bladder management. 2. Patient will perform bowel and bladder management with modified independent for increased independence with ADLs. 3. Patient will perform UB dressing with setup while utilizing salbador-techniques for increased independence with ADLs. 4. Patient will perform LB dressing with setup while utilizing salbador-techniques for increased independence with ADLs. 5. Patient will perform LUE exercises with Supervision assist for 15 minutes to increase independence with ADLs. 6. Patient will utilize energy conservation techniques with Supervision for increased independence with ADLs. Outcome: Progressing Towards Goal  Occupational Therapy EVALUATION    Patient: Sage Taylor (49 y.o. female)  Date: 1/8/2018  Primary Diagnosis: LUE weakness  Stroke [facility])        Precautions:  Fall    ASSESSMENT :  Based on the objective data described below, the patient presents with decreased functional strength, decreased functional balance, decreased overall activity tolerance limiting independence with ADLs. Patient drowsy from lack of sleep over night w/ neurochecks. Pt grossly S/SBA for ADLs w/o buttons or ties. Pt w/ decreased L hand /pinch strength & sensation. Pt reports increased L hand numbness but found sleeping w/ L hand off bed and L wrist flexed. Encouraged proper positioning of affected hand/wrist and will continue to monitor. Pt able to walk to bathroom w/o LOB. Visual assessment limited d/t drowsiness. Pt would benefit from continued OT services to maximize independence in ADLs and L hand functional use for bilateral ADLs.     Education: Reviewed home safety, body mechanics, signs and symptoms of a stroke, risk factors, blood sugar management in relation to stroke, and adaptive dressing techniques with patient verbalized understanding at this time. Stroke Education: Patient educated on signs/symptoms of stroke and importance of early intervention. Patient verbalized understanding. Patient will benefit from skilled intervention to address the above impairments. Patients rehabilitation potential is considered to be Good  Factors which may influence rehabilitation potential include:   []             None noted  [x]             Mental ability/status  [x]             Medical condition  []             Home/family situation and support systems  []             Safety awareness  []             Pain tolerance/management  []             Other:      PLAN :  Recommendations and Planned Interventions:  [x]               Self Care Training                  [x]        Therapeutic Activities  [x]               Functional Mobility Training    [x]        Cognitive Retraining  [x]               Therapeutic Exercises           [x]        Endurance Activities  [x]               Balance Training                   [x]        Neuromuscular Re-Education  [x]               Visual/Perceptual Training     [x]   Home Safety Training  [x]               Patient Education                 [x]        Family Training/Education  []               Other (comment):    Frequency/Duration: Patient will be followed by occupational therapy 1-2 times per day/4-7 days per week to address goals. Discharge Recommendations: Outpatient Occupational Therapy for L hand  Further Equipment Recommendations for Discharge: N/A     SUBJECTIVE:   Patient stated I'm sorry. I didn't get any sleep last night. I wasn't bored.     OBJECTIVE DATA SUMMARY:     Past Medical History:   Diagnosis Date    Abdominal pain     Anemia NEC     sickle cell trait    C. difficile colitis     [facility] ([facility] [address]     Gastrointestinal disorder     Crohns    Herpes zoster     Hx of cocaine abuse     Hyperkalemia     Hypertension     Iron deficiency anemia     MRSA (methicillin resistant Staphylococcus aureus)     Obesity     Pain management     Sick[facility] trait ([facility] [address]     Stroke ([facility] [address]     + cva 3/17    [facility]boembolus [facility])      Past Surgical History:   Procedure Laterality Date    COLONOSCOPY N/A 3/17/2017    COLONOSCOPY; BIOPSY; performed by Susie Del Rio MD at THE [facility] ENDOSCOPY    HX GYN      tubal ligation    HX TUBAL LIGATION      VASCULAR SURGERY PROCEDURE UNLIST       Barriers to Learning/Limitations: yes;  altered mental status (i.e.Sedation, Confusion)/drowsiness  Compensate with: visual, verbal, tactile, kinesthetic cues/model    Prior Level of Function/Home Situation: son lives w/ her. 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; pt aware her h/o RLE DVT a risk factor for her CVA  Home Situation  Home Environment: Private residence  # Steps to Enter: 1  Rails to Enter: No  Wheelchair Ramp: No  One/Two Story Residence: Two story  # of Interior Steps: 13  Interior Rails: Right  Lift Chair Available: No  Living Alone: No  Support Systems: Child(margaret) (son lives w/ her)  Patient Expects to be Discharged to[de-identified] Private residence  Current DME Used/Available at Home: None  Tub or Shower Type: Tub/Shower combination  [x]  Right hand dominant   []  Left hand dominant    Cognitive/Behavioral Status:  Neurologic State: Drowsy  Orientation Level: Oriented X4  Cognition: Follows commands;Decreased attention/concentration; Appropriate for age attention/concentration; Appropriate decision making  Safety/Judgement: Awareness of environment    Skin: appears intact  Edema: no edema noted    Vision/Perceptual:    Tracking: Able to track stimulus in all quadrants w/o difficulty                 Diplopia: No              Coordination:  Coordination: Generally decreased, functional  Fine Motor Skills-Upper: Left Impaired;Right Intact    Gross Motor Skills-Upper: Left Intact; Right Intact    Balance:  Sitting: Intact  Standing: Intact; With support    Strength:  LUE proximal strength symmetric; wrist/hand/digits grossly 3-/5  Strength: Generally decreased, functional  Tone & Sensation:  Tone: Normal  Sensation: Intact (L hand diminished)   L hand dulled sensation w/ difficulty w/ discrimination; intact grossly  Range of Motion:  AROM: Generally decreased, functional  PROM: Generally decreased, functional    Functional Mobility and Transfers for ADLs:  Bed Mobility:  Supine to Sit: Modified independent  Sit to Supine: Modified independent  Scooting: Modified independent  Transfers:  Sit to Stand: Stand-by asssistance  Bed to Chair: Supervision   Toilet Transfer : Supervision   Bathroom Mobility: Supervision/set up    ADL Assessment:   Feeding: Setup    Oral Facial Hygiene/Grooming: Supervision    Bathing: Setup    Upper Body Dressing: Setup    Lower Body Dressing: Stand-by assistance    Toileting: Supervision    ADL Intervention:  Feeding  Feeding Assistance: Supervision/set-up  Container Management: Stand-by assistance  Cutting Food: Stand-by assistance  Utensil Management: Stand-by assistance  Food to Mouth: Contact guard assistance  Drink to Mouth: Contact guard assistance  Cues: Tactile cues provided;Verbal cues provided;Visual cues provided    Grooming  Washing Hands: Supervision/set-up    Lower Body Dressing Assistance  Socks: Supervision/set-up; Stand-by assistance  Position Performed: Seated edge of bed (ankle-over-knee)  Cues: Tactile cues provided;Verbal cues provided;Visual cues provided    Toileting  Toileting Assistance: Supervision/set up  Bladder Hygiene: Supervision/set-up  Clothing Management: Supervision/set-up    Cognitive Retraining  Problem Solving: Identifying the task; Identifying the problem;General alternative solution;Deductive reason  Organizing/Sequencing: Prioritizing;Breaking task down  Attention to Task: Single task  Maintains Attention For (Time): 30 seconds (drowsy)  Following Commands:  Follows one step commands/directions  Safety/Judgement: Awareness of environment  Cues: Tactile cues provided;Verbal cues provided;Visual cues provided    Pain:  Pre-treatment: 0/10  Post-treatment: 0/10    Activity Tolerance:   Patient able to stand 1-2 minute(s). Patient able to complete ADLs with intermittent rest breaks. Patient limited by drowsiness. Patient unsteady     Please refer to the flowsheet for vital signs taken during this treatment. After treatment:   [] Patient left in no apparent distress sitting up in chair  [x] Patient left in no apparent distress in bed  [x] Call bell left within reach  [x] Nursing notified/Parth [employer] PA/Esteban Dowd  [] Caregiver present  [] Bed alarm activated    COMMUNICATION/EDUCATION:   [x] Home safety education was provided and the patient/caregiver indicated understanding. [x] Patient/family have participated as able in goal setting and plan of care. [x] Patient/family agree to work toward stated goals and plan of care. [] Patient understands intent and goals of therapy, but is neutral about his/her participation. [] Patient is unable to participate in goal setting and plan of care. Thank you for this referral.  Mariola Amaya, OTR/L  Time Calculation: 23 mins    G-Codes (GP)  Self Care   Current  CI= 1-19%   Goal  CI= 1-19%  The severity rating is based on the professional judgement & direct observation of Level of Assistance required for Functional Mobility and ADLs. Eval Complexity: History: HIGH Complexity : Extensive review of history including physical, cognitive and psychosocial history ; Examination: HIGH Complexity : 5 or more performance deficits relating to physical, cognitive , or psychosocial skils that result in activity limitations and / or participation restrictions; Decision Making:HIGH Complexity : Patient presents with comorbidities that affect occupational performance.  Signifigant modification of tasks or assistance (eg, physical or verbal) with assessment (s) is necessary to enable patient to complete evaluation

## 2018-01-08 NOTE — PROGRESS NOTES
Problem: Dysphagia (Adult)  Goal: *Acute Goals and Plan of Care (Insert Text)  Outcome: Resolved/Met Date Met: 01/08/18  Speech LAnguage Pathology bedside swallow evaluation only    Patient: Pillo Woodall (89 y.o. female)  Date: 1/8/2018  Primary Diagnosis: LUE weakness  Stroke ([facility] [address]  Precautions: Aspiration. Fall    ASSESSMENT :  Based on the objective data described below, the patient presents with mild oral dysphagia. Patient has a h/o CVA resulting in cognitive communication deficit and cognitive communication deficits. She additionally reports that she still feels jumbled up, thus cognitive evaluation recommended. Patient was AOx4 during CBSE with good command following. C/o of stomach pains; likely 2/2 Crohn's disease per RN. Currently on isolation precautions for ? C-Diff. 0 overt s/sx of aspiration/penetration across solids and thin liquids + straw. <10% palatal residue cleared with liquid wash. Education re: dysphagia, aspiration, and compensatory strategies provided; patient verbalized/demonstrated understanding. D/w Patito Campos RN. PLAN :  Recommendations and Planned Interventions:  SLP signing off dysphagia goals. Cognitive evaluation. Discharge Recommendations: TBD     SUBJECTIVE:   Patient stated I still feel deyanira off.     OBJECTIVE:     Past Medical History:   Diagnosis Date    Abdominal pain     Anemia NEC     sickle cell trait    C. difficile colitis     Crohn's disease ([facility] [address]     Gastrointestinal disorder     Crohns    Herpes zoster     Hx of cocaine abuse     Hyperkalemia     Hypertension     Iron deficiency anemia     MRSA (methicillin resistant Staphylococcus aureus)     Obesity     Pain management     Sickle cell trait (HCC)     Stroke ([facility] [address]     + cva 3/17    Thromboembolus [facility])      Past Surgical History:   Procedure Laterality Date    COLONOSCOPY N/A 3/17/2017    COLONOSCOPY; BIOPSY; performed by Megan Gottlieb MD at THE [facility] ENDOSCOPY    HX GYN      tubal ligation    HX TUBAL LIGATION      VASCULAR SURGERY PROCEDURE UNLIST       Prior Level of Function/Home Situation:  Home Situation  Home Environment: Private residence  # Steps to Enter: 1  Rails to Enter: No  Wheelchair Ramp: No  One/Two Story Residence: Two story  # of Interior Steps: 13  Interior Rails: Right  Lift Chair Available: No  Living Alone: No  Support Systems: Child(margaret) (son lives w/ her)  Patient Expects to be Discharged to[de-identified] Private residence  Current DME Used/Available at Home: None  Tub or Shower Type: Tub/Shower combination  Diet prior to admission: regular  Current Diet:  regular  Cognitive and Communication Status:  Neurologic State: Alert  Orientation Level: Oriented X4  Cognition: Follows commands  Perception: Appears intact  Perseveration: No perseveration noted  Safety/Judgement: Awareness of environment  Oral Assessment:  Oral Assessment  Labial: Left droop  Dentition: Natural  Oral Hygiene: good  Lingual: Decreased strength  Velum: No impairment  Mandible: No impairment  P.O. Trials:  Patient Position: EOB  Vocal quality prior to P.O.: No impairment  Consistency Presented: Thin liquid;Puree; Solid  How Presented: Self-fed/presented;Straw;Successive swallows;Spoon  Bolus Acceptance: No impairment  Bolus Formation/Control: Impaired  Type of Impairment: Mastication  Propulsion: No impairment  Oral Residue: Less than 10% of bolus;Palatal  Initiation of Swallow: No impairment  Laryngeal Elevation: Functional  Aspiration Signs/Symptoms: None  Pharyngeal Phase Characteristics: No impairment, issues, or problems   Effective Modifications: Small sips and bites; Alternate liquids/solids  Cues for Modifications: Minimal  Oral Phase Severity: Mild  Pharyngeal Phase Severity : No impairment    GCODESwallowing:  Swallow Current Status CI= 1-19%   Swallow Goal Status CI= 1-19%   Swallow D/C Status CI= 1-19%    The severity rating is based on the following outcomes:  JULEE Noms Swallow Level 6   Clinical Judgement    PAIN:  Start of Eval: 10  End of Eval: 10     After treatment:   []            Patient left in no apparent distress sitting up in chair  [x]            Patient left in no apparent distress in bed  [x]            Call bell left within reach  [x]            Nursing notified  []            Family present  []            Caregiver present  []            Bed alarm activated    COMMUNICATION/EDUCATION:   [x]            Aspiration precautions; swallow safety; compensatory techniques. [x]            Patient/family have participated as able in goal setting and plan of care. [x]            Patient/family agree to work toward stated goals and plan of care. []            Patient understands intent and goals of therapy; neutral about participation. []            Patient unable to participate in goal setting/plan of care; educ ongoing with interdisciplinary staff  []         Posted safety precautions in patient's room.     Thank you for this referral.  Alene Soulier, SLP  Time Calculation: 22 mins

## 2018-01-08 NOTE — PROGRESS NOTES
TRANSFER - IN REPORT:    Verbal report received from Beti RN(name) on [facility]  being received from ED(unit) for routine progression of care      Report consisted of patients Situation, Background, Assessment and   Recommendations(SBAR). Information from the following report(s) SBAR, Kardex and MAR was reviewed with the receiving nurse. Opportunity for questions and clarification was provided. Assessment completed upon patients arrival to unit and care assumed.

## 2018-01-08 NOTE — ROUTINE PROCESS
Stroke Education provided to patient and the following topics were discussed    1. Patients personal risk factors for stroke are hypertension and prior stroke    2. Warning signs of Stroke:        * Sudden numbness or weakness of the face, arm or leg, especially on one side of          The body            * Sudden confusion, trouble speaking or understanding        * Sudden trouble seeing in one or both eyes        * Sudden trouble walking, dizziness, loss of balance or coordination        * Sudden severe headache with no known cause      3. Importance of activation Emergency Medical Services ( 9-1-1 ) immediately if experience any warning signs of stroke. 4. Be sure and schedule a follow-up appointment with your primary care doctor or any specialists as instructed. 5. You must take medicine every day to treat your risk factors for stroke. Be sure to take your medicines exactly as your doctor tells you: no more, no less. Know what your medicines are for , what they do. Anti-thrombotics /anticoagulants can help prevent strokes. You are taking the following medicine(s)  See PTA meds     6. Smoking and second-hand smoke greatly increase your risk of stroke, cardiovascular disease and death. Smoking history never    7. Information provided was [facility] Stroke Education Binder    8. Documentation of teaching completed in Patient Education Activity and on Care Plan with teaching response noted?   yes

## 2018-01-08 NOTE — PROGRESS NOTES
Patient seen and examined this AM as a part of routine admission follow up. The patient has been seen by Dr Simeon Leach, Neurologist, appreciate his input. The patient will undergo MRI and echocardiogram this morning, will await results. OT Evaluation has been completed. PT/SLP to be completed, as patient was off of the floor when they visited.

## 2018-01-08 NOTE — PROGRESS NOTES
PT    PT orders received and chart reviewed. Attempted PT eval at this time; pt off the floor for testing.     Shanique Boyd PT, DPT

## 2018-01-08 NOTE — CONSULTS
NEUROLOGY CONSULTATION NOTE    Patient: Miriam Haro MRN: [id number]  CSN: [id number]    YOB: 1970  Age: 56 y.o. Sex: female    DOA: 1/7/2018 LOS:  LOS: 0 days        Requesting Physician: Dr. Maddie Cadena  Reason for Consultation: stroke ? HISTORY OF PRESENT ILLNESS:   Miriam Haro is a 64-year-old female with history of Crohns disease, migraine headaches, past history of cocaine use, sickle cell trait and multifocal infarcts in March 2017 (on Coumadin on and off), who presented with left arm and right leg numbness yesterday. She was also feeling confused. She had migraine headaches yesterday and the symptoms started escalating. With prior stroke, she had left arm and facial weakness with numbness. Her brain MRI at the time showed multifocal infarcts on the right temporal lobe, inferior MCA distribution, right thalamic, parietal and frontal infarcts and multifocal cerebellar infarcts, therefore she was started on Coumadin. Treatment with Coumadin has been complicated with intermittent G. I. bleeding. She has acted G. I. bleeding for one week, therefore she stopped taking Coumadin for one week and then started back again. Yesterday, she was seen by teleneurology and was not regarded to be a tPA candidate. Her INR was 1.4. On presentation to ER, her blood pressure was 108/84. She was afebrile. Her head CT didnt show any acute infarcts but chronic old right MCA distribution infarcts. Please see her prior stroke workup in March 2017. Currently, she denies any headaches. She has ongoing numbness on the left hand and right foot as well as left side of her face. She denies any vision changes. Prior Stroke Work-up in March 2017:  Brain MRI:1. Multifocal acute infarctions, the largest is in the right MCA territory correlating with the abnormal CT. There are bilateral left greater than right cerebellar hemisphere infarctions.  These infarctions are likely embolic with a central origin. 2. Possible hemorrhagic lesion within the pituitary. Scans dedicated to the pituitary could be performed. This could be artifact of volume averaging of the sellar floor but is at least suspect. 3. Chronic appearing right orbital floor fracture with protrusion of orbital fat and relative enophthalmos. MRA Brain: Occlusion inferior division right M2 segment MCA. This correlates with the acute infarction and is indeterminate in nature, most likely embolic certainly. No other significant intracranial vascular abnormality. MRA Neck: Limited noncontrast neck vascular evaluation. No definite hemodynamically significant stenosis is documented. Echocardiogram: EF is 60-65%, no intracardiac shunt detected. LAURIE: no intracardiac thrombus. Carotid Doppler: Left ICA <50% stenosis with a small plaque. No significant stenosis elsewhere. PAST MEDICAL HISTORY:  Past Medical History:   Diagnosis Date    Abdominal pain     Anemia NEC     sickle cell trait    C. difficile colitis     Crohn's disease (HCC)     Gastrointestinal disorder     Crohns    Herpes zoster     Hx of cocaine abuse     Hyperkalemia     Hypertension     Iron deficiency anemia     MRSA (methicillin resistant Staphylococcus aureus)     Obesity     Pain management     Sickle cell trait ([address]     Stroke ([facility] [address]     + cva 3/17    Thromboembolus ([address]      PAST SURGICAL HISTORY:  Past Surgical History:   Procedure Laterality Date    COLONOSCOPY N/A 3/17/2017    COLONOSCOPY; BIOPSY; performed by Kyleigh Wisdom MD at THE [facility] ENDOSCOPY    HX GYN      tubal ligation    HX TUBAL LIGATION      VASCULAR SURGERY PROCEDURE UNLIST       FAMILY HISTORY:  History reviewed. No pertinent family history.   SOCIAL HISTORY:  Social History     Social History    Marital status:      Spouse name: N/A    Number of children: N/A    Years of education: N/A     Social History Main Topics    Smoking status: Former Smoker    Smokeless tobacco: Never Used    Alcohol use No    Drug use: No    Sexual activity: Yes     Partners: Male     Birth control/ protection: Surgical     Other Topics Concern    None     Social History Narrative     MEDICATIONS:  Current Facility-Administered Medications   Medication Dose Route Frequency    atorvastatin (LIPITOR) tablet 80 mg  80 mg Oral QHS    carvedilol (COREG) tablet 3.125 mg  3.125 mg Oral BID WITH MEALS    docusate sodium (COLACE) capsule 100 mg  100 mg Oral BID    gabapentin (NEURONTIN) capsule 400 mg  400 mg Oral TID    lisinopril (PRINIVIL, ZESTRIL) tablet 10 mg  10 mg Oral DAILY    ondansetron (ZOFRAN ODT) tablet 4 mg  4 mg Oral Q6H PRN    sodium chloride (NS) flush 5-10 mL  5-10 mL IntraVENous Q8H    sodium chloride (NS) flush 5-10 mL  5-10 mL IntraVENous PRN    HYDROmorphone (PF) (DILAUDID) injection 1 mg  1 mg IntraVENous Q4H PRN    methylPREDNISolone (PF) (SOLU-MEDROL) injection 35 mg  35 mg IntraVENous Q12H    aspirin chewable tablet 81 mg  81 mg Oral DAILY     Prior to Admission medications    Medication Sig Start Date End Date Taking? Authorizing Provider   docusate sodium (COLACE) 100 mg capsule Take 1 Cap by mouth two (2) times a day for 90 days. 12/13/17 3/13/18 Yes Sherry Calvillo MD   predniSONE (STERAPRED) 5 mg dose pack Take 8 tabs for 5 days, then 4 tabs for 5 days, then 2 tabs for 5 days, then 1 tab for 5 days, then off 12/11/17  Yes Dipesh Sequeira MD   promethazine (PHENERGAN) 25 mg tablet Take 1 Tab by mouth every six (6) hours as needed. 12/11/17  Yes Dipesh Sequeira MD   lisinopril (PRINIVIL, ZESTRIL) 10 mg tablet Take 10 mg by mouth daily. Yes Shannon Walker MD   warfarin (COUMADIN) 7.5 mg tablet Take 1 Tab by mouth daily. Patient taking differently: Take 5 mg by mouth daily. Except thurs and sat when pt takes 7.5 mg 4/18/17  Yes Mariola Sarmiento MD   carvedilol (COREG) 3.125 mg tablet Take 1 Tab by mouth two (2) times daily (with meals).  4/18/17  Yes Mariola Sarmiento MD atorvastatin (LIPITOR) 80 mg tablet Take 1 Tab by mouth nightly. 3/23/17  Yes Soniya Howe MD   ondansetron (ZOFRAN ODT) 4 mg disintegrating tablet Take 1-2 tablets every 6-8 hours as needed for nausea and vomiting. 12/13/17   Aguilar Silva MD   butalbital-acetaminophen-caff (FIORICET) -40 mg per capsule Take 1 Cap by mouth every six (6) hours as needed for Pain. Max Daily Amount: 4 Caps. Indications: TENSION-TYPE HEADACHE 10/30/17   Aguilar Silva MD   ELETRIPTAN HBR (RELPAX PO) Take  by mouth. Shannon Walker MD   gabapentin (NEURONTIN) 400 mg capsule Take 1 Cap by mouth three (3) times daily. 3/23/17   Soniya Howe MD       ALLERGIES:  Allergies   Allergen Reactions    Humira [Adalimumab] Other (comments)    Remicade [Infliximab] Palpitations       Review of Systems  GENERAL: No fevers or chills. HEENT: No change in vision, earache, tinnitus, sore throat or sinus congestion. Headache+  NECK: No pain or stiffness. CARDIOVASCULAR: No chest pain or pressure. No palpitations. PULMONARY: No shortness of breath, cough or wheeze. GASTROINTESTINAL: Positive for  abdominal pain, nausea, hematochezia/melena. GENITOURINARY: No urinary frequency, urgency, hesitancy or dysuria. MUSCULOSKELETAL: No joint or muscle pain, no back pain, no recent trauma. DERMATOLOGIC: No rash, no itching, no lesions. ENDOCRINE: No polyuria, polydipsia, no heat or cold intolerance. No recent change in weight. HEMATOLOGICAL: No anemia or easy bruising or bleeding. NEUROLOGIC: positive for headache, numbness, tingling or weakness. No seizures. PHYSICAL EXAMINATION:     Visit Vitals    /79 (BP 1 Location: Left arm, BP Patient Position: At rest)    Pulse 77    Temp 97.6 °F (36.4 °C)    Resp 18    Ht 5' 6\" (1.676 m)    Wt 175 lb (79.4 kg)    SpO2 99%    Breastfeeding No    BMI 28.25 kg/m2      O2 Device: Room air  GENERAL: Pleasant, in no apparent distress.   HEENT: Moist mucous membranes, sclerae anicteric, scalp is atraumatic. CVS: Regular rate and rhythm, no murmurs or gallops. No carotid bruits. PULMONARY: Clear to auscultation bilaterally. No rales or rhonchi. No wheezing. EXTREMITIES: Normal range of motion at all sites. No deformities. ABDOMEN: Soft, nontender. SKIN: No rashes or ecchymoses. Warm and dry. NEUROLOGIC: Alert and oriented x3. Speech is fluent without any aphasia or dysarthria. Cranial nerves: Face is symmetric with symmetric smile. Facial sensation is decreased on the right (not left). Extraocular movements are intact with no nystagmus. Visual fields are full to confrontation. PERRL. Tongue is midline. Palate elevates symmetrically. Hearing intact to speech. Sternocleidomastoid and trapezius strengths are full bilaterally. Motor: Normal tone and normal bulk on all four extremities. Left arm drift with orbiting. Left leg 4/5 (effort?). Right side strength is intact/full. Sensory: decrease sensation to touch on the right arm and left leg ( she gives inconsistent answers)  Coordination: Intact coordination with finger-nose-finger bilaterally. Normal fine movements. No bradykinesia detected. Deep tendon reflexes: 2+ at biceps, brachioradialis, patella and ankles bilaterally.      Labs: Results:       Chemistry Recent Labs      01/07/18   2357   GLU  98   NA  139   K  3.2*   CL  101   CO2  24   BUN  5*   CREA  0.69   CA  8.9   AGAP  14   BUCR  7*      CBC w/Diff Recent Labs      01/07/18   2357   WBC  4.5*   RBC  3.85*   HGB  8.7*   HCT  28.6*   PLT  376   GRANS  51   LYMPH  33   EOS  2      Cardiac Enzymes Recent Labs      01/08/18   0527   CPK  36   CKND1  CALCULATION NOT PERFORMED WHEN RESULT IS BELOW LINEAR LIMIT      Coagulation Recent Labs      01/08/18   0527  01/07/18   2357   PTP  18.0*  16.6*   INR  1.6*  1.4*       Lipid Panel Lab Results   Component Value Date/Time    Cholesterol, total 145 03/12/2017 06:15 AM    HDL Cholesterol 37 03/12/2017 06:15 AM    LDL, calculated 91.6 03/12/2017 06:15 AM    VLDL, calculated 16.4 03/12/2017 06:15 AM    Triglyceride 82 03/12/2017 06:15 AM    CHOL/HDL Ratio 3.9 03/12/2017 06:15 AM      BNP No results for input(s): BNPP in the last 72 hours. Liver Enzymes No results for input(s): TP, ALB, TBIL, AP, SGOT, GPT in the last 72 hours. No lab exists for component: DBIL   Thyroid Studies Lab Results   Component Value Date/Time    TSH 1.51 12/12/2009 09:48 AM          Radiology:  Bruce Mater Abd Acute W 1 V Chest    Result Date: 12/12/2017  PROCEDURE: PA chest x-ray with abdominal series CLINICAL HISTORY: Crohn's disease, abdominal discomfort. COMPARISON: CT 8/1/2017 FINDINGS: Frontal chest demonstrates clear lungs. Cardiac, hilar and mediastinal contours are normal. No acute bony abnormality. Interval removal right subclavian central venous catheter. Abdominal series (supine and upright radiographs) demonstrates no free intraperitoneal gas. Gaseous distention of the large intestine is noted, with additional accentuation of the mucosal fold pattern of the intestine projecting over the left pelvic rim. No acute osseous abnormality. IMPRESSION: 1. No acute pulmonary process identified. Interval removal right subclavian central venous catheter. 2. Focal area of mucosal fold accentuation involving the large intestine projecting over the left pelvis, suggestive of underlying infectious or inflammatory colitis. Gaseous distention of the large intestine noted, likely reactive ileus. No free intraperitoneal gas. Ct Head Wo Cont    Result Date: 1/8/2018  EXAM: CT head INDICATION: Neurologic deficit. COMPARISON: August 1, 2017. TECHNIQUE: Axial CT imaging of the head was performed without intravenous contrast. Dose reduction techniques used: automated exposure control, adjustment of the mAs and/or kVp according to patient size, and iterative reconstruction techniques.  _______________ FINDINGS: BRAIN AND POSTERIOR FOSSA: A right middle cerebral artery distribution infarct is again noted centered in the right parietal lobe similar to previous. The lateral, third and fourth ventricles are normally outlined. The basal cisterns are normally outlined is well. The cortical sulci are normally outlined. No acute territorial defect is seen. There is no acute hemorrhage. EXTRA-AXIAL SPACES AND MENINGES: There are no abnormal extra-axial fluid collections. CALVARIUM: Intact. SINUSES: Clear. OTHER: None. _______________     IMPRESSION: Old right middle cerebral artery distribution infarct. No acute intracranial abnormality identified. Findings were called to the emergency department just prior to signing report. Xr Chest Port    Result Date: 1/8/2018  --------------------------------------------------------------------------- <<<<<<<<<           [facility]           >>>>>>>>> --------------------------------------------------------------------------- CLINICAL HISTORY:  Weakness. COMPARISON EXAMINATIONS:  December 11, 2017. ---  SINGLE FRONTAL VIEW OF THE CHEST  --- The lungs and pleural spaces are clear. The cardiomediastinal silhouette is stable. No significant osseous abnormalities are identified.  --------------    Impression: -------------- No significant abnormalities. ASSESSMENT/IMPRESSION:  The clinical presentation is highly suggestive of anamnestic response (recrudescence pattern) of the old stroke symptoms. Im not sure if the patient had a new stroke. Some of the symptoms may well be related to migraines as well. The patient needs to be evaluated with a brain MRI. If brain MRI is positive, further workup may be indicated. Otherwise, depending on G. I. states, the patient needs to be treated with anticoagulation with Coumadin. Until that time, she needs to be on aspirin 81 mg daily. RECOMMENDATIONS:  1. Continue aspirin 81 mg daily for now  2. brain MRI without contrast  3. consider G. I. consult for G. I. bleeding  4. continue atorvastatin 80 mg daily.   5. IV fluids with normal saline  6. further recommendations to follow after brain MRI      Please do not hesitate to return with any questions.    ------------------------------------  Leanne Slater MD  1/8/2018  8:35 AM

## 2018-01-09 PROBLEM — A49.8 CLOSTRIDIUM DIFFICILE INFECTION: Status: ACTIVE | Noted: 2018-01-09

## 2018-01-09 LAB
ANION GAP SERPL CALC-SCNC: 9 MMOL/L (ref 3–18)
APTT PPP: 31.2 SEC (ref 23–36.4)
ATRIAL RATE: 109 BPM
BASOPHILS # BLD: 0 K/UL (ref 0–0.06)
BASOPHILS NFR BLD: 0 % (ref 0–2)
BUN SERPL-MCNC: 11 MG/DL (ref 7–18)
BUN/CREAT SERPL: 14 (ref 12–20)
CALCIUM SERPL-MCNC: 9.7 MG/DL (ref 8.5–10.1)
CALCULATED P AXIS, ECG09: 43 DEGREES
CALCULATED R AXIS, ECG10: 61 DEGREES
CALCULATED T AXIS, ECG11: 56 DEGREES
CHLORIDE SERPL-SCNC: 106 MMOL/L (ref 100–108)
CHOLEST SERPL-MCNC: 172 MG/DL
CO2 SERPL-SCNC: 27 MMOL/L (ref 21–32)
CREAT SERPL-MCNC: 0.77 MG/DL (ref 0.6–1.3)
DIAGNOSIS, 93000: NORMAL
DIFFERENTIAL METHOD BLD: ABNORMAL
EOSINOPHIL # BLD: 0.1 K/UL (ref 0–0.4)
EOSINOPHIL NFR BLD: 1 % (ref 0–5)
ERYTHROCYTE [DISTWIDTH] IN BLOOD BY AUTOMATED COUNT: 15.7 % (ref 11.6–14.5)
EST. AVERAGE GLUCOSE BLD GHB EST-MCNC: 108 MG/DL
GLUCOSE SERPL-MCNC: 106 MG/DL (ref 74–99)
HBA1C MFR BLD: 5.4 % (ref 4.5–5.6)
HCT VFR BLD AUTO: 31.3 % (ref 35–45)
HDLC SERPL-MCNC: 74 MG/DL (ref 40–60)
HDLC SERPL: 2.3 {RATIO} (ref 0–5)
HGB BLD-MCNC: 9.3 G/DL (ref 12–16)
INR PPP: 1.6 (ref 0.8–1.2)
LDLC SERPL CALC-MCNC: 85.2 MG/DL (ref 0–100)
LIPID PROFILE,FLP: ABNORMAL
LYMPHOCYTES # BLD: 1.4 K/UL (ref 0.9–3.6)
LYMPHOCYTES NFR BLD: 24 % (ref 21–52)
MAGNESIUM SERPL-MCNC: 2.1 MG/DL (ref 1.6–2.6)
MCH RBC QN AUTO: 22.6 PG (ref 24–34)
MCHC RBC AUTO-ENTMCNC: 29.7 G/DL (ref 31–37)
MCV RBC AUTO: 76.2 FL (ref 74–97)
MONOCYTES # BLD: 1.2 K/UL (ref 0.05–1.2)
MONOCYTES NFR BLD: 21 % (ref 3–10)
NEUTS SEG # BLD: 3.3 K/UL (ref 1.8–8)
NEUTS SEG NFR BLD: 54 % (ref 40–73)
P-R INTERVAL, ECG05: 162 MS
PLATELET # BLD AUTO: 460 K/UL (ref 135–420)
PMV BLD AUTO: 9.3 FL (ref 9.2–11.8)
POTASSIUM SERPL-SCNC: 4.7 MMOL/L (ref 3.5–5.5)
PROTHROMBIN TIME: 18.2 SEC (ref 11.5–15.2)
Q-T INTERVAL, ECG07: 378 MS
QRS DURATION, ECG06: 148 MS
QTC CALCULATION (BEZET), ECG08: 509 MS
RBC # BLD AUTO: 4.11 M/UL (ref 4.2–5.3)
SODIUM SERPL-SCNC: 142 MMOL/L (ref 136–145)
TRIGL SERPL-MCNC: 64 MG/DL (ref ?–150)
VENTRICULAR RATE, ECG03: 109 BPM
VLDLC SERPL CALC-MCNC: 12.8 MG/DL
WBC # BLD AUTO: 6 K/UL (ref 4.6–13.2)

## 2018-01-09 PROCEDURE — 83036 HEMOGLOBIN GLYCOSYLATED A1C: CPT | Performed by: INTERNAL MEDICINE

## 2018-01-09 PROCEDURE — 85610 PROTHROMBIN TIME: CPT | Performed by: INTERNAL MEDICINE

## 2018-01-09 PROCEDURE — 85730 THROMBOPLASTIN TIME PARTIAL: CPT | Performed by: INTERNAL MEDICINE

## 2018-01-09 PROCEDURE — 74011250636 HC RX REV CODE- 250/636: Performed by: INTERNAL MEDICINE

## 2018-01-09 PROCEDURE — 36415 COLL VENOUS BLD VENIPUNCTURE: CPT | Performed by: INTERNAL MEDICINE

## 2018-01-09 PROCEDURE — 74011250637 HC RX REV CODE- 250/637: Performed by: INTERNAL MEDICINE

## 2018-01-09 PROCEDURE — 74011250637 HC RX REV CODE- 250/637: Performed by: PHYSICIAN ASSISTANT

## 2018-01-09 PROCEDURE — 80061 LIPID PANEL: CPT | Performed by: INTERNAL MEDICINE

## 2018-01-09 PROCEDURE — 85025 COMPLETE CBC W/AUTO DIFF WBC: CPT | Performed by: INTERNAL MEDICINE

## 2018-01-09 PROCEDURE — 97161 PT EVAL LOW COMPLEX 20 MIN: CPT

## 2018-01-09 PROCEDURE — 80048 BASIC METABOLIC PNL TOTAL CA: CPT | Performed by: INTERNAL MEDICINE

## 2018-01-09 PROCEDURE — 83735 ASSAY OF MAGNESIUM: CPT | Performed by: INTERNAL MEDICINE

## 2018-01-09 PROCEDURE — 97116 GAIT TRAINING THERAPY: CPT

## 2018-01-09 PROCEDURE — 74011000250 HC RX REV CODE- 250: Performed by: HOSPITALIST

## 2018-01-09 PROCEDURE — 65660000000 HC RM CCU STEPDOWN

## 2018-01-09 RX ORDER — METRONIDAZOLE 250 MG/1
500 TABLET ORAL EVERY 8 HOURS
Status: DISCONTINUED | OUTPATIENT
Start: 2018-01-09 | End: 2018-01-15 | Stop reason: HOSPADM

## 2018-01-09 RX ADMIN — VANCOMYCIN HYDROCHLORIDE 125 MG: 1 INJECTION, POWDER, LYOPHILIZED, FOR SOLUTION INTRAVENOUS at 22:30

## 2018-01-09 RX ADMIN — HYDROMORPHONE HYDROCHLORIDE 1 MG: 1 INJECTION, SOLUTION INTRAMUSCULAR; INTRAVENOUS; SUBCUTANEOUS at 14:13

## 2018-01-09 RX ADMIN — METRONIDAZOLE 500 MG: 250 TABLET ORAL at 21:50

## 2018-01-09 RX ADMIN — CARVEDILOL 3.12 MG: 3.12 TABLET, FILM COATED ORAL at 16:20

## 2018-01-09 RX ADMIN — PROMETHAZINE HYDROCHLORIDE 12.5 MG: 25 TABLET ORAL at 05:00

## 2018-01-09 RX ADMIN — HYDROMORPHONE HYDROCHLORIDE 1 MG: 1 INJECTION, SOLUTION INTRAMUSCULAR; INTRAVENOUS; SUBCUTANEOUS at 05:00

## 2018-01-09 RX ADMIN — Medication 10 ML: at 21:52

## 2018-01-09 RX ADMIN — DOCUSATE SODIUM 100 MG: 100 CAPSULE, LIQUID FILLED ORAL at 09:58

## 2018-01-09 RX ADMIN — CARVEDILOL 3.12 MG: 3.12 TABLET, FILM COATED ORAL at 09:59

## 2018-01-09 RX ADMIN — ATORVASTATIN CALCIUM 80 MG: 20 TABLET, FILM COATED ORAL at 21:50

## 2018-01-09 RX ADMIN — METHYLPREDNISOLONE SODIUM SUCCINATE 35 MG: 40 INJECTION, POWDER, FOR SOLUTION INTRAMUSCULAR; INTRAVENOUS at 08:05

## 2018-01-09 RX ADMIN — HYDROMORPHONE HYDROCHLORIDE 1 MG: 1 INJECTION, SOLUTION INTRAMUSCULAR; INTRAVENOUS; SUBCUTANEOUS at 18:23

## 2018-01-09 RX ADMIN — VANCOMYCIN HYDROCHLORIDE 125 MG: 1 INJECTION, POWDER, LYOPHILIZED, FOR SOLUTION INTRAVENOUS at 17:06

## 2018-01-09 RX ADMIN — GABAPENTIN 400 MG: 400 CAPSULE ORAL at 09:59

## 2018-01-09 RX ADMIN — PROMETHAZINE HYDROCHLORIDE 12.5 MG: 25 TABLET ORAL at 18:19

## 2018-01-09 RX ADMIN — HYDROMORPHONE HYDROCHLORIDE 1 MG: 1 INJECTION, SOLUTION INTRAMUSCULAR; INTRAVENOUS; SUBCUTANEOUS at 09:57

## 2018-01-09 RX ADMIN — Medication 10 ML: at 16:23

## 2018-01-09 RX ADMIN — METRONIDAZOLE 500 MG: 250 TABLET ORAL at 14:14

## 2018-01-09 RX ADMIN — ASPIRIN 81 MG 81 MG: 81 TABLET ORAL at 09:59

## 2018-01-09 RX ADMIN — METRONIDAZOLE 500 MG: 500 INJECTION, SOLUTION INTRAVENOUS at 08:05

## 2018-01-09 RX ADMIN — GABAPENTIN 400 MG: 400 CAPSULE ORAL at 21:50

## 2018-01-09 RX ADMIN — GABAPENTIN 400 MG: 400 CAPSULE ORAL at 16:20

## 2018-01-09 RX ADMIN — Medication 10 ML: at 08:05

## 2018-01-09 RX ADMIN — LISINOPRIL 10 MG: 5 TABLET ORAL at 09:59

## 2018-01-09 RX ADMIN — HYDROMORPHONE HYDROCHLORIDE 1 MG: 1 INJECTION, SOLUTION INTRAMUSCULAR; INTRAVENOUS; SUBCUTANEOUS at 22:26

## 2018-01-09 RX ADMIN — PROMETHAZINE HYDROCHLORIDE 12.5 MG: 25 TABLET ORAL at 09:58

## 2018-01-09 NOTE — PROGRESS NOTES
D/c plan: anticipate home when medically cleared  Met with patient during IDR's patient plans to return home when d/c. She does not anticipate any needs at this time and no DME. Plan is for d/c tomorrow with follow up with Dr. Joselito Waters Management Interventions  PCP Verified by CM:  Yes  Transition of Care Consult (CM Consult): Discharge Planning

## 2018-01-09 NOTE — CONSULTS
[id number] [facility]  MR#: [id number]  : 1970  ACCOUNT #: [de-identified]   DATE OF SERVICE: 2018    HISTORY OF PRESENT ILLNESS:  A 45-year-old female with severe Crohn disease of the left colon that has been progressing since age 16. The patient has infectious complication with different biologics including Humira and Cimzia and Remicade that she had to that many years ago. She was admitted in 2017 for a flareup. Colonoscopy on 2017 showed a poor bowel prep. There was severe Crohn disease of the sigmoid, descending colon and transverse colon, but the ascending colon and cecum and terminal ileum were normal.  Her disease was mild to moderate in the transverse, severe in the left colon, but the rectum was normal.  We started her on Entyvio. Shortly within two weeks after that endoscopy, she received her loading dose of 300 mg at 0, 2 weeks, 6 weeks, and then every 8 weeks. At the end of 2017, she stopped taking it because she had a surgery on her right popliteal artery for a thrombosis. At that time, she was confined to her house. She was unable to go to the infusion clinic and did not follow up with me. At the end of August she started to have a gradual flareup with progressive watery diarrhea with a small amount of blood a few times a week. This got progressively worse to the point that in the last three weeks she was having at least ten liquid, foul-smelling bowel movements per day with a frequent amount of fresh blood mixed with the stools. She was also having severe left-sided abdominal pain, nausea. She lost 10 pounds in the last two months by loss of appetite. She was unable to sleep during the night, mostly because of severe abdominal pain, but also because of the profuse diarrhea. She was not vomiting. She has been feeling some hot flashes and night sweats in the last three months.   She claimed that she has not had a menstruation since June 2017. She was treating herself with intermittent Asacol 2 pills a day at most.  She claims Asacol gives her headaches, also Zofran gives her headaches. She was taking Aleve and ibuprofen intermittently for a different kind of pain, including abdominal pain, but she stopped it at least in September 2017 because it was upsetting her stomach, giving her a lot of heartburn and nausea. She was going to the different emergency rooms around the town and was given every time a short course of prednisone, which helped her. She claims that she has been having intermittent heartburns about 2-3 times a week, for which she does not take any medication except occasional Tums. She does not have any vomiting or dysphagia. She has no mouth sores, no rash, no perianal disease, but she does have bad hemorrhoids that are sensitive and do bleed. She has been on Coumadin because of the hypercoagulable state. She has a right popliteal artery deep vein thrombosis. She has history of a stroke that left her with left upper arm and right lower extremity weakness. She has been living by herself, though she is very sedentary because she does not have a car, sits at home. She has three children but mostly she lives by herself. She used to smoke, but quit about a year ago. She denied abusing alcohol or taking any drugs. She was complaining of pain in her right anterior leg. Neurologist told her that this is caused by the surgery that she had on her left popliteal artery and for that she was taking narcotic medication along with gabapentin but this was not approved by her insurance. She has been complaining of shortness of breath and some mild intermittent coughing. She claims that sometimes she gets a puffy face from the steroids.     At the hospital she was found to have C. difficile colitis and I see that in the past, from her chart I see that she tested only this time positive for C. difficile by PCR test, but in 2015 it was negative. The toxins done in 2009 on two occasions were negative. Therefore, this is the first documented episode of C. difficile. Her abdominal CT scan of the abdomen on 08/01/2017 showed no acute finding, but no oral contrast was given to evaluate the bowels. There was apparently fluid inside the large and the small bowel. PAST MEDICAL HISTORY:  Besides the Crohn disease of the large colon, she has sickle cell trait, iron-deficiency anemia, a history of MRSA infection, herpes zoster infection, history of cocaine abuse, hypertension, history of stroke in March 2017 with thromboembolic disease, history of a right popliteal thrombosis requiring surgery. She had three pregnancies and deliveries. She had tubal ligation. She has severe migraine. She has obesity, neuropathy. ALLERGIES:  SHE IS ALLERGIC TO HUMIRA, REMICADE. MEDICATIONS AT HOME:  She was taking Colace twice a day, prednisone 40 mg in a tapered fashion, Phenergan every 6 hours p.r.n., Zestril 10 mg, Coumadin 7.5 mg, Coreg 3.125, Lipitor is 80, Zofran 4 mg p.r.n., Fioricet 50/300/40 p.r.n. headache, Relpax ? mg t.i.d.    FUNCTIONAL INQUIRY:  See above. She claims that sometimes she has burning sensation on voiding, but there is no hematuria. She has no arthralgia, no back pain, no rash. She claims that she has been having memory problem recently to the point that she left the door of her house and the trunk of her daughter's car open without remembering. PHYSICAL EXAMINATION:  GENERAL:  We have a 52-year-old -American female who appears to be in no distress. VITAL SIGNS:  She weighs 175 pounds. Temperature 98.5, pulse 97, blood pressure 106/71, breathing 16, saturating 100% on room air. On arrival, she was afebrile, pulse was 122, blood pressure was 120/94. SKIN:  Normal.  She has some tattoos.   She has poor venous access, only a tiny vein was found on her thumb to give her some IV fluids. HEENT:  The eyes are unremarkable. The pupils are equal and reactive to light. The sclerae are anicteric. The conjunctivae are pink. Oropharyngeal cavity is normal with moist and pink mucous membranes, normal teeth. NECK:  Supple. No palpable mass. No enlarged thyroid. LUNGS:  Clear to auscultation. HEART:  Rhythm is regular. S1, S2 normal.  No murmur. ABDOMEN:  Not distended, soft. There is mild to moderate tenderness in the left side of the abdomen, but there is no guarding, mass or organomegaly. Bowel sounds are normal.  EXTREMITIES:  Remarkable for a scar on the right leg. There is no clubbing, no tremor, no edema. NEUROLOGIC: She is alert and oriented, moves all her four extremities. PSYCHIATRIC: The patient does not appear to be overtly depressed at this time. LABORATORY DATA:  She has a hemoglobin of 9.4, low MCH at 22.6, WBC 6, platelets 718,370, hemoglobin was 9.0 on December 11th. INR was 1.6. Complete metabolic panel was normal except for albumin of 3.2 last month. BUN is 11, creatinine 0.77. Lipid profile is normal with HDL of 74. Hemoglobin A1c 5.4. Her iron saturation is 3%. CRP 2.2. Pregnancy test was negative on 09/07/2017 but we have none since. Toxicology screen was negative on January 8th. TSH was 1.51 in December 2009 but nothing since. On 10/30/2017 the C. difficile by PCR was positive. Serology for hepatitis A, B was negative. CONCLUSION:    1. This is a 61-year-old female with severe Crohn disease of the colon, mostly the distal colon. She has been intolerant to different biologics with infectious complications. We started her last spring on Entyvio but unfortunately she took it only for no more than 3 months and then stopped it. Within a month after, she started to have reoccurrence of her Crohn disease with frequent diarrhea, abdominal pain and rectal bleeding.   That problem intensified in the last 3 weeks to the point that she was unable to sleep from having severe, profuse, foul-smelling diarrhea and left-sided abdominal pain. She was tested positive for C. difficile. This is the first episode documented at this hospital.  She was started on Flagyl and oral vancomycin. I think this should be continued for at least 2 weeks before considering starting the [facility] again. We have to give her the loading dose of 300 mg at 0, 2, 6 weeks and then every 8 weeks subsequently. She seems to be intolerant to Asacol. For now, we will treat her colitis with prednisone 50 mg in a tapered fashion over < 2 months until she is able to get her Entyvio again. 2.  This patient has a hypercoagulable state with a history of cerebrovascular accident and thromboembolic events. She needs to be on Coumadin for life. For now, I will consider that we continue with the Coumadin, but just keeping her INR at between 2 and 2.5. There is no need to repeat an endoscopy but maybe in the next year we should consider doing one when she is hopefully in remission on the Entyvio. 3.  She has a history of herpes zoster. 4.  She has a history of depression, history of substance abuse in the past, although she is not doing this. We have to be in the future cautious with narcotic medication. 5.  She also has severe gastroesophageal reflux disease that probably was aggravated by the intake of nonsteroidal anti-inflammatories (NSAIDs). I told her that she should never be taking NSAIDs, first because of Crohn disease, and second because of her anticoagulation. 6.  She is obese. I encouraged her to start to become more active physically. 7.  She does have migraine headache. I would like to follow her up at the office. 8.  Finally, she has memory problems that may be related to poor nutrition. She needs to start to take her vitamin D on a regular basis, and preferably she needs to take some multivitamins. We would like to check her vitamin K04 and folic acid level.   She does have a mixed anemia from sickle cell anemia and also iron-deficiency anemia. 9.  She also appears to be premenopausal with hot flashes and amenorrhea for over seven months. Further notes will follow. EDELMIRA Loco MD MBE / PAMELA  D: 01/09/2018 15:36     T: 01/09/2018 17:17  JOB #: [id number]  CC: Theodore Quarles MD  CC: Charmaine Walker MD

## 2018-01-09 NOTE — PROGRESS NOTES
Patient experiencing maroon colored small amount of loose stools. [address] notified, no new orders received continue to monitor.

## 2018-01-09 NOTE — PROGRESS NOTES
Hospitalist Progress Note    Patient: Mary Hicks MRN: [id number]  CSN: [id number]    YOB: 1970  Age: 56 y.o. Sex: female    DOA: 1/7/2018 LOS:  LOS: 1 day          Chief Complaint:    LUE Weakness    Assessment/Plan     1. LUE Weakness  2. Chron's Disease  3. Clostridium difficile Infection  4. Hx of Embolic Stroke  5. Hx of DVT    1. Continue LUE weakness. MRI showed no acute abnormality, no evidence of acute infarct. Continue PT/OT. Echo showed good EF with no regional wall abnormalities. 2. Active, severe disease. Dr Abran Monzon on board. Patient previously on biologic but is no longer taking it because she could not make her infusion appointments due to LE artery thrombus which required surgery in the past. Will continue solumedrol for now. Will await Dr Betty Lieberman further input for biologic. 3. Patient started on IV Flagyl yesterday. Transitioned to oral Flagyl 500mg q8h and started oral Vancomycin 125mg oral solution q6h. Will continue to monitor. 4. Dr Cameron Lazo, Neurologist, recommends coumadin with goal INR of 2-3, but is deferring to Dr Abran Monzon for when appropriate from GI standpoint. Continue aspirin for now. 5. As above. DVT Prophylaxis - SCD  Dispo: Pending GI input    Patient Active Problem List   Diagnosis Code    Crohn disease ([facility] K50.90    Sickle cell trait ([facility] D57.3    Hypertension I10    Hx of cocaine abuse X30.954    Embolic stroke ([facility] F50.1    Neuropathic pain M79.2    DVT (deep venous thrombosis) ([facility]) I82.409    GI bleed K92.2    PAD (peripheral artery disease) ([facility]) I73.9    Wound infection T14. 8XXA, L08.9    Popliteal artery thrombosis, right ([facility]) I74.3    Pure hypercholesterolemia E78.00    LUE weakness R29.898    Stroke ([facility] I63.9       Subjective:    Some complaints of frequent BM    Review of systems:    Constitutional: denies fevers, chills, myalgias  Respiratory: denies SOB, cough  Cardiovascular: denies chest pain, palpitations  Gastrointestinal: denies nausea, vomiting. Reporting diarrhea.        Vital signs/Intake and Output:  Visit Vitals    /71 (BP 1 Location: Right arm, BP Patient Position: At rest)    Pulse 97    Temp 98.5 °F (36.9 °C)    Resp 16    Ht 5' 6\" (1.676 m)    Wt 79.4 kg (175 lb)    SpO2 100%    Breastfeeding No    BMI 28.25 kg/m2     Current Shift:  01/09 0701 - 01/09 1900  In: 480 [P.O.:480]  Out: -   Last three shifts:  01/07 1901 - 01/09 0700  In: 840 [P.O.:840]  Out: -     Exam:    General: Well developed, alert, NAD, OX3  Head/Neck: NCAT, supple, No masses, No lymphadenopathy  CVS:Regular rate and rhythm, no M/R/G, S1/S2 heard, no thrill  Lungs:Clear to auscultation bilaterally, no wheezes, rhonchi, or rales  Abdomen: Soft, Nontender, No distention, Normal Bowel sounds, No hepatomegaly  Extremities: No C/C/E, pulses palpable 2+  Skin:normal texture and turgor, no rashes, no lesions  Neuro:grossly normal , follows commands  Psych:appropriate                Labs: Results:       Chemistry Recent Labs      01/09/18   0551  01/07/18   2357   GLU  106*  98   NA  142  139   K  4.7  3.2*   CL  106  101   CO2  27  24   BUN  11  5*   CREA  0.77  0.69   CA  9.7  8.9   AGAP  9  14   BUCR  14  7*      CBC w/Diff Recent Labs      01/09/18   0551  01/07/18   2357   WBC  6.0  4.5*   RBC  4.11*  3.85*   HGB  9.3*  8.7*   HCT  31.3*  28.6*   PLT  460*  376   GRANS  54  51   LYMPH  24  33   EOS  1  2      Cardiac Enzymes Recent Labs      01/08/18   1840  01/08/18   0942   CPK  30  50   CKND1  CALCULATION NOT PERFORMED WHEN RESULT IS BELOW LINEAR LIMIT  CALCULATION NOT PERFORMED WHEN RESULT IS BELOW LINEAR LIMIT      Coagulation Recent Labs      01/09/18   0551  01/08/18   0527   PTP  18.2*  18.0*   INR  1.6*  1.6*   APTT  31.2   --        Lipid Panel Lab Results   Component Value Date/Time    Cholesterol, total 172 01/09/2018 05:51 AM    HDL Cholesterol 74 01/09/2018 05:51 AM    LDL, calculated 85.2 01/09/2018 05:51 AM    VLDL, calculated 12.8 01/09/2018 05:51 AM    Triglyceride 64 01/09/2018 05:51 AM    CHOL/HDL Ratio 2.3 01/09/2018 05:51 AM      BNP No results for input(s): BNPP in the last 72 hours. Liver Enzymes No results for input(s): TP, ALB, TBIL, AP, SGOT, GPT in the last 72 hours.     No lab exists for component: DBIL   Thyroid Studies Lab Results   Component Value Date/Time    TSH 1.51 12/12/2009 09:48 AM        Procedures/imaging: see electronic medical records for all procedures/Xrays and details which were not copied into this note but were reviewed prior to creation of [address] Zuleta, PA-C

## 2018-01-09 NOTE — ROUTINE PROCESS
Bedside and Verbal shift change report given to JIMI Murray (oncoming nurse) by Aisha (offgoing nurse). Report included the following information SBAR, Kardex, Intake/Output, MAR and Recent Results.

## 2018-01-09 NOTE — PROGRESS NOTES
Problem: Falls - Risk of  Goal: *Absence of Falls  Document Bernard Fall Risk and appropriate interventions in the flowsheet.    Outcome: Progressing Towards Goal  Fall Risk Interventions:            Medication Interventions: Patient to call before getting OOB

## 2018-01-09 NOTE — PROGRESS NOTES
0730 Assumed care of patient from [address]. Patient in bed watching TV patient on contact for C-Diff.     0930 Patient in bed watching tv and talking on the phone patient a/o x4.    1000 Patient complaint of abdominal pain and nausea given prn dilaudid and phenergan. 1030 Patient states nausea and abdominal pain has improved. 1300 Oral vancomycin due spoke with pharmacist was told that it had to be prepared and then it would be sent to the floor. 1540 Patient currently getting spa treatment. 1700 1300 dose of oral vancomycin given. 1815 Patient compliant of nausea and abdominal pain given prn phenergan and dilaudid. 1845 Patient states that nausea and pain is \"better\".

## 2018-01-09 NOTE — PROGRESS NOTES
Problem: Self Care Deficits Care Plan (Adult)  Goal: *Acute Goals and Plan of Care (Insert Text)  Initial OT STGs (1/8/2018) Within 7 days:    1. Patient will perform toilet transfer with modified Independent in preparation for bowel and bladder management. 2. Patient will perform bowel and bladder management with modified independent for increased independence with ADLs. 3. Patient will perform UB dressing with setup while utilizing salbador-techniques for increased independence with ADLs. 4. Patient will perform LB dressing with setup while utilizing salbador-techniques for increased independence with ADLs. 5. Patient will perform LUE exercises with Supervision assist for 15 minutes to increase independence with ADLs. 6. Patient will utilize energy conservation techniques with Supervision for increased independence with ADLs. Occupational Therapy Treatment Attempt    Chart reviewed. Attempted Occupational Therapy Treatment, however, patient unable to be seen due to:  []  Nausea/vomiting  []  Eating  []  Pain  []  Patient too lethargic  []  Off Unit for testing/procedure  []  Dialysis treatment in progress  []  Telemetry Results  [x]  Other: w/ MD being assessed    Will f/u later as patient's schedule allows.    Thank you for this referral.  Mariola Amaya, OTR/L

## 2018-01-09 NOTE — PROGRESS NOTES
1925:  Received patient form Meek Ribeiro RN.      2010:  Assessment completed. Patient sitting up to edge of bed, eating dinner tray and watching TV. No distress noted. #22 IV to left thumb flushed at patient request.  Flushes well, dressing CDI. Patient expressed concern for wanting to keep IV patent, states she has \"horrible veins\" and wants to avoid being stuck again. No complaints of pain at this time and no needs are stated. Shift Summary:  Patient spent uneventful shift. No issues noted. See flow sheet and MAR. Patient medicated for pain per PRN orders. See MAR.

## 2018-01-09 NOTE — PROGRESS NOTES
MRI negative for CVA per Dr. Michael Gomez note. Baseline oral dysphagia 2/2 to old CVA. Swallowing functional, thus SLP signed off. Recommend cognitive evaluation outpatient if the patient continues to complains that cognition is not at baseline. SLP to complete order.       Thank you for this referral,   Gladis Ojeda M.S.Ed., CCC/SLP  Speech Language Pathologist

## 2018-01-09 NOTE — PROGRESS NOTES
Problem: Mobility Impaired (Adult and Pediatric)  Goal: *Acute Goals and Plan of Care (Insert Text)  Physical Therapy Goals  Initiated 1/9/2018 and to be accomplished within 3-7 day(s)  1. Patient will move from supine to sit and sit to supine  in bed with modified independence. 2.  Patient will transfer from bed to chair and chair to bed with modified independence using the least restrictive device. 3.  Patient will perform sit to stand with modified independence. 4.  Patient will ambulate with modified independence for 150 feet with the least restrictive device. 5.  Patient will ascend/descend 2 stairs with handrail(s) with modified independence. Outcome: Progressing Towards Goal  physical Therapy EVALUATION    Patient: Denver Oseguera (89 y.o. female)  Date: 1/9/2018  Primary Diagnosis: LUE weakness  Stroke [facility])  Precautions:   Fall    ASSESSMENT :  Based on the objective data described below, the patient presents with L lower extremity weakness, decreased gait quality and endurance, and overall limitations in functional mobility. Pt performed supine to sit with supervision, sit to stand with supervision. Patient ambulated 50 feet without AD, GB applied, CGA/supervision; occasional furniture walking and weight shift with amb. Pt tolerated session well as evidenced by no c/o increased pain, no lightheadedness or dizziness. Patient would benefit from skilled inpatient physical therapy to address deficits, progress as tolerated to achieve long term goals and allow safe discharge. Patient will benefit from skilled intervention to address the above impairments.   Patients rehabilitation potential is considered to be Good  Factors which may influence rehabilitation potential include:   []         None noted  []         Mental ability/status  [x]         Medical condition  []         Home/family situation and support systems  [x]         Safety awareness  []         Pain tolerance/management  [] Other: PLAN :  Recommendations and Planned Interventions:  [x]           Bed Mobility Training             [x]    Neuromuscular Re-Education  [x]           Transfer Training                   []    Orthotic/Prosthetic Training  [x]           Gait Training                          []    Modalities  [x]           Therapeutic Exercises          []    Edema Management/Control  [x]           Therapeutic Activities            [x]    Patient and Family Training/Education  []           Other (comment):    Frequency/Duration: Patient will be followed by physical therapy 1-2 times per day to address goals.   Discharge Recommendations: Home Health  Further Equipment Recommendations for Discharge: N/A     SUBJECTIVE:   Patient stated I feel like I'm walking pretty normal.    OBJECTIVE DATA SUMMARY:     Past Medical History:   Diagnosis Date    Abdominal pain     Anemia NEC     sickle cell trait    C. difficile colitis     [facility] ([facility]     Gastrointestinal disorder     Crohns    Herpes zoster     Hx of cocaine abuse     Hyperkalemia     Hypertension     Iron deficiency anemia     MRSA (methicillin resistant Staphylococcus aureus)     Obesity     Pain management     Sickle cell trait ([facility]     Stroke ([facility]     + cva 3/17    Thromboembolus [facility])      Past Surgical History:   Procedure Laterality Date    COLONOSCOPY N/A 3/17/2017    COLONOSCOPY; BIOPSY; performed by Magan Moura MD at [facility] ENDOSCOPY    HX GYN      tubal ligation    HX TUBAL LIGATION      VASCULAR SURGERY PROCEDURE UNLIST       Barriers to Learning/Limitations: yes;  cognitive  Compensate with: Visual Cues, Verbal Cues and Tactile Cues  Prior Level of Function/Home Situation: Independent amb s/AD  Home Situation  Home Environment: Private residence  # Steps to Enter: 1  Rails to Enter: No  Wheelchair Ramp: No  One/Two Story Residence: Two story  # of Interior Steps: 15  Interior Rails: Right  Lift Chair Available: No  Living Alone: No  Support Systems: Child(margaret) (son lives w/ her)  Patient Expects to be Discharged to[de-identified] Private residence  Current DME Used/Available at Home: None  Tub or Shower Type: Tub/Shower combination  Critical Behavior:  Neurologic State: Alert  Orientation Level: Oriented X4  Cognition: Appropriate decision making; Appropriate for age attention/concentration; Appropriate safety awareness  Psychosocial  Patient Behaviors: Calm; Cooperative  Needs Expressed: Educational  Purposeful Interaction: Yes  Pt Identified Daily Priority: Clinical issues (comment)  Caritas Process: Attend basic human needs  Caring Interventions: Therapeutic modalities  Reassure: Caring rounds  Therapeutic Modalities: Intentional therapeutic touch  Skin Condition/Temp: Warm;Dry  Skin Integrity: Intact;Scars (comment) (scsrs on right leg)  Skin Integumentary  Skin Color: Appropriate for ethnicity  Skin Condition/Temp: Warm;Dry  Skin Integrity: Intact;Scars (comment) (scsrs on right leg)  Turgor: Epidermis thin w/ loss of subcut tissue  Hair Growth: Present  Varicosities: Absent  Strength:    Strength: Generally decreased, functional  Tone & Sensation:   Tone: Normal  Sensation: Intact  Range Of Motion:  AROM: Generally decreased, functional  Functional Mobility:  Bed Mobility:  Supine to Sit: Supervision  Sit to Supine: Supervision  Transfers:  Sit to Stand: Supervision  Stand to Sit: Supervision  Balance:   Sitting: Intact  Standing: Intact; Without support  Ambulation/Gait Training:  Distance (ft): 50 Feet (ft)  Assistive Device: Gait belt  Ambulation - Level of Assistance: Supervision  Gait Description (WDL): Exceptions to WDL  Gait Abnormalities: Decreased step clearance  Base of Support: Widened  Stance: Weight shift  Speed/Janice: Slow;Shuffled  Step Length: Right shortened;Left shortened  Swing Pattern: Right asymmetrical;Left asymmetrical  Pain:  Pain Scale 1: Numeric (0 - 10)  Pain Intensity 1: 6  Pain Location 1: Abdomen  Pain Orientation 1: Lower  Pain Description 1: Aching  Pain Intervention(s) 1: Medication (see MAR)  Activity Tolerance:   Good  Please refer to the flowsheet for vital signs taken during this treatment. After treatment:   []         Patient left in no apparent distress sitting up in chair  [x]         Patient left in no apparent distress in bed  [x]         Call bell left within reach  [x]         Nursing notified  []         Caregiver present  []         Bed alarm activated    COMMUNICATION/EDUCATION:   [x]         Fall prevention education was provided and the patient/caregiver indicated understanding. [x]         Patient/family have participated as able in goal setting and plan of care. [x]         Patient/family agree to work toward stated goals and plan of care. []         Patient understands intent and goals of therapy, but is neutral about his/her participation. []         Patient is unable to participate in goal setting and plan of care. Thank you for this referral.  Archie Erazo   Time Calculation: 25 mins   Eval Complexity: History: MEDIUM  Complexity : 1-2 comorbidities / personal factors will impact the outcome/ POC Exam:LOW Complexity : 1-2 Standardized tests and measures addressing body structure, function, activity limitation and / or participation in recreation  Presentation: LOW Complexity : Stable, uncomplicated  Clinical Decision Making:Low Complexity amb >30 ft s/AD Overall Complexity:LOW    Mobility  Current  CJ= 20-39%   Goal  CI= 1-19%. The severity rating is based on the Level of Assistance required for Functional Mobility and ADLs.

## 2018-01-09 NOTE — ROUTINE PROCESS
1100 Interdisciplinary team rounds were held 1/9/2018 with the following team members:Nursing, Pharmacy, Physical Therapy, Physician and Clinical Coordinator and the patient. Plan of care discussed Patient on Q4 neuro checks, C-Diff isolation, Possible D/C tomorrow 9/10/18. See clinical pathway and/or care plan for interventions and desired outcomes.

## 2018-01-09 NOTE — PROGRESS NOTES
Problem: Self Care Deficits Care Plan (Adult)  Goal: *Acute Goals and Plan of Care (Insert Text)  Initial OT STGs (1/8/2018) Within 7 days:    1. Patient will perform toilet transfer with modified Independent in preparation for bowel and bladder management. 2. Patient will perform bowel and bladder management with modified independent for increased independence with ADLs. 3. Patient will perform UB dressing with setup while utilizing salbador-techniques for increased independence with ADLs. 4. Patient will perform LB dressing with setup while utilizing salbador-techniques for increased independence with ADLs. 5. Patient will perform LUE exercises with Supervision assist for 15 minutes to increase independence with ADLs. 6. Patient will utilize energy conservation techniques with Supervision for increased independence with ADLs. Occupational Therapy Treatment Attempt    Chart reviewed. Attempted Occupational Therapy Treatment, however, patient unable to be seen due to:  []  Nausea/vomiting  []  Eating  []  Pain  []  Patient too lethargic  []  Off Unit for testing/procedure  []  Dialysis treatment in progress  []  Telemetry Results  [x]  Other:SPA     Will f/u later as patient's schedule allows.    Thank you for this referral.  Mariola Amaya, OTR/L

## 2018-01-09 NOTE — PROGRESS NOTES
NEUROLOGY PROGRESS NOTE        Patient: Patricia Whittaker        Sex: female          DOA: 1/7/2018  YOB: 1970      Age:  56 y.o.         LOS: 1 day     Identification:  [id number] female with history of Crohns disease, migraine headaches, past history of cocaine use, sickle cell trait and multifocal infarcts in March 2017 (on Coumadin on and off), who presented with left arm and right leg numbness. SUBJECTIVE:   Brain MRI is negative for an acute stroke. Pt is c-diff positive. REVIEW OF SYSTEMS: Denies chest pain. Blood in stool+. / Abdominal pain+    OBJECTIVE:      Visit Vitals    /83 (BP 1 Location: Right arm, BP Patient Position: At rest)    Pulse 95    Temp 97.8 °F (36.6 °C)    Resp 16    Ht 5' 6\" (1.676 m)    Wt 175 lb (79.4 kg)    SpO2 100%    Breastfeeding No    BMI 28.25 kg/m2     Physical Exam:  GEN: Alert, NAD  EYES: conjunctiva normal, lids with out lesions  HEENT: MMM. HEART: RRR +S1 +S2  LUNGS: CTA B/L no rales or rhonchi. ABDOMEN: Soft, non-tender. EXTREMITIES: No edema cyanosis  SKIN: no rashes or skin breakdown, no nodules  NEURO: Alert, oriented x3. Speech is fluent. Cranials: Face: subtl left NL fold flattening. Smiles symmetrically. EOMI, VFF. Tongue is midline. Motor: Subtle left arm drift. Left arm sensation decreased.     Current Facility-Administered Medications   Medication Dose Route Frequency    atorvastatin (LIPITOR) tablet 80 mg  80 mg Oral QHS    carvedilol (COREG) tablet 3.125 mg  3.125 mg Oral BID WITH MEALS    docusate sodium (COLACE) capsule 100 mg  100 mg Oral BID    gabapentin (NEURONTIN) capsule 400 mg  400 mg Oral TID    lisinopril (PRINIVIL, ZESTRIL) tablet 10 mg  10 mg Oral DAILY    sodium chloride (NS) flush 5-10 mL  5-10 mL IntraVENous Q8H    sodium chloride (NS) flush 5-10 mL  5-10 mL IntraVENous PRN    HYDROmorphone (PF) (DILAUDID) injection 1 mg  1 mg IntraVENous Q4H PRN    methylPREDNISolone (PF) (SOLU-MEDROL) injection 35 mg  35 mg IntraVENous Q12H    aspirin chewable tablet 81 mg  81 mg Oral DAILY    promethazine (PHENERGAN) tablet 12.5 mg  12.5 mg Oral Q6H PRN    metroNIDAZOLE (FLAGYL) IVPB premix 500 mg  500 mg IntraVENous Q8H       Laboratory  Recent Results (from the past 24 hour(s))   CARDIAC PANEL,(CK, CKMB & TROPONIN)    Collection Time: 01/08/18  9:42 AM   Result Value Ref Range    CK 50 26 - 192 U/L    CK - MB <1.0 <3.6 ng/ml    CK-MB Index  0.0 - 4.0 %     CALCULATION NOT PERFORMED WHEN RESULT IS BELOW LINEAR LIMIT    Troponin-I, Qt. <0.02 0.00 - 0.06 NG/ML   C. DIFFICILE/EPI PCR    Collection Time: 01/08/18 10:00 AM   Result Value Ref Range    Special Requests: NO SPECIAL REQUESTS      Culture result: (A)       Toxigenic C. difficile POSITIVE. Toxin producing C. difficile target sequences are detected. Culture result:       CALLED TO [address] [place] [address] LAB AT [phone]. REPEATED TO 1097.    CARDIAC PANEL,(CK, CKMB & TROPONIN)    Collection Time: 01/08/18  6:40 PM   Result Value Ref Range    CK 30 26 - 192 U/L    CK - MB <1.0 <3.6 ng/ml    CK-MB Index  0.0 - 4.0 %     CALCULATION NOT PERFORMED WHEN RESULT IS BELOW LINEAR LIMIT    Troponin-I, Qt. <0.02 0.00 - 0.06 NG/ML   LIPID PANEL    Collection Time: 01/09/18  5:51 AM   Result Value Ref Range    LIPID PROFILE          Cholesterol, total 172 <200 MG/DL    Triglyceride 64 <150 MG/DL    HDL Cholesterol 74 (H) 40 - 60 MG/DL    LDL, calculated 85.2 0 - 100 MG/DL    VLDL, calculated 12.8 MG/DL    CHOL/HDL Ratio 2.3 0 - 5.0     PTT    Collection Time: 01/09/18  5:51 AM   Result Value Ref Range    aPTT 31.2 23.0 - 36.4 SEC   PROTHROMBIN TIME + INR    Collection Time: 01/09/18  5:51 AM   Result Value Ref Range    Prothrombin time 18.2 (H) 11.5 - 15.2 sec    INR 1.6 (H) 0.8 - 1.2     MAGNESIUM    Collection Time: 01/09/18  5:51 AM   Result Value Ref Range    Magnesium 2.1 1.6 - 2.6 mg/dL   METABOLIC PANEL, BASIC    Collection Time: 01/09/18  5:51 AM   Result Value Ref Range Sodium 142 136 - 145 mmol/L    Potassium 4.7 3.5 - 5.5 mmol/L    Chloride 106 100 - 108 mmol/L    CO2 27 21 - 32 mmol/L    Anion gap 9 3.0 - 18 mmol/L    Glucose 106 (H) 74 - 99 mg/dL    BUN 11 7.0 - 18 MG/DL    Creatinine 0.77 0.6 - 1.3 MG/DL    BUN/Creatinine ratio 14 12 - 20      GFR est AA >60 >60 ml/min/1.73m2    GFR est non-AA >60 >60 ml/min/1.73m2    Calcium 9.7 8.5 - 10.1 MG/DL   CBC WITH AUTOMATED DIFF    Collection Time: 01/09/18  5:51 AM   Result Value Ref Range    WBC 6.0 4.6 - 13.2 K/uL    RBC 4.11 (L) 4.20 - 5.30 M/uL    HGB 9.3 (L) 12.0 - 16.0 g/dL    HCT 31.3 (L) 35.0 - 45.0 %    MCV 76.2 74.0 - 97.0 FL    MCH 22.6 (L) 24.0 - 34.0 PG    MCHC 29.7 (L) 31.0 - 37.0 g/dL    RDW 15.7 (H) 11.6 - 14.5 %    PLATELET 157 (H) 291 - 420 K/uL    MPV 9.3 9.2 - 11.8 FL    NEUTROPHILS 54 40 - 73 %    LYMPHOCYTES 24 21 - 52 %    MONOCYTES 21 (H) 3 - 10 %    EOSINOPHILS 1 0 - 5 %    BASOPHILS 0 0 - 2 %    ABS. NEUTROPHILS 3.3 1.8 - 8.0 K/UL    ABS. LYMPHOCYTES 1.4 0.9 - 3.6 K/UL    ABS. MONOCYTES 1.2 0.05 - 1.2 K/UL    ABS. EOSINOPHILS 0.1 0.0 - 0.4 K/UL    ABS. BASOPHILS 0.0 0.0 - 0.06 K/UL    DF AUTOMATED     HEMOGLOBIN A1C WITH EAG    Collection Time: 01/09/18  5:57 AM   Result Value Ref Range    Hemoglobin A1c 5.4 4.5 - 5.6 %    Est. average glucose 108 mg/dL       Radiology:  Xr Abd Acute W 1 V Chest    Result Date: 12/12/2017  PROCEDURE: PA chest x-ray with abdominal series CLINICAL HISTORY: Crohn's disease, abdominal discomfort. COMPARISON: CT 8/1/2017 FINDINGS: Frontal chest demonstrates clear lungs. Cardiac, hilar and mediastinal contours are normal. No acute bony abnormality. Interval removal right subclavian central venous catheter. Abdominal series (supine and upright radiographs) demonstrates no free intraperitoneal gas. Gaseous distention of the large intestine is noted, with additional accentuation of the mucosal fold pattern of the intestine projecting over the left pelvic rim.  No acute osseous abnormality. IMPRESSION: 1. No acute pulmonary process identified. Interval removal right subclavian central venous catheter. 2. Focal area of mucosal fold accentuation involving the large intestine projecting over the left pelvis, suggestive of underlying infectious or inflammatory colitis. Gaseous distention of the large intestine noted, likely reactive ileus. No free intraperitoneal gas. Mri Brain Wo Cont    Result Date: 1/8/2018  MRI brain History: Left arm and right leg numbness, confusion, headaches Comparison: MR brain 10/30/2017 Technique: Multiplanar multi sequence MR imaging of the brain was performed utilizing axial T2, FLAIR, diffusion, T2 gradient echo, coronal T2, and multiplanar T1 pre contrast imaging . No gadolinium was administered due to specific clinician request. Findings: Motion artifact is present which limits evaluation. No restricted diffusion is seen to suggest acute infarct. Several small areas of cortical atrophy with underlying FLAIR hyperintensity fluid signal are seen involving the right parietal lobe with small amount of posterior temporal lobe involvement representing chronic infarct. Is patchy chronic infarct also involves the posterior right insular cortex. Linear extension of chronic infarct is seen superiorly along the right corona radiata as well. Slight ex vacuo dilatation of the right lateral ventricle is present. No mass effect or associated hemorrhage is seen. Small linear chronic bilateral cerebellar infarcts are present. These infarcts are unchanged. The ventricles are otherwise within normal limits in their size and configuration. There is no evidence of mass effect, hemorrhage, midline shift, or herniation. There is no abnormal restricted diffusion to suggest acute infarct. No susceptibility artifact is seen to suggest intraparenchymal hemorrhage.  The major intracranial vascular flow voids are grossly normal. Irregularity is present along the right orbital floor with evidence of inferiorly herniating extraconal fat representing chronic orbital floor fracture. No air-fluid level was present. Left orbit is unremarkable. Mastoid air cells are unremarkable. IMPRESSION: Motion degraded study. 1.  No evidence acute infarct. No significant change. 2. Stable encephalomalacic changes from chronic right parietal and posterior temporal infarcts, right corona radiata infarct, and bilateral cerebellar infarcts. 3. Stable chronic right orbital floor fracture with inferior herniation of extraconal orbital fat. Ct Head Wo Cont    Result Date: 1/8/2018  EXAM: CT head INDICATION: Neurologic deficit. COMPARISON: August 1, 2017. TECHNIQUE: Axial CT imaging of the head was performed without intravenous contrast. Dose reduction techniques used: automated exposure control, adjustment of the mAs and/or kVp according to patient size, and iterative reconstruction techniques. _______________ FINDINGS: BRAIN AND POSTERIOR FOSSA: A right middle cerebral artery distribution infarct is again noted centered in the right parietal lobe similar to previous. The lateral, third and fourth ventricles are normally outlined. The basal cisterns are normally outlined is well. The cortical sulci are normally outlined. No acute territorial defect is seen. There is no acute hemorrhage. EXTRA-AXIAL SPACES AND MENINGES: There are no abnormal extra-axial fluid collections. CALVARIUM: Intact. SINUSES: Clear. OTHER: None. _______________     IMPRESSION: Old right middle cerebral artery distribution infarct. No acute intracranial abnormality identified. Findings were called to the emergency department just prior to signing report.     Xr Chest Port    Result Date: 1/8/2018  --------------------------------------------------------------------------- <<<<<<<<<           [facility]           >>>>>>>>> --------------------------------------------------------------------------- CLINICAL HISTORY: Weakness. COMPARISON EXAMINATIONS:  December 11, 2017. ---  SINGLE FRONTAL VIEW OF THE CHEST  --- The lungs and pleural spaces are clear. The cardiomediastinal silhouette is stable. No significant osseous abnormalities are identified.  --------------    Impression: -------------- No significant abnormalities. ASSESSMENT/IMPRESSION:   The clinical presentation is highly suggestive of anamnestic response (recrudescence pattern) of the old stroke symptoms. Brain MRI is negative for an acute event. She needs to be on Coumadin for secondary prevention. Until cleared by GI perspective, OK to continue aspirin for now. RECOMMENDATIONS:  1. Continue aspirin 81 mg daily for now. Switch to Coumadin with a goal INR 2-3 when reasonable from GI perspective. 2. Treatment of c-diff colitis as you are doing  3. continue atorvastatin 80 mg daily. 4. IV fluids or oral liquids for hydration    Neurology signs off. We will follow the patient as outpatient. Please do not hesitate to return with any questions.     Signed:  Samara Mcgarry MD  1/9/2018  9:26 AM

## 2018-01-09 NOTE — ROUTINE PROCESS
Bedside and Verbal shift change report given to Aileen Sun RN (oncoming nurse) by JIMI Murray RN (offgoing nurse). Report included the following information SBAR, Kardex, Intake/Output, MAR and Recent Results.

## 2018-01-10 LAB
ANION GAP SERPL CALC-SCNC: 10 MMOL/L (ref 3–18)
BASOPHILS # BLD: 0 K/UL (ref 0–0.1)
BASOPHILS NFR BLD: 0 % (ref 0–3)
BUN SERPL-MCNC: 14 MG/DL (ref 7–18)
BUN/CREAT SERPL: 17 (ref 12–20)
CALCIUM SERPL-MCNC: 9.4 MG/DL (ref 8.5–10.1)
CHLORIDE SERPL-SCNC: 103 MMOL/L (ref 100–108)
CO2 SERPL-SCNC: 30 MMOL/L (ref 21–32)
CREAT SERPL-MCNC: 0.83 MG/DL (ref 0.6–1.3)
DIFFERENTIAL METHOD BLD: ABNORMAL
EOSINOPHIL # BLD: 0.1 K/UL (ref 0–0.4)
EOSINOPHIL NFR BLD: 1 % (ref 0–5)
ERYTHROCYTE [DISTWIDTH] IN BLOOD BY AUTOMATED COUNT: 15.6 % (ref 11.6–14.5)
FOLATE SERPL-MCNC: 7.1 NG/ML (ref 3.1–17.5)
GLUCOSE SERPL-MCNC: 92 MG/DL (ref 74–99)
HCT VFR BLD AUTO: 31.3 % (ref 35–45)
HGB BLD-MCNC: 9.3 G/DL (ref 12–16)
INR PPP: 1.2 (ref 0.8–1.2)
LYMPHOCYTES # BLD: 3.1 K/UL (ref 0.8–3.5)
LYMPHOCYTES NFR BLD: 41 % (ref 20–51)
MAGNESIUM SERPL-MCNC: 2 MG/DL (ref 1.6–2.6)
MCH RBC QN AUTO: 22.8 PG (ref 24–34)
MCHC RBC AUTO-ENTMCNC: 29.7 G/DL (ref 31–37)
MCV RBC AUTO: 76.7 FL (ref 74–97)
MONOCYTES # BLD: 1.1 K/UL (ref 0–1)
MONOCYTES NFR BLD: 14 % (ref 2–9)
NEUTS BAND NFR BLD MANUAL: 7 % (ref 0–5)
NEUTS SEG # BLD: 2.8 K/UL (ref 1.8–8)
NEUTS SEG NFR BLD: 37 % (ref 42–75)
NRBC BLD-RTO: 1 PER 100 WBC
PLATELET # BLD AUTO: 509 K/UL (ref 135–420)
PLATELET COMMENTS,PCOM: ABNORMAL
PMV BLD AUTO: 9.4 FL (ref 9.2–11.8)
POTASSIUM SERPL-SCNC: 3.6 MMOL/L (ref 3.5–5.5)
PROTHROMBIN TIME: 14.8 SEC (ref 11.5–15.2)
RBC # BLD AUTO: 4.08 M/UL (ref 4.2–5.3)
RBC MORPH BLD: ABNORMAL
SODIUM SERPL-SCNC: 143 MMOL/L (ref 136–145)
VIT B12 SERPL-MCNC: 809 PG/ML (ref 211–911)
WBC # BLD AUTO: 7.6 K/UL (ref 4.6–13.2)
WBC MORPH BLD: ABNORMAL

## 2018-01-10 PROCEDURE — 85610 PROTHROMBIN TIME: CPT | Performed by: INTERNAL MEDICINE

## 2018-01-10 PROCEDURE — 74011250636 HC RX REV CODE- 250/636: Performed by: PHYSICIAN ASSISTANT

## 2018-01-10 PROCEDURE — 97110 THERAPEUTIC EXERCISES: CPT

## 2018-01-10 PROCEDURE — 36415 COLL VENOUS BLD VENIPUNCTURE: CPT | Performed by: INTERNAL MEDICINE

## 2018-01-10 PROCEDURE — 80048 BASIC METABOLIC PNL TOTAL CA: CPT | Performed by: INTERNAL MEDICINE

## 2018-01-10 PROCEDURE — 97116 GAIT TRAINING THERAPY: CPT

## 2018-01-10 PROCEDURE — 97112 NEUROMUSCULAR REEDUCATION: CPT

## 2018-01-10 PROCEDURE — 65660000000 HC RM CCU STEPDOWN

## 2018-01-10 PROCEDURE — 74011250636 HC RX REV CODE- 250/636: Performed by: INTERNAL MEDICINE

## 2018-01-10 PROCEDURE — 74011250637 HC RX REV CODE- 250/637: Performed by: INTERNAL MEDICINE

## 2018-01-10 PROCEDURE — 85025 COMPLETE CBC W/AUTO DIFF WBC: CPT | Performed by: INTERNAL MEDICINE

## 2018-01-10 PROCEDURE — 97535 SELF CARE MNGMENT TRAINING: CPT

## 2018-01-10 PROCEDURE — 83735 ASSAY OF MAGNESIUM: CPT | Performed by: INTERNAL MEDICINE

## 2018-01-10 PROCEDURE — 82652 VIT D 1 25-DIHYDROXY: CPT | Performed by: INTERNAL MEDICINE

## 2018-01-10 PROCEDURE — 74011250637 HC RX REV CODE- 250/637: Performed by: PHYSICIAN ASSISTANT

## 2018-01-10 PROCEDURE — 82607 VITAMIN B-12: CPT | Performed by: INTERNAL MEDICINE

## 2018-01-10 RX ORDER — HYDROMORPHONE HYDROCHLORIDE 1 MG/ML
0.5 INJECTION, SOLUTION INTRAMUSCULAR; INTRAVENOUS; SUBCUTANEOUS
Status: DISCONTINUED | OUTPATIENT
Start: 2018-01-10 | End: 2018-01-15 | Stop reason: HOSPADM

## 2018-01-10 RX ADMIN — Medication 10 ML: at 16:17

## 2018-01-10 RX ADMIN — DOCUSATE SODIUM 100 MG: 100 CAPSULE, LIQUID FILLED ORAL at 09:14

## 2018-01-10 RX ADMIN — HYDROMORPHONE HYDROCHLORIDE 1 MG: 1 INJECTION, SOLUTION INTRAMUSCULAR; INTRAVENOUS; SUBCUTANEOUS at 07:11

## 2018-01-10 RX ADMIN — ATORVASTATIN CALCIUM 80 MG: 20 TABLET, FILM COATED ORAL at 23:07

## 2018-01-10 RX ADMIN — GABAPENTIN 400 MG: 400 CAPSULE ORAL at 23:07

## 2018-01-10 RX ADMIN — LISINOPRIL 10 MG: 5 TABLET ORAL at 09:13

## 2018-01-10 RX ADMIN — METHYLPREDNISOLONE SODIUM SUCCINATE 35 MG: 40 INJECTION, POWDER, FOR SOLUTION INTRAMUSCULAR; INTRAVENOUS at 19:00

## 2018-01-10 RX ADMIN — HYDROMORPHONE HYDROCHLORIDE 1 MG: 1 INJECTION, SOLUTION INTRAMUSCULAR; INTRAVENOUS; SUBCUTANEOUS at 11:29

## 2018-01-10 RX ADMIN — VANCOMYCIN HYDROCHLORIDE 125 MG: 1 INJECTION, POWDER, LYOPHILIZED, FOR SOLUTION INTRAVENOUS at 17:54

## 2018-01-10 RX ADMIN — VANCOMYCIN HYDROCHLORIDE 125 MG: 1 INJECTION, POWDER, LYOPHILIZED, FOR SOLUTION INTRAVENOUS at 05:42

## 2018-01-10 RX ADMIN — VANCOMYCIN HYDROCHLORIDE 125 MG: 1 INJECTION, POWDER, LYOPHILIZED, FOR SOLUTION INTRAVENOUS at 11:37

## 2018-01-10 RX ADMIN — ASPIRIN 81 MG 81 MG: 81 TABLET ORAL at 09:14

## 2018-01-10 RX ADMIN — PROMETHAZINE HYDROCHLORIDE 12.5 MG: 25 TABLET ORAL at 11:29

## 2018-01-10 RX ADMIN — METRONIDAZOLE 500 MG: 250 TABLET ORAL at 05:41

## 2018-01-10 RX ADMIN — GABAPENTIN 400 MG: 400 CAPSULE ORAL at 16:16

## 2018-01-10 RX ADMIN — CARVEDILOL 3.12 MG: 3.12 TABLET, FILM COATED ORAL at 16:16

## 2018-01-10 RX ADMIN — Medication 10 ML: at 23:09

## 2018-01-10 RX ADMIN — METRONIDAZOLE 500 MG: 250 TABLET ORAL at 23:08

## 2018-01-10 RX ADMIN — METHYLPREDNISOLONE SODIUM SUCCINATE 35 MG: 40 INJECTION, POWDER, FOR SOLUTION INTRAMUSCULAR; INTRAVENOUS at 07:11

## 2018-01-10 RX ADMIN — CARVEDILOL 3.12 MG: 3.12 TABLET, FILM COATED ORAL at 09:14

## 2018-01-10 RX ADMIN — DOCUSATE SODIUM 100 MG: 100 CAPSULE, LIQUID FILLED ORAL at 20:28

## 2018-01-10 RX ADMIN — Medication 10 ML: at 05:43

## 2018-01-10 RX ADMIN — PROMETHAZINE HYDROCHLORIDE 12.5 MG: 25 TABLET ORAL at 02:24

## 2018-01-10 RX ADMIN — GABAPENTIN 400 MG: 400 CAPSULE ORAL at 09:13

## 2018-01-10 RX ADMIN — HYDROMORPHONE HYDROCHLORIDE 1 MG: 1 INJECTION, SOLUTION INTRAMUSCULAR; INTRAVENOUS; SUBCUTANEOUS at 02:24

## 2018-01-10 RX ADMIN — PROMETHAZINE HYDROCHLORIDE 12.5 MG: 25 TABLET ORAL at 16:16

## 2018-01-10 RX ADMIN — HYDROMORPHONE HYDROCHLORIDE 0.5 MG: 1 INJECTION, SOLUTION INTRAMUSCULAR; INTRAVENOUS; SUBCUTANEOUS at 16:16

## 2018-01-10 RX ADMIN — HYDROMORPHONE HYDROCHLORIDE 0.5 MG: 1 INJECTION, SOLUTION INTRAMUSCULAR; INTRAVENOUS; SUBCUTANEOUS at 20:28

## 2018-01-10 RX ADMIN — METRONIDAZOLE 500 MG: 250 TABLET ORAL at 16:16

## 2018-01-10 NOTE — PROGRESS NOTES
Problem: Mobility Impaired (Adult and Pediatric)  Goal: *Acute Goals and Plan of Care (Insert Text)  Physical Therapy Goals  Initiated 1/9/2018 and to be accomplished within 3-7 day(s)  1. Patient will move from supine to sit and sit to supine  in bed with modified independence. 2.  Patient will transfer from bed to chair and chair to bed with modified independence using the least restrictive device. 3.  Patient will perform sit to stand with modified independence. 4.  Patient will ambulate with modified independence for 150 feet with the least restrictive device. 5.  Patient will ascend/descend 2 stairs with handrail(s) with modified independence. Outcome: Progressing Towards Goal  physical Therapy TREATMENT/DISCHARGE    Patient: John Blood (89 y.o. female)  Date: 1/10/2018  Diagnosis: LUE weakness  Stroke (HCC) LUE weakness  Precautions: Fall  Chart, physical therapy assessment, plan of care and goals were reviewed. ASSESSMENT:  Pt is supervision/uJan level with mobility in room and transfers to/from toilet. Pt mobility limited by contact isolation. Pt educated on seated and standing there ex for LE strengthening and core stability. Pt educated to perform in hospital and at home to improve functional mobility and strength. Pt has been cleared from inpatient skilled physical therapy and recommend home health upon hospital d/c. Progression toward goals:  [x]      Goals met  []      Improving appropriately and progressing toward goals  []      Improving slowly and progressing toward goals  []      Not making progress toward goals and plan of care will be adjusted     PLAN:  Patient will be discharged from physical therapy at this time.   Rationale for discharge:  [x] Goals Achieved  [] [address]  [] Patient not participating in therapy  [] Other:  Discharge Recommendations:  Home Health  Further Equipment Recommendations for Discharge:  N/A     SUBJECTIVE:   Patient stated I don't want to go home with this stuff.     OBJECTIVE DATA SUMMARY:   Critical Behavior:  Neurologic State: Alert  Orientation Level: Oriented X4  Cognition: Appropriate decision making, Appropriate for age attention/concentration, Appropriate safety awareness, Follows commands  Safety/Judgement: Awareness of environment  Functional Mobility Training:  Bed Mobility:  Supine to Sit: Modified independent  Sit to Supine: Modified independent  Transfers:  Sit to Stand: Modified independent  Stand to Sit: Modified independent  Balance:  Sitting: Intact  Standing: Intact; Without support  Ambulation/Gait Training:  Distance (ft): 50 Feet (ft)  Ambulation - Level of Assistance: Supervision;Modified independent  Gait Abnormalities: Decreased step clearance  Base of Support: Widened  Stance: Weight shift  Speed/Janice: Slow  Step Length: Right shortened;Left shortened  Swing Pattern: Left asymmetrical;Right asymmetrical  Therapeutic Exercises:   Seated there ex: LAQ, B UE flex c/alt LE tap, sit to stand s/UE support, marches, hip add c/pillow, x10 reps ea. Pain:  Pain Scale 1: Numeric (0 - 10)  Pain Intensity 1: 9  Pain Location 1: Abdomen  Pain Orientation 1: Anterior  Pain Description 1: Aching  Pain Intervention(s) 1: Medication (see MAR)  Activity Tolerance:   Good  Please refer to the flowsheet for vital signs taken during this treatment.   After treatment:   [] Patient left in no apparent distress sitting up in chair  [x] Patient left in no apparent distress in bed  [x] Call bell left within reach  [x] Nursing notified  [] Caregiver present  [] Bed alarm activated  Erin Erazo   Time Calculation: 25 mins

## 2018-01-10 NOTE — ROUTINE PROCESS
6048 Patient called out requesting to speak with the \"head nurse\". Tara Cuenca went to bedside to speak with patient who had a list of complaints about this nurse and is requesting a new RN. Only pain medications due the rest of the evening so CCL will administered. Patient is otherwise resting in NAD.   2162 Bedside and Verbal shift change report given to FRITZ Bryan (oncoming nurse) by Vince Raza RN (offgoing nurse). Report included the following information SBAR, Kardex and MAR.

## 2018-01-10 NOTE — PROGRESS NOTES
Assumed care of pt from [facility] RN. Pt is alert no sign of distress. Call light within reach. Night RN stated that patient stated she would allow physicians to discharge her. 1130: Pt c/o pain asked for medication. 1140: Gave pain medication to patient will reassess pt within the hour.

## 2018-01-10 NOTE — ROUTINE PROCESS
1920 Bedside shift change report given to APPLE Kemp (oncoming nurse) by Anjum Bob. Candie Tran, RN (offgoing nurse). Report included the following information SBAR, Kardex, Recent Results, Med Rec Status and Cardiac Rhythm Sinus Tach/BBB.

## 2018-01-10 NOTE — PROGRESS NOTES
Problem: Falls - Risk of  Goal: *Absence of Falls  Document Bernard Fall Risk and appropriate interventions in the flowsheet.    Outcome: Progressing Towards Goal  Fall Risk Interventions:            Medication Interventions: Bed/chair exit alarm, Patient to call before getting OOB, Teach patient to arise slowly

## 2018-01-10 NOTE — PROGRESS NOTES
Problem: Falls - Risk of  Goal: *Absence of Falls  Document Bernard Fall Risk and appropriate interventions in the flowsheet.    Outcome: Progressing Towards Goal  Fall Risk Interventions:            Medication Interventions: Bed/chair exit alarm

## 2018-01-10 NOTE — PROGRESS NOTES
D/c plan: anticipate home today  No needs anticipated from care management and no DME needed. Patient has follow up appointment with Dr. Michael Morris MD  Care Management Interventions  PCP Verified by CM:  Yes  Transition of Care Consult (CM Consult): Discharge Planning

## 2018-01-10 NOTE — PROGRESS NOTES
Hospitalist Progress Note    Patient: Brian Villareal MRN: [id number]  CSN: [id number]    YOB: 1970  Age: 56 y.o. Sex: female    DOA: 1/7/2018 LOS:  LOS: 2 days          Chief Complaint:    Bloody BMs    Assessment/Plan     1. Chron's Disease  2. C.diff Infection  3. Hx of Embolic Stroke  4. Hx of DVT    1. Patient voiced complaints of difficult night last night after having multiple bloody, diarrhea BMs. Stool sample left in the toilet for provider visualization, it is in fact profusely bloody. Patient has severe disease and has been started on immunotherapy in the past, but quickly became noncompliant after being unable to make her appointments. Patient complains of abdominal pain throughout the day. Will decrease her dilaudid dose this afternoon to 0.5mg q4h. Case management has an appointment secured with Dr Roslyn Riggs for January 25. It is imperative that she keep this appointment and attend the appointment to resume her immunotherapy. 2. Continue with oral Flagyl and oral Vancomycin. When the patient is appropriate for discharge, will continue these medications until she visits Dr Roslyn Riggs in clinic. 3. Per Dr Jeremy Gaston, the patient ultimately will need to be on coumadin as she has a history of embolic disease. However, this will need to be cleared by GI first as she has extensive, severe Chron's disease. Continue aspirin for now. 4. As above. DVT Prophylaxis - SCDs  Dispo: Will need to monitor H/H for any acute bleed as patient has continued bloody diarrhea. If her hgb remains stable, and the patient's diarrhea decreases in frequency, patient may be ready for d/c in coming days.      Patient Active Problem List   Diagnosis Code    Crohn disease ([facility] K50.90    Sickle cell trait ([facility] D57.3    Hypertension I10    Hx of cocaine abuse X57.916    Embolic stroke ([facility] S02.6    Neuropathic pain M79.2    DVT (deep venous thrombosis) ([facility]) I82.409    GI bleed K92.2    PAD (peripheral artery disease) (HCC) I73.9    Wound infection T14. 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    Pure hypercholesterolemia E78.00    LUE weakness R29.898    Stroke ([facility]) I63.9    Clostridium difficile infection B96.89       Subjective:    Complaints of abdominal pain and bloody BMs    Review of systems:    Constitutional: denies fevers, chills, myalgias  Respiratory: denies SOB, cough  Cardiovascular: denies chest pain, palpitations  Gastrointestinal: denies nausea, vomiting      Vital signs/Intake and Output:  Visit Vitals    /78 (BP 1 Location: Right arm, BP Patient Position: At rest;Sitting)    Pulse (!) 102    Temp 98.3 °F (36.8 °C)    Resp 18    Ht 5' 6\" (1.676 m)    Wt 79.6 kg (175 lb 7.8 oz)    SpO2 96%    Breastfeeding No    BMI 28.32 kg/m2     Current Shift:     Last three shifts:  01/08 1901 - 01/10 0700  In: 1320 [P.O.:1320]  Out: 500 [Urine:500]    Exam:    General: Well developed, alert, NAD, OX3  Head/Neck: NCAT, supple, No masses, No lymphadenopathy  CVS:Regular rate and rhythm, no M/R/G, S1/S2 heard, no thrill  Lungs:Clear to auscultation bilaterally, no wheezes, rhonchi, or rales  Abdomen: Soft, mild tenderness, No distention, Normal Bowel sounds, No hepatomegaly  Extremities: No C/C/E, pulses palpable 2+  Skin:normal texture and turgor, no rashes, no lesions  Neuro:grossly normal , follows commands  Psych:appropriate                Labs: Results:       Chemistry Recent Labs      01/10/18   0335  01/09/18   0551  01/07/18   2357   GLU  92  106*  98   NA  143  142  139   K  3.6  4.7  3.2*   CL  103  106  101   CO2  30  27  24   BUN  14  11  5*   CREA  0.83  0.77  0.69   CA  9.4  9.7  8.9   AGAP  10  9  14   BUCR  17  14  7*      CBC w/Diff Recent Labs      01/10/18   0335  01/09/18   0551  01/07/18   2357   WBC  7.6  6.0  4.5*   RBC  4.08*  4.11*  3.85*   HGB  9.3*  9.3*  8.7*   HCT  31.3*  31.3*  28.6*   PLT  509*  460*  376   GRANS  37*  54  51 LYMPH  41  24  33   EOS  1  1  2      Cardiac Enzymes Recent Labs      01/08/18   1840  01/08/18   0942   CPK  30  50   CKND1  CALCULATION NOT PERFORMED WHEN RESULT IS BELOW LINEAR LIMIT  CALCULATION NOT PERFORMED WHEN RESULT IS BELOW LINEAR LIMIT      Coagulation Recent Labs      01/10/18   0335  01/09/18   0551   PTP  14.8  18.2*   INR  1.2  1.6*   APTT   --   31.2       Lipid Panel Lab Results   Component Value Date/Time    Cholesterol, total 172 01/09/2018 05:51 AM    HDL Cholesterol 74 01/09/2018 05:51 AM    LDL, calculated 85.2 01/09/2018 05:51 AM    VLDL, calculated 12.8 01/09/2018 05:51 AM    Triglyceride 64 01/09/2018 05:51 AM    CHOL/HDL Ratio 2.3 01/09/2018 05:51 AM      BNP No results for input(s): BNPP in the last 72 hours. Liver Enzymes No results for input(s): TP, ALB, TBIL, AP, SGOT, GPT in the last 72 hours.     No lab exists for component: DBIL   Thyroid Studies Lab Results   Component Value Date/Time    TSH 1.51 12/12/2009 09:48 AM        Procedures/imaging: see electronic medical records for all procedures/Xrays and details which were not copied into this note but were reviewed prior to creation of [address], PAINGRID

## 2018-01-10 NOTE — PROGRESS NOTES
Problem: Self Care Deficits Care Plan (Adult)  Goal: *Acute Goals and Plan of Care (Insert Text)  Initial OT STGs (1/8/2018) Within 7 days:    1. Patient will perform toilet transfer with modified Independent in preparation for bowel and bladder management. 2. Patient will perform bowel and bladder management with modified independent for increased independence with ADLs. 3. Patient will perform UB dressing with setup while utilizing salbador-techniques for increased independence with ADLs. 4. Patient will perform LB dressing with setup while utilizing salbador-techniques for increased independence with ADLs. 5. Patient will perform LUE exercises with Supervision assist for 15 minutes to increase independence with ADLs. 6. Patient will utilize energy conservation techniques with Supervision for increased independence with ADLs. Outcome: Resolved/Met Date Met: 01/10/18  Occupational Therapy TREATMENT/discharge    Patient: Smitha Felton (26 y.o. female)  Date: 1/10/2018  Diagnosis: LUE weakness  Stroke ([facility] LUE weakness       Precautions: Fall, ISO/C-diff  Chart, occupational therapy assessment, plan of care, and goals were reviewed. ASSESSMENT:  Patient making excellent progress. Pt reports L hand was residual from CVA 3/2017 and an extension from initial CVA. Pt able to perform ADLs and instructed on [facility] tasks to perform at home and safety d/t decreased sensation. Pt reports intermittent tingling which appears d/t poor body mechanics of hand d/t compensation from decreased strength w/ wrist flex/ext and ulnar/radially deviated. Instructed pt to be aware of L hand/wrist position. Progression toward goals:  [x]          Improving appropriately and progressing toward goals  []          Improving slowly and progressing toward goals  []          Not making progress toward goals and plan of care will be adjusted     PLAN:  Patient will be discharged from occupational therapy at this time.   Rationale for discharge:  [x] Goals Achieved  [] [address]  [] Patient not participating in therapy  [] Other:  Discharge Recommendations:  Home Health  Further Equipment Recommendations for Discharge:  N/A     SUBJECTIVE:   Patient stated I'm doing a lot better.     OBJECTIVE DATA SUMMARY:   Cognitive/Behavioral Status:  Neurologic State: Alert  Orientation Level: Oriented X4  Cognition: Appropriate decision making, Appropriate for age attention/concentration, Appropriate safety awareness, Follows commands  Safety/Judgement: Awareness of environment, Fall prevention, Insight into deficits    Functional Mobility and Transfers for ADLs:   Bed Mobility:  Supine to Sit: Modified independent  Sit to Supine: Modified independent   Transfers:  Sit to Stand: Modified independent   Toilet Transfer : Modified independent   Bathroom Mobility: Modified independent     Balance:  Sitting: Intact  Standing: Intact; Without support    ADL Intervention:  Grooming  Washing Hands: Modified independent    Toileting  Toileting Assistance: Modified independent  Bowel Hygiene: Modified indpendent    Cognitive Retraining  Safety/Judgement: Awareness of environment; Fall prevention; Insight into deficits    Neuro Re-Education:  L hand [address] task of using functionally as well as picking up small ADL objects; body mechanics of L hand     Pain:  Pre-treatment: 0/10  Post-treatment: 0/10    Activity Tolerance:    Patient able to stand 5+ minute(s). Patient able to complete ADLs without rest breaks. Please refer to the flowsheet for vital signs taken during this treatment. After treatment:   [x]  Patient left in no apparent distress sitting up in chair  []  Patient left in no apparent distress in bed  [x]  Call bell left within reach  [x]  Nursing notified  []  Caregiver present  []  Bed alarm activated    Thank you for allowing me to assist in the care of this patient.   Mariola Amaya OTR/L  Time Calculation: 24 mins

## 2018-01-11 LAB
1,25(OH)2D3 SERPL-MCNC: 69 PG/ML (ref 19.9–79.3)
ANION GAP SERPL CALC-SCNC: 9 MMOL/L (ref 3–18)
BASOPHILS # BLD: 0 K/UL (ref 0–0.06)
BASOPHILS NFR BLD: 0 % (ref 0–2)
BUN SERPL-MCNC: 12 MG/DL (ref 7–18)
BUN/CREAT SERPL: 20 (ref 12–20)
CALCIUM SERPL-MCNC: 8.6 MG/DL (ref 8.5–10.1)
CHLORIDE SERPL-SCNC: 103 MMOL/L (ref 100–108)
CO2 SERPL-SCNC: 29 MMOL/L (ref 21–32)
CREAT SERPL-MCNC: 0.59 MG/DL (ref 0.6–1.3)
DIFFERENTIAL METHOD BLD: ABNORMAL
EOSINOPHIL # BLD: 0.1 K/UL (ref 0–0.4)
EOSINOPHIL NFR BLD: 1 % (ref 0–5)
ERYTHROCYTE [DISTWIDTH] IN BLOOD BY AUTOMATED COUNT: 15.5 % (ref 11.6–14.5)
GLUCOSE SERPL-MCNC: 91 MG/DL (ref 74–99)
HCT VFR BLD AUTO: 24.1 % (ref 35–45)
HGB BLD-MCNC: 7.4 G/DL (ref 12–16)
INR PPP: 1.1 (ref 0.8–1.2)
LYMPHOCYTES # BLD: 1.8 K/UL (ref 0.9–3.6)
LYMPHOCYTES NFR BLD: 25 % (ref 21–52)
MAGNESIUM SERPL-MCNC: 1.7 MG/DL (ref 1.6–2.6)
MCH RBC QN AUTO: 23.1 PG (ref 24–34)
MCHC RBC AUTO-ENTMCNC: 30.7 G/DL (ref 31–37)
MCV RBC AUTO: 75.3 FL (ref 74–97)
MONOCYTES # BLD: 1.4 K/UL (ref 0.05–1.2)
MONOCYTES NFR BLD: 19 % (ref 3–10)
NEUTS SEG # BLD: 4 K/UL (ref 1.8–8)
NEUTS SEG NFR BLD: 55 % (ref 40–73)
PLATELET # BLD AUTO: 426 K/UL (ref 135–420)
PMV BLD AUTO: 9.1 FL (ref 9.2–11.8)
POTASSIUM SERPL-SCNC: 3.7 MMOL/L (ref 3.5–5.5)
PROTHROMBIN TIME: 13.4 SEC (ref 11.5–15.2)
RBC # BLD AUTO: 3.2 M/UL (ref 4.2–5.3)
SODIUM SERPL-SCNC: 141 MMOL/L (ref 136–145)
WBC # BLD AUTO: 7.4 K/UL (ref 4.6–13.2)

## 2018-01-11 PROCEDURE — 85025 COMPLETE CBC W/AUTO DIFF WBC: CPT | Performed by: INTERNAL MEDICINE

## 2018-01-11 PROCEDURE — 36415 COLL VENOUS BLD VENIPUNCTURE: CPT | Performed by: INTERNAL MEDICINE

## 2018-01-11 PROCEDURE — 85610 PROTHROMBIN TIME: CPT | Performed by: INTERNAL MEDICINE

## 2018-01-11 PROCEDURE — 74011250637 HC RX REV CODE- 250/637: Performed by: INTERNAL MEDICINE

## 2018-01-11 PROCEDURE — 74011250636 HC RX REV CODE- 250/636: Performed by: INTERNAL MEDICINE

## 2018-01-11 PROCEDURE — 74011250636 HC RX REV CODE- 250/636: Performed by: PHYSICIAN ASSISTANT

## 2018-01-11 PROCEDURE — 80048 BASIC METABOLIC PNL TOTAL CA: CPT | Performed by: INTERNAL MEDICINE

## 2018-01-11 PROCEDURE — 65660000000 HC RM CCU STEPDOWN

## 2018-01-11 PROCEDURE — 83735 ASSAY OF MAGNESIUM: CPT | Performed by: INTERNAL MEDICINE

## 2018-01-11 PROCEDURE — 74011250637 HC RX REV CODE- 250/637: Performed by: PHYSICIAN ASSISTANT

## 2018-01-11 RX ADMIN — PROMETHAZINE HYDROCHLORIDE 12.5 MG: 25 TABLET ORAL at 14:50

## 2018-01-11 RX ADMIN — CARVEDILOL 3.12 MG: 3.12 TABLET, FILM COATED ORAL at 09:52

## 2018-01-11 RX ADMIN — Medication 10 ML: at 23:18

## 2018-01-11 RX ADMIN — VANCOMYCIN HYDROCHLORIDE 125 MG: 1 INJECTION, POWDER, LYOPHILIZED, FOR SOLUTION INTRAVENOUS at 00:30

## 2018-01-11 RX ADMIN — HYDROMORPHONE HYDROCHLORIDE 0.5 MG: 1 INJECTION, SOLUTION INTRAMUSCULAR; INTRAVENOUS; SUBCUTANEOUS at 04:42

## 2018-01-11 RX ADMIN — ATORVASTATIN CALCIUM 80 MG: 20 TABLET, FILM COATED ORAL at 21:53

## 2018-01-11 RX ADMIN — HYDROMORPHONE HYDROCHLORIDE 0.5 MG: 1 INJECTION, SOLUTION INTRAMUSCULAR; INTRAVENOUS; SUBCUTANEOUS at 19:07

## 2018-01-11 RX ADMIN — DOCUSATE SODIUM 100 MG: 100 CAPSULE, LIQUID FILLED ORAL at 09:52

## 2018-01-11 RX ADMIN — HYDROMORPHONE HYDROCHLORIDE 0.5 MG: 1 INJECTION, SOLUTION INTRAMUSCULAR; INTRAVENOUS; SUBCUTANEOUS at 00:31

## 2018-01-11 RX ADMIN — GABAPENTIN 400 MG: 400 CAPSULE ORAL at 21:53

## 2018-01-11 RX ADMIN — CARVEDILOL 3.12 MG: 3.12 TABLET, FILM COATED ORAL at 18:54

## 2018-01-11 RX ADMIN — GABAPENTIN 400 MG: 400 CAPSULE ORAL at 18:54

## 2018-01-11 RX ADMIN — METRONIDAZOLE 500 MG: 250 TABLET ORAL at 06:20

## 2018-01-11 RX ADMIN — GABAPENTIN 400 MG: 400 CAPSULE ORAL at 09:52

## 2018-01-11 RX ADMIN — DOCUSATE SODIUM 100 MG: 100 CAPSULE, LIQUID FILLED ORAL at 21:54

## 2018-01-11 RX ADMIN — Medication 10 ML: at 06:19

## 2018-01-11 RX ADMIN — PROMETHAZINE HYDROCHLORIDE 12.5 MG: 25 TABLET ORAL at 00:30

## 2018-01-11 RX ADMIN — METHYLPREDNISOLONE SODIUM SUCCINATE 35 MG: 40 INJECTION, POWDER, FOR SOLUTION INTRAMUSCULAR; INTRAVENOUS at 06:19

## 2018-01-11 RX ADMIN — LISINOPRIL 10 MG: 5 TABLET ORAL at 09:52

## 2018-01-11 RX ADMIN — PROMETHAZINE HYDROCHLORIDE 12.5 MG: 25 TABLET ORAL at 09:53

## 2018-01-11 RX ADMIN — Medication 10 ML: at 18:55

## 2018-01-11 RX ADMIN — HYDROMORPHONE HYDROCHLORIDE 0.5 MG: 1 INJECTION, SOLUTION INTRAMUSCULAR; INTRAVENOUS; SUBCUTANEOUS at 23:18

## 2018-01-11 RX ADMIN — VANCOMYCIN HYDROCHLORIDE 125 MG: 1 INJECTION, POWDER, LYOPHILIZED, FOR SOLUTION INTRAVENOUS at 13:57

## 2018-01-11 RX ADMIN — HYDROMORPHONE HYDROCHLORIDE 0.5 MG: 1 INJECTION, SOLUTION INTRAMUSCULAR; INTRAVENOUS; SUBCUTANEOUS at 09:50

## 2018-01-11 RX ADMIN — METRONIDAZOLE 500 MG: 250 TABLET ORAL at 13:56

## 2018-01-11 RX ADMIN — ASPIRIN 81 MG 81 MG: 81 TABLET ORAL at 09:52

## 2018-01-11 RX ADMIN — HYDROMORPHONE HYDROCHLORIDE 0.5 MG: 1 INJECTION, SOLUTION INTRAMUSCULAR; INTRAVENOUS; SUBCUTANEOUS at 14:50

## 2018-01-11 RX ADMIN — VANCOMYCIN HYDROCHLORIDE 125 MG: 1 INJECTION, POWDER, LYOPHILIZED, FOR SOLUTION INTRAVENOUS at 06:19

## 2018-01-11 RX ADMIN — VANCOMYCIN HYDROCHLORIDE 125 MG: 1 INJECTION, POWDER, LYOPHILIZED, FOR SOLUTION INTRAVENOUS at 21:53

## 2018-01-11 RX ADMIN — PROMETHAZINE HYDROCHLORIDE 12.5 MG: 25 TABLET ORAL at 19:07

## 2018-01-11 RX ADMIN — METRONIDAZOLE 500 MG: 250 TABLET ORAL at 21:53

## 2018-01-11 NOTE — PROGRESS NOTES
Hospitalist Progress Note    Patient: Smitha Felton MRN: [id number]  CSN: [id number]    YOB: 1970  Age: 56 y.o. Sex: female    DOA: 1/7/2018 LOS:  LOS: 3 days          Chief Complaint:    Bloody BMs    Assessment/Plan     1. Chron's Disease  2. C.diff Infection  3. Hx of Embolic Stroke  4. Hx of DVT    1. Patient again voiced complaints of difficult night with abd cramping and fewer bloody BM. Hgb down to 7.5 this AM, will continue monitoring with daily CBC draws. Discussed potential for blood transfusion if patient's H/H drops any further. The patient voiced understanding. As previously documented, the patient has been on an immunologic medication for her Chron's and would ultimately like to get restarted. However, per Dr Diane Calvert, will need to clear patient's C.diff infection prior to restarting immunologic therapy. 2. Continue with oral Flagyl and oral Vancomycin for now. When appropriate for discharge, continue these medications until she sees Dr Diane Calvert in clinic. 3. Patient will require anticoagulation, but will have GI determine when patient can safely be on anticoagulation with her severe Chron's disease. 4. As above. DVT Prophylaxis - SCDs  Dispo: Will continue to monitor H/H for further decrease; will need to monitor her BMs for worsening blood volume, and hope that her BMs decrease in frequency prior to discharge. Patient Active Problem List   Diagnosis Code    Crohn disease ([facility] K50.90    Sickle cell trait ([facility] D57.3    Hypertension I10    Hx of cocaine abuse M40.088    Embolic stroke ([facility] A24.7    Neuropathic pain M79.2    DVT (deep venous thrombosis) ([facility]) I82.409    GI bleed K92.2    PAD (peripheral artery disease) ([facility]) I73.9    Wound infection T14. 8XXA, L08.9    Popliteal artery thrombosis, right ([facility]) I74.3    Pure hypercholesterolemia E78.00    LUE weakness R29.898    Stroke ([facility]) I63.9    Clostridium difficile infection B96.89 Subjective:    Continued complaints of abdominal pain    Review of systems:    Constitutional: denies fevers, chills, myalgias  Respiratory: denies SOB, cough  Cardiovascular: denies chest pain, palpitations  Gastrointestinal: denies nausea, vomiting. Reports diarrhea.        Vital signs/Intake and Output:  Visit Vitals    /82    Pulse (!) 103    Temp 98.2 °F (36.8 °C)    Resp 17    Ht 5' 6\" (1.676 m)    Wt 79.6 kg (175 lb 7.8 oz)    SpO2 98%    Breastfeeding No    BMI 28.32 kg/m2     Current Shift:  01/11 0701 - 01/11 1900  In: 240 [P.O.:240]  Out: -   Last three shifts:  01/09 1901 - 01/11 0700  In: 1320 [P.O.:1320]  Out: 650 [Urine:650]    Exam:    General: Well developed, alert, NAD, OX3  Head/Neck: NCAT, supple, No masses, No lymphadenopathy  CVS:Regular rate and rhythm, no M/R/G, S1/S2 heard, no thrill  Lungs:Clear to auscultation bilaterally, no wheezes, rhonchi, or rales  Abdomen: Soft, Nontender, No distention, Normal Bowel sounds, No hepatomegaly  Extremities: No C/C/E, pulses palpable 2+  Skin:normal texture and turgor, no rashes, no lesions  Neuro:grossly normal , follows commands  Psych:appropriate                Labs: Results:       Chemistry Recent Labs      01/11/18   0539  01/10/18   0335 01/09/18   0551   GLU  91  92  106*   NA  141  143  142   K  3.7  3.6  4.7   CL  103  103  106   CO2  29  30  27   BUN  12  14  11   CREA  0.59*  0.83  0.77   CA  8.6  9.4  9.7   AGAP  9  10  9   BUCR  20  17  14      CBC w/Diff Recent Labs      01/11/18   0539  01/10/18   0335  01/09/18   0551   WBC  7.4  7.6  6.0   RBC  3.20*  4.08*  4.11*   HGB  7.4*  9.3*  9.3*   HCT  24.1*  31.3*  31.3*   PLT  426*  509*  460*   GRANS  55  37*  54   LYMPH  25  41  24   EOS  1  1  1      Cardiac Enzymes Recent Labs      01/08/18   1840   CPK  30   CKND1  CALCULATION NOT PERFORMED WHEN RESULT IS BELOW LINEAR LIMIT      Coagulation Recent Labs      01/11/18   0539  01/10/18   0335  01/09/18   0551   PTP  13.4 14.8  18.2*   INR  1.1  1.2  1.6*   APTT   --    --   31.2       Lipid Panel Lab Results   Component Value Date/Time    Cholesterol, total 172 01/09/2018 05:51 AM    HDL Cholesterol 74 01/09/2018 05:51 AM    LDL, calculated 85.2 01/09/2018 05:51 AM    VLDL, calculated 12.8 01/09/2018 05:51 AM    Triglyceride 64 01/09/2018 05:51 AM    CHOL/HDL Ratio 2.3 01/09/2018 05:51 AM      BNP No results for input(s): BNPP in the last 72 hours. Liver Enzymes No results for input(s): TP, ALB, TBIL, AP, SGOT, GPT in the last 72 hours.     No lab exists for component: DBIL   Thyroid Studies Lab Results   Component Value Date/Time    TSH 1.51 12/12/2009 09:48 AM        Procedures/imaging: see electronic medical records for all procedures/Xrays and details which were not copied into this note but were reviewed prior to creation of [address], PA-C

## 2018-01-11 NOTE — WOUND CARE
Pt assessed by wound care during monthly prevalence. Pt has a current uma score of 22 and no pressure injuries noted at this time.

## 2018-01-11 NOTE — PROGRESS NOTES
D/c plan: anticipate home when medically cleared. no new needs at this time for d/c will continue to follow

## 2018-01-11 NOTE — PROGRESS NOTES
0439 Pt reports having BM that appeared like \"vegetable soup,\" red. Did not show RN. SHIFT SUMMARY: No events during this night shift. Pt continues to request for pain medication and nausea medication as it is available.  Plan is to monitor for BM and H&H.

## 2018-01-11 NOTE — ROUTINE PROCESS
Bedside shift change report given to [employer]  (oncoming nurse) by Rachelle Davalos RN (offgoing nurse). Report included the following information SBAR, Kardex, Intake/Output and MAR.

## 2018-01-11 NOTE — PROGRESS NOTES
0800 Assumed responsibility for patient from Mckenzie Dorantes RN    Bedside shift change report given to Kaylee Mcneil RN (oncoming nurse) by Aj Riggs RN (offgoing nurse). Report included the following information SBAR, Kardex and MAR.

## 2018-01-11 NOTE — ROUTINE PROCESS
Bedside and Verbal shift change report given to Matt Rose RN (oncoming nurse) by Ivonne Miner RN  (offgoing nurse). Report included the following information SBAR, Kardex, Intake/Output and MAR.

## 2018-01-11 NOTE — PROGRESS NOTES
Problem: Falls - Risk of  Goal: *Absence of Falls  Document Bernard Fall Risk and appropriate interventions in the flowsheet.    Outcome: Progressing Towards Goal  Fall Risk Interventions:            Medication Interventions: Patient to call before getting OOB, Evaluate medications/consider consulting pharmacy, Teach patient to arise slowly

## 2018-01-12 LAB
ANION GAP SERPL CALC-SCNC: 7 MMOL/L (ref 3–18)
BASOPHILS # BLD: 0 K/UL (ref 0–0.1)
BASOPHILS NFR BLD: 0 % (ref 0–3)
BUN SERPL-MCNC: 14 MG/DL (ref 7–18)
BUN/CREAT SERPL: 19 (ref 12–20)
CALCIUM SERPL-MCNC: 8.4 MG/DL (ref 8.5–10.1)
CHLORIDE SERPL-SCNC: 106 MMOL/L (ref 100–108)
CO2 SERPL-SCNC: 30 MMOL/L (ref 21–32)
CREAT SERPL-MCNC: 0.74 MG/DL (ref 0.6–1.3)
DIFFERENTIAL METHOD BLD: ABNORMAL
EOSINOPHIL # BLD: 0.3 K/UL (ref 0–0.4)
EOSINOPHIL NFR BLD: 3 % (ref 0–5)
ERYTHROCYTE [DISTWIDTH] IN BLOOD BY AUTOMATED COUNT: 15.4 % (ref 11.6–14.5)
GLUCOSE SERPL-MCNC: 73 MG/DL (ref 74–99)
HCT VFR BLD AUTO: 25.3 % (ref 35–45)
HGB BLD-MCNC: 7.5 G/DL (ref 12–16)
INR PPP: 1 (ref 0.8–1.2)
LYMPHOCYTES # BLD: 2.4 K/UL (ref 0.8–3.5)
LYMPHOCYTES NFR BLD: 24 % (ref 20–51)
MAGNESIUM SERPL-MCNC: 1.7 MG/DL (ref 1.6–2.6)
MCH RBC QN AUTO: 23.1 PG (ref 24–34)
MCHC RBC AUTO-ENTMCNC: 29.6 G/DL (ref 31–37)
MCV RBC AUTO: 78.1 FL (ref 74–97)
MONOCYTES # BLD: 1 K/UL (ref 0–1)
MONOCYTES NFR BLD: 10 % (ref 2–9)
NEUTS BAND NFR BLD MANUAL: 8 % (ref 0–5)
NEUTS SEG # BLD: 5.6 K/UL (ref 1.8–8)
NEUTS SEG NFR BLD: 55 % (ref 42–75)
NRBC BLD-RTO: 1 PER 100 WBC
PLATELET # BLD AUTO: 494 K/UL (ref 135–420)
PMV BLD AUTO: 9.1 FL (ref 9.2–11.8)
POTASSIUM SERPL-SCNC: 3.3 MMOL/L (ref 3.5–5.5)
PROTHROMBIN TIME: 12.5 SEC (ref 11.5–15.2)
RBC # BLD AUTO: 3.24 M/UL (ref 4.2–5.3)
RBC MORPH BLD: ABNORMAL
SODIUM SERPL-SCNC: 143 MMOL/L (ref 136–145)
WBC # BLD AUTO: 10.1 K/UL (ref 4.6–13.2)

## 2018-01-12 PROCEDURE — 80048 BASIC METABOLIC PNL TOTAL CA: CPT | Performed by: INTERNAL MEDICINE

## 2018-01-12 PROCEDURE — 74011250636 HC RX REV CODE- 250/636: Performed by: INTERNAL MEDICINE

## 2018-01-12 PROCEDURE — 36415 COLL VENOUS BLD VENIPUNCTURE: CPT | Performed by: INTERNAL MEDICINE

## 2018-01-12 PROCEDURE — 74011250636 HC RX REV CODE- 250/636: Performed by: PHYSICIAN ASSISTANT

## 2018-01-12 PROCEDURE — 85610 PROTHROMBIN TIME: CPT | Performed by: INTERNAL MEDICINE

## 2018-01-12 PROCEDURE — 74011250637 HC RX REV CODE- 250/637: Performed by: INTERNAL MEDICINE

## 2018-01-12 PROCEDURE — 83735 ASSAY OF MAGNESIUM: CPT | Performed by: INTERNAL MEDICINE

## 2018-01-12 PROCEDURE — 65660000000 HC RM CCU STEPDOWN

## 2018-01-12 PROCEDURE — 74011250637 HC RX REV CODE- 250/637: Performed by: PHYSICIAN ASSISTANT

## 2018-01-12 PROCEDURE — 85025 COMPLETE CBC W/AUTO DIFF WBC: CPT | Performed by: INTERNAL MEDICINE

## 2018-01-12 PROCEDURE — 74011250636 HC RX REV CODE- 250/636: Performed by: HOSPITALIST

## 2018-01-12 RX ORDER — MAGNESIUM SULFATE HEPTAHYDRATE 40 MG/ML
2 INJECTION, SOLUTION INTRAVENOUS ONCE
Status: COMPLETED | OUTPATIENT
Start: 2018-01-12 | End: 2018-01-12

## 2018-01-12 RX ORDER — POTASSIUM CHLORIDE 20 MEQ/1
20 TABLET, EXTENDED RELEASE ORAL
Status: COMPLETED | OUTPATIENT
Start: 2018-01-12 | End: 2018-01-12

## 2018-01-12 RX ADMIN — HYDROMORPHONE HYDROCHLORIDE 0.5 MG: 1 INJECTION, SOLUTION INTRAMUSCULAR; INTRAVENOUS; SUBCUTANEOUS at 11:54

## 2018-01-12 RX ADMIN — CARVEDILOL 3.12 MG: 3.12 TABLET, FILM COATED ORAL at 16:25

## 2018-01-12 RX ADMIN — POTASSIUM CHLORIDE 20 MEQ: 20 TABLET, EXTENDED RELEASE ORAL at 11:06

## 2018-01-12 RX ADMIN — DOCUSATE SODIUM 100 MG: 100 CAPSULE, LIQUID FILLED ORAL at 21:27

## 2018-01-12 RX ADMIN — METHYLPREDNISOLONE SODIUM SUCCINATE 35 MG: 40 INJECTION, POWDER, FOR SOLUTION INTRAMUSCULAR; INTRAVENOUS at 07:36

## 2018-01-12 RX ADMIN — LISINOPRIL 10 MG: 5 TABLET ORAL at 11:07

## 2018-01-12 RX ADMIN — MAGNESIUM SULFATE HEPTAHYDRATE 2 G: 40 INJECTION, SOLUTION INTRAVENOUS at 15:38

## 2018-01-12 RX ADMIN — GABAPENTIN 400 MG: 400 CAPSULE ORAL at 21:27

## 2018-01-12 RX ADMIN — PROMETHAZINE HYDROCHLORIDE 12.5 MG: 25 TABLET ORAL at 03:13

## 2018-01-12 RX ADMIN — HYDROMORPHONE HYDROCHLORIDE 0.5 MG: 1 INJECTION, SOLUTION INTRAMUSCULAR; INTRAVENOUS; SUBCUTANEOUS at 16:25

## 2018-01-12 RX ADMIN — GABAPENTIN 400 MG: 400 CAPSULE ORAL at 15:39

## 2018-01-12 RX ADMIN — Medication 10 ML: at 14:00

## 2018-01-12 RX ADMIN — PROMETHAZINE HYDROCHLORIDE 12.5 MG: 25 TABLET ORAL at 16:25

## 2018-01-12 RX ADMIN — GABAPENTIN 400 MG: 400 CAPSULE ORAL at 11:08

## 2018-01-12 RX ADMIN — ASPIRIN 81 MG 81 MG: 81 TABLET ORAL at 11:09

## 2018-01-12 RX ADMIN — Medication 10 ML: at 21:28

## 2018-01-12 RX ADMIN — PROMETHAZINE HYDROCHLORIDE 12.5 MG: 25 TABLET ORAL at 11:54

## 2018-01-12 RX ADMIN — CARVEDILOL 3.12 MG: 3.12 TABLET, FILM COATED ORAL at 11:09

## 2018-01-12 RX ADMIN — HYDROMORPHONE HYDROCHLORIDE 0.5 MG: 1 INJECTION, SOLUTION INTRAMUSCULAR; INTRAVENOUS; SUBCUTANEOUS at 21:27

## 2018-01-12 RX ADMIN — VANCOMYCIN HYDROCHLORIDE 125 MG: 1 INJECTION, POWDER, LYOPHILIZED, FOR SOLUTION INTRAVENOUS at 11:55

## 2018-01-12 RX ADMIN — VANCOMYCIN HYDROCHLORIDE 125 MG: 1 INJECTION, POWDER, LYOPHILIZED, FOR SOLUTION INTRAVENOUS at 19:14

## 2018-01-12 RX ADMIN — HYDROMORPHONE HYDROCHLORIDE 0.5 MG: 1 INJECTION, SOLUTION INTRAMUSCULAR; INTRAVENOUS; SUBCUTANEOUS at 07:36

## 2018-01-12 RX ADMIN — ATORVASTATIN CALCIUM 80 MG: 20 TABLET, FILM COATED ORAL at 21:27

## 2018-01-12 RX ADMIN — METRONIDAZOLE 500 MG: 250 TABLET ORAL at 15:38

## 2018-01-12 RX ADMIN — VANCOMYCIN HYDROCHLORIDE 125 MG: 1 INJECTION, POWDER, LYOPHILIZED, FOR SOLUTION INTRAVENOUS at 03:14

## 2018-01-12 RX ADMIN — METHYLPREDNISOLONE SODIUM SUCCINATE 35 MG: 40 INJECTION, POWDER, FOR SOLUTION INTRAMUSCULAR; INTRAVENOUS at 19:15

## 2018-01-12 RX ADMIN — Medication 10 ML: at 06:00

## 2018-01-12 RX ADMIN — HYDROMORPHONE HYDROCHLORIDE 0.5 MG: 1 INJECTION, SOLUTION INTRAMUSCULAR; INTRAVENOUS; SUBCUTANEOUS at 03:14

## 2018-01-12 RX ADMIN — METRONIDAZOLE 500 MG: 250 TABLET ORAL at 21:27

## 2018-01-12 RX ADMIN — DOCUSATE SODIUM 100 MG: 100 CAPSULE, LIQUID FILLED ORAL at 11:09

## 2018-01-12 RX ADMIN — VANCOMYCIN HYDROCHLORIDE 125 MG: 1 INJECTION, POWDER, LYOPHILIZED, FOR SOLUTION INTRAVENOUS at 23:59

## 2018-01-12 RX ADMIN — METRONIDAZOLE 500 MG: 250 TABLET ORAL at 06:44

## 2018-01-12 NOTE — ROUTINE PROCESS
Bedside and Verbal shift change report given to Gabriela Mcknight RN (oncoming nurse) by Surekha Rojas RN (offgoing nurse). Report included the following information SBAR and Kardex.

## 2018-01-12 NOTE — PROGRESS NOTES
D/c plan Home when medically cleared  Met with RONNY Orellana is not ready for d/c at this time. She plans to return home when medically cleared. Patient has no needs and does not have request for DME  Care Management Interventions  PCP Verified by CM:  Yes  Transition of Care Consult (CM Consult): Discharge Planning

## 2018-01-12 NOTE — PROGRESS NOTES
Shift Summary: Pt had 2 bloody stools overnight. Pain meds and phenergan administered as per Pt request. Pt refused AM Solumedrol. Stated she will take it later. Didn't reason why she is refusing at this time.

## 2018-01-12 NOTE — PROGRESS NOTES
Gastrointestinal Progress Note    Patient Name: Priyanka Maria    PNKTJ'M Date: 1/12/2018    Admit Date: 1/7/2018    Subjective:     Diet: Patient is on soft diet and is not tolerating. Nausea is present. Vomiting is not present. Pain: Patient does complain of abdominal pain. The pain is located in the LLQ. The pain is described as cramping, and is 4/10 in intensity. Bowel Movements: Diarrhea x 6 including 2 at night    Bleeding:  bright red blood per rectum    Current Facility-Administered Medications   Medication Dose Route Frequency    HYDROmorphone (PF) (DILAUDID) injection 0.5 mg  0.5 mg IntraVENous Q4H PRN    metroNIDAZOLE (FLAGYL) tablet 500 mg  500 mg Oral Q8H    vancomycin 50 mg/mL oral solution (compounded) 125 mg  125 mg Oral Q6H    atorvastatin (LIPITOR) tablet 80 mg  80 mg Oral QHS    carvedilol (COREG) tablet 3.125 mg  3.125 mg Oral BID WITH MEALS    docusate sodium (COLACE) capsule 100 mg  100 mg Oral BID    gabapentin (NEURONTIN) capsule 400 mg  400 mg Oral TID    lisinopril (PRINIVIL, ZESTRIL) tablet 10 mg  10 mg Oral DAILY    sodium chloride (NS) flush 5-10 mL  5-10 mL IntraVENous Q8H    sodium chloride (NS) flush 5-10 mL  5-10 mL IntraVENous PRN    methylPREDNISolone (PF) (SOLU-MEDROL) injection 35 mg  35 mg IntraVENous Q12H    aspirin chewable tablet 81 mg  81 mg Oral DAILY    promethazine (PHENERGAN) tablet 12.5 mg  12.5 mg Oral Q6H PRN          Objective:     Visit Vitals    /61 (BP 1 Location: Left arm, BP Patient Position: At rest)    Pulse 93    Temp 98.3 °F (36.8 °C)    Resp 18    Ht 5' 6\" (1.676 m)    Wt 91.1 kg (200 lb 13.4 oz)    SpO2 98%    Breastfeeding No    BMI 32.42 kg/m2       01/10 1901 - 01/12 0700  In: 1700 [P.O.:1680; I.V.:20]  Out: 400 [Urine:400]    General appearance: alert, cooperative, no distress, appears stated age  Abdomen: soft, mild tenderness LLQ.  Bowel sounds normal. No masses,  no organomegaly    Data Review:    Labs: Results:       Chemistry Recent Labs      01/12/18   0426  01/11/18   0539  01/10/18   0335   GLU  73*  91  92   NA  143  141  143   K  3.3*  3.7  3.6   CL  106  103  103   CO2  30  29  30   BUN  14  12  14   CREA  0.74  0.59*  0.83   CA  8.4*  8.6  9.4   AGAP  7  9  10   BUCR  19  20  17      CBC w/Diff Recent Labs      01/12/18 0426  01/11/18   0539  01/10/18   0335   WBC  10.1  7.4  7.6   RBC  3.24*  3.20*  4.08*   HGB  7.5*  7.4*  9.3*   HCT  25.3*  24.1*  31.3*   PLT  494*  426*  509*   GRANS  55  55  37*   LYMPH  24  25  41   EOS  3  1  1      Coagulation Recent Labs      01/12/18 0426  01/11/18   0539   PTP  12.5  13.4   INR  1.0  1.1       Liver Enzymes No results for input(s): TP, ALB, TBIL, AP, SGOT, GPT in the last 72 hours. No lab exists for component: DBIL       Assessment:     Principal Problem:    LUE weakness (1/8/2018)    Active Problems:    Crohn disease ([facility] [address] (2/27/2012)      DVT (deep venous thrombosis) ([facility] [address] (3/16/2017)      [facility] (1/8/2018)      Clostridium difficile infection (1/9/2018)        Plan:     Severe crohn's disease of the colon x age 16 mostly the left and transverse colon. She has been intolerant to different biologics with infectious complications. We started her in March 2017 on Union but unfortunately she took it only for no more than 3 months and then stopped it at the end of July 2017. Within a month after, she started to have reoccurrence of her Crohn disease with frequent diarrhea, abdominal pain and rectal bleeding. That problem intensified in the last 3 weeks to the point that she was unable to sleep from having severe, profuse, foul-smelling diarrhea and left-sided abdominal pain. She was tested positive for C. difficile. This is the first episode documented at this hospital of C difficile infection. She was started on Flagyl and oral vancomycin. I think this should be continued for at least 2 weeks before considering starting the Union again. We have to give her the loading dose of 300 mg at 0, 2, 6 weeks and then every 8 weeks subsequently. For now I would keep her in the hospital until no more bleeding. Prednisone 50 mg daily in a tapered fashion over maximum 2 months hoping by that time the [facility] would be on  Board again  DVT and CVA has lupic anticoagulant? Needs to be on coumadin  Chronic iron deficiency anemia related to her Crohn's disease. 7.5 transfuse if 7 or below.  May give her Iron infusion while inpatient    Matt Street MD  January 12, 2018

## 2018-01-12 NOTE — PROGRESS NOTES
Problem: Falls - Risk of  Goal: *Absence of Falls  Document Bernard Fall Risk and appropriate interventions in the flowsheet.    Outcome: Progressing Towards Goal  Fall Risk Interventions:            Medication Interventions: Patient to call before getting OOB         History of Falls Interventions: Door open when patient unattended

## 2018-01-12 NOTE — PROGRESS NOTES
Hospitalist Progress Note    Patient: Sage Taylor MRN: [id number]  CSN: [id number]    YOB: 1970  Age: 56 y.o. Sex: female    DOA: 1/7/2018 LOS:  LOS: 4 days          Chief Complaint:    Bloody BMs    Assessment/Plan     1. Chron's Disease  2. C.diff Infection  3. Hx of Embolic Stroke  4. Hx of DVT    1. Continued complaints of restless night due to in and out of bathroom with bloody BMs. Hgb stable since yesterday, will continue to monitor with daily lab draws. Revisited possibility of blood transfusion with the patient if her hgb continues to drop. Will need immunologic therapy upon discharge. Will have Dr Lexii Judd arrange this. 2. Continue flagyl and vancomycin for now. When appropriate for discharge will continue until patient is seen by Dr Lexii Judd in clinic. 3. Patient will eventually require anticoagulation, as recommended by Dr Simeon Leach, but this will need to be cleared by Dr Lexii Judd from a GI standpoint. 4. As above. DVT Prophylaxis - SCDs  Dispo: Monitoring H/H. Monitoring BMs for worsening bloodiness, monitoring for decrease in frequency of BMs. Patient Active Problem List   Diagnosis Code    Crohn disease ([facility] K50.90    Sickle cell trait ([facility] D57.3    Hypertension I10    Hx of cocaine abuse B57.290    [facility] E44.2    Neuropathic pain M79.2    DVT (deep venous thrombosis) ([facility]) I82.409    GI bleed K92.2    PAD (peripheral artery disease) ([facility]) I73.9    Wound infection T14. 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    Pure hypercholesterolemia E78.00    LUE weakness R29.898    Stroke ([facility]) I63.9    Clostridium difficile infection B96.89       Subjective:    Complaints of restless night, abdominal pain, loose stools    Review of systems:    Constitutional: denies fevers, chills, myalgias  Respiratory: denies SOB, cough  Cardiovascular: denies chest pain, palpitations  Gastrointestinal: denies nausea, vomiting      Vital signs/Intake and Output:  Visit Vitals    BP 98/59 (BP 1 Location: Right arm, BP Patient Position: At rest)    Pulse 89    Temp 97.8 °F (36.6 °C)    Resp 18    Ht 5' 6\" (1.676 m)    Wt 91.1 kg (200 lb 13.4 oz)    SpO2 98%    Breastfeeding No    BMI 32.42 kg/m2     Current Shift:     Last three shifts:  01/10 1901 - 01/12 0700  In: 1700 [P.O.:1680; I.V.:20]  Out: 400 [Urine:400]    Exam:    General: Well developed, alert, NAD, OX3  Head/Neck: NCAT, supple, No masses, No lymphadenopathy  CVS:Regular rate and rhythm, no M/R/G, S1/S2 heard, no thrill  Lungs:Clear to auscultation bilaterally, no wheezes, rhonchi, or rales  Abdomen: Soft, Nontender, No distention, Normal Bowel sounds, No hepatomegaly  Extremities: No C/C/E, pulses palpable 2+  Skin:normal texture and turgor, no rashes, no lesions  Neuro:grossly normal , follows commands  Psych:appropriate                Labs: Results:       Chemistry Recent Labs      01/12/18 0426 01/11/18   0539  01/10/18   0335   GLU  73*  91  92   NA  143  141  143   K  3.3*  3.7  3.6   CL  106  103  103   CO2  30  29  30   BUN  14  12  14   CREA  0.74  0.59*  0.83   CA  8.4*  8.6  9.4   AGAP  7  9  10   BUCR  19  20  17      CBC w/Diff Recent Labs      01/12/18 0426 01/11/18   0539  01/10/18   0335   WBC  10.1  7.4  7.6   RBC  3.24*  3.20*  4.08*   HGB  7.5*  7.4*  9.3*   HCT  25.3*  24.1*  31.3*   PLT  494*  426*  509*   GRANS  55  55  37*   LYMPH  24  25  41   EOS  3  1  1      Cardiac Enzymes No results for input(s): CPK, CKND1, CHANDA in the last 72 hours.     No lab exists for component: CKRMB, TROIP   Coagulation Recent Labs      01/12/18 0426 01/11/18   0539   PTP  12.5  13.4   INR  1.0  1.1       Lipid Panel Lab Results   Component Value Date/Time    Cholesterol, total 172 01/09/2018 05:51 AM    HDL Cholesterol 74 01/09/2018 05:51 AM    LDL, calculated 85.2 01/09/2018 05:51 AM    VLDL, calculated 12.8 01/09/2018 05:51 AM    Triglyceride 64 01/09/2018 05:51 AM CHOL/HDL Ratio 2.3 01/09/2018 05:51 AM      BNP No results for input(s): BNPP in the last 72 hours. Liver Enzymes No results for input(s): TP, ALB, TBIL, AP, SGOT, GPT in the last 72 hours.     No lab exists for component: DBIL   Thyroid Studies Lab Results   Component Value Date/Time    TSH 1.51 12/12/2009 09:48 AM        Procedures/imaging: see electronic medical records for all procedures/Xrays and details which were not copied into this note but were reviewed prior to creation of [address] Estelle Zuleta, PA-C

## 2018-01-12 NOTE — PROGRESS NOTES
0730 Assumed responsibility for patient from Severino Bailey, [facility]    283 Assessed patient and gave morning medications    1155 administered pain and nausea medication    Bedside shift change report given to Espinoza Lim RN (oncoming nurse) by Monica Balderrama RN (offgoing nurse). Report included the following information SBAR, Kardex and MAR.

## 2018-01-13 LAB
ANION GAP SERPL CALC-SCNC: 6 MMOL/L (ref 3–18)
BASOPHILS # BLD: 0 K/UL (ref 0–0.1)
BASOPHILS NFR BLD: 0 % (ref 0–3)
BUN SERPL-MCNC: 13 MG/DL (ref 7–18)
BUN/CREAT SERPL: 23 (ref 12–20)
CALCIUM SERPL-MCNC: 8.4 MG/DL (ref 8.5–10.1)
CHLORIDE SERPL-SCNC: 105 MMOL/L (ref 100–108)
CO2 SERPL-SCNC: 28 MMOL/L (ref 21–32)
CREAT SERPL-MCNC: 0.56 MG/DL (ref 0.6–1.3)
DIFFERENTIAL METHOD BLD: ABNORMAL
EOSINOPHIL # BLD: 0 K/UL (ref 0–0.4)
EOSINOPHIL NFR BLD: 0 % (ref 0–5)
ERYTHROCYTE [DISTWIDTH] IN BLOOD BY AUTOMATED COUNT: 15.9 % (ref 11.6–14.5)
GLUCOSE BLD STRIP.AUTO-MCNC: 114 MG/DL (ref 70–110)
GLUCOSE BLD STRIP.AUTO-MCNC: 115 MG/DL (ref 70–110)
GLUCOSE BLD STRIP.AUTO-MCNC: 170 MG/DL (ref 70–110)
GLUCOSE SERPL-MCNC: 119 MG/DL (ref 74–99)
HCT VFR BLD AUTO: 23.1 % (ref 35–45)
HCT VFR BLD AUTO: 29.2 % (ref 35–45)
HGB BLD-MCNC: 6.9 G/DL (ref 12–16)
HGB BLD-MCNC: 9.2 G/DL (ref 12–16)
INR PPP: 1 (ref 0.8–1.2)
LYMPHOCYTES # BLD: 1.8 K/UL (ref 0.8–3.5)
LYMPHOCYTES NFR BLD: 19 % (ref 20–51)
MAGNESIUM SERPL-MCNC: 2.1 MG/DL (ref 1.6–2.6)
MCH RBC QN AUTO: 22.7 PG (ref 24–34)
MCHC RBC AUTO-ENTMCNC: 29.9 G/DL (ref 31–37)
MCV RBC AUTO: 76 FL (ref 74–97)
MONOCYTES # BLD: 1.3 K/UL (ref 0–1)
MONOCYTES NFR BLD: 14 % (ref 2–9)
NEUTS BAND NFR BLD MANUAL: 22 % (ref 0–5)
NEUTS SEG # BLD: 4.3 K/UL (ref 1.8–8)
NEUTS SEG NFR BLD: 45 % (ref 42–75)
PLATELET # BLD AUTO: 458 K/UL (ref 135–420)
PMV BLD AUTO: 8.9 FL (ref 9.2–11.8)
POTASSIUM SERPL-SCNC: 3.9 MMOL/L (ref 3.5–5.5)
PROTHROMBIN TIME: 12.4 SEC (ref 11.5–15.2)
RBC # BLD AUTO: 3.04 M/UL (ref 4.2–5.3)
RBC MORPH BLD: ABNORMAL
SODIUM SERPL-SCNC: 139 MMOL/L (ref 136–145)
WBC # BLD AUTO: 9.6 K/UL (ref 4.6–13.2)
WBC MORPH BLD: ABNORMAL

## 2018-01-13 PROCEDURE — 74011250636 HC RX REV CODE- 250/636: Performed by: INTERNAL MEDICINE

## 2018-01-13 PROCEDURE — 74011250637 HC RX REV CODE- 250/637: Performed by: INTERNAL MEDICINE

## 2018-01-13 PROCEDURE — 36415 COLL VENOUS BLD VENIPUNCTURE: CPT | Performed by: INTERNAL MEDICINE

## 2018-01-13 PROCEDURE — 82962 GLUCOSE BLOOD TEST: CPT

## 2018-01-13 PROCEDURE — P9016 RBC LEUKOCYTES REDUCED: HCPCS | Performed by: INTERNAL MEDICINE

## 2018-01-13 PROCEDURE — 36430 TRANSFUSION BLD/BLD COMPNT: CPT

## 2018-01-13 PROCEDURE — 80048 BASIC METABOLIC PNL TOTAL CA: CPT | Performed by: INTERNAL MEDICINE

## 2018-01-13 PROCEDURE — 65660000000 HC RM CCU STEPDOWN

## 2018-01-13 PROCEDURE — 85610 PROTHROMBIN TIME: CPT | Performed by: INTERNAL MEDICINE

## 2018-01-13 PROCEDURE — 85025 COMPLETE CBC W/AUTO DIFF WBC: CPT | Performed by: INTERNAL MEDICINE

## 2018-01-13 PROCEDURE — 83735 ASSAY OF MAGNESIUM: CPT | Performed by: INTERNAL MEDICINE

## 2018-01-13 PROCEDURE — 74011250636 HC RX REV CODE- 250/636: Performed by: PHYSICIAN ASSISTANT

## 2018-01-13 PROCEDURE — 30233N1 TRANSFUSION OF NONAUTOLOGOUS RED BLOOD CELLS INTO PERIPHERAL VEIN, PERCUTANEOUS APPROACH: ICD-10-PCS | Performed by: HOSPITALIST

## 2018-01-13 PROCEDURE — 86920 COMPATIBILITY TEST SPIN: CPT | Performed by: INTERNAL MEDICINE

## 2018-01-13 PROCEDURE — 74011250637 HC RX REV CODE- 250/637: Performed by: PHYSICIAN ASSISTANT

## 2018-01-13 PROCEDURE — 86900 BLOOD TYPING SEROLOGIC ABO: CPT | Performed by: INTERNAL MEDICINE

## 2018-01-13 PROCEDURE — 85018 HEMOGLOBIN: CPT | Performed by: INTERNAL MEDICINE

## 2018-01-13 RX ORDER — SODIUM CHLORIDE 9 MG/ML
250 INJECTION, SOLUTION INTRAVENOUS AS NEEDED
Status: DISCONTINUED | OUTPATIENT
Start: 2018-01-13 | End: 2018-01-15 | Stop reason: HOSPADM

## 2018-01-13 RX ORDER — DIPHENHYDRAMINE HYDROCHLORIDE 50 MG/ML
25 INJECTION, SOLUTION INTRAMUSCULAR; INTRAVENOUS ONCE
Status: COMPLETED | OUTPATIENT
Start: 2018-01-14 | End: 2018-01-13

## 2018-01-13 RX ADMIN — CARVEDILOL 3.12 MG: 3.12 TABLET, FILM COATED ORAL at 18:19

## 2018-01-13 RX ADMIN — SODIUM CHLORIDE 250 ML: 900 INJECTION, SOLUTION INTRAVENOUS at 13:34

## 2018-01-13 RX ADMIN — LISINOPRIL 10 MG: 5 TABLET ORAL at 10:17

## 2018-01-13 RX ADMIN — GABAPENTIN 400 MG: 400 CAPSULE ORAL at 21:25

## 2018-01-13 RX ADMIN — VANCOMYCIN HYDROCHLORIDE 125 MG: 1 INJECTION, POWDER, LYOPHILIZED, FOR SOLUTION INTRAVENOUS at 22:38

## 2018-01-13 RX ADMIN — HYDROMORPHONE HYDROCHLORIDE 0.5 MG: 1 INJECTION, SOLUTION INTRAMUSCULAR; INTRAVENOUS; SUBCUTANEOUS at 01:41

## 2018-01-13 RX ADMIN — PROMETHAZINE HYDROCHLORIDE 12.5 MG: 25 TABLET ORAL at 22:38

## 2018-01-13 RX ADMIN — VANCOMYCIN HYDROCHLORIDE 125 MG: 1 INJECTION, POWDER, LYOPHILIZED, FOR SOLUTION INTRAVENOUS at 18:20

## 2018-01-13 RX ADMIN — HYDROMORPHONE HYDROCHLORIDE 0.5 MG: 1 INJECTION, SOLUTION INTRAMUSCULAR; INTRAVENOUS; SUBCUTANEOUS at 10:16

## 2018-01-13 RX ADMIN — HYDROMORPHONE HYDROCHLORIDE 0.5 MG: 1 INJECTION, SOLUTION INTRAMUSCULAR; INTRAVENOUS; SUBCUTANEOUS at 05:48

## 2018-01-13 RX ADMIN — GABAPENTIN 400 MG: 400 CAPSULE ORAL at 10:18

## 2018-01-13 RX ADMIN — ATORVASTATIN CALCIUM 80 MG: 20 TABLET, FILM COATED ORAL at 21:25

## 2018-01-13 RX ADMIN — METRONIDAZOLE 500 MG: 250 TABLET ORAL at 05:48

## 2018-01-13 RX ADMIN — METHYLPREDNISOLONE SODIUM SUCCINATE 35 MG: 40 INJECTION, POWDER, FOR SOLUTION INTRAMUSCULAR; INTRAVENOUS at 06:24

## 2018-01-13 RX ADMIN — VANCOMYCIN HYDROCHLORIDE 125 MG: 1 INJECTION, POWDER, LYOPHILIZED, FOR SOLUTION INTRAVENOUS at 10:16

## 2018-01-13 RX ADMIN — Medication 10 ML: at 14:00

## 2018-01-13 RX ADMIN — PROMETHAZINE HYDROCHLORIDE 12.5 MG: 25 TABLET ORAL at 01:41

## 2018-01-13 RX ADMIN — METRONIDAZOLE 500 MG: 250 TABLET ORAL at 14:22

## 2018-01-13 RX ADMIN — Medication 10 ML: at 06:24

## 2018-01-13 RX ADMIN — HYDROMORPHONE HYDROCHLORIDE 0.5 MG: 1 INJECTION, SOLUTION INTRAMUSCULAR; INTRAVENOUS; SUBCUTANEOUS at 18:18

## 2018-01-13 RX ADMIN — HYDROMORPHONE HYDROCHLORIDE 0.5 MG: 1 INJECTION, SOLUTION INTRAMUSCULAR; INTRAVENOUS; SUBCUTANEOUS at 22:38

## 2018-01-13 RX ADMIN — CARVEDILOL 3.12 MG: 3.12 TABLET, FILM COATED ORAL at 10:18

## 2018-01-13 RX ADMIN — DOCUSATE SODIUM 100 MG: 100 CAPSULE, LIQUID FILLED ORAL at 10:17

## 2018-01-13 RX ADMIN — HYDROMORPHONE HYDROCHLORIDE 0.5 MG: 1 INJECTION, SOLUTION INTRAMUSCULAR; INTRAVENOUS; SUBCUTANEOUS at 14:21

## 2018-01-13 RX ADMIN — Medication 10 ML: at 21:25

## 2018-01-13 RX ADMIN — METRONIDAZOLE 500 MG: 250 TABLET ORAL at 21:25

## 2018-01-13 RX ADMIN — VANCOMYCIN HYDROCHLORIDE 125 MG: 1 INJECTION, POWDER, LYOPHILIZED, FOR SOLUTION INTRAVENOUS at 06:24

## 2018-01-13 RX ADMIN — DIPHENHYDRAMINE HYDROCHLORIDE 25 MG: 50 INJECTION, SOLUTION INTRAMUSCULAR; INTRAVENOUS at 23:44

## 2018-01-13 RX ADMIN — GABAPENTIN 400 MG: 400 CAPSULE ORAL at 18:19

## 2018-01-13 RX ADMIN — ASPIRIN 81 MG 81 MG: 81 TABLET ORAL at 10:17

## 2018-01-13 NOTE — ROUTINE PROCESS
0730 Assumed pt care. Received bedside SBAR report from [address] [place] [address]. No s/s of distress noted. 0950 Pt requested PRN Dilaudid for pain 6/10.  1015 PRN Dilaudid administered. Pt requested PRN Phenergan to be given with the Dilaudid. I will administer Phenergan to pt in an hour. Pt has a blue 22 gauge in foot. Pt to receive blood transfusion. Was informed by charge nurse Jefferson Mariscal RN that 22 g was acceptable for blood transfusions. Next dilaudid admin available at 2:16.  1325 Consent for blood administration signed by pt and witnessed by RN. NS set up and primed for blood transfusion. INT patent, 22 gauge Rt foot. VS assessed. 1 unit released from order. 1335 1 unit packed red blood cells retrieved from lab. 2 person check of blood products : A Roxy Kerr RN and Santo Courser. 1340 Blood administration began. 75 mL/hr  1345 No s/s of reaction noted. 1351 VS in flow sheet. No s/s of blood transfusion reaction noted. Administration increased to 100 mL/hr  1430 Pt in bed watching TV. No s/s of blood transfusion noted. Pt asked for coffee. Coffee provided. 1500  Pt in bed watching TV. No s/s of blood transfusion noted. Pt asked for more coffee. Coffee provided. 1525 Pt up to restroom. No s/s of distress noted. 1635 First unit of blood administration complete. Second unit released from lab. Tubing and NS changed. VS assessed. 1700 2nd unit of blood administration started at 75 mL/hr.   1705 No s/s of reaction noted. Will continue to monitor. 1710 VS assessed. No s/s of reaction noted. Will continue to monitor. 1717 VS assessed. No s/s of reaction noted. Pt stated \"she felt fine\". Increased administration to 100 mL/hr. 1810 Meds administered. Pt refused Solu Medrol. Pt stated \"it's a steroid and I do not want to take it at night. It kept me up all night last night. \" Blood product still transfusing. VS assessed. No s/s of transfusion reaction noted.   Bedside shift change report given to Ashly Glasgow RN (oncoming nurse) by Dann St RN (offgoing nurse). Report included the following information SBAR, Kardex, Intake/Output and MAR. Alarm parameters reviewed, on and audible Appropriate for patient clinical condition   Alarm parameters reviewed and opportunities for questions provided.

## 2018-01-13 NOTE — PROGRESS NOTES
1930 Pt received from offgoing nurse without any signs or symptoms of distress. Pt vitals are stable and within normal limits. Pt bed in low position with wheels locked and call bell within reach. 2013 Assessment completed and documented in flow sheet. Pt denies any further needs at this time. Pt in NAD with bed in low position, wheels locked and call bell within reach. 2127 Scheduled medications administered as ordered. Pain medication administered as per PRN order for c/o pain. See flow sheets for follow up documentation. 2359 Reassessment completed with no changes noted. Bed locked, in lowest position, with call light within reach. 0141 Pain medication administered as per PRN order for c/o pain. See flow sheets for follow up documentation. 6705 Reassessment completed with no changes noted. Bed locked, in lowest position, with call light within reach. 0549 Pain medication administered as per PRN order for c/o pain. See flow sheets for follow up documentation. 0673 Dr Arnaud Joe made aware of pt Hgb= 6.9. New orders received to transfuse 2 units PRBC.  0715 Bedside and Verbal shift change report given to Katiuska BLANCO (oncoming nurse) by Osbaldo Reeves RN (offgoing nurse). Report included the following information SBAR, MAR, Recent Results, Cardiac Rhythm NSR and Alarm Parameters .

## 2018-01-13 NOTE — PROGRESS NOTES
Hospitalist Progress Note    Patient: Fortino Alvarez MRN: [id number]  CSN: [id number]    YOB: 1970  Age: 56 y.o. Sex: female    DOA: 1/7/2018 LOS:  LOS: 5 days              IMPRESSION and Plan:    Fortino Alvarez is a 56 y.o. female with   Patient Active Problem List    Diagnosis Date Noted    Clostridium difficile infection 01/09/2018    LUE weakness 01/08/2018    Stroke ([address] 01/08/2018    Pure hypercholesterolemia 05/05/2017    Wound infection 04/28/2017    Popliteal artery thrombosis, right ([address] 04/28/2017    PAD (peripheral artery disease) ([facility] 04/04/2017    GI bleed 03/28/2017    DVT (deep venous thrombosis) ([facility] 03/16/2017    Neuropathic pain 66/69/9195    Embolic stroke ([address] 69/43/3663    Sickle cell trait ([facility]     Hypertension     Hx of cocaine abuse     Crohn disease ([address] 02/27/2012     Principal Problem:    LUE weakness (1/8/2018)    Active Problems:    Crohn disease ([address] (2/27/2012)      DVT (deep venous thrombosis) ([facility] (3/16/2017)      Stroke ([facility] (1/8/2018)      Clostridium difficile infection (1/9/2018)      . Chron's Disease -- cont Iv solumedrol     C.diff Infection with colitis -- cont IV flagyl    Anemia due to GI blood loss -- transfuse 2 units prbc and followup     Hx of Embolic Stroke  - anticoag on hold     Hx of DVT         Patient's condition is stable        Recommend to continue hospitalization. Discussed with patient. Chief Complaints:   Chief Complaint   Patient presents with    Numbness     SUBJECTIVE:  Pt is seen and examined. Chart reviewed. Still co bloody stools but improved. No pain. No CP or SOB  No Fever, chills, Nausea, vomitting. Review of systems:    General: No fevers or chills. Cardiovascular: No chest pain or pressure. No palpitations. Pulmonary: shortness of breath.    Gastrointestinal: No nausea, vomiting    PE:  Patient Vitals for the past 24 hrs:   BP Temp Pulse Resp SpO2 Weight   01/13/18 0748 94/60 97.6 °F (36.4 °C) 85 16 100 % -   01/13/18 0319 104/58 97.6 °F (36.4 °C) 91 16 98 % 91.6 kg (201 lb 15.1 oz)   01/12/18 2342 95/61 98 °F (36.7 °C) 89 16 98 % -   01/12/18 1834 100/52 98.3 °F (36.8 °C) 100 18 98 % -   01/12/18 1512 101/61 98.3 °F (36.8 °C) 93 18 98 % -   01/12/18 1110 109/72 97.4 °F (36.3 °C) 97 16 100 % -       Intake/Output Summary (Last 24 hours) at 01/13/18 1044  Last data filed at 01/13/18 0230   Gross per 24 hour   Intake              960 ml   Output                0 ml   Net              960 ml     Patient Vitals for the past 120 hrs:   Weight   01/10/18 0258 79.6 kg (175 lb 7.8 oz)   01/12/18 0302 91.1 kg (200 lb 13.4 oz)   01/13/18 0319 91.6 kg (201 lb 15.1 oz)           HEENT: Perr[place], EOMI; oral mucosa well prefused; Conjunctiva not injected  Neck: No JVD, Negative carotid bruits, normal pulses; No thyromegaly  Resp: CTA bilaterally; No wheezes or rales  CV: RRR s1s2 No murmur; No rubs; PMI not displaced  Abd: Positive Bowel Sounds, Soft, Nontender  Ext: No clubbing; No cyanosis;  No edema  Neuro: Alert and oriented; Nonfocal  Skin: Warm, Dry, Intact  Pulses: 2+ DP/PT/Rad      Intake and Output:  Current Shift:     Last three shifts:  01/11 1901 - 01/13 0700  In: 1580 [P.O.:1560; I.V.:20]  Out: 0     Lab/Data Reviewed:  Recent Results (from the past 8 hour(s))   PROTHROMBIN TIME + INR    Collection Time: 01/13/18  4:49 AM   Result Value Ref Range    Prothrombin time 12.4 11.5 - 15.2 sec    INR 1.0 0.8 - 1.2     CBC WITH AUTOMATED DIFF    Collection Time: 01/13/18  4:49 AM   Result Value Ref Range    WBC 9.6 4.6 - 13.2 K/uL    RBC 3.04 (L) 4.20 - 5.30 M/uL    HGB 6.9 (L) 12.0 - 16.0 g/dL    HCT 23.1 (L) 35.0 - 45.0 %    MCV 76.0 74.0 - 97.0 FL    MCH 22.7 (L) 24.0 - 34.0 PG    MCHC 29.9 (L) 31.0 - 37.0 g/dL    RDW 15.9 (H) 11.6 - 14.5 %    PLATELET 255 (H) 997 - 420 K/uL    MPV 8.9 (L) 9.2 - 11.8 FL    NEUTROPHILS 45 42 - 75 %    BAND NEUTROPHILS 22 (H) 0 - 5 %    LYMPHOCYTES 19 (L) 20 - 51 % MONOCYTES 14 (H) 2 - 9 %    EOSINOPHILS 0 0 - 5 %    BASOPHILS 0 0 - 3 %    ABS. NEUTROPHILS 4.3 1.8 - 8.0 K/UL    ABS. LYMPHOCYTES 1.8 0.8 - 3.5 K/UL    ABS. MONOCYTES 1.3 (H) 0 - 1.0 K/UL    ABS. EOSINOPHILS 0.0 0.0 - 0.4 K/UL    ABS.  BASOPHILS 0.0 0.0 - 0.1 K/UL    RBC COMMENTS ANISOCYTOSIS  1+        RBC COMMENTS POLYCHROMASIA  1+        RBC COMMENTS HYPOCHROMIA  1+        RBC COMMENTS OVALOCYTES  1+        RBC COMMENTS SCHISTOCYTES  OCCASSIONAL  TEARDROP CELLS  1+        WBC COMMENTS TOXIC GRANULATION      DF MANUAL     METABOLIC PANEL, BASIC    Collection Time: 01/13/18  4:49 AM   Result Value Ref Range    Sodium 139 136 - 145 mmol/L    Potassium 3.9 3.5 - 5.5 mmol/L    Chloride 105 100 - 108 mmol/L    CO2 28 21 - 32 mmol/L    Anion gap 6 3.0 - 18 mmol/L    Glucose 119 (H) 74 - 99 mg/dL    BUN 13 7.0 - 18 MG/DL    Creatinine 0.56 (L) 0.6 - 1.3 MG/DL    BUN/Creatinine ratio 23 (H) 12 - 20      GFR est AA >60 >60 ml/min/1.73m2    GFR est non-AA >60 >60 ml/min/1.73m2    Calcium 8.4 (L) 8.5 - 10.1 MG/DL   MAGNESIUM    Collection Time: 01/13/18  4:49 AM   Result Value Ref Range    Magnesium 2.1 1.6 - 2.6 mg/dL   TYPE + CROSSMATCH    Collection Time: 01/13/18  7:14 AM   Result Value Ref Range    Crossmatch Expiration 01/16/2018     ABO/Rh(D) A POSITIVE     Antibody screen NEG     CALLED TO: FRANCIS PETIT 3N ON 1/13/2018 0822 BY [facility]     Unit number [id number]     Blood component type [facility]     Unit division 00     Status of unit ALLOCATED     Crossmatch result Compatible     Unit number [id number]     Blood component type [facility]     Unit division 00     Status of unit ALLOCATED     Crossmatch result Compatible      Medications:  Current Facility-Administered Medications   Medication Dose Route Frequency    0.9% sodium chloride infusion 250 mL  250 mL IntraVENous PRN    HYDROmorphone (PF) (DILAUDID) injection 0.5 mg  0.5 mg IntraVENous Q4H PRN    metroNIDAZOLE (FLAGYL) tablet 500 mg  500 mg Oral Q8H    vancomycin 50 mg/mL oral solution (compounded) 125 mg  125 mg Oral Q6H    atorvastatin (LIPITOR) tablet 80 mg  80 mg Oral QHS    carvedilol (COREG) tablet 3.125 mg  3.125 mg Oral BID WITH MEALS    docusate sodium (COLACE) capsule 100 mg  100 mg Oral BID    gabapentin (NEURONTIN) capsule 400 mg  400 mg Oral TID    lisinopril (PRINIVIL, ZESTRIL) tablet 10 mg  10 mg Oral DAILY    sodium chloride (NS) flush 5-10 mL  5-10 mL IntraVENous Q8H    sodium chloride (NS) flush 5-10 mL  5-10 mL IntraVENous PRN    methylPREDNISolone (PF) (SOLU-MEDROL) injection 35 mg  35 mg IntraVENous Q12H    aspirin chewable tablet 81 mg  81 mg Oral DAILY    promethazine (PHENERGAN) tablet 12.5 mg  12.5 mg Oral Q6H PRN       Recent Results (from the past 24 hour(s))   PROTHROMBIN TIME + INR    Collection Time: 01/13/18  4:49 AM   Result Value Ref Range    Prothrombin time 12.4 11.5 - 15.2 sec    INR 1.0 0.8 - 1.2     CBC WITH AUTOMATED DIFF    Collection Time: 01/13/18  4:49 AM   Result Value Ref Range    WBC 9.6 4.6 - 13.2 K/uL    RBC 3.04 (L) 4.20 - 5.30 M/uL    HGB 6.9 (L) 12.0 - 16.0 g/dL    HCT 23.1 (L) 35.0 - 45.0 %    MCV 76.0 74.0 - 97.0 FL    MCH 22.7 (L) 24.0 - 34.0 PG    MCHC 29.9 (L) 31.0 - 37.0 g/dL    RDW 15.9 (H) 11.6 - 14.5 %    PLATELET 514 (H) 344 - 420 K/uL    MPV 8.9 (L) 9.2 - 11.8 FL    NEUTROPHILS 45 42 - 75 %    BAND NEUTROPHILS 22 (H) 0 - 5 %    LYMPHOCYTES 19 (L) 20 - 51 %    MONOCYTES 14 (H) 2 - 9 %    EOSINOPHILS 0 0 - 5 %    BASOPHILS 0 0 - 3 %    ABS. NEUTROPHILS 4.3 1.8 - 8.0 K/UL    ABS. LYMPHOCYTES 1.8 0.8 - 3.5 K/UL    ABS. MONOCYTES 1.3 (H) 0 - 1.0 K/UL    ABS. EOSINOPHILS 0.0 0.0 - 0.4 K/UL    ABS.  BASOPHILS 0.0 0.0 - 0.1 K/UL    RBC COMMENTS ANISOCYTOSIS  1+        RBC COMMENTS POLYCHROMASIA  1+        RBC COMMENTS HYPOCHROMIA  1+        RBC COMMENTS OVALOCYTES  1+        RBC COMMENTS SCHISTOCYTES  OCCASSIONAL  TEARDROP CELLS  1+        WBC COMMENTS TOXIC GRANULATION      DF MANUAL     METABOLIC PANEL, BASIC    Collection Time: 01/13/18  4:49 AM   Result Value Ref Range    Sodium 139 136 - 145 mmol/L    Potassium 3.9 3.5 - 5.5 mmol/L    Chloride 105 100 - 108 mmol/L    CO2 28 21 - 32 mmol/L    Anion gap 6 3.0 - 18 mmol/L    Glucose 119 (H) 74 - 99 mg/dL    BUN 13 7.0 - 18 MG/DL    Creatinine 0.56 (L) 0.6 - 1.3 MG/DL    BUN/Creatinine ratio 23 (H) 12 - 20      GFR est AA >60 >60 ml/min/1.73m2    GFR est non-AA >60 >60 ml/min/1.73m2    Calcium 8.4 (L) 8.5 - 10.1 MG/DL   MAGNESIUM    Collection Time: 01/13/18  4:49 AM   Result Value Ref Range    Magnesium 2.1 1.6 - 2.6 mg/dL   TYPE + CROSSMATCH    Collection Time: 01/13/18  7:14 AM   Result Value Ref Range    Crossmatch Expiration 01/16/2018     ABO/Rh(D) A POSITIVE     Antibody screen NEG     CALLED TO: FRANCIS PETIT 3N ON 1/13/2018 0822 BY [facility]     Unit number [id number]     Blood component type [facility]     Unit division 00     Status of unit ALLOCATED     Crossmatch result Compatible     Unit number [id number]     Blood component type [facility]     Unit division 00     Status of unit ALLOCATED     Crossmatch result Compatible        Procedures/imaging: see electronic medical records for all procedures/Xrays and details which were not copied into this note but were reviewed prior to creation of Michelle Duarte MD   1/13/2018, 10:44 AM

## 2018-01-13 NOTE — PROGRESS NOTES
Problem: Falls - Risk of  Goal: *Absence of Falls  Document Bernard Fall Risk and appropriate interventions in the flowsheet.    Outcome: Progressing Towards Goal  Fall Risk Interventions:            Medication Interventions: Patient to call before getting OOB, Teach patient to arise slowly         History of Falls Interventions: Door open when patient unattended

## 2018-01-14 LAB
ABO + RH BLD: NORMAL
ALBUMIN SERPL-MCNC: 2.3 G/DL (ref 3.4–5)
ALBUMIN/GLOB SERPL: 0.7 {RATIO} (ref 0.8–1.7)
ALP SERPL-CCNC: 74 U/L (ref 45–117)
ALT SERPL-CCNC: 48 U/L (ref 13–56)
ANION GAP SERPL CALC-SCNC: 8 MMOL/L (ref 3–18)
AST SERPL-CCNC: 20 U/L (ref 15–37)
BASOPHILS # BLD: 0 K/UL (ref 0–0.1)
BASOPHILS NFR BLD: 0 % (ref 0–3)
BILIRUB SERPL-MCNC: 0.1 MG/DL (ref 0.2–1)
BLD PROD TYP BPU: NORMAL
BLD PROD TYP BPU: NORMAL
BLOOD GROUP ANTIBODIES SERPL: NORMAL
BPU ID: NORMAL
BPU ID: NORMAL
BUN SERPL-MCNC: 15 MG/DL (ref 7–18)
BUN/CREAT SERPL: 22 (ref 12–20)
CALCIUM SERPL-MCNC: 8.3 MG/DL (ref 8.5–10.1)
CALLED TO:,BCALL1: NORMAL
CHLORIDE SERPL-SCNC: 108 MMOL/L (ref 100–108)
CO2 SERPL-SCNC: 27 MMOL/L (ref 21–32)
CREAT SERPL-MCNC: 0.67 MG/DL (ref 0.6–1.3)
CROSSMATCH RESULT,%XM: NORMAL
CROSSMATCH RESULT,%XM: NORMAL
DIFFERENTIAL METHOD BLD: ABNORMAL
EOSINOPHIL # BLD: 0 K/UL (ref 0–0.4)
EOSINOPHIL NFR BLD: 0 % (ref 0–5)
ERYTHROCYTE [DISTWIDTH] IN BLOOD BY AUTOMATED COUNT: 15.4 % (ref 11.6–14.5)
GLOBULIN SER CALC-MCNC: 3.4 G/DL (ref 2–4)
GLUCOSE BLD STRIP.AUTO-MCNC: 97 MG/DL (ref 70–110)
GLUCOSE SERPL-MCNC: 90 MG/DL (ref 74–99)
HCT VFR BLD AUTO: 29.8 % (ref 35–45)
HGB BLD-MCNC: 9.2 G/DL (ref 12–16)
INR PPP: 1 (ref 0.8–1.2)
LYMPHOCYTES # BLD: 3.8 K/UL (ref 0.8–3.5)
LYMPHOCYTES NFR BLD: 36 % (ref 20–51)
MAGNESIUM SERPL-MCNC: 1.7 MG/DL (ref 1.6–2.6)
MCH RBC QN AUTO: 24.5 PG (ref 24–34)
MCHC RBC AUTO-ENTMCNC: 30.9 G/DL (ref 31–37)
MCV RBC AUTO: 79.3 FL (ref 74–97)
METAMYELOCYTES NFR BLD MANUAL: 1 %
MONOCYTES # BLD: 0.6 K/UL (ref 0–1)
MONOCYTES NFR BLD: 6 % (ref 2–9)
NEUTS BAND NFR BLD MANUAL: 14 % (ref 0–5)
NEUTS SEG # BLD: 4.5 K/UL (ref 1.8–8)
NEUTS SEG NFR BLD: 43 % (ref 42–75)
NRBC BLD-RTO: 1 PER 100 WBC
PLATELET # BLD AUTO: 465 K/UL (ref 135–420)
PMV BLD AUTO: 8.9 FL (ref 9.2–11.8)
POTASSIUM SERPL-SCNC: 3.8 MMOL/L (ref 3.5–5.5)
PROT SERPL-MCNC: 5.7 G/DL (ref 6.4–8.2)
PROTHROMBIN TIME: 12.5 SEC (ref 11.5–15.2)
RBC # BLD AUTO: 3.76 M/UL (ref 4.2–5.3)
RBC MORPH BLD: ABNORMAL
SODIUM SERPL-SCNC: 143 MMOL/L (ref 136–145)
SPECIMEN EXP DATE BLD: NORMAL
STATUS OF UNIT,%ST: NORMAL
STATUS OF UNIT,%ST: NORMAL
UNIT DIVISION, %UDIV: 0
UNIT DIVISION, %UDIV: 0
WBC # BLD AUTO: 10.5 K/UL (ref 4.6–13.2)

## 2018-01-14 PROCEDURE — 74011636637 HC RX REV CODE- 636/637: Performed by: INTERNAL MEDICINE

## 2018-01-14 PROCEDURE — 74011250637 HC RX REV CODE- 250/637: Performed by: INTERNAL MEDICINE

## 2018-01-14 PROCEDURE — 83735 ASSAY OF MAGNESIUM: CPT | Performed by: INTERNAL MEDICINE

## 2018-01-14 PROCEDURE — 74011250636 HC RX REV CODE- 250/636: Performed by: INTERNAL MEDICINE

## 2018-01-14 PROCEDURE — 85025 COMPLETE CBC W/AUTO DIFF WBC: CPT | Performed by: INTERNAL MEDICINE

## 2018-01-14 PROCEDURE — 65660000000 HC RM CCU STEPDOWN

## 2018-01-14 PROCEDURE — 36415 COLL VENOUS BLD VENIPUNCTURE: CPT | Performed by: INTERNAL MEDICINE

## 2018-01-14 PROCEDURE — 74011250637 HC RX REV CODE- 250/637: Performed by: PHYSICIAN ASSISTANT

## 2018-01-14 PROCEDURE — 80053 COMPREHEN METABOLIC PANEL: CPT | Performed by: INTERNAL MEDICINE

## 2018-01-14 PROCEDURE — 82962 GLUCOSE BLOOD TEST: CPT

## 2018-01-14 PROCEDURE — 74011250636 HC RX REV CODE- 250/636: Performed by: PHYSICIAN ASSISTANT

## 2018-01-14 PROCEDURE — 85610 PROTHROMBIN TIME: CPT | Performed by: INTERNAL MEDICINE

## 2018-01-14 RX ORDER — PREDNISONE 20 MG/1
20 TABLET ORAL 2 TIMES DAILY WITH MEALS
Status: DISCONTINUED | OUTPATIENT
Start: 2018-01-14 | End: 2018-01-15 | Stop reason: HOSPADM

## 2018-01-14 RX ORDER — UREA 10 %
2 LOTION (ML) TOPICAL 2 TIMES DAILY
Status: DISCONTINUED | OUTPATIENT
Start: 2018-01-14 | End: 2018-01-15 | Stop reason: HOSPADM

## 2018-01-14 RX ORDER — DIPHENHYDRAMINE HYDROCHLORIDE 50 MG/ML
25 INJECTION, SOLUTION INTRAMUSCULAR; INTRAVENOUS ONCE
Status: COMPLETED | OUTPATIENT
Start: 2018-01-14 | End: 2018-01-14

## 2018-01-14 RX ADMIN — METRONIDAZOLE 500 MG: 250 TABLET ORAL at 14:36

## 2018-01-14 RX ADMIN — Medication 10 ML: at 15:50

## 2018-01-14 RX ADMIN — Medication 10 ML: at 22:41

## 2018-01-14 RX ADMIN — LACTOBACILLUS TAB 2 TABLET: TAB at 22:30

## 2018-01-14 RX ADMIN — GABAPENTIN 400 MG: 400 CAPSULE ORAL at 15:49

## 2018-01-14 RX ADMIN — ASPIRIN 81 MG 81 MG: 81 TABLET ORAL at 08:38

## 2018-01-14 RX ADMIN — METRONIDAZOLE 500 MG: 250 TABLET ORAL at 05:42

## 2018-01-14 RX ADMIN — PREDNISONE 20 MG: 20 TABLET ORAL at 17:01

## 2018-01-14 RX ADMIN — LACTOBACILLUS TAB 2 TABLET: TAB at 10:39

## 2018-01-14 RX ADMIN — HYDROMORPHONE HYDROCHLORIDE 0.5 MG: 1 INJECTION, SOLUTION INTRAMUSCULAR; INTRAVENOUS; SUBCUTANEOUS at 22:30

## 2018-01-14 RX ADMIN — ATORVASTATIN CALCIUM 80 MG: 20 TABLET, FILM COATED ORAL at 22:30

## 2018-01-14 RX ADMIN — HYDROMORPHONE HYDROCHLORIDE 0.5 MG: 1 INJECTION, SOLUTION INTRAMUSCULAR; INTRAVENOUS; SUBCUTANEOUS at 18:30

## 2018-01-14 RX ADMIN — HYDROMORPHONE HYDROCHLORIDE 0.5 MG: 1 INJECTION, SOLUTION INTRAMUSCULAR; INTRAVENOUS; SUBCUTANEOUS at 06:28

## 2018-01-14 RX ADMIN — METHYLPREDNISOLONE SODIUM SUCCINATE 35 MG: 40 INJECTION, POWDER, FOR SOLUTION INTRAMUSCULAR; INTRAVENOUS at 06:28

## 2018-01-14 RX ADMIN — GABAPENTIN 400 MG: 400 CAPSULE ORAL at 22:30

## 2018-01-14 RX ADMIN — GABAPENTIN 400 MG: 400 CAPSULE ORAL at 08:38

## 2018-01-14 RX ADMIN — Medication 10 ML: at 05:42

## 2018-01-14 RX ADMIN — LISINOPRIL 10 MG: 5 TABLET ORAL at 08:38

## 2018-01-14 RX ADMIN — HYDROMORPHONE HYDROCHLORIDE 0.5 MG: 1 INJECTION, SOLUTION INTRAMUSCULAR; INTRAVENOUS; SUBCUTANEOUS at 02:42

## 2018-01-14 RX ADMIN — DIPHENHYDRAMINE HYDROCHLORIDE 25 MG: 50 INJECTION, SOLUTION INTRAMUSCULAR; INTRAVENOUS at 17:01

## 2018-01-14 RX ADMIN — HYDROMORPHONE HYDROCHLORIDE 0.5 MG: 1 INJECTION, SOLUTION INTRAMUSCULAR; INTRAVENOUS; SUBCUTANEOUS at 10:39

## 2018-01-14 RX ADMIN — VANCOMYCIN HYDROCHLORIDE 125 MG: 1 INJECTION, POWDER, LYOPHILIZED, FOR SOLUTION INTRAVENOUS at 05:42

## 2018-01-14 RX ADMIN — METRONIDAZOLE 500 MG: 250 TABLET ORAL at 22:30

## 2018-01-14 RX ADMIN — PROMETHAZINE HYDROCHLORIDE 12.5 MG: 25 TABLET ORAL at 22:30

## 2018-01-14 RX ADMIN — CARVEDILOL 3.12 MG: 3.12 TABLET, FILM COATED ORAL at 17:01

## 2018-01-14 RX ADMIN — CARVEDILOL 3.12 MG: 3.12 TABLET, FILM COATED ORAL at 08:39

## 2018-01-14 RX ADMIN — PREDNISONE 20 MG: 20 TABLET ORAL at 10:38

## 2018-01-14 RX ADMIN — HYDROMORPHONE HYDROCHLORIDE 0.5 MG: 1 INJECTION, SOLUTION INTRAMUSCULAR; INTRAVENOUS; SUBCUTANEOUS at 14:35

## 2018-01-14 NOTE — PROGRESS NOTES
1930:  Bedside and Verbal shift change report received from Micki Anderson RN (offgoing nurse) to Jeannette Marlow RN (oncoming nurse). Report included the following information SBAR, Kardex, Intake/Output, MAR, Recent Results and Cardiac Rhythm SR. Pt resting in bed. Appears to be in no distress at this time. Call bell within reach. Zone phone number given to pt. Pt instructed to call zone phone or call bell if needed. Pt instructed to call for assistance before ambulating. Shift summary:    Pt was educated fully of reasoning and risks of wearing SCD's but continued to refuse to wear them during shift. Pt requested prescribed pain medication every 4h during shift.

## 2018-01-14 NOTE — ROUTINE PROCESS
Bedside and Verbal shift change report given to Gillian Barahona RN (oncoming nurse) by Jostin Lange RN (offgoing nurse).  Report included the following information SBAR, Kardex, Intake/Output, MAR, Recent Results, Med Rec Status and Cardiac Rhythm SR.

## 2018-01-14 NOTE — ROUTINE PROCESS
[address] pt care. Received bedside SBAR report from Wally Beth. No s/s of distress noted. Bed in lowest  Position. Call bell and phone within reach. 1039 Pt rated pain 7/10. PRN Dilaudid administered. See MAR.  1112 Pt pain reassessed. Pt stated \"I'm alright\" rated 6/10.  5386 Pt requested PRN Dilaudid for pain. 1515 Pt complains of itching. Requesting Benadryl. Paged Dr June Jacques. 1600 Informed Dr June Jacques via telephone that pt was itching. Requested one time dose Benadryl 25 mg IV. Confirmed with read back. Placed order, see MAR. Waiting on pharmacy. 1701  One time dose Benadryl administered to pt. VS assessed. 1930 Bedside report given to [facility]. Alarm parameters reviewed, on and audible Appropriate for patient clinical condition. Alarm parameters reviewed and opportunities for questions provided.

## 2018-01-14 NOTE — PROGRESS NOTES
Problem: Falls - Risk of  Goal: *Absence of Falls  Document Bernard Fall Risk and appropriate interventions in the flowsheet.    Outcome: Progressing Towards Goal  Fall Risk Interventions:            Medication Interventions: Bed/chair exit alarm, Patient to call before getting OOB, Teach patient to arise slowly         History of Falls Interventions: Consult care management for discharge planning, Evaluate medications/consider consulting pharmacy, Investigate reason for fall

## 2018-01-14 NOTE — PROGRESS NOTES
Hospitalist Progress Note    Patient: Charlie Bolton MRN: [id number]  CSN: [id number]    YOB: 1970  Age: 56 y.o. Sex: female    DOA: 1/7/2018 LOS:  LOS: 6 days              IMPRESSION and Plan:    Charlie Bolton is a 56 y.o. female with   Patient Active Problem List    Diagnosis Date Noted    Clostridium difficile infection 01/09/2018    LUE weakness 01/08/2018    Stroke ([facility] 01/08/2018    Pure hypercholesterolemia 05/05/2017    Wound infection 04/28/2017    Popliteal artery thrombosis, right ([facility] 04/28/2017    PAD (peripheral artery disease) ([facility] 04/04/2017    GI bleed 03/28/2017    DVT (deep venous thrombosis) ([facility] 03/16/2017    Neuropathic pain 68/32/0666    Embolic stroke ([facility] 68/22/9659    Sickle cell trait ([facility]     Hypertension     Hx of cocaine abuse     Crohn disease ([facility] 02/27/2012     Principal Problem:    LUE weakness (1/8/2018)    Active Problems:    Crohn disease ([facility] (2/27/2012)      DVT (deep venous thrombosis) ([facility] (3/16/2017)      St[facility] (1/8/2018)      Clostridium difficile infection (1/9/2018)      . Chron's Disease -- change to po prednisone. C.diff Infection with colitis -- DC Iv vanc. Cont flagyl. Add probiotics    Anemia due to GI blood loss -- s/p 2 units prbc. H/h improved appropriatly. Cont to monitor     Hx of Embolic Stroke  - anticoag on hold and consider restarting once hgb stable     Hx of DVT     Weakness --Encourage ambulation    Patient's condition is stable    Plan for dc tomorrow if stable      Recommend to continue hospitalization. Discussed with patient. Chief Complaints:   Chief Complaint   Patient presents with    Numbness     SUBJECTIVE:  Pt is seen and examined. \"Diarrhea has improved significantly\". No furhther abd pain     No CP or SOB  No Fever, chills, Nausea, vomitting. Review of systems:    General: No fevers or chills. Cardiovascular: No chest pain or pressure. No palpitations.    Pulmonary: shortness of breath. Gastrointestinal: No nausea, vomiting    PE:  Patient Vitals for the past 24 hrs:   BP Temp Pulse Resp SpO2 Weight   01/14/18 0727 120/83 97.8 °F (36.6 °C) 81 17 100 % -   01/14/18 0301 109/52 98.1 °F (36.7 °C) 84 16 99 % 92.5 kg (203 lb 14.8 oz)   01/13/18 2350 116/62 98.3 °F (36.8 °C) 84 16 99 % -   01/13/18 1954 126/74 98.1 °F (36.7 °C) 81 16 100 % -   01/13/18 1828 109/68 97.6 °F (36.4 °C) 83 16 - -   01/13/18 1715 101/66 98 °F (36.7 °C) 87 16 100 % -   01/13/18 1710 104/66 98.4 °F (36.9 °C) 83 16 - -   01/13/18 1700 104/65 98.3 °F (36.8 °C) 84 16 98 % -   01/13/18 1428 107/63 97.7 °F (36.5 °C) 91 16 100 % -   01/13/18 1351 101/56 98.1 °F (36.7 °C) 87 16 98 % -   01/13/18 1311 99/60 97.9 °F (36.6 °C) 84 16 99 % -   01/13/18 1133 113/63 98 °F (36.7 °C) 86 16 100 % -       Intake/Output Summary (Last 24 hours) at 01/14/18 0924  Last data filed at 01/14/18 0840   Gross per 24 hour   Intake             1150 ml   Output                0 ml   Net             1150 ml     Patient Vitals for the past 120 hrs:   Weight   01/10/18 0258 79.6 kg (175 lb 7.8 oz)   01/12/18 0302 91.1 kg (200 lb 13.4 oz)   01/13/18 0319 91.6 kg (201 lb 15.1 oz)   01/14/18 0301 92.5 kg (203 lb 14.8 oz)           HEENT: [place], EOMI; oral mucosa well prefused; Conjunctiva not injected  Neck: No JVD, Negative carotid bruits, normal pulses; No thyromegaly  Resp: CTA bilaterally; No wheezes or rales  CV: RRR s1s2 No murmur; No rubs; PMI not displaced  Abd: Positive Bowel Sounds, Soft, Nontender  Ext: No clubbing; No cyanosis;  No edema  Neuro: Alert and oriented; Nonfocal  Skin: Warm, Dry, Intact  Pulses: 2+ DP/PT/Rad      Intake and Output:  Current Shift:  01/14 0701 - 01/14 1900  In: 240 [P.O.:240]  Out: -   Last three shifts:  01/12 1901 - 01/14 0700  In: 1390 [P.O.:1080]  Out: 0     Lab/Data Reviewed:  Recent Results (from the past 8 hour(s))   PROTHROMBIN TIME + INR    Collection Time: 01/14/18  4:03 AM   Result Value Ref Range    Prothrombin time 12.5 11.5 - 15.2 sec    INR 1.0 0.8 - 1.2     CBC WITH AUTOMATED DIFF    Collection Time: 01/14/18  4:03 AM   Result Value Ref Range    WBC 10.5 4.6 - 13.2 K/uL    RBC 3.76 (L) 4.20 - 5.30 M/uL    HGB 9.2 (L) 12.0 - 16.0 g/dL    HCT 29.8 (L) 35.0 - 45.0 %    MCV 79.3 74.0 - 97.0 FL    MCH 24.5 24.0 - 34.0 PG    MCHC 30.9 (L) 31.0 - 37.0 g/dL    RDW 15.4 (H) 11.6 - 14.5 %    PLATELET 252 (H) 050 - 420 K/uL    MPV 8.9 (L) 9.2 - 11.8 FL    NEUTROPHILS 43 42 - 75 %    BAND NEUTROPHILS 14 (H) 0 - 5 %    LYMPHOCYTES 36 20 - 51 %    MONOCYTES 6 2 - 9 %    EOSINOPHILS 0 0 - 5 %    BASOPHILS 0 0 - 3 %    METAMYELOCYTES 1 (H) 0 %    NRBC 1.0 (H) 0  WBC    ABS. NEUTROPHILS 4.5 1.8 - 8.0 K/UL    ABS. LYMPHOCYTES 3.8 (H) 0.8 - 3.5 K/UL    ABS. MONOCYTES 0.6 0 - 1.0 K/UL    ABS. EOSINOPHILS 0.0 0.0 - 0.4 K/UL    ABS. BASOPHILS 0.0 0.0 - 0.1 K/UL    RBC COMMENTS ANISOCYTOSIS  1+        RBC COMMENTS POIKILOCYTOSIS  1+        RBC COMMENTS POLYCHROMASIA  1+        RBC COMMENTS TEARDROP CELLS  FEW  SCHISTOCYTES  OCCASIONAL        DF MANUAL     MAGNESIUM    Collection Time: 01/14/18  4:03 AM   Result Value Ref Range    Magnesium 1.7 1.6 - 2.6 mg/dL   METABOLIC PANEL, COMPREHENSIVE    Collection Time: 01/14/18  4:03 AM   Result Value Ref Range    Sodium 143 136 - 145 mmol/L    Potassium 3.8 3.5 - 5.5 mmol/L    Chloride 108 100 - 108 mmol/L    CO2 27 21 - 32 mmol/L    Anion gap 8 3.0 - 18 mmol/L    Glucose 90 74 - 99 mg/dL    BUN 15 7.0 - 18 MG/DL    Creatinine 0.67 0.6 - 1.3 MG/DL    BUN/Creatinine ratio 22 (H) 12 - 20      GFR est AA >60 >60 ml/min/1.73m2    GFR est non-AA >60 >60 ml/min/1.73m2    Calcium 8.3 (L) 8.5 - 10.1 MG/DL    Bilirubin, total 0.1 (L) 0.2 - 1.0 MG/DL    ALT (SGPT) 48 13 - 56 U/L    AST (SGOT) 20 15 - 37 U/L    Alk.  phosphatase 74 45 - 117 U/L    Protein, total 5.7 (L) 6.4 - 8.2 g/dL    Albumin 2.3 (L) 3.4 - 5.0 g/dL    Globulin 3.4 2.0 - 4.0 g/dL    A-G Ratio 0.7 (L) 0.8 - 1.7 GLUCOSE, POC    Collection Time: 01/14/18  6:15 AM   Result Value Ref Range    Glucose (POC) 97 70 - 110 mg/dL     Medications:  Current Facility-Administered Medications   Medication Dose Route Frequency    0.9% sodium chloride infusion 250 mL  250 mL IntraVENous PRN    HYDROmorphone (PF) (DILAUDID) injection 0.5 mg  0.5 mg IntraVENous Q4H PRN    metroNIDAZOLE (FLAGYL) tablet 500 mg  500 mg Oral Q8H    vancomycin 50 mg/mL oral solution (compounded) 125 mg  125 mg Oral Q6H    atorvastatin (LIPITOR) tablet 80 mg  80 mg Oral QHS    carvedilol (COREG) tablet 3.125 mg  3.125 mg Oral BID WITH MEALS    gabapentin (NEURONTIN) capsule 400 mg  400 mg Oral TID    lisinopril (PRINIVIL, ZESTRIL) tablet 10 mg  10 mg Oral DAILY    sodium chloride (NS) flush 5-10 mL  5-10 mL IntraVENous Q8H    sodium chloride (NS) flush 5-10 mL  5-10 mL IntraVENous PRN    methylPREDNISolone (PF) (SOLU-MEDROL) injection 35 mg  35 mg IntraVENous Q12H    aspirin chewable tablet 81 mg  81 mg Oral DAILY    promethazine (PHENERGAN) tablet 12.5 mg  12.5 mg Oral Q6H PRN       Recent Results (from the past 24 hour(s))   GLUCOSE, POC    Collection Time: 01/13/18 11:35 AM   Result Value Ref Range    Glucose (POC) 170 (H) 70 - 110 mg/dL   GLUCOSE, POC    Collection Time: 01/13/18  4:05 PM   Result Value Ref Range    Glucose (POC) 114 (H) 70 - 110 mg/dL   GLUCOSE, POC    Collection Time: 01/13/18  9:25 PM   Result Value Ref Range    Glucose (POC) 115 (H) 70 - 110 mg/dL   HGB & HCT    Collection Time: 01/13/18 10:00 PM   Result Value Ref Range    HGB 9.2 (L) 12.0 - 16.0 g/dL    HCT 29.2 (L) 35.0 - 45.0 %   PROTHROMBIN TIME + INR    Collection Time: 01/14/18  4:03 AM   Result Value Ref Range    Prothrombin time 12.5 11.5 - 15.2 sec    INR 1.0 0.8 - 1.2     CBC WITH AUTOMATED DIFF    Collection Time: 01/14/18  4:03 AM   Result Value Ref Range    WBC 10.5 4.6 - 13.2 K/uL    RBC 3.76 (L) 4.20 - 5.30 M/uL    HGB 9.2 (L) 12.0 - 16.0 g/dL    HCT 29.8 (L) 35.0 - 45.0 %    MCV 79.3 74.0 - 97.0 FL    MCH 24.5 24.0 - 34.0 PG    MCHC 30.9 (L) 31.0 - 37.0 g/dL    RDW 15.4 (H) 11.6 - 14.5 %    PLATELET 759 (H) 883 - 420 K/uL    MPV 8.9 (L) 9.2 - 11.8 FL    NEUTROPHILS 43 42 - 75 %    BAND NEUTROPHILS 14 (H) 0 - 5 %    LYMPHOCYTES 36 20 - 51 %    MONOCYTES 6 2 - 9 %    EOSINOPHILS 0 0 - 5 %    BASOPHILS 0 0 - 3 %    METAMYELOCYTES 1 (H) 0 %    NRBC 1.0 (H) 0  WBC    ABS. NEUTROPHILS 4.5 1.8 - 8.0 K/UL    ABS. LYMPHOCYTES 3.8 (H) 0.8 - 3.5 K/UL    ABS. MONOCYTES 0.6 0 - 1.0 K/UL    ABS. EOSINOPHILS 0.0 0.0 - 0.4 K/UL    ABS. BASOPHILS 0.0 0.0 - 0.1 K/UL    RBC COMMENTS ANISOCYTOSIS  1+        RBC COMMENTS POIKILOCYTOSIS  1+        RBC COMMENTS POLYCHROMASIA  1+        RBC COMMENTS TEARDROP CELLS  FEW  SCHISTOCYTES  OCCASIONAL        DF MANUAL     MAGNESIUM    Collection Time: 01/14/18  4:03 AM   Result Value Ref Range    Magnesium 1.7 1.6 - 2.6 mg/dL   METABOLIC PANEL, COMPREHENSIVE    Collection Time: 01/14/18  4:03 AM   Result Value Ref Range    Sodium 143 136 - 145 mmol/L    Potassium 3.8 3.5 - 5.5 mmol/L    Chloride 108 100 - 108 mmol/L    CO2 27 21 - 32 mmol/L    Anion gap 8 3.0 - 18 mmol/L    Glucose 90 74 - 99 mg/dL    BUN 15 7.0 - 18 MG/DL    Creatinine 0.67 0.6 - 1.3 MG/DL    BUN/Creatinine ratio 22 (H) 12 - 20      GFR est AA >60 >60 ml/min/1.73m2    GFR est non-AA >60 >60 ml/min/1.73m2    Calcium 8.3 (L) 8.5 - 10.1 MG/DL    Bilirubin, total 0.1 (L) 0.2 - 1.0 MG/DL    ALT (SGPT) 48 13 - 56 U/L    AST (SGOT) 20 15 - 37 U/L    Alk.  phosphatase 74 45 - 117 U/L    Protein, total 5.7 (L) 6.4 - 8.2 g/dL    Albumin 2.3 (L) 3.4 - 5.0 g/dL    Globulin 3.4 2.0 - 4.0 g/dL    A-G Ratio 0.7 (L) 0.8 - 1.7     GLUCOSE, POC    Collection Time: 01/14/18  6:15 AM   Result Value Ref Range    Glucose (POC) 97 70 - 110 mg/dL       Procedures/imaging: see electronic medical records for all procedures/Xrays and details which were not copied into this note but were reviewed prior to creation of Heidi Mejia MD   1/14/2018, 10:44 AM

## 2018-01-15 VITALS
OXYGEN SATURATION: 100 % | RESPIRATION RATE: 18 BRPM | DIASTOLIC BLOOD PRESSURE: 83 MMHG | HEIGHT: 66 IN | BODY MASS INDEX: 32.77 KG/M2 | HEART RATE: 100 BPM | SYSTOLIC BLOOD PRESSURE: 105 MMHG | TEMPERATURE: 97.5 F | WEIGHT: 203.93 LBS

## 2018-01-15 LAB
ALBUMIN SERPL-MCNC: 2.4 G/DL (ref 3.4–5)
ALBUMIN/GLOB SERPL: 0.6 {RATIO} (ref 0.8–1.7)
ALP SERPL-CCNC: 79 U/L (ref 45–117)
ALT SERPL-CCNC: 40 U/L (ref 13–56)
ANION GAP SERPL CALC-SCNC: 7 MMOL/L (ref 3–18)
AST SERPL-CCNC: 12 U/L (ref 15–37)
BASOPHILS # BLD: 0 K/UL (ref 0–0.06)
BASOPHILS NFR BLD: 0 % (ref 0–2)
BILIRUB SERPL-MCNC: 0.1 MG/DL (ref 0.2–1)
BNP SERPL-MCNC: 15 PG/ML (ref 0–450)
BUN SERPL-MCNC: 14 MG/DL (ref 7–18)
BUN/CREAT SERPL: 22 (ref 12–20)
CALCIUM SERPL-MCNC: 8.6 MG/DL (ref 8.5–10.1)
CHLORIDE SERPL-SCNC: 103 MMOL/L (ref 100–108)
CO2 SERPL-SCNC: 28 MMOL/L (ref 21–32)
CREAT SERPL-MCNC: 0.64 MG/DL (ref 0.6–1.3)
DIFFERENTIAL METHOD BLD: ABNORMAL
EOSINOPHIL # BLD: 0.2 K/UL (ref 0–0.4)
EOSINOPHIL NFR BLD: 1 % (ref 0–5)
ERYTHROCYTE [DISTWIDTH] IN BLOOD BY AUTOMATED COUNT: 16.2 % (ref 11.6–14.5)
GLOBULIN SER CALC-MCNC: 3.7 G/DL (ref 2–4)
GLUCOSE SERPL-MCNC: 104 MG/DL (ref 74–99)
HCT VFR BLD AUTO: 32.1 % (ref 35–45)
HGB BLD-MCNC: 10 G/DL (ref 12–16)
INR PPP: 0.9 (ref 0.8–1.2)
LYMPHOCYTES # BLD: 2.6 K/UL (ref 0.9–3.6)
LYMPHOCYTES NFR BLD: 19 % (ref 21–52)
MAGNESIUM SERPL-MCNC: 1.7 MG/DL (ref 1.6–2.6)
MCH RBC QN AUTO: 24.2 PG (ref 24–34)
MCHC RBC AUTO-ENTMCNC: 31.2 G/DL (ref 31–37)
MCV RBC AUTO: 77.5 FL (ref 74–97)
MONOCYTES # BLD: 1.8 K/UL (ref 0.05–1.2)
MONOCYTES NFR BLD: 14 % (ref 3–10)
NEUTS SEG # BLD: 8.9 K/UL (ref 1.8–8)
NEUTS SEG NFR BLD: 66 % (ref 40–73)
PLATELET # BLD AUTO: 509 K/UL (ref 135–420)
PMV BLD AUTO: 9.1 FL (ref 9.2–11.8)
POTASSIUM SERPL-SCNC: 3.6 MMOL/L (ref 3.5–5.5)
PROT SERPL-MCNC: 6.1 G/DL (ref 6.4–8.2)
PROTHROMBIN TIME: 11.9 SEC (ref 11.5–15.2)
RBC # BLD AUTO: 4.14 M/UL (ref 4.2–5.3)
SODIUM SERPL-SCNC: 138 MMOL/L (ref 136–145)
WBC # BLD AUTO: 13.5 K/UL (ref 4.6–13.2)

## 2018-01-15 PROCEDURE — 74011636637 HC RX REV CODE- 636/637: Performed by: INTERNAL MEDICINE

## 2018-01-15 PROCEDURE — 83880 ASSAY OF NATRIURETIC PEPTIDE: CPT | Performed by: INTERNAL MEDICINE

## 2018-01-15 PROCEDURE — 36415 COLL VENOUS BLD VENIPUNCTURE: CPT | Performed by: INTERNAL MEDICINE

## 2018-01-15 PROCEDURE — 74011250637 HC RX REV CODE- 250/637: Performed by: INTERNAL MEDICINE

## 2018-01-15 PROCEDURE — 80053 COMPREHEN METABOLIC PANEL: CPT | Performed by: INTERNAL MEDICINE

## 2018-01-15 PROCEDURE — 85610 PROTHROMBIN TIME: CPT | Performed by: INTERNAL MEDICINE

## 2018-01-15 PROCEDURE — 74011250637 HC RX REV CODE- 250/637: Performed by: PHYSICIAN ASSISTANT

## 2018-01-15 PROCEDURE — 83735 ASSAY OF MAGNESIUM: CPT | Performed by: INTERNAL MEDICINE

## 2018-01-15 PROCEDURE — 85025 COMPLETE CBC W/AUTO DIFF WBC: CPT | Performed by: INTERNAL MEDICINE

## 2018-01-15 PROCEDURE — 74011250636 HC RX REV CODE- 250/636: Performed by: PHYSICIAN ASSISTANT

## 2018-01-15 RX ORDER — PREDNISONE 10 MG/1
10 TABLET ORAL DAILY
Qty: 14 TAB | Refills: 0 | Status: SHIPPED | OUTPATIENT
Start: 2018-03-09 | End: 2018-01-15

## 2018-01-15 RX ORDER — PREDNISONE 20 MG/1
40 TABLET ORAL DAILY
Qty: 28 TAB | Refills: 0 | Status: SHIPPED | OUTPATIENT
Start: 2018-01-23 | End: 2018-01-15

## 2018-01-15 RX ORDER — METRONIDAZOLE 500 MG/1
500 TABLET ORAL EVERY 8 HOURS
Qty: 24 TAB | Refills: 0 | Status: SHIPPED | OUTPATIENT
Start: 2018-01-15

## 2018-01-15 RX ORDER — OXYCODONE AND ACETAMINOPHEN 5; 325 MG/1; MG/1
1 TABLET ORAL
Qty: 10 TAB | Refills: 0 | Status: SHIPPED | OUTPATIENT
Start: 2018-01-15 | End: 2018-01-15

## 2018-01-15 RX ORDER — GUAIFENESIN 100 MG/5ML
81 LIQUID (ML) ORAL DAILY
Qty: 30 TAB | Refills: 0 | Status: SHIPPED | OUTPATIENT
Start: 2018-01-15 | End: 2018-01-16

## 2018-01-15 RX ORDER — OXYCODONE AND ACETAMINOPHEN 5; 325 MG/1; MG/1
1 TABLET ORAL
Qty: 10 TAB | Refills: 0 | Status: SHIPPED | OUTPATIENT
Start: 2018-01-15 | End: 2018-03-01 | Stop reason: ALTCHOICE

## 2018-01-15 RX ORDER — PREDNISONE 10 MG/1
TABLET ORAL
Qty: 175 TAB | Refills: 0 | Status: SHIPPED
Start: 2018-01-15 | End: 2022-08-08

## 2018-01-15 RX ORDER — PREDNISONE 10 MG/1
30 TABLET ORAL DAILY
Qty: 42 TAB | Refills: 0 | Status: SHIPPED | OUTPATIENT
Start: 2018-02-07 | End: 2018-01-15

## 2018-01-15 RX ORDER — UREA 10 %
2 LOTION (ML) TOPICAL 2 TIMES DAILY
Qty: 56 TAB | Refills: 0 | Status: SHIPPED | OUTPATIENT
Start: 2018-01-15 | End: 2018-03-01 | Stop reason: ALTCHOICE

## 2018-01-15 RX ORDER — PREDNISONE 50 MG/1
50 TABLET ORAL DAILY
Qty: 7 TAB | Refills: 0 | Status: SHIPPED | OUTPATIENT
Start: 2018-01-15 | End: 2018-01-15

## 2018-01-15 RX ORDER — HYDROCODONE BITARTRATE AND ACETAMINOPHEN 5; 325 MG/1; MG/1
1 TABLET ORAL
Qty: 10 TAB | Refills: 0 | Status: SHIPPED | OUTPATIENT
Start: 2018-01-15 | End: 2018-01-15

## 2018-01-15 RX ORDER — PREDNISONE 20 MG/1
20 TABLET ORAL DAILY
Qty: 14 TAB | Refills: 0 | Status: SHIPPED | OUTPATIENT
Start: 2018-02-22 | End: 2018-01-15

## 2018-01-15 RX ADMIN — LACTOBACILLUS TAB 2 TABLET: TAB at 10:56

## 2018-01-15 RX ADMIN — CARVEDILOL 3.12 MG: 3.12 TABLET, FILM COATED ORAL at 10:55

## 2018-01-15 RX ADMIN — PROMETHAZINE HYDROCHLORIDE 12.5 MG: 25 TABLET ORAL at 07:44

## 2018-01-15 RX ADMIN — PREDNISONE 20 MG: 20 TABLET ORAL at 10:55

## 2018-01-15 RX ADMIN — GABAPENTIN 400 MG: 400 CAPSULE ORAL at 10:55

## 2018-01-15 RX ADMIN — LISINOPRIL 10 MG: 5 TABLET ORAL at 10:55

## 2018-01-15 RX ADMIN — HYDROMORPHONE HYDROCHLORIDE 0.5 MG: 1 INJECTION, SOLUTION INTRAMUSCULAR; INTRAVENOUS; SUBCUTANEOUS at 07:44

## 2018-01-15 RX ADMIN — METRONIDAZOLE 500 MG: 250 TABLET ORAL at 11:04

## 2018-01-15 RX ADMIN — HYDROMORPHONE HYDROCHLORIDE 0.5 MG: 1 INJECTION, SOLUTION INTRAMUSCULAR; INTRAVENOUS; SUBCUTANEOUS at 02:52

## 2018-01-15 RX ADMIN — HYDROMORPHONE HYDROCHLORIDE 0.5 MG: 1 INJECTION, SOLUTION INTRAMUSCULAR; INTRAVENOUS; SUBCUTANEOUS at 12:25

## 2018-01-15 RX ADMIN — ASPIRIN 81 MG 81 MG: 81 TABLET ORAL at 10:55

## 2018-01-15 RX ADMIN — Medication 10 ML: at 07:51

## 2018-01-15 NOTE — ROUTINE PROCESS
Dual AVS reviewed with Rob Bob RN. All medications reviewed individually with patient. Opportunities for questions and concerns provided. Patient discharged via (mode of transport ie. Car, ambulance or air transport) car. Patient's arm band appropriately discarded.

## 2018-01-15 NOTE — PROGRESS NOTES
D/c plan: anticipate home  Met with patient at bedside. States she is going home today. States she has family that can assist. Anthony Richardson is making follow up appointment with Gastroenterologist. Patient denies needs for medical equipment or [address]. States she is not home bound. States her daughter will pick her up for transport home. Care Management Interventions  PCP Verified by CM: Yes  Transition of Care Consult (CM Consult): Discharge Planning  Current Support Network:  Other  Confirm Follow Up Transport: Family  Plan discussed with Pt/Family/Caregiver: Yes  Freedom of Choice Offered: Yes  Discharge Location  Discharge Placement: Home with family assistance

## 2018-01-15 NOTE — PROGRESS NOTES
800 Assumed care of patient from [facility], [address] [place] [address] patient received early AM discharge to home, but need to be seen by Dr. Gael Egan prior, explained to patient, understanding verbalized. Scheduled medications administered without any complications, telemetry monitor removed and returned. Patient is currently sitting up in bed waiting to be seen by Dr. Gael Egan before proceeding with home discharge, no s/s of any pain or distress, will continue to monitor.

## 2018-01-15 NOTE — DISCHARGE SUMMARY
Discharge Summary    Patient: Evert Councilman MRN: [id number]  CSN: [id number]    YOB: 1970  Age: 56 y.o. Sex: female    DOA: 1/7/2018 LOS:  LOS: 7 days   Discharge Date: 1/15/2018     Primary Care Provider:  Yvrose Saab MD    Admission Diagnoses: LUE weakness  Stroke [facility])    Discharge Diagnoses:    Problem List as of 1/15/2018  Date Reviewed: 1/8/2018          Codes Class Noted - Resolved    Clostridium difficile infection ICD-10-CM: B96.89  ICD-9-CM: 041.84  1/9/2018 - Present        * (Principal)LUE weakness ICD-10-CM: R29.898  ICD-9-CM: 729.89  1/8/2018 - Present        Stroke ([facility] ICD-10-CM: I63.9  ICD-9-CM: 434.91  1/8/2018 - Present        Pure hypercholesterolemia (Chronic) ICD-10-CM: E78.00  ICD-9-CM: 272.0  5/5/2017 - Present        Wound infection ICD-10-CM: T14. 8XXA, L08.9  ICD-9-CM: 958.3  4/28/2017 - Present        Popliteal artery thrombosis, right (HCC) ICD-10-CM: I74.3  ICD-9-CM: 444.22  4/28/2017 - Present        PAD (peripheral artery disease) (HCC) ICD-10-CM: I73.9  ICD-9-CM: 443.9  4/4/2017 - Present        GI bleed ICD-10-CM: K92.2  ICD-9-CM: 578.9  3/28/2017 - Present        DVT (deep venous thrombosis) ([facility] ICD-10-CM: I82.409  ICD-9-CM: 453.40  3/16/2017 - Present        Neuropathic pain ICD-10-CM: M79.2  ICD-9-CM: 729.2  3/15/2017 - Present        Sickle cell trait ([facility] ICD-10-CM: D57.3  ICD-9-CM: 282.5  Unknown - Present        Hypertension ICD-10-CM: I10  ICD-9-CM: 401.9  Unknown - Present        Hx of cocaine abuse ICD-10-CM: Z87.898  ICD-9-CM: 305.63  Unknown - Present        Embolic stroke ([facility] QVD-82-ID: I63.9  ICD-9-CM: 434.11  3/10/2017 - Present        Crohn disease ([facility] ICD-10-CM: K50.90  ICD-9-CM: 555.9  2/27/2012 - Present        RESOLVED: Abscess of right leg ICD-10-CM: L02.415  ICD-9-CM: 682.6  4/28/2017 - 5/3/2017        RESOLVED: Vascular abnormality ICD-10-CM: I99.9  ICD-9-CM: 459.9  4/6/2017 - 4/28/2017        RESOLVED: Elevated WBC count ICD-10-CM: Y32.683  ICD-9-CM: 288.60  3/28/2017 - 4/18/2017        RESOLVED: Acute blood loss anemia ICD-10-CM: D62  ICD-9-CM: 285.1  3/15/2017 - 4/18/2017        RESOLVED: Colitis ICD-10-CM: K52.9  ICD-9-CM: 558.9  8/8/2015 - 3/15/2017        RESOLVED: Crohn's disease with complication ([facility] KNX-74-OT: O87.280  ICD-9-CM: 555.9  8/8/2015 - 3/15/2017        RESOLVED: Dehydration ICD-10-CM: E86.0  ICD-9-CM: 276.51  8/8/2015 - 3/15/2017        RESOLVED: Hypokalemia ICD-10-CM: E87.6  ICD-9-CM: 276.8  8/8/2015 - 3/23/2017        RESOLVED: Chronic pain syndrome ICD-10-CM: G89.4  ICD-9-CM: 338.4  2/27/2012 - 4/28/2017        RESOLVED: Abdominal pain ICD-10-CM: R10.9  ICD-9-CM: 789.00  2/27/2012 - 3/15/2017        RESOLVED: Obesity ICD-10-CM: E66.9  ICD-9-CM: 278.00  2/27/2012 - 3/15/2017              Discharge Medications:     Discharge Medication List as of 1/15/2018 12:10 PM      START taking these medications    Details   Lactobacillus Acidoph & Bulgar (FLORANEX) 1 million cell tab tablet Take 2 Tabs by mouth two (2) times a day., Normal, Disp-56 Tab, R-0      metroNIDAZOLE (FLAGYL) 500 mg tablet Take 1 Tab by mouth every eight (8) hours. , Normal, Disp-24 Tab, R-0      aspirin 81 mg chewable tablet Take 1 Tab by mouth daily. , Normal, Disp-30 Tab, R-0         CONTINUE these medications which have CHANGED    Details   predniSONE (DELTASONE) 10 mg tablet Take 5 tablets by mouth daily for 7 days, then 4 tablets daily for 14 days then 3 tablets daily for 14 days then 2 tablets daily for 14 days then 1 tablet by mouth daily for 14 days, No Print, Disp-175 Tab, R-0         CONTINUE these medications which have NOT CHANGED    Details   docusate sodium (COLACE) 100 mg capsule Take 1 Cap by mouth two (2) times a day for 90 days. , Normal, Disp-60 Cap, R-2      promethazine (PHENERGAN) 25 mg tablet Take 1 Tab by mouth every six (6) hours as needed. , Print, Disp-12 Tab, R-0      lisinopril (PRINIVIL, ZESTRIL) 10 mg tablet Take 10 mg by mouth daily. , Historical Med      carvedilol (COREG) 3.125 mg tablet Take 1 Tab by mouth two (2) times daily (with meals). , Print, Disp-60 Tab, R-0      atorvastatin (LIPITOR) 80 mg tablet Take 1 Tab by mouth nightly. , Print, Disp-30 Tab, R-2      butalbital-acetaminophen-caff (FIORICET) -40 mg per capsule Take 1 Cap by mouth every six (6) hours as needed for Pain. Max Daily Amount: 4 Caps. Indications: TENSION-TYPE HEADACHE, Print, Disp-12 Cap, R-0      ELETRIPTAN HBR (RELPAX PO) Take  by mouth., Historical Med      gabapentin (NEURONTIN) 400 mg capsule Take 1 Cap by mouth three (3) times daily. , Print, Disp-90 Cap, R-2      Warfarin 7.5 mg  - Take one pill daily. STOP taking these medications       ondansetron (ZOFRAN ODT) 4 mg disintegrating tablet Comments:   Reason for Stopping:         predniSONE (STERAPRED) 5 mg dose pack Comments:   Reason for Stopping:                        Discharge Condition: Stable    Procedures : None    Consults: Gastroenterology and Neurology      PHYSICAL EXAM    Visit Vitals    /83 (BP 1 Location: Left arm, BP Patient Position: At rest)    Pulse 100    Temp 97.5 °F (36.4 °C)    Resp 18    Ht 5' 6\" (1.676 m)    Wt 92.5 kg (203 lb 14.8 oz)    SpO2 100%    Breastfeeding No    BMI 32.91 kg/m2     General: Awake, cooperative, no acute distress    HEENT: NC, Atraumatic. PERRLA, EOMI. Anicteric sclerae. Lungs:  CTA Bilaterally. No Wheezing/Rhonchi/Rales. Heart:  Regular  rhythm,  No murmur, No Rubs, No Gallops  Abdomen: Soft, Non distended, Non tender. +Bowel sounds,   Extremities: No c/c/e  Psych:   Not anxious or agitated. Neurologic:  No acute neurological deficits. Admission HPI : Arlette Cabrera is a 56 y.o.   female hx of MCA stroke who started with numbness in left face  Dysarthria waxing and wannning since early this am around 11am;  Patient was forgetful about putting groceries in car and had trouble finding words  Has hx of right leg DVT and stopped coumadin due to bloody stools and abdominal cramping  Has been on Asacol and intermittent steroids      IN ER code S called not TPA  canidate due to duration of symptoms and active blood loss  recommending admission for further evaluation     Hospital Course : This patient was admitted to medical services on January 8, 2018 after presenting with stroke like symptoms. MRI upon admission revealed no new infarcts. Given her history of acute bright red blood per rectum and bloody bowel movements, GI was consulted and Dr Roslyn Riggs saw the patient. She was found to have positive C.diff stool tests, which likely was present on admission. She was treated with Flagly and Vancomycin. She was placed on steroids for her Chron's disease. The patient received a blood transfusion prior to discharge for a Hgb of 6.9, with appropriate response to the transfusion. Her Hgb has remained stable since. Overall, her hospital course was uncomplicated and she will be discharged in stable condition. Activity: Activity as tolerated    Diet: Regular Diet    Follow-up: This patient was instructed to follow up with Dr Roslyn Riggs to restart her immunologic therapy for her Chron's. She was also instructed to follow up with her PCP upon discharge for routine follow up. Disposition: This patient will be discharged home in fair condition. She had an uncomplicated hospitalization, and will be placed on a long steroid taper per GI recommendations. She will continue 8 further days of Flagyl to treat her C.diff infection, also as recommended per GI.     Minutes spent on discharge: 36 min       Labs: Results:       Chemistry Recent Labs      01/15/18   0520  01/14/18   0403   GLU  104*  90   NA  138  143   K  3.6  3.8   CL  103  108   CO2  28  27   BUN  14  15   CREA  0.64  0.67   CA  8.6  8.3*   AGAP  7  8   BUCR  22*  22*   AP  79  74   TP  6.1*  5.7*   ALB  2.4*  2.3*   GLOB  3.7  3.4 AGRAT  0.6*  0.7*      CBC w/Diff Recent Labs      01/15/18   0520  01/14/18   0403  01/13/18   2200   WBC  13.5*  10.5   --    RBC  4.14*  3.76*   --    HGB  10.0*  9.2*  9.2*   HCT  32.1*  29.8*  29.2*   PLT  509*  465*   --    GRANS  66  43   --    LYMPH  19*  36   --    EOS  1  0   --       Cardiac Enzymes No results for input(s): CPK, CKND1, CHANDA in the last 72 hours. No lab exists for component: CKRMB, TROIP   Coagulation Recent Labs      01/15/18   0520  01/14/18   0403   PTP  11.9  12.5   INR  0.9  1.0       Lipid Panel Lab Results   Component Value Date/Time    Cholesterol, total 172 01/09/2018 05:51 AM    HDL Cholesterol 74 01/09/2018 05:51 AM    LDL, calculated 85.2 01/09/2018 05:51 AM    VLDL, calculated 12.8 01/09/2018 05:51 AM    Triglyceride 64 01/09/2018 05:51 AM    CHOL/HDL Ratio 2.3 01/09/2018 05:51 AM      BNP No results for input(s): BNPP in the last 72 hours. Liver Enzymes Recent Labs      01/15/18   0520   TP  6.1*   ALB  2.4*   AP  79   SGOT  12*      Thyroid Studies Lab Results   Component Value Date/Time    TSH 1.51 12/12/2009 09:48 AM            Significant Diagnostic Studies: Mri Brain Wo Cont    Result Date: 1/8/2018  MRI brain History: Left arm and right leg numbness, confusion, headaches Comparison: MR brain 10/30/2017 Technique: Multiplanar multi sequence MR imaging of the brain was performed utilizing axial T2, FLAIR, diffusion, T2 gradient echo, coronal T2, and multiplanar T1 pre contrast imaging . No gadolinium was administered due to specific clinician request. Findings: Motion artifact is present which limits evaluation. No restricted diffusion is seen to suggest acute infarct. Several small areas of cortical atrophy with underlying FLAIR hyperintensity fluid signal are seen involving the right parietal lobe with small amount of posterior temporal lobe involvement representing chronic infarct. Is patchy chronic infarct also involves the posterior right insular cortex. Linear extension of chronic infarct is seen superiorly along the right corona radiata as well. Slight ex vacuo dilatation of the right lateral ventricle is present. No mass effect or associated hemorrhage is seen. Small linear chronic bilateral cerebellar infarcts are present. These infarcts are unchanged. The ventricles are otherwise within normal limits in their size and configuration. There is no evidence of mass effect, hemorrhage, midline shift, or herniation. There is no abnormal restricted diffusion to suggest acute infarct. No susceptibility artifact is seen to suggest intraparenchymal hemorrhage. The major intracranial vascular flow voids are grossly normal. Irregularity is present along the right orbital floor with evidence of inferiorly herniating extraconal fat representing chronic orbital floor fracture. No air-fluid level was present. Left orbit is unremarkable. Mastoid air cells are unremarkable. IMPRESSION: Motion degraded study. 1.  No evidence acute infarct. No significant change. 2. Stable encephalomalacic changes from chronic right parietal and posterior temporal infarcts, right corona radiata infarct, and bilateral cerebellar infarcts. 3. Stable chronic right orbital floor fracture with inferior herniation of extraconal orbital fat. Ct Head Wo Cont    Result Date: 1/8/2018  EXAM: CT head INDICATION: Neurologic deficit. COMPARISON: August 1, 2017. TECHNIQUE: Axial CT imaging of the head was performed without intravenous contrast. Dose reduction techniques used: automated exposure control, adjustment of the mAs and/or kVp according to patient size, and iterative reconstruction techniques. _______________ FINDINGS: BRAIN AND POSTERIOR FOSSA: A right middle cerebral artery distribution infarct is again noted centered in the right parietal lobe similar to previous. The lateral, third and fourth ventricles are normally outlined. The basal cisterns are normally outlined is well.  The cortical sulci are normally outlined. No acute territorial defect is seen. There is no acute hemorrhage. EXTRA-AXIAL SPACES AND MENINGES: There are no abnormal extra-axial fluid collections. CALVARIUM: Intact. SINUSES: Clear. OTHER: None. _______________     IMPRESSION: Old right middle cerebral artery distribution infarct. No acute intracranial abnormality identified. Findings were called to the emergency department just prior to signing report. Xr Chest Port    Result Date: 1/8/2018  --------------------------------------------------------------------------- <<<<<<<<<           [facility]           >>>>>>>>> --------------------------------------------------------------------------- CLINICAL HISTORY:  Weakness. COMPARISON EXAMINATIONS:  December 11, 2017. ---  SINGLE FRONTAL VIEW OF THE CHEST  --- The lungs and pleural spaces are clear. The cardiomediastinal silhouette is stable. No significant osseous abnormalities are identified.  --------------    Impression: -------------- No significant abnormalities. No results found for this or any previous visit.         CC: Kush Waller MD

## 2018-01-15 NOTE — ROUTINE PROCESS
Bedside and Verbal shift change report given to S. Rory Lefort, RN (oncoming nurse) by Bryan Connor RN (offgoing nurse). Report included the following information SBAR, Kardex, Intake/Output, MAR and Recent Results.

## 2018-01-15 NOTE — DISCHARGE INSTRUCTIONS
DISCHARGE SUMMARY from Nurse    PATIENT INSTRUCTIONS:    Recommended activity: Activity as tolerated      *  Please give a list of your current medications to your Primary Care Provider. *  Please update this list whenever your medications are discontinued, doses are      changed, or new medications (including over-the-counter products) are added. *  Please carry medication information at all times in case of emergency situations. These are general instructions for a healthy lifestyle:    No smoking/ No tobacco products/ Avoid exposure to second hand smoke  Surgeon General's Warning:  Quitting smoking now greatly reduces serious risk to your health. Obesity, smoking, and sedentary lifestyle greatly increases your risk for illness    A healthy diet, regular physical exercise & weight monitoring are important for maintaining a healthy lifestyle    You may be retaining fluid if you have a history of heart failure or if you experience any of the following symptoms:  Weight gain of 3 pounds or more overnight or 5 pounds in a week, increased swelling in our hands or feet or shortness of breath while lying flat in bed. Please call your doctor as soon as you notice any of these symptoms; do not wait until your next office visit. Recognize signs and symptoms of STROKE:    F-face looks uneven    A-arms unable to move or move unevenly    S-speech slurred or non-existent    T-time-call 911 as soon as signs and symptoms begin-DO NOT go       Back to bed or wait to see if you get better-TIME IS BRAIN. Warning Signs of HEART ATTACK     Call 911 if you have these symptoms:   Chest discomfort. Most heart attacks involve discomfort in the center of the chest that lasts more than a few minutes, or that goes away and comes back. It can feel like uncomfortable pressure, squeezing, fullness, or pain.  Discomfort in other areas of the upper body.  Symptoms can include pain or discomfort in one or both arms, the back, neck, jaw, or stomach.  Shortness of breath with or without chest discomfort.  Other signs may include breaking out in a cold sweat, nausea, or lightheadedness. Don't wait more than five minutes to call 911 - MINUTES MATTER! Fast action can save your life. Calling 911 is almost always the fastest way to get lifesaving treatment. Emergency Medical Services staff can begin treatment when they arrive -- up to an hour sooner than if someone gets to the hospital by car. The discharge information has been reviewed with the patient. The patient verbalized understanding. Discharge medications reviewed with the patient and appropriate educational materials and side effects teaching were provided. ___________________________________________________________________________________________________________________________________     Anemia From Heavy Bleeding: Care Instructions  Your Care Instructions    Anemia means that your body does not have enough red blood cells. Red blood cells carry oxygen around the body. When you have anemia, you may feel dizzy, tired, and weak. You may also feel your heart pounding. For some people, it's hard to focus and think clearly. One common cause of anemia is bleeding. Bleeding from ulcers, hemorrhoids, cancer, or other problems can cause anemia. It may also be caused by heavy menstrual periods. Your treatment may include iron pills. Iron helps your body make hemoglobin. Hemoglobin is the part of the red blood cell that carries oxygen. If you have severe anemia, you may need a blood transfusion to give you red blood cells as quickly as possible. Sometimes it takes several months to get iron levels back to normal.  Follow-up care is a key part of your treatment and safety. Be sure to make and go to all appointments, and call your doctor if you are having problems. It's also a good idea to know your test results and keep a list of the medicines you take.   How can you care for yourself at home? · Be safe with medicines. Take your medicines exactly as prescribed. Call your doctor if you think you are having a problem with your medicine. · Follow your doctor's advice about eating foods that have a lot of iron in them. These include red meat, shellfish, poultry, and eggs. They also include beans, raisins, whole-grain bread, and leafy green vegetables. · Steam your vegetables. This is the best way to prepare them if you want to get as much iron as possible. · Iron pills can cause constipation. If you take them, there are things you can do to avoid constipation. Drink plenty of fluids, eat foods with a lot of fiber, and exercise every day. When should you call for help? Call 911 anytime you think you may need emergency care. For example, call if:  ? · You passed out (lost consciousness). ? · Your stools are maroon or very bloody. ?Call your doctor now or seek immediate medical care if:  ? · You are short of breath. ? · You have new or worse bleeding. ? · You are dizzy or light-headed, or you feel like you may faint. ? Watch closely for changes in your health, and be sure to contact your doctor if:  ? · You feel weaker or more tired than usual.   ? · You do not get better as expected. Where can you learn more? Go to http://[email or web address]-maria del carmen.info/. Enter X137 in the search box to learn more about \"Anemia From Heavy Bleeding: Care Instructions. \"  Current as of: October 13, 2016  Content Version: 11.4  © 6900-8160 [employer]. Care instructions adapted under license by [employer] (which disclaims liability or warranty for this information). If you have questions about a medical condition or this instruction, always ask your healthcare professional. [address] any warranty or liability for your use of this information.          Taking Aspirin to Prevent Heart Attack and Stroke: Care Instructions  Your Care Instructions    Aspirin acts as a \"blood thinner. \" It prevents blood clots from forming. When taken during and after a heart attack, it can reduce your chance of dying. And it's used if you have a stent in your coronary artery. Also, aspirin helps certain people lower their risk of a heart attack or stroke. Be sure you know what dose of aspirin to take and how often to take it. Low-dose aspirin is typically 81 mg. But the dose for daily aspirin can range from 81 mg to 325 mg. Taking aspirin every day can cause bleeding. It may not be safe if you have stomach ulcers. And it may not be safe if you have high blood pressure that is not controlled. If you take aspirin pills every day, do not take ones that have other ingredients such as caffeine or sodium. Before you start to take aspirin, tell your doctor all the medicines, vitamins, herbal products, and supplements you take. Follow-up care is a key part of your treatment and safety. Be sure to make and go to all appointments, and call your doctor if you are having problems. It's also a good idea to know your test results and keep a list of the medicines you take. How can you care for yourself at home? · Take aspirin with a full glass of water unless your doctor tells you not to. Do not lie down right after you take it. · If you have a stent in your coronary artery, take your aspirin as your heart doctor says to. If another doctor says to stop taking the aspirin for any reason, talk to your heart doctor before you stop. · Do not chew or crush the coated or sustained-release forms of aspirin. · Ask your doctor if you can drink alcohol while you take aspirin. And ask how much you can drink. Too much alcohol with aspirin can cause stomach bleeding. · Do not take aspirin if you are pregnant, unless your doctor says it is okay. · Keep all aspirin out of children's reach. · Throw aspirin away if it starts to smell like vinegar.   · Do not take aspirin if you have gout or if you take prescription blood thinners, unless your doctor has told you to. · Do not take prescription or over-the-counter medicines, vitamins, herbal products, or supplements without talking to your doctor first. Yulia Solid the label before you take another over-the-counter medicine. Many contain aspirin. So they could cause you to take too much aspirin. · Talk with your doctor before you take a pain medicine. Ask which type of medicine you can take and how to take it safely with aspirin. · Tell your doctor or dentist before a surgery or procedure that you take aspirin. He or she will tell you if you should stop taking aspirin before your surgery or procedure. Make sure that you understand exactly what your doctor wants you to do. Where can you learn more? Go to http://johan-maria del carmen.info/. Enter L204 in the search box to learn more about \"Taking Aspirin to Prevent Heart Attack and Stroke: Care Instructions. \"  Current as of: September 21, 2016  Content Version: 11.4  © 1711-5093 [employer]. Care instructions adapted under license by [employer] (which disclaims liability or warranty for this information). If you have questions about a medical condition or this instruction, always ask your healthcare professional. [place] [address] any warranty or liability for your use of this information. Crohn's Disease: Care Instructions  Your Care Instructions    Crohn's disease is a lifelong inflammatory bowel disease (IBD). Parts of the digestive tract get swollen and irritated and may develop deep sores called ulcers. Crohn's disease usually occurs in the last part of the small intestine and the first part of the large intestine. But it can develop anywhere from the mouth to the anus. The main symptoms of Crohn's disease are belly pain, diarrhea, fever, and weight loss. Some people may have constipation.  Crohn's disease also sometimes causes problems with the joints, eyes, or skin. Your symptoms may be mild at some times and severe at others. The disease can also go into remission, which means that it is not active and you have no symptoms. Bad attacks of Crohn's disease often have to be treated in the hospital so that you can get medicines and liquids through a tube in your vein, called an IV. This gives your digestive system time to rest and recover. Talk with your doctor about the best treatments for you. You may need medicines that help prevent or treat flare-ups of the disease. You may need surgery to remove part of your bowel if you have an abnormal opening in the bowel (fistula), an abscess, or a bowel obstruction. In some cases, surgery is needed if medicines do not work. But symptoms often return to other areas of the intestines after surgery. Learning good self-care can help you reduce your symptoms and manage Crohn's disease. Follow-up care is a key part of your treatment and safety. Be sure to make and go to all appointments, and call your doctor if you are having problems. It's also a good idea to know your test results and keep a list of the medicines you take. How can you care for yourself at home? · Take your medicines exactly as prescribed. Call your doctor if you think you are having a problem with your medicine. You will get more details on the specific medicines your doctor prescribes. · Do not take anti-inflammatory medicines, such as aspirin, ibuprofen (Advil, Motrin), or naproxen (Aleve). They may make your symptoms worse. Do not take any other medicines or herbal products without talking to your doctor first.  · Avoid foods that make your symptoms worse. These might include milk, alcohol, high-fiber foods, or spicy foods. · Eat a healthy diet. Make sure to get enough iron. Rectal bleeding may make you lose iron. Good sources of iron include beef, lentils, spinach, raisins, and iron-enriched breads and cereals.   · Drink liquid meal replacements if your doctor recommends them. These are high in calories and contain vitamins and minerals. Severe symptoms may make it hard for your body to absorb vitamins and minerals. · Do not smoke. Smoking makes Crohn's disease worse. If you need help quitting, talk to your doctor about stop-smoking programs and medicines. These can increase your chances of quitting for good. · Seek support from friends and family to help cope with Crohn's disease. The illness can affect all parts of your life. Get counseling if you need it. When should you call for help? Call 911 anytime you think you may need emergency care. For example, call if:  ? · Your stools are maroon or very bloody. ? · You passed out (lost consciousness). ?Call your doctor now or seek immediate medical care if:  ? · You are vomiting. ? · You have new or worse belly pain. ? · You have a fever. ? · You cannot pass stools or gas. ? · You have new or more blood in your stools. ? Watch closely for changes in your health, and be sure to contact your doctor if:  ? · You have new or worse symptoms. ? · You are losing weight. ? · You do not get better as expected. Where can you learn more? Go to http://johan-maria del carmen.info/. Enter [id number] [phone] in the search box to learn more about \"Crohn's Disease: Care Instructions. \"  Current as of: May 12, 2017  Content Version: 11.4  © 5416-7036 [employer]. Care instructions adapted under license by [employer] (which disclaims liability or warranty for this information). If you have questions about a medical condition or this instruction, always ask your healthcare professional. [place] [address] any warranty or liability for your use of this information.

## 2018-01-16 RX ORDER — WARFARIN 7.5 MG/1
7.5 TABLET ORAL DAILY
Qty: 30 TAB | Refills: 0 | Status: SHIPPED
Start: 2018-01-16

## 2018-03-01 ENCOUNTER — APPOINTMENT (OUTPATIENT)
Dept: MRI IMAGING | Age: 48
End: 2018-03-01
Attending: EMERGENCY MEDICINE
Payer: MEDICAID

## 2018-03-01 ENCOUNTER — HOSPITAL ENCOUNTER (EMERGENCY)
Age: 48
Discharge: HOME | End: 2018-03-02
Attending: EMERGENCY MEDICINE
Payer: MEDICAID

## 2018-03-01 DIAGNOSIS — K50.919 CROHN'S DISEASE WITH COMPLICATION, UNSPECIFIED GASTROINTESTINAL TRACT LOCATION (HCC): ICD-10-CM

## 2018-03-01 DIAGNOSIS — R20.2 PARESTHESIA: Primary | ICD-10-CM

## 2018-03-01 DIAGNOSIS — Z51.81 SUBTHERAPEUTIC ANTICOAGULATION: ICD-10-CM

## 2018-03-01 DIAGNOSIS — Z86.73 H/O STROKE ASSOCIATED WITH BLOOD CLOTTING TENDENCY: ICD-10-CM

## 2018-03-01 DIAGNOSIS — Z79.01 SUBTHERAPEUTIC ANTICOAGULATION: ICD-10-CM

## 2018-03-01 LAB
ANION GAP SERPL CALC-SCNC: 7 MMOL/L (ref 3–18)
APPEARANCE UR: CLEAR
APTT PPP: 23.2 SEC (ref 23–36.4)
BACTERIA URNS QL MICRO: ABNORMAL /HPF
BASOPHILS # BLD: 0 K/UL (ref 0–0.06)
BASOPHILS NFR BLD: 0 % (ref 0–2)
BILIRUB UR QL: NEGATIVE
BUN SERPL-MCNC: 11 MG/DL (ref 7–18)
BUN/CREAT SERPL: 13 (ref 12–20)
CALCIUM SERPL-MCNC: 9 MG/DL (ref 8.5–10.1)
CHLORIDE SERPL-SCNC: 106 MMOL/L (ref 100–108)
CO2 SERPL-SCNC: 27 MMOL/L (ref 21–32)
COLOR UR: YELLOW
CREAT SERPL-MCNC: 0.86 MG/DL (ref 0.6–1.3)
DIFFERENTIAL METHOD BLD: ABNORMAL
EOSINOPHIL # BLD: 0.1 K/UL (ref 0–0.4)
EOSINOPHIL NFR BLD: 1 % (ref 0–5)
EPITH CASTS URNS QL MICRO: ABNORMAL /LPF (ref 0–5)
ERYTHROCYTE [DISTWIDTH] IN BLOOD BY AUTOMATED COUNT: 15.8 % (ref 11.6–14.5)
GLUCOSE SERPL-MCNC: 101 MG/DL (ref 74–99)
GLUCOSE UR STRIP.AUTO-MCNC: NEGATIVE MG/DL
HCT VFR BLD AUTO: 30.9 % (ref 35–45)
HGB BLD-MCNC: 9.5 G/DL (ref 12–16)
HGB UR QL STRIP: NEGATIVE
INR PPP: 1.2 (ref 0.8–1.2)
KETONES UR QL STRIP.AUTO: NEGATIVE MG/DL
LEUKOCYTE ESTERASE UR QL STRIP.AUTO: ABNORMAL
LYMPHOCYTES # BLD: 2 K/UL (ref 0.9–3.6)
LYMPHOCYTES NFR BLD: 18 % (ref 21–52)
MAGNESIUM SERPL-MCNC: 1.8 MG/DL (ref 1.6–2.6)
MCH RBC QN AUTO: 23.7 PG (ref 24–34)
MCHC RBC AUTO-ENTMCNC: 30.7 G/DL (ref 31–37)
MCV RBC AUTO: 77.1 FL (ref 74–97)
MONOCYTES # BLD: 0.8 K/UL (ref 0.05–1.2)
MONOCYTES NFR BLD: 7 % (ref 3–10)
MUCOUS THREADS URNS QL MICRO: NEGATIVE /LPF
NEUTS SEG # BLD: 8.1 K/UL (ref 1.8–8)
NEUTS SEG NFR BLD: 74 % (ref 40–73)
NITRITE UR QL STRIP.AUTO: NEGATIVE
PH UR STRIP: 6 [PH] (ref 5–8)
PLATELET # BLD AUTO: 362 K/UL (ref 135–420)
PMV BLD AUTO: 9.7 FL (ref 9.2–11.8)
POTASSIUM SERPL-SCNC: 3.7 MMOL/L (ref 3.5–5.5)
PROT UR STRIP-MCNC: NEGATIVE MG/DL
PROTHROMBIN TIME: 14.3 SEC (ref 11.5–15.2)
RBC # BLD AUTO: 4.01 M/UL (ref 4.2–5.3)
RBC #/AREA URNS HPF: NEGATIVE /HPF (ref 0–5)
SODIUM SERPL-SCNC: 140 MMOL/L (ref 136–145)
SP GR UR REFRACTOMETRY: 1.02 (ref 1–1.03)
UROBILINOGEN UR QL STRIP.AUTO: 1 EU/DL (ref 0.2–1)
WBC # BLD AUTO: 11 K/UL (ref 4.6–13.2)
WBC URNS QL MICRO: ABNORMAL /HPF (ref 0–5)

## 2018-03-01 PROCEDURE — 70551 MRI BRAIN STEM W/O DYE: CPT

## 2018-03-01 PROCEDURE — 85025 COMPLETE CBC W/AUTO DIFF WBC: CPT | Performed by: EMERGENCY MEDICINE

## 2018-03-01 PROCEDURE — 83735 ASSAY OF MAGNESIUM: CPT | Performed by: EMERGENCY MEDICINE

## 2018-03-01 PROCEDURE — 80048 BASIC METABOLIC PNL TOTAL CA: CPT | Performed by: EMERGENCY MEDICINE

## 2018-03-01 PROCEDURE — 85730 THROMBOPLASTIN TIME PARTIAL: CPT | Performed by: EMERGENCY MEDICINE

## 2018-03-01 PROCEDURE — 81001 URINALYSIS AUTO W/SCOPE: CPT | Performed by: EMERGENCY MEDICINE

## 2018-03-01 PROCEDURE — 85610 PROTHROMBIN TIME: CPT | Performed by: EMERGENCY MEDICINE

## 2018-03-01 PROCEDURE — 93005 ELECTROCARDIOGRAM TRACING: CPT

## 2018-03-01 PROCEDURE — 99285 EMERGENCY DEPT VISIT HI MDM: CPT

## 2018-03-02 VITALS
TEMPERATURE: 97.9 F | HEART RATE: 87 BPM | SYSTOLIC BLOOD PRESSURE: 147 MMHG | RESPIRATION RATE: 16 BRPM | OXYGEN SATURATION: 100 % | DIASTOLIC BLOOD PRESSURE: 94 MMHG | BODY MASS INDEX: 28.12 KG/M2 | HEIGHT: 66 IN | WEIGHT: 175 LBS

## 2018-03-02 RX ORDER — DICYCLOMINE HYDROCHLORIDE 20 MG/1
20 TABLET ORAL EVERY 6 HOURS
Qty: 20 TAB | Refills: 0 | Status: SHIPPED | OUTPATIENT
Start: 2018-03-02 | End: 2018-03-07

## 2018-03-02 NOTE — DISCHARGE INSTRUCTIONS
Numbness and Tingling: Care Instructions  Your Care Instructions    Many things can cause numbness or tingling. Swelling may put pressure on a nerve. This could cause you to lose feeling or have a pins-and-needles sensation on part of your body. Nerves may be damaged from trauma, toxins, or diseases, such as diabetes or multiple sclerosis (MS). Sometimes, though, the cause is not clear. If there is no clear reason for your symptoms, and you are not having any other symptoms, your doctor may suggest watching and waiting for a while to see if the numbness or tingling goes away on its own. Your doctor may want you to have blood or nerve tests to find the cause of your symptoms. Follow-up care is a key part of your treatment and safety. Be sure to make and go to all appointments, and call your doctor if you are having problems. It's also a good idea to know your test results and keep a list of the medicines you take. How can you care for yourself at home? · If your doctor prescribes medicine, take it exactly as directed. Call your doctor if you think you are having a problem with your medicine. · If you have any swelling, put ice or a cold pack on the area for 10 to 20 minutes at a time. Put a thin cloth between the ice and your skin. When should you call for help? Call 911 anytime you think you may need emergency care. For example, call if:  ? · You have weakness, numbness, or tingling in both legs. ? · You lose bowel or bladder control. ? · You have symptoms of a stroke. These may include:  ¨ Sudden numbness, tingling, weakness, or loss of movement in your face, arm, or leg, especially on only one side of your body. ¨ Sudden vision changes. ¨ Sudden trouble speaking. ¨ Sudden confusion or trouble understanding simple statements. ¨ Sudden problems with walking or balance. ¨ A sudden, severe headache that is different from past headaches. ? Watch closely for changes in your health, and be sure to contact your doctor if you have any problems, or if:  ? · You do not get better as expected. Where can you learn more? Go to http://johan-maria del carmen.info/. Enter F911 in the search box to learn more about \"Numbness and Tingling: Care Instructions. \"  Current as of: October 14, 2016  Content Version: 11.4  © 8511-5433 [employer]. Care instructions adapted under license by [employer] (which disclaims liability or warranty for this information). If you have questions about a medical condition or this instruction, always ask your healthcare professional. [address] any warranty or liability for your use of this information. Taking Warfarin Safely: Care Instructions  Your Care Instructions    Warfarin is a medicine that you take to prevent blood clots. It is often called a blood thinner. Doctors give warfarin (such as Coumadin) to reduce the risk of blood clots. You may be at risk for blood clots if you have atrial fibrillation or deep vein thrombosis. Some other health problems may also put you at risk. Warfarin slows the amount of time it takes for your blood to clot. It can cause bleeding problems. Even if you've been taking warfarin for a while, it's important to know how to take it safely. Foods and other medicines can affect the way warfarin works. Some can make warfarin work too well. This can cause bleeding problems. And some can make it work poorly, so that it does not prevent blood clots very well. You will need regular blood tests to check how long it takes for your blood to form a clot. This test is called a PT or prothrombin time test. The result of the test is called an INR level. Depending on the test results, your doctor or anticoagulation clinic may adjust your dose of warfarin. Follow-up care is a key part of your treatment and safety.  Be sure to make and go to all appointments, and call your doctor if you are having problems. It's also a good idea to know your test results and keep a list of the medicines you take. How can you care for yourself at home? Take warfarin safely  · Take your warfarin at the same time each day. · If you miss a dose of warfarin, don't take an extra dose to make up for it. Your doctor can tell you exactly what to do so you don't take too much or too little. · Wear medical alert jewelry that lets others know that you take warfarin. You can buy this at most [employer]. · Don't take warfarin if you are pregnant or planning to get pregnant. Talk to your doctor about how you can prevent getting pregnant while you are taking it. · Don't change your dose or stop taking warfarin unless your doctor tells you to. Effects of medicines and food on warfarin  · Don't start or stop taking any medicines, vitamins, or natural remedies unless you first talk to your doctor. Many medicines can affect how warfarin works. These include aspirin and other pain relievers, over-the-counter medicines, multivitamins, dietary supplements, and herbal products. · Tell all of your doctors and pharmacists that you take warfarin. Some prescription medicines can affect how warfarin works. · Keep the amount of vitamin K in your diet about the same from day to day. Do not suddenly eat a lot more or a lot less food that is rich in vitamin K than you usually do. Vitamin K affects how warfarin works and how your blood clots. Talk with your doctor before making big changes in your diet. Foods that have a lot of vitamin K include cooked green vegetables, such as:  ¨ Kale, spinach, turnip greens, jose greens, Swiss chard, and mustard greens. ¨ [place] sprouts, broccoli, and asparagus. · Limit your use of alcohol. Avoid bleeding by preventing falls and injuries  · Wear slippers or shoes with nonskid soles. · Remove throw rugs and clutter. · Rearrange furniture and electrical cords to keep them out of walking paths.   · Keep stairways, porches, and outside walkways well lit. Use night-lights in hallways and bathrooms. · Be extra careful when you work with sharp tools or knives. When should you call for help? Call 911 anytime you think you may need emergency care. For example, call if:  ? · You have a sudden, severe headache that is different from past headaches. ?Call your doctor now or seek immediate medical care if:  ? · You have any abnormal bleeding, such as:  ¨ Nosebleeds. ¨ Vaginal bleeding that is different (heavier, more frequent, at a different time of the month) than what you are used to. ¨ Bloody or black stools, or rectal bleeding. ¨ Bloody or pink urine. ? Watch closely for changes in your health, and be sure to contact your doctor if you have any problems. Where can you learn more? Go to http://johan-maria del carmen.info/. Enter E985 in the search box to learn more about \"Taking Warfarin Safely: Care Instructions. \"  Current as of: September 21, 2016  Content Version: 11.4  © 4707-7574 [employer]. Care instructions adapted under license by [employer] (which disclaims liability or warranty for this information). If you have questions about a medical condition or this instruction, always ask your healthcare professional. [address] any warranty or liability for your use of this information.

## 2018-03-02 NOTE — ED PROVIDER NOTES
EMERGENCY DEPARTMENT HISTORY AND PHYSICAL EXAM    Date: 3/1/2018  Patient Name: Dale Escalona    History of Presenting Illness     Chief Complaint   Patient presents with    Abdominal Pain    Numbness    Rectal Bleeding    Dizziness         History Provided By: Patient    Chief Complaint: RLE and left hand numbness  Duration: Since yesterday   Timing:  Improving  Location: RLE and left hand  Severity: Mild  Associated Symptoms:  dizziness, lightheadedness, and slurred speech; also C/O Crohn's flare up with sxs of nausea, periumbilical abdominal pain, and rectal bleeding that may be bright red or dark    Additional History (Context):   9:38 PM  Dale Escalona is a 56 y.o. female with PMHx Crohn's disease, HTN, and right sided CVA who presents ambulatory to the emergency department C/O RLE and left hand numbness, onset yesterday. Associated sxs include dizziness, lightheadedness, and slurred speech. She reports that her current sxs are unlike her previous CVA. Pt also C/O Crohn's flare up with sxs of nausea, periumbilical abdominal pain, and rectal bleeding that may be bright red or dark. Pt is on warfarin but does not take it daily as instructed because \"It make me bleed. \" Of note, pt was evaluated here 2 months ago for CVA-like sxs and admitted for C. Diff. Pt is right handed. Pt denies vomiting, tobacco/EtOH/illicit drug use, or any other sxs or complaints. PCP: Noreen Seay MD    Current Outpatient Prescriptions   Medication Sig Dispense Refill    dicyclomine (BENTYL) 20 mg tablet Take 1 Tab by mouth every six (6) hours for 20 doses. 20 Tab 0    warfarin (COUMADIN) 7.5 mg tablet Take 1 Tab by mouth daily. (Patient taking differently: Take 5 mg by mouth daily.) 30 Tab 0    metroNIDAZOLE (FLAGYL) 500 mg tablet Take 1 Tab by mouth every eight (8) hours.  24 Tab 0    predniSONE (DELTASONE) 10 mg tablet Take 5 tablets by mouth daily for 7 days, then 4 tablets daily for 14 days then 3 tablets daily for 14 days then 2 tablets daily for 14 days then 1 tablet by mouth daily for 14 days 175 Tab 0    docusate sodium (COLACE) 100 mg capsule Take 1 Cap by mouth two (2) times a day for 90 days. 60 Cap 2    lisinopril (PRINIVIL, ZESTRIL) 10 mg tablet Take 10 mg by mouth daily.  carvedilol (COREG) 3.125 mg tablet Take 1 Tab by mouth two (2) times daily (with meals). 60 Tab 0    atorvastatin (LIPITOR) 80 mg tablet Take 1 Tab by mouth nightly. 30 Tab 2    butalbital-acetaminophen-caff (FIORICET) -40 mg per capsule Take 1 Cap by mouth every six (6) hours as needed for Pain. Max Daily Amount: 4 Caps. Indications: TENSION-TYPE HEADACHE 12 Cap 0       Past History     Past Medical History:  Past Medical History:   Diagnosis Date    Abdominal pain     Anemia NEC     sickle cell trait    C. difficile colitis     [facility]'s disease ([facility]     Gastrointestinal disorder     Crohns    Herpes zoster     Hx of cocaine abuse     Hyperkalemia     Hypertension     Iron deficiency anemia     MRSA (methicillin resistant Staphylococcus aureus)     Obesity     Pain management     Sickle cell trait (HCC)     Stroke ([facility]     + cva 3/17    Thromboembolus [facility])        Past Surgical History:  Past Surgical History:   Procedure Laterality Date    COLONOSCOPY N/A 3/17/2017    COLONOSCOPY; BIOPSY; performed by Beata Aguilera MD at [facility] ENDOSCOPY    HX GYN      tubal ligation    HX TUBAL LIGATION      VASCULAR SURGERY PROCEDURE UNLIST      blood clot back of right knee       Family History:  History reviewed. No pertinent family history. Social History:  Social History   Substance Use Topics    Smoking status: Former Smoker    Smokeless tobacco: Never Used    Alcohol use No       Allergies:   Allergies   Allergen Reactions    Humira [Adalimumab] Other (comments)    Remicade [Infliximab] Palpitations         Review of Systems   Review of Systems   Constitutional: Negative for chills, diaphoresis, fever and unexpected weight change. HENT: Negative for congestion, drooling, ear pain, rhinorrhea, sore throat, tinnitus and trouble swallowing. Eyes: Negative for photophobia, pain, redness and visual disturbance. Respiratory: Negative for cough, choking, chest tightness, shortness of breath, wheezing and stridor. Cardiovascular: Negative for chest pain, palpitations and leg swelling. Gastrointestinal: Positive for abdominal pain and nausea. Negative for abdominal distention, anal bleeding, blood in stool, constipation, diarrhea and vomiting.        (+) rectal bleeding   Genitourinary: Negative for difficulty urinating, dysuria, flank pain, frequency, hematuria and urgency. Musculoskeletal: Negative for arthralgias, back pain and neck pain. Skin: Negative for color change, rash and wound. Neurological: Positive for dizziness, speech difficulty, light-headedness and numbness (RLE and left hand). Negative for seizures, syncope and headaches. Hematological: Does not bruise/bleed easily. Psychiatric/Behavioral: Negative for agitation, behavioral problems, hallucinations, self-injury and suicidal ideas. The patient is not hyperactive. Physical Exam     Vitals:    03/01/18 2014 03/01/18 2316 03/02/18 0134   BP: 148/90 (!) 140/94 (!) 147/94   Pulse: (!) 124 91 87   Resp: 18 15 16   Temp: 97.9 °F (36.6 °C)     SpO2: 100% 100% 100%   Weight: 79.4 kg (175 lb)     Height: 5' 6\" (1.676 m)       Physical Exam   Constitutional: She is oriented to person, place, and time. She appears well-developed and well-nourished. Non-toxic appearance. No distress. Overweight, well appearing, non toxic   HENT:   Head: Normocephalic and atraumatic. Right Ear: External ear normal.   Left Ear: External ear normal.   Mouth/Throat: Oropharynx is clear and moist. No oropharyngeal exudate. Eyes: Conjunctivae are normal. Pupils are equal, round, and reactive to light. No scleral icterus. Right eye exhibits nystagmus (faint). Left eye exhibits nystagmus (faint). No pallor. Faint nystagmus   Neck: Normal range of motion. Neck supple. No JVD present. No tracheal deviation present. No thyromegaly present. Cardiovascular: Normal rate, regular rhythm and normal heart sounds. Pulmonary/Chest: Effort normal and breath sounds normal. No stridor. No respiratory distress. Abdominal: Soft. Bowel sounds are normal. She exhibits no distension. There is no tenderness. There is no rebound and no guarding. Musculoskeletal: Normal range of motion. She exhibits no edema or tenderness. No soft tissue injuries   Lymphadenopathy:     She has no cervical adenopathy. Neurological: She is alert and oriented to person, place, and time. She has normal strength and normal reflexes. No cranial nerve deficit. Coordination normal.   Periods of slurred speech with slight droop of left sided facial muscles. No pronator drift. Normal fine motor coordination   Skin: Skin is warm and dry. No rash noted. She is not diaphoretic. No erythema. Psychiatric: She has a normal mood and affect. Her behavior is normal. Judgment and thought content normal. Her speech is slurred (periods). Nursing note and vitals reviewed.     Diagnostic Study Results     Labs -     Recent Results (from the past 12 hour(s))   URINALYSIS W/ RFLX MICROSCOPIC    Collection Time: 03/01/18  9:10 PM   Result Value Ref Range    Color YELLOW      Appearance CLEAR      Specific gravity 1.018 1.005 - 1.030      pH (UA) 6.0 5.0 - 8.0      Protein NEGATIVE  NEG mg/dL    Glucose NEGATIVE  NEG mg/dL    Ketone NEGATIVE  NEG mg/dL    Bilirubin NEGATIVE  NEG      Blood NEGATIVE  NEG      Urobilinogen 1.0 0.2 - 1.0 EU/dL    Nitrites NEGATIVE  NEG      Leukocyte Esterase SMALL (A) NEG     URINE MICROSCOPIC ONLY    Collection Time: 03/01/18  9:10 PM   Result Value Ref Range    WBC 2 to 5 0 - 5 /hpf    RBC NEGATIVE  0 - 5 /hpf    Epithelial cells 1+ 0 - 5 /lpf    Bacteria FEW (A) NEG /hpf    Mucus NEGATIVE  NEG /lpf   CBC WITH AUTOMATED DIFF    Collection Time: 03/01/18  9:15 PM   Result Value Ref Range    WBC 11.0 4.6 - 13.2 K/uL    RBC 4.01 (L) 4.20 - 5.30 M/uL    HGB 9.5 (L) 12.0 - 16.0 g/dL    HCT 30.9 (L) 35.0 - 45.0 %    MCV 77.1 74.0 - 97.0 FL    MCH 23.7 (L) 24.0 - 34.0 PG    MCHC 30.7 (L) 31.0 - 37.0 g/dL    RDW 15.8 (H) 11.6 - 14.5 %    PLATELET 181 596 - 052 K/uL    MPV 9.7 9.2 - 11.8 FL    NEUTROPHILS 74 (H) 40 - 73 %    LYMPHOCYTES 18 (L) 21 - 52 %    MONOCYTES 7 3 - 10 %    EOSINOPHILS 1 0 - 5 %    BASOPHILS 0 0 - 2 %    ABS. NEUTROPHILS 8.1 (H) 1.8 - 8.0 K/UL    ABS. LYMPHOCYTES 2.0 0.9 - 3.6 K/UL    ABS. MONOCYTES 0.8 0.05 - 1.2 K/UL    ABS. EOSINOPHILS 0.1 0.0 - 0.4 K/UL    ABS.  BASOPHILS 0.0 0.0 - 0.06 K/UL    DF AUTOMATED     METABOLIC PANEL, BASIC    Collection Time: 03/01/18  9:15 PM   Result Value Ref Range    Sodium 140 136 - 145 mmol/L    Potassium 3.7 3.5 - 5.5 mmol/L    Chloride 106 100 - 108 mmol/L    CO2 27 21 - 32 mmol/L    Anion gap 7 3.0 - 18 mmol/L    Glucose 101 (H) 74 - 99 mg/dL    BUN 11 7.0 - 18 MG/DL    Creatinine 0.86 0.6 - 1.3 MG/DL    BUN/Creatinine ratio 13 12 - 20      GFR est AA >60 >60 ml/min/1.73m2    GFR est non-AA >60 >60 ml/min/1.73m2    Calcium 9.0 8.5 - 10.1 MG/DL   MAGNESIUM    Collection Time: 03/01/18  9:15 PM   Result Value Ref Range    Magnesium 1.8 1.6 - 2.6 mg/dL   PROTHROMBIN TIME + INR    Collection Time: 03/01/18  9:15 PM   Result Value Ref Range    Prothrombin time 14.3 11.5 - 15.2 sec    INR 1.2 0.8 - 1.2     PTT    Collection Time: 03/01/18  9:15 PM   Result Value Ref Range    aPTT 23.2 23.0 - 36.4 SEC   EKG, 12 LEAD, INITIAL    Collection Time: 03/01/18 11:13 PM   Result Value Ref Range    Ventricular Rate 89 BPM    Atrial Rate 89 BPM    P-R Interval 160 ms    QRS Duration 100 ms    Q-T Interval 392 ms    QTC Calculation (Bezet) 476 ms    Calculated P Axis 29 degrees    Calculated R Axis 48 degrees    Calculated T Axis 14 degrees    Diagnosis Normal sinus rhythm  Septal infarct , age undetermined  Abnormal ECG  When compared with ECG of 08-JAN-2018 00:16,  Left bundle branch block is no longer present  Septal infarct is now present         Radiologic Studies -    MRI BRAIN WO CONT    (Results Pending)     CT Results  (Last 48 hours)    None        CXR Results  (Last 48 hours)    None            Medical Decision Making   I am the first provider for this patient. I reviewed the vital signs, available nursing notes, past medical history, past surgical history, family history and social history. Vital Signs-Reviewed the patient's vital signs. Pulse Oximetry Analysis - 100% on RA     EKG interpretation: (Preliminary)  23:13  NSR at 89 bpm. LAFB. Normal ST segments. EKG read by Hi Baird. Jovanna Hidalgo MD at 23:13    Records Reviewed: Nursing Notes    Provider Notes (Medical Decision Making):   Ddx: She has chronic complaints unlike that of her old CVA findings. Will check MR for new CVA findings and metabolic encephalopathy for her old sxs. Procedures:  Procedures    PROCEDURE NOTE - RECTAL EXAM:   9:51 PM  Performed by: Hi Baird. Jovanna Hidalgo MD  Rectal exam performed. Brown stool was collected. Stool was Hemoccult tested, and found to be heme Negative. The procedure took 1-15 minutes, and pt tolerated well. Written by Nury Hampton, ED Scribe, as dictated by Hi Baird. Jovanna Hidalgo MD.    ED Course:   9:38 PM   Initial assessment performed. The patients presenting problems have been discussed, and they are in agreement with the care plan formulated and outlined with them. I have encouraged them to ask questions as they arise throughout their visit. Diagnosis and Disposition       DISCHARGE NOTE:  1:24 AM  Jadyn Curry's  results have been reviewed with her. She has been counseled regarding her diagnosis, treatment, and plan.   She verbally conveys understanding and agreement of the signs, symptoms, diagnosis, treatment and prognosis and additionally agrees to follow up as discussed. She also agrees with the care-plan and conveys that all of her questions have been answered. I have also provided discharge instructions for her that include: educational information regarding their diagnosis and treatment, and list of reasons why they would want to return to the ED prior to their follow-up appointment, should her condition change. She has been provided with education for proper emergency department utilization. CLINICAL IMPRESSION:    1. Paresthesia    2. Subtherapeutic anticoagulation    3. H/O stroke associated with blood clotting tendency    4. [facility]'s disease with complication, [facility]        PLAN:  1. D/C Home  2. Discharge Medication List as of 3/2/2018  1:30 AM      START taking these medications    Details   dicyclomine (BENTYL) 20 mg tablet Take 1 Tab by mouth every six (6) hours for 20 doses. , Normal, Disp-20 Tab, R-0         CONTINUE these medications which have NOT CHANGED    Details   warfarin (COUMADIN) 7.5 mg tablet Take 1 Tab by mouth daily. , No Print, Disp-30 Tab, R-0      metroNIDAZOLE (FLAGYL) 500 mg tablet Take 1 Tab by mouth every eight (8) hours. , Normal, Disp-24 Tab, R-0      predniSONE (DELTASONE) 10 mg tablet Take 5 tablets by mouth daily for 7 days, then 4 tablets daily for 14 days then 3 tablets daily for 14 days then 2 tablets daily for 14 days then 1 tablet by mouth daily for 14 days, No Print, Disp-175 Tab, R-0      docusate sodium (COLACE) 100 mg capsule Take 1 Cap by mouth two (2) times a day for 90 days. , Normal, Disp-60 Cap, R-2      lisinopril (PRINIVIL, ZESTRIL) 10 mg tablet Take 10 mg by mouth daily. , Historical Med      carvedilol (COREG) 3.125 mg tablet Take 1 Tab by mouth two (2) times daily (with meals). , Print, Disp-60 Tab, R-0      atorvastatin (LIPITOR) 80 mg tablet Take 1 Tab by mouth nightly. , Print, Disp-30 Tab, R-2      butalbital-acetaminophen-caff (FIORICET) -40 mg per capsule Take 1 Cap by mouth every six (6) hours as needed for Pain. Max Daily Amount: 4 Caps. Indications: TENSION-TYPE HEADACHE, Print, Disp-12 Cap, R-0         STOP taking these medications       Lactobacillus Acidoph & Bulgar (FLORANEX) 1 million cell tab tablet Comments:   Reason for Stopping:         oxyCODONE-acetaminophen (PERCOCET) 5-325 mg per tablet Comments:   Reason for Stopping:         promethazine (PHENERGAN) 25 mg tablet Comments:   Reason for Stopping:         ELETRIPTAN HBR (RELPAX PO) Comments:   Reason for Stopping:         gabapentin (NEURONTIN) 400 mg capsule Comments:   Reason for Stopping:             3.   Follow-up Information     Follow up With Details Comments Contact Info    BRITTANIE Stephens Schedule an appointment as soon as possible for a visit in 2 days for neurology follow up [facility]  [address]  [address] [address],[address] [facility], [id number] [place] [address]  Main Phone: [phone]    Isidro Cota MD Schedule an appointment as soon as possible for a visit in 2 days for GI follow up [address] [facility]  [address] [facility]      [facility] EMERGENCY DEPT  As needed, If symptoms worsen [address] [place] [id number] [phone]        _______________________________    Attestations: This note is prepared by Enid Savage, acting as Scribe for Ramona Caba MD.    Ramona Caba MD:  The scribe's documentation has been prepared under my direction and personally reviewed by me in its entirety.   I confirm that the note above accurately reflects all work, treatment, procedures, and medical decision making performed by me.  _______________________________

## 2018-03-02 NOTE — ED TRIAGE NOTES
Sepsis Screening completed    (  )Patient meets SIRS criteria. ( x )Patient does not meet SIRS criteria. SIRS Criteria is achieved when two or more of the following are present   Temperature < 96.8°F (36°C) or > 100.9°F (38.3°C)   Heart Rate > 90 beats per minute   Respiratory Rate > 20 breaths per minute   WBC count > 12,000 or <4,000 or > 10% bands  .

## 2018-03-02 NOTE — ED TRIAGE NOTES
Patient states left hand numbness and right foot numbness, dizziness, all onset yesterday, also states belly pain and bleeding from rectum from crohn's. States her speech is slurred, not obvious on assessment, no facial droop.

## 2018-03-04 LAB
ATRIAL RATE: 89 BPM
CALCULATED P AXIS, ECG09: 29 DEGREES
CALCULATED R AXIS, ECG10: 48 DEGREES
CALCULATED T AXIS, ECG11: 14 DEGREES
DIAGNOSIS, 93000: NORMAL
P-R INTERVAL, ECG05: 160 MS
Q-T INTERVAL, ECG07: 392 MS
QRS DURATION, ECG06: 100 MS
QTC CALCULATION (BEZET), ECG08: 476 MS
VENTRICULAR RATE, ECG03: 89 BPM

## 2018-04-13 ENCOUNTER — HOSPITAL ENCOUNTER (EMERGENCY)
Age: 48
Discharge: HOME | End: 2018-04-13
Attending: INTERNAL MEDICINE
Payer: MEDICAID

## 2018-04-13 ENCOUNTER — APPOINTMENT (OUTPATIENT)
Dept: CT IMAGING | Age: 48
End: 2018-04-13
Attending: INTERNAL MEDICINE
Payer: MEDICAID

## 2018-04-13 VITALS
HEIGHT: 66 IN | SYSTOLIC BLOOD PRESSURE: 118 MMHG | BODY MASS INDEX: 32.14 KG/M2 | WEIGHT: 200 LBS | RESPIRATION RATE: 11 BRPM | HEART RATE: 82 BPM | DIASTOLIC BLOOD PRESSURE: 84 MMHG | TEMPERATURE: 97.9 F | OXYGEN SATURATION: 100 %

## 2018-04-13 DIAGNOSIS — G44.209 TENSION-TYPE HEADACHE, NOT INTRACTABLE, UNSPECIFIED CHRONICITY PATTERN: ICD-10-CM

## 2018-04-13 DIAGNOSIS — R20.2 PARESTHESIA: Primary | ICD-10-CM

## 2018-04-13 DIAGNOSIS — E86.0 DEHYDRATION: ICD-10-CM

## 2018-04-13 DIAGNOSIS — D64.9 CHRONIC ANEMIA: ICD-10-CM

## 2018-04-13 LAB
ANION GAP SERPL CALC-SCNC: 10 MMOL/L (ref 3–18)
APTT PPP: 31.7 SEC (ref 23–36.4)
BASOPHILS # BLD: 0 K/UL (ref 0–0.06)
BASOPHILS NFR BLD: 0 % (ref 0–2)
BUN SERPL-MCNC: 20 MG/DL (ref 7–18)
BUN/CREAT SERPL: 20 (ref 12–20)
CALCIUM SERPL-MCNC: 8.8 MG/DL (ref 8.5–10.1)
CHLORIDE SERPL-SCNC: 105 MMOL/L (ref 100–108)
CK MB CFR SERPL CALC: NORMAL % (ref 0–4)
CK MB SERPL-MCNC: <1 NG/ML (ref 5–25)
CK SERPL-CCNC: 89 U/L (ref 26–192)
CO2 SERPL-SCNC: 28 MMOL/L (ref 21–32)
CREAT SERPL-MCNC: 1.01 MG/DL (ref 0.6–1.3)
DIFFERENTIAL METHOD BLD: ABNORMAL
EOSINOPHIL # BLD: 0.4 K/UL (ref 0–0.4)
EOSINOPHIL NFR BLD: 6 % (ref 0–5)
ERYTHROCYTE [DISTWIDTH] IN BLOOD BY AUTOMATED COUNT: 14.9 % (ref 11.6–14.5)
GLUCOSE BLD STRIP.AUTO-MCNC: 102 MG/DL (ref 70–110)
GLUCOSE SERPL-MCNC: 98 MG/DL (ref 74–99)
HCT VFR BLD AUTO: 30.8 % (ref 35–45)
HGB BLD-MCNC: 9.3 G/DL (ref 12–16)
INR PPP: 2.2 (ref 0.8–1.2)
LYMPHOCYTES # BLD: 1.9 K/UL (ref 0.9–3.6)
LYMPHOCYTES NFR BLD: 27 % (ref 21–52)
MAGNESIUM SERPL-MCNC: 1.8 MG/DL (ref 1.6–2.6)
MCH RBC QN AUTO: 23.1 PG (ref 24–34)
MCHC RBC AUTO-ENTMCNC: 30.2 G/DL (ref 31–37)
MCV RBC AUTO: 76.6 FL (ref 74–97)
MONOCYTES # BLD: 0.7 K/UL (ref 0.05–1.2)
MONOCYTES NFR BLD: 10 % (ref 3–10)
NEUTS SEG # BLD: 4.1 K/UL (ref 1.8–8)
NEUTS SEG NFR BLD: 57 % (ref 40–73)
PLATELET # BLD AUTO: 347 K/UL (ref 135–420)
PMV BLD AUTO: 9.2 FL (ref 9.2–11.8)
POTASSIUM SERPL-SCNC: 3.5 MMOL/L (ref 3.5–5.5)
PROTHROMBIN TIME: 23.8 SEC (ref 11.5–15.2)
RBC # BLD AUTO: 4.02 M/UL (ref 4.2–5.3)
SODIUM SERPL-SCNC: 143 MMOL/L (ref 136–145)
TROPONIN I SERPL-MCNC: <0.02 NG/ML (ref 0–0.06)
WBC # BLD AUTO: 7.1 K/UL (ref 4.6–13.2)

## 2018-04-13 PROCEDURE — 96374 THER/PROPH/DIAG INJ IV PUSH: CPT

## 2018-04-13 PROCEDURE — 85730 THROMBOPLASTIN TIME PARTIAL: CPT | Performed by: INTERNAL MEDICINE

## 2018-04-13 PROCEDURE — 83735 ASSAY OF MAGNESIUM: CPT | Performed by: INTERNAL MEDICINE

## 2018-04-13 PROCEDURE — 93005 ELECTROCARDIOGRAM TRACING: CPT

## 2018-04-13 PROCEDURE — 85025 COMPLETE CBC W/AUTO DIFF WBC: CPT | Performed by: INTERNAL MEDICINE

## 2018-04-13 PROCEDURE — 82962 GLUCOSE BLOOD TEST: CPT

## 2018-04-13 PROCEDURE — 74011250637 HC RX REV CODE- 250/637: Performed by: INTERNAL MEDICINE

## 2018-04-13 PROCEDURE — 99285 EMERGENCY DEPT VISIT HI MDM: CPT

## 2018-04-13 PROCEDURE — 74011250636 HC RX REV CODE- 250/636: Performed by: INTERNAL MEDICINE

## 2018-04-13 PROCEDURE — 70450 CT HEAD/BRAIN W/O DYE: CPT

## 2018-04-13 PROCEDURE — 85610 PROTHROMBIN TIME: CPT | Performed by: INTERNAL MEDICINE

## 2018-04-13 PROCEDURE — 80048 BASIC METABOLIC PNL TOTAL CA: CPT | Performed by: INTERNAL MEDICINE

## 2018-04-13 PROCEDURE — 82550 ASSAY OF CK (CPK): CPT | Performed by: INTERNAL MEDICINE

## 2018-04-13 PROCEDURE — 96361 HYDRATE IV INFUSION ADD-ON: CPT

## 2018-04-13 RX ORDER — BUTALBITAL, ACETAMINOPHEN AND CAFFEINE 50; 325; 40 MG/1; MG/1; MG/1
1 TABLET ORAL
Status: COMPLETED | OUTPATIENT
Start: 2018-04-13 | End: 2018-04-13

## 2018-04-13 RX ORDER — BUTALBITAL, ACETAMINOPHEN AND CAFFEINE 300; 40; 50 MG/1; MG/1; MG/1
1 CAPSULE ORAL
Qty: 20 CAP | Refills: 0 | Status: SHIPPED | OUTPATIENT
Start: 2018-04-13 | End: 2018-04-28

## 2018-04-13 RX ORDER — PROCHLORPERAZINE EDISYLATE 5 MG/ML
10 INJECTION INTRAMUSCULAR; INTRAVENOUS
Status: COMPLETED | OUTPATIENT
Start: 2018-04-13 | End: 2018-04-13

## 2018-04-13 RX ADMIN — PROCHLORPERAZINE EDISYLATE 10 MG: 5 INJECTION INTRAMUSCULAR; INTRAVENOUS at 10:06

## 2018-04-13 RX ADMIN — BUTALBITAL, ACETAMINOPHEN AND CAFFEINE 1 TABLET: 50; 325; 40 TABLET ORAL at 08:27

## 2018-04-13 RX ADMIN — SODIUM CHLORIDE 500 ML: 900 INJECTION, SOLUTION INTRAVENOUS at 10:34

## 2018-04-13 NOTE — DISCHARGE INSTRUCTIONS
Dehydration: Care Instructions  Your Care Instructions  Dehydration happens when your body loses too much fluid. This might happen when you do not drink enough water or you lose large amounts of fluids from your body because of diarrhea, vomiting, or sweating. Severe dehydration can be life-threatening. Water and minerals called electrolytes help put your body fluids back in balance. Learn the early signs of fluid loss, and drink more fluids to prevent dehydration. Follow-up care is a key part of your treatment and safety. Be sure to make and go to all appointments, and call your doctor if you are having problems. It's also a good idea to know your test results and keep a list of the medicines you take. How can you care for yourself at home? · To prevent dehydration, drink plenty of fluids, enough so that your urine is light yellow or clear like water. Choose water and other caffeine-free clear liquids until you feel better. If you have kidney, heart, or liver disease and have to limit fluids, talk with your doctor before you increase the amount of fluids you drink. · If you do not feel like eating or drinking, try taking small sips of water, sports drinks, or other rehydration drinks. · Get plenty of rest.  To prevent dehydration  · Add more fluids to your diet and daily routine, unless your doctor has told you not to. · During hot weather, drink more fluids. Drink even more fluids if you exercise a lot. Stay away from drinks with alcohol or caffeine. · Watch for the symptoms of dehydration. These include:  ¨ A dry, sticky mouth. ¨ Dark yellow urine, and not much of it. ¨ Dry and sunken eyes. ¨ Feeling very tired. · Learn what problems can lead to dehydration. These include:  ¨ Diarrhea, fever, and vomiting. ¨ Any illness with a fever, such as pneumonia or the flu. ¨ Activities that cause heavy sweating, such as endurance races and heavy outdoor work in hot or humid weather.   ¨ Alcohol or drug abuse or withdrawal.  ¨ Certain medicines, such as cold and allergy pills (antihistamines), diet pills (diuretics), and laxatives. ¨ Certain diseases, such as diabetes, cancer, and heart or kidney disease. When should you call for help? Call 911 anytime you think you may need emergency care. For example, call if:  ? · You passed out (lost consciousness). ?Call your doctor now or seek immediate medical care if:  ? · You are confused and cannot think clearly. ? · You are dizzy or lightheaded, or you feel like you may faint. ? · You have signs of needing more fluids. You have sunken eyes and a dry mouth, and you pass only a little dark urine. ? · You cannot keep fluids down. ? Watch closely for changes in your health, and be sure to contact your doctor if:  ? · You are not making tears. ? · Your skin is very dry and sags slowly back into place after you pinch it. ? · Your mouth and eyes are very dry. Where can you learn more? Go to http://johan-maria del carmen.info/. Enter D969 in the search box to learn more about \"Dehydration: Care Instructions. \"  Current as of: March 20, 2017  Content Version: 11.4  © 1959-6727 [employer]. Care instructions adapted under license by test company (which disclaims liability or warranty for this information). If you have questions about a medical condition or this instruction, always ask your healthcare professional. [place] [address] any warranty or liability for your use of this information. Numbness and Tingling: Care Instructions  Your Care Instructions    Many things can cause numbness or tingling. Swelling may put pressure on a nerve. This could cause you to lose feeling or have a pins-and-needles sensation on part of your body. Nerves may be damaged from trauma, toxins, or diseases, such as diabetes or multiple sclerosis (MS). Sometimes, though, the cause is not clear.   If there is no clear reason for your symptoms, and you are not having any other symptoms, your doctor may suggest watching and waiting for a while to see if the numbness or tingling goes away on its own. Your doctor may want you to have blood or nerve tests to find the cause of your symptoms. Follow-up care is a key part of your treatment and safety. Be sure to make and go to all appointments, and call your doctor if you are having problems. It's also a good idea to know your test results and keep a list of the medicines you take. How can you care for yourself at home? · If your doctor prescribes medicine, take it exactly as directed. Call your doctor if you think you are having a problem with your medicine. · If you have any swelling, put ice or a cold pack on the area for 10 to 20 minutes at a time. Put a thin cloth between the ice and your skin. When should you call for help? Call 911 anytime you think you may need emergency care. For example, call if:  ? · You have weakness, numbness, or tingling in both legs. ? · You lose bowel or bladder control. ? · You have symptoms of a stroke. These may include:  ¨ Sudden numbness, tingling, weakness, or loss of movement in your face, arm, or leg, especially on only one side of your body. ¨ Sudden vision changes. ¨ Sudden trouble speaking. ¨ Sudden confusion or trouble understanding simple statements. ¨ Sudden problems with walking or balance. ¨ A sudden, severe headache that is different from past headaches. ? Watch closely for changes in your health, and be sure to contact your doctor if you have any problems, or if:  ? · You do not get better as expected. Where can you learn more? Go to http://johan-maria del carmen.info/. Enter O544 in the search box to learn more about \"Numbness and Tingling: Care Instructions. \"  Current as of: October 14, 2016  Content Version: 11.4  © 1351-3050 [employer].  Care instructions adapted under license by [employer] (which disclaims liability or warranty for this information). If you have questions about a medical condition or this instruction, always ask your healthcare professional. [address] any warranty or liability for your use of this information.

## 2018-04-13 NOTE — ED NOTES
Assumed care of patient. Patient presents complaining of headache, slurred speech, LUE numbness, and numbness to tongue onset around 2200 last night. Patient reports when she awoke this morning, she also had a \"cramping\" sensation to her left hand. Patient reports history of stroke with residual left-sided facial droop. Patient also reports intermittent RLE numbness. Patient endorses intermittent dizziness described as \"feeling like I'm going to pass out\", chest pain, and shortness of breath over the past several days. Patient A&O x4. Residual left-sided facial droop noted. Tongue midline. No extremity drift noted. Pupils equal and reactive. Patient endorses decreased sensation to LUE and RLE. Patient's blood sugar checked at 102. Dr. Cherl Fleischer at bedside to assess patient.

## 2018-04-13 NOTE — ED TRIAGE NOTES
C/o left hand cramping and slurred speech since last night around 2200. No slurred speech noted. States she has a headache now. Sepsis Screening completed    (  )Patient meets SIRS criteria. (x  )Patient does not meet SIRS criteria.       SIRS Criteria is achieved when two or more of the following are present   Temperature < 96.8°F (36°C) or > 100.9°F (38.3°C)   Heart Rate > 90 beats per minute   Respiratory Rate > 20 breaths per minute   WBC count > 12,000 or <4,000 or > 10% bands

## 2018-04-13 NOTE — ED PROVIDER NOTES
EMERGENCY DEPARTMENT HISTORY AND PHYSICAL EXAM    Date: 4/13/2018  Patient Name: Brain Santos    History of Presenting Illness     Chief Complaint   Patient presents with    Other         History Provided By: Patient    Chief Complaint: Slurred speech  Duration: 9 Hours  Timing:  Acute  Location: N/A  Quality: Slurred  Severity: 8 out of 10  Associated Symptoms: left arm numbness, left hand cramping, frontal HA (8/10), tongue numbness, chest pain, SOB, and dizziness    Additional History (Context):   7:18 AM  Brain Santos is a 56 y.o. female with PMHx of Crohn's disease, HTN, C. Diff, anemia, and CVA who presents to the emergency department C/O slurred speech onset 9 hours ago. Associated sxs include left arm numbness, left hand cramping, frontal HA (8/10), tongue numbness, chest pain, SOB, and dizziness. Pt reports she did take her Coumadin yesterday. Pt denies LOC, weakness, abdominal pain, Hx diabetes, illicit drug use, and any other sxs or complaints. PCP: Jovanna Rueda MD    Current Facility-Administered Medications   Medication Dose Route Frequency Provider Last Rate Last Dose    sodium chloride 0.9 % bolus infusion 500 mL  500 mL IntraVENous Lyssa Perez  mL/hr at 04/13/18 1034 500 mL at 04/13/18 1034     Current Outpatient Prescriptions   Medication Sig Dispense Refill    butalbital-acetaminophen-caff (FIORICET) -40 mg per capsule Take 1 Cap by mouth every six (6) hours as needed for Pain or Headache. 20 Cap 0    warfarin (COUMADIN) 7.5 mg tablet Take 1 Tab by mouth daily. (Patient taking differently: Take 5 mg by mouth daily.) 30 Tab 0    metroNIDAZOLE (FLAGYL) 500 mg tablet Take 1 Tab by mouth every eight (8) hours.  24 Tab 0    predniSONE (DELTASONE) 10 mg tablet Take 5 tablets by mouth daily for 7 days, then 4 tablets daily for 14 days then 3 tablets daily for 14 days then 2 tablets daily for 14 days then 1 tablet by mouth daily for 14 days 175 Tab 0    lisinopril (PRINIVIL, ZESTRIL) 10 mg tablet Take 10 mg by mouth daily.  carvedilol (COREG) 3.125 mg tablet Take 1 Tab by mouth two (2) times daily (with meals). 60 Tab 0    atorvastatin (LIPITOR) 80 mg tablet Take 1 Tab by mouth nightly. 30 Tab 2       Past History     Past Medical History:  Past Medical History:   Diagnosis Date    Abdominal pain     Anemia NEC     sickle cell trait    C. difficile colitis     Crohn's disease (HCC)     Gastrointestinal disorder     Crohns    Herpes zoster     Hx of cocaine abuse     Hyperkalemia     Hypertension     Iron deficiency anemia     MRSA (methicillin resistant Staphylococcus aureus)     Obesity     Pain management     Sickle cell trait (HCC)     Stroke ([facility] [address]     + cva 3/17    Thromboembolus [facility])        Past Surgical History:  Past Surgical History:   Procedure Laterality Date    COLONOSCOPY N/A 3/17/2017    COLONOSCOPY; BIOPSY; performed by Montserrat Suárez MD at [facility] ENDOSCOPY    HX GYN      tubal ligation    HX TUBAL LIGATION      VASCULAR SURGERY PROCEDURE UNLIST      blood clot back of right knee       Family History:  No family history on file. Social History:  Social History   Substance Use Topics    Smoking status: Former Smoker    Smokeless tobacco: Never Used    Alcohol use No       Allergies: Allergies   Allergen Reactions    Humira [Adalimumab] Other (comments)    Remicade [Infliximab] Palpitations         Review of Systems   Review of Systems   Respiratory: Positive for shortness of breath. Cardiovascular: Positive for chest pain. Gastrointestinal: Negative for abdominal pain. Musculoskeletal: Positive for myalgias (left hand cramping). Neurological: Positive for dizziness, speech difficulty (slurred), numbness (left arm, tongue) and headaches (frontal). Negative for syncope and weakness. All other systems reviewed and are negative.       Physical Exam     Vitals:    04/13/18 0723 04/13/18 0900 04/13/18 1100   BP: (!) 143/97 119/78 118/84   Pulse: (!) 108 83 82   Resp: 16 13 11   Temp: 97.9 °F (36.6 °C)     SpO2: 98%  100%   Weight: 90.7 kg (200 lb)     Height: 5' 6\" (1.676 m)       Physical Exam   Constitutional: She is oriented to person, place, and time. She appears well-developed and well-nourished. HENT:   Head: Normocephalic and atraumatic. Right Ear: External ear normal.   Left Ear: External ear normal.   Nose: Nose normal.   Mouth/Throat: Oropharynx is clear and moist.   Eyes: Conjunctivae and EOM are normal. Pupils are equal, round, and reactive to light. Right eye exhibits no discharge. Left eye exhibits no discharge. No scleral icterus. Neck: Normal range of motion. Neck supple. No JVD present. No tracheal deviation present. Cardiovascular: Regular rhythm, normal heart sounds and intact distal pulses. Tachycardia present. Pulmonary/Chest: Effort normal and breath sounds normal.   Abdominal: Soft. Bowel sounds are normal. She exhibits no distension. There is no tenderness. No HSM   Musculoskeletal: Normal range of motion. Neurological: She is alert and oriented to person, place, and time. She has normal strength and normal reflexes. No cranial nerve deficit (besides subject numbness of face). No focal motor weakness. No drift in BUE and BLE. Sensation intact except subjective numbness in left hand, which is new but right leg numbness is old. No slurred speech. Minimal left facial droop. Skin: Skin is warm and dry. No rash noted. Psychiatric: Her behavior is normal. Her mood appears anxious (slightly). Nursing note and vitals reviewed.       Diagnostic Study Results     Labs -     Recent Results (from the past 12 hour(s))   GLUCOSE, POC    Collection Time: 04/13/18  7:21 AM   Result Value Ref Range    Glucose (POC) 102 70 - 110 mg/dL   EKG, 12 LEAD, INITIAL    Collection Time: 04/13/18  7:35 AM   Result Value Ref Range    Ventricular Rate 95 BPM    Atrial Rate 95 BPM    P-R Interval 162 ms    QRS Duration 154 ms    Q-T Interval 436 ms    QTC Calculation (Bezet) 547 ms    Calculated P Axis 20 degrees    Calculated R Axis -8 degrees    Calculated T Axis 45 degrees    Diagnosis       Normal sinus rhythm  Left bundle branch block  Abnormal ECG  When compared with ECG of 01-MAR-2018 23:13,  Left bundle branch block is now present  Criteria for Septal infarct are no longer present     CBC WITH AUTOMATED DIFF    Collection Time: 04/13/18  9:36 AM   Result Value Ref Range    WBC 7.1 4.6 - 13.2 K/uL    RBC 4.02 (L) 4.20 - 5.30 M/uL    HGB 9.3 (L) 12.0 - 16.0 g/dL    HCT 30.8 (L) 35.0 - 45.0 %    MCV 76.6 74.0 - 97.0 FL    MCH 23.1 (L) 24.0 - 34.0 PG    MCHC 30.2 (L) 31.0 - 37.0 g/dL    RDW 14.9 (H) 11.6 - 14.5 %    PLATELET 335 715 - 169 K/uL    MPV 9.2 9.2 - 11.8 FL    NEUTROPHILS 57 40 - 73 %    LYMPHOCYTES 27 21 - 52 %    MONOCYTES 10 3 - 10 %    EOSINOPHILS 6 (H) 0 - 5 %    BASOPHILS 0 0 - 2 %    ABS. NEUTROPHILS 4.1 1.8 - 8.0 K/UL    ABS. LYMPHOCYTES 1.9 0.9 - 3.6 K/UL    ABS. MONOCYTES 0.7 0.05 - 1.2 K/UL    ABS. EOSINOPHILS 0.4 0.0 - 0.4 K/UL    ABS.  BASOPHILS 0.0 0.0 - 0.06 K/UL    DF AUTOMATED     METABOLIC PANEL, BASIC    Collection Time: 04/13/18  9:36 AM   Result Value Ref Range    Sodium 143 136 - 145 mmol/L    Potassium 3.5 3.5 - 5.5 mmol/L    Chloride 105 100 - 108 mmol/L    CO2 28 21 - 32 mmol/L    Anion gap 10 3.0 - 18 mmol/L    Glucose 98 74 - 99 mg/dL    BUN 20 (H) 7.0 - 18 MG/DL    Creatinine 1.01 0.6 - 1.3 MG/DL    BUN/Creatinine ratio 20 12 - 20      GFR est AA >60 >60 ml/min/1.73m2    GFR est non-AA 59 (L) >60 ml/min/1.73m2    Calcium 8.8 8.5 - 10.1 MG/DL   MAGNESIUM    Collection Time: 04/13/18  9:36 AM   Result Value Ref Range    Magnesium 1.8 1.6 - 2.6 mg/dL   PTT    Collection Time: 04/13/18  9:36 AM   Result Value Ref Range    aPTT 31.7 23.0 - 36.4 SEC   PROTHROMBIN TIME + INR    Collection Time: 04/13/18  9:36 AM   Result Value Ref Range    Prothrombin time 23.8 (H) 11.5 - 15.2 sec    INR 2.2 (H) 0.8 - 1.2     CARDIAC PANEL,(CK, CKMB & TROPONIN)    Collection Time: 04/13/18  9:36 AM   Result Value Ref Range    CK 89 26 - 192 U/L    CK - MB <1.0 <3.6 ng/ml    CK-MB Index  0.0 - 4.0 %     CALCULATION NOT PERFORMED WHEN RESULT IS BELOW LINEAR LIMIT    Troponin-I, Qt. <0.02 0.00 - 0.06 NG/ML       Radiologic Studies -   CT HEAD WO CONT   Final Result   IMPRESSION:        1. No evidence of acute infarct, hemorrhage or mass. Old right MCA distribution  infarct without significant change. As read by the radiologist.     CT Results  (Last 48 hours)               04/13/18 0803  CT HEAD WO CONT Final result    Impression:  IMPRESSION:           1. No evidence of acute infarct, hemorrhage or mass. Old right MCA distribution   infarct without significant change. Narrative:  EXAM: CT head       INDICATION: Headache, slurred speech, left upper extremity numbness beginning   previous evening       COMPARISON: Noncontrast CT brain 1/7/2018       TECHNIQUE: Axial CT imaging of the head was performed without intravenous   contrast. Coronal and sagittal reconstructions were performed. One or more dose reduction techniques were used on this CT: automated exposure   control, adjustment of the mAs and/or kVp according to patient's size, and   iterative reconstruction techniques. The specific techniques utilized on this CT   exam have been documented in the patient's electronic medical record.        _______________       FINDINGS:       BRAIN AND POSTERIOR FOSSA: Stable hypodensity in the right temporal and parietal   lobes consistent with old infarct. There is also stable area of hypodensity on   the right in the centrum semiovale. Ventricles continue be normal in size with   prominence of the sulci. There is no intracranial hemorrhage, mass effect, or   midline shift. No evidence of acute cortical infarct. EXTRA-AXIAL SPACES AND MENINGES: There are no abnormal extra-axial fluid   collections. CALVARIUM: Intact. SINUSES: Clear. OTHER: None.       _______________               CXR Results  (Last 48 hours)    None          Medications given in the ED-  Medications   sodium chloride 0.9 % bolus infusion 500 mL (500 mL IntraVENous New Bag 4/13/18 1034)   prochlorperazine (COMPAZINE) injection 10 mg (10 mg IntraVENous Given 4/13/18 1006)   butalbital-acetaminophen-caffeine (FIORICET, ESGIC) -40 mg per tablet 1 Tab (1 Tab Oral Given 4/13/18 7782)         Medical Decision Making   I am the first provider for this patient. I reviewed the vital signs, available nursing notes, past medical history, past surgical history, family history and social history. Vital Signs-Reviewed the patient's vital signs. Pulse Oximetry Analysis - 98% on RA     Cardiac Monitor:  Rate: 97 bpm  Rhythm: NSR    EKG interpretation: (Preliminary)  Rhythm: NSR. Rate: 95 bpm; LBBB, No STEMI  EKG read by Aye Lopez MD at 7:35 AM     Records Reviewed: Nursing Notes and Old Medical Records    Provider Notes (Medical Decision Making):   DDX: Stroke like symptoms in pt with history of stroke. Is on coumadin. Most of her symptoms are resolving. Rule out subtherapeutic INR, electrolyte disorder, arrhythmia, CNS bleed, and infection. Does not appear to have evidence of encephalitis or meningitis. Procedures:  Procedures    ED Course:   7:18 AM Initial assessment performed. The patients presenting problems have been discussed, and they are in agreement with the care plan formulated and outlined with them. I have encouraged them to ask questions as they arise throughout their visit. 8:11 AM Discussed patient's history, exam, and available diagnostics results with Darren Taylor MD, Neurology, who states no need for further imagine besides the CT head without contrast. Looked at the CT scan from today and finds no acute findings. Agrees with blood work, including checking for INR and urine testing.  Treat accordingly with outpatient follow up. Does not feel the pt needs to be admitted to the hospital. Recent MRI head from March had no acute findings. 10:29 AM Pt is feeling better. HA is down to a 6/10. Normal neuro exam, other than subjective numbness in RLE and LUE. NSR at 87 bpm. Blood pressure is 120/72. Discussed results of labs , including therapeutic INR and slightly elevated BUN, which is consistent with dehydration. Pt denies any bleeding. Pt admits to drinking up to 6 cups of coffees a day. Discussed decreasing caffeine intake. Will discharge after hydration in the ED. Diagnosis and Disposition       DISCHARGE NOTE:  11:23 AM  Meche Curry's  results have been reviewed with her. She has been counseled regarding her diagnosis, treatment, and plan. She verbally conveys understanding and agreement of the signs, symptoms, diagnosis, treatment and prognosis and additionally agrees to follow up as discussed. She also agrees with the care-plan and conveys that all of her questions have been answered. I have also provided discharge instructions for her that include: educational information regarding their diagnosis and treatment, and list of reasons why they would want to return to the ED prior to their follow-up appointment, should her condition change. She has been provided with education for proper emergency department utilization. CLINICAL IMPRESSION:    1. Paresthesia    2. Tension-type headache, not intractable, unspecified chronicity pattern    3. Dehydration    4. Chronic anemia        PLAN:  1. D/C Home  2. Current Discharge Medication List      CONTINUE these medications which have CHANGED    Details   butalbital-acetaminophen-caff (FIORICET) -40 mg per capsule Take 1 Cap by mouth every six (6) hours as needed for Pain or Headache. Qty: 20 Cap, Refills: 0           3.    Follow-up Information     Follow up With Details Comments Contact Info    Benjamine Severs, MD Schedule an appointment as soon as possible for a visit in 3 days For primary care follow up. [address] Cesar Moyer MD Schedule an appointment as soon as possible for a visit in 3 days For neurology follow up. [address]  [address] [facility] [address] [facility] [address] [facility]  [phone]      [facility] EMERGENCY DEPT Go to As needed, If symptoms worsen [address] [address]  [phone]        _______________________________    Attestations: This note is prepared by Eron Erazo, acting as Scribe for Nikita Talbert MD.    Nikita Talbert MD:  The scribe's documentation has been prepared under my direction and personally reviewed by me in its entirety.   I confirm that the note above accurately reflects all work, treatment, procedures, and medical decision making performed by me.  _______________________________

## 2018-04-13 NOTE — ED NOTES
12-lead EKG performed per orders and shown to provider. Unable to obtain PIV access. Patient reports history of difficult stick. Will have ED tech attempt.

## 2018-04-13 NOTE — ED NOTES
Patient reports that she is still unable to provide a urine sample. Patient also reports that her daughter is her ride home and needs to go to work. MD made aware.

## 2018-04-13 NOTE — ED NOTES
I have reviewed discharge instructions with the patient. The patient verbalized understanding. One prescription sent to pharmacy which was reviewed with patient. Patient armband removed and shredded.

## 2018-04-15 LAB
ATRIAL RATE: 95 BPM
CALCULATED P AXIS, ECG09: 20 DEGREES
CALCULATED R AXIS, ECG10: -8 DEGREES
CALCULATED T AXIS, ECG11: 45 DEGREES
DIAGNOSIS, 93000: NORMAL
P-R INTERVAL, ECG05: 162 MS
Q-T INTERVAL, ECG07: 436 MS
QRS DURATION, ECG06: 154 MS
QTC CALCULATION (BEZET), ECG08: 547 MS
VENTRICULAR RATE, ECG03: 95 BPM

## 2018-04-28 ENCOUNTER — HOSPITAL ENCOUNTER (EMERGENCY)
Age: 48
Discharge: HOME | End: 2018-04-28
Attending: EMERGENCY MEDICINE
Payer: MEDICAID

## 2018-04-28 ENCOUNTER — APPOINTMENT (OUTPATIENT)
Dept: GENERAL RADIOLOGY | Age: 48
End: 2018-04-28
Attending: EMERGENCY MEDICINE
Payer: MEDICAID

## 2018-04-28 ENCOUNTER — APPOINTMENT (OUTPATIENT)
Dept: CT IMAGING | Age: 48
End: 2018-04-28
Attending: EMERGENCY MEDICINE
Payer: MEDICAID

## 2018-04-28 VITALS
DIASTOLIC BLOOD PRESSURE: 93 MMHG | OXYGEN SATURATION: 100 % | SYSTOLIC BLOOD PRESSURE: 125 MMHG | WEIGHT: 180 LBS | RESPIRATION RATE: 18 BRPM | TEMPERATURE: 98.4 F | HEIGHT: 66 IN | BODY MASS INDEX: 28.93 KG/M2 | HEART RATE: 85 BPM

## 2018-04-28 DIAGNOSIS — R20.0 LEFT ARM NUMBNESS: Primary | ICD-10-CM

## 2018-04-28 DIAGNOSIS — I10 HYPERTENSION, UNSPECIFIED TYPE: ICD-10-CM

## 2018-04-28 DIAGNOSIS — G44.209 TENSION-TYPE HEADACHE, NOT INTRACTABLE, UNSPECIFIED CHRONICITY PATTERN: ICD-10-CM

## 2018-04-28 DIAGNOSIS — R79.89 ELEVATED LFTS: ICD-10-CM

## 2018-04-28 LAB
ALBUMIN SERPL-MCNC: 3.2 G/DL (ref 3.4–5)
ALBUMIN/GLOB SERPL: 0.7 {RATIO} (ref 0.8–1.7)
ALP SERPL-CCNC: 134 U/L (ref 45–117)
ALT SERPL-CCNC: 99 U/L (ref 13–56)
ANION GAP SERPL CALC-SCNC: 8 MMOL/L (ref 3–18)
AST SERPL-CCNC: 97 U/L (ref 15–37)
BASOPHILS # BLD: 0 K/UL (ref 0–0.06)
BASOPHILS NFR BLD: 0 % (ref 0–2)
BILIRUB SERPL-MCNC: 0.2 MG/DL (ref 0.2–1)
BUN SERPL-MCNC: 18 MG/DL (ref 7–18)
BUN/CREAT SERPL: 25 (ref 12–20)
CALCIUM SERPL-MCNC: 8.7 MG/DL (ref 8.5–10.1)
CHLORIDE SERPL-SCNC: 106 MMOL/L (ref 100–108)
CK MB CFR SERPL CALC: NORMAL % (ref 0–4)
CK MB SERPL-MCNC: <1 NG/ML (ref 5–25)
CK SERPL-CCNC: 55 U/L (ref 26–192)
CO2 SERPL-SCNC: 24 MMOL/L (ref 21–32)
CREAT SERPL-MCNC: 0.71 MG/DL (ref 0.6–1.3)
DIFFERENTIAL METHOD BLD: ABNORMAL
EOSINOPHIL # BLD: 0.7 K/UL (ref 0–0.4)
EOSINOPHIL NFR BLD: 11 % (ref 0–5)
ERYTHROCYTE [DISTWIDTH] IN BLOOD BY AUTOMATED COUNT: 14.9 % (ref 11.6–14.5)
GLOBULIN SER CALC-MCNC: 4.5 G/DL (ref 2–4)
GLUCOSE BLD STRIP.AUTO-MCNC: 78 MG/DL (ref 70–110)
GLUCOSE BLD STRIP.AUTO-MCNC: 89 MG/DL (ref 70–110)
GLUCOSE SERPL-MCNC: 78 MG/DL (ref 74–99)
HCT VFR BLD AUTO: 32.3 % (ref 35–45)
HGB BLD-MCNC: 9.9 G/DL (ref 12–16)
INR PPP: 1.1 (ref 0.8–1.2)
LYMPHOCYTES # BLD: 1.4 K/UL (ref 0.9–3.6)
LYMPHOCYTES NFR BLD: 23 % (ref 21–52)
MAGNESIUM SERPL-MCNC: 1.8 MG/DL (ref 1.6–2.6)
MCH RBC QN AUTO: 23.3 PG (ref 24–34)
MCHC RBC AUTO-ENTMCNC: 30.7 G/DL (ref 31–37)
MCV RBC AUTO: 76 FL (ref 74–97)
MONOCYTES # BLD: 0.5 K/UL (ref 0.05–1.2)
MONOCYTES NFR BLD: 8 % (ref 3–10)
NEUTS SEG # BLD: 3.6 K/UL (ref 1.8–8)
NEUTS SEG NFR BLD: 58 % (ref 40–73)
PLATELET # BLD AUTO: 266 K/UL (ref 135–420)
PMV BLD AUTO: 9.5 FL (ref 9.2–11.8)
POTASSIUM SERPL-SCNC: 4.3 MMOL/L (ref 3.5–5.5)
PROT SERPL-MCNC: 7.7 G/DL (ref 6.4–8.2)
PROTHROMBIN TIME: 13.2 SEC (ref 11.5–15.2)
RBC # BLD AUTO: 4.25 M/UL (ref 4.2–5.3)
SODIUM SERPL-SCNC: 138 MMOL/L (ref 136–145)
TROPONIN I SERPL-MCNC: <0.02 NG/ML (ref 0–0.06)
WBC # BLD AUTO: 6.1 K/UL (ref 4.6–13.2)

## 2018-04-28 PROCEDURE — 74011250636 HC RX REV CODE- 250/636: Performed by: EMERGENCY MEDICINE

## 2018-04-28 PROCEDURE — 93005 ELECTROCARDIOGRAM TRACING: CPT

## 2018-04-28 PROCEDURE — 84484 ASSAY OF TROPONIN QUANT: CPT | Performed by: EMERGENCY MEDICINE

## 2018-04-28 PROCEDURE — 82962 GLUCOSE BLOOD TEST: CPT

## 2018-04-28 PROCEDURE — 85610 PROTHROMBIN TIME: CPT | Performed by: EMERGENCY MEDICINE

## 2018-04-28 PROCEDURE — 70450 CT HEAD/BRAIN W/O DYE: CPT

## 2018-04-28 PROCEDURE — 99285 EMERGENCY DEPT VISIT HI MDM: CPT

## 2018-04-28 PROCEDURE — 96361 HYDRATE IV INFUSION ADD-ON: CPT

## 2018-04-28 PROCEDURE — 83735 ASSAY OF MAGNESIUM: CPT | Performed by: EMERGENCY MEDICINE

## 2018-04-28 PROCEDURE — 96375 TX/PRO/DX INJ NEW DRUG ADDON: CPT

## 2018-04-28 PROCEDURE — 85025 COMPLETE CBC W/AUTO DIFF WBC: CPT | Performed by: EMERGENCY MEDICINE

## 2018-04-28 PROCEDURE — 71045 X-RAY EXAM CHEST 1 VIEW: CPT

## 2018-04-28 PROCEDURE — 80053 COMPREHEN METABOLIC PANEL: CPT | Performed by: EMERGENCY MEDICINE

## 2018-04-28 PROCEDURE — 96374 THER/PROPH/DIAG INJ IV PUSH: CPT

## 2018-04-28 RX ORDER — PROMETHAZINE HYDROCHLORIDE 25 MG/1
25 TABLET ORAL
Qty: 12 TAB | Refills: 0 | Status: SHIPPED | OUTPATIENT
Start: 2018-04-28

## 2018-04-28 RX ORDER — METOCLOPRAMIDE HYDROCHLORIDE 5 MG/ML
10 INJECTION INTRAMUSCULAR; INTRAVENOUS
Status: COMPLETED | OUTPATIENT
Start: 2018-04-28 | End: 2018-04-28

## 2018-04-28 RX ORDER — BUTALBITAL, ACETAMINOPHEN AND CAFFEINE 300; 40; 50 MG/1; MG/1; MG/1
1 CAPSULE ORAL
Qty: 12 CAP | Refills: 0 | Status: SHIPPED | OUTPATIENT
Start: 2018-04-28

## 2018-04-28 RX ORDER — KETOROLAC TROMETHAMINE 30 MG/ML
30 INJECTION, SOLUTION INTRAMUSCULAR; INTRAVENOUS ONCE
Status: COMPLETED | OUTPATIENT
Start: 2018-04-28 | End: 2018-04-28

## 2018-04-28 RX ORDER — DIPHENHYDRAMINE HYDROCHLORIDE 50 MG/ML
25 INJECTION, SOLUTION INTRAMUSCULAR; INTRAVENOUS ONCE
Status: COMPLETED | OUTPATIENT
Start: 2018-04-28 | End: 2018-04-28

## 2018-04-28 RX ADMIN — KETOROLAC TROMETHAMINE 30 MG: 30 INJECTION, SOLUTION INTRAMUSCULAR at 11:54

## 2018-04-28 RX ADMIN — METHYLPREDNISOLONE SODIUM SUCCINATE 125 MG: 125 INJECTION, POWDER, FOR SOLUTION INTRAMUSCULAR; INTRAVENOUS at 11:51

## 2018-04-28 RX ADMIN — DIPHENHYDRAMINE HYDROCHLORIDE 25 MG: 50 INJECTION, SOLUTION INTRAMUSCULAR; INTRAVENOUS at 12:00

## 2018-04-28 RX ADMIN — SODIUM CHLORIDE 1000 ML: 900 INJECTION, SOLUTION INTRAVENOUS at 11:49

## 2018-04-28 RX ADMIN — METOCLOPRAMIDE 10 MG: 5 INJECTION, SOLUTION INTRAMUSCULAR; INTRAVENOUS at 11:56

## 2018-04-28 NOTE — ED NOTES
Discharge instructions reviewed with opportunity for questions provided. Pt vocalized understanding. Armband removed and shredded. Pt stable condition at time of discharge. Ride not at bedside yet. Pt to be transported to waiting room to stand by for her daughter.

## 2018-04-28 NOTE — DISCHARGE INSTRUCTIONS
Numbness and Tingling: Care Instructions  Your Care Instructions    Many things can cause numbness or tingling. Swelling may put pressure on a nerve. This could cause you to lose feeling or have a pins-and-needles sensation on part of your body. Nerves may be damaged from trauma, toxins, or diseases, such as diabetes or multiple sclerosis (MS). Sometimes, though, the cause is not clear. If there is no clear reason for your symptoms, and you are not having any other symptoms, your doctor may suggest watching and waiting for a while to see if the numbness or tingling goes away on its own. Your doctor may want you to have blood or nerve tests to find the cause of your symptoms. Follow-up care is a key part of your treatment and safety. Be sure to make and go to all appointments, and call your doctor if you are having problems. It's also a good idea to know your test results and keep a list of the medicines you take. How can you care for yourself at home? · If your doctor prescribes medicine, take it exactly as directed. Call your doctor if you think you are having a problem with your medicine. · If you have any swelling, put ice or a cold pack on the area for 10 to 20 minutes at a time. Put a thin cloth between the ice and your skin. When should you call for help? Call 911 anytime you think you may need emergency care. For example, call if:  ? · You have weakness, numbness, or tingling in both legs. ? · You lose bowel or bladder control. ? · You have symptoms of a stroke. These may include:  ¨ Sudden numbness, tingling, weakness, or loss of movement in your face, arm, or leg, especially on only one side of your body. ¨ Sudden vision changes. ¨ Sudden trouble speaking. ¨ Sudden confusion or trouble understanding simple statements. ¨ Sudden problems with walking or balance. ¨ A sudden, severe headache that is different from past headaches. ? Watch closely for changes in your health, and be sure to contact your doctor if you have any problems, or if:  ? · You do not get better as expected. Where can you learn more? Go to http://johan-maria del carmen.info/. Enter H092 in the search box to learn more about \"Numbness and Tingling: Care Instructions. \"  Current as of: October 14, 2016  Content Version: 11.4  © 9223-8350 [employer]. Care instructions adapted under license by "[employer] (which disclaims liability or warranty for this information). If you have questions about a medical condition or this instruction, always ask your healthcare professional. [place] [address] any warranty or liability for your use of this information. High Blood Pressure: Care Instructions  Your Care Instructions    If your blood pressure is usually above 140/90, you have high blood pressure, or hypertension. That means the top number is 140 or higher or the bottom number is 90 or higher, or both. Despite what a lot of people think, high blood pressure usually doesn't cause headaches or make you feel dizzy or lightheaded. It usually has no symptoms. But it does increase your risk for heart attack, stroke, and kidney or eye damage. The higher your blood pressure, the more your risk increases. Your doctor will give you a goal for your blood pressure. Your goal will be based on your health and your age. An example of a goal is to keep your blood pressure below 140/90. Lifestyle changes, such as eating healthy and being active, are always important to help lower blood pressure. You might also take medicine to reach your blood pressure goal.  Follow-up care is a key part of your treatment and safety. Be sure to make and go to all appointments, and call your doctor if you are having problems. It's also a good idea to know your test results and keep a list of the medicines you take. How can you care for yourself at home?   Medical treatment  · If you stop taking your medicine, your blood pressure will go back up. You may take one or more types of medicine to lower your blood pressure. Be safe with medicines. Take your medicine exactly as prescribed. Call your doctor if you think you are having a problem with your medicine. · Talk to your doctor before you start taking aspirin every day. Aspirin can help certain people lower their risk of a heart attack or stroke. But taking aspirin isn't right for everyone, because it can cause serious bleeding. · See your doctor regularly. You may need to see the doctor more often at first or until your blood pressure comes down. · If you are taking blood pressure medicine, talk to your doctor before you take decongestants or anti-inflammatory medicine, such as ibuprofen. Some of these medicines can raise blood pressure. · Learn how to check your blood pressure at home. Lifestyle changes  · Stay at a healthy weight. This is especially important if you put on weight around the waist. Losing even 10 pounds can help you lower your blood pressure. · If your doctor recommends it, get more exercise. Walking is a good choice. Bit by bit, increase the amount you walk every day. Try for at least 30 minutes on most days of the week. You also may want to swim, bike, or do other activities. · Avoid or limit alcohol. Talk to your doctor about whether you can drink any alcohol. · Try to limit how much sodium you eat to less than 2,300 milligrams (mg) a day. Your doctor may ask you to try to eat less than 1,500 mg a day. · Eat plenty of fruits (such as bananas and oranges), vegetables, legumes, whole grains, and low-fat dairy products. · Lower the amount of saturated fat in your diet. Saturated fat is found in animal products such as milk, cheese, and meat. Limiting these foods may help you lose weight and also lower your risk for heart disease. · Do not smoke. Smoking increases your risk for heart attack and stroke.  If you need help quitting, talk to your doctor about stop-smoking programs and medicines. These can increase your chances of quitting for good. When should you call for help? Call 911 anytime you think you may need emergency care. This may mean having symptoms that suggest that your blood pressure is causing a serious heart or blood vessel problem. Your blood pressure may be over 180/110. ? For example, call 911 if:  ? · You have symptoms of a heart attack. These may include:  ¨ Chest pain or pressure, or a strange feeling in the chest.  ¨ Sweating. ¨ Shortness of breath. ¨ Nausea or vomiting. ¨ Pain, pressure, or a strange feeling in the back, neck, jaw, or upper belly or in one or both shoulders or arms. ¨ Lightheadedness or sudden weakness. ¨ A fast or irregular heartbeat. ? · You have symptoms of a stroke. These may include:  ¨ Sudden numbness, tingling, weakness, or loss of movement in your face, arm, or leg, especially on only one side of your body. ¨ Sudden vision changes. ¨ Sudden trouble speaking. ¨ Sudden confusion or trouble understanding simple statements. ¨ Sudden problems with walking or balance. ¨ A sudden, severe headache that is different from past headaches. ? · You have severe back or belly pain. ?Do not wait until your blood pressure comes down on its own. Get help right away. ?Call your doctor now or seek immediate care if:  ? · Your blood pressure is much higher than normal (such as 180/110 or higher), but you don't have symptoms. ? · You think high blood pressure is causing symptoms, such as:  ¨ Severe headache. ¨ Blurry vision. ? Watch closely for changes in your health, and be sure to contact your doctor if:  ? · Your blood pressure measures 140/90 or higher at least 2 times. That means the top number is 140 or higher or the bottom number is 90 or higher, or both. ? · You think you may be having side effects from your blood pressure medicine.    ? · Your blood pressure is usually normal, but it goes above normal at least 2 times. Where can you learn more? Go to http://johan-maria del carmen.info/. Enter U509 in the search box to learn more about \"High Blood Pressure: Care Instructions. \"  Current as of: September 21, 2016  Content Version: 11.4  © 0594-4639 [employer]. Care instructions adapted under license by [employer] (which disclaims liability or warranty for this information). If you have questions about a medical condition or this instruction, always ask your healthcare professional. [address] any warranty or liability for your use of this information.

## 2018-04-28 NOTE — ED TRIAGE NOTES
Pt to ED from EMS for complaints of numbness and tingling of the left arm x several weeks, worse in the last 2 days. Pt has hx of stroke March 2017 only deficit left sided facial numbness. Pt takes Warfarin daily. Complaining of 9/10 headache. Pt has a neurologist, last appointment was last month. Sepsis Screening completed    (  )Patient meets SIRS criteria. ( xx )Patient does not meet SIRS criteria.       SIRS Criteria is achieved when two or more of the following are present   Temperature < 96.8°F (36°C) or > 100.9°F (38.3°C)   Heart Rate > 90 beats per minute   Respiratory Rate > 20 breaths per minute   WBC count > 12,000 or <4,000 or > 10% bands

## 2018-04-28 NOTE — ED PROVIDER NOTES
EMERGENCY DEPARTMENT HISTORY AND PHYSICAL EXAM    Date: 4/28/2018  Patient Name: Steven Alfaro    History of Presenting Illness     Chief Complaint   Patient presents with    Numbness         History Provided By: Patient and EMS    Chief Complaint: Numbness  Duration: 1 Years  Timing:  Waxing and Waning  Location: LUE and left face  Quality: numb  Severity: Moderate  Associated Symptoms:  \"ringing in my head\" x2-3 days, cough, mild difficulty urinating described as \"I have to push the pee out of me\", and bloody stool last night    Additional History (Context):   8:38 AM  Steven Alfaro is a 56 y.o. female with PMHX CVA (3/2017), HTN, and Crohn's Disease presents to the emergency department via EMS C/O waxing and waning LUE and left facial numbness, onset 1 year ago after her stroke and worsening the last 2 days. Associated sxs include \"ringing in my head\" x2-3 days, cough, mild difficulty urinating described as \"I have to push the pee out of me\", and some blood in her stool last night, which she thinks is from her Crohn's. Pt reports her sxs are similar to her previous CVA sxs, but less severe. Pt states she called her neurologist this week and was rx'ed a new HA medication with no relief. Pt has an appointment scheduled for next month. Currently on Warfarin (doubled her dose yesterday). EMS notes pt's FSBS was 72. Endorses tobacco use. Pt denies use of EtOH/illicit drugs, and any other sxs or complaints. PCP: Gail Noble MD    Current Outpatient Prescriptions   Medication Sig Dispense Refill    ascorbic acid (ASCO-CAPS-500 PO) Take  by mouth.  promethazine (PHENERGAN) 25 mg tablet Take 1 Tab by mouth every six (6) hours as needed for Nausea. Caution: This medication may make you drowsy. Avoid driving while under the influence of this medication. 12 Tab 0    butalbital-acetaminophen-caff (FIORICET) -40 mg per capsule Take 1 Cap by mouth every four (4) hours as needed for Pain.  12 Cap 0  warfarin (COUMADIN) 7.5 mg tablet Take 1 Tab by mouth daily. (Patient taking differently: Take 5 mg by mouth daily.) 30 Tab 0    metroNIDAZOLE (FLAGYL) 500 mg tablet Take 1 Tab by mouth every eight (8) hours. 24 Tab 0    predniSONE (DELTASONE) 10 mg tablet Take 5 tablets by mouth daily for 7 days, then 4 tablets daily for 14 days then 3 tablets daily for 14 days then 2 tablets daily for 14 days then 1 tablet by mouth daily for 14 days 175 Tab 0    lisinopril (PRINIVIL, ZESTRIL) 10 mg tablet Take 10 mg by mouth daily.  carvedilol (COREG) 3.125 mg tablet Take 1 Tab by mouth two (2) times daily (with meals). 60 Tab 0    atorvastatin (LIPITOR) 80 mg tablet Take 1 Tab by mouth nightly. 30 Tab 2       Past History     Past Medical History:  Past Medical History:   Diagnosis Date    Abdominal pain     Anemia NEC     sickle cell trait    C. difficile colitis     Crohn's disease (HCC)     Gastrointestinal disorder     Crohns    Herpes zoster     Hx of cocaine abuse     Hyperkalemia     Hypertension     Iron deficiency anemia     MRSA (methicillin resistant Staphylococcus aureus)     Obesity     Pain management     Sickle cell trait (HCC)     Stroke ([facility] [address]     + cva 3/17    Thromboembolus [facility])        Past Surgical History:  Past Surgical History:   Procedure Laterality Date    COLONOSCOPY N/A 3/17/2017    COLONOSCOPY; BIOPSY; performed by Estefany Wood MD at [facility] ENDOSCOPY    HX GYN      tubal ligation    HX TUBAL LIGATION      VASCULAR SURGERY PROCEDURE UNLIST      blood clot back of right knee       Family History:  History reviewed. No pertinent family history. Social History:  Social History   Substance Use Topics    Smoking status: Current Some Day Smoker    Smokeless tobacco: Never Used    Alcohol use No       Allergies:   Allergies   Allergen Reactions    Humira [Adalimumab] Other (comments)    Remicade [Infliximab] Palpitations         Review of Systems   Review of Systems Respiratory: Positive for cough. Gastrointestinal: Positive for blood in stool. Genitourinary: Positive for difficulty urinating. Neurological: Positive for numbness (LUE and left facial) and headaches. (+) \"ringing in my head\"   All other systems reviewed and are negative. Physical Exam     Vitals:    04/28/18 1045 04/28/18 1100 04/28/18 1115 04/28/18 1130   BP:   (!) 130/109 130/89   Pulse: 87 90 85 86   Resp: 13 11 13 14   Temp:       SpO2: 100% 100% 100% 100%   Weight:       Height:         Physical Exam   Nursing note and vitals reviewed. Constitutional: Well appearing, no acute distress  Head: Normocephalic, Atraumatic  Eyes: Pupils are equal, round, and reactive to light, EOMI  Neck: Supple, non-tender  Cardiovascular: Regular rate and rhythm, no murmurs, rubs, or gallops  Chest: Normal work of breathing and chest excursion bilaterally  Lungs: Clear to ausculation bilaterally  Abdomen: Soft, non tender, non distended, normoactive bowel sounds  Back: No evidence of trauma or deformity  Extremities: No evidence of trauma or deformity, no LE edema  Skin: Warm and dry, normal cap refill  Neuro: Alert and appropriate, normal speech, normal gait, normal coordination. Slight left facial droop. Subjective numbness on left face and LUE.  Strength and sensation otherwise normal.   Psychiatric: Normal mood and affect       Diagnostic Study Results     Labs -     Recent Results (from the past 12 hour(s))   GLUCOSE, POC    Collection Time: 04/28/18  8:53 AM   Result Value Ref Range    Glucose (POC) 78 70 - 110 mg/dL   EKG, 12 LEAD, INITIAL    Collection Time: 04/28/18  9:02 AM   Result Value Ref Range    Ventricular Rate 90 BPM    Atrial Rate 90 BPM    P-R Interval 182 ms    QRS Duration 144 ms    Q-T Interval 424 ms    QTC Calculation (Bezet) 518 ms    Calculated P Axis 30 degrees    Calculated R Axis -14 degrees    Calculated T Axis 57 degrees    Diagnosis       Normal sinus rhythm  Left bundle branch block  Abnormal ECG  When compared with ECG of 13-APR-2018 07:35,  No significant change was found     GLUCOSE, POC    Collection Time: 04/28/18  9:21 AM   Result Value Ref Range    Glucose (POC) 89 70 - 110 mg/dL   CBC WITH AUTOMATED DIFF    Collection Time: 04/28/18 10:05 AM   Result Value Ref Range    WBC 6.1 4.6 - 13.2 K/uL    RBC 4.25 4.20 - 5.30 M/uL    HGB 9.9 (L) 12.0 - 16.0 g/dL    HCT 32.3 (L) 35.0 - 45.0 %    MCV 76.0 74.0 - 97.0 FL    MCH 23.3 (L) 24.0 - 34.0 PG    MCHC 30.7 (L) 31.0 - 37.0 g/dL    RDW 14.9 (H) 11.6 - 14.5 %    PLATELET 721 710 - 600 K/uL    MPV 9.5 9.2 - 11.8 FL    NEUTROPHILS 58 40 - 73 %    LYMPHOCYTES 23 21 - 52 %    MONOCYTES 8 3 - 10 %    EOSINOPHILS 11 (H) 0 - 5 %    BASOPHILS 0 0 - 2 %    ABS. NEUTROPHILS 3.6 1.8 - 8.0 K/UL    ABS. LYMPHOCYTES 1.4 0.9 - 3.6 K/UL    ABS. MONOCYTES 0.5 0.05 - 1.2 K/UL    ABS. EOSINOPHILS 0.7 (H) 0.0 - 0.4 K/UL    ABS. BASOPHILS 0.0 0.0 - 0.06 K/UL    DF AUTOMATED     METABOLIC PANEL, COMPREHENSIVE    Collection Time: 04/28/18 10:05 AM   Result Value Ref Range    Sodium 138 136 - 145 mmol/L    Potassium 4.3 3.5 - 5.5 mmol/L    Chloride 106 100 - 108 mmol/L    CO2 24 21 - 32 mmol/L    Anion gap 8 3.0 - 18 mmol/L    Glucose 78 74 - 99 mg/dL    BUN 18 7.0 - 18 MG/DL    Creatinine 0.71 0.6 - 1.3 MG/DL    BUN/Creatinine ratio 25 (H) 12 - 20      GFR est AA >60 >60 ml/min/1.73m2    GFR est non-AA >60 >60 ml/min/1.73m2    Calcium 8.7 8.5 - 10.1 MG/DL    Bilirubin, total 0.2 0.2 - 1.0 MG/DL    ALT (SGPT) 99 (H) 13 - 56 U/L    AST (SGOT) 97 (H) 15 - 37 U/L    Alk.  phosphatase 134 (H) 45 - 117 U/L    Protein, total 7.7 6.4 - 8.2 g/dL    Albumin 3.2 (L) 3.4 - 5.0 g/dL    Globulin 4.5 (H) 2.0 - 4.0 g/dL    A-G Ratio 0.7 (L) 0.8 - 1.7     MAGNESIUM    Collection Time: 04/28/18 10:05 AM   Result Value Ref Range    Magnesium 1.8 1.6 - 2.6 mg/dL   CARDIAC PANEL,(CK, CKMB & TROPONIN)    Collection Time: 04/28/18 10:05 AM   Result Value Ref Range    CK 55 26 - 192 U/L    CK - MB <1.0 <3.6 ng/ml    CK-MB Index  0.0 - 4.0 %     CALCULATION NOT PERFORMED WHEN RESULT IS BELOW LINEAR LIMIT    Troponin-I, Qt. <0.02 0.00 - 0.06 NG/ML   PROTHROMBIN TIME + INR    Collection Time: 04/28/18 10:05 AM   Result Value Ref Range    Prothrombin time 13.2 11.5 - 15.2 sec    INR 1.1 0.8 - 1.2         Radiologic Studies -   XR CHEST SNGL V   Final Result      CT HEAD WO CONT   Final Result        CT Results  (Last 48 hours)               04/28/18 0942  CT HEAD WO CONT Final result    Impression:  IMPRESSION:       No acute intracranial abnormalities. Chronic right MCA infarct is unchanged in   appearance. Of note noncontrast CT can be negative in early acute stroke. Narrative:  EXAM: CT head       INDICATION: Numbness and tingling in the left arm for several weeks. History of   stroke in March 2017. Patient is on warfarin. COMPARISON: 4/13/2018, 3/1/2018, 1/8/2018       TECHNIQUE: Axial CT imaging of the head was performed without intravenous   contrast.       DOSE REDUCTION:  One or more dose reduction techniques were used on this CT:   automated exposure control, adjustment of the mAs and/or kVp according to   patient's size, and iterative reconstruction techniques. The specific techniques   utilized on this CT exam have been documented in the patient's electronic   medical record.       _______________       FINDINGS:       BRAIN AND POSTERIOR FOSSA: The sulci, folia, ventricles and basal cisterns are   within normal limits for the patient?s age. There is no intracranial hemorrhage,   mass effect, or midline shift. Again noted is a chronic infarct within the right   centrum semiovale, unchanged in appearance since 4/13/2018. No new focal   hypodensity to suggest a new infarct. EXTRA-AXIAL SPACES AND MENINGES: There are no abnormal extra-axial fluid   collections. CALVARIUM: The visualized calvarium, skull base, and orbits are normal.       SINUSES: Clear. OTHER: None.       _______________               CXR Results  (Last 48 hours)               04/28/18 0932  XR CHEST SNGL V Final result    Impression:  IMPRESSION:        No acute cardiopulmonary disease. Narrative:  EXAM: Chest x-ray single AP view       INDICATION: Numbness and tingling in the left arm. COMPARISON: 1/8/2018       _____________       FINDINGS:        The lungs are clear. The cardiomediastinal silhouette is within normal limits. No pleural effusion. No acute osseus abnormality. _____________                   Medical Decision Making   I am the first provider for this patient. I reviewed the vital signs, available nursing notes, past medical history, past surgical history, family history and social history. Vital Signs-Reviewed the patient's vital signs. Pulse Oximetry Analysis - 100% on RA     Cardiac Monitor:  Rate: 87 bpm  Rhythm: NSR    EKG interpretation: (Preliminary)  9:02 AM   90 bpm, NSR, LBBB. EKG read by Virginie Pat MD at 9:04 AM     Records Reviewed: Nursing Notes and Old Medical Records  On EMR review, pt was seen here for slurred speech, HA, and LUE numbness. CT Head showed NAP. Pt was discharged with Fioricet. Pt [facility]. [facility]  Aware reviewed, no recent controlled substance prescriptions found. Procedures:  Procedures    Procedure Note- Peripheral IV Access  10:16 AM  Performed by: Virginie Pat MD  Gained IV access using  20 gauge needle because the patient had no vascular access. After cleaning the site with alcohol prep, IV was placed in the right upper arm with US guidance. Line confirmation was obtained by direct visualization and good blood return. No anaesthetic was used. The line was successfully flushed with normal saline and was secured with transparent tape. The procedure took 1-15 minutes, and pt tolerated well.   Written by RUTH Nevarez, as dictated by Virginie Pat MD.     ED Course:   8:38 AM Initial assessment performed. The patients presenting problems have been discussed, and they are in agreement with the care plan formulated and outlined with them. I have encouraged them to ask questions as they arise throughout their visit. Diagnosis and Disposition       DISCHARGE NOTE:  11:23 AM  Kaden Curry's  results have been reviewed with her. She has been counseled regarding her diagnosis, treatment, and plan. She verbally conveys understanding and agreement of the signs, symptoms, diagnosis, treatment and prognosis and additionally agrees to follow up as discussed. She also agrees with the care-plan and conveys that all of her questions have been answered. I have also provided discharge instructions for her that include: educational information regarding their diagnosis and treatment, and list of reasons why they would want to return to the ED prior to their follow-up appointment, should her condition change. She has been provided with education for proper emergency department utilization. CLINICAL IMPRESSION:    1. Left arm numbness    2. Hypertension, unspecified type    3. Elevated LFTs    4. Tension-type headache, not intractable, unspecified chronicity pattern        Discussion: 56 y.o. female with waxing and waning left arm numbness since her stroke last year. No acute abnormalities identified except for slightly elevated LFTs. Plan to d/c with return precautions and early neurology and PCP follow up. Pt understands and agrees with this plan. PLAN:  1. D/C Home  2. Current Discharge Medication List      START taking these medications    Details   promethazine (PHENERGAN) 25 mg tablet Take 1 Tab by mouth every six (6) hours as needed for Nausea. Caution: This medication may make you drowsy. Avoid driving while under the influence of this medication.   Qty: 12 Tab, Refills: 0      butalbital-acetaminophen-caff (FIORICET) -40 mg per capsule Take 1 Cap by mouth every four (4) hours as needed for Pain. Qty: 12 Cap, Refills: 0           3. Follow-up Information     Follow up With Details Comments Contact Info    Rubina Menard MD Schedule an appointment as soon as possible for a visit in 2 days  [address]  [phone]      Your Neurologist Schedule an appointment as soon as possible for a visit in 2 days      [facility]  As needed, if symptoms worsen [address] [place] [phone]        ___________________________   Attestations:     SCRIBE ATTESTATION:  This note is prepared by Kirby Randall, acting as Scribe for Jose Alejandro Nolen MD.    PROVIDER ATTESTATION:  Jose Alejandro Nolen MD: The scribe's documentation has been prepared under my direction and personally reviewed by me in its entirety.  I confirm that the note above accurately reflects all work, treatment, procedures, and medical decision making performed by me.   _______________________________

## 2018-04-28 NOTE — ED NOTES
No ride present at bedside. Patient educated that they should not drive due to side effects of administered medication. Patient verbalized understanding. MD aware. Pt states daughter will be here in about an hour.

## 2018-04-28 NOTE — ED NOTES
FSBG 78. Administered 1 apple juice PO. Will recheck FSBG in 15 mins. ED tech at bedside to attempt peripheral IV.

## 2018-04-28 NOTE — ED NOTES
Pt hourly rounding competed. Safety   Pt (x) resting on stretcher with side rails up and call bell in reach. () in chair    () in parents arms. Toileting   Pt offered ()Bedpan     ()Assistance to Restroom     ()Urinal  Ongoing Updates  Updated on plan of care and status of test results. Pain Management  Inquired as to comfort and offered comfort measures:    () warm blankets   (x) dimmed lights    Pt medicated per MAR. Pt in NAD, no further needs expressed, call bell within reach.

## 2018-04-29 LAB
ATRIAL RATE: 90 BPM
CALCULATED P AXIS, ECG09: 30 DEGREES
CALCULATED R AXIS, ECG10: -14 DEGREES
CALCULATED T AXIS, ECG11: 57 DEGREES
DIAGNOSIS, 93000: NORMAL
P-R INTERVAL, ECG05: 182 MS
Q-T INTERVAL, ECG07: 424 MS
QRS DURATION, ECG06: 144 MS
QTC CALCULATION (BEZET), ECG08: 518 MS
VENTRICULAR RATE, ECG03: 90 BPM

## 2019-01-11 NOTE — PROGRESS NOTES
Eber Brito is a 34 y.o. male.     Difficulty Urinating   This is a new problem. The current episode started yesterday. The problem occurs daily. The problem has been gradually worsening. Associated symptoms include urinary symptoms. Pertinent negatives include no abdominal pain, arthralgias, change in bowel habit, chest pain, chills, diaphoresis, fatigue, fever, myalgias, nausea, rash or vomiting. Nothing aggravates the symptoms. He has tried nothing for the symptoms.       The following portions of the patient's history were reviewed and updated as appropriate: allergies, current medications, past family history, past medical history, past social history, past surgical history and problem list.     No Known Allergies     Review of Systems   Constitutional: Negative for activity change, appetite change, chills, diaphoresis, fatigue, fever and unexpected weight change.   Respiratory: Negative.    Cardiovascular: Negative.  Negative for chest pain.   Gastrointestinal: Negative for abdominal pain, change in bowel habit, nausea and vomiting.   Genitourinary: Positive for difficulty urinating, dysuria, frequency, hematuria and urgency. Negative for flank pain and testicular pain.        Denies personal or family history of kidney stones   Musculoskeletal: Negative for arthralgias and myalgias.   Skin: Negative for rash.       Objective   Physical Exam   Constitutional: He is oriented to person, place, and time. Vital signs are normal. He appears well-developed and well-nourished. He is cooperative. He does not appear ill. No distress.   HENT:   Mouth/Throat: Uvula is midline and mucous membranes are normal.   Cardiovascular: Normal rate, regular rhythm and normal heart sounds.   Pulmonary/Chest: Effort normal and breath sounds normal.   Abdominal: Soft. Normal appearance. There is no tenderness. There is no rigidity and no CVA tenderness.   Neurological: He is alert and oriented to person, place, and  Pt [facility] appointment has been changed to Thursday 4/20 at 10 am; pt contacted and made aware of appointment change and AVS updated. Pt has [facility] for INR checks and CMS will schedule PCP follow up for further anticoagulant management. Care Management Interventions  PCP Verified by CM: Yes  Mode of Transport at Discharge:  Other (see comment)  Transition of Care Consult (CM Consult): [address]: Yes  Physical Therapy Consult: Yes  Occupational Therapy Consult: Yes  Current Support Network: Family Lives Nearby  Confirm Follow Up Transport: Family  Plan discussed with Pt/Family/Caregiver: Yes  Freedom of Choice Offered: Yes  Discharge Location  Discharge Placement: Home with home health time.   Skin: Skin is warm, dry and intact. No rash noted. He is not diaphoretic.   Psychiatric: He has a normal mood and affect. His behavior is normal. Judgment and thought content normal.   Vitals reviewed.    Vitals:    01/11/19 1754   BP: 142/78   Pulse: 68   Resp: 16   Temp: 98.7 °F (37.1 °C)   SpO2: 96%   Body mass index is 27.41 kg/m².     Assessment/Plan   Josh was seen today for urinary tract infection.    Diagnoses and all orders for this visit:    Urinary tract infection with hematuria, site unspecified  -     Urine Culture - Urine, Urine, Clean Catch  -     sulfamethoxazole-trimethoprim (BACTRIM DS) 800-160 MG per tablet; Take 1 tablet by mouth 2 (Two) Times a Day for 7 days.    Dysuria  -     POC Urinalysis Dipstick, Automated       Brief Urine Lab Results  (Last result in the past 365 days)      Color   Clarity   Blood   Leuk Est   Nitrite   Protein   CREAT   Urine HCG        01/11/19 1807 Brittani Cloudy Moderate Small (1+) Positive 100 mg/dL             Discussed the nature of the medical condition(s) risks, complications, implications, management, safe and proper use of medications. Encouraged medication compliance, and keeping scheduled follow up appointments with me and any other providers.

## 2019-09-30 ENCOUNTER — HOSPITAL ENCOUNTER (EMERGENCY)
Age: 49
Discharge: HOME | End: 2019-09-30
Attending: EMERGENCY MEDICINE
Payer: MEDICAID

## 2019-09-30 ENCOUNTER — APPOINTMENT (OUTPATIENT)
Dept: CT IMAGING | Age: 49
End: 2019-09-30
Attending: EMERGENCY MEDICINE
Payer: MEDICAID

## 2019-09-30 VITALS
DIASTOLIC BLOOD PRESSURE: 90 MMHG | HEART RATE: 84 BPM | RESPIRATION RATE: 16 BRPM | HEIGHT: 66 IN | OXYGEN SATURATION: 100 % | TEMPERATURE: 97.5 F | SYSTOLIC BLOOD PRESSURE: 125 MMHG | WEIGHT: 175 LBS | BODY MASS INDEX: 28.12 KG/M2

## 2019-09-30 DIAGNOSIS — K50.90 EXACERBATION OF CROHN'S DISEASE WITHOUT COMPLICATION (HCC): ICD-10-CM

## 2019-09-30 DIAGNOSIS — F17.200 NICOTINE USE DISORDER: Primary | ICD-10-CM

## 2019-09-30 DIAGNOSIS — R79.1 SUBTHERAPEUTIC INTERNATIONAL NORMALIZED RATIO (INR): ICD-10-CM

## 2019-09-30 DIAGNOSIS — Z79.01 ON CONTINUOUS ORAL ANTICOAGULATION: ICD-10-CM

## 2019-09-30 LAB
ALBUMIN SERPL-MCNC: 3.6 G/DL (ref 3.4–5)
ALBUMIN/GLOB SERPL: 1 {RATIO} (ref 0.8–1.7)
ALP SERPL-CCNC: 110 U/L (ref 45–117)
ALT SERPL-CCNC: 38 U/L (ref 13–56)
ANION GAP SERPL CALC-SCNC: 5 MMOL/L (ref 3–18)
AST SERPL-CCNC: 26 U/L (ref 10–38)
BASOPHILS # BLD: 0 K/UL (ref 0–0.1)
BASOPHILS NFR BLD: 0 % (ref 0–2)
BILIRUB SERPL-MCNC: 0.2 MG/DL (ref 0.2–1)
BUN SERPL-MCNC: 21 MG/DL (ref 7–18)
BUN/CREAT SERPL: 22 (ref 12–20)
CALCIUM SERPL-MCNC: 8.7 MG/DL (ref 8.5–10.1)
CHLORIDE SERPL-SCNC: 110 MMOL/L (ref 100–111)
CO2 SERPL-SCNC: 27 MMOL/L (ref 21–32)
CREAT SERPL-MCNC: 0.96 MG/DL (ref 0.6–1.3)
DIFFERENTIAL METHOD BLD: ABNORMAL
EOSINOPHIL # BLD: 0.4 K/UL (ref 0–0.4)
EOSINOPHIL NFR BLD: 9 % (ref 0–5)
ERYTHROCYTE [DISTWIDTH] IN BLOOD BY AUTOMATED COUNT: 15.6 % (ref 11.6–14.5)
GLOBULIN SER CALC-MCNC: 3.7 G/DL (ref 2–4)
GLUCOSE SERPL-MCNC: 93 MG/DL (ref 74–99)
HCT VFR BLD AUTO: 33.9 % (ref 35–45)
HEMOCCULT STL QL: POSITIVE
HGB BLD-MCNC: 10.6 G/DL (ref 12–16)
INR PPP: 1.3 (ref 0.8–1.2)
LIPASE SERPL-CCNC: 111 U/L (ref 73–393)
LYMPHOCYTES # BLD: 1.3 K/UL (ref 0.9–3.6)
LYMPHOCYTES NFR BLD: 28 % (ref 21–52)
MCH RBC QN AUTO: 23.9 PG (ref 24–34)
MCHC RBC AUTO-ENTMCNC: 31.3 G/DL (ref 31–37)
MCV RBC AUTO: 76.5 FL (ref 74–97)
MONOCYTES # BLD: 0.7 K/UL (ref 0.05–1.2)
MONOCYTES NFR BLD: 14 % (ref 3–10)
NEUTS SEG # BLD: 2.4 K/UL (ref 1.8–8)
NEUTS SEG NFR BLD: 49 % (ref 40–73)
PLATELET # BLD AUTO: 231 K/UL (ref 135–420)
PMV BLD AUTO: 9.9 FL (ref 9.2–11.8)
POTASSIUM SERPL-SCNC: 3.3 MMOL/L (ref 3.5–5.5)
PROT SERPL-MCNC: 7.3 G/DL (ref 6.4–8.2)
PROTHROMBIN TIME: 15.9 SEC (ref 11.5–15.2)
RBC # BLD AUTO: 4.43 M/UL (ref 4.2–5.3)
SODIUM SERPL-SCNC: 142 MMOL/L (ref 136–145)
WBC # BLD AUTO: 4.8 K/UL (ref 4.6–13.2)

## 2019-09-30 PROCEDURE — 87045 FECES CULTURE AEROBIC BACT: CPT

## 2019-09-30 PROCEDURE — 74011250636 HC RX REV CODE- 250/636: Performed by: EMERGENCY MEDICINE

## 2019-09-30 PROCEDURE — 99284 EMERGENCY DEPT VISIT MOD MDM: CPT

## 2019-09-30 PROCEDURE — 80053 COMPREHEN METABOLIC PANEL: CPT

## 2019-09-30 PROCEDURE — 85025 COMPLETE CBC W/AUTO DIFF WBC: CPT

## 2019-09-30 PROCEDURE — 74011250637 HC RX REV CODE- 250/637: Performed by: EMERGENCY MEDICINE

## 2019-09-30 PROCEDURE — 74176 CT ABD & PELVIS W/O CONTRAST: CPT

## 2019-09-30 PROCEDURE — 83690 ASSAY OF LIPASE: CPT

## 2019-09-30 PROCEDURE — 96375 TX/PRO/DX INJ NEW DRUG ADDON: CPT

## 2019-09-30 PROCEDURE — 85610 PROTHROMBIN TIME: CPT

## 2019-09-30 PROCEDURE — 96374 THER/PROPH/DIAG INJ IV PUSH: CPT

## 2019-09-30 PROCEDURE — 82272 OCCULT BLD FECES 1-3 TESTS: CPT

## 2019-09-30 PROCEDURE — 74011000258 HC RX REV CODE- 258: Performed by: EMERGENCY MEDICINE

## 2019-09-30 RX ORDER — PROMETHAZINE HYDROCHLORIDE 25 MG/1
25 TABLET ORAL
Status: COMPLETED | OUTPATIENT
Start: 2019-09-30 | End: 2019-09-30

## 2019-09-30 RX ORDER — METHYLPREDNISOLONE 4 MG/1
TABLET ORAL
Qty: 1 DOSE PACK | Refills: 0 | Status: SHIPPED | OUTPATIENT
Start: 2019-09-30 | End: 2022-08-08

## 2019-09-30 RX ORDER — ACETAMINOPHEN 10 MG/ML
1000 INJECTION, SOLUTION INTRAVENOUS ONCE
Status: COMPLETED | OUTPATIENT
Start: 2019-09-30 | End: 2019-09-30

## 2019-09-30 RX ORDER — DICYCLOMINE HYDROCHLORIDE 20 MG/1
20 TABLET ORAL EVERY 6 HOURS
Qty: 20 TAB | Refills: 0 | Status: SHIPPED | OUTPATIENT
Start: 2019-09-30 | End: 2019-10-05

## 2019-09-30 RX ORDER — FENTANYL CITRATE 50 UG/ML
50 INJECTION, SOLUTION INTRAMUSCULAR; INTRAVENOUS
Status: COMPLETED | OUTPATIENT
Start: 2019-09-30 | End: 2019-09-30

## 2019-09-30 RX ADMIN — PROMETHAZINE HYDROCHLORIDE 25 MG: 25 TABLET ORAL at 10:05

## 2019-09-30 RX ADMIN — METHYLPREDNISOLONE SODIUM SUCCINATE 125 MG: 125 INJECTION, POWDER, FOR SOLUTION INTRAMUSCULAR; INTRAVENOUS at 08:09

## 2019-09-30 RX ADMIN — PROMETHAZINE HYDROCHLORIDE 25 MG: 25 INJECTION, SOLUTION INTRAMUSCULAR; INTRAVENOUS at 08:05

## 2019-09-30 RX ADMIN — SODIUM CHLORIDE 1000 ML: 900 INJECTION, SOLUTION INTRAVENOUS at 08:04

## 2019-09-30 RX ADMIN — FENTANYL CITRATE 50 MCG: 50 INJECTION, SOLUTION INTRAMUSCULAR; INTRAVENOUS at 10:35

## 2019-09-30 RX ADMIN — ACETAMINOPHEN 1000 MG: 10 INJECTION, SOLUTION INTRAVENOUS at 08:09

## 2019-09-30 NOTE — ED PROVIDER NOTES
EMERGENCY DEPARTMENT HISTORY AND PHYSICAL EXAM    Date: 9/30/2019  Patient Name: Ailyn Yanez    History of Presenting Illness     Chief Complaint   Patient presents with    Abdominal Pain         History Provided By: Patient    Additional History (Context):   Ailyn Yanez is a 51 y.o. female with PMHX Crohn's, noncompliance on her treatment, hypertension, nicotine use, on Coumadin for prior CVA, presents to the emergency department C/O generalized abdominal pain ongoing for last week. Patient states that she was seen in outlying emergency department, [address] [place] [address], yesterday and was recommended to be admitted for observation for her abdominal pain. Patient states \"I did not like the way they treated me\" left without completion of care. Patient reports nausea but denies any active vomiting. Also reports blood in stool. Pt denies fever, dysuria, hematuria, chest pain, shortness of breath, and any other sxs or complaints. PCP: Hansel Ruiz MD    Current Facility-Administered Medications   Medication Dose Route Frequency Provider Last Rate Last Dose    lidocaine (PF) (XYLOCAINE) 1 mg/kg = 80 mg in 0.9% sodium chloride 50 mL IVPB  1 mg/kg IntraVENous ONCE Delores Zavala, DO         Current Outpatient Medications   Medication Sig Dispense Refill    dicyclomine (BENTYL) 20 mg tablet Take 1 Tab by mouth every six (6) hours for 20 doses. 20 Tab 0    methylPREDNISolone (MEDROL, AHWA,) 4 mg tablet Take medications as instructed on packaging 1 Dose Pack 0    ascorbic acid (ASCO-CAPS-500 PO) Take  by mouth.  warfarin (COUMADIN) 7.5 mg tablet Take 1 Tab by mouth daily. (Patient taking differently: Take 5 mg by mouth daily.) 30 Tab 0    lisinopril (PRINIVIL, ZESTRIL) 10 mg tablet Take 10 mg by mouth daily.  carvedilol (COREG) 3.125 mg tablet Take 1 Tab by mouth two (2) times daily (with meals). 60 Tab 0    atorvastatin (LIPITOR) 80 mg tablet Take 1 Tab by mouth nightly.  30 Tab 2    promethazine (PHENERGAN) 25 mg tablet Take 1 Tab by mouth every six (6) hours as needed for Nausea. Caution: This medication may make you drowsy. Avoid driving while under the influence of this medication. 12 Tab 0    butalbital-acetaminophen-caff (FIORICET) -40 mg per capsule Take 1 Cap by mouth every four (4) hours as needed for Pain. 12 Cap 0    metroNIDAZOLE (FLAGYL) 500 mg tablet Take 1 Tab by mouth every eight (8) hours. 24 Tab 0    predniSONE (DELTASONE) 10 mg tablet Take 5 tablets by mouth daily for 7 days, then 4 tablets daily for 14 days then 3 tablets daily for 14 days then 2 tablets daily for 14 days then 1 tablet by mouth daily for 14 days 175 Tab 0       Past History     Past Medical History:  Past Medical History:   Diagnosis Date    Abdominal pain     Anemia NEC     sickle cell trait    C. difficile colitis     [facility] [address]     Gastrointestinal disorder     Crohns    Herpes zoster     Hx of cocaine abuse     Hyperkalemia     Hypertension     Iron deficiency anemia     MRSA (methicillin resistant Staphylococcus aureus)     Obesity     Pain management     Sickle cell trait ([facility] [address]     Stroke ([facility] [address]     + cva 3/17    [facility]romboembolus [facility])        Past Surgical History:  Past Surgical History:   Procedure Laterality Date    COLONOSCOPY N/A 3/17/2017    COLONOSCOPY; BIOPSY; performed by Marvin Mcallister MD at [facility] ENDOSCOPY    HX GYN      tubal ligation    HX TUBAL LIGATION      VASCULAR SURGERY PROCEDURE UNLIST      blood clot back of right knee       Family History:  History reviewed. No pertinent family history. Social History:  Social History     Tobacco Use    Smoking status: Current Every Day Smoker    Smokeless tobacco: Never Used   Substance Use Topics    Alcohol use: No    Drug use: No       Allergies:   Allergies   Allergen Reactions    Humira [Adalimumab] Other (comments)    Remicade [Infliximab] Palpitations         Review of Systems   Review of Systems   Constitutional: Negative for chills and fever. HENT: Negative for congestion, ear pain, sinus pain and sore throat. Eyes: Negative for pain and visual disturbance. Respiratory: Negative for cough and shortness of breath. Cardiovascular: Negative for chest pain and leg swelling. Gastrointestinal: Positive for abdominal pain, blood in stool and nausea. Negative for constipation, diarrhea and vomiting. Genitourinary: Negative for dysuria, hematuria, vaginal bleeding and vaginal discharge. Musculoskeletal: Negative for back pain and neck pain. Skin: Negative for rash and wound. Neurological: Negative for dizziness, tremors, weakness, light-headedness and numbness. All other systems reviewed and are negative.       Physical Exam     Vitals:    09/30/19 0717 09/30/19 0730 09/30/19 1107   BP: (!) 136/97 119/89 125/90   Pulse: 84     Resp: 16     Temp: 97.5 °F (36.4 °C)     SpO2: 100% 100%    Weight: 79.4 kg (175 lb)     Height: 5' 6\" (1.676 m)       Physical Exam    Nursing note and vitals reviewed    Constitutional: Middle-aged -American male, no acute distress  Head: Normocephalic, Atraumatic  Eyes: Pupils are equal, round, and reactive to light, EOMI  Neck: Supple, non-tender  Cardiovascular: Regular rate and rhythm, no murmurs, rubs, or gallops  Chest: Normal work of breathing and chest excursion bilaterally  Lungs: Clear to ausculation bilaterally, no wheezes, no rhonchi  Abdomen: Soft, generalized mild abdominal tenderness, non distended, normoactive bowel sounds  Back: No evidence of trauma or deformity  Extremities: No evidence of trauma or deformity, no LE edema  Skin: Warm and dry, normal cap refill  Neuro: Alert and appropriate, CN intact, normal speech, moving all 4 extremities freely and symmetrically  Psychiatric: Normal mood and affect       Diagnostic Study Results     Labs -     Recent Results (from the past 12 hour(s))   OCCULT BLOOD, STOOL    Collection Time: 09/30/19  7:45 AM   Result Value Ref Range    Occult blood, stool POSITIVE (A) NEG     CBC WITH AUTOMATED DIFF    Collection Time: 09/30/19  8:00 AM   Result Value Ref Range    WBC 4.8 4.6 - 13.2 K/uL    RBC 4.43 4.20 - 5.30 M/uL    HGB 10.6 (L) 12.0 - 16.0 g/dL    HCT 33.9 (L) 35.0 - 45.0 %    MCV 76.5 74.0 - 97.0 FL    MCH 23.9 (L) 24.0 - 34.0 PG    MCHC 31.3 31.0 - 37.0 g/dL    RDW 15.6 (H) 11.6 - 14.5 %    PLATELET 997 445 - 054 K/uL    MPV 9.9 9.2 - 11.8 FL    NEUTROPHILS 49 40 - 73 %    LYMPHOCYTES 28 21 - 52 %    MONOCYTES 14 (H) 3 - 10 %    EOSINOPHILS 9 (H) 0 - 5 %    BASOPHILS 0 0 - 2 %    ABS. NEUTROPHILS 2.4 1.8 - 8.0 K/UL    ABS. LYMPHOCYTES 1.3 0.9 - 3.6 K/UL    ABS. MONOCYTES 0.7 0.05 - 1.2 K/UL    ABS. EOSINOPHILS 0.4 0.0 - 0.4 K/UL    ABS. BASOPHILS 0.0 0.0 - 0.1 K/UL    DF AUTOMATED     METABOLIC PANEL, COMPREHENSIVE    Collection Time: 09/30/19  8:00 AM   Result Value Ref Range    Sodium 142 136 - 145 mmol/L    Potassium 3.3 (L) 3.5 - 5.5 mmol/L    Chloride 110 100 - 111 mmol/L    CO2 27 21 - 32 mmol/L    Anion gap 5 3.0 - 18 mmol/L    Glucose 93 74 - 99 mg/dL    BUN 21 (H) 7.0 - 18 MG/DL    Creatinine 0.96 0.6 - 1.3 MG/DL    BUN/Creatinine ratio 22 (H) 12 - 20      GFR est AA >60 >60 ml/min/1.73m2    GFR est non-AA >60 >60 ml/min/1.73m2    Calcium 8.7 8.5 - 10.1 MG/DL    Bilirubin, total 0.2 0.2 - 1.0 MG/DL    ALT (SGPT) 38 13 - 56 U/L    AST (SGOT) 26 10 - 38 U/L    Alk. phosphatase 110 45 - 117 U/L    Protein, total 7.3 6.4 - 8.2 g/dL    Albumin 3.6 3.4 - 5.0 g/dL    Globulin 3.7 2.0 - 4.0 g/dL    A-G Ratio 1.0 0.8 - 1.7     LIPASE    Collection Time: 09/30/19  8:00 AM   Result Value Ref Range    Lipase 111 73 - 393 U/L   PROTHROMBIN TIME + INR    Collection Time: 09/30/19  8:00 AM   Result Value Ref Range    Prothrombin time 15.9 (H) 11.5 - 15.2 sec    INR 1.3 (H) 0.8 - 1.2         Radiologic Studies -   CT ABD PELV WO CONT   Final Result   IMPRESSION:      1.   Mild inflammatory change involving the cecum, likely in keeping with the   provided history of inflammatory bowel disease. No morphology of bowel   obstruction or gross free intraperitoneal gas. Adjacent appendix is normal in   appearance. 2. Liquid stool throughout the large intestine, as can be seen in the context of   a diarrheal state. CT Results  (Last 48 hours)               09/30/19 1050  CT ABD PELV WO CONT Final result    Impression:  IMPRESSION:       1. Mild inflammatory change involving the cecum, likely in keeping with the   provided history of inflammatory bowel disease. No morphology of bowel   obstruction or gross free intraperitoneal gas. Adjacent appendix is normal in   appearance. 2. Liquid stool throughout the large intestine, as can be seen in the context of   a diarrheal state. Narrative:  EXAM: CT of the abdomen and pelvis       INDICATION: 56-year-old patient, abdominal pain, history of Crohn disease. Blood   in stool. COMPARISON: CT 8/1/2017       TECHNIQUE: Axial CT imaging of the abdomen and pelvis was performed without oral   or intravenous contrast. Multiplanar reformats were generated. One or more dose   reduction techniques were used on this CT: automated exposure control,   adjustment of the mAs and/or kVp according to patient size, and iterative   reconstruction techniques. The specific techniques used on this CT exam have   been documented in the patient's electronic medical record. Digital Imaging and   Communications in Medicine (DICOM) format image data are available to   nonaffiliated external healthcare facilities or entities on a secure, media   free, reciprocally searchable basis with patient authorization for at least a   12-month period after this study. FINDINGS:       LOWER CHEST: Unremarkable.        LIVER, BILIARY: Unenhanced appearance of the liver demonstrates a small low   attenuating structure within the right hepatic lobe (series 2, image 30), remain   too small to further characterize, but favored to reflect a small cyst. No   biliary dilation. The gallbladder is decompressed. PANCREAS: Normal unenhanced appearance. SPLEEN: Normal.       ADRENALS: Normal.       KIDNEYS/URETERS/BLADDER: Normal.       PELVIC ORGANS: Lobular uterine contour with peripherally distributed   calcifications in the region of the uterine fundus likely in keeping with   unchanged leiomyomata. VASCULATURE: Unremarkable       LYMPH NODES: There are an increased number of subcentimeter mesenteric lymph   nodes, greatest number throughout the ileocolic mesentery. No evidence of   abdominal or pelvic adenopathy. GASTROINTESTINAL TRACT: There is mild inflammatory change present involving the   cecum and proximal portion of the ascending colon. Assessment for bowel wall   thickening is limited given the lack of intravenous and oral contrast. No   significant bowel dilatation. Adjacent appendix is normal in appearance. Liquid   stool is present throughout the entirety of the large intestine. No morphology   of bowel obstruction. No free intraperitoneal gas. No pneumatosis. BONES: No acute or aggressive osseous abnormalities identified. Mild lower   lumbar spondylosis L5-S1.       OTHER: None.       _______________               CXR Results  (Last 48 hours)    None            Medical Decision Making   I am the first provider for this patient. I reviewed the vital signs, available nursing notes, past medical history, past surgical history, family history and social history. Vital Signs-Reviewed the patient's vital signs. Pulse Oximetry Analysis -100 % on room air    Records Reviewed: Nursing Notes and Old Medical Records    Provider Notes:   51 y.o. female PMHX Crohn's, noncompliance on her treatment, hypertension, nicotine use, on oral anticoagulation presenting with generalized abdominal pain, blood in stool.   On exam patient is afebrile, normotensive and not tachycardic. She does not appear toxic or acutely ill. Generalized abdominal pain however no rebound or guarding. Soft and nondistended. Last CT scan on August 8. Was seen yesterday at [facility] where she had blood work and an abdominal x-rays. Will repeat blood work and obtain CT scan to eval for any complications of her Crohn's. Will provide symptom control and reassess. Procedures:  Procedures    ED Course:   7:17 AM   Initial assessment performed. The patients presenting problems have been discussed, and they are in agreement with the care plan formulated and outlined with them. I have encouraged them to ask questions as they arise throughout their visit. 10:07 AM  Patient receiving Ofirmev, requesting Dilaudid by name. States \" I need something to take the edge off\". Left before completion of care yesterday from [facility], concern for drug seeking behavior at that time. 10:31 AM  Pt reports she is allergic to lidocaine. Will give a dose of Fentanyl. 11:17 AM  CBC showing no leukocytosis or bandemia. CT scan showing mild inflammatory changes consistent with IBD. Liquid stool. No metabolic derangements. Will discharge with Bentyl and steroids for her Crohn's flare. Urged close follow-up with gastroenterologist.          Diagnosis and Disposition       DISCHARGE NOTE:  11:17 AM    Sharan Curry's  results have been reviewed with her. She has been counseled regarding her diagnosis, treatment, and plan. She verbally conveys understanding and agreement of the signs, symptoms, diagnosis, treatment and prognosis and additionally agrees to follow up as discussed. She also agrees with the care-plan and conveys that all of her questions have been answered.   I have also provided discharge instructions for her that include: educational information regarding their diagnosis and treatment, and list of reasons why they would want to return to the ED prior to their follow-up appointment, should her condition change. She has been provided with education for proper emergency department utilization. CLINICAL IMPRESSION:    1. Nicotine use disorder    2. On continuous oral anticoagulation    3. Subtherapeutic international normalized ratio (INR)    4. Exacerbation of Crohn's disease without complication ([facility] [address]        PLAN:  1. D/C Home  2. Current Discharge Medication List      START taking these medications    Details   dicyclomine (BENTYL) 20 mg tablet Take 1 Tab by mouth every six (6) hours for 20 doses. Qty: 20 Tab, Refills: 0      methylPREDNISolone (MEDROL, HAWA,) 4 mg tablet Take medications as instructed on packaging  Qty: 1 Dose Pack, Refills: 0         CONTINUE these medications which have NOT CHANGED    Details   predniSONE (DELTASONE) 10 mg tablet Take 5 tablets by mouth daily for 7 days, then 4 tablets daily for 14 days then 3 tablets daily for 14 days then 2 tablets daily for 14 days then 1 tablet by mouth daily for 14 days  Qty: 175 Tab, Refills: 0           3. Follow-up Information     Follow up With Specialties Details Why Contact Info    Nati Moscoso MD Family Practice Schedule an appointment as soon as possible for a visit in 2 days  [address] Cesar Kaiser MD Gastroenterology Schedule an appointment as soon as possible for a visit in 2 days  [address] [facility] [address]  [phone] [address] [facility] DEPT Emergency Medicine  As needed if symptoms worsen [address] [address]  [phone]        ____________________________________     Please note that this dictation was completed with [employer], the [employer] voice recognition software. Quite often unanticipated grammatical, syntax, homophones, and other interpretive errors are inadvertently transcribed by the computer software. Please disregard these errors.   Please excuse any errors that have escaped final proofreading.

## 2019-09-30 NOTE — ED NOTES
Discuss discharge plan with patient prior to administrating IV Fentanyl.  Patient understand if Discharge she can not drive states Daughter will pick her up

## 2019-09-30 NOTE — ED TRIAGE NOTES
Patient with hx of crohns ambulatory to ED with C/O abdominal pain with blood in the stool  that started 3 days ago.

## 2019-09-30 NOTE — DISCHARGE INSTRUCTIONS
You were seen and evaluated in the Emergency Department. Please understand that your work up is not all encompassing and you should follow up with your primary care physician for further management and continuity of care. Please return to Emergency Department or seek medical attention immediately if you have acute worsening in your symptoms or develop chest pain, shortness of breath, repeated vomiting, fever, altered level of consciousness, coughing up blood, or start sweating and feel clammy. If you were prescribed any medicine for home, please take as prescribed by your health-care provider. If you were given any follow-up appointments or numbers to call, please do so as instructed. Avoid any tobacco products or excessive alcohol. Patient Education        Crohn's Disease: Care Instructions  Your Care Instructions    Crohn's disease is a lifelong inflammatory bowel disease (IBD). Parts of the digestive tract get swollen and irritated and may develop deep sores called ulcers. Crohn's disease usually occurs in the last part of the small intestine and the first part of the large intestine. But it can develop anywhere from the mouth to the anus. The main symptoms of Crohn's disease are belly pain, diarrhea, fever, and weight loss. Some people may have constipation. Crohn's disease also sometimes causes problems with the joints, eyes, or skin. Your symptoms may be mild at some times and severe at others. The disease can also go into remission, which means that it is not active and you have no symptoms. Bad attacks of Crohn's disease often have to be treated in the hospital so that you can get medicines and liquids through a tube in your vein, called an IV. This gives your digestive system time to rest and recover. Talk with your doctor about the best treatments for you. You may need medicines that help prevent or treat flare-ups of the disease.  You may need surgery to remove part of your bowel if you have an abnormal opening in the bowel (fistula), an abscess, or a bowel obstruction. In some cases, surgery is needed if medicines do not work. But symptoms often return to other areas of the intestines after surgery. Learning good self-care can help you reduce your symptoms and manage Crohn's disease. Follow-up care is a key part of your treatment and safety. Be sure to make and go to all appointments, and call your doctor if you are having problems. It's also a good idea to know your test results and keep a list of the medicines you take. How can you care for yourself at home? · Take your medicines exactly as prescribed. Call your doctor if you think you are having a problem with your medicine. You will get more details on the specific medicines your doctor prescribes. · Do not take anti-inflammatory medicines, such as aspirin, ibuprofen (Advil, Motrin), or naproxen (Aleve). They may make your symptoms worse. Do not take any other medicines or herbal products without talking to your doctor first.  · Avoid foods that make your symptoms worse. These might include milk, alcohol, high-fiber foods, or spicy foods. · Eat a healthy diet. Make sure to get enough iron. Rectal bleeding may make you lose iron. Good sources of iron include beef, lentils, spinach, raisins, and iron-enriched breads and cereals. · Drink liquid meal replacements if your doctor recommends them. These are high in calories and contain vitamins and minerals. Severe symptoms may make it hard for your body to absorb vitamins and minerals. · Do not smoke. Smoking makes Crohn's disease worse. If you need help quitting, talk to your doctor about stop-smoking programs and medicines. These can increase your chances of quitting for good. · Seek support from friends and family to help cope with Crohn's disease. The illness can affect all parts of your life. Get counseling if you need it. When should you call for help?   Call 911 anytime you think you may need emergency care. For example, call if:    · Your stools are maroon or very bloody.     · You passed out (lost consciousness).    Call your doctor now or seek immediate medical care if:    · You are vomiting.     · You have new or worse belly pain.     · You have a fever.     · You cannot pass stools or gas.     · You have new or more blood in your stools.    Watch closely for changes in your health, and be sure to contact your doctor if:    · You have new or worse symptoms.     · You are losing weight.     · You do not get better as expected. Where can you learn more? Go to http://johan-maria del carmen.info/. Enter 21 [phone] in the search box to learn more about \"Crohn's Disease: Care Instructions. \"  Current as of: November 7, 2018  Content Version: 12.2  © 4721-3819 [employer]. Care instructions adapted under license by [employer] (which disclaims liability or warranty for this information). If you have questions about a medical condition or this instruction, always ask your healthcare professional. [place] [address] any warranty or liability for your use of this information. Patient Education        Diet for Inflammatory Bowel Disease: Care Instructions  Your Care Instructions    Crohn's disease and ulcerative colitis are types of inflammatory bowel disease. What you eat doesn't increase the inflammation that causes your disease. But some types of foods, such as high-fiber fruits and vegetables, may make your symptoms worse. No one diet is right for everyone with an inflammatory bowel disease. Foods that bother one person may not bother another. Your diet has to be tailored for you. Follow-up care is a key part of your treatment and safety. Be sure to make and go to all appointments, and call your doctor if you are having problems. It's also a good idea to know your test results and keep a list of the medicines you take.   How can you care for yourself at home? · Keep a food diary. As soon as you know what foods make your symptoms worse, your doctor or dietitian can help you plan the right diet for you. · During a flare-up, avoid or reduce foods that make symptoms worse. ? Choose dairy products that are low in lactose, such as yogurt, lactose-reduced milk, and hard cheeses like cheddar. ? If you have fat in your stools, choose low-fat foods instead of high-fat ones. For instance, some cuts of red meat have a lot of fat. A low-fat choice would be lean beef (such as sirloin, top and bottom round, tri, or diet lean hamburger), poultry, or fish, such as cod.  ? Instead of frying foods, try baking or broiling them. ? Cook fruits and vegetables without hulls, skins, or seeds. ? Try different ways of preparing fruits and vegetables, such as steaming, stewing, or baking. ? Peel and seed fresh fruits and vegetables if these bother you, or choose canned varieties. · Get the calories and nutrients you need. ? Eat a varied, nutritious diet that is high in calories and protein. ? Try eating 3 meals plus 2 or 3 snacks a day. It may be easier to get more calories if you spread your food intake throughout the day. ? Take vitamin and mineral supplements if your doctor recommends them. ? Try adding high-calorie liquid supplements, such as Ensure Plus or Boost Plus, if you have trouble keeping your weight up.  ? See your doctor or dietitian if your diet feels too limited or you are losing weight. · Make sure to get enough iron. Rectal bleeding may make you lose iron. Good sources of iron include:  ? Beef. ? Lentils. ? Spinach. ? Raisins. ? Iron-enriched breads and cereals. When should you call for help? Watch closely for changes in your health, and be sure to contact your doctor if you have any problems. Where can you learn more? Go to http://johan-maria del carmen.info/.   Enter G774 in the search box to learn more about \"Diet for Inflammatory Bowel Disease: Care Instructions. \"  Current as of: November 7, 2018  Content Version: 12.2  © 2612-3319 [employer]. Care instructions adapted under license by [employer] (which disclaims liability or warranty for this information). If you have questions about a medical condition or this instruction, always ask your healthcare professional. [place] [address] any warranty or liability for your use of this information.

## 2019-09-30 NOTE — ED NOTES
IV Infiltration. Transfusions held , warm compress applied. Patient denies discomfort and/or pain . MD made aware.  Will continue to monitor

## 2019-10-02 LAB
BACTERIA SPEC CULT: NORMAL
SERVICE CMNT-IMP: NORMAL

## 2019-12-18 NOTE — PROGRESS NOTES
Orders received, chart reviewed. On-call SLP to be in later this date to complete clinical evaluation of swallow as warranted.     Thank you for this referral,  Rashida Sykes, SLP Monalisa Mccurdy M.D. Resident  Patient has MRI conditional pacemaker from [employer]. States cardiologists need to turn it for MRI. Called radiology to reach IR after hours. COnfirmed that myelogram was done by Neuroradiologist and that they would not be available until in the AM.

## 2021-06-20 NOTE — ED NOTES
Patient reports that her son is enroute to provide transportation following narcotic pain med admin.
Sepsis Screening completed    (  )Patient meets SIRS criteria. ( x )Patient does not meet SIRS criteria.       SIRS Criteria is achieved when two or more of the following are present   Temperature < 96.8°F (36°C) or > 100.9°F (38.3°C)   Heart Rate > 90 beats per minute   Respiratory Rate > 20 breaths per minute   WBC count > 12,000 or <4,000 or > 10% bands
motor vehicle collision
fair balance

## 2021-08-03 PROBLEM — I63.9 STROKE (HCC): Status: RESOLVED | Noted: 2018-01-08 | Resolved: 2021-08-03

## 2021-08-18 NOTE — OP NOTES
[address] [place] [address]  OPERATIVE REPORT    Name:  Palmira Bob  MR#:  [id number]  :  1970  Account #:  [de-identified]  Date of Adm:  2017  Date of Surgery:  2017      PREOPERATIVE DIAGNOSIS:  Right popliteal artery occlusion. POSTOPERATIVE DIAGNOSIS:  Right popliteal artery occlusion. PROCEDURES PERFORMED  1. Thromboembolectomy of the right popliteal artery. 2. Repair of right popliteal artery below-the-knee with a bovine patch. ESTIMATED BLOOD LOSS:  25 mL. SPECIMENS REMOVED:  Thrombus. ANESTHESIA:  General.    COMPLICATIONS ENCOUNTERED:  Zero. CONDITION OF THE PATIENT:  Stable. INDICATION FOR THE PROCEDURE:  The patient is an unfortunate  43-year-old female. She was admitted to the hospital with Crohn's  disorder and hypercoagulable disorder. She has had some GI  bleeding. She was noted to have a stroke as well as a right popliteal  occlusion. She seems stable for surgery. She had fluids, electrolyte,  and hemodynamic stability and appropriate transfusions. She  understood the risks and benefits of the procedure. It is complex. She  does not seem to have PAD, but a clotting disorder. DESCRIPTION OF PROCEDURE:  She had a preoperative antibiotic. She was placed on the table. She had general anesthesia. Her right  leg was shaved, prepped, and draped in the usual standard fashion  following [facility] compliant guidelines for aseptic technique. I made an  incision on the medial aspect of the leg just below the knee through  skin, soft tissue, and fascia, giving us access to the subpopliteal  space. Immediately saw the popliteal vein, which was patent, and  behind this, the popliteal artery. The popliteal artery going more  cephalad was patent. There was the appearance of occlusion at the  level of trifurcation. I gained a vessel loop around the popliteal above  this area.   I placed a vessel loop around the origin of the anterior tibial  and Erin is here today for   Chief Complaint   Patient presents with   • Office Visit   • Follow-up     and refill meds   .        Medication Refills needed today?  YES,   if you receive a prescription today would you like it to be sent to [facility]? NO    Medications: medications verified and updated    Patient would like communication of their results via:      Cell Phone:   Telephone Information:   Mobile [phone]     Okay to leave a message containing results? Yes    Tobacco history: verified      Health Maintenance Summary     DTaP/Tdap/Td Vaccine (1 - Tdap)  Overdue - never done    Shingles Vaccine (1 of 2)  Overdue - never done    COVID-19 Vaccine (2 - Pfizer 2-dose series)  Overdue since 3/19/2021    Medicare Wellness Visit (Yearly)  Due soon on 2/18/2022    Depression Screening (Yearly)  Due soon on 2/18/2022    Influenza Vaccine (1)  Next due on 9/1/2021    Osteoporosis Screening   Completed    Pneumococcal Vaccine 65+   Completed    Hepatitis B Vaccine   Aged Out    Meningococcal Vaccine   Aged Out    HPV Vaccine   Aged Out              COVID-19 Vaccine Interest Assessment:   • Patient reported already received outside of [facility]  If the patient reports an outside immunization, please update the WIR/ICARE information in the Immunizations activity        the tibioperoneal trunk. I heparinized at this point with 6000 units  of heparin inside the artery in a long way fashion. There was acute  thrombus here thrombectomized locally. I then did a Marky catheter  first in the popliteal artery, achieving brisk arterial inflow and all  thrombus. I flushed here, and then the anterior tibia. Next, I had good  backbleeding from here with all thrombus was removed. I flushed here  as well. Then, into the tibioperoneal trunk, I also removed all thrombus  and had backbleeding. I repaired the site with a bovine pericardium  patch and 6-0 Prolene suture in a circular standard fashion. Just prior  to completion, I did backflush all the sites. I completed the  anastomosis and released all the controls. There was now a pulse into  the popliteal, at the patch site, the tibioperoneal, and the anterior tibial  arteries. I was pleased with this. There was no bleeding. I did  irrigation of this site. I closed the fascia with interrupted 2-0 Monocryl sutures, the  subcutaneous with 3-0 Monocryl interrupted, and the skin with 4-0  Monocryl and Dermabond glue. She was stable for the procedure. She was extubated.         Steffany Salinas DO    CM / [address]  D:  04/06/2017   17:56  T:  04/06/2017   23:32  Job #:  [id number]

## 2021-09-10 NOTE — PROGRESS NOTES
0730 Assumed care of patient. Pt resting quietly in bed.     0800 Assessment completed and charted. See flow sheet. 1100 PT/OT working with patient. Per PT pt able to get up to chair or bedside commode with assist. Pt needs frequent reminders to use right leg.     1200 Reassessment completed and charted. See flow sheet. 1330 Speech therapy at bedside. Recommend mechanical soft diet with single bite/sip. Encourage patient to use right side of mouth. 1600 Reassessment completed and charted. 1730 Pt sitting up to eat dinner. Tolerated well. 1920 Bedside shift change report given to Argelia Her (oncoming nurse) by Quin Heath RN   (offgoing nurse). Report included the following information SBAR, Kardex and MAR. LGBP 6/23/2021. Pt called stating that on Monday after eating one meat roll up with humus she noticed an ache in her pouch. She said that pain has increasingly progressed over time. Now pt reported pain with drinking. Pt denies fever, chills, nausea, vomiting or fever Pt was then offered an appointment on Monday at the [facility] office with NP which she accepted. Pt was advised to call the office I she began experiencing increased abdominal pain and or vomiting prior to appointment on Monday. She was also advised to try to stick to a soft foods or liquid diet until Monday's appointment.

## 2022-02-05 NOTE — PROGRESS NOTES
[facility], Procedure Note          Extubation:       Kimberley Mullins  MRN# [id number]   February 5, 2022, 10:36 AM         Patient extubated at: February 5, 2022, 10:00AM   Supplemental Oxygen: Via nasal cannula at 2 liters per minute   Cough: The cough is strong and good   Secretion Mode: Able to clear   Secretion Amount: Moderate amount, thick and white in color   Respiratory Exam:: Breath sounds: equal and clear     Location: all lobes and bilaterally   Skin Exam:: Patient color: pink   Patient Status: Currently appears comfortable   Arterial Blood Gasses: pH Arterial (no units)   Date Value   02/05/2022 7.44   04/06/2006 8.0 (A)     pO2 Arterial (mm Hg)   Date Value   02/05/2022 120 (H)     pCO2 Arterial (mm Hg)   Date Value   02/05/2022 30 (L)     Bicarbonate Arterial (mmol/L)   Date Value   02/05/2022 20 (L)            Recorded by Bijan Selby   PT treatment attempted. Pt refused secondary to her pain being a 9/10. Pt wanted pain meds before therapy. Discussed with nurse. Will f/u when schedule allows.

## 2022-03-18 PROBLEM — T14.8XXA WOUND INFECTION: Status: ACTIVE | Noted: 2017-04-28

## 2022-03-18 PROBLEM — L08.9 WOUND INFECTION: Status: ACTIVE | Noted: 2017-04-28

## 2022-03-18 PROBLEM — K92.2 GI BLEED: Status: ACTIVE | Noted: 2017-03-28

## 2022-03-19 PROBLEM — I82.409 DVT (DEEP VENOUS THROMBOSIS) (HCC): Status: ACTIVE | Noted: 2017-03-16

## 2022-03-19 PROBLEM — I73.9 PAD (PERIPHERAL ARTERY DISEASE) (HCC): Status: ACTIVE | Noted: 2017-04-04

## 2022-03-19 PROBLEM — A49.8 CLOSTRIDIUM DIFFICILE INFECTION: Status: ACTIVE | Noted: 2018-01-09

## 2022-03-19 PROBLEM — E78.00 PURE HYPERCHOLESTEROLEMIA: Status: ACTIVE | Noted: 2017-05-05

## 2022-03-19 PROBLEM — I74.3 POPLITEAL ARTERY THROMBOSIS, RIGHT (HCC): Status: ACTIVE | Noted: 2017-04-28

## 2022-03-19 PROBLEM — R29.898 LUE WEAKNESS: Status: ACTIVE | Noted: 2018-01-08

## 2022-03-20 PROBLEM — M79.2 NEUROPATHIC PAIN: Status: ACTIVE | Noted: 2017-03-15

## 2022-03-20 PROBLEM — I63.9 EMBOLIC STROKE (HCC): Status: ACTIVE | Noted: 2017-03-10

## 2022-08-08 ENCOUNTER — HOSPITAL ENCOUNTER (EMERGENCY)
Age: 52
Discharge: HOME | End: 2022-08-08
Attending: EMERGENCY MEDICINE
Payer: MEDICAID

## 2022-08-08 VITALS
SYSTOLIC BLOOD PRESSURE: 141 MMHG | DIASTOLIC BLOOD PRESSURE: 81 MMHG | WEIGHT: 175 LBS | OXYGEN SATURATION: 100 % | HEIGHT: 66 IN | BODY MASS INDEX: 28.12 KG/M2 | RESPIRATION RATE: 18 BRPM | TEMPERATURE: 98.4 F | HEART RATE: 102 BPM

## 2022-08-08 DIAGNOSIS — L24.9 IRRITANT CONTACT DERMATITIS, UNSPECIFIED TRIGGER: Primary | ICD-10-CM

## 2022-08-08 PROCEDURE — 99283 EMERGENCY DEPT VISIT LOW MDM: CPT

## 2022-08-08 RX ORDER — DIAPER,BRIEF,INFANT-TODD,DISP
EACH MISCELLANEOUS 2 TIMES DAILY
Qty: 30 G | Refills: 0 | Status: SHIPPED | OUTPATIENT
Start: 2022-08-08

## 2022-08-08 RX ORDER — HYDROXYZINE 50 MG/1
50 TABLET, FILM COATED ORAL
Qty: 20 TABLET | Refills: 0 | Status: SHIPPED | OUTPATIENT
Start: 2022-08-08 | End: 2022-08-18

## 2022-08-08 RX ORDER — PREDNISONE 10 MG/1
TABLET ORAL
Qty: 21 TABLET | Refills: 0 | Status: SHIPPED | OUTPATIENT
Start: 2022-08-08

## 2022-08-08 NOTE — ED PROVIDER NOTES
EMERGENCY DEPARTMENT HISTORY AND PHYSICAL EXAM    Date: 8/8/2022  Patient Name: Jae Owens    History of Presenting Illness     Chief Complaint   Patient presents with    Skin Problem         History Provided By: Patient    Chief Complaint: rash      Additional History (Context):   2:19 PM  Jae Owens is a 47 y.o. female with PMHX Crohn's disease, hypertension, CVA presents to the emergency department C/O rash for the past several days. Patient states she noticed some bumps to her abdomen about a week ago that were itchy. Patient was concerned for bedbugs at her house inspected did not find anything. She was applying tea tree oil to the skin to help with the bumps 3 days ago and began breaking out in a red itchy burning rash to the areas applied. No tongue throat or lip swelling. Denies any recent tick bites recently. No recent illness. No lesions to hands or feet. No tongue throat lip swelling difficulty breathing or wheezing. PCP: Mariya Mason MD    Current Outpatient Medications   Medication Sig Dispense Refill    predniSONE (STERAPRED DS) 10 mg dose pack Take as directed 21 Tablet 0    hydrOXYzine HCL (ATARAX) 50 mg tablet Take 1 Tablet by mouth every six (6) hours as needed for Itching for up to 10 days. 20 Tablet 0    hydrocortisone (CORTAID) 0.5 % topical cream Apply  to affected area two (2) times a day. use thin layer 30 g 0    promethazine (PHENERGAN) 25 mg tablet Take 1 Tab by mouth every six (6) hours as needed for Nausea. Caution: This medication may make you drowsy. Avoid driving while under the influence of this medication. 12 Tab 0    butalbital-acetaminophen-caff (FIORICET) -40 mg per capsule Take 1 Cap by mouth every four (4) hours as needed for Pain. 12 Cap 0    warfarin (COUMADIN) 7.5 mg tablet Take 1 Tab by mouth daily.  (Patient taking differently: Take 5 mg by mouth in the morning.) 30 Tab 0    metroNIDAZOLE (FLAGYL) 500 mg tablet Take 1 Tab by mouth every eight (8) hours. 24 Tab 0    lisinopril (PRINIVIL, ZESTRIL) 10 mg tablet Take 10 mg by mouth daily. carvedilol (COREG) 3.125 mg tablet Take 1 Tab by mouth two (2) times daily (with meals). 60 Tab 0    atorvastatin (LIPITOR) 80 mg tablet Take 1 Tab by mouth nightly. 30 Tab 2       Past History     Past Medical History:  Past Medical History:   Diagnosis Date    Abdominal pain     Anemia NEC     sickle cell trait    C. difficile colitis     Crohn's disease ([facility]     Gastrointestinal disorder     Crohns    Herpes zoster     Hx of cocaine abuse (HCC)     Hyperkalemia     Hypertension     Iron deficiency anemia     MRSA (methicillin resistant Staphylococcus aureus)     Obesity     Pain management     Sickle cell trait ([facility]     [facility]     + cva 3/17    Thromboembolus [facility])        Past Surgical History:  Past Surgical History:   Procedure Laterality Date    COLONOSCOPY N/A 3/17/2017    COLONOSCOPY; BIOPSY; performed by Balbina Kinney MD at THE [facility] ENDOSCOPY    HX GYN      tubal ligation    HX TUBAL LIGATION      VASCULAR SURGERY PROCEDURE UNLIST      blood clot back of right knee       Family History:  History reviewed. No pertinent family history. Social History:  Social History     Tobacco Use    Smoking status: Every Day    Smokeless tobacco: Never   Substance Use Topics    Alcohol use: No    Drug use: No       Allergies: Allergies   Allergen Reactions    Humira [Adalimumab] Other (comments)    Remicade [Infliximab] Palpitations       Review of Systems   Review of Systems   Constitutional:  Negative for chills and fever. Respiratory:  Negative for shortness of breath. Cardiovascular:  Negative for chest pain. Gastrointestinal:  Negative for abdominal pain, diarrhea, nausea and vomiting. Musculoskeletal:  Negative for back pain and neck pain. Skin:  Positive for rash. Neurological:  Negative for weakness and numbness. All other systems reviewed and are negative.     Physical Exam Vitals:    08/08/22 1402   BP: (!) 141/81   Pulse: (!) 102   Resp: 18   Temp: 98.4 °F (36.9 °C)   SpO2: 100%   Weight: 79.4 kg (175 lb)   Height: 5' 6\" (1.676 m)     Physical Exam  Vitals and nursing note reviewed. Constitutional:       General: She is not in acute distress. Appearance: She is well-developed. Comments: Pt appears well sitting up in bed in no distress, speaking in full sentences without evidence of dyspnea, increased work of breathing or any obvious pain at this time     HENT:      Head: Normocephalic and atraumatic. Jaw: No trismus. Comments: No intraoral lesions  No Tongue throat or lip swelling airway is patent swallowing secretions without difficulty no stridor     Right Ear: Hearing, tympanic membrane, ear canal and external ear normal.      Left Ear: Hearing, tympanic membrane, ear canal and external ear normal.      Nose: Nose normal.      Mouth/Throat:      Mouth: Mucous membranes are not dry. Pharynx: Uvula midline. No oropharyngeal exudate, posterior oropharyngeal erythema or uvula swelling. Tonsils: No tonsillar abscesses. Cardiovascular:      Rate and Rhythm: Normal rate and regular rhythm. Pulmonary:      Effort: Pulmonary effort is normal. No respiratory distress. Breath sounds: Normal breath sounds. No stridor. No wheezing or rales. Chest:      Chest wall: No tenderness. Abdominal:      General: Bowel sounds are normal.      Palpations: Abdomen is soft. Musculoskeletal:      Cervical back: Normal range of motion and neck supple. Skin:     Findings: Rash present. Comments: Few scattered hyperpigmented patches to the abdomen   The anterior abdomen is red irritated with dry skin that extends from underneath the breasts down to pubic area remainder of skin is clear hands and feet are clear back is unaffected   Neurological:      Mental Status: She is alert and oriented to person, place, and time.    Psychiatric:         Judgment: Judgment normal.       Diagnostic Study Results     Labs:   No results found for this or any previous visit (from the past 12 hour(s)). Radiologic Studies:   No orders to display     CT Results  (Last 48 hours)      None          CXR Results  (Last 48 hours)      None            Medical Decision Making   I am the first provider for this patient. I reviewed the vital signs, available nursing notes, past medical history, past surgical history, family history and social history. Vital Signs: Reviewed the patient's vital signs. Pulse Oximetry Analysis: 100% on Ra     Records Reviewed: Nursing Notes and Old Medical Records    Procedures:  Procedures    ED Course:   2:19 PM Initial assessment performed. The patients presenting problems have been discussed, and they are in agreement with the care plan formulated and outlined with them. I have encouraged them to ask questions as they arise throughout their visit. Discussion:  Pt presents with rash to the anterior abdomen after applying tea tree oil to the skin for what she thought were insect bites. She does have a few hyperpigmented patches to the skin patient states used to be red bumps. Anterior aspect of the abdomen erythematous irritated dry skin remainder of skin on affected no intraoral lesions no evidence of airway involvement or anaphylaxis. The redness started after using tea tree oil. Suspect contact dermatitis. Will start on prednisone and Atarax and give topical steroid cream and have patient follow-up with Derm. Strict return precautions given, pt offering no questions or complaints. Diagnosis and Disposition     DISCHARGE NOTE:  Yokoosiris Curry's  results have been reviewed with her. She has been counseled regarding her diagnosis, treatment, and plan. She verbally conveys understanding and agreement of the signs, symptoms, diagnosis, treatment and prognosis and additionally agrees to follow up as discussed.   She also agrees with the care-plan and conveys that all of her questions have been answered. I have also provided discharge instructions for her that include: educational information regarding their diagnosis and treatment, and list of reasons why they would want to return to the ED prior to their follow-up appointment, should her condition change. She has been provided with education for proper emergency department utilization. CLINICAL IMPRESSION:    1. Irritant contact dermatitis, unspecified trigger        PLAN:  1. D/C Home  2. Current Discharge Medication List        START taking these medications    Details   predniSONE (STERAPRED DS) 10 mg dose pack Take as directed  Qty: 21 Tablet, Refills: 0  Start date: 8/8/2022      hydrOXYzine HCL (ATARAX) 50 mg tablet Take 1 Tablet by mouth every six (6) hours as needed for Itching for up to 10 days. Qty: 20 Tablet, Refills: 0  Start date: 8/8/2022, End date: 8/18/2022      hydrocortisone (CORTAID) 0.5 % topical cream Apply  to affected area two (2) times a day. use thin layer  Qty: 30 g, Refills: 0  Start date: 8/8/2022           3. Follow-up Information       Follow up With Specialties Details Why Contact Info    Inga Cardona MD Family Medicine Schedule an appointment as soon as possible for a visit   [address] [facility] [address] [facility] [address] [facility] EMERGENCY DEPT Emergency Medicine  If symptoms worsen [address] [facility] [id number] [phone]                   Please note that this dictation was completed with [employer], the computer voice recognition software. Quite often unanticipated grammatical, syntax, homophones, and other interpretive errors are inadvertently transcribed by the computer software. Please disregard these errors. Please excuse any errors that have escaped final proofreading.

## 2022-12-27 ENCOUNTER — HOSPITAL ENCOUNTER (EMERGENCY)
Age: 52
Discharge: HOME | End: 2022-12-28
Attending: EMERGENCY MEDICINE
Payer: MEDICAID

## 2022-12-27 DIAGNOSIS — K50.10 CROHN'S DISEASE OF COLON WITHOUT COMPLICATION (HCC): Primary | ICD-10-CM

## 2022-12-27 PROCEDURE — 87506 IADNA-DNA/RNA PROBE TQ 6-11: CPT

## 2022-12-27 PROCEDURE — 85610 PROTHROMBIN TIME: CPT

## 2022-12-27 PROCEDURE — 87324 CLOSTRIDIUM AG IA: CPT

## 2022-12-27 PROCEDURE — 96375 TX/PRO/DX INJ NEW DRUG ADDON: CPT

## 2022-12-27 PROCEDURE — 96374 THER/PROPH/DIAG INJ IV PUSH: CPT

## 2022-12-27 PROCEDURE — 99285 EMERGENCY DEPT VISIT HI MDM: CPT

## 2022-12-27 PROCEDURE — 83735 ASSAY OF MAGNESIUM: CPT

## 2022-12-27 PROCEDURE — 80053 COMPREHEN METABOLIC PANEL: CPT

## 2022-12-27 PROCEDURE — 85025 COMPLETE CBC W/AUTO DIFF WBC: CPT

## 2022-12-27 PROCEDURE — 85652 RBC SED RATE AUTOMATED: CPT

## 2022-12-27 RX ORDER — MORPHINE SULFATE 4 MG/ML
4 INJECTION INTRAVENOUS
Status: COMPLETED | OUTPATIENT
Start: 2022-12-27 | End: 2022-12-28

## 2022-12-27 RX ORDER — ONDANSETRON 2 MG/ML
4 INJECTION INTRAMUSCULAR; INTRAVENOUS
Status: COMPLETED | OUTPATIENT
Start: 2022-12-27 | End: 2022-12-28

## 2022-12-28 ENCOUNTER — APPOINTMENT (OUTPATIENT)
Dept: CT IMAGING | Age: 52
End: 2022-12-28
Attending: EMERGENCY MEDICINE
Payer: MEDICAID

## 2022-12-28 VITALS
TEMPERATURE: 97.3 F | HEART RATE: 98 BPM | DIASTOLIC BLOOD PRESSURE: 88 MMHG | RESPIRATION RATE: 18 BRPM | SYSTOLIC BLOOD PRESSURE: 124 MMHG | HEIGHT: 66 IN | OXYGEN SATURATION: 100 % | BODY MASS INDEX: 28.12 KG/M2 | WEIGHT: 175 LBS

## 2022-12-28 LAB
ALBUMIN SERPL-MCNC: 3.3 G/DL (ref 3.4–5)
ALBUMIN/GLOB SERPL: 0.9 {RATIO} (ref 0.8–1.7)
ALP SERPL-CCNC: 84 U/L (ref 45–117)
ALT SERPL-CCNC: 29 U/L (ref 13–56)
ANION GAP SERPL CALC-SCNC: 8 MMOL/L (ref 3–18)
AST SERPL-CCNC: 7 U/L (ref 10–38)
BASOPHILS # BLD: 0.1 K/UL (ref 0–0.1)
BASOPHILS NFR BLD: 1 % (ref 0–2)
BILIRUB SERPL-MCNC: 0.3 MG/DL (ref 0.2–1)
BUN SERPL-MCNC: 10 MG/DL (ref 7–18)
BUN/CREAT SERPL: 13 (ref 12–20)
C DIFF GDH STL QL: NEGATIVE
C DIFF TOX A+B STL QL IA: NEGATIVE
CALCIUM SERPL-MCNC: 9.1 MG/DL (ref 8.5–10.1)
CAMPYLOBACTER SPECIES, DNA: NEGATIVE
CHLORIDE SERPL-SCNC: 105 MMOL/L (ref 100–111)
CO2 SERPL-SCNC: 28 MMOL/L (ref 21–32)
CREAT SERPL-MCNC: 0.78 MG/DL (ref 0.6–1.3)
DIFFERENTIAL METHOD BLD: ABNORMAL
ENTEROTOXIGEN E COLI, DNA: NEGATIVE
EOSINOPHIL # BLD: 0 K/UL (ref 0–0.4)
EOSINOPHIL NFR BLD: 0 % (ref 0–5)
ERYTHROCYTE [DISTWIDTH] IN BLOOD BY AUTOMATED COUNT: 15.3 % (ref 11.6–14.5)
ERYTHROCYTE [SEDIMENTATION RATE] IN BLOOD: 78 MM/HR (ref 0–30)
GLOBULIN SER CALC-MCNC: 3.7 G/DL (ref 2–4)
GLUCOSE SERPL-MCNC: 88 MG/DL (ref 74–99)
HCT VFR BLD AUTO: 32.5 % (ref 35–45)
HGB BLD-MCNC: 10.1 G/DL (ref 12–16)
IMM GRANULOCYTES # BLD AUTO: 0.2 K/UL (ref 0–0.04)
IMM GRANULOCYTES NFR BLD AUTO: 1 % (ref 0–0.5)
INR PPP: 1 (ref 0.8–1.2)
INTERPRETATION: NORMAL
LYMPHOCYTES # BLD: 1.8 K/UL (ref 0.9–3.6)
LYMPHOCYTES NFR BLD: 14 % (ref 21–52)
MAGNESIUM SERPL-MCNC: 1.9 MG/DL (ref 1.6–2.6)
MCH RBC QN AUTO: 22.8 PG (ref 24–34)
MCHC RBC AUTO-ENTMCNC: 31.1 G/DL (ref 31–37)
MCV RBC AUTO: 73.4 FL (ref 78–100)
MONOCYTES # BLD: 1.4 K/UL (ref 0.05–1.2)
MONOCYTES NFR BLD: 11 % (ref 3–10)
NEUTS SEG # BLD: 8.9 K/UL (ref 1.8–8)
NEUTS SEG NFR BLD: 72 % (ref 40–73)
NRBC # BLD: 0.02 K/UL (ref 0–0.01)
NRBC BLD-RTO: 0.2 PER 100 WBC
P SHIGELLOIDES DNA STL QL NAA+PROBE: NEGATIVE
PLATELET # BLD AUTO: 364 K/UL (ref 135–420)
PMV BLD AUTO: 8.8 FL (ref 9.2–11.8)
POTASSIUM SERPL-SCNC: 3.6 MMOL/L (ref 3.5–5.5)
PROT SERPL-MCNC: 7 G/DL (ref 6.4–8.2)
PROTHROMBIN TIME: 13.9 SEC (ref 11.5–15.2)
RBC # BLD AUTO: 4.43 M/UL (ref 4.2–5.3)
SALMONELLA SPECIES, DNA: NEGATIVE
SHIGA TOXIN PRODUCING, DNA: NEGATIVE
SHIGELLA SP+EIEC IPAH STL QL NAA+PROBE: NEGATIVE
SODIUM SERPL-SCNC: 141 MMOL/L (ref 136–145)
VIBRIO SPECIES, DNA: NEGATIVE
WBC # BLD AUTO: 12.3 K/UL (ref 4.6–13.2)
Y. ENTEROCOLITICA, DNA: NEGATIVE

## 2022-12-28 PROCEDURE — 74011000636 HC RX REV CODE- 636: Performed by: EMERGENCY MEDICINE

## 2022-12-28 PROCEDURE — 74011250636 HC RX REV CODE- 250/636: Performed by: EMERGENCY MEDICINE

## 2022-12-28 PROCEDURE — 74177 CT ABD & PELVIS W/CONTRAST: CPT

## 2022-12-28 PROCEDURE — 74011250637 HC RX REV CODE- 250/637: Performed by: EMERGENCY MEDICINE

## 2022-12-28 RX ORDER — OXYCODONE HYDROCHLORIDE 5 MG/1
5 TABLET ORAL
Qty: 12 TABLET | Refills: 0 | Status: SHIPPED | OUTPATIENT
Start: 2022-12-28 | End: 2022-12-31

## 2022-12-28 RX ORDER — PREDNISONE 10 MG/1
TABLET ORAL
Qty: 1 DOSE PACK | Refills: 0 | Status: SHIPPED | OUTPATIENT
Start: 2022-12-28

## 2022-12-28 RX ORDER — ONDANSETRON 4 MG/1
4 TABLET, FILM COATED ORAL
Qty: 20 TABLET | Refills: 0 | Status: SHIPPED | OUTPATIENT
Start: 2022-12-28

## 2022-12-28 RX ORDER — OXYCODONE HYDROCHLORIDE 5 MG/1
5 TABLET ORAL ONCE
Status: COMPLETED | OUTPATIENT
Start: 2022-12-28 | End: 2022-12-28

## 2022-12-28 RX ADMIN — MORPHINE SULFATE 4 MG: 4 INJECTION INTRAVENOUS at 01:57

## 2022-12-28 RX ADMIN — IOPAMIDOL 100 ML: 612 INJECTION, SOLUTION INTRAVENOUS at 04:07

## 2022-12-28 RX ADMIN — ONDANSETRON 4 MG: 2 INJECTION INTRAMUSCULAR; INTRAVENOUS at 01:57

## 2022-12-28 RX ADMIN — OXYCODONE 5 MG: 5 TABLET ORAL at 05:37

## 2022-12-28 RX ADMIN — SODIUM CHLORIDE 1000 ML: 9 INJECTION, SOLUTION INTRAVENOUS at 01:57

## 2022-12-28 RX ADMIN — METHYLPREDNISOLONE SODIUM SUCCINATE 125 MG: 125 INJECTION, POWDER, FOR SOLUTION INTRAMUSCULAR; INTRAVENOUS at 05:36

## 2022-12-28 NOTE — ED PROVIDER NOTES
EMERGENCY DEPARTMENT HISTORY AND PHYSICAL EXAM      Date: 12/27/2022  Patient Name: Rosy Girard    History of Presenting Illness     Chief Complaint   Patient presents with    Abdominal Pain       History Provided By: Patient    HPI: Rosy Girard, 47 y.o. female with PMHx as noted below presents emergency department chief complaint of abdominal pain, nausea, bloody diarrhea. Patient states she is having symptoms now for the last 4 to 5 weeks. She describes it as diffuse abdominal cramping will have several watery stools, intermittently mixed with blood. Patient reports a history of Crohn's disease but reports being noncompliant with follow-up and does not currently see a gastroenterologist for her Crohn's. Patient was seen in an outside emergency department on 12/21 for similar symptoms and started on Augmentin however she states that symptoms have persisted so she came to the ED here for evaluation. Otherwise pain is intermittent, moderate nonradiating. Pt denies any other alleviating or exacerbating factors. Additionally, pt specifically denies any recent fever, chills, headache, CP, SOB, lightheadedness, dizziness, numbness, weakness, BLE swelling, heart palpitations, urinary sxs,, cough, or congestion. PCP: Keri Coffman MD    Current Outpatient Medications   Medication Sig Dispense Refill    predniSONE (STERAPRED DS) 10 mg dose pack 10day pack 1 Dose Pack 0    ondansetron hcl (Zofran) 4 mg tablet Take 1 Tablet by mouth every eight (8) hours as needed for Nausea. 20 Tablet 0    oxyCODONE IR (Roxicodone) 5 mg immediate release tablet Take 1 Tablet by mouth every six (6) hours as needed for Pain for up to 3 days. Max Daily Amount: 20 mg. 12 Tablet 0    hydrocortisone (CORTAID) 0.5 % topical cream Apply  to affected area two (2) times a day. use thin layer 30 g 0    promethazine (PHENERGAN) 25 mg tablet Take 1 Tab by mouth every six (6) hours as needed for Nausea. Caution:   This medication may make you drowsy. Avoid driving while under the influence of this medication. 12 Tab 0    butalbital-acetaminophen-caff (FIORICET) -40 mg per capsule Take 1 Cap by mouth every four (4) hours as needed for Pain. 12 Cap 0    warfarin (COUMADIN) 7.5 mg tablet Take 1 Tab by mouth daily. (Patient taking differently: Take 5 mg by mouth in the morning.) 30 Tab 0    metroNIDAZOLE (FLAGYL) 500 mg tablet Take 1 Tab by mouth every eight (8) hours. 24 Tab 0    lisinopril (PRINIVIL, ZESTRIL) 10 mg tablet Take 10 mg by mouth daily. carvedilol (COREG) 3.125 mg tablet Take 1 Tab by mouth two (2) times daily (with meals). 60 Tab 0    atorvastatin (LIPITOR) 80 mg tablet Take 1 Tab by mouth nightly. 30 Tab 2       Past History     Past Medical History:  Past Medical History:   Diagnosis Date    Abdominal pain     Anemia NEC     sickle cell trait    C. difficile colitis     Crohn's disease ([facility] [address]     Gastrointestinal disorder     Crohns    Herpes zoster     Hx of cocaine abuse (HCC)     Hyperkalemia     Hypertension     Iron deficiency anemia     MRSA (methicillin resistant Staphylococcus aureus)     Obesity     Pain management     Sickle cell trait ([facility] [address]     Stroke ([facility] [address]     + cva 3/17    Thromboembolus [facility])        Past Surgical History:  Past Surgical History:   Procedure Laterality Date    COLONOSCOPY N/A 3/17/2017    COLONOSCOPY; BIOPSY; performed by Chemo Terry MD at [facility] ENDOSCOPY    HX GYN      tubal ligation    HX TUBAL LIGATION      VASCULAR SURGERY PROCEDURE UNLIST      blood clot back of right knee       Family History:  No family history on file. Social History:  Social History     Tobacco Use    Smoking status: Every Day    Smokeless tobacco: Never   Substance Use Topics    Alcohol use: No    Drug use: No       Allergies:   Allergies   Allergen Reactions    Humira [Adalimumab] Other (comments)    Remicade [Infliximab] Palpitations         Review of Systems   Review of Systems  Constitutional: Negative for fever, chills, and fatigue. HENT: Negative for congestion, sore throat, rhinorrhea, sneezing and neck stiffness   Eyes: Negative for discharge and redness. Respiratory: Negative for  shortness of breath, wheezing   Cardiovascular: Negative for chest pain, palpitations   Gastrointestinal: Positive for nausea, abdominal pain, diarrhea and blood in stool. Genitourinary: Negative for dysuria, hematuria, flank pain, decreased urine volume, discharge,   Musculoskeletal: Negative for myalgias or joint pain . Skin: Negative for rash or lesions . Neurological: Negative weakness, light-headedness, numbness and headaches. Physical Exam   Physical Exam    GENERAL: alert and oriented, no acute distress  EYES: PEERL, No injection, discharge or icterus. ENT: Mucous membranes pink and moist.  NECK: Supple  LUNGS: Airway patent. Non-labored respirations. Breath sounds clear with good air entry bilaterally. HEART: Regular rate and rhythm. No peripheral edema  ABDOMEN: Non-distended, minimal diffuse ttp without guarding or rebound. SKIN:  warm, dry  MSK/EXTREMITIES: Without swelling, tenderness or deformity, symmetric with normal ROM  NEUROLOGICAL: Alert, oriented      Diagnostic Study Results     Labs -   No results found for this or any previous visit (from the past 12 hour(s)). Radiologic Studies -   CT ABD PELV W CONT   Final Result      1. Perhaps slight wall thickening involving the rectosigmoid colon which could   be in keeping with the patient's provided history of known inflammatory bowel   disease. Correlate clinically to exclude an infectious colitis. Prominent   perirectal and pericolonic lymph nodes to the level of the splenic flexure may   be reactive to the patient's history of inflammatory bowel disease. 2. No other acute radiographic abnormality. CT Results  (Last 48 hours)                 12/28/22 0407  CT ABD PELV W CONT Final result    Impression:      1.  Perhaps slight wall thickening involving the rectosigmoid colon which could   be in keeping with the patient's provided history of known inflammatory bowel   disease. Correlate clinically to exclude an infectious colitis. Prominent   perirectal and pericolonic lymph nodes to the level of the splenic flexure may   be reactive to the patient's history of inflammatory bowel disease. 2. No other acute radiographic abnormality. Narrative:  EXAM: CT abdomen/pelvis with contrast       CLINICAL INDICATION/HISTORY: Abdominal pain     > Additional: History of Crohn's disease       COMPARISON: 09/30/2019       TECHNIQUE: Axial CT imaging of the abdomen and pelvis was performed with   intravenous contrast. Multiplanar reformats were generated. One or more dose   reduction techniques were used on this CT: automated exposure control,   adjustment of the mAs and/or kVp according to patient size, and iterative   reconstruction techniques. The specific techniques used on this CT exam have   been documented in the patient's electronic medical record. Digital Imaging and Communications in Medicine (DICOM) format image data are   available to nonaffiliated external healthcare facilities or entities on a   secure, media free, reciprocally searchable basis with patient authorization for   at least a 12 month period after this study. _______________       FINDINGS:       LOWER CHEST: Unremarkable. LIVER, BILIARY: There are benign hepatic cysts which require no dedicated   imaging follow-up. No biliary dilation. Gallbladder is unremarkable. PANCREAS: Normal.       SPLEEN: Normal.       ADRENALS: Normal.       KIDNEYS/URETERS/BLADDER: Normal.       PELVIC ORGANS: Small calcified uterine fundal fibroids. VASCULATURE: Unremarkable. LYMPH NODES: Prominent and mildly enlarged perirectal and pericolonic lymph   nodes to the level of the splenic flexure. No enlarged retroperitoneal lymph   nodes. GASTROINTESTINAL TRACT: No bowel obstruction. Perhaps slight wall thickening of   the rectosigmoid colon. No other areas of bowel wall thickening. Normal   appendix. BONES: No acute or aggressive osseous abnormalities identified. OTHER: Increased vascularity throughout the mesocolon. _______________                 CXR Results  (Last 48 hours)      None              Medical Decision Making     IVenancio MD am the first provider for this patient and am the attending of record for this patient encounter. I reviewed the vital signs, available nursing notes, past medical history, past surgical history, family history and social history. Vital Signs-Reviewed the patient's vital signs. Patient Vitals for the past 12 hrs:   Temp Pulse Resp BP SpO2   12/28/22 0221 -- -- -- -- 100 %   12/28/22 0156 97.3 °F (36.3 °C) 98 18 124/88 100 %   12/27/22 2129 97.3 °F (36.3 °C) 99 18 (!) 130/106 100 %         Records Reviewed: Nursing Notes and Old Medical Records    Provider Notes (Medical Decision Making): The patient presents with a chief complaint of abdominal pain. Differential diagnosis considered includes acute appendicitis, acute cholecystitis, pancreatitis, gastritis, PUD, diverticulitis, mesenteric ischemia, abdominal aortic aneurysm, bowel obstruction, enteritis, colitis, fecal impaction, volvulus, IBS, inflammatory bowel disease, specific food intolerance, peritonitis, perforated viscous, malignancy, UTI, abscess, and abdominal pain NOS. Pelvic source of pain was also considered. All labs and diagnostic studies were reviewed and interpreted by me. Findings consistent with mild Crohn's flare. Given dose of Solu-Medrol here, will discharge on a steroid taper. Pt is tolerating po. The patient states that their symptoms have improved and he feels much better.  There are no other new complaints at this time      There were no concerns for any acute life-threatening or surgical pathology that would warrant admission at this time. Vital signs were within acceptable limits at the time of discharge. Patient was in stable condition and felt to be reliable for outpatient management. The patient was advised to return to the emergency room for acutely worsening pain, persistence of pain, fevers, bloody stools, intractable vomiting or bloody emesis, inability to tolerate food/liquids, syncope, or change in mental status. Otherwise, the patient is encouraged to follow-up with a primary care physician and gastroenterology as has been previously recommended. I did explain to the patient the importance of compliance with GI follow-up as she has been choosing not to do so up to this point. The patient understood the work-up and assessment and had no further questions. The patient was discharged home in stable clinical condition. ED Course:   Initial assessment performed. The patients presenting problems have been discussed, and they are in agreement with the care plan formulated and outlined with them. I have encouraged them to ask questions as they arise throughout their visit. Medications   sodium chloride 0.9 % bolus infusion 1,000 mL (0 mL IntraVENous IV Completed 12/28/22 0558)   ondansetron (ZOFRAN) injection 4 mg (4 mg IntraVENous Given 12/28/22 0157)   morphine injection 4 mg (4 mg IntraVENous Given 12/28/22 0157)   iopamidoL (ISOVUE 300) 300 mg iodine /mL (61 %) contrast injection  mL (100 mL IntraVENous Given 12/28/22 0407)   methylPREDNISolone (PF) (Solu-MEDROL) injection 125 mg (125 mg IntraVENous Given 12/28/22 0536)   oxyCODONE IR (ROXICODONE) tablet 5 mg (5 mg Oral Given 12/28/22 0537)         PROGRESS  Jadyn Curry's  results have been reviewed with her. She has been counseled regarding her diagnosis.   She verbally conveys understanding and agreement of the signs, symptoms, diagnosis, treatment and prognosis and additionally agrees to follow up as recommended with Dr. Diane Dickson MD in 24 - 48 hours. She also agrees with the care-plan and conveys that all of her questions have been answered. I have also put together some discharge instructions for her that include: 1) educational information regarding their diagnosis, 2) how to care for their diagnosis at home, as well a 3) list of reasons why they would want to return to the ED prior to their follow-up appointment, should their condition change. Disposition:  home    PLAN:  1. Discharge Medication List as of 12/28/2022  5:49 AM        START taking these medications    Details   predniSONE (STERAPRED DS) 10 mg dose pack 10day pack, Normal, Disp-1 Dose Pack, R-0      ondansetron hcl (Zofran) 4 mg tablet Take 1 Tablet by mouth every eight (8) hours as needed for Nausea., Normal, Disp-20 Tablet, R-0      oxyCODONE IR (Roxicodone) 5 mg immediate release tablet Take 1 Tablet by mouth every six (6) hours as needed for Pain for up to 3 days. Max Daily Amount: 20 mg., Normal, Disp-12 Tablet, R-0           CONTINUE these medications which have NOT CHANGED    Details   hydrocortisone (CORTAID) 0.5 % topical cream Apply  to affected area two (2) times a day. use thin layer, Normal, Disp-30 g, R-0      promethazine (PHENERGAN) 25 mg tablet Take 1 Tab by mouth every six (6) hours as needed for Nausea. Caution: This medication may make you drowsy. Avoid driving while under the influence of this medication. , Print, Disp-12 Tab, R-0      butalbital-acetaminophen-caff (FIORICET) -40 mg per capsule Take 1 Cap by mouth every four (4) hours as needed for Pain., Normal, Disp-12 Cap, R-0      warfarin (COUMADIN) 7.5 mg tablet Take 1 Tab by mouth daily. , No Print, Disp-30 Tab, R-0      metroNIDAZOLE (FLAGYL) 500 mg tablet Take 1 Tab by mouth every eight (8) hours. , Normal, Disp-24 Tab, R-0      lisinopril (PRINIVIL, ZESTRIL) 10 mg tablet Take 10 mg by mouth daily. , Historical Med      carvedilol (COREG) 3.125 mg tablet Take 1 Tab by mouth two (2) times daily (with meals). , Print, Disp-60 Tab, R-0      atorvastatin (LIPITOR) 80 mg tablet Take 1 Tab by mouth nightly. , Print, Disp-30 Tab, R-2           2. Follow-up Information       Follow up With Specialties Details Why Contact Info    Trevor Cardona MD Family Medicine Schedule an appointment as soon as possible for a visit in 2 days  [address] [id number]  [phone]      [facility] EMERGENCY DEPT Emergency Medicine  If symptoms worsen [address] [facility]  [phone]    Lindi Goltz, MD Gastroenterology Schedule an appointment as soon as possible for a visit in 2 days  [address] Dangelo Crespo [address] [address] [phone]            Return to ED if worse     Diagnosis     Clinical Impression:   1. Crohn's disease of colon without complication [facility])        Please note that this dictation was completed with Dragon, computer voice recognition software. Quite often unanticipated grammatical, syntax, homophones, and other interpretive errors are inadvertently transcribed by the computer software. Please disregard these errors. Additionally, please excuse any errors that have escaped final proofreading.

## 2023-04-27 NOTE — ED TRIAGE NOTES
Pt is having severe allergic reaction to tea tree oil x 2 but the rash has gotten worse it is really dry and irritated. Pt has rash to abdomen and bilateral breast and underarms and right leg. Number Of Hemigard Strips Per Side: 1

## 2023-05-29 ENCOUNTER — APPOINTMENT (OUTPATIENT)
Facility: HOSPITAL | Age: 53
End: 2023-05-29
Payer: MEDICAID

## 2023-05-29 ENCOUNTER — HOSPITAL ENCOUNTER (EMERGENCY)
Facility: HOSPITAL | Age: 53
Discharge: HOME | End: 2023-05-29
Attending: EMERGENCY MEDICINE
Payer: MEDICAID

## 2023-05-29 VITALS
HEART RATE: 99 BPM | RESPIRATION RATE: 16 BRPM | SYSTOLIC BLOOD PRESSURE: 128 MMHG | TEMPERATURE: 97.3 F | OXYGEN SATURATION: 100 % | BODY MASS INDEX: 30.86 KG/M2 | WEIGHT: 192 LBS | DIASTOLIC BLOOD PRESSURE: 84 MMHG | HEIGHT: 66 IN

## 2023-05-29 DIAGNOSIS — Z79.01 ANTICOAGULATED ON COUMADIN: ICD-10-CM

## 2023-05-29 DIAGNOSIS — K92.1 BLOOD IN STOOL: ICD-10-CM

## 2023-05-29 DIAGNOSIS — M79.10 MYALGIA: Primary | ICD-10-CM

## 2023-05-29 DIAGNOSIS — I69.30 HISTORY OF CVA WITH RESIDUAL DEFICIT: ICD-10-CM

## 2023-05-29 LAB
ALBUMIN SERPL-MCNC: 3.4 G/DL (ref 3.4–5)
ALBUMIN/GLOB SERPL: 1 (ref 0.8–1.7)
ALP SERPL-CCNC: 87 U/L (ref 45–117)
ALT SERPL-CCNC: 26 U/L (ref 13–56)
ANION GAP SERPL CALC-SCNC: 5 MMOL/L (ref 3–18)
APTT PPP: 36.9 SEC (ref 23–36.4)
AST SERPL-CCNC: 14 U/L (ref 10–38)
BASOPHILS # BLD: 0 K/UL (ref 0–0.1)
BASOPHILS NFR BLD: 1 % (ref 0–2)
BILIRUB SERPL-MCNC: 0.2 MG/DL (ref 0.2–1)
BUN SERPL-MCNC: 19 MG/DL (ref 7–18)
BUN/CREAT SERPL: 19 (ref 12–20)
CALCIUM SERPL-MCNC: 8.8 MG/DL (ref 8.5–10.1)
CHLORIDE SERPL-SCNC: 110 MMOL/L (ref 100–111)
CK SERPL-CCNC: 66 U/L (ref 26–192)
CO2 SERPL-SCNC: 26 MMOL/L (ref 21–32)
CREAT SERPL-MCNC: 0.99 MG/DL (ref 0.6–1.3)
D DIMER PPP FEU-MCNC: 0.37 UG/ML(FEU)
DIFFERENTIAL METHOD BLD: ABNORMAL
EOSINOPHIL # BLD: 0.6 K/UL (ref 0–0.4)
EOSINOPHIL NFR BLD: 8 % (ref 0–5)
ERYTHROCYTE [DISTWIDTH] IN BLOOD BY AUTOMATED COUNT: 15 % (ref 11.6–14.5)
GLOBULIN SER CALC-MCNC: 3.3 G/DL (ref 2–4)
GLUCOSE SERPL-MCNC: 86 MG/DL (ref 74–99)
HCT VFR BLD AUTO: 34.3 % (ref 35–45)
HGB BLD-MCNC: 10.5 G/DL (ref 12–16)
IMM GRANULOCYTES # BLD AUTO: 0 K/UL (ref 0–0.04)
IMM GRANULOCYTES NFR BLD AUTO: 0 % (ref 0–0.5)
INR PPP: 2.3 (ref 0.8–1.2)
LIPASE SERPL-CCNC: 159 U/L (ref 73–393)
LYMPHOCYTES # BLD: 2.3 K/UL (ref 0.9–3.6)
LYMPHOCYTES NFR BLD: 31 % (ref 21–52)
MAGNESIUM SERPL-MCNC: 2.1 MG/DL (ref 1.6–2.6)
MCH RBC QN AUTO: 22.9 PG (ref 24–34)
MCHC RBC AUTO-ENTMCNC: 30.6 G/DL (ref 31–37)
MCV RBC AUTO: 74.7 FL (ref 78–100)
MONOCYTES # BLD: 0.6 K/UL (ref 0.05–1.2)
MONOCYTES NFR BLD: 8 % (ref 3–10)
NEUTS SEG # BLD: 3.7 K/UL (ref 1.8–8)
NEUTS SEG NFR BLD: 52 % (ref 40–73)
NRBC # BLD: 0 K/UL (ref 0–0.01)
NRBC BLD-RTO: 0 PER 100 WBC
PLATELET # BLD AUTO: 308 K/UL (ref 135–420)
PMV BLD AUTO: 9.4 FL (ref 9.2–11.8)
POTASSIUM SERPL-SCNC: 3.6 MMOL/L (ref 3.5–5.5)
PROT SERPL-MCNC: 6.7 G/DL (ref 6.4–8.2)
PROTHROMBIN TIME: 25.8 SEC (ref 11.5–15.2)
RBC # BLD AUTO: 4.59 M/UL (ref 4.2–5.3)
SODIUM SERPL-SCNC: 141 MMOL/L (ref 136–145)
TROPONIN I SERPL HS-MCNC: 4 NG/L (ref 0–54)
WBC # BLD AUTO: 7.2 K/UL (ref 4.6–13.2)

## 2023-05-29 PROCEDURE — 71045 X-RAY EXAM CHEST 1 VIEW: CPT

## 2023-05-29 PROCEDURE — 84484 ASSAY OF TROPONIN QUANT: CPT

## 2023-05-29 PROCEDURE — 83735 ASSAY OF MAGNESIUM: CPT

## 2023-05-29 PROCEDURE — 82550 ASSAY OF CK (CPK): CPT

## 2023-05-29 PROCEDURE — 99285 EMERGENCY DEPT VISIT HI MDM: CPT

## 2023-05-29 PROCEDURE — 85379 FIBRIN DEGRADATION QUANT: CPT

## 2023-05-29 PROCEDURE — 6360000004 HC RX CONTRAST MEDICATION: Performed by: PHYSICIAN ASSISTANT

## 2023-05-29 PROCEDURE — 85610 PROTHROMBIN TIME: CPT

## 2023-05-29 PROCEDURE — 93005 ELECTROCARDIOGRAM TRACING: CPT | Performed by: PHYSICIAN ASSISTANT

## 2023-05-29 PROCEDURE — 96374 THER/PROPH/DIAG INJ IV PUSH: CPT

## 2023-05-29 PROCEDURE — 85025 COMPLETE CBC W/AUTO DIFF WBC: CPT

## 2023-05-29 PROCEDURE — 74177 CT ABD & PELVIS W/CONTRAST: CPT

## 2023-05-29 PROCEDURE — 6360000002 HC RX W HCPCS: Performed by: PHYSICIAN ASSISTANT

## 2023-05-29 PROCEDURE — 96375 TX/PRO/DX INJ NEW DRUG ADDON: CPT

## 2023-05-29 PROCEDURE — 83690 ASSAY OF LIPASE: CPT

## 2023-05-29 PROCEDURE — 80053 COMPREHEN METABOLIC PANEL: CPT

## 2023-05-29 PROCEDURE — 85730 THROMBOPLASTIN TIME PARTIAL: CPT

## 2023-05-29 RX ORDER — OXYCODONE HYDROCHLORIDE AND ACETAMINOPHEN 5; 325 MG/1; MG/1
1 TABLET ORAL EVERY 8 HOURS PRN
Qty: 10 TABLET | Refills: 0 | Status: SHIPPED | OUTPATIENT
Start: 2023-05-29 | End: 2023-06-01

## 2023-05-29 RX ORDER — ONDANSETRON 2 MG/ML
4 INJECTION INTRAMUSCULAR; INTRAVENOUS
Status: COMPLETED | OUTPATIENT
Start: 2023-05-29 | End: 2023-05-29

## 2023-05-29 RX ORDER — MORPHINE SULFATE 2 MG/ML
2 INJECTION, SOLUTION INTRAMUSCULAR; INTRAVENOUS
Status: COMPLETED | OUTPATIENT
Start: 2023-05-29 | End: 2023-05-29

## 2023-05-29 RX ADMIN — ONDANSETRON 4 MG: 2 INJECTION INTRAMUSCULAR; INTRAVENOUS at 21:14

## 2023-05-29 RX ADMIN — IOPAMIDOL 100 ML: 612 INJECTION, SOLUTION INTRAVENOUS at 20:51

## 2023-05-29 RX ADMIN — MORPHINE SULFATE 2 MG: 2 INJECTION, SOLUTION INTRAMUSCULAR; INTRAVENOUS at 21:13

## 2023-05-29 ASSESSMENT — PAIN SCALES - GENERAL: PAINLEVEL_OUTOF10: 10

## 2023-05-29 ASSESSMENT — PAIN - FUNCTIONAL ASSESSMENT: PAIN_FUNCTIONAL_ASSESSMENT: 0-10

## 2023-05-29 NOTE — ED NOTES
Pt arrived with c/o intermittent diarrhea with blood in her stool. Pt has hx of chrons, stroke with left sided deficits and is on a blood thinner. Pt states she has been having bloody stool x2 weeks. Pt feels weak and has pain in her limbs. Pt denies lightheaded or dizziness. Pt is alert and oriented x4. Pt vital signs are stable.      Jenna Hinson RN  05/29/23 2313

## 2023-05-29 NOTE — ED PROVIDER NOTES
[phone]   oxyCODONE-acetaminophen 5-325 MG per tablet                I am the Primary Clinician of Record. (Please note that parts of this dictation were completed with voice recognition software. Quite often unanticipated grammatical, syntax, homophones, and other interpretive errors are inadvertently transcribed by the computer software. Please disregards these errors.  Please excuse any errors that have escaped final proofreading.)       Nakia Snyder PA-C  05/30/23 0251

## 2023-05-29 NOTE — ED NOTES
Attempted PIV x2 with no success. Pt states she normally requires an ultrasound guided IV or IJ. Pt endorses how she generally requires multiple sticks. Pt is resting comfortably in bed. RN seeking other IV options.      Humaira Alfaro RN  05/29/23 0715

## 2023-05-29 NOTE — ED TRIAGE NOTES
Patient reports that she had a stroke in January has  left sided hemiplegia.  Reports her left arm and left leg pain and rectal bleeding

## 2023-05-30 NOTE — PROGRESS NOTES
Patient in Cat Scan for a CT Abdomen Pelvis WITH IV contrast. Patient's arm at IV site is wrapped in Coban wrap. IV flushes well as long as arm is kept straight. Tech left both of her arms straight for the exam and dropped the injection rate down to 1.5ml/s for the dye. During the contrast injection, the tech was in the room talking to the patient. The tech stepped out during the final scan. No contrast was noted to be seen on the images. When the tech re-entered the room the patient asked if the IV was supposed to hurt. IV site difficult to assess with the wrappings- but a heat pack was applied above the IV site and the patient's Nurse was alerted she was on her way back to the ED and needed to be evaluated.

## 2023-06-01 LAB
EKG ATRIAL RATE: 82 BPM
EKG DIAGNOSIS: NORMAL
EKG P AXIS: -3 DEGREES
EKG P-R INTERVAL: 130 MS
EKG Q-T INTERVAL: 396 MS
EKG QRS DURATION: 98 MS
EKG QTC CALCULATION (BAZETT): 462 MS
EKG R AXIS: 6 DEGREES
EKG T AXIS: 40 DEGREES
EKG VENTRICULAR RATE: 82 BPM

## 2023-08-20 ENCOUNTER — HOSPITAL ENCOUNTER (EMERGENCY)
Facility: HOSPITAL | Age: 53
Discharge: HOME | End: 2023-08-20
Attending: EMERGENCY MEDICINE
Payer: MEDICAID

## 2023-08-20 VITALS
BODY MASS INDEX: 28.12 KG/M2 | OXYGEN SATURATION: 97 % | HEART RATE: 112 BPM | HEIGHT: 66 IN | SYSTOLIC BLOOD PRESSURE: 127 MMHG | DIASTOLIC BLOOD PRESSURE: 93 MMHG | TEMPERATURE: 98.4 F | WEIGHT: 175 LBS | RESPIRATION RATE: 19 BRPM

## 2023-08-20 DIAGNOSIS — M79.602 LEFT ARM PAIN: Primary | ICD-10-CM

## 2023-08-20 PROCEDURE — 6370000000 HC RX 637 (ALT 250 FOR IP): Performed by: EMERGENCY MEDICINE

## 2023-08-20 PROCEDURE — 99283 EMERGENCY DEPT VISIT LOW MDM: CPT

## 2023-08-20 RX ORDER — NAPROXEN 500 MG/1
500 TABLET ORAL 2 TIMES DAILY
Qty: 60 TABLET | Refills: 0 | Status: SHIPPED | OUTPATIENT
Start: 2023-08-20

## 2023-08-20 RX ORDER — DOXYCYCLINE HYCLATE 100 MG
100 TABLET ORAL 2 TIMES DAILY
Qty: 14 TABLET | Refills: 0 | Status: SHIPPED | OUTPATIENT
Start: 2023-08-20 | End: 2023-08-27

## 2023-08-20 RX ORDER — HYDROCODONE BITARTRATE AND ACETAMINOPHEN 5; 325 MG/1; MG/1
1 TABLET ORAL
Status: COMPLETED | OUTPATIENT
Start: 2023-08-20 | End: 2023-08-20

## 2023-08-20 RX ADMIN — HYDROCODONE BITARTRATE AND ACETAMINOPHEN 1 TABLET: 5; 325 TABLET ORAL at 19:19

## 2023-08-20 ASSESSMENT — PAIN DESCRIPTION - ORIENTATION: ORIENTATION: LEFT

## 2023-08-20 ASSESSMENT — PAIN DESCRIPTION - LOCATION: LOCATION: ARM

## 2023-08-20 ASSESSMENT — PAIN SCALES - GENERAL: PAINLEVEL_OUTOF10: 10

## 2024-01-04 NOTE — PROGRESS NOTES
0730 Assumed pt care, pt is alert and oriented x4, ST on the monitor, vss, lung sounds are clear on room air, pt is resting quietly watching television. Pt denies any further needs at this time. Shift Summary- Pt experienced an uneventful shift. Pt awaiting PICC placement tomorrow. Bedside and Verbal shift change report given to JIMI Willard (oncoming nurse) by Elier Fernandez   (offgoing nurse).  Report included the following information SBAR, Kardex, Procedure Summary, Intake/Output, MAR, Recent Results and Cardiac Rhythm ST. Hide Include Location In Plan Question?: No Include Location In Plan?: Yes Detail Level: Generalized Detail Level: Detailed

## 2024-01-17 NOTE — ED NOTES
Mica Winslow was discharged in good and improved condition. The patient's diagnosis, condition and treatment were explained to patient and written aftercare instructions were given. The patient verbalized good understanding. Patient armband removed and both labels and armband were placed in shred bin. Patient left ER ambulatory with steady gait in no acute distress. 0 prescriptions provided.
Patient observed standing at the door.  Nurse requested pain return to bed and that we will be with you shortly
Pt is awake and alert, family at bedside. No acute distress noted. MediPort site WDL. safety maintained maintained, comfort measures provided. Awaiting further plan
Pt awake, alert, dad at the bedside. Denies pain/discomfort. Comfort and safety maintained.

## 2024-01-31 NOTE — ED NOTES
Assisted to bathroom via w/c - pt ambulated w/o difficulty - provided urine specimen.
Bedside and Verbal shift change report given to Marline Reis (oncoming nurse) by Allen Younger RN   (offgoing nurse). Report included the following information SBAR, Kardex and MAR.
FSBS done at bedside during triage, results 90
MRI checklist completed.
Patient armband removed and shredded  I have reviewed discharge instructions with the patient. The patient verbalized understanding.
Pt lying in bed texting on her phone -waiting for MRI results.
Unknown if ever smoked

## 2024-02-21 NOTE — ED NOTES
Chief Complaint   Patient presents with    Annual Exam     Patient presents in office today for CPE.  She is not fasting.  No concerns.      \"Have you been to the ER, urgent care clinic since your last visit?  Hospitalized since your last visit?\"    NO    “Have you seen or consulted any other health care providers outside of [facility] since your last visit?”    NO        “Have you had a pap smear?”    YES - Where: Dr. Marya Irwin  Nurse/CMA to request most recent records if not in the chart      
IV access attempted by Caden Lopez w/o success. Ultrasound guided IV to be attempted. Pt alert and oriented resting in bed comfortably.
Patient received discharge paperwork from RN. Patient verbalized understanding of discharge paper word. Patient ambulatory out of the ER.
Pt alert and oriented reports a hx of Crohn's states she has had diarrhea since 11/25 (approx) with diffuse abdominal pain consistent with her Crohn's. Pt has been taking abx. Pt speaks in full sentences w/o complications.
factors and screens needed  -Patient needs a colonoscopy No  -Labs from previous visits were discussed with patient Yes  -Discussed with patient diet and exercise=yes  -Discussed with patient testicular (male)/breast self exam (female)= Yes  Return in about 1 year (around 2/21/2025), or if symptoms worsen or fail to improve.      I have discussed the diagnosis with the patient and the intended plan as seen in the above orders.  The patient has received an after-visit summary and questions were answered concerning future plans.  Pt conveyed understanding.    Medication Side Effects and Warnings were discussed with patient: Yes  Patient Labs were reviewed and or requested: Yes  Patient Past Records were reviewed and or requested  Yes    Patient Instructions   Patient Education    A Healthy Lifestyle: Care Instructions  A healthy lifestyle can help you feel good, have more energy, and stay at a weight that's healthy for you. You can share a healthy lifestyle with your friends and family. And you can do it on your own.    Eat meals with your friends or family. You could try cooking together.   Plan activities with other people. Go for a walk with a friend, try a free online fitness class, or join a sports league.     Eat a variety of healthy foods. These include fruits, vegetables, whole grains, low-fat dairy, and lean protein.   Choose healthy portions of food. You can use the Nutrition Facts label on food packages as a guide.     Eat more fruits and vegetables. You could add vegetables to sandwiches or add fruit to cereal.   Drink water when you are thirsty. Limit soda, juice, and sports drinks.     Try to exercise most days. Aim for at least 2½ hours of exercise each week.   Keep moving. Work in the garden or take your dog on a walk. Use the stairs instead of the elevator.     If you use tobacco or nicotine, try to quit. Ask your doctor about programs and medicines to help you quit.   Limit alcohol. Men should have

## 2024-08-28 ENCOUNTER — APPOINTMENT (OUTPATIENT)
Facility: HOSPITAL | Age: 54
End: 2024-08-28
Payer: MEDICAID

## 2024-08-28 ENCOUNTER — HOSPITAL ENCOUNTER (EMERGENCY)
Facility: HOSPITAL | Age: 54
Discharge: HOME | End: 2024-08-28
Attending: STUDENT IN AN ORGANIZED HEALTH CARE EDUCATION/TRAINING PROGRAM
Payer: MEDICAID

## 2024-08-28 VITALS
RESPIRATION RATE: 16 BRPM | SYSTOLIC BLOOD PRESSURE: 128 MMHG | DIASTOLIC BLOOD PRESSURE: 96 MMHG | BODY MASS INDEX: 36.96 KG/M2 | TEMPERATURE: 98.5 F | OXYGEN SATURATION: 100 % | HEIGHT: 66 IN | WEIGHT: 230 LBS | HEART RATE: 100 BPM

## 2024-08-28 DIAGNOSIS — N39.0 URINARY TRACT INFECTION WITHOUT HEMATURIA, SITE UNSPECIFIED: ICD-10-CM

## 2024-08-28 DIAGNOSIS — R10.84 GENERALIZED ABDOMINAL PAIN: Primary | ICD-10-CM

## 2024-08-28 DIAGNOSIS — K50.90 EXACERBATION OF CROHN'S DISEASE WITHOUT COMPLICATION (HCC): ICD-10-CM

## 2024-08-28 LAB
ALBUMIN SERPL-MCNC: 3.9 G/DL (ref 3.4–5)
ALBUMIN/GLOB SERPL: 1.3 (ref 0.8–1.7)
ALP SERPL-CCNC: 93 U/L (ref 45–117)
ALT SERPL-CCNC: 25 U/L (ref 13–56)
ANION GAP SERPL CALC-SCNC: 9 MMOL/L (ref 3–18)
APPEARANCE UR: ABNORMAL
AST SERPL-CCNC: 9 U/L (ref 10–38)
BACTERIA URNS QL MICRO: ABNORMAL /HPF
BASOPHILS # BLD: 0.1 K/UL (ref 0–0.1)
BASOPHILS NFR BLD: 0 % (ref 0–2)
BILIRUB SERPL-MCNC: 0.2 MG/DL (ref 0.2–1)
BILIRUB UR QL: NEGATIVE
BUN SERPL-MCNC: 19 MG/DL (ref 7–18)
BUN/CREAT SERPL: 18 (ref 12–20)
C COLI+JEJUNI TUF STL QL NAA+PROBE: NEGATIVE
CALCIUM SERPL-MCNC: 8.7 MG/DL (ref 8.5–10.1)
CHLORIDE SERPL-SCNC: 111 MMOL/L (ref 100–111)
CO2 SERPL-SCNC: 24 MMOL/L (ref 21–32)
COLOR UR: YELLOW
CREAT SERPL-MCNC: 1.04 MG/DL (ref 0.6–1.3)
DIFFERENTIAL METHOD BLD: ABNORMAL
EC STX1+STX2 GENES STL QL NAA+PROBE: NEGATIVE
EOSINOPHIL # BLD: 0.4 K/UL (ref 0–0.4)
EOSINOPHIL NFR BLD: 3 % (ref 0–5)
EPITH CASTS URNS QL MICRO: ABNORMAL /LPF (ref 0–5)
ERYTHROCYTE [DISTWIDTH] IN BLOOD BY AUTOMATED COUNT: 16.2 % (ref 11.6–14.5)
ETEC ELTA+ESTB GENES STL QL NAA+PROBE: NEGATIVE
GLOBULIN SER CALC-MCNC: 2.9 G/DL (ref 2–4)
GLUCOSE SERPL-MCNC: 99 MG/DL (ref 74–99)
GLUCOSE UR STRIP.AUTO-MCNC: NEGATIVE MG/DL
HCG SERPL QL: NEGATIVE
HCT VFR BLD AUTO: 35.1 % (ref 35–45)
HGB BLD-MCNC: 11 G/DL (ref 12–16)
HGB UR QL STRIP: NEGATIVE
IMM GRANULOCYTES # BLD AUTO: 0 K/UL (ref 0–0.04)
IMM GRANULOCYTES NFR BLD AUTO: 0 % (ref 0–0.5)
INR PPP: 2.5 (ref 0.9–1.1)
KETONES UR QL STRIP.AUTO: ABNORMAL MG/DL
LEUKOCYTE ESTERASE UR QL STRIP.AUTO: ABNORMAL
LIPASE SERPL-CCNC: 48 U/L (ref 13–75)
LYMPHOCYTES # BLD: 3.1 K/UL (ref 0.9–3.6)
LYMPHOCYTES NFR BLD: 24 % (ref 21–52)
MAGNESIUM SERPL-MCNC: 1.8 MG/DL (ref 1.6–2.6)
MCH RBC QN AUTO: 23.4 PG (ref 24–34)
MCHC RBC AUTO-ENTMCNC: 31.3 G/DL (ref 31–37)
MCV RBC AUTO: 74.5 FL (ref 78–100)
MONOCYTES # BLD: 0.8 K/UL (ref 0.05–1.2)
MONOCYTES NFR BLD: 6 % (ref 3–10)
NEUTS SEG # BLD: 8.3 K/UL (ref 1.8–8)
NEUTS SEG NFR BLD: 66 % (ref 40–73)
NITRITE UR QL STRIP.AUTO: NEGATIVE
NRBC # BLD: 0 K/UL (ref 0–0.01)
NRBC BLD-RTO: 0 PER 100 WBC
P SHIGELLOIDES DNA STL QL NAA+PROBE: NEGATIVE
PH UR STRIP: 5.5 (ref 5–8)
PLATELET # BLD AUTO: 289 K/UL (ref 135–420)
PMV BLD AUTO: 9.4 FL (ref 9.2–11.8)
POTASSIUM SERPL-SCNC: 3.4 MMOL/L (ref 3.5–5.5)
PROT SERPL-MCNC: 6.8 G/DL (ref 6.4–8.2)
PROT UR STRIP-MCNC: NEGATIVE MG/DL
PROTHROMBIN TIME: 27.4 SEC (ref 11.9–14.9)
RBC # BLD AUTO: 4.71 M/UL (ref 4.2–5.3)
RBC #/AREA URNS HPF: ABNORMAL /HPF (ref 0–5)
SALMONELLA SP SPAO STL QL NAA+PROBE: NEGATIVE
SHIGELLA SP+EIEC IPAH STL QL NAA+PROBE: NEGATIVE
SODIUM SERPL-SCNC: 144 MMOL/L (ref 136–145)
SP GR UR REFRACTOMETRY: 1.03 (ref 1–1.03)
UROBILINOGEN UR QL STRIP.AUTO: 1 EU/DL (ref 0.2–1)
V CHOL+PARA+VUL DNA STL QL NAA+NON-PROBE: NEGATIVE
WBC # BLD AUTO: 12.7 K/UL (ref 4.6–13.2)
WBC URNS QL MICRO: ABNORMAL /HPF (ref 0–5)
Y ENTEROCOL DNA STL QL NAA+NON-PROBE: NEGATIVE

## 2024-08-28 PROCEDURE — 87506 IADNA-DNA/RNA PROBE TQ 6-11: CPT

## 2024-08-28 PROCEDURE — 96376 TX/PRO/DX INJ SAME DRUG ADON: CPT

## 2024-08-28 PROCEDURE — 83690 ASSAY OF LIPASE: CPT

## 2024-08-28 PROCEDURE — 99285 EMERGENCY DEPT VISIT HI MDM: CPT

## 2024-08-28 PROCEDURE — 74177 CT ABD & PELVIS W/CONTRAST: CPT

## 2024-08-28 PROCEDURE — 85610 PROTHROMBIN TIME: CPT

## 2024-08-28 PROCEDURE — 85025 COMPLETE CBC W/AUTO DIFF WBC: CPT

## 2024-08-28 PROCEDURE — 81001 URINALYSIS AUTO W/SCOPE: CPT

## 2024-08-28 PROCEDURE — 6360000002 HC RX W HCPCS: Performed by: EMERGENCY MEDICINE

## 2024-08-28 PROCEDURE — 6360000002 HC RX W HCPCS: Performed by: STUDENT IN AN ORGANIZED HEALTH CARE EDUCATION/TRAINING PROGRAM

## 2024-08-28 PROCEDURE — 83735 ASSAY OF MAGNESIUM: CPT

## 2024-08-28 PROCEDURE — 6360000004 HC RX CONTRAST MEDICATION: Performed by: STUDENT IN AN ORGANIZED HEALTH CARE EDUCATION/TRAINING PROGRAM

## 2024-08-28 PROCEDURE — 96375 TX/PRO/DX INJ NEW DRUG ADDON: CPT

## 2024-08-28 PROCEDURE — 2580000003 HC RX 258: Performed by: STUDENT IN AN ORGANIZED HEALTH CARE EDUCATION/TRAINING PROGRAM

## 2024-08-28 PROCEDURE — 87086 URINE CULTURE/COLONY COUNT: CPT

## 2024-08-28 PROCEDURE — 80053 COMPREHEN METABOLIC PANEL: CPT

## 2024-08-28 PROCEDURE — 84703 CHORIONIC GONADOTROPIN ASSAY: CPT

## 2024-08-28 PROCEDURE — 96374 THER/PROPH/DIAG INJ IV PUSH: CPT

## 2024-08-28 RX ORDER — OXYCODONE AND ACETAMINOPHEN 5; 325 MG/1; MG/1
1 TABLET ORAL EVERY 6 HOURS PRN
Qty: 12 TABLET | Refills: 0 | Status: SHIPPED | OUTPATIENT
Start: 2024-08-28 | End: 2024-08-31

## 2024-08-28 RX ORDER — MORPHINE SULFATE 4 MG/ML
4 INJECTION, SOLUTION INTRAMUSCULAR; INTRAVENOUS
Status: COMPLETED | OUTPATIENT
Start: 2024-08-28 | End: 2024-08-28

## 2024-08-28 RX ORDER — CEPHALEXIN 500 MG/1
500 CAPSULE ORAL 2 TIMES DAILY
Qty: 14 CAPSULE | Refills: 0 | Status: SHIPPED | OUTPATIENT
Start: 2024-08-28 | End: 2024-09-04

## 2024-08-28 RX ORDER — IOPAMIDOL 612 MG/ML
100 INJECTION, SOLUTION INTRAVASCULAR
Status: COMPLETED | OUTPATIENT
Start: 2024-08-28 | End: 2024-08-28

## 2024-08-28 RX ORDER — PROMETHAZINE HYDROCHLORIDE 25 MG/1
25 TABLET ORAL EVERY 6 HOURS PRN
Qty: 28 TABLET | Refills: 0 | Status: SHIPPED | OUTPATIENT
Start: 2024-08-28 | End: 2024-09-04

## 2024-08-28 RX ADMIN — IOPAMIDOL 100 ML: 612 INJECTION, SOLUTION INTRAVENOUS at 03:21

## 2024-08-28 RX ADMIN — MORPHINE SULFATE 4 MG: 4 INJECTION, SOLUTION INTRAMUSCULAR; INTRAVENOUS at 02:06

## 2024-08-28 RX ADMIN — MORPHINE SULFATE 4 MG: 4 INJECTION, SOLUTION INTRAMUSCULAR; INTRAVENOUS at 04:14

## 2024-08-28 RX ADMIN — WATER 1000 MG: 1 INJECTION INTRAMUSCULAR; INTRAVENOUS; SUBCUTANEOUS at 04:15

## 2024-08-28 ASSESSMENT — PAIN SCALES - GENERAL: PAINLEVEL_OUTOF10: 10

## 2024-08-28 ASSESSMENT — PAIN - FUNCTIONAL ASSESSMENT: PAIN_FUNCTIONAL_ASSESSMENT: 0-10

## 2024-08-28 NOTE — ED PROVIDER NOTES
[facility] EMERGENCY DEPT  EMERGENCY DEPARTMENT ENCOUNTER      Pt Name: Robyn Parr  MRN: [id number]  Birthdate 1970  Date of evaluation: 8/28/2024  Provider: ADRIANE Villanueva  1:56 AM    CHIEF COMPLAINT       Chief Complaint   Patient presents with    Abdominal Pain         HISTORY OF PRESENT ILLNESS    Robyn Parr is a 54 y.o. female who presents to the emergency department with generalized abdominal pain bloody stools diarrhea nauseousness.  She has had her symptoms for upwards of a week and is expecting her delivery of Stelara to arrive on Friday.  Her GI doctor is with [facility].  History of bilateral tubal ligation.  Last colonoscopy last year.    HPI    Nursing Notes were reviewed.    REVIEW OF SYSTEMS       Review of Systems   Constitutional:  Negative for fever.   HENT: Negative.     Eyes: Negative.    Respiratory: Negative.     Cardiovascular: Negative.    Gastrointestinal:  Positive for abdominal pain, blood in stool, diarrhea and nausea. Negative for vomiting.   Endocrine: Negative.    Genitourinary: Negative.    Musculoskeletal: Negative.    Skin: Negative.    Allergic/Immunologic: Positive for immunocompromised state.   Neurological: Negative.    Hematological: Negative.    Psychiatric/Behavioral: Negative.         Except as noted above the remainder of the review of systems was reviewed and negative.       PAST MEDICAL HISTORY     Past Medical History:   Diagnosis Date    Cerebral artery occlusion with cerebral infarction (HCC)     Crohn's disease (HCC)     Hypertension     Sickle cell trait (HCC)          SURGICAL HISTORY     No past surgical history on file.      CURRENT MEDICATIONS       Previous Medications    ATORVASTATIN (LIPITOR) 80 MG TABLET    Take 80 mg by mouth    BUTALBITAL-APAP-CAFFEINE -40 MG CAPS PER CAPSULE    Take 1 capsule by mouth every 4 hours as needed    CARVEDILOL (COREG) 3.125 MG TABLET    Take 3.125 mg by mouth 2 times daily (with meals)    HYDROCORTISONE 0.5 %

## 2024-08-28 NOTE — DISCHARGE INSTRUCTIONS
Take the pain medication as needed.  Take the prescribed antibiotics for urinary tract infection.  Follow-up with your doctor and your GI doctor.  Return to the emergency department for any new or worsening symptoms

## 2024-08-29 LAB
BACTERIA SPEC CULT: NORMAL
CC UR VC: NORMAL
SERVICE CMNT-IMP: NORMAL

## 2024-08-30 ASSESSMENT — ENCOUNTER SYMPTOMS
NAUSEA: 1
BLOOD IN STOOL: 1
VOMITING: 0
RESPIRATORY NEGATIVE: 1
DIARRHEA: 1
ABDOMINAL PAIN: 1
EYES NEGATIVE: 1

## 2024-10-10 ENCOUNTER — APPOINTMENT (OUTPATIENT)
Facility: HOSPITAL | Age: 54
End: 2024-10-10
Payer: MEDICAID

## 2024-10-10 ENCOUNTER — HOSPITAL ENCOUNTER (EMERGENCY)
Facility: HOSPITAL | Age: 54
Discharge: HOME | End: 2024-10-10
Attending: EMERGENCY MEDICINE
Payer: MEDICAID

## 2024-10-10 VITALS
WEIGHT: 238 LBS | HEART RATE: 94 BPM | TEMPERATURE: 98.6 F | HEIGHT: 66 IN | BODY MASS INDEX: 38.25 KG/M2 | SYSTOLIC BLOOD PRESSURE: 146 MMHG | RESPIRATION RATE: 16 BRPM | OXYGEN SATURATION: 100 % | DIASTOLIC BLOOD PRESSURE: 97 MMHG

## 2024-10-10 DIAGNOSIS — N30.01 ACUTE CYSTITIS WITH HEMATURIA: Primary | ICD-10-CM

## 2024-10-10 LAB
ALBUMIN SERPL-MCNC: 3.7 G/DL (ref 3.4–5)
ALBUMIN/GLOB SERPL: 1.2 (ref 0.8–1.7)
ALP SERPL-CCNC: 94 U/L (ref 45–117)
ALT SERPL-CCNC: 24 U/L (ref 13–56)
ANION GAP SERPL CALC-SCNC: 2 MMOL/L (ref 3–18)
APPEARANCE UR: ABNORMAL
AST SERPL-CCNC: 10 U/L (ref 10–38)
BACTERIA URNS QL MICRO: ABNORMAL /HPF
BASOPHILS # BLD: 0 K/UL (ref 0–0.1)
BASOPHILS NFR BLD: 1 % (ref 0–2)
BILIRUB SERPL-MCNC: 0.2 MG/DL (ref 0.2–1)
BILIRUB UR QL: NEGATIVE
BUN SERPL-MCNC: 9 MG/DL (ref 7–18)
BUN/CREAT SERPL: 10 (ref 12–20)
CALCIUM SERPL-MCNC: 8.7 MG/DL (ref 8.5–10.1)
CHLORIDE SERPL-SCNC: 112 MMOL/L (ref 100–111)
CO2 SERPL-SCNC: 27 MMOL/L (ref 21–32)
COLOR UR: YELLOW
CREAT SERPL-MCNC: 0.88 MG/DL (ref 0.6–1.3)
DIFFERENTIAL METHOD BLD: ABNORMAL
EOSINOPHIL # BLD: 0.7 K/UL (ref 0–0.4)
EOSINOPHIL NFR BLD: 8 % (ref 0–5)
EPITH CASTS URNS QL MICRO: ABNORMAL /LPF (ref 0–5)
ERYTHROCYTE [DISTWIDTH] IN BLOOD BY AUTOMATED COUNT: 16.2 % (ref 11.6–14.5)
ERYTHROCYTE [SEDIMENTATION RATE] IN BLOOD: 29 MM/HR (ref 0–30)
GLOBULIN SER CALC-MCNC: 3.1 G/DL (ref 2–4)
GLUCOSE SERPL-MCNC: 94 MG/DL (ref 74–99)
GLUCOSE UR STRIP.AUTO-MCNC: NEGATIVE MG/DL
HCT VFR BLD AUTO: 34.6 % (ref 35–45)
HGB BLD-MCNC: 10.6 G/DL (ref 12–16)
HGB UR QL STRIP: ABNORMAL
IMM GRANULOCYTES # BLD AUTO: 0 K/UL (ref 0–0.04)
IMM GRANULOCYTES NFR BLD AUTO: 0 % (ref 0–0.5)
KETONES UR QL STRIP.AUTO: NEGATIVE MG/DL
LEUKOCYTE ESTERASE UR QL STRIP.AUTO: ABNORMAL
LIPASE SERPL-CCNC: 48 U/L (ref 13–75)
LYMPHOCYTES # BLD: 3.2 K/UL (ref 0.9–3.6)
LYMPHOCYTES NFR BLD: 37 % (ref 21–52)
MCH RBC QN AUTO: 23.3 PG (ref 24–34)
MCHC RBC AUTO-ENTMCNC: 30.6 G/DL (ref 31–37)
MCV RBC AUTO: 76.2 FL (ref 78–100)
MONOCYTES # BLD: 0.5 K/UL (ref 0.05–1.2)
MONOCYTES NFR BLD: 6 % (ref 3–10)
NEUTS SEG # BLD: 4.2 K/UL (ref 1.8–8)
NEUTS SEG NFR BLD: 48 % (ref 40–73)
NITRITE UR QL STRIP.AUTO: NEGATIVE
NRBC # BLD: 0 K/UL (ref 0–0.01)
NRBC BLD-RTO: 0 PER 100 WBC
PH UR STRIP: 6 (ref 5–8)
PLATELET # BLD AUTO: 292 K/UL (ref 135–420)
PMV BLD AUTO: 9.4 FL (ref 9.2–11.8)
POTASSIUM SERPL-SCNC: 3.6 MMOL/L (ref 3.5–5.5)
PROT SERPL-MCNC: 6.8 G/DL (ref 6.4–8.2)
PROT UR STRIP-MCNC: NEGATIVE MG/DL
RBC # BLD AUTO: 4.54 M/UL (ref 4.2–5.3)
RBC #/AREA URNS HPF: ABNORMAL /HPF (ref 0–5)
SODIUM SERPL-SCNC: 141 MMOL/L (ref 136–145)
SP GR UR REFRACTOMETRY: 1.02 (ref 1–1.03)
UROBILINOGEN UR QL STRIP.AUTO: 0.2 EU/DL (ref 0.2–1)
WBC # BLD AUTO: 8.7 K/UL (ref 4.6–13.2)
WBC URNS QL MICRO: ABNORMAL /HPF (ref 0–5)

## 2024-10-10 PROCEDURE — 6360000004 HC RX CONTRAST MEDICATION: Performed by: EMERGENCY MEDICINE

## 2024-10-10 PROCEDURE — 85025 COMPLETE CBC W/AUTO DIFF WBC: CPT

## 2024-10-10 PROCEDURE — 96374 THER/PROPH/DIAG INJ IV PUSH: CPT

## 2024-10-10 PROCEDURE — 6360000002 HC RX W HCPCS: Performed by: EMERGENCY MEDICINE

## 2024-10-10 PROCEDURE — 83690 ASSAY OF LIPASE: CPT

## 2024-10-10 PROCEDURE — 6370000000 HC RX 637 (ALT 250 FOR IP): Performed by: EMERGENCY MEDICINE

## 2024-10-10 PROCEDURE — 80053 COMPREHEN METABOLIC PANEL: CPT

## 2024-10-10 PROCEDURE — 81001 URINALYSIS AUTO W/SCOPE: CPT

## 2024-10-10 PROCEDURE — 85652 RBC SED RATE AUTOMATED: CPT

## 2024-10-10 PROCEDURE — 96375 TX/PRO/DX INJ NEW DRUG ADDON: CPT

## 2024-10-10 PROCEDURE — 74177 CT ABD & PELVIS W/CONTRAST: CPT

## 2024-10-10 PROCEDURE — 99285 EMERGENCY DEPT VISIT HI MDM: CPT

## 2024-10-10 RX ORDER — IOPAMIDOL 755 MG/ML
100 INJECTION, SOLUTION INTRAVASCULAR
Status: COMPLETED | OUTPATIENT
Start: 2024-10-10 | End: 2024-10-10

## 2024-10-10 RX ORDER — PHENAZOPYRIDINE HYDROCHLORIDE 100 MG/1
100 TABLET, FILM COATED ORAL 3 TIMES DAILY PRN
Qty: 6 TABLET | Refills: 0 | Status: SHIPPED | OUTPATIENT
Start: 2024-10-10 | End: 2024-10-12

## 2024-10-10 RX ORDER — CEFDINIR 300 MG/1
300 CAPSULE ORAL 2 TIMES DAILY
Qty: 14 CAPSULE | Refills: 0 | Status: SHIPPED | OUTPATIENT
Start: 2024-10-10 | End: 2024-10-17

## 2024-10-10 RX ORDER — CEFDINIR 300 MG/1
300 CAPSULE ORAL
Status: COMPLETED | OUTPATIENT
Start: 2024-10-10 | End: 2024-10-10

## 2024-10-10 RX ORDER — MORPHINE SULFATE 4 MG/ML
4 INJECTION, SOLUTION INTRAMUSCULAR; INTRAVENOUS
Status: COMPLETED | OUTPATIENT
Start: 2024-10-10 | End: 2024-10-10

## 2024-10-10 RX ORDER — KETOROLAC TROMETHAMINE 15 MG/ML
30 INJECTION, SOLUTION INTRAMUSCULAR; INTRAVENOUS
Status: COMPLETED | OUTPATIENT
Start: 2024-10-10 | End: 2024-10-10

## 2024-10-10 RX ADMIN — IOPAMIDOL 100 ML: 755 INJECTION, SOLUTION INTRAVENOUS at 16:03

## 2024-10-10 RX ADMIN — KETOROLAC TROMETHAMINE 30 MG: 15 INJECTION, SOLUTION INTRAMUSCULAR; INTRAVENOUS at 16:53

## 2024-10-10 RX ADMIN — MORPHINE SULFATE 4 MG: 4 INJECTION, SOLUTION INTRAMUSCULAR; INTRAVENOUS at 15:40

## 2024-10-10 RX ADMIN — CEFDINIR 300 MG: 300 CAPSULE ORAL at 16:52

## 2024-10-10 ASSESSMENT — PAIN DESCRIPTION - LOCATION: LOCATION: ABDOMEN

## 2024-10-10 ASSESSMENT — PAIN DESCRIPTION - ORIENTATION: ORIENTATION: RIGHT;LEFT;MID

## 2024-10-10 ASSESSMENT — PAIN SCALES - GENERAL: PAINLEVEL_OUTOF10: 8

## 2024-10-10 ASSESSMENT — PAIN DESCRIPTION - DESCRIPTORS: DESCRIPTORS: SHARP;STABBING

## 2024-10-10 NOTE — ED TRIAGE NOTES
Patient ambulatory to ED with cane c/o abdominal pain, believes its from her Crohn's flare up and dysuria. Denies any other urinary symptoms other than her dysuria x 1 week.

## 2024-10-12 NOTE — ED PROVIDER NOTES
EMERGENCY DEPARTMENT HISTORY AND PHYSICAL EXAM      Date: 10/10/2024  Patient Name: Robyn Parr    History of Presenting Illness     Chief Complaint   Patient presents with    Dysuria    Abdominal Pain       History Provided By: Patient    HPI: Robyn Parr, 54 y.o. female with PMHx as noted below presents to the ED with cc of abd cramping and dysuria.  Reports hx of Croh's and had noted some blood mixed with diarrhea recently concern for Crohn's flare.      Pt denies any other alleviating or exacerbating factors. Additionally, pt specifically denies any recent fever, chills, headache, nausea, vomiting,  CP, SOB, lightheadedness, dizziness, numbness, weakness, BLE swelling, heart palpitations, urinary sxs, constipation, , cough, or congestion.    PCP: Tato Matrin MD    No current facility-administered medications for this encounter.     Current Outpatient Medications   Medication Sig Dispense Refill    cefdinir (OMNICEF) 300 MG capsule Take 1 capsule by mouth 2 times daily for 7 days 14 capsule 0    phenazopyridine (PYRIDIUM) 100 MG tablet Take 1 tablet by mouth 3 times daily as needed for Pain 6 tablet 0    naproxen (NAPROSYN) 500 MG tablet Take 1 tablet by mouth 2 times daily 60 tablet 0    atorvastatin (LIPITOR) 80 MG tablet Take 80 mg by mouth      butalbital-APAP-caffeine -40 MG CAPS per capsule Take 1 capsule by mouth every 4 hours as needed      carvedilol (COREG) 3.125 MG tablet Take 3.125 mg by mouth 2 times daily (with meals)      hydrocortisone 0.5 % cream Apply topically 2 times daily      lisinopril (PRINIVIL;ZESTRIL) 10 MG tablet Take 10 mg by mouth daily      metroNIDAZOLE (FLAGYL) 500 MG tablet Take 500 mg by mouth in the morning and 500 mg at noon and 500 mg in the evening.      ondansetron (ZOFRAN) 4 MG tablet Take 4 mg by mouth every 8 hours as needed      predniSONE 10 MG (21) TBPK 10day pack      warfarin (COUMADIN) 7.5 MG tablet Take 7.5 mg by mouth daily         Past  complaints at this time      There were no concerns for any acute life-threatening or surgical pathology that would warrant admission at this time.  Vital signs were within acceptable limits at the time of discharge.  Patient was in stable condition and felt to be reliable for outpatient management.  There were no lab findings of immediate concern.  The patient was advised to return to the emergency room for acutely worsening pain, persistence of pain, fevers, bloody stools, intractable vomiting or bloody emesis, inability to tolerate food/liquids, syncope, or change in mental status.  Otherwise, the patient is encouraged to follow-up with a primary care physician as well as her gastroenterologist within the week if possible..  The patient understood the work-up and assessment and had no further questions.  The patient was discharged home in stable clinical condition.           ED Course:   Initial assessment performed. The patients presenting problems have been discussed, and they are in agreement with the care plan formulated and outlined with them.  I have encouraged them to ask questions as they arise throughout their visit.    Patient was given the following medications:  Medications   morphine sulfate (PF) injection 4 mg (4 mg IntraVENous Given 10/10/24 1540)   iopamidol (ISOVUE-370) 76 % injection 100 mL (100 mLs IntraVENous Given 10/10/24 1603)   cefdinir (OMNICEF) capsule 300 mg (300 mg Oral Given 10/10/24 1652)   ketorolac (TORADOL) injection 30 mg (30 mg IntraVENous Given 10/10/24 1653)                PROGRESS  Robyn Parr's  results have been reviewed with her.  She has been counseled regarding her diagnosis.  She verbally conveys understanding and agreement of the signs, symptoms, diagnosis, treatment and prognosis and additionally agrees to follow up as recommended with Tato Foley MD in 24 - 48 hours.  She also agrees with the care-plan and conveys that all of her questions have been

## 2024-10-24 ENCOUNTER — HOSPITAL ENCOUNTER (EMERGENCY)
Facility: HOSPITAL | Age: 54
Discharge: HOME | End: 2024-10-24
Payer: MEDICAID

## 2024-10-24 ENCOUNTER — APPOINTMENT (OUTPATIENT)
Facility: HOSPITAL | Age: 54
End: 2024-10-24
Payer: MEDICAID

## 2024-10-24 VITALS
OXYGEN SATURATION: 100 % | HEART RATE: 97 BPM | TEMPERATURE: 98.2 F | HEIGHT: 66 IN | DIASTOLIC BLOOD PRESSURE: 93 MMHG | WEIGHT: 238 LBS | RESPIRATION RATE: 16 BRPM | BODY MASS INDEX: 38.25 KG/M2 | SYSTOLIC BLOOD PRESSURE: 134 MMHG

## 2024-10-24 DIAGNOSIS — M25.561 ACUTE PAIN OF RIGHT KNEE: ICD-10-CM

## 2024-10-24 DIAGNOSIS — M17.11 OSTEOARTHRITIS OF RIGHT KNEE, UNSPECIFIED OSTEOARTHRITIS TYPE: Primary | ICD-10-CM

## 2024-10-24 LAB — ECHO BSA: 2.24 M2

## 2024-10-24 PROCEDURE — 93971 EXTREMITY STUDY: CPT

## 2024-10-24 PROCEDURE — 73562 X-RAY EXAM OF KNEE 3: CPT

## 2024-10-24 PROCEDURE — 99284 EMERGENCY DEPT VISIT MOD MDM: CPT

## 2024-10-24 PROCEDURE — 6370000000 HC RX 637 (ALT 250 FOR IP)

## 2024-10-24 RX ORDER — ACETAMINOPHEN 325 MG/1
650 TABLET ORAL
Status: COMPLETED | OUTPATIENT
Start: 2024-10-24 | End: 2024-10-24

## 2024-10-24 RX ADMIN — ACETAMINOPHEN 650 MG: 325 TABLET ORAL at 16:04

## 2024-10-24 ASSESSMENT — PAIN DESCRIPTION - PAIN TYPE: TYPE: ACUTE PAIN

## 2024-10-24 ASSESSMENT — PAIN - FUNCTIONAL ASSESSMENT: PAIN_FUNCTIONAL_ASSESSMENT: 0-10

## 2024-10-24 ASSESSMENT — PAIN SCALES - GENERAL: PAINLEVEL_OUTOF10: 10

## 2024-10-24 ASSESSMENT — PAIN DESCRIPTION - LOCATION: LOCATION: KNEE

## 2024-10-24 ASSESSMENT — PAIN DESCRIPTION - DESCRIPTORS: DESCRIPTORS: ACHING

## 2024-10-24 ASSESSMENT — PAIN DESCRIPTION - ORIENTATION: ORIENTATION: RIGHT

## 2024-10-24 NOTE — ED TRIAGE NOTES
Pt c/o R knee pain x 2 weeks. Pt reports she has been exercising and that flared up her knee. Pt denies any injuries. Pt has +PMS. Pt has L sided weakness from previous stroke.

## 2024-10-24 NOTE — ED PROVIDER NOTES
disagreements were addressed in the HPI.    PAST HISTORY     Past Medical History:  Past Medical History:   Diagnosis Date    Cerebral artery occlusion with cerebral infarction (HCC)     Crohn's disease (HCC)     Hypertension     Sickle cell trait (HCC)        Past Surgical History:  No past surgical history on file.    Family History:  No family history on file.    Social History:  Social History     Socioeconomic History    Marital status:    Tobacco Use    Smoking status: Never   Substance and Sexual Activity    Alcohol use: Not Currently    Drug use: Never     Social Determinants of Health     Transportation Needs: No Transportation Needs (1/18/2024)    Received from [facility]    OASIS : Transportation     Lack of Transportation (Medical): No     Lack of Transportation (Non-Medical): No     Patient Unable or Declines to Respond: No   Social Connections: Feeling Socially Integrated (1/18/2024)    Received from [facility]    OASIS : Social Isolation     Frequency of experiencing loneliness or isolation: Never   Intimate Partner Violence: Unknown (5/20/2024)    Received from [facility]    Humiliation, Afraid, Rape, and Kick questionnaire     Physically Abused: No       Allergies:  Allergies   Allergen Reactions    Adalimumab Other (See Comments)    Infliximab Palpitations    Tramadol Hives and Itching       CURRENT MEDICATIONS      No current facility-administered medications for this encounter.     Current Outpatient Medications   Medication Sig Dispense Refill    diclofenac sodium (VOLTAREN) 1 % GEL Apply 4 g topically 4 times daily 100 g 0    atorvastatin (LIPITOR) 80 MG tablet Take 80 mg by mouth      butalbital-APAP-caffeine -40 MG CAPS per capsule Take 1 capsule by mouth every 4 hours as needed      carvedilol (COREG) 3.125 MG tablet Take 3.125 mg by mouth 2 times daily (with meals)      hydrocortisone 0.5 % cream Apply topically 2 times daily

## 2025-02-16 NOTE — INTERDISCIPLINARY ROUNDS
Attempted to ambulate pt but was barely able to stand with assistance and was complaining of severe left leg pain. Pt assisted back into bed to prevent falling.    Interdisciplinary Round Note   Patient Information: Fariba Seaman                                      498/70 Reason for Admission: Rectal bleed, crohns, anemia, dizziness.   GI bleed  Quality Measure: Not applicable            Attending Provider:   Bisi Ricks DO  Primary Care Physician:       None       None  Consulting:  IP CONSULT TO HOSPITALIST  IP CONSULT TO GASTROENTEROLOGY  IP CONSULT TO GENERAL SURGERY  IP CONSULT TO VASCULAR SURGERY  IDR Rounding Physician:  Past Medical History:   Past Medical History:   Diagnosis Date    Abdominal pain     Anemia NEC     sickle cell trait    C. difficile colitis     [facility] [address]     Gastrointestinal disorder     Crohns    Herpes zoster     Hx of cocaine abuse     Hyperkalemia     Hypertension     Iron deficiency anemia     MRSA (methicillin resistant Staphylococcus aureus)     Obesity     Pain management     Sickle cell trait ([facility] [address]     Stroke ([facility] [address]     + cva 3/17        Hospital day: 7                          3d 4h  Estimated discharge date:   RRAT Score: Medium Risk            16       Total Score        3 Relationship with PCP    3 Patient Length of Stay > 5    4 More than 1 Admission in calendar year    4 Patient Insurance is Medicare, Medicaid or Self Pay    2 Charlson Comorbidity Score        Criteria that do not apply:    Patient Living Status         Primary Goals for Today: pain control, monitor PTT    Secondary Goals for Today: continue Lovenox, Coumadin    Overnight Events: no    Gaming: no    Central Line: yes    Isolation: no       IV Antibiotics? no       When started: n/a  Current Medication List:          Current Facility-Administered Medications   Medication Dose Route Frequency    methotrexate (PF) chemo syringe 25 mg  25 mg SubCUTAneous ONCE    predniSONE (DELTASONE) tablet 40 mg  40 mg Oral DAILY WITH BREAKFAST    gabapentin (NEURONTIN) capsule 200 mg  200 mg Oral TID    atorvastatin (LIPITOR) tablet 80 mg  80 mg Oral QHS    carvedilol (COREG) tablet 3.125 mg  3.125 mg Oral BID WITH MEALS    enoxaparin (LOVENOX) injection 60 mg  60 mg SubCUTAneous Q12H    multivitamin, tx-iron-ca-min (THERA-M w/ IRON) tablet 1 Tab  1 Tab Oral DAILY    insulin lispro (HUMALOG) injection   SubCUTAneous AC&HS    glucose chewable tablet 16 g  4 Tab Oral PRN    glucagon (GLUCAGEN) injection 1 mg  1 mg IntraMUSCular PRN    dextrose (D50W) injection syrg 12.5-25 g  25-50 mL IntraVENous PRN    docusate sodium (COLACE) capsule 100 mg  100 mg Oral BID    Warfarin- Rx to Dose & Monitor  1 Each Other Rx Dosing/Monitoring    morphine injection 2 mg  2 mg IntraVENous Q3H PRN    0.9% sodium chloride infusion 250 mL  250 mL IntraVENous PRN    mesalamine (PENTASA) CR capsule 1,000 mg  1,000 mg Oral QID    oxyCODONE-acetaminophen (PERCOCET) 5-325 mg per tablet 1 Tab  1 Tab Oral Q4H PRN    acetaminophen (TYLENOL) tablet 650 mg  650 mg Oral Q4H PRN    diphenhydrAMINE (BENADRYL) injection 12.5 mg  12.5 mg IntraVENous Q4H PRN    ondansetron (ZOFRAN) injection 4 mg  4 mg IntraVENous Q6H PRN    0.9% sodium chloride infusion 250 mL  250 mL IntraVENous PRN    0.9% sodium chloride infusion 250 mL  250 mL IntraVENous PRN    0.9% sodium chloride infusion 250 mL  250 mL IntraVENous PRN    0.9% sodium chloride infusion 250 mL  250 mL IntraVENous PRN    0.9% sodium chloride infusion 250 mL  250 mL IntraVENous PRN     Facility-Administered Medications Ordered in Other Encounters   Medication Dose Route Frequency    [START ON 4/7/2017] vedolizumab (ENTYVIO) 300 mg in 0.9% sodium chloride 250 mL infusion  300 mg IntraVENous ONCE      VTE Prophylaxis               Sequential Compression Device: Bilateral                 Lines, Drains, & Airways  Triple Lumen 03/28/17 Right Subclavian-Site Assessment: Clean, dry, & intact  [REMOVED] Peripheral IV 03/27/17 Left Antecubital-Site Assessment: Clean, dry, & intact  [REMOVED] Peripheral IV 03/27/17 Left Forearm-Site Assessment: Clean, dry, & intact     Vital signs:  Visit Vitals    /86 (BP 1 Location: Left arm, BP Patient Position: At rest)    Pulse 80    Temp 97.9 °F (36.6 °C)    Resp 20    Ht 5' 6\" (1.676 m)    Wt 87.3 kg (192 lb 8 oz)    SpO2 100%    Breastfeeding No    BMI 31.07 kg/m2        Intake and Output:   04/01 0701 - 04/02 1900  In: 1890 [P.O.:1890]  Out: 200 [Urine:200]  04/02 1901 - 04/03 0700  In: 240 [P.O.:240]  Out: 350 [Urine:350]  Last Bowel Movement Date: 04/02/17  Stool Color: Brown (dark)  Stool Appearance: Soft  Stool Amount: Large  Stool Source/Status: Rectum     Current Diet: DIET CARDIAC Regular; Low Fiber       Abdominal   Abdominal Assessment: Intact  Appetite: Good  Bowel Sounds: Active   Nutrition  Chewing/Swallowing Problems: No  Difficulty with Secretions: No  Speech Slurred/Thick/Garbled: No    NGT: no    Feeding Tube: no   Recent Glucose Results:   Lab Results   Component Value Date/Time     (H) 04/02/2017 04:00 AM    GLUCPOC 123 (H) 04/02/2017 05:31 PM    GLUCPOC 124 (H) 04/02/2017 11:55 AM    GLUCPOC 116 (H) 04/02/2017 07:51 AM    GI Prophylaxis: no        Type: n/a        Activity Level:   Activity Level: Up ad hanane    Needs assistance with ADLs: no  PT Consult Status: ***  Equipment: ***  Surgical/Ortho Notes: ***   Current Immunizations:  Immunization History   Administered Date(s) Administered    Influenza Vaccine 11/01/2016    Pneumococcal Vaccine (Unspecified Type) 01/01/2010     RRAT Score:     Readmit Risk Tool  Support Systems: Family member(s)  Relationship with Primary Physician Group: Seen at least one time within the past 6 months   Recommendations:       Discharge Disposition: {PT D/C Recommendations:95641}    Needs for Discharge: ***           Recommendations from IDR team: ***    Other Notes: ***

## 2025-03-10 ENCOUNTER — APPOINTMENT (OUTPATIENT)
Facility: HOSPITAL | Age: 55
End: 2025-03-10
Payer: MEDICAID

## 2025-03-10 ENCOUNTER — HOSPITAL ENCOUNTER (EMERGENCY)
Facility: HOSPITAL | Age: 55
Discharge: HOME | End: 2025-03-10
Payer: MEDICAID

## 2025-03-10 VITALS
HEIGHT: 66 IN | BODY MASS INDEX: 39.86 KG/M2 | TEMPERATURE: 99 F | RESPIRATION RATE: 16 BRPM | WEIGHT: 248 LBS | SYSTOLIC BLOOD PRESSURE: 134 MMHG | HEART RATE: 97 BPM | DIASTOLIC BLOOD PRESSURE: 80 MMHG | OXYGEN SATURATION: 100 %

## 2025-03-10 DIAGNOSIS — R10.84 GENERALIZED ABDOMINAL PAIN: Primary | ICD-10-CM

## 2025-03-10 DIAGNOSIS — K50.10 CROHN'S DISEASE OF COLON WITHOUT COMPLICATION (HCC): ICD-10-CM

## 2025-03-10 LAB
ALBUMIN SERPL-MCNC: 3.8 G/DL (ref 3.4–5)
ALBUMIN/GLOB SERPL: 1.2 (ref 0.8–1.7)
ALP SERPL-CCNC: 91 U/L (ref 45–117)
ALT SERPL-CCNC: 19 U/L (ref 13–56)
ANION GAP SERPL CALC-SCNC: 5 MMOL/L (ref 3–18)
AST SERPL-CCNC: 11 U/L (ref 10–38)
BASOPHILS # BLD: 0.03 K/UL (ref 0–0.1)
BASOPHILS NFR BLD: 0.4 % (ref 0–2)
BILIRUB SERPL-MCNC: 0.3 MG/DL (ref 0.2–1)
BUN SERPL-MCNC: 10 MG/DL (ref 7–18)
BUN/CREAT SERPL: 11 (ref 12–20)
CALCIUM SERPL-MCNC: 8.6 MG/DL (ref 8.5–10.1)
CHLORIDE SERPL-SCNC: 114 MMOL/L (ref 100–111)
CO2 SERPL-SCNC: 27 MMOL/L (ref 21–32)
CREAT SERPL-MCNC: 0.94 MG/DL (ref 0.6–1.3)
DIFFERENTIAL METHOD BLD: ABNORMAL
EKG ATRIAL RATE: 91 BPM
EKG DIAGNOSIS: NORMAL
EKG P AXIS: 33 DEGREES
EKG P-R INTERVAL: 176 MS
EKG Q-T INTERVAL: 434 MS
EKG QRS DURATION: 150 MS
EKG QTC CALCULATION (BAZETT): 533 MS
EKG R AXIS: 74 DEGREES
EKG T AXIS: 54 DEGREES
EKG VENTRICULAR RATE: 91 BPM
EOSINOPHIL # BLD: 0.32 K/UL (ref 0–0.4)
EOSINOPHIL NFR BLD: 4 % (ref 0–5)
ERYTHROCYTE [DISTWIDTH] IN BLOOD BY AUTOMATED COUNT: 16.3 % (ref 11.6–14.5)
GLOBULIN SER CALC-MCNC: 3.1 G/DL (ref 2–4)
GLUCOSE SERPL-MCNC: 90 MG/DL (ref 74–99)
HCT VFR BLD AUTO: 35.9 % (ref 35–45)
HEMOCCULT STL QL: NEGATIVE
HGB BLD-MCNC: 11.2 G/DL (ref 12–16)
IMM GRANULOCYTES # BLD AUTO: 0.01 K/UL (ref 0–0.04)
IMM GRANULOCYTES NFR BLD AUTO: 0.1 % (ref 0–0.5)
LIPASE SERPL-CCNC: 30 U/L (ref 13–75)
LYMPHOCYTES # BLD: 2.48 K/UL (ref 0.9–3.3)
LYMPHOCYTES NFR BLD: 31.3 % (ref 21–52)
MCH RBC QN AUTO: 23.4 PG (ref 24–34)
MCHC RBC AUTO-ENTMCNC: 31.2 G/DL (ref 31–37)
MCV RBC AUTO: 75.1 FL (ref 78–100)
MONOCYTES # BLD: 0.58 K/UL (ref 0.05–1.2)
MONOCYTES NFR BLD: 7.3 % (ref 3–10)
NEUTS SEG # BLD: 4.5 K/UL (ref 1.8–8)
NEUTS SEG NFR BLD: 56.9 % (ref 40–73)
NRBC # BLD: 0.02 K/UL (ref 0–0.01)
NRBC BLD-RTO: 0.3 PER 100 WBC
PLATELET # BLD AUTO: 263 K/UL (ref 135–420)
PMV BLD AUTO: 9.9 FL (ref 9.2–11.8)
POTASSIUM SERPL-SCNC: 3.4 MMOL/L (ref 3.5–5.5)
PROT SERPL-MCNC: 6.9 G/DL (ref 6.4–8.2)
RBC # BLD AUTO: 4.78 M/UL (ref 4.2–5.3)
SODIUM SERPL-SCNC: 146 MMOL/L (ref 136–145)
WBC # BLD AUTO: 7.9 K/UL (ref 4.6–13.2)

## 2025-03-10 PROCEDURE — 96374 THER/PROPH/DIAG INJ IV PUSH: CPT

## 2025-03-10 PROCEDURE — 96375 TX/PRO/DX INJ NEW DRUG ADDON: CPT

## 2025-03-10 PROCEDURE — 83690 ASSAY OF LIPASE: CPT

## 2025-03-10 PROCEDURE — 2580000003 HC RX 258

## 2025-03-10 PROCEDURE — 93010 ELECTROCARDIOGRAM REPORT: CPT | Performed by: INTERNAL MEDICINE

## 2025-03-10 PROCEDURE — 82272 OCCULT BLD FECES 1-3 TESTS: CPT

## 2025-03-10 PROCEDURE — 85025 COMPLETE CBC W/AUTO DIFF WBC: CPT

## 2025-03-10 PROCEDURE — 99284 EMERGENCY DEPT VISIT MOD MDM: CPT

## 2025-03-10 PROCEDURE — 93005 ELECTROCARDIOGRAM TRACING: CPT | Performed by: EMERGENCY MEDICINE

## 2025-03-10 PROCEDURE — 74176 CT ABD & PELVIS W/O CONTRAST: CPT

## 2025-03-10 PROCEDURE — 6360000002 HC RX W HCPCS

## 2025-03-10 PROCEDURE — 6370000000 HC RX 637 (ALT 250 FOR IP)

## 2025-03-10 PROCEDURE — 80053 COMPREHEN METABOLIC PANEL: CPT

## 2025-03-10 RX ORDER — 0.9 % SODIUM CHLORIDE 0.9 %
1000 INTRAVENOUS SOLUTION INTRAVENOUS ONCE
Status: COMPLETED | OUTPATIENT
Start: 2025-03-10 | End: 2025-03-10

## 2025-03-10 RX ORDER — IOPAMIDOL 612 MG/ML
100 INJECTION, SOLUTION INTRAVASCULAR
Status: DISCONTINUED | OUTPATIENT
Start: 2025-03-10 | End: 2025-03-10

## 2025-03-10 RX ORDER — OXYCODONE HYDROCHLORIDE 5 MG/1
5 TABLET ORAL
Refills: 0 | Status: COMPLETED | OUTPATIENT
Start: 2025-03-10 | End: 2025-03-10

## 2025-03-10 RX ORDER — MORPHINE SULFATE 2 MG/ML
2 INJECTION, SOLUTION INTRAMUSCULAR; INTRAVENOUS
Refills: 0 | Status: COMPLETED | OUTPATIENT
Start: 2025-03-10 | End: 2025-03-10

## 2025-03-10 RX ORDER — ONDANSETRON 2 MG/ML
4 INJECTION INTRAMUSCULAR; INTRAVENOUS
Status: COMPLETED | OUTPATIENT
Start: 2025-03-10 | End: 2025-03-10

## 2025-03-10 RX ORDER — OXYCODONE HYDROCHLORIDE 5 MG/1
5 TABLET ORAL EVERY 6 HOURS PRN
Qty: 12 TABLET | Refills: 0 | Status: SHIPPED | OUTPATIENT
Start: 2025-03-10 | End: 2025-03-13

## 2025-03-10 RX ADMIN — MORPHINE SULFATE 2 MG: 2 INJECTION, SOLUTION INTRAMUSCULAR; INTRAVENOUS at 14:50

## 2025-03-10 RX ADMIN — OXYCODONE HYDROCHLORIDE 5 MG: 5 TABLET ORAL at 18:50

## 2025-03-10 RX ADMIN — ONDANSETRON 4 MG: 2 INJECTION, SOLUTION INTRAMUSCULAR; INTRAVENOUS at 14:48

## 2025-03-10 RX ADMIN — SODIUM CHLORIDE 1000 ML: 0.9 INJECTION, SOLUTION INTRAVENOUS at 14:32

## 2025-03-10 ASSESSMENT — PAIN SCALES - GENERAL: PAINLEVEL_OUTOF10: 8

## 2025-03-10 ASSESSMENT — PAIN DESCRIPTION - ORIENTATION: ORIENTATION: MID

## 2025-03-10 ASSESSMENT — PAIN DESCRIPTION - PAIN TYPE: TYPE: ACUTE PAIN;CHRONIC PAIN

## 2025-03-10 ASSESSMENT — PAIN DESCRIPTION - DESCRIPTORS: DESCRIPTORS: ACHING

## 2025-03-10 ASSESSMENT — PAIN DESCRIPTION - FREQUENCY: FREQUENCY: INTERMITTENT

## 2025-03-10 ASSESSMENT — PAIN DESCRIPTION - LOCATION: LOCATION: ABDOMEN

## 2025-03-10 ASSESSMENT — PAIN - FUNCTIONAL ASSESSMENT: PAIN_FUNCTIONAL_ASSESSMENT: 0-10

## (undated) DEVICE — PENCIL ES L3M BTTN SWCH S STL HEX LOK BLDE ELECTRD HOLSTER

## (undated) DEVICE — MAJ-1414 SINGLE USE ADPATER BIOPSY VALV: Brand: SINGLE USE ADAPTOR BIOPSY VALVE

## (undated) DEVICE — 3M(TM) MEDIPORE(TM) +PAD SOFT CLOTH ADHESIVE WOUND DRESSING 3566: Brand: 3M™ MEDIPORE™

## (undated) DEVICE — SUTURE MCRYL SZ 2-0 L36IN ABSRB UD L36MM CT-1 1/2 CIR Y945H

## (undated) DEVICE — APPLIER LIG CLP M L11IN TI STR RNG HNDL FOR 20 CLP DISP

## (undated) DEVICE — BLADE ASSEMB CLP HAIR FINE --

## (undated) DEVICE — NDL PRT INJ NSAF BLNT 18GX1.5 --

## (undated) DEVICE — SUTURE PERMAHAND SZ 2-0 L30IN NONABSORBABLE BLK SILK W/O A305H

## (undated) DEVICE — 3M™ STERI-STRIP™ COMPOUND BENZOIN TINCTURE 40 BAGS/CARTON 4 CARTONS/CASE C1544: Brand: 3M™ STERI-STRIP™

## (undated) DEVICE — SUTURE PERMAHAND SZ 4-0 L12X30IN NONABSORBABLE BLK SILK A303H

## (undated) DEVICE — SUT MONOCRYL PLUS UD 3-0 --

## (undated) DEVICE — TABLE COVER: Brand: CONVERTORS

## (undated) DEVICE — KENDALL RADIOLUCENT FOAM MONITORING ELECTRODE RECTANGULAR SHAPE: Brand: KENDALL

## (undated) DEVICE — SINGLE PORT MANIFOLD: Brand: NEPTUNE 2

## (undated) DEVICE — APPLIER CLP L9.375IN APER 2.1MM CLS L3.8MM 20 SM TI CLP

## (undated) DEVICE — (D)COVER PD SURG 24X40IN LF -- DISC BY MFR NO SUB

## (undated) DEVICE — CATH SUC CTRL PRT TRIFLO 14FR --

## (undated) DEVICE — CATH IV SAFE STR 22GX1IN BLU -- PROTECTIV PLUS

## (undated) DEVICE — 3M™ BAIR PAWS FLEX™ WARMING GOWN, STANDARD, 20 PER CASE 81003: Brand: BAIR PAWS™

## (undated) DEVICE — MEDI-VAC NON-CONDUCTIVE SUCTION TUBING: Brand: CARDINAL HEALTH

## (undated) DEVICE — LIGHT HANDLE: Brand: DEVON

## (undated) DEVICE — SUTURE PROL SZ 5-0 L24IN NONABSORBABLE BLU L9.3MM BV-1 3/8 9702H

## (undated) DEVICE — TRAP MUCUS SPECIMEN 40ML -- MEDICHOICE

## (undated) DEVICE — SUT VCRL + 2-0 36IN CT1 UD --

## (undated) DEVICE — TRNQT TEXT 1X18IN BLU LF DISP -- CONVERT TO ITEM 362165

## (undated) DEVICE — SPONGE GZ W4XL4IN COT 12 PLY TYP VII WVN C FLD DSGN

## (undated) DEVICE — NDL FLTR TIP 5 MIC 18GX1.5IN --

## (undated) DEVICE — DERMABOND SKIN ADH 0.7ML -- DERMABOND ADVANCED 12/BX

## (undated) DEVICE — Device

## (undated) DEVICE — CANNULA CUSH AD W/ 14FT TBG

## (undated) DEVICE — SLIM BODY SKIN STAPLER: Brand: APPOSE ULC

## (undated) DEVICE — INTENDED FOR TISSUE SEPARATION, AND OTHER PROCEDURES THAT REQUIRE A SHARP SURGICAL BLADE TO PUNCTURE OR CUT.: Brand: BARD-PARKER SAFETY BLADES SIZE 11, STERILE

## (undated) DEVICE — SUT MONOCRYL PLUS UD 4-0 --

## (undated) DEVICE — SPONGE LAP 18X18IN STRL -- 5/PK

## (undated) DEVICE — SET ADMIN 16ML TBNG L100IN 2 Y INJ SITE IV PIGGY BK DISP

## (undated) DEVICE — O.R TOWEL, X-RAY DETECTABLE: Brand: DEROYAL

## (undated) DEVICE — GLOVE SURG SZ 7 L11.33IN FNGR THK9.8MIL STRW LTX POLYMER

## (undated) DEVICE — (D)PREP SKN CHLRAPRP APPL 26ML -- CONVERT TO ITEM 371833

## (undated) DEVICE — SOLUTION IV 500ML 0.9% SOD CHL FLX CONT

## (undated) DEVICE — APPLIER CLP AUTO MED 9.75 IN TI SURGCLP SUPER INTLOK 20 DISP

## (undated) DEVICE — ENDO CARRY-ON PROCEDURE KIT INCLUDES ENZYMATIC SPONGE, GAUZE, BIOHAZARD LABEL, TRAY, LUBRICANT, DIRTY SCOPE LABEL, WATER LABEL, TRAY, DRAWSTRING PAD, AND DEFENDO 4-PIECE KIT.: Brand: ENDO CARRY-ON PROCEDURE KIT

## (undated) DEVICE — SYRINGE 50ML E/T

## (undated) DEVICE — HEX-LOCKING BLADE ELECTRODE: Brand: EDGE

## (undated) DEVICE — SUT PROL 6-0 24IN BV1 DA BLU --

## (undated) DEVICE — DRAPE XR C ARM 41X74IN LF --

## (undated) DEVICE — FOGARTY ARTERIAL EMBOLECTOMY CATHETER 4F 40CM: Brand: FOGARTY

## (undated) DEVICE — SOLUTION IV 1000ML 0.9% SOD CHL

## (undated) DEVICE — SYR 3ML LL TIP 1/10ML GRAD --

## (undated) DEVICE — REM POLYHESIVE ADULT PATIENT RETURN ELECTRODE: Brand: VALLEYLAB

## (undated) DEVICE — TRAY CATH OD16FR SIL URIN M STATLOK STBL DEV SURSTP

## (undated) DEVICE — SUT PROL 6-0 30IN C1 DA BLU --

## (undated) DEVICE — 3M(TM) MEDIPORE(TM) +PAD SOFT CLOTH ADHESIVE WOUND DRESSING 3569: Brand: 3M™ MEDIPORE™

## (undated) DEVICE — FOGARTY ARTERIAL EMBOLECTOMY CATHETER 3F 40CM: Brand: FOGARTY

## (undated) DEVICE — SYR 5ML 1/5 GRAD LL NSAF LF --

## (undated) DEVICE — 3-0 COATED VICRYL PLUS UNDYED 1X27" SH --

## (undated) DEVICE — (D)GEL US MEDIA TRNSMITAQSNC -- DISCK FROM MFR

## (undated) DEVICE — SPONGE GZ W4XL4IN RAYON POLY 4 PLY NONWOVEN FASTER WICKING

## (undated) DEVICE — 3M™ COBAN™ NL STERILE NON-LATEX SELF-ADHERENT WRAP, 2086S, 6 IN X 5 YD (15 CM X 4,5 M), 12 ROLLS/CASE: Brand: 3M™ COBAN™

## (undated) DEVICE — SUTURE PERMAHAND SZ 3-0 L30IN NONABSORBABLE BLK W/O NDL SA84H

## (undated) DEVICE — TRAP SPEC COLL POLYP POLYSTYR --

## (undated) DEVICE — WRISTBAND ID AD W2.5XL9.5CM RED VYN ADH CLSR UNI-PRINT